# Patient Record
Sex: MALE | Race: WHITE | Employment: OTHER | URBAN - METROPOLITAN AREA
[De-identification: names, ages, dates, MRNs, and addresses within clinical notes are randomized per-mention and may not be internally consistent; named-entity substitution may affect disease eponyms.]

---

## 2017-02-28 DIAGNOSIS — E78.5 HYPERLIPIDEMIA LDL GOAL <100: ICD-10-CM

## 2017-02-28 DIAGNOSIS — M10.9 GOUT, UNSPECIFIED CAUSE, UNSPECIFIED CHRONICITY, UNSPECIFIED SITE: ICD-10-CM

## 2017-02-28 DIAGNOSIS — I10 BENIGN ESSENTIAL HYPERTENSION: ICD-10-CM

## 2017-02-28 NOTE — TELEPHONE ENCOUNTER
"Reason for Call:  Medication or medication refill:    Do you use a [facility]?  Name of the pharmacy and phone number for the current request:  [facility] [address] [place] [address] [address] AT [facility] OF  [address]      Name of the medication requested: furosemide (LASIX) 20 MG tablet  allopurinol (ZYLOPRIM) 300 MG tablet  amLODIPine (NORVASC) 10 MG tablet  simvastatin (ZOCOR) 20 MG tablet  metoprolol (TOPROL-XL) 50 MG 24 hr tablet  losartan (COZAAR) 100 MG tablet    Other request: pt changed pharmacy;  Ran out of \"some\" of them and wants to  Pick all of them up today    Can we leave a detailed message on this number? YES    Phone number patient can be reached at: Home number on file [phone] (home)    Best Time:     Call taken on 2/28/2017 at 12:45 PM by Belkis Heller      "

## 2017-02-28 NOTE — TELEPHONE ENCOUNTER
Lasix, amlodipine, metoprolol and losartan      Last Written Prescription Date: 09/08/16  Last Fill Quantity: 90, # refills: 3  Last Office Visit with [facility], [facility] or [facility] prescribing provider: 09/08/16       Potassium   Date Value Ref Range Status   09/08/2016 3.8 3.4 - 5.3 mmol/L Final     Creatinine   Date Value Ref Range Status   09/08/2016 1.20 0.66 - 1.25 mg/dL Final     BP Readings from Last 3 Encounters:   09/08/16 138/82   06/02/15 136/66   05/25/15 144/65     allopurinol       Last Written Prescription Date: 09/08/16  Last Fill Quantity: 90, # refills: 3  Last Office Visit with [facility], [facility] or [facility] prescribing provider:  09/08/16        No results found for: URIC]  Creatinine   Date Value Ref Range Status   09/08/2016 1.20 0.66 - 1.25 mg/dL Final   ]  Lab Results   Component Value Date    WBC 7.7 09/08/2016     Lab Results   Component Value Date    RBC 5.43 09/08/2016     Lab Results   Component Value Date    HGB 14.3 09/08/2016     Lab Results   Component Value Date    HCT 44.1 09/08/2016     No components found for: MCT  Lab Results   Component Value Date    MCV 81 09/08/2016     Lab Results   Component Value Date    MCH 26.3 09/08/2016     Lab Results   Component Value Date    MCHC 32.4 09/08/2016     Lab Results   Component Value Date    RDW 16.2 09/08/2016     Lab Results   Component Value Date     09/08/2016     Lab Results   Component Value Date    AST 10 09/08/2016     Lab Results   Component Value Date    ALT 22 09/08/2016     simvastatin     Last Written Prescription Date: 09/08/16  Last Fill Quantity: 90, # refills: 3  Last Office Visit with [facility], [facility] or [facility] prescribing provider: 09/08/16       Lab Results   Component Value Date    CHOL 217 09/08/2016     Lab Results   Component Value Date    HDL 53 09/08/2016     Lab Results   Component Value Date     09/08/2016     Lab Results   Component Value Date    TRIG 265 09/08/2016     Lab Results   Component Value Date    CHOLHDLRATIO  2.8 02/26/2015

## 2017-03-02 RX ORDER — SIMVASTATIN 20 MG
20 TABLET ORAL AT BEDTIME
Qty: 90 TABLET | Refills: 1 | Status: SHIPPED | OUTPATIENT
Start: 2017-03-02 | End: 2018-08-23

## 2017-03-02 RX ORDER — FUROSEMIDE 20 MG
TABLET ORAL
Qty: 90 TABLET | Refills: 1 | Status: SHIPPED | OUTPATIENT
Start: 2017-03-02 | End: 2018-08-23

## 2017-03-02 RX ORDER — LOSARTAN POTASSIUM 100 MG/1
100 TABLET ORAL DAILY
Qty: 90 TABLET | Refills: 1 | Status: SHIPPED | OUTPATIENT
Start: 2017-03-02 | End: 2018-08-23

## 2017-03-02 RX ORDER — AMLODIPINE BESYLATE 10 MG/1
10 TABLET ORAL DAILY
Qty: 90 TABLET | Refills: 1 | Status: SHIPPED | OUTPATIENT
Start: 2017-03-02 | End: 2018-08-23

## 2017-03-02 RX ORDER — METOPROLOL SUCCINATE 50 MG/1
50 TABLET, EXTENDED RELEASE ORAL DAILY
Qty: 90 TABLET | Refills: 1 | Status: SHIPPED | OUTPATIENT
Start: 2017-03-02 | End: 2018-08-23

## 2017-03-02 RX ORDER — ALLOPURINOL 300 MG/1
300 TABLET ORAL DAILY
Qty: 90 TABLET | Refills: 1 | Status: SHIPPED | OUTPATIENT
Start: 2017-03-02 | End: 2018-08-23

## 2018-01-18 ENCOUNTER — ALLIED HEALTH/NURSE VISIT (OUTPATIENT)
Dept: NURSING | Facility: CLINIC | Age: 73
End: 2018-01-18
Payer: COMMERCIAL

## 2018-01-18 DIAGNOSIS — Z23 NEED FOR PROPHYLACTIC VACCINATION AND INOCULATION AGAINST INFLUENZA: Primary | ICD-10-CM

## 2018-01-18 PROCEDURE — 90471 IMMUNIZATION ADMIN: CPT

## 2018-01-18 PROCEDURE — 90662 IIV NO PRSV INCREASED AG IM: CPT

## 2018-01-18 NOTE — MR AVS SNAPSHOT
"              After Visit Summary   1/18/2018    Betito Anderson    MRN: [id number]           Patient Information     Date Of Birth          1945        Visit Information        Provider Department      1/18/2018 3:00 PM CS NURSE [facility]        Today's Diagnoses     Need for prophylactic vaccination and inoculation against influenza    -  1       Follow-ups after your visit        Your next 10 appointments already scheduled     Jan 18, 2018  3:00 PM CST   Nurse Only with CS NURSE   [facility] [place] ([facility])    [address]  [place] [address] [phone]              Who to contact     If you have questions or need follow up information about today's clinic visit or your schedule please contact [facility] directly at [phone].  Normal or non-critical lab and imaging results will be communicated to you by SmashFlyhart, letter or phone within 4 business days after the clinic has received the results. If you do not hear from us within 7 days, please contact the clinic through SmashFlyhart or phone. If you have a critical or abnormal lab result, we will notify you by phone as soon as possible.  Submit refill requests through [employer] or call your pharmacy and they will forward the refill request to us. Please allow 3 business days for your refill to be completed.          Additional Information About Your Visit        SmashFlyhart Information     [employer] lets you send messages to your doctor, view your test results, renew your prescriptions, schedule appointments and more. To sign up, go to [email or web address] . Click on \"Log in\" on the left side of the screen, which will take you to the Welcome page. Then click on \"Sign up Now\" on the right side of the page.     You will be asked to enter the access code listed below, as well as some personal information. Please follow the directions to create your username and password.     Your access code is: " [email or web address]  Expires: 2018  2:47 PM     Your access code will  in 90 days. If you need help or a new code, please call your [facility] clinic or [phone].        Care EveryWhere ID     This is your Care EveryWhere ID. This could be used by other organizations to access your [facility] medical records  [id number]         Blood Pressure from Last 3 Encounters:   16 138/82   06/02/15 136/66   05/25/15 144/65    Weight from Last 3 Encounters:   16 210 lb (95.3 kg)   06/02/15 206 lb 12.8 oz (93.8 kg)   02/26/15 213 lb 9.6 oz (96.9 kg)              We Performed the Following     FLU VACCINE, INCREASED ANTIGEN, PRESV FREE, AGE 65+ [00403]     Vaccine Administration, Initial [64602]        Primary Care Provider Office Phone # Fax #    Rudy Mat Vernon [phone] [address] [address]        Equal Access to Services     [facility]: Hadii aad ku hadasho Soomaali, waaxda luqadaha, qaybta kaalmada adeblessing, dulce sofia . So [facility] [phone].    ATENCIÓN: Si habla español, tiene a mason disposición servicios gratuitos de asistencia lingüística. [facility] [phone].    We comply with applicable federal civil rights laws and [place] laws. We do not discriminate on the basis of race, color, national origin, age, disability, sex, sexual orientation, or gender identity.            Thank you!     Thank you for choosing [facility]  for your care. Our goal is always to provide you with excellent care. Hearing back from our patients is one way we can continue to improve our services. Please take a few minutes to complete the written survey that you may receive in the mail after your visit with us. Thank you!             Your Updated Medication List - Protect others around you: Learn how to safely use, store and throw away your medicines at www.disposemymeds.org.          This list is accurate as of: 18  2:47 PM.   Always use your most recent med list.                   Brand Name Dispense Instructions for use Diagnosis    allopurinol 300 MG tablet    ZYLOPRIM    90 tablet    Take 1 tablet (300 mg) by mouth daily    Gout, unspecified cause, unspecified chronicity, unspecified site       amLODIPine 10 MG tablet    NORVASC    90 tablet    Take 1 tablet (10 mg) by mouth daily    Benign essential hypertension       aspirin 325 MG EC tablet     40 tablet    Take 1 tablet (325 mg) by mouth daily        furosemide 20 MG tablet    LASIX    90 tablet    TAKE 1 TABLET EVERY DAY    Benign essential hypertension       ibuprofen 600 MG tablet    ADVIL/MOTRIN    30 tablet    Take 1 tablet (600 mg) by mouth 3 times daily    Radicular pain of left lower extremity       losartan 100 MG tablet    COZAAR    90 tablet    Take 1 tablet (100 mg) by mouth daily    Benign essential hypertension       metoprolol succinate 50 MG 24 hr tablet    TOPROL-XL    90 tablet    Take 1 tablet (50 mg) by mouth daily    Benign essential hypertension       simvastatin 20 MG tablet    ZOCOR    90 tablet    Take 1 tablet (20 mg) by mouth At Bedtime    Hyperlipidemia LDL goal <100

## 2018-01-18 NOTE — PROGRESS NOTES
Injectable Influenza Immunization Documentation    1.  Is the person to be vaccinated sick today?   No    2. Does the person to be vaccinated have an allergy to a component   of the vaccine?   No  Egg Allergy Algorithm Link    3. Has the person to be vaccinated ever had a serious reaction   to influenza vaccine in the past?   No    4. Has the person to be vaccinated ever had Guillain-Barré syndrome?   No    Form completed by Vivian Saravia CMA  Prior to injection verified patient identity using patient's name and date of birth.

## 2018-10-08 NOTE — PROGRESS NOTES
SUBJECTIVE:   Betito Anderson is a 72 year old male who presents for Preventive Visit.    The patient feels fine and weight is down, although still too much.  He is not working out regularly.  He does not check his blood pressure and it is quite high today.  He does miss his blood pressure medications on occasion mostly due to urinary frequency.  He is retired but works 3 times a week at the [place].  No chest pain or shortness of breath.  No palpitations or dizziness.  No gout.               Past Medical History:      Past Medical History:   Diagnosis Date     ASCVD (arteriosclerotic cardiovascular disease) 1997    angio with 40% circ lesion     Carotid artery plaque 2005    seen on us, about 50% stenosis, fu 2009 us no significant stenosis     Elevated blood sugar      Gout     on allopurinol     HTN (hypertension)      Hypercholesteremia      Hyponatremia 2015    felt due to chlorthalidone     Low mean corpuscular volume (MCV)      Near syncope 5/15    fu est echo low level but neg     Psoriasis     Dr. Marti     Screening 2012    abd us no aaa             Past Surgical History:      Past Surgical History:   Procedure Laterality Date     C ANESTH,DX ARTHROSCOPIC PROC KNEE JOINT  2009             Social History:     Social History     Social History     Marital status:      Spouse name: N/A     Number of children: 2     Years of education: N/A     Occupational History     retired      Social History Main Topics     Smoking status: Former Smoker     Quit date: 9/11/1998     Smokeless tobacco: Never Used     Alcohol use 8.4 oz/week     14 Standard drinks or equivalent per week      Comment: 1-2 a night     Drug use: No     Sexual activity: No     Other Topics Concern     Not on file     Social History Narrative             Family History:   reviewed         Allergies:     Allergies   Allergen Reactions     Ace Inhibitors Anaphylaxis     Edema of ace             Medications:     Current Outpatient  "Prescriptions   Medication Sig Dispense Refill     allopurinol (ZYLOPRIM) 300 MG tablet TAKE 1 TABLET(300 MG) BY MOUTH DAILY 90 tablet 3     amLODIPine (NORVASC) 10 MG tablet TAKE 1 TABLET(10 MG) BY MOUTH DAILY 90 tablet 3     aspirin 325 MG EC tablet Take 1 tablet (325 mg) by mouth daily 40 tablet      furosemide (LASIX) 20 MG tablet Take 1 tablet (20 mg) by mouth daily 90 tablet 3     ibuprofen (ADVIL,MOTRIN) 600 MG tablet Take 1 tablet (600 mg) by mouth 3 times daily 30 tablet 1     losartan (COZAAR) 100 MG tablet TAKE 1 TABLET(100 MG) BY MOUTH DAILY 90 tablet 3     metoprolol succinate (TOPROL-XL) 50 MG 24 hr tablet TAKE 1 TABLET(50 MG) BY MOUTH DAILY 90 tablet 3     simvastatin (ZOCOR) 20 MG tablet TAKE 1 TABLET(20 MG) BY MOUTH AT BEDTIME 90 tablet 3     [DISCONTINUED] amLODIPine (NORVASC) 10 MG tablet TAKE 1 TABLET(10 MG) BY MOUTH DAILY 30 tablet 0     [DISCONTINUED] furosemide (LASIX) 20 MG tablet TAKE 1 TABLET BY MOUTH EVERY DAY 30 tablet 0     [DISCONTINUED] losartan (COZAAR) 100 MG tablet TAKE 1 TABLET(100 MG) BY MOUTH DAILY 30 tablet 0     [DISCONTINUED] metoprolol succinate (TOPROL-XL) 50 MG 24 hr tablet TAKE 1 TABLET(50 MG) BY MOUTH DAILY 30 tablet 0     [DISCONTINUED] simvastatin (ZOCOR) 20 MG tablet TAKE 1 TABLET(20 MG) BY MOUTH AT BEDTIME 30 tablet 0               Review of Systems:   The 10 point Review of Systems is negative other than noted in the HPI           Physical Exam:   Blood pressure 184/81, pulse 85, temperature 97.9  F (36.6  C), temperature source Oral, height 5' 6\" (1.676 m), weight 195 lb (88.5 kg), SpO2 97 %.    Exam:  Constitutional: healthy appearing, alert and in no distress  Heent: Normocephalic. Head without obvious masses or lesions. PERRLDC, EOMI. Mouth exam within normal limits: tongue, mucous membranes, posterior pharynx all normal, no lesions or abnormalities seen.  Tm's and canals within normal limits bilaterally. Neck supple, no nuchal rigidity or masses. No " supraclavicular, or cervical adenopathy. Thyroid symmetric, no masses.  Cardiovascular: irregular rate and rhythm, no murmer, rub or gallops.  JVP not elevated, no edema.  Carotids within normal limits bilaterally, no bruits.  Respiratory: Normal respiratory effort.  Lungs clear, normal flow, no wheezing or crackles.  Breasts: Normal bilaterally.  No masses or lesions.  Nipples within normal limites.  No axillary lesions or nodes.  Gastrointestinal: Normal active bowel sounds.   Soft, not tender, no masses, guarding or rebound.  No hepatosplenomegaly.   Genitourinary: Rectal mod benign prostatic hypertrophy, smooth  Musculoskeletal: extremities normal, no gross deformities noted.  Skin: no suspicious lesions or rashes   Neurologic: Mental status within normal limits.  Speech fluent.  No gross motor abnormalities and gait intact.  Psychiatric: mentation appears normal and affect normal.         Data:   Labs sent; ekg - afib, otherwise within normal limits.        Assessment:   1. Normal complete physical exam  2. New onset afib, chadsvasc score of 2 to 3  3. Ascvd, stable  4. Hypertension, control poor, in part due to missing doses  5. Obesity, weight loss needed  6. Elevated sugar, cholesterol, follow up labs  7. Gout, no issues  8. Carotid dz, last follow up fine  9. Low mcv, follow up labs  10. hcm         Plan:   Change toprol to 50mg bid  Est echo  Cards consult  Labs today  Exercise, diet and weight loss  Fit testing  shingrix at pharm  Take meds regularly  Flu shot today    I discussed with patient at length dx of afib and causes, risk of stroke, risk of bleeding, need for blood pressure control, rec cutting down alcohol, see cards, eval as noted.      Rudy Vernon M.D.            Are you in the first 12 months of your Medicare Part B coverage?  No    Healthy Habits:    Do you get at least three servings of calcium containing foods daily (dairy, green leafy vegetables, etc.)? yes    Amount of exercise or  daily activities, outside of work: 5 day(s) per week    Problems taking medications regularly No    Medication side effects: No    Have you had an eye exam in the past two years? no    Do you see a dentist twice per year? no    Do you have sleep apnea, excessive snoring or daytime drowsiness?no      Ability to successfully perform activities of daily living: Yes, no assistance needed    Home safety:  none identified     Hearing impairment: Yes,     Fall risk:           COGNITIVE SCREEN  1) Repeat 3 items (Leader, Season, Table)    2) Clock draw:   3) 3 item recall: Recalls 3 objects  Results: 3 items recalled: COGNITIVE IMPAIRMENT LESS LIKELY    Mini-CogTM Copyright S Karolina. Licensed by the author for use in [facility]; reprinted with permission (soob@[facility]). All rights reserved.                Reviewed and updated as needed this visit by clinical staff         Reviewed and updated as needed this visit by Provider        Social History   Substance Use Topics     Smoking status: Former Smoker     Quit date: 9/11/1998     Smokeless tobacco: Never Used     Alcohol use 0.0 oz/week     0 Standard drinks or equivalent per week      Comment: 1-2 a night       If you drink alcohol do you typically have >3 drinks per day or >7 drinks per week? No                        Today's PHQ-2 Score:   PHQ-2 ( 1999 Pfizer) 9/8/2016 2/26/2015   Q1: Little interest or pleasure in doing things 0 0   Q2: Feeling down, depressed or hopeless 0 0   PHQ-2 Score 0 0       Do you feel safe in your environment - Yes    Do you have a Health Care Directive?: Yes: Patient states has Advance Directive and will bring in a copy to clinic.    Current providers sharing in care for this patient include:   Patient Care Team:  Rudy Vernon MD as PCP - General (Internal Medicine)    The following health maintenance items are reviewed in Epic and correct as of today:  Health Maintenance   Topic Date Due     FIT Q1 YR  11/21/2014      "FALL RISK ASSESSMENT  09/08/2017     PHQ-2 Q1 YR  09/08/2017     INFLUENZA VACCINE (1) 09/01/2018     ADVANCE DIRECTIVE PLANNING Q5 YRS  10/29/2018     LIPID SCREEN Q5 YR MALE (SYSTEM ASSIGNED)  09/08/2021     TETANUS IMMUNIZATION (SYSTEM ASSIGNED)  09/13/2022     PNEUMOCOCCAL  Completed     AORTIC ANEURYSM SCREENING (SYSTEM ASSIGNED)  Completed     HEPATITIS C SCREENING  Completed       End of Life Planning:  Patient currently has an advanced directive: yes    COUNSELING:  Reviewed preventive health counseling, as reflected in patient instructions       Regular exercise       Healthy diet/nutrition    BP Readings from Last 1 Encounters:   09/08/16 138/82     Estimated body mass index is 34.41 kg/(m^2) as calculated from the following:    Height as of 9/8/16: 5' 5.5\" (1.664 m).    Weight as of 9/8/16: 210 lb (95.3 kg).      Weight management plan: exercise, diet     reports that he quit smoking about 20 years ago. He has never used smokeless tobacco.      Appropriate preventive services were discussed with this patient, including applicable screening as appropriate for cardiovascular disease, diabetes, osteopenia/osteoporosis, and glaucoma.  As appropriate for age/gender, discussed screening for colorectal cancer, prostate cancer, breast cancer, and cervical cancer. Checklist reviewing preventive services available has been given to the patient.    Reviewed patients plan of care and provided an AVS. The Basic Care Plan (routine screening as documented in Health Maintenance) for Betito meets the Care Plan requirement. This Care Plan has been established and reviewed with the Patient.    Counseling Resources:  ATP IV Guidelines  Pooled Cohorts Equation Calculator  Breast Cancer Risk Calculator  FRAX Risk Assessment  ICSI Preventive Guidelines  Dietary Guidelines for Americans, 2010  [employer]'s MyPlate  ASA Prophylaxis  Lung CA Screening    Rudy Vernon MD  [facility]  "

## 2018-10-08 NOTE — PATIENT INSTRUCTIONS
I would recommend getting the new shingles shot called shingrix, but I would do it at your pharmacy as they can check with the insurance company to see if it is paid for.    I will let you know all the lab results once back    I will have the [place] [facility] call you to do both a stress test and to make a appointment for an office visit with cardiology    For now change the dose of your metoprolol to 50mg twice daily    Rudy Vernon M.D.              Preventive Health Recommendations:       Male Ages 65 and over    Yearly exam:             See your health care provider every year in order to  o   Review health changes.   o   Discuss preventive care.    o   Review your medicines if your doctor has prescribed any.    Talk with your health care provider about whether you should have a test to screen for prostate cancer (PSA).    Every 3 years, have a diabetes test (fasting glucose). If you are at risk for diabetes, you should have this test more often.    Every 5 years, have a cholesterol test. Have this test more often if you are at risk for high cholesterol or heart disease.     Every 10 years, have a colonoscopy. Or, have a yearly FIT test (stool test). These exams will check for colon cancer.    Talk to with your health care provider about screening for Abdominal Aortic Aneurysm if you have a family history of AAA or have a history of smoking.  Shots:     Get a flu shot each year.     Get a tetanus shot every 10 years.     Talk to your doctor about your pneumonia vaccines. There are now two you should receive - Pneumovax (PPSV 23) and Prevnar (PCV 13).    Talk to your pharmacist about a shingles vaccine.     Talk to your doctor about the hepatitis B vaccine.  Nutrition:     Eat at least 5 servings of fruits and vegetables each day.     Eat whole-grain bread, whole-wheat pasta and brown rice instead of white grains and rice.     Get adequate Calcium and Vitamin D.   Lifestyle    Exercise for at least 150 minutes a  week (30 minutes a day, 5 days a week). This will help you control your weight and prevent disease.     Limit alcohol to one drink per day.     No smoking.     Wear sunscreen to prevent skin cancer.     See your dentist every six months for an exam and cleaning.     See your eye doctor every 1 to 2 years to screen for conditions such as glaucoma, macular degeneration and cataracts.

## 2018-10-09 ENCOUNTER — OFFICE VISIT (OUTPATIENT)
Dept: FAMILY MEDICINE | Facility: CLINIC | Age: 73
End: 2018-10-09
Payer: COMMERCIAL

## 2018-10-09 VITALS
BODY MASS INDEX: 31.34 KG/M2 | HEIGHT: 66 IN | TEMPERATURE: 97.9 F | SYSTOLIC BLOOD PRESSURE: 184 MMHG | HEART RATE: 85 BPM | OXYGEN SATURATION: 97 % | WEIGHT: 195 LBS | DIASTOLIC BLOOD PRESSURE: 81 MMHG

## 2018-10-09 DIAGNOSIS — R71.8 LOW MEAN CORPUSCULAR VOLUME (MCV): ICD-10-CM

## 2018-10-09 DIAGNOSIS — E66.9 NON MORBID OBESITY, UNSPECIFIED OBESITY TYPE: ICD-10-CM

## 2018-10-09 DIAGNOSIS — I65.29 CAROTID ATHEROSCLEROSIS, UNSPECIFIED LATERALITY: ICD-10-CM

## 2018-10-09 DIAGNOSIS — M10.9 GOUT, UNSPECIFIED CAUSE, UNSPECIFIED CHRONICITY, UNSPECIFIED SITE: ICD-10-CM

## 2018-10-09 DIAGNOSIS — I25.10 ASCVD (ARTERIOSCLEROTIC CARDIOVASCULAR DISEASE): ICD-10-CM

## 2018-10-09 DIAGNOSIS — I10 BENIGN ESSENTIAL HYPERTENSION: ICD-10-CM

## 2018-10-09 DIAGNOSIS — Z23 NEED FOR PROPHYLACTIC VACCINATION AND INOCULATION AGAINST INFLUENZA: ICD-10-CM

## 2018-10-09 DIAGNOSIS — I48.91 ATRIAL FIBRILLATION, UNSPECIFIED TYPE (H): ICD-10-CM

## 2018-10-09 DIAGNOSIS — Z00.00 ROUTINE GENERAL MEDICAL EXAMINATION AT A HEALTH CARE FACILITY: Primary | ICD-10-CM

## 2018-10-09 DIAGNOSIS — E78.5 HYPERLIPIDEMIA LDL GOAL <100: ICD-10-CM

## 2018-10-09 DIAGNOSIS — R73.9 ELEVATED BLOOD SUGAR: ICD-10-CM

## 2018-10-09 LAB
ALBUMIN SERPL-MCNC: 3.5 G/DL (ref 3.4–5)
ALP SERPL-CCNC: 91 U/L (ref 40–150)
ALT SERPL W P-5'-P-CCNC: 27 U/L (ref 0–70)
ANION GAP SERPL CALCULATED.3IONS-SCNC: 11 MMOL/L (ref 3–14)
AST SERPL W P-5'-P-CCNC: 29 U/L (ref 0–45)
BASOPHILS # BLD AUTO: 0.1 10E9/L (ref 0–0.2)
BASOPHILS NFR BLD AUTO: 0.6 %
BILIRUB SERPL-MCNC: 1 MG/DL (ref 0.2–1.3)
BUN SERPL-MCNC: 13 MG/DL (ref 7–30)
CALCIUM SERPL-MCNC: 8.9 MG/DL (ref 8.5–10.1)
CHLORIDE SERPL-SCNC: 99 MMOL/L (ref 94–109)
CHOLEST SERPL-MCNC: 153 MG/DL
CO2 SERPL-SCNC: 26 MMOL/L (ref 20–32)
CREAT SERPL-MCNC: 1.06 MG/DL (ref 0.66–1.25)
DIFFERENTIAL METHOD BLD: ABNORMAL
EOSINOPHIL # BLD AUTO: 0.2 10E9/L (ref 0–0.7)
EOSINOPHIL NFR BLD AUTO: 2.2 %
ERYTHROCYTE [DISTWIDTH] IN BLOOD BY AUTOMATED COUNT: 19.6 % (ref 10–15)
FERRITIN SERPL-MCNC: 284 NG/ML (ref 26–388)
GFR SERPL CREATININE-BSD FRML MDRD: 69 ML/MIN/1.7M2
GLUCOSE SERPL-MCNC: 89 MG/DL (ref 70–99)
HBA1C MFR BLD: 5.3 % (ref 0–5.6)
HCT VFR BLD AUTO: 44.7 % (ref 40–53)
HDLC SERPL-MCNC: 90 MG/DL
HGB BLD-MCNC: 14.5 G/DL (ref 13.3–17.7)
IRON SATN MFR SERPL: 32 % (ref 15–46)
IRON SERPL-MCNC: 91 UG/DL (ref 35–180)
LDLC SERPL CALC-MCNC: 47 MG/DL
LYMPHOCYTES # BLD AUTO: 1.2 10E9/L (ref 0.8–5.3)
LYMPHOCYTES NFR BLD AUTO: 13.3 %
MCH RBC QN AUTO: 26.2 PG (ref 26.5–33)
MCHC RBC AUTO-ENTMCNC: 32.4 G/DL (ref 31.5–36.5)
MCV RBC AUTO: 81 FL (ref 78–100)
MONOCYTES # BLD AUTO: 0.7 10E9/L (ref 0–1.3)
MONOCYTES NFR BLD AUTO: 7 %
NEUTROPHILS # BLD AUTO: 7.1 10E9/L (ref 1.6–8.3)
NEUTROPHILS NFR BLD AUTO: 76.9 %
NONHDLC SERPL-MCNC: 63 MG/DL
PLATELET # BLD AUTO: 338 10E9/L (ref 150–450)
POTASSIUM SERPL-SCNC: 3.8 MMOL/L (ref 3.4–5.3)
PROT SERPL-MCNC: 7.6 G/DL (ref 6.8–8.8)
RBC # BLD AUTO: 5.53 10E12/L (ref 4.4–5.9)
SODIUM SERPL-SCNC: 136 MMOL/L (ref 133–144)
TIBC SERPL-MCNC: 282 UG/DL (ref 240–430)
TRIGL SERPL-MCNC: 80 MG/DL
TSH SERPL DL<=0.005 MIU/L-ACNC: 2.54 MU/L (ref 0.4–4)
WBC # BLD AUTO: 9.3 10E9/L (ref 4–11)

## 2018-10-09 PROCEDURE — 83540 ASSAY OF IRON: CPT | Performed by: INTERNAL MEDICINE

## 2018-10-09 PROCEDURE — 36415 COLL VENOUS BLD VENIPUNCTURE: CPT | Performed by: INTERNAL MEDICINE

## 2018-10-09 PROCEDURE — G0439 PPPS, SUBSEQ VISIT: HCPCS | Performed by: INTERNAL MEDICINE

## 2018-10-09 PROCEDURE — 83550 IRON BINDING TEST: CPT | Performed by: INTERNAL MEDICINE

## 2018-10-09 PROCEDURE — 93010 ELECTROCARDIOGRAM REPORT: CPT | Performed by: INTERNAL MEDICINE

## 2018-10-09 PROCEDURE — 90662 IIV NO PRSV INCREASED AG IM: CPT | Performed by: INTERNAL MEDICINE

## 2018-10-09 PROCEDURE — 80053 COMPREHEN METABOLIC PANEL: CPT | Performed by: INTERNAL MEDICINE

## 2018-10-09 PROCEDURE — 82728 ASSAY OF FERRITIN: CPT | Performed by: INTERNAL MEDICINE

## 2018-10-09 PROCEDURE — 83036 HEMOGLOBIN GLYCOSYLATED A1C: CPT | Performed by: INTERNAL MEDICINE

## 2018-10-09 PROCEDURE — 85025 COMPLETE CBC W/AUTO DIFF WBC: CPT | Performed by: INTERNAL MEDICINE

## 2018-10-09 PROCEDURE — 80061 LIPID PANEL: CPT | Performed by: INTERNAL MEDICINE

## 2018-10-09 PROCEDURE — 84443 ASSAY THYROID STIM HORMONE: CPT | Performed by: INTERNAL MEDICINE

## 2018-10-09 PROCEDURE — 99213 OFFICE O/P EST LOW 20 MIN: CPT | Mod: 25 | Performed by: INTERNAL MEDICINE

## 2018-10-09 PROCEDURE — G0008 ADMIN INFLUENZA VIRUS VAC: HCPCS | Performed by: INTERNAL MEDICINE

## 2018-10-09 RX ORDER — SIMVASTATIN 20 MG
TABLET ORAL
Qty: 90 TABLET | Refills: 3 | Status: SHIPPED | OUTPATIENT
Start: 2018-10-09 | End: 2019-03-21

## 2018-10-09 RX ORDER — FUROSEMIDE 20 MG
20 TABLET ORAL DAILY
Qty: 90 TABLET | Refills: 3 | Status: SHIPPED | OUTPATIENT
Start: 2018-10-09 | End: 2019-03-21

## 2018-10-09 RX ORDER — ALLOPURINOL 300 MG/1
TABLET ORAL
Qty: 90 TABLET | Refills: 3 | Status: SHIPPED | OUTPATIENT
Start: 2018-10-09 | End: 2019-03-21

## 2018-10-09 RX ORDER — AMLODIPINE BESYLATE 10 MG/1
TABLET ORAL
Qty: 90 TABLET | Refills: 3 | Status: SHIPPED | OUTPATIENT
Start: 2018-10-09 | End: 2019-03-21

## 2018-10-09 RX ORDER — LOSARTAN POTASSIUM 100 MG/1
TABLET ORAL
Qty: 90 TABLET | Refills: 3 | Status: SHIPPED | OUTPATIENT
Start: 2018-10-09 | End: 2019-03-21

## 2018-10-09 RX ORDER — METOPROLOL SUCCINATE 50 MG/1
TABLET, EXTENDED RELEASE ORAL
Qty: 90 TABLET | Refills: 3 | Status: SHIPPED | OUTPATIENT
Start: 2018-10-09 | End: 2019-03-21

## 2018-10-09 NOTE — MR AVS SNAPSHOT
After Visit Summary   10/9/2018    Betito Anderson    MRN: [id number]           Patient Information     Date Of Birth          1945        Visit Information        Provider Department      10/9/2018 10:00 AM Rudy Vernon MD [facility]        Today's Diagnoses     Routine general medical examination at a health care facility    -  1    Atrial fibrillation, unspecified type (H)        ASCVD (arteriosclerotic cardiovascular disease)        Benign essential hypertension        Hyperlipidemia LDL goal <100        Elevated blood sugar        Non morbid obesity, unspecified obesity type        Gout, unspecified cause, unspecified chronicity, unspecified site        Carotid atherosclerosis, unspecified laterality        Low mean corpuscular volume (MCV)        Need for prophylactic vaccination and inoculation against influenza          Care Instructions    I would recommend getting the new shingles shot called shingrix, but I would do it at your pharmacy as they can check with the insurance company to see if it is paid for.    I will let you know all the lab results once back    I will have the [place] [facility] clinic call you to do both a stress test and to make a appointment for an office visit with cardiology    For now change the dose of your metoprolol to 50mg twice daily    Rudy Vernon M.D.              Preventive Health Recommendations:       Male Ages 65 and over    Yearly exam:             See your health care provider every year in order to  o   Review health changes.   o   Discuss preventive care.    o   Review your medicines if your doctor has prescribed any.    Talk with your health care provider about whether you should have a test to screen for prostate cancer (PSA).    Every 3 years, have a diabetes test (fasting glucose). If you are at risk for diabetes, you should have this test more often.    Every 5 years, have a cholesterol test. Have this test more often if you are  at risk for high cholesterol or heart disease.     Every 10 years, have a colonoscopy. Or, have a yearly FIT test (stool test). These exams will check for colon cancer.    Talk to with your health care provider about screening for Abdominal Aortic Aneurysm if you have a family history of AAA or have a history of smoking.  Shots:     Get a flu shot each year.     Get a tetanus shot every 10 years.     Talk to your doctor about your pneumonia vaccines. There are now two you should receive - Pneumovax (PPSV 23) and Prevnar (PCV 13).    Talk to your pharmacist about a shingles vaccine.     Talk to your doctor about the hepatitis B vaccine.  Nutrition:     Eat at least 5 servings of fruits and vegetables each day.     Eat whole-grain bread, whole-wheat pasta and brown rice instead of white grains and rice.     Get adequate Calcium and Vitamin D.   Lifestyle    Exercise for at least 150 minutes a week (30 minutes a day, 5 days a week). This will help you control your weight and prevent disease.     Limit alcohol to one drink per day.     No smoking.     Wear sunscreen to prevent skin cancer.     See your dentist every six months for an exam and cleaning.     See your eye doctor every 1 to 2 years to screen for conditions such as glaucoma, macular degeneration and cataracts.          Follow-ups after your visit        Additional Services     CARDIOLOGY EVAL ADULT REFERRAL       Preferred location:  [facility] [phone]   [email or web address]    Please be aware that coverage of these services is subject to the terms and limitations of your health insurance plan.  Call member services at your health plan with any benefit or coverage questions.      Please bring the following to your appointment:  Any x-rays, CTs or MRIs which have been performed. Contact the facility where they were done to arrange for  prior to your scheduled appointment.    List  "of current medications  This referral request   Any documents/labs given to you for this referral                  Follow-up notes from your care team     Return in about 1 year (around 10/9/2019) for Physical Exam.      Future tests that were ordered for you today     Open Future Orders        Priority Expected Expires Ordered    Exercise Stress Echocardiogram Routine  10/9/2019 10/9/2018    Fecal colorectal cancer screen (FIT) Routine 10/30/2018 2019 10/9/2018            Who to contact     If you have questions or need follow up information about today's clinic visit or your schedule please contact [facility] directly at [phone].  Normal or non-critical lab and imaging results will be communicated to you by Comprehend Systemshart, letter or phone within 4 business days after the clinic has received the results. If you do not hear from us within 7 days, please contact the clinic through Comprehend Systemshart or phone. If you have a critical or abnormal lab result, we will notify you by phone as soon as possible.  Submit refill requests through Blockchain or call your pharmacy and they will forward the refill request to us. Please allow 3 business days for your refill to be completed.          Additional Information About Your Visit        Comprehend SystemsharVIDDIX Information     Blockchain lets you send messages to your doctor, view your test results, renew your prescriptions, schedule appointments and more. To sign up, go to [email or web address] . Click on \"Log in\" on the left side of the screen, which will take you to the Welcome page. Then click on \"Sign up Now\" on the right side of the page.     You will be asked to enter the access code listed below, as well as some personal information. Please follow the directions to create your username and password.     Your access code is: [email or web address]  Expires: 2019  9:42 AM     Your access code will  in 90 days. If you need help or a new code, please call your [facility] or " "[phone].        Care EveryWhere ID     This is your Care EveryWhere ID. This could be used by other organizations to access your [place] medical records  [id number]        Your Vitals Were     Pulse Temperature Height Pulse Oximetry BMI (Body Mass Index)       85 97.9  F (36.6  C) (Oral) 5' 6\" (1.676 m) 97% 31.47 kg/m2        Blood Pressure from Last 3 Encounters:   10/09/18 184/81   09/08/16 138/82   06/02/15 136/66    Weight from Last 3 Encounters:   10/09/18 195 lb (88.5 kg)   09/08/16 210 lb (95.3 kg)   06/02/15 206 lb 12.8 oz (93.8 kg)              We Performed the Following     CARDIOLOGY EVAL ADULT REFERRAL     CBC with platelets differential     Comprehensive metabolic panel     EKG 12-LEAD CLINIC READ     Ferritin     FLU VACCINE, INCREASED ANTIGEN, PRESV FREE, AGE 65+ [05777]     Hemoglobin A1c     Iron and iron binding capacity     Lipid panel reflex to direct LDL Non-fasting     OFFICE/OUTPT VISIT,EST,LEVL III     TSH with free T4 reflex     Vaccine Administration, Initial [[id number]]          Today's Medication Changes          These changes are accurate as of 10/9/18 10:33 AM.  If you have any questions, ask your nurse or doctor.               These medicines have changed or have updated prescriptions.        Dose/Directions    furosemide 20 MG tablet   Commonly known as:  LASIX   This may have changed:  See the new instructions.   Used for:  Benign essential hypertension   Changed by:  Rudy Vernon MD        Dose:  20 mg   Take 1 tablet (20 mg) by mouth daily   Quantity:  90 tablet   Refills:  3            Where to get your medicines      These medications were sent to [facility] [address] [place] [address] AT [facility] OF  & [facility] 7  [facility] [address] [address]     Phone:  [phone]     allopurinol 300 MG tablet    amLODIPine 10 MG tablet    furosemide 20 MG tablet    losartan 100 MG tablet    metoprolol succinate 50 MG 24 hr tablet    " simvastatin 20 MG tablet                Primary Care Provider Office Phone # Fax #    Rudy Vernon [phone] [address] [address]        Equal Access to Services     BRODIE COBOS : Hadshanelle katz ku farzaneho Somannyali, waaxda luqadaha, qaybta kaalmada adeegyada, waxjorgito idiin hayaan awa mayo laLuisalfredo bello. So [facility] [phone].    ATENCIÓN: Si habla español, tiene a mason disposición servicios gratuitos de asistencia lingüística. Llame al [phone].    We comply with applicable federal civil rights laws and [place] laws. We do not discriminate on the basis of race, color, national origin, age, disability, sex, sexual orientation, or gender identity.            Thank you!     Thank you for choosing [facility]  for your care. Our goal is always to provide you with excellent care. Hearing back from our patients is one way we can continue to improve our services. Please take a few minutes to complete the written survey that you may receive in the mail after your visit with us. Thank you!             Your Updated Medication List - Protect others around you: Learn how to safely use, store and throw away your medicines at www.disposemymeds.org.          This list is accurate as of 10/9/18 10:33 AM.  Always use your most recent med list.                   Brand Name Dispense Instructions for use Diagnosis    allopurinol 300 MG tablet    ZYLOPRIM    90 tablet    TAKE 1 TABLET(300 MG) BY MOUTH DAILY    Gout, unspecified cause, unspecified chronicity, unspecified site       amLODIPine 10 MG tablet    NORVASC    90 tablet    TAKE 1 TABLET(10 MG) BY MOUTH DAILY    Benign essential hypertension       aspirin 325 MG EC tablet     40 tablet    Take 1 tablet (325 mg) by mouth daily        furosemide 20 MG tablet    LASIX    90 tablet    Take 1 tablet (20 mg) by mouth daily    Benign essential hypertension       ibuprofen 600 MG tablet    ADVIL/MOTRIN    30 tablet    Take 1  tablet (600 mg) by mouth 3 times daily    Radicular pain of left lower extremity       losartan 100 MG tablet    COZAAR    90 tablet    TAKE 1 TABLET(100 MG) BY MOUTH DAILY    Benign essential hypertension       metoprolol succinate 50 MG 24 hr tablet    TOPROL-XL    90 tablet    TAKE 1 TABLET(50 MG) BY MOUTH DAILY    Benign essential hypertension       simvastatin 20 MG tablet    ZOCOR    90 tablet    TAKE 1 TABLET(20 MG) BY MOUTH AT BEDTIME    Hyperlipidemia LDL goal <100

## 2018-10-09 NOTE — LETTER
[place] [address] [facility]  [address]  [place]  [address]  Tel: [phone]    October 10, 2018    Betito Anderson  [address] [facility] [address]        Dear Mr. Anderson,    I am happy to report that your cbc or complete blood count is normal with no signs of anemia, leukemia or platelet abnormalities.  Your chemistry panel shows no signs of diabetes.  Your blood salts, kidney tests, liver tests, thyroid test, iron studies, and proteins are all fine.    Your total cholesterol is 153 with the normal range being below 200.  Your HDL or good cholesterol is 90 with the normal range being above 40.  Your LDL or bad cholesterol is 47 with the normal range being below 130.  These numbers are super.    I am happy to bring you this overall excellent report.  Please see cardiology for the afib and I want to see you back in 4 weeks.     If you have any further questions or problems, please contact our office.      Sincerely,    Rudy Vernon MD/ Vivian Saravia CMA  Results for orders placed or performed in visit on 10/09/18   CBC with platelets differential   Result Value Ref Range    WBC 9.3 4.0 - 11.0 10e9/L    RBC Count 5.53 4.4 - 5.9 10e12/L    Hemoglobin 14.5 13.3 - 17.7 g/dL    Hematocrit 44.7 40.0 - 53.0 %    MCV 81 78 - 100 fl    MCH 26.2 (L) 26.5 - 33.0 pg    MCHC 32.4 31.5 - 36.5 g/dL    RDW 19.6 (H) 10.0 - 15.0 %    Platelet Count 338 150 - 450 10e9/L    % Neutrophils 76.9 %    % Lymphocytes 13.3 %    % Monocytes 7.0 %    % Eosinophils 2.2 %    % Basophils 0.6 %    Absolute Neutrophil 7.1 1.6 - 8.3 10e9/L    Absolute Lymphocytes 1.2 0.8 - 5.3 10e9/L    Absolute Monocytes 0.7 0.0 - 1.3 10e9/L    Absolute Eosinophils 0.2 0.0 - 0.7 10e9/L    Absolute Basophils 0.1 0.0 - 0.2 10e9/L    Diff Method Automated Method    Comprehensive metabolic panel   Result Value Ref Range    Sodium 136 133 - 144 mmol/L    Potassium 3.8 3.4 - 5.3 mmol/L    Chloride 99 94 - 109 mmol/L    Carbon Dioxide  26 20 - 32 mmol/L    Anion Gap 11 3 - 14 mmol/L    Glucose 89 70 - 99 mg/dL    Urea Nitrogen 13 7 - 30 mg/dL    Creatinine 1.06 0.66 - 1.25 mg/dL    GFR Estimate 69 >60 mL/min/1.7m2    GFR Estimate If Black 83 >60 mL/min/1.7m2    Calcium 8.9 8.5 - 10.1 mg/dL    Bilirubin Total 1.0 0.2 - 1.3 mg/dL    Albumin 3.5 3.4 - 5.0 g/dL    Protein Total 7.6 6.8 - 8.8 g/dL    Alkaline Phosphatase 91 40 - 150 U/L    ALT 27 0 - 70 U/L    AST 29 0 - 45 U/L   Lipid panel reflex to direct LDL Non-fasting   Result Value Ref Range    Cholesterol 153 <200 mg/dL    Triglycerides 80 <150 mg/dL    HDL Cholesterol 90 >39 mg/dL    LDL Cholesterol Calculated 47 <100 mg/dL    Non HDL Cholesterol 63 <130 mg/dL   Iron and iron binding capacity   Result Value Ref Range    Iron 91 35 - 180 ug/dL    Iron Binding Cap 282 240 - 430 ug/dL    Iron Saturation Index 32 15 - 46 %   Ferritin   Result Value Ref Range    Ferritin 284 26 - 388 ng/mL   Hemoglobin A1c   Result Value Ref Range    Hemoglobin A1C 5.3 0 - 5.6 %   TSH with free T4 reflex   Result Value Ref Range    TSH 2.54 0.40 - 4.00 mU/L               Enclosure: Lab Results

## 2018-10-09 NOTE — PROGRESS NOTES
Injectable Influenza Immunization Documentation    1.  Is the person to be vaccinated sick today?   No    2. Does the person to be vaccinated have an allergy to a component   of the vaccine?   No  Egg Allergy Algorithm Link    3. Has the person to be vaccinated ever had a serious reaction   to influenza vaccine in the past?   No    4. Has the person to be vaccinated ever had Guillain-Barré syndrome?   No    Form completed by Vivian Saravia CMA  Prior to injection verified patient identity using patient's name and date of birth.  Due to injection administration, patient instructed to remain in clinic for 15 minutes  afterwards, and to report any adverse reaction to me immediately.

## 2018-10-10 NOTE — PROGRESS NOTES
It was a pleasure seeing you for your physical examination.  I wanted to get back to you with your test results.  I have enclosed a copy for your review.      I am happy to report that your cbc or complete blood count is normal with no signs of anemia, leukemia or platelet abnormalities.  Your chemistry panel shows no signs of diabetes.  Your blood salts, kidney tests, liver tests, thyroid test, iron studies, and proteins are all fine.    Your total cholesterol is 153 with the normal range being below 200.  Your HDL or good cholesterol is 90 with the normal range being above 40.  Your LDL or bad cholesterol is 47 with the normal range being below 130.  These numbers are super.    I am happy to bring you this overall excellent report.  Please see cardiology for the afib and I want to see you back in 4 weeks.  If you have any questions please call me.

## 2018-10-17 ENCOUNTER — TELEPHONE (OUTPATIENT)
Dept: FAMILY MEDICINE | Facility: CLINIC | Age: 73
End: 2018-10-17

## 2018-10-17 NOTE — TELEPHONE ENCOUNTER
Called heart clinic/ scheduling and cancelled appt. Called pt to notify, no answer- left VM to call back.    Noah DE LA O RN

## 2018-10-17 NOTE — TELEPHONE ENCOUNTER
Judith Hdez from the heart clinic called wanting to clarify the exercise stress echo order (Test is tomorrow). States pt had one done in 2015 and could not reach optimal heart rate and was non diagnostic. Per 2015 report they recommended a nuclear medicine Lexiscan stress test instead.   Do you want to continue with exercise stress echo or change to NM Lexiscan stress test?     Thank you,  Noah DE LA O RN    Triage: Judith can be reached at [phone] (this is the hospital not the clinic, but her co-workers are aware if Judith is unavailable)

## 2018-10-17 NOTE — TELEPHONE ENCOUNTER
Since he has cards appt coming soon, let's just cancel the stress test and see what cards wants to do    Thanks    Rudy Vernon M.D.

## 2018-10-23 ENCOUNTER — OFFICE VISIT (OUTPATIENT)
Dept: CARDIOLOGY | Facility: CLINIC | Age: 73
End: 2018-10-23
Attending: INTERNAL MEDICINE
Payer: COMMERCIAL

## 2018-10-23 VITALS
HEIGHT: 66 IN | WEIGHT: 195 LBS | OXYGEN SATURATION: 97 % | SYSTOLIC BLOOD PRESSURE: 168 MMHG | BODY MASS INDEX: 31.34 KG/M2 | DIASTOLIC BLOOD PRESSURE: 78 MMHG | HEART RATE: 61 BPM

## 2018-10-23 DIAGNOSIS — I48.20 CHRONIC ATRIAL FIBRILLATION (H): Primary | ICD-10-CM

## 2018-10-23 PROCEDURE — 93000 ELECTROCARDIOGRAM COMPLETE: CPT | Performed by: INTERNAL MEDICINE

## 2018-10-23 PROCEDURE — 99204 OFFICE O/P NEW MOD 45 MIN: CPT | Performed by: INTERNAL MEDICINE

## 2018-10-23 NOTE — MR AVS SNAPSHOT
After Visit Summary   10/23/2018    Betito Anderson    MRN: [id number]           Patient Information     Date Of Birth          1945        Visit Information        Provider Department      10/23/2018 8:45 AM Fuad Lezama MD [facility]        Today's Diagnoses     Chronic atrial fibrillation (H)    -  1       Follow-ups after your visit        Your next 10 appointments already scheduled     Nov 14, 2018  8:45 AM CST   Ech Complete with [id number]   [facility] CV Echocardiography (Cardiovascular Imaging at [facility])    [address]  [address]  [facility] [address]   [phone]           1.  Please bring or wear a comfortable two-piece outfit. 2.  You may eat, drink and take your normal medicines. 3.  For any questions that cannot be answered, please contact the ordering physician 4.  Please do not wear perfumes or scented lotions on the day of your exam.            Nov 14, 2018 10:00 AM CST   Holter Monitor with [facility] Radiology - [facility] ([facility])    [address]  [facility] [address]   [phone]              Future tests that were ordered for you today     Open Future Orders        Priority Expected Expires Ordered    Holter Monitor 24 hour - Adult Routine 10/30/2018 10/23/2019 10/23/2018    Echocardiogram Routine 10/30/2018 10/23/2019 10/23/2018            Who to contact     If you have questions or need follow up information about today's clinic visit or your schedule please contact [facility] directly at [phone].  Normal or non-critical lab and imaging results will be communicated to you by MyChart, letter or phone within 4 business days after the clinic has received the results. If you do not hear from us within 7 days, please contact the clinic through MyChart or phone. If you have a critical or abnormal  "lab result, we will notify you by phone as soon as possible.  Submit refill requests through Property Owl or call your pharmacy and they will forward the refill request to us. Please allow 3 business days for your refill to be completed.          Additional Information About Your Visit        MyChart Information     Property Owl lets you send messages to your doctor, view your test results, renew your prescriptions, schedule appointments and more. To sign up, go to [email or web address] . Click on \"Log in\" on the left side of the screen, which will take you to the Welcome page. Then click on \"Sign up Now\" on the right side of the page.     You will be asked to enter the access code listed below, as well as some personal information. Please follow the directions to create your username and password.     Your access code is: [email or web address]  Expires: 2019  9:42 AM     Your access code will  in 90 days. If you need help or a new code, please call your [place] clinic or [phone].        Care EveryWhere ID     This is your Care EveryWhere ID. This could be used by other organizations to access your [place] medical records  [id number]        Your Vitals Were     Pulse Height Pulse Oximetry BMI (Body Mass Index)          61 1.676 m (5' 6\") 97% 31.47 kg/m2         Blood Pressure from Last 3 Encounters:   10/23/18 168/78   10/09/18 184/81   16 138/82    Weight from Last 3 Encounters:   10/23/18 88.5 kg (195 lb)   10/09/18 88.5 kg (195 lb)   16 95.3 kg (210 lb)              We Performed the Following     EKG 12-lead complete w/read - Clinics (performed today)          Today's Medication Changes          These changes are accurate as of 10/23/18  9:19 AM.  If you have any questions, ask your nurse or doctor.               Start taking these medicines.        Dose/Directions    apixaban ANTICOAGULANT 5 MG tablet   Commonly known as:  ELIQUIS   Used for:  Chronic atrial fibrillation (H)   Started by:  Lise " Fuad Chang MD        Dose:  5 mg   Take 1 tablet (5 mg) by mouth 2 times daily   Quantity:  60 tablet   Refills:  11         These medicines have changed or have updated prescriptions.        Dose/Directions    ibuprofen 600 MG tablet   Commonly known as:  ADVIL/MOTRIN   This may have changed:    - when to take this  - reasons to take this   Used for:  Radicular pain of left lower extremity        Dose:  600 mg   Take 1 tablet (600 mg) by mouth 3 times daily   Quantity:  30 tablet   Refills:  1            Where to get your medicines      These medications were sent to [facility] Replaced by [facility] - [place], [place] - [address] AT [facility] OF  & [address]  [address] [facility] [address], [facility] [address]     Phone:  [phone]     apixaban ANTICOAGULANT 5 MG tablet                Primary Care Provider Office Phone # Fax #    Rudy Mat Vernon [phone] [address] [address]        Equal Access to Services     HAMM[facility] AH: Hadii aad ku hadasho [place], waaxda luqadaha, qaybta kaalmada adeegyada, waxay idiin hayaan awa sofia . So [facility] [phone].    ATENCIÓN: Si habla español, tiene a mason disposición servicios gratuitos de asistencia lingüística. [facility] [phone].    We comply with applicable federal civil rights laws and [place] laws. We do not discriminate on the basis of race, color, national origin, age, disability, sex, sexual orientation, or gender identity.            Thank you!     Thank you for choosing [facility] HEART [facility]  for your care. Our goal is always to provide you with excellent care. Hearing back from our patients is one way we can continue to improve our services. Please take a few minutes to complete the written survey that you may receive in the mail after your visit with us. Thank you!             Your Updated Medication List - Protect others around you: Learn how to safely use, store and throw  away your medicines at www.disposemymeds.org.          This list is accurate as of 10/23/18  9:19 AM.  Always use your most recent med list.                   Brand Name Dispense Instructions for use Diagnosis    allopurinol 300 MG tablet    ZYLOPRIM    90 tablet    TAKE 1 TABLET(300 MG) BY MOUTH DAILY    Gout, unspecified cause, unspecified chronicity, unspecified site       amLODIPine 10 MG tablet    NORVASC    90 tablet    TAKE 1 TABLET(10 MG) BY MOUTH DAILY    Benign essential hypertension       apixaban ANTICOAGULANT 5 MG tablet    ELIQUIS    60 tablet    Take 1 tablet (5 mg) by mouth 2 times daily    Chronic atrial fibrillation (H)       aspirin 325 MG EC tablet     40 tablet    Take 1 tablet (325 mg) by mouth daily        furosemide 20 MG tablet    LASIX    90 tablet    Take 1 tablet (20 mg) by mouth daily    Benign essential hypertension       ibuprofen 600 MG tablet    ADVIL/MOTRIN    30 tablet    Take 1 tablet (600 mg) by mouth 3 times daily    Radicular pain of left lower extremity       losartan 100 MG tablet    COZAAR    90 tablet    TAKE 1 TABLET(100 MG) BY MOUTH DAILY    Benign essential hypertension       metoprolol succinate 50 MG 24 hr tablet    TOPROL-XL    90 tablet    TAKE 1 TABLET(50 MG) BY MOUTH DAILY    Benign essential hypertension       simvastatin 20 MG tablet    ZOCOR    90 tablet    TAKE 1 TABLET(20 MG) BY MOUTH AT BEDTIME    Hyperlipidemia LDL goal <100

## 2018-10-23 NOTE — LETTER
10/23/2018    Rudy Vernon MD  [address]  [place] [address]    RE: Betito Anderson       Dear Colleague,    I had the pleasure of seeing Betito Anderson in the [facility].    HPI and Plan:   See dictation  [id number]  Orders Placed This Encounter   Procedures     EKG 12-lead complete w/read - Clinics (performed today)     Holter Monitor 24 hour - Adult     Echocardiogram       Orders Placed This Encounter   Medications     apixaban ANTICOAGULANT (ELIQUIS) 5 MG tablet     Sig: Take 1 tablet (5 mg) by mouth 2 times daily     Dispense:  60 tablet     Refill:  11       There are no discontinued medications.      Encounter Diagnosis   Name Primary?     Chronic atrial fibrillation (H) Yes       CURRENT MEDICATIONS:  Current Outpatient Prescriptions   Medication Sig Dispense Refill     allopurinol (ZYLOPRIM) 300 MG tablet TAKE 1 TABLET(300 MG) BY MOUTH DAILY 90 tablet 3     amLODIPine (NORVASC) 10 MG tablet TAKE 1 TABLET(10 MG) BY MOUTH DAILY 90 tablet 3     apixaban ANTICOAGULANT (ELIQUIS) 5 MG tablet Take 1 tablet (5 mg) by mouth 2 times daily 60 tablet 11     aspirin 325 MG EC tablet Take 1 tablet (325 mg) by mouth daily 40 tablet      furosemide (LASIX) 20 MG tablet Take 1 tablet (20 mg) by mouth daily 90 tablet 3     ibuprofen (ADVIL,MOTRIN) 600 MG tablet Take 1 tablet (600 mg) by mouth 3 times daily (Patient taking differently: Take 600 mg by mouth every 6 hours as needed ) 30 tablet 1     losartan (COZAAR) 100 MG tablet TAKE 1 TABLET(100 MG) BY MOUTH DAILY 90 tablet 3     metoprolol succinate (TOPROL-XL) 50 MG 24 hr tablet TAKE 1 TABLET(50 MG) BY MOUTH DAILY 90 tablet 3     simvastatin (ZOCOR) 20 MG tablet TAKE 1 TABLET(20 MG) BY MOUTH AT BEDTIME 90 tablet 3       ALLERGIES     Allergies   Allergen Reactions     Ace Inhibitors Anaphylaxis     Edema of ace       PAST MEDICAL HISTORY:  Past Medical History:   Diagnosis Date     ASCVD (arteriosclerotic cardiovascular  "disease) 1997    angio with 40% circ lesion     Atrial fibrillation (H) 10/09/2018    found on routine physical     Carotid artery plaque 2005    seen on us, about 50% stenosis, fu 2009 us no significant stenosis     Elevated blood sugar      Gout     on allopurinol     HTN (hypertension)      Hypercholesteremia      Hyponatremia 2015    felt due to chlorthalidone     Low mean corpuscular volume (MCV)      Near syncope 5/15    fu est echo low level but neg     Psoriasis     Dr. Marti     Screening 2012    abd us no aaa       PAST SURGICAL HISTORY:  Past Surgical History:   Procedure Laterality Date     C ANESTH,DX ARTHROSCOPIC PROC KNEE JOINT  2009       FAMILY HISTORY:  Family History   Problem Relation Age of Onset     Coronary Artery Disease Father      Medical History Unknown Mother      Medical History Unknown Maternal Grandfather        SOCIAL HISTORY:  Social History     Social History     Marital status:      Spouse name: N/A     Number of children: 2     Years of education: N/A     Occupational History     retired      Social History Main Topics     Smoking status: Former Smoker     Quit date: 9/11/1998     Smokeless tobacco: Never Used     Alcohol use 8.4 oz/week     14 Standard drinks or equivalent per week      Comment: 1-2 a night     Drug use: No     Sexual activity: No     Other Topics Concern     None     Social History Narrative       Review of Systems:  Skin:  Negative       Eyes:  Negative      ENT:  Negative      Respiratory:  Negative       Cardiovascular:    Positive for;edema    Gastroenterology: Negative      Genitourinary:  Positive for   kidney stone  Musculoskeletal:  Negative      Neurologic:  Negative      Psychiatric:         Heme/Lymph/Imm:  Negative      Endocrine:  Negative        Physical Exam:  Vitals: /78 (BP Location: Right arm, Patient Position: Chair, Cuff Size: Adult Regular)  Pulse 61  Ht 1.676 m (5' 6\")  Wt 88.5 kg (195 lb)  SpO2 97%  BMI 31.47 " kg/m2    Constitutional:  cooperative, alert and oriented, well developed, well nourished, in no acute distress obese      Skin:  warm and dry to the touch, no apparent skin lesions or masses noted          Head:  normocephalic, no masses or lesions        Eyes:  pupils equal and round, conjunctivae and lids unremarkable, sclera white, no xanthalasma, EOMS intact, no nystagmus        Lymph:No Cervical lymphadenopathy present     ENT:  no pallor or cyanosis, dentition good        Neck:  carotid pulses are full and equal bilaterally, JVP normal, no carotid bruit        Respiratory:  normal breath sounds, clear to auscultation, normal A-P diameter, normal symmetry, normal respiratory excursion, no use of accessory muscles         Cardiac:   irregularly irregular rhythm   no presence of murmur          pulses full and equal, no bruits auscultated                                        GI:  abdomen soft, non-tender, BS normoactive, no mass, no HSM, no bruits obese      Extremities and Muscular Skeletal:  no deformities, clubbing, cyanosis, erythema observed              Neurological:  no gross motor deficits        Psych:  Alert and Oriented x 3        CC  Rudy Vernon MD  [address] [facility] [address]  [place] [address]                Service Date: 10/23/2018      HISTORY OF PRESENT ILLNESS:  Thank you for allowing me to participate in the care of this very delightful patient.  As you know, Mykel is a 72-year-old gentleman with history of hypertension who was in the clinic for a routine visit and noted to be in atrial fibrillation with a controlled ventricular response.  The patient did not have any symptoms such as palpitations or change in exercise tolerance.  In light of that, I asked him to increase his metoprolol and see me for further evaluation.      EKG today demonstrated atrial fibrillation with rate of 69 beats per minute.  Again, the patient denies having palpitations, near syncope, syncope, chest pain, chest  discomfort.  The patient was scheduled to have a stress test but it was canceled because of atrial fibrillation.  He does not have any symptoms suggestive of sleep apnea and has a normal thyroid level.      We discussed at length about the etiology of atrial fibrillation.  In this case, it is probably related to his aging, hypertension and obesity.      We discussed potential complications including CVA which in his case there is a 2% chance at least, given CHADS-VASc of 2, and tachycardia-induced cardiomyopathy which is quite rare, given the rate is under control.      Lastly, we discussed treatment options including one attempt at restore sinus rhythm with a cardioversion versus leave alone and rate control.  The patient would like to go with the latter.  In the meantime, I did encourage him to go on anticoagulation with Eliquis and stop the aspirin and have him wear a monitor to look at his rate.  I also would like to obtain an echocardiogram as a baseline to ensure he has normal cardiac structure and function.  We will contact the patient regarding the result of the echocardiogram and plan going forward.      cc:   Rudy Vernon MD    [facility]   [address]   [place] [id number]         ROSANNA LANDAVERDE MD             D: 10/23/2018   T: 10/23/2018   MT: DAI      Name:     MARCELA TINAJERO   MRN:      [id number]        Account:      [id number]   :      1945           Service Date: 10/23/2018      Document: [id number]       Thank you for allowing me to participate in the care of your patient.      Sincerely,     Rosanna Rausch MD     [facility]    cc:   Rudy Vernon MD  [address]  [place] [id number]

## 2018-10-23 NOTE — LETTER
10/23/2018      Rudy Vernon MD  [address]  [facility] [address]      RE: Betito Anderson       Dear Colleague,    I had the pleasure of seeing Betito Anderson in the [facility].    Service Date: 10/23/2018      HISTORY OF PRESENT ILLNESS:  Thank you for allowing me to participate in the care of this very delightful patient.  As you know, Mykel is a 72-year-old gentleman with history of hypertension who was in the clinic for a routine visit and noted to be in atrial fibrillation with a controlled ventricular response.  The patient did not have any symptoms such as palpitations or change in exercise tolerance.  In light of that, I asked him to increase his metoprolol and see me for further evaluation.      EKG today demonstrated atrial fibrillation with rate of 69 beats per minute.  Again, the patient denies having palpitations, near syncope, syncope, chest pain, chest discomfort.  The patient was scheduled to have a stress test but it was canceled because of atrial fibrillation.  He does not have any symptoms suggestive of sleep apnea and has a normal thyroid level.      We discussed at length about the etiology of atrial fibrillation.  In this case, it is probably related to his aging, hypertension and obesity.      We discussed potential complications including CVA which in his case there is a 2% chance at least, given CHADS-VASc of 2, and tachycardia-induced cardiomyopathy which is quite rare, given the rate is under control.      Lastly, we discussed treatment options including one attempt at restore sinus rhythm with a cardioversion versus leave alone and rate control.  The patient would like to go with the latter.  In the meantime, I did encourage him to go on anticoagulation with Eliquis and stop the aspirin and have him wear a monitor to look at his rate.  I also would like to obtain an echocardiogram as a baseline to ensure he has normal cardiac structure and  function.  We will contact the patient regarding the result of the echocardiogram and plan going forward.      cc:   Rudy Vernon MD    [facility]   [address]   [place] [address]         ROSANNA LANDAVERDE MD             D: 10/23/2018   T: 10/23/2018   MT: DAI      Name:     MARCELA TINAJERO   MRN:      [id number]        Account:      [id number]   :      1945           Service Date: 10/23/2018      Document: [id number]           Outpatient Encounter Prescriptions as of 10/23/2018   Medication Sig Dispense Refill     allopurinol (ZYLOPRIM) 300 MG tablet TAKE 1 TABLET(300 MG) BY MOUTH DAILY 90 tablet 3     amLODIPine (NORVASC) 10 MG tablet TAKE 1 TABLET(10 MG) BY MOUTH DAILY 90 tablet 3     apixaban ANTICOAGULANT (ELIQUIS) 5 MG tablet Take 1 tablet (5 mg) by mouth 2 times daily 60 tablet 11     aspirin 325 MG EC tablet Take 1 tablet (325 mg) by mouth daily 40 tablet      furosemide (LASIX) 20 MG tablet Take 1 tablet (20 mg) by mouth daily 90 tablet 3     ibuprofen (ADVIL,MOTRIN) 600 MG tablet Take 1 tablet (600 mg) by mouth 3 times daily (Patient taking differently: Take 600 mg by mouth every 6 hours as needed ) 30 tablet 1     losartan (COZAAR) 100 MG tablet TAKE 1 TABLET(100 MG) BY MOUTH DAILY 90 tablet 3     metoprolol succinate (TOPROL-XL) 50 MG 24 hr tablet TAKE 1 TABLET(50 MG) BY MOUTH DAILY 90 tablet 3     simvastatin (ZOCOR) 20 MG tablet TAKE 1 TABLET(20 MG) BY MOUTH AT BEDTIME 90 tablet 3     No facility-administered encounter medications on file as of 10/23/2018.        Again, thank you for allowing me to participate in the care of your patient.      Sincerely,    Rosanna Rausch MD     [facility]

## 2018-10-23 NOTE — PROGRESS NOTES
Service Date: 10/23/2018      HISTORY OF PRESENT ILLNESS:  Thank you for allowing me to participate in the care of this very delightful patient.  As you know, Mykel is a 72-year-old gentleman with history of hypertension who was in the clinic for a routine visit and noted to be in atrial fibrillation with a controlled ventricular response.  The patient did not have any symptoms such as palpitations or change in exercise tolerance.  In light of that, I asked him to increase his metoprolol and see me for further evaluation.      EKG today demonstrated atrial fibrillation with rate of 69 beats per minute.  Again, the patient denies having palpitations, near syncope, syncope, chest pain, chest discomfort.  The patient was scheduled to have a stress test but it was canceled because of atrial fibrillation.  He does not have any symptoms suggestive of sleep apnea and has a normal thyroid level.      We discussed at length about the etiology of atrial fibrillation.  In this case, it is probably related to his aging, hypertension and obesity.      We discussed potential complications including CVA which in his case there is a 2% chance at least, given CHADS-VASc of 2, and tachycardia-induced cardiomyopathy which is quite rare, given the rate is under control.      Lastly, we discussed treatment options including one attempt at restore sinus rhythm with a cardioversion versus leave alone and rate control.  The patient would like to go with the latter.  In the meantime, I did encourage him to go on anticoagulation with Eliquis and stop the aspirin and have him wear a monitor to look at his rate.  I also would like to obtain an echocardiogram as a baseline to ensure he has normal cardiac structure and function.  We will contact the patient regarding the result of the echocardiogram and plan going forward.      cc:   Rudy Vernon MD    [facility]   [address]   [place] [address]         ROSANNA LANDAVERDE MD              D: 10/23/2018   T: 10/23/2018   MT: DAI      Name:     MARCELA TINAJERO   MRN:      [id number]        Account:      [id number]   :      1945           Service Date: 10/23/2018      Document: [id number]

## 2018-10-23 NOTE — PROGRESS NOTES
HPI and Plan:   See dictation  [id number]  Orders Placed This Encounter   Procedures     EKG 12-lead complete w/read - Clinics (performed today)     Holter Monitor 24 hour - Adult     Echocardiogram       Orders Placed This Encounter   Medications     apixaban ANTICOAGULANT (ELIQUIS) 5 MG tablet     Sig: Take 1 tablet (5 mg) by mouth 2 times daily     Dispense:  60 tablet     Refill:  11       There are no discontinued medications.      Encounter Diagnosis   Name Primary?     Chronic atrial fibrillation (H) Yes       CURRENT MEDICATIONS:  Current Outpatient Prescriptions   Medication Sig Dispense Refill     allopurinol (ZYLOPRIM) 300 MG tablet TAKE 1 TABLET(300 MG) BY MOUTH DAILY 90 tablet 3     amLODIPine (NORVASC) 10 MG tablet TAKE 1 TABLET(10 MG) BY MOUTH DAILY 90 tablet 3     apixaban ANTICOAGULANT (ELIQUIS) 5 MG tablet Take 1 tablet (5 mg) by mouth 2 times daily 60 tablet 11     aspirin 325 MG EC tablet Take 1 tablet (325 mg) by mouth daily 40 tablet      furosemide (LASIX) 20 MG tablet Take 1 tablet (20 mg) by mouth daily 90 tablet 3     ibuprofen (ADVIL,MOTRIN) 600 MG tablet Take 1 tablet (600 mg) by mouth 3 times daily (Patient taking differently: Take 600 mg by mouth every 6 hours as needed ) 30 tablet 1     losartan (COZAAR) 100 MG tablet TAKE 1 TABLET(100 MG) BY MOUTH DAILY 90 tablet 3     metoprolol succinate (TOPROL-XL) 50 MG 24 hr tablet TAKE 1 TABLET(50 MG) BY MOUTH DAILY 90 tablet 3     simvastatin (ZOCOR) 20 MG tablet TAKE 1 TABLET(20 MG) BY MOUTH AT BEDTIME 90 tablet 3       ALLERGIES     Allergies   Allergen Reactions     Ace Inhibitors Anaphylaxis     Edema of ace       PAST MEDICAL HISTORY:  Past Medical History:   Diagnosis Date     ASCVD (arteriosclerotic cardiovascular disease) 1997    angio with 40% circ lesion     Atrial fibrillation (H) 10/09/2018    found on routine physical     Carotid artery plaque 2005    seen on us, about 50% stenosis, fu 2009 us no significant stenosis     Elevated blood  "sugar      Gout     on allopurinol     HTN (hypertension)      Hypercholesteremia      Hyponatremia 2015    felt due to chlorthalidone     Low mean corpuscular volume (MCV)      Near syncope 5/15    fu est echo low level but neg     Psoriasis     Dr. Marti     Screening 2012    abd us no aaa       PAST SURGICAL HISTORY:  Past Surgical History:   Procedure Laterality Date     C ANESTH,DX ARTHROSCOPIC PROC KNEE JOINT  2009       FAMILY HISTORY:  Family History   Problem Relation Age of Onset     Coronary Artery Disease Father      Medical History Unknown Mother      Medical History Unknown Maternal Grandfather        SOCIAL HISTORY:  Social History     Social History     Marital status:      Spouse name: N/A     Number of children: 2     Years of education: N/A     Occupational History     retired      Social History Main Topics     Smoking status: Former Smoker     Quit date: 9/11/1998     Smokeless tobacco: Never Used     Alcohol use 8.4 oz/week     14 Standard drinks or equivalent per week      Comment: 1-2 a night     Drug use: No     Sexual activity: No     Other Topics Concern     None     Social History Narrative       Review of Systems:  Skin:  Negative       Eyes:  Negative      ENT:  Negative      Respiratory:  Negative       Cardiovascular:    Positive for;edema    Gastroenterology: Negative      Genitourinary:  Positive for   kidney stone  Musculoskeletal:  Negative      Neurologic:  Negative      Psychiatric:         Heme/Lymph/Imm:  Negative      Endocrine:  Negative        Physical Exam:  Vitals: /78 (BP Location: Right arm, Patient Position: Chair, Cuff Size: Adult Regular)  Pulse 61  Ht 1.676 m (5' 6\")  Wt 88.5 kg (195 lb)  SpO2 97%  BMI 31.47 kg/m2    Constitutional:  cooperative, alert and oriented, well developed, well nourished, in no acute distress obese      Skin:  warm and dry to the touch, no apparent skin lesions or masses noted          Head:  normocephalic, no masses or " lesions        Eyes:  pupils equal and round, conjunctivae and lids unremarkable, sclera white, no xanthalasma, EOMS intact, no nystagmus        Lymph:No Cervical lymphadenopathy present     ENT:  no pallor or cyanosis, dentition good        Neck:  carotid pulses are full and equal bilaterally, JVP normal, no carotid bruit        Respiratory:  normal breath sounds, clear to auscultation, normal A-P diameter, normal symmetry, normal respiratory excursion, no use of accessory muscles         Cardiac:   irregularly irregular rhythm   no presence of murmur          pulses full and equal, no bruits auscultated                                        GI:  abdomen soft, non-tender, BS normoactive, no mass, no HSM, no bruits obese      Extremities and Muscular Skeletal:  no deformities, clubbing, cyanosis, erythema observed              Neurological:  no gross motor deficits        Psych:  Alert and Oriented x 3        CC  Rudy Vernon MD  [address]  [place] [address]

## 2018-10-30 DIAGNOSIS — I48.20 CHRONIC ATRIAL FIBRILLATION (H): ICD-10-CM

## 2018-11-14 ENCOUNTER — HOSPITAL ENCOUNTER (OUTPATIENT)
Dept: CARDIOLOGY | Facility: CLINIC | Age: 73
End: 2018-11-14
Attending: INTERNAL MEDICINE
Payer: MEDICARE

## 2018-11-14 ENCOUNTER — HOSPITAL ENCOUNTER (OUTPATIENT)
Dept: CARDIOLOGY | Facility: CLINIC | Age: 73
Discharge: HOME | End: 2018-11-14
Attending: INTERNAL MEDICINE | Admitting: INTERNAL MEDICINE
Payer: MEDICARE

## 2018-11-14 DIAGNOSIS — I48.20 CHRONIC ATRIAL FIBRILLATION (H): ICD-10-CM

## 2018-11-14 PROCEDURE — 93227 XTRNL ECG REC<48 HR R&I: CPT | Performed by: INTERNAL MEDICINE

## 2018-11-14 PROCEDURE — 93306 TTE W/DOPPLER COMPLETE: CPT | Mod: 26 | Performed by: INTERNAL MEDICINE

## 2018-11-14 PROCEDURE — 93306 TTE W/DOPPLER COMPLETE: CPT

## 2018-11-14 PROCEDURE — 93225 XTRNL ECG REC<48 HRS REC: CPT

## 2018-11-19 LAB — INTERPRETATION MONITOR -MUSE: NORMAL

## 2018-11-20 ENCOUNTER — TELEPHONE (OUTPATIENT)
Dept: CARDIOLOGY | Facility: CLINIC | Age: 73
End: 2018-11-20

## 2018-11-20 DIAGNOSIS — I48.91 AFIB (H): Primary | ICD-10-CM

## 2018-11-20 NOTE — TELEPHONE ENCOUNTER
If no sxs in day time ok to reduce toprol to 25 mg daily and repeat holter in a few weeks. If sxs stop toprol and repeat holter in a few wks as well. Thanks, qp

## 2018-11-20 NOTE — TELEPHONE ENCOUNTER
Called and informed pt of holter results with 917 pauses. He has for dizziness, fatigue or lightheadedness. Per Dr Lezama he should decrease toprol to 25 mg daily and repeat holter in a few weeks. Pt agreed and will call back to schedule holter. ARNOLDO

## 2018-11-20 NOTE — TELEPHONE ENCOUNTER
Received note from Dr Cruz regarding holter results showing 917 pauses. The longest was 3.92 sec on 11/15 at 5: 58 am. Most were during sleep hours. He is on metoprolol and eliquis for afib. Will review any changes with Dr Lezama.

## 2018-12-06 ENCOUNTER — TELEPHONE (OUTPATIENT)
Dept: FAMILY MEDICINE | Facility: CLINIC | Age: 73
End: 2018-12-06

## 2019-03-20 DIAGNOSIS — E78.5 HYPERLIPIDEMIA LDL GOAL <100: ICD-10-CM

## 2019-03-20 DIAGNOSIS — I48.20 CHRONIC ATRIAL FIBRILLATION (H): ICD-10-CM

## 2019-03-20 DIAGNOSIS — M10.9 GOUT, UNSPECIFIED CAUSE, UNSPECIFIED CHRONICITY, UNSPECIFIED SITE: ICD-10-CM

## 2019-03-20 DIAGNOSIS — I10 BENIGN ESSENTIAL HYPERTENSION: ICD-10-CM

## 2019-03-20 NOTE — TELEPHONE ENCOUNTER
Fax from [facility]  Patient switching all medications over to mail order, new Rxs are needed.  Pended.  Eliquis previously Rx'd by Cardiology - should that be done by them or will Dr Vernon Rx?    LOV 10-9-18 Saulo Lowe, RT (R)

## 2019-03-21 RX ORDER — FUROSEMIDE 20 MG
20 TABLET ORAL DAILY
Qty: 90 TABLET | Refills: 1 | Status: ON HOLD | OUTPATIENT
Start: 2019-03-21 | End: 2019-07-06

## 2019-03-21 RX ORDER — LOSARTAN POTASSIUM 100 MG/1
TABLET ORAL
Qty: 90 TABLET | Refills: 1 | Status: SHIPPED | OUTPATIENT
Start: 2019-03-21 | End: 2019-09-17

## 2019-03-21 RX ORDER — ALLOPURINOL 300 MG/1
TABLET ORAL
Qty: 90 TABLET | Refills: 1 | Status: SHIPPED | OUTPATIENT
Start: 2019-03-21 | End: 2019-09-17

## 2019-03-21 RX ORDER — SIMVASTATIN 20 MG
TABLET ORAL
Qty: 90 TABLET | Refills: 1 | Status: ON HOLD | OUTPATIENT
Start: 2019-03-21 | End: 2019-07-06

## 2019-03-21 RX ORDER — AMLODIPINE BESYLATE 10 MG/1
TABLET ORAL
Qty: 90 TABLET | Refills: 1 | Status: SHIPPED | OUTPATIENT
Start: 2019-03-21 | End: 2019-06-29

## 2019-03-21 RX ORDER — METOPROLOL SUCCINATE 50 MG/1
TABLET, EXTENDED RELEASE ORAL
Qty: 90 TABLET | Refills: 1 | Status: ON HOLD | OUTPATIENT
Start: 2019-03-21 | End: 2019-07-06

## 2019-03-21 NOTE — TELEPHONE ENCOUNTER
allopurinol (ZYLOPRIM) 300 MG tablet  Last Written Prescription Date:  10/09/18  Last Fill Quantity: 90,  # refills: 3   Last office visit: 10/9/2018 with prescribing provider:  Saulo Spears Office Visit:        amLODIPine (NORVASC) 10 MG tablet  Last Written Prescription Date:  10/9/18  Last Fill Quantity: 90,  # refills: 3   Last office visit: 10/9/2018 with prescribing provider:  Saulo Spears Office Visit:        apixaban ANTICOAGULANT (ELIQUIS) 5 MG tablet  Last Written Prescription Date:  10/30/18  Last Fill Quantity: 180,  # refills: 3   Last office visit: 10/9/2018 with prescribing provider:  Saulo Spears Office Visit:          furosemide (LASIX) 20 MG tablet  Last Written Prescription Date:  10/9/18  Last Fill Quantity: 90,  # refills: 3   Last office visit: 10/9/2018 with prescribing provider:  Saulo Spears Office Visit:        losartan (COZAAR) 100 MG tablet  Last Written Prescription Date:  10/9/18  Last Fill Quantity: 90,  # refills: 3   Last office visit: 10/9/2018 with prescribing provider:  Saulo Spears Office Visit:        simvastatin (ZOCOR) 20 MG tablet  Last Written Prescription Date:  10/9/18  Last Fill Quantity: 90,  # refills: 3   Last office visit: 10/9/2018 with prescribing provider:  Saulo Spears Office Visit:        metoprolol succinate (TOPROL-XL) 50 MG 24 hr tablet  Last Written Prescription Date:  10/9/18  Last Fill Quantity: 90,  # refills: 3   Last office visit: 10/9/2018 with prescribing provider:  Saulo Spears Office Visit:          Requested Prescriptions   Pending Prescriptions Disp Refills     allopurinol (ZYLOPRIM) 300 MG tablet 90 tablet 3     Sig: TAKE 1 TABLET(300 MG) BY MOUTH DAILY    Gout Agents Protocol Failed - 3/20/2019  3:37 PM       Failed - Has Uric Acid on file in past 12 months and value is less than 6    No lab results found.  If level is 6mg/dL or greater, ok to refill one time and refer to provider.          Passed - CBC on file in  "past 12 months    Recent Labs   Lab Test 10/09/18  1039   WBC 9.3   RBC 5.53   HGB 14.5   HCT 44.7                   Passed - ALT on file in past 12 months    Recent Labs   Lab Test 10/09/18  1039   ALT 27            Passed - Recent (12 mo) or future (30 days) visit within the authorizing provider's specialty    Patient had office visit in the last 12 months or has a visit in the next 30 days with authorizing provider or within the authorizing provider's specialty.  See \"Patient Info\" tab in inbasket, or \"Choose Columns\" in Meds & Orders section of the refill encounter.             Passed - Medication is active on med list       Passed - Patient is age 18 or older       Passed - Normal serum creatinine on file in the past 12 months    Recent Labs   Lab Test 10/09/18  1039   CR 1.06             amLODIPine (NORVASC) 10 MG tablet 90 tablet 3     Sig: TAKE 1 TABLET(10 MG) BY MOUTH DAILY    Calcium Channel Blockers Protocol  Failed - 3/20/2019  3:37 PM       Failed - Blood pressure under 140/90 in past 12 months    BP Readings from Last 3 Encounters:   10/23/18 168/78   10/09/18 184/81   09/08/16 138/82                Passed - Recent (12 mo) or future (30 days) visit within the authorizing provider's specialty    Patient had office visit in the last 12 months or has a visit in the next 30 days with authorizing provider or within the authorizing provider's specialty.  See \"Patient Info\" tab in inbasket, or \"Choose Columns\" in Meds & Orders section of the refill encounter.             Passed - Medication is active on med list       Passed - Patient is age 18 or older       Passed - Normal serum creatinine on file in past 12 months    Recent Labs   Lab Test 10/09/18  1039   CR 1.06             apixaban ANTICOAGULANT (ELIQUIS) 5 MG tablet 180 tablet 3     Sig: Take 1 tablet (5 mg) by mouth 2 times daily    Direct Oral Anticoagulant Agents Passed - 3/20/2019  3:37 PM       Passed - Normal Platelets on file in past 12 " "months    Recent Labs   Lab Test 10/09/18  1039                 Passed - Medication is active on med list       Passed - Patient is 18-79 years of age       Passed - Serum creatinine less than or equal to 1.4 on file in past 12 mos    Recent Labs   Lab Test 10/09/18  1039   CR 1.06            Passed - Weight is greater than 60 kg for the past year    Wt Readings from Last 3 Encounters:   10/23/18 88.5 kg (195 lb)   10/09/18 88.5 kg (195 lb)   09/08/16 95.3 kg (210 lb)            Passed - Recent (6 mo) or future (30 days) visit within the authorizing provider's specialty        furosemide (LASIX) 20 MG tablet 90 tablet 3     Sig: Take 1 tablet (20 mg) by mouth daily    Diuretics (Including Combos) Protocol Failed - 3/20/2019  3:37 PM       Failed - Blood pressure under 140/90 in past 12 months    BP Readings from Last 3 Encounters:   10/23/18 168/78   10/09/18 184/81   09/08/16 138/82                Passed - Recent (12 mo) or future (30 days) visit within the authorizing provider's specialty    Patient had office visit in the last 12 months or has a visit in the next 30 days with authorizing provider or within the authorizing provider's specialty.  See \"Patient Info\" tab in inbasket, or \"Choose Columns\" in Meds & Orders section of the refill encounter.             Passed - Medication is active on med list       Passed - Patient is age 18 or older       Passed - Normal serum creatinine on file in past 12 months    Recent Labs   Lab Test 10/09/18  1039   CR 1.06             Passed - Normal serum potassium on file in past 12 months    Recent Labs   Lab Test 10/09/18  1039   POTASSIUM 3.8                   Passed - Normal serum sodium on file in past 12 months    Recent Labs   Lab Test 10/09/18  1039                 losartan (COZAAR) 100 MG tablet 90 tablet 3     Sig: TAKE 1 TABLET(100 MG) BY MOUTH DAILY    Angiotensin-II Receptors Failed - 3/20/2019  3:37 PM       Failed - Blood pressure under 140/90 in " "past 12 months    BP Readings from Last 3 Encounters:   10/23/18 168/78   10/09/18 184/81   09/08/16 138/82                Passed - Recent (12 mo) or future (30 days) visit within the authorizing provider's specialty    Patient had office visit in the last 12 months or has a visit in the next 30 days with authorizing provider or within the authorizing provider's specialty.  See \"Patient Info\" tab in inbasket, or \"Choose Columns\" in Meds & Orders section of the refill encounter.             Passed - Medication is active on med list       Passed - Patient is age 18 or older       Passed - Normal serum creatinine on file in past 12 months    Recent Labs   Lab Test 10/09/18  1039   CR 1.06            Passed - Normal serum potassium on file in past 12 months    Recent Labs   Lab Test 10/09/18  1039   POTASSIUM 3.8                    metoprolol succinate ER (TOPROL-XL) 50 MG 24 hr tablet 90 tablet 3     Sig: TAKE 1 TABLET(50 MG) BY MOUTH DAILY    Beta-Blockers Protocol Failed - 3/20/2019  3:37 PM       Failed - Blood pressure under 140/90 in past 12 months    BP Readings from Last 3 Encounters:   10/23/18 168/78   10/09/18 184/81   09/08/16 138/82                Passed - Patient is age 6 or older       Passed - Recent (12 mo) or future (30 days) visit within the authorizing provider's specialty    Patient had office visit in the last 12 months or has a visit in the next 30 days with authorizing provider or within the authorizing provider's specialty.  See \"Patient Info\" tab in inbasket, or \"Choose Columns\" in Meds & Orders section of the refill encounter.             Passed - Medication is active on med list        simvastatin (ZOCOR) 20 MG tablet 90 tablet 3     Sig: TAKE 1 TABLET(20 MG) BY MOUTH AT BEDTIME    Statins Protocol Passed - 3/20/2019  3:37 PM       Passed - LDL on file in past 12 months    Recent Labs   Lab Test 10/09/18  1039   LDL 47            Passed - No abnormal creatine kinase in past 12 months    No " "lab results found.            Passed - Recent (12 mo) or future (30 days) visit within the authorizing provider's specialty    Patient had office visit in the last 12 months or has a visit in the next 30 days with authorizing provider or within the authorizing provider's specialty.  See \"Patient Info\" tab in inbasket, or \"Choose Columns\" in Meds & Orders section of the refill encounter.             Passed - Medication is active on med list       Passed - Patient is age 18 or older          "

## 2019-03-21 NOTE — TELEPHONE ENCOUNTER
To PCP:     Routing refill request to provider for review/approval because:  Drug interaction warning (Losartan)  RX last authorized by outside Provider (Apixaban by [facility])     Pt is switching pharmacies.    Please review and authorize if appropriate,     Thank you,   Malorie WARREN RN     (all other requests approved per protocol to complete RX signed 10/2018 by PCP)

## 2019-06-26 ENCOUNTER — TELEPHONE (OUTPATIENT)
Dept: FAMILY MEDICINE | Facility: CLINIC | Age: 74
End: 2019-06-26

## 2019-06-26 NOTE — TELEPHONE ENCOUNTER
Reason for call:  Patient reporting a symptom    Symptom or request: pt gets exhausted every 10-15 steps. Wife if concerned about him.  Pleases call her with direction on what they should do    Duration (how long have symptoms been present): 2 weeks    Have you been treated for this before? No    Additional comments: Wife is really concerned and I offered NP appt and dr. Barrientos appt but they are SET on seeing Dr. Lujan    Phone Number patient can be reached at:  [phone]    Best Time:  anytime    Can we leave a detailed message on this number:  YES    Call taken on 6/26/2019 at 9:42 AM by Usha Ross

## 2019-06-26 NOTE — TELEPHONE ENCOUNTER
"To PCP: Please see below. This sounds complicated for a fit-in, but is there anyway you can fit pt in this week? Or would Team be OK (they declined Team, but maybe with your recommendation we could schedule something for tomorrow with Team/open Provider)     Please advise,   Malorie COX RN      Returned call:     S: Diane has noticed a progression over the last few months     Increased fatigue, lower extremity edema and generalized weakness     10-15 steps and needs to rest. \"huffs and puffs\"     B: Last OV 10/9/18    A:     Significant Lower extremity edema- bilateral     Extreme fatigue     Few drinks starting at 5pm-- then sits on the couch \"he is a couch potato\"     Denies CP   Denies lightheaded/dizzy   Denies unilateral weakness     Pt has not taken Eliquis \"for months, only on it for a week and a half after seeing the doctor\" (in October)     Reports he takes lasix-- just not in the morning (question if he truly does take this daily)     R: OV with PCP> If weakness becomes so that he cannot perform daily activities or SOB at rest, weakness, numbness/tingling- Advising ER.                                 "

## 2019-06-27 ENCOUNTER — ANCILLARY PROCEDURE (OUTPATIENT)
Dept: GENERAL RADIOLOGY | Facility: CLINIC | Age: 74
End: 2019-06-27
Attending: INTERNAL MEDICINE
Payer: COMMERCIAL

## 2019-06-27 ENCOUNTER — OFFICE VISIT (OUTPATIENT)
Dept: FAMILY MEDICINE | Facility: CLINIC | Age: 74
End: 2019-06-27
Payer: COMMERCIAL

## 2019-06-27 VITALS
OXYGEN SATURATION: 100 % | TEMPERATURE: 97.7 F | SYSTOLIC BLOOD PRESSURE: 185 MMHG | HEART RATE: 75 BPM | WEIGHT: 203 LBS | HEIGHT: 66 IN | DIASTOLIC BLOOD PRESSURE: 90 MMHG | BODY MASS INDEX: 32.62 KG/M2

## 2019-06-27 DIAGNOSIS — R06.09 DOE (DYSPNEA ON EXERTION): ICD-10-CM

## 2019-06-27 DIAGNOSIS — R06.09 DOE (DYSPNEA ON EXERTION): Primary | ICD-10-CM

## 2019-06-27 DIAGNOSIS — R60.0 BILATERAL LEG EDEMA: ICD-10-CM

## 2019-06-27 LAB
ERYTHROCYTE [DISTWIDTH] IN BLOOD BY AUTOMATED COUNT: 17.3 % (ref 10–15)
FEF 25/75: NORMAL
FEV-1: NORMAL
FEV1/FVC: NORMAL
FVC: NORMAL
HCT VFR BLD AUTO: 48.7 % (ref 40–53)
HGB BLD-MCNC: 15.6 G/DL (ref 13.3–17.7)
MCH RBC QN AUTO: 27.8 PG (ref 26.5–33)
MCHC RBC AUTO-ENTMCNC: 32 G/DL (ref 31.5–36.5)
MCV RBC AUTO: 87 FL (ref 78–100)
PLATELET # BLD AUTO: 286 10E9/L (ref 150–450)
RBC # BLD AUTO: 5.62 10E12/L (ref 4.4–5.9)
WBC # BLD AUTO: 8.9 10E9/L (ref 4–11)

## 2019-06-27 PROCEDURE — 36415 COLL VENOUS BLD VENIPUNCTURE: CPT | Performed by: INTERNAL MEDICINE

## 2019-06-27 PROCEDURE — 99214 OFFICE O/P EST MOD 30 MIN: CPT | Mod: 25 | Performed by: INTERNAL MEDICINE

## 2019-06-27 PROCEDURE — 84443 ASSAY THYROID STIM HORMONE: CPT | Performed by: INTERNAL MEDICINE

## 2019-06-27 PROCEDURE — 71046 X-RAY EXAM CHEST 2 VIEWS: CPT

## 2019-06-27 PROCEDURE — 80048 BASIC METABOLIC PNL TOTAL CA: CPT | Performed by: INTERNAL MEDICINE

## 2019-06-27 PROCEDURE — 99207 C PAF COMPLETED  NO CHARGE: CPT | Performed by: INTERNAL MEDICINE

## 2019-06-27 PROCEDURE — 93000 ELECTROCARDIOGRAM COMPLETE: CPT | Performed by: INTERNAL MEDICINE

## 2019-06-27 PROCEDURE — 85027 COMPLETE CBC AUTOMATED: CPT | Performed by: INTERNAL MEDICINE

## 2019-06-27 PROCEDURE — 94010 BREATHING CAPACITY TEST: CPT | Performed by: INTERNAL MEDICINE

## 2019-06-27 SDOH — HEALTH STABILITY: MENTAL HEALTH: HOW OFTEN DO YOU HAVE 6 OR MORE DRINKS ON ONE OCCASION?: NEVER

## 2019-06-27 SDOH — HEALTH STABILITY: MENTAL HEALTH: HOW MANY STANDARD DRINKS CONTAINING ALCOHOL DO YOU HAVE ON A TYPICAL DAY?: 1 OR 2

## 2019-06-27 SDOH — HEALTH STABILITY: MENTAL HEALTH: HOW OFTEN DO YOU HAVE A DRINK CONTAINING ALCOHOL?: 2-3 TIMES A WEEK

## 2019-06-27 ASSESSMENT — MIFFLIN-ST. JEOR: SCORE: 1608.55

## 2019-06-27 NOTE — PATIENT INSTRUCTIONS
1. Please call me today with your exact medicines and doses    2. I will have the heart clinic call you to do the heart echo test.  If they do not call in the next 3 days let me know    3. I will let you know the test results once back      Rudy Vernon M.D.

## 2019-06-27 NOTE — PROGRESS NOTES
The patient presents is not feeling well.    The patient notes he has not felt well for at least 4 weeks with increased fatigue, lower extremity edema and weakness.  He has had dyspnea on exertion mostly with coming up steps that he did not have before.  His edema is not new but persists.  He does not have chest pain and is not been dizzy but occasionally has palpitations.  He is not short of breath at rest and has no PND.  He was having had congestion and a cough with phlegm but that is much improved and is only occasional now.  No fevers or night sweats.    He does have a cardiac history as noted and reviewed.  He did not bring a med list and is very unclear on his medicines but it does not sound like he is taking his Eliquis as he did not feel well on it.  It is unclear exactly what medications he is taking.  He did have a cardiac echo as noted in November 2018 with some wall motion abnormality but a normal EF.  Moderate mitral regurg and moderate aortic valve sclerosis was seen.    Patient used to smoke but has not in years.  He has no history of lung disease or blood clots.    A complete review of systems is otherwise negative.     Past Medical History:   Diagnosis Date     ASCVD (arteriosclerotic cardiovascular disease) 1997    angio with 40% circ lesion     Atrial fibrillation (H) 10/09/2018    found on routine physical     Carotid artery plaque 2005    seen on us, about 50% stenosis, fu 2009 us no significant stenosis     Elevated blood sugar      Gout     on allopurinol     HTN (hypertension)      Hypercholesteremia      Hyponatremia 2015    felt due to chlorthalidone     Low mean corpuscular volume (MCV)      Near syncope 5/15    fu est echo low level but neg     Psoriasis     Dr. Marti     Screening 2012    abd us no aaa     Past Surgical History:   Procedure Laterality Date     C ANESTH,DX ARTHROSCOPIC PROC KNEE JOINT  2009     Social History     Socioeconomic History     Marital status:       Spouse name: Not on file     Number of children: 2     Years of education: Not on file     Highest education level: Not on file   Occupational History     Occupation: retired   Social Needs     Financial resource strain: Not on file     Food insecurity:     Worry: Not on file     Inability: Not on file     Transportation needs:     Medical: Not on file     Non-medical: Not on file   Tobacco Use     Smoking status: Former Smoker     Packs/day: 1.00     Years: 30.00     Pack years: 30.00     Types: Cigars     Last attempt to quit: 1998     Years since quittin.8     Smokeless tobacco: Never Used   Substance and Sexual Activity     Alcohol use: Yes     Alcohol/week: 8.4 oz     Types: 14 Standard drinks or equivalent per week     Frequency: 2-3 times a week     Drinks per session: 1 or 2     Binge frequency: Never     Comment: 1-2 a night     Drug use: No     Sexual activity: Never   Lifestyle     Physical activity:     Days per week: Not on file     Minutes per session: Not on file     Stress: Not on file   Relationships     Social connections:     Talks on phone: Not on file     Gets together: Not on file     Attends L[employer] service: Not on file     Active member of club or organization: Not on file     Attends meetings of clubs or organizations: Not on file     Relationship status: Not on file     Intimate partner violence:     Fear of current or ex partner: Not on file     Emotionally abused: Not on file     Physically abused: Not on file     Forced sexual activity: Not on file   Other Topics Concern     Parent/sibling w/ CABG, MI or angioplasty before 65F 55M? Not Asked   Social History Narrative     Not on file     Current Outpatient Medications   Medication Sig Dispense Refill     allopurinol (ZYLOPRIM) 300 MG tablet TAKE 1 TABLET(300 MG) BY MOUTH DAILY 90 tablet 1     amLODIPine (NORVASC) 10 MG tablet TAKE 1 TABLET(10 MG) BY MOUTH DAILY 90 tablet 1     apixaban ANTICOAGULANT (ELIQUIS) 5 MG tablet Take  "1 tablet (5 mg) by mouth 2 times daily 180 tablet 1     aspirin 325 MG EC tablet Take 1 tablet (325 mg) by mouth daily 40 tablet      furosemide (LASIX) 20 MG tablet Take 1 tablet (20 mg) by mouth daily 90 tablet 1     ibuprofen (ADVIL,MOTRIN) 600 MG tablet Take 1 tablet (600 mg) by mouth 3 times daily (Patient taking differently: Take 600 mg by mouth every 6 hours as needed ) 30 tablet 1     losartan (COZAAR) 100 MG tablet TAKE 1 TABLET(100 MG) BY MOUTH DAILY 90 tablet 1     metoprolol succinate ER (TOPROL-XL) 50 MG 24 hr tablet TAKE 1 TABLET(50 MG) BY MOUTH DAILY 90 tablet 1     simvastatin (ZOCOR) 20 MG tablet TAKE 1 TABLET(20 MG) BY MOUTH AT BEDTIME 90 tablet 1     Allergies   Allergen Reactions     Ace Inhibitors Anaphylaxis     Edema of ace     FAMILY HISTORY NOTED AND REVIEWED    REVIEW OF SYSTEMS: above    PHYSICAL EXAM    /90 (BP Location: Right arm, Patient Position: Sitting, Cuff Size: Adult Regular)   Pulse 75   Temp 97.7  F (36.5  C) (Oral)   Ht 1.676 m (5' 6\")   Wt 92.1 kg (203 lb)   SpO2 100%   BMI 32.77 kg/m      Patient appears non toxic  Mouth and eyes within normal limits  Neck within normal limits  Thyroid within normal limits  No supraclavicular, cervical or axillary lymphadenopathy  Lungs bilat scattered expir wheeze, dec at right base, no crackles  cv irreg, irreg, hr controlled, no murmer, rub or gallop, no jvp, 2+ bilat lower leg edema  Abdomen normal active bowel sounds, soft non-tender, no mgr, no hepatosplenomegaly    Labs sent; cxr to be done; ekg - afib, anter twi that is new; spirometry - dec lung volumes, normal ratio    ASSESSMENT:  Dyspnea on exertion in patient with afib, prior smoker, not taking anticoag, with significant edema.  The latter may be due to norvasc but need to consider left heart failure vs diast dysfxn.  Could be copd given wheezing, less likely pulmonary embolis, doubt ptx, anemia, cv ischemia, needs eval    PLAN:  Labs  Echo  Will consider ct chest  He " will call me asap with med list  Consider formal spirometry    Rudy Vernon M.D.

## 2019-06-28 LAB
ANION GAP SERPL CALCULATED.3IONS-SCNC: 9 MMOL/L (ref 3–14)
BUN SERPL-MCNC: 20 MG/DL (ref 7–30)
CALCIUM SERPL-MCNC: 8.3 MG/DL (ref 8.5–10.1)
CHLORIDE SERPL-SCNC: 98 MMOL/L (ref 94–109)
CO2 SERPL-SCNC: 30 MMOL/L (ref 20–32)
CREAT SERPL-MCNC: 1.31 MG/DL (ref 0.66–1.25)
GFR SERPL CREATININE-BSD FRML MDRD: 53 ML/MIN/{1.73_M2}
GLUCOSE SERPL-MCNC: 96 MG/DL (ref 70–99)
POTASSIUM SERPL-SCNC: 3.9 MMOL/L (ref 3.4–5.3)
SODIUM SERPL-SCNC: 137 MMOL/L (ref 133–144)
TSH SERPL DL<=0.005 MIU/L-ACNC: 3.22 MU/L (ref 0.4–4)

## 2019-06-29 ENCOUNTER — HOSPITAL ENCOUNTER (INPATIENT)
Facility: CLINIC | Age: 74
LOS: 7 days | Discharge: HOME | DRG: 246 | End: 2019-07-06
Attending: EMERGENCY MEDICINE | Admitting: INTERNAL MEDICINE
Payer: COMMERCIAL

## 2019-06-29 ENCOUNTER — APPOINTMENT (OUTPATIENT)
Dept: GENERAL RADIOLOGY | Facility: CLINIC | Age: 74
DRG: 246 | End: 2019-06-29
Attending: EMERGENCY MEDICINE
Payer: COMMERCIAL

## 2019-06-29 DIAGNOSIS — I50.9 ACUTE ON CHRONIC CONGESTIVE HEART FAILURE, UNSPECIFIED HEART FAILURE TYPE (H): ICD-10-CM

## 2019-06-29 DIAGNOSIS — I10 BENIGN ESSENTIAL HYPERTENSION: ICD-10-CM

## 2019-06-29 DIAGNOSIS — I48.20 CHRONIC ATRIAL FIBRILLATION (H): ICD-10-CM

## 2019-06-29 DIAGNOSIS — I25.10 ASCVD (ARTERIOSCLEROTIC CARDIOVASCULAR DISEASE): Primary | ICD-10-CM

## 2019-06-29 DIAGNOSIS — I50.23 ACUTE ON CHRONIC SYSTOLIC CONGESTIVE HEART FAILURE (H): ICD-10-CM

## 2019-06-29 LAB
ANION GAP SERPL CALCULATED.3IONS-SCNC: 8 MMOL/L (ref 3–14)
BASOPHILS # BLD AUTO: 0.1 10E9/L (ref 0–0.2)
BASOPHILS NFR BLD AUTO: 0.4 %
BUN SERPL-MCNC: 20 MG/DL (ref 7–30)
CALCIUM SERPL-MCNC: 8.4 MG/DL (ref 8.5–10.1)
CHLORIDE SERPL-SCNC: 103 MMOL/L (ref 94–109)
CO2 SERPL-SCNC: 27 MMOL/L (ref 20–32)
CREAT SERPL-MCNC: 1.17 MG/DL (ref 0.66–1.25)
DIFFERENTIAL METHOD BLD: ABNORMAL
EOSINOPHIL # BLD AUTO: 0.1 10E9/L (ref 0–0.7)
EOSINOPHIL NFR BLD AUTO: 0.5 %
ERYTHROCYTE [DISTWIDTH] IN BLOOD BY AUTOMATED COUNT: 16.2 % (ref 10–15)
GFR SERPL CREATININE-BSD FRML MDRD: 61 ML/MIN/{1.73_M2}
GLUCOSE SERPL-MCNC: 132 MG/DL (ref 70–99)
HCT VFR BLD AUTO: 45.5 % (ref 40–53)
HGB BLD-MCNC: 14.8 G/DL (ref 13.3–17.7)
IMM GRANULOCYTES # BLD: 0 10E9/L (ref 0–0.4)
IMM GRANULOCYTES NFR BLD: 0.2 %
INTERPRETATION ECG - MUSE: NORMAL
LYMPHOCYTES # BLD AUTO: 0.6 10E9/L (ref 0.8–5.3)
LYMPHOCYTES NFR BLD AUTO: 3.7 %
MCH RBC QN AUTO: 27.9 PG (ref 26.5–33)
MCHC RBC AUTO-ENTMCNC: 32.5 G/DL (ref 31.5–36.5)
MCV RBC AUTO: 86 FL (ref 78–100)
MONOCYTES # BLD AUTO: 1.2 10E9/L (ref 0–1.3)
MONOCYTES NFR BLD AUTO: 7.4 %
NEUTROPHILS # BLD AUTO: 14.6 10E9/L (ref 1.6–8.3)
NEUTROPHILS NFR BLD AUTO: 87.8 %
NRBC # BLD AUTO: 0 10*3/UL
NRBC BLD AUTO-RTO: 0 /100
NT-PROBNP SERPL-MCNC: ABNORMAL PG/ML (ref 0–900)
PLATELET # BLD AUTO: 304 10E9/L (ref 150–450)
POTASSIUM SERPL-SCNC: 3.4 MMOL/L (ref 3.4–5.3)
PROCALCITONIN SERPL-MCNC: 0.05 NG/ML
RBC # BLD AUTO: 5.3 10E12/L (ref 4.4–5.9)
SODIUM SERPL-SCNC: 138 MMOL/L (ref 133–144)
TROPONIN I SERPL-MCNC: 0.03 UG/L (ref 0–0.04)
WBC # BLD AUTO: 16.6 10E9/L (ref 4–11)

## 2019-06-29 PROCEDURE — 93005 ELECTROCARDIOGRAM TRACING: CPT

## 2019-06-29 PROCEDURE — 25000125 ZZHC RX 250: Performed by: EMERGENCY MEDICINE

## 2019-06-29 PROCEDURE — 94640 AIRWAY INHALATION TREATMENT: CPT

## 2019-06-29 PROCEDURE — 85025 COMPLETE CBC W/AUTO DIFF WBC: CPT | Performed by: EMERGENCY MEDICINE

## 2019-06-29 PROCEDURE — 25000128 H RX IP 250 OP 636: Performed by: EMERGENCY MEDICINE

## 2019-06-29 PROCEDURE — 84484 ASSAY OF TROPONIN QUANT: CPT | Performed by: EMERGENCY MEDICINE

## 2019-06-29 PROCEDURE — 99223 1ST HOSP IP/OBS HIGH 75: CPT | Mod: AI | Performed by: PHYSICIAN ASSISTANT

## 2019-06-29 PROCEDURE — 71046 X-RAY EXAM CHEST 2 VIEWS: CPT

## 2019-06-29 PROCEDURE — 21000001 ZZH R&B HEART CARE

## 2019-06-29 PROCEDURE — 96374 THER/PROPH/DIAG INJ IV PUSH: CPT

## 2019-06-29 PROCEDURE — 84145 PROCALCITONIN (PCT): CPT | Performed by: EMERGENCY MEDICINE

## 2019-06-29 PROCEDURE — 25000128 H RX IP 250 OP 636: Performed by: PHYSICIAN ASSISTANT

## 2019-06-29 PROCEDURE — 80048 BASIC METABOLIC PNL TOTAL CA: CPT | Performed by: EMERGENCY MEDICINE

## 2019-06-29 PROCEDURE — 99285 EMERGENCY DEPT VISIT HI MDM: CPT | Mod: 25

## 2019-06-29 PROCEDURE — 83880 ASSAY OF NATRIURETIC PEPTIDE: CPT | Performed by: EMERGENCY MEDICINE

## 2019-06-29 RX ORDER — SIMVASTATIN 20 MG
20 TABLET ORAL AT BEDTIME
Status: DISCONTINUED | OUTPATIENT
Start: 2019-06-30 | End: 2019-07-03

## 2019-06-29 RX ORDER — METOPROLOL TARTRATE 1 MG/ML
5 INJECTION, SOLUTION INTRAVENOUS EVERY 4 HOURS PRN
Status: DISCONTINUED | OUTPATIENT
Start: 2019-06-29 | End: 2019-07-06 | Stop reason: HOSPADM

## 2019-06-29 RX ORDER — BISACODYL 10 MG
10 SUPPOSITORY, RECTAL RECTAL DAILY PRN
Status: DISCONTINUED | OUTPATIENT
Start: 2019-06-29 | End: 2019-07-06 | Stop reason: HOSPADM

## 2019-06-29 RX ORDER — NALOXONE HYDROCHLORIDE 0.4 MG/ML
.1-.4 INJECTION, SOLUTION INTRAMUSCULAR; INTRAVENOUS; SUBCUTANEOUS
Status: DISCONTINUED | OUTPATIENT
Start: 2019-06-29 | End: 2019-07-05

## 2019-06-29 RX ORDER — PROCHLORPERAZINE MALEATE 5 MG
5 TABLET ORAL EVERY 6 HOURS PRN
Status: DISCONTINUED | OUTPATIENT
Start: 2019-06-29 | End: 2019-07-06 | Stop reason: HOSPADM

## 2019-06-29 RX ORDER — LOSARTAN POTASSIUM 100 MG/1
100 TABLET ORAL DAILY
Status: DISCONTINUED | OUTPATIENT
Start: 2019-06-30 | End: 2019-07-06 | Stop reason: HOSPADM

## 2019-06-29 RX ORDER — ACETAMINOPHEN 325 MG/1
650 TABLET ORAL EVERY 4 HOURS PRN
Status: DISCONTINUED | OUTPATIENT
Start: 2019-06-29 | End: 2019-07-05

## 2019-06-29 RX ORDER — ALLOPURINOL 300 MG/1
300 TABLET ORAL DAILY
Status: DISCONTINUED | OUTPATIENT
Start: 2019-06-30 | End: 2019-07-06 | Stop reason: HOSPADM

## 2019-06-29 RX ORDER — FUROSEMIDE 10 MG/ML
40 INJECTION INTRAMUSCULAR; INTRAVENOUS ONCE
Status: COMPLETED | OUTPATIENT
Start: 2019-06-29 | End: 2019-06-29

## 2019-06-29 RX ORDER — MAGNESIUM SULFATE HEPTAHYDRATE 40 MG/ML
4 INJECTION, SOLUTION INTRAVENOUS EVERY 4 HOURS PRN
Status: DISCONTINUED | OUTPATIENT
Start: 2019-06-29 | End: 2019-07-06 | Stop reason: HOSPADM

## 2019-06-29 RX ORDER — POTASSIUM CHLORIDE 29.8 MG/ML
20 INJECTION INTRAVENOUS
Status: DISCONTINUED | OUTPATIENT
Start: 2019-06-29 | End: 2019-07-06 | Stop reason: HOSPADM

## 2019-06-29 RX ORDER — ACETAMINOPHEN 650 MG/1
650 SUPPOSITORY RECTAL EVERY 4 HOURS PRN
Status: DISCONTINUED | OUTPATIENT
Start: 2019-06-29 | End: 2019-07-06 | Stop reason: HOSPADM

## 2019-06-29 RX ORDER — PROCHLORPERAZINE 25 MG
12.5 SUPPOSITORY, RECTAL RECTAL EVERY 12 HOURS PRN
Status: DISCONTINUED | OUTPATIENT
Start: 2019-06-29 | End: 2019-07-06 | Stop reason: HOSPADM

## 2019-06-29 RX ORDER — FUROSEMIDE 10 MG/ML
40 INJECTION INTRAMUSCULAR; INTRAVENOUS
Status: DISCONTINUED | OUTPATIENT
Start: 2019-06-30 | End: 2019-07-01

## 2019-06-29 RX ORDER — POTASSIUM CHLORIDE 7.45 MG/ML
10 INJECTION INTRAVENOUS
Status: DISCONTINUED | OUTPATIENT
Start: 2019-06-29 | End: 2019-07-06 | Stop reason: HOSPADM

## 2019-06-29 RX ORDER — POTASSIUM CL/LIDO/0.9 % NACL 10MEQ/0.1L
10 INTRAVENOUS SOLUTION, PIGGYBACK (ML) INTRAVENOUS
Status: DISCONTINUED | OUTPATIENT
Start: 2019-06-29 | End: 2019-07-06 | Stop reason: HOSPADM

## 2019-06-29 RX ORDER — POTASSIUM CHLORIDE 1500 MG/1
20-40 TABLET, EXTENDED RELEASE ORAL
Status: DISCONTINUED | OUTPATIENT
Start: 2019-06-29 | End: 2019-07-06 | Stop reason: HOSPADM

## 2019-06-29 RX ORDER — FUROSEMIDE 10 MG/ML
20 INJECTION INTRAMUSCULAR; INTRAVENOUS
Status: DISCONTINUED | OUTPATIENT
Start: 2019-06-30 | End: 2019-06-29

## 2019-06-29 RX ORDER — AMOXICILLIN 250 MG
2 CAPSULE ORAL 2 TIMES DAILY PRN
Status: DISCONTINUED | OUTPATIENT
Start: 2019-06-29 | End: 2019-07-06 | Stop reason: HOSPADM

## 2019-06-29 RX ORDER — LIDOCAINE 40 MG/G
CREAM TOPICAL
Status: DISCONTINUED | OUTPATIENT
Start: 2019-06-29 | End: 2019-07-06 | Stop reason: HOSPADM

## 2019-06-29 RX ORDER — IPRATROPIUM BROMIDE AND ALBUTEROL SULFATE 2.5; .5 MG/3ML; MG/3ML
3 SOLUTION RESPIRATORY (INHALATION) ONCE
Status: COMPLETED | OUTPATIENT
Start: 2019-06-29 | End: 2019-06-29

## 2019-06-29 RX ORDER — POTASSIUM CHLORIDE 1.5 G/1.58G
20-40 POWDER, FOR SOLUTION ORAL
Status: DISCONTINUED | OUTPATIENT
Start: 2019-06-29 | End: 2019-07-06 | Stop reason: HOSPADM

## 2019-06-29 RX ORDER — HYDRALAZINE HYDROCHLORIDE 20 MG/ML
10 INJECTION INTRAMUSCULAR; INTRAVENOUS EVERY 4 HOURS PRN
Status: DISCONTINUED | OUTPATIENT
Start: 2019-06-29 | End: 2019-07-06 | Stop reason: HOSPADM

## 2019-06-29 RX ORDER — ONDANSETRON 4 MG/1
4 TABLET, ORALLY DISINTEGRATING ORAL EVERY 6 HOURS PRN
Status: DISCONTINUED | OUTPATIENT
Start: 2019-06-29 | End: 2019-07-06 | Stop reason: HOSPADM

## 2019-06-29 RX ORDER — METOPROLOL SUCCINATE 50 MG/1
50 TABLET, EXTENDED RELEASE ORAL DAILY
Status: DISCONTINUED | OUTPATIENT
Start: 2019-06-30 | End: 2019-07-01

## 2019-06-29 RX ORDER — AMOXICILLIN 250 MG
1 CAPSULE ORAL 2 TIMES DAILY PRN
Status: DISCONTINUED | OUTPATIENT
Start: 2019-06-29 | End: 2019-07-06 | Stop reason: HOSPADM

## 2019-06-29 RX ORDER — ONDANSETRON 2 MG/ML
4 INJECTION INTRAMUSCULAR; INTRAVENOUS EVERY 6 HOURS PRN
Status: DISCONTINUED | OUTPATIENT
Start: 2019-06-29 | End: 2019-07-06 | Stop reason: HOSPADM

## 2019-06-29 RX ORDER — POLYETHYLENE GLYCOL 3350 17 G/17G
17 POWDER, FOR SOLUTION ORAL DAILY PRN
Status: DISCONTINUED | OUTPATIENT
Start: 2019-06-29 | End: 2019-07-06 | Stop reason: HOSPADM

## 2019-06-29 RX ADMIN — HYDRALAZINE HYDROCHLORIDE 10 MG: 20 INJECTION INTRAMUSCULAR; INTRAVENOUS at 22:18

## 2019-06-29 RX ADMIN — IPRATROPIUM BROMIDE AND ALBUTEROL SULFATE 3 ML: .5; 3 SOLUTION RESPIRATORY (INHALATION) at 19:10

## 2019-06-29 RX ADMIN — FUROSEMIDE 40 MG: 10 INJECTION, SOLUTION INTRAVENOUS at 19:43

## 2019-06-29 ASSESSMENT — ENCOUNTER SYMPTOMS: SHORTNESS OF BREATH: 1

## 2019-06-29 NOTE — ED TRIAGE NOTES
States he is due to have an ECHO. Saw Saulo on Thursday r/t heart issues.  Has a cold for multiple days and now harder to catch his breath.

## 2019-06-29 NOTE — Clinical Note
The first balloon was inserted into the left anterior descending and middle left anterior descending.Max pressure = 22 krista. Total duration = 27 seconds.      Balloon reinflated a second time:

## 2019-06-29 NOTE — Clinical Note
Stent deployed in the middle left anterior descending. Max pressure = 12 krista. Total duration = 22 seconds.

## 2019-06-29 NOTE — Clinical Note
The first balloon was inserted into the left anterior descending and middle left anterior descending.Max pressure = 18 krista. Total duration = 40 seconds.     Max pressure = 16 krista. Total duration = 25 seconds.    Balloon reinflated a second time: Max pressure = 16 krista. Total duration = 25 seconds.  Balloon reinflated a third time: Max pressure = 16 krista. Total duration = 20 seconds.

## 2019-06-29 NOTE — Clinical Note
Balloon inserted to left anterior descending, middle left anterior descending and left anterior descending diagonal.

## 2019-06-29 NOTE — Clinical Note
The first balloon was inserted into the left anterior descending and middle left anterior descending.Max pressure = 6 krista. Total duration = 8 seconds.     Max pressure = 10 krista. Total duration = 28 seconds.    Balloon reinflated a second time: Max pressure = 10 krista. Total duration = 28 seconds.  Balloon reinflated a third time: Max pressure = 12 krista. Total duration = 21 seconds.  Balloon reinflated a fourth time: Max pressure = 16 krista. Total duration = 23 seconds.

## 2019-06-30 ENCOUNTER — APPOINTMENT (OUTPATIENT)
Dept: CARDIOLOGY | Facility: CLINIC | Age: 74
DRG: 246 | End: 2019-06-30
Attending: PHYSICIAN ASSISTANT
Payer: COMMERCIAL

## 2019-06-30 ENCOUNTER — APPOINTMENT (OUTPATIENT)
Dept: OCCUPATIONAL THERAPY | Facility: CLINIC | Age: 74
DRG: 246 | End: 2019-06-30
Attending: PHYSICIAN ASSISTANT
Payer: COMMERCIAL

## 2019-06-30 LAB
ALBUMIN SERPL-MCNC: 3.3 G/DL (ref 3.4–5)
ALP SERPL-CCNC: 96 U/L (ref 40–150)
ALT SERPL W P-5'-P-CCNC: 22 U/L (ref 0–70)
ANION GAP SERPL CALCULATED.3IONS-SCNC: 7 MMOL/L (ref 3–14)
AST SERPL W P-5'-P-CCNC: 21 U/L (ref 0–45)
BILIRUB SERPL-MCNC: 1.3 MG/DL (ref 0.2–1.3)
BUN SERPL-MCNC: 19 MG/DL (ref 7–30)
CALCIUM SERPL-MCNC: 8.5 MG/DL (ref 8.5–10.1)
CHLORIDE SERPL-SCNC: 98 MMOL/L (ref 94–109)
CHOLEST SERPL-MCNC: 169 MG/DL
CO2 SERPL-SCNC: 30 MMOL/L (ref 20–32)
CREAT SERPL-MCNC: 1.07 MG/DL (ref 0.66–1.25)
ERYTHROCYTE [DISTWIDTH] IN BLOOD BY AUTOMATED COUNT: 16.5 % (ref 10–15)
GFR SERPL CREATININE-BSD FRML MDRD: 68 ML/MIN/{1.73_M2}
GLUCOSE SERPL-MCNC: 110 MG/DL (ref 70–99)
HBA1C MFR BLD: 5.7 % (ref 0–5.6)
HCT VFR BLD AUTO: 46.1 % (ref 40–53)
HDLC SERPL-MCNC: 60 MG/DL
HGB BLD-MCNC: 14.9 G/DL (ref 13.3–17.7)
LDLC SERPL CALC-MCNC: 88 MG/DL
MAGNESIUM SERPL-MCNC: 1.1 MG/DL (ref 1.6–2.3)
MAGNESIUM SERPL-MCNC: 2.2 MG/DL (ref 1.6–2.3)
MCH RBC QN AUTO: 27.5 PG (ref 26.5–33)
MCHC RBC AUTO-ENTMCNC: 32.3 G/DL (ref 31.5–36.5)
MCV RBC AUTO: 85 FL (ref 78–100)
NONHDLC SERPL-MCNC: 109 MG/DL
PLATELET # BLD AUTO: 262 10E9/L (ref 150–450)
POTASSIUM SERPL-SCNC: 3.2 MMOL/L (ref 3.4–5.3)
POTASSIUM SERPL-SCNC: 4.1 MMOL/L (ref 3.4–5.3)
PROT SERPL-MCNC: 7.5 G/DL (ref 6.8–8.8)
RBC # BLD AUTO: 5.41 10E12/L (ref 4.4–5.9)
SODIUM SERPL-SCNC: 135 MMOL/L (ref 133–144)
TRIGL SERPL-MCNC: 105 MG/DL
TROPONIN I SERPL-MCNC: 0.04 UG/L (ref 0–0.04)
TROPONIN I SERPL-MCNC: 0.04 UG/L (ref 0–0.04)
WBC # BLD AUTO: 13.6 10E9/L (ref 4–11)

## 2019-06-30 PROCEDURE — 25500064 ZZH RX 255 OP 636: Performed by: INTERNAL MEDICINE

## 2019-06-30 PROCEDURE — 25000132 ZZH RX MED GY IP 250 OP 250 PS 637: Performed by: HOSPITALIST

## 2019-06-30 PROCEDURE — 97535 SELF CARE MNGMENT TRAINING: CPT | Mod: GO

## 2019-06-30 PROCEDURE — 93306 TTE W/DOPPLER COMPLETE: CPT | Mod: 26 | Performed by: INTERNAL MEDICINE

## 2019-06-30 PROCEDURE — 99207 ZZC CDG-MDM COMPONENT: MEETS MODERATE - UP CODED: CPT | Performed by: HOSPITALIST

## 2019-06-30 PROCEDURE — 21000001 ZZH R&B HEART CARE

## 2019-06-30 PROCEDURE — 84132 ASSAY OF SERUM POTASSIUM: CPT | Performed by: HOSPITALIST

## 2019-06-30 PROCEDURE — 97165 OT EVAL LOW COMPLEX 30 MIN: CPT | Mod: GO

## 2019-06-30 PROCEDURE — 99222 1ST HOSP IP/OBS MODERATE 55: CPT | Mod: 25 | Performed by: INTERNAL MEDICINE

## 2019-06-30 PROCEDURE — 84484 ASSAY OF TROPONIN QUANT: CPT | Performed by: PHYSICIAN ASSISTANT

## 2019-06-30 PROCEDURE — 25000128 H RX IP 250 OP 636: Performed by: PHYSICIAN ASSISTANT

## 2019-06-30 PROCEDURE — 36415 COLL VENOUS BLD VENIPUNCTURE: CPT | Performed by: HOSPITALIST

## 2019-06-30 PROCEDURE — 83735 ASSAY OF MAGNESIUM: CPT | Performed by: HOSPITALIST

## 2019-06-30 PROCEDURE — 97110 THERAPEUTIC EXERCISES: CPT | Mod: GO

## 2019-06-30 PROCEDURE — 25000128 H RX IP 250 OP 636: Performed by: HOSPITALIST

## 2019-06-30 PROCEDURE — 40000264 ECHOCARDIOGRAM COMPLETE

## 2019-06-30 PROCEDURE — 99233 SBSQ HOSP IP/OBS HIGH 50: CPT | Performed by: HOSPITALIST

## 2019-06-30 PROCEDURE — 83036 HEMOGLOBIN GLYCOSYLATED A1C: CPT | Performed by: PHYSICIAN ASSISTANT

## 2019-06-30 PROCEDURE — 80053 COMPREHEN METABOLIC PANEL: CPT | Performed by: PHYSICIAN ASSISTANT

## 2019-06-30 PROCEDURE — 83735 ASSAY OF MAGNESIUM: CPT | Performed by: PHYSICIAN ASSISTANT

## 2019-06-30 PROCEDURE — 85027 COMPLETE CBC AUTOMATED: CPT | Performed by: PHYSICIAN ASSISTANT

## 2019-06-30 PROCEDURE — 25000132 ZZH RX MED GY IP 250 OP 250 PS 637: Performed by: PHYSICIAN ASSISTANT

## 2019-06-30 PROCEDURE — 25000132 ZZH RX MED GY IP 250 OP 250 PS 637: Performed by: INTERNAL MEDICINE

## 2019-06-30 PROCEDURE — 80061 LIPID PANEL: CPT | Performed by: PHYSICIAN ASSISTANT

## 2019-06-30 PROCEDURE — 36415 COLL VENOUS BLD VENIPUNCTURE: CPT | Performed by: PHYSICIAN ASSISTANT

## 2019-06-30 RX ORDER — FUROSEMIDE 10 MG/ML
40 INJECTION INTRAMUSCULAR; INTRAVENOUS ONCE
Status: COMPLETED | OUTPATIENT
Start: 2019-07-01 | End: 2019-07-01

## 2019-06-30 RX ORDER — POTASSIUM CHLORIDE 1500 MG/1
20 TABLET, EXTENDED RELEASE ORAL
Status: COMPLETED | OUTPATIENT
Start: 2019-06-30 | End: 2019-07-02

## 2019-06-30 RX ORDER — MICONAZOLE NITRATE 20 MG/G
CREAM TOPICAL
Status: DISCONTINUED | OUTPATIENT
Start: 2019-06-30 | End: 2019-07-06 | Stop reason: HOSPADM

## 2019-06-30 RX ORDER — LABETALOL 20 MG/4 ML (5 MG/ML) INTRAVENOUS SYRINGE
10
Status: DISCONTINUED | OUTPATIENT
Start: 2019-06-30 | End: 2019-07-06 | Stop reason: HOSPADM

## 2019-06-30 RX ORDER — FUROSEMIDE 10 MG/ML
40 INJECTION INTRAMUSCULAR; INTRAVENOUS ONCE
Status: COMPLETED | OUTPATIENT
Start: 2019-06-30 | End: 2019-06-30

## 2019-06-30 RX ADMIN — POTASSIUM CHLORIDE 40 MEQ: 1500 TABLET, EXTENDED RELEASE ORAL at 08:19

## 2019-06-30 RX ADMIN — FUROSEMIDE 40 MG: 10 INJECTION, SOLUTION INTRAVENOUS at 08:19

## 2019-06-30 RX ADMIN — HYDRALAZINE HYDROCHLORIDE 10 MG: 20 INJECTION INTRAMUSCULAR; INTRAVENOUS at 20:52

## 2019-06-30 RX ADMIN — FUROSEMIDE 40 MG: 10 INJECTION, SOLUTION INTRAVENOUS at 16:19

## 2019-06-30 RX ADMIN — METOPROLOL SUCCINATE 50 MG: 50 TABLET, EXTENDED RELEASE ORAL at 08:19

## 2019-06-30 RX ADMIN — HUMAN ALBUMIN MICROSPHERES AND PERFLUTREN 9 ML: 10; .22 INJECTION, SOLUTION INTRAVENOUS at 10:23

## 2019-06-30 RX ADMIN — MAGNESIUM SULFATE IN WATER 4 G: 40 INJECTION, SOLUTION INTRAVENOUS at 08:20

## 2019-06-30 RX ADMIN — HYDRALAZINE HYDROCHLORIDE 10 MG: 20 INJECTION INTRAMUSCULAR; INTRAVENOUS at 04:29

## 2019-06-30 RX ADMIN — ALLOPURINOL 300 MG: 300 TABLET ORAL at 08:19

## 2019-06-30 RX ADMIN — LOSARTAN POTASSIUM 100 MG: 100 TABLET ORAL at 08:19

## 2019-06-30 RX ADMIN — POTASSIUM CHLORIDE 20 MEQ: 1500 TABLET, EXTENDED RELEASE ORAL at 11:05

## 2019-06-30 RX ADMIN — ASPIRIN 325 MG: 325 TABLET, COATED ORAL at 20:52

## 2019-06-30 RX ADMIN — GUAIFENESIN 10 ML: 100 SOLUTION ORAL at 16:24

## 2019-06-30 RX ADMIN — SIMVASTATIN 20 MG: 20 TABLET, FILM COATED ORAL at 20:52

## 2019-06-30 RX ADMIN — HYDRALAZINE HYDROCHLORIDE 10 MG: 20 INJECTION INTRAMUSCULAR; INTRAVENOUS at 11:38

## 2019-06-30 RX ADMIN — GUAIFENESIN 10 ML: 100 SOLUTION ORAL at 20:52

## 2019-06-30 ASSESSMENT — ACTIVITIES OF DAILY LIVING (ADL)
ADLS_ACUITY_SCORE: 12

## 2019-06-30 NOTE — PROVIDER NOTIFICATION
MD Notification    Notified Person: MD    Notified Person Name:  Dr. Colón     Notification Date/Time:  6/30, 1154    Notification Interaction: text page    Purpose of Notification: FYI pt very hypertensive, PRN hydralazine given,do you want to add anything further, also wife is here at bedside if you are able to see pt soon.      Orders Received: orders for labetolol and cards consult placed    Comments:

## 2019-06-30 NOTE — H&P
"Admitted:     06/29/2019      HOSPITALIST ADMISSION      PRIMARY CARE PROVIDER:  Dr. Rudy Vernon.      CHIEF COMPLAINT:  Shortness of breath and skipping beats.      History is provided by the patient and chart review      HISTORY OF PRESENT ILLNESS:  Betito Anderson is a 73-year-old male with hypertension, chronic atrial fibrillation, ASCVD, who presented with progressive dyspnea and lower extremity edema over the last 2-3 months.  He states initially his dyspnea first occurred when he would walk across the street and it has since progressed to the point where he can only walk a couple of stairs before having to stop and rest.  He attributed his lower extremity edema to Amlodipine.  Today, he became more concerned when he noted that he was short of breath at rest and had heart palpitations, noting \"My heart was skipping beats.\"  He also describes orthopnea and has increased from his usual 2 pillows to 3-4 pillows more recently.  In the last 24-36 hours, he developed a productive cough that feels like it is stuck in his throat.  He is mostly compliant with his medications, so he stopped taking the amlodipine as he thought it was worsening his lower extremity edema and he does admit to missing usually at least 1 day per week of medications.  He denies fevers, URI symptoms, chest pain, PND, abdominal pain, nausea, vomiting, change in bowel or bladder habits.      Notably, the patient has a history of chronic atrial fibrillation that appears was diagnosed in the fall of 2018.  Echocardiogram done showed EF 55%-60% with basal inferior and inferolateral wall hypokinesia and patient was referred for stress testing; however, failed, it appears due to atrial fibrillation, then it appears, lost to followup.  Additionally, the patient stopped taking apixaban after 2 weeks due to developing facial numbness, \"like clockwork\" at noon.  It does not appear that he has seen a medical provider since being evaluated for atrial " fibrillation.  He was seen in clinic 2 days prior to this admission with his primary provider with similar symptoms and at that time was noted to have lack of clarity in what he was actually taking for medications.  Notably, he has a family history of his father having coronary artery disease with bypass surgery at the age of 56 and later suffering a heart attack.     In the Emergency Department, the patient was found to be hypertensive with heart with SBP ranging  180-220s.  His heart rate was overall controlled in the 80s-90s.  He has an audible gurgling quality to his breathing and basic labs revealed a leukocytosis of 16.6, BNP of 24,400 and a troponin that was within reference range.  His EKG showed atrial fibrillation with nonspecific ST changes and notably 2 days prior in clinic with his primary showed T-wave inversions in the anterior leads.  His chest x-ray shows a right-sided pleural effusion with associated atelectasis versus infiltrate.  The patient was treated with Lasix 40 mg and one Duo nebulizer and requested admission.  He has not yet seen any improvement in his symptoms.      Lastly, the patient is a former smoker with estimated 40-qnii-hmlc history.  He does consume a moderate amount of alcohol, consuming 3-4 alcoholic beverages per night, usually daily.  He has never had seizures, hallucinations or tremors if he goes without drinking.  His last drink was on the evening prior to admission.     PAST MEDICAL HISTORY:   1.  Chronic atrial fibrillation.  Stopped taking Eliquis after 2 weeks due to facial numbness that would start around noon.  Notably, he did not start the Eliquis medication until approximately 3 months later when his insurance changed following his initial visit with Dr. Lezama.   2.  ASCVD, mild, with a 40% lesion in the circumflex, based off of angiogram in 1997.   3.  Hypertension.   4.  Moderate aortic valve stenosis based off TTE, 11/2018.      PAST SURGICAL HISTORY:  Right knee  meniscal repair.      FAMILY HISTORY:  Father:  CAD with bypass surgery at the age of 56 and later suffered a heart attack.  Paternal aunt with CVA.      SOCIAL HISTORY:     The patient is a former smoker, quitting at around age 40 with an estimated 30-pack-year history.   Consumes 3-4 alcoholic beverages per week.     No illicit drug use.   Does not walk with a cane or a walker.   [facility]   to his wife, Diane, and they have 2 adult children.      PRIOR TO ADMISSION MEDICATIONS:  Medications Prior to Admission   Medication Sig Dispense Refill Last Dose     allopurinol (ZYLOPRIM) 300 MG tablet TAKE 1 TABLET(300 MG) BY MOUTH DAILY 90 tablet 1 6/29/2019 at morning     furosemide (LASIX) 20 MG tablet Take 1 tablet (20 mg) by mouth daily 90 tablet 1 6/29/2019 at morning     losartan (COZAAR) 100 MG tablet TAKE 1 TABLET(100 MG) BY MOUTH DAILY 90 tablet 1 6/29/2019 at morning     metoprolol succinate ER (TOPROL-XL) 50 MG 24 hr tablet TAKE 1 TABLET(50 MG) BY MOUTH DAILY 90 tablet 1 6/29/2019 at morning     simvastatin (ZOCOR) 20 MG tablet TAKE 1 TABLET(20 MG) BY MOUTH AT BEDTIME 90 tablet 1 6/29/2019 at morning      ALLERGIES:  ACE INHIBITORS CAUSE ANAPHYLAXIS WITH EDEMA.      REVIEW OF SYSTEMS:  A complete review of systems was performed and is negative except for that noted in the HPI.      PHYSICAL EXAMINATION:   VITAL SIGNS:  Blood pressure 200/119, heart rate is 96, temperature 98.8, respirations 18, oxygen saturation is 93%.   GENERAL:  Pleasant male who appears stated age.  He looks overall comfortable lying flat but does have a gurgling quality to his breathing and voice, which improved slightly with sitting up.   SKIN:  Warm, dry.  No rash or lesions on exposed skin.   HEENT:  Normocephalic, atraumatic.  EOMs grossly intact and PERRLA.  Moist mucous membranes.  Crowded oropharynx.   NECK:  Supple.  No cervical lymphadenopathy or JVD.   CHEST:  Breath sounds coarse with diffuse inspiratory,  expiratory wheezing and crackles at the bases.  No increased work of breathing on room air.   CARDIOVASCULAR:  Irregularly irregular.  No rub or murmur appreciated, although difficult to hear over adventitious breath sounds.  There is 2+ edema to the knees.   ABDOMEN:  Soft, overweight, nontender, nondistended.   MUSCULOSKELETAL:  Moves all 4 extremities.   NEUROLOGIC:  Cranial nerves II-XII grossly intact.      LABORATORY DATA:  Sodium 138, potassium 3.4, creatinine 1.17, GFR 61, calcium 8.4, glucose 132.   WBC 16.6, hemoglobin 14.8, platelets 304.  BNP 24,400.  Troponin I 0.029.      IMAGING:  EKG shows atrial fibrillation with controlled ventricular rate and nonspecific ST-T changes.      Chest x-rays shows a right-sided pleural effusion with associated atelectasis versus infiltrate.      I personally reviewed imaging as described above and agree with interpretations.      ASSESSMENT AND PLAN:  Betito Anderson is a 73-year-old male with alcohol use disorder, hypertension, ASCVD, and chronic atrial fibrillation who presented with progressive dyspnea and lower extremity as well as new shortness of breath at rest and heart palpitations, found to be in atrial fibrillation with controlled rate and acute heart failure.     1.  Acute heart failure.   Last TTE showed EF 55%-60% (11/2018) and noted to have basal and basal inferior and inferolateral wall hypokinesia and unfortunately, the patient appears to have been lost to followup after failing a stress test due to atrial fibrillation.  Also noted on that echo was moderate aortic valve stenosis.  Additionally, the patient is a moderate drinker (see below).  He appears to have uncontrolled hypertension.  Additionally, he has history of a 40% lesion, circumflex based off of angiogram in 1997.  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  Treated with Lasix 40 mg IV in the ED.  BNP 24,400.  EKG without overt ischemic changes, does show atrial fibrillation with  "controlled rate.  Chest x-ray shows right-sided pleural effusion with no overt pulmonary vascular congestion.   -- Monitor strict I's and O's and daily weights.  Telemetry.   -- Continue IV Lasix 40 mg b.i.d.   -- Continue PTA beta blocker, ARB, statin.   -- Obtain A1c and fasting lipid panel for ischemic risk factors screening.   -- Appreciate Cardiology and [facility] consultations.   -- Trend serial troponin.    2.  Possible pneumonia.   Afebrile and no other URI symptoms aside from a productive cough times the last 2 days that he feels gets stuck in his throat and may very well be related to his heart failure.  Has leukocytosis of 16.6, which could be reactive in the setting above.  Chest x-ray showed a right pleural effusion with atelectasis versus infiltrate.   -- Add on procalcitonin.   -- Hold off on empiric antibiotics at this point and recheck labs and monitor vitals, with treatment as depicted above and consider antibiotics if appears clinically warranted.     3.  Chronic atrial fibrillation with controlled rate.   Appears diagnosed in fall 2018 and was recommended increased dose of metoprolol as well as apixaban.  He did not start the apixaban until approximately 3 months later when his insurance changed and then only used it for 2 weeks after developing facial numbness, \"like clockwork\" around noon.  CHADS-VASc score is at least 3 for age, CHF and hypertension.  He is agreeable to trying a different anticoagulant agent.   -- Continue metoprolol succinate 50 mg daily.   -- P.r.n. metoprolol available for heart rate greater than 120.   -- Plan was to initiate Xarelto with the patient's permission.  However, upon further thought, as there may be consideration for an angiogram during patient's stay, will hold off on initiating any anticoagulation at this point.     4.  Moderate aortic valve stenosis.   Noted on echo 11/2018.  No murmur appreciated on exam, though difficult to hear over adventitious breath " sounds.   -- Obtain echocardiogram as above.     5.  Alcohol use disorder.   Consumes 3-4 alcoholic beverages daily.   -- Discussed moderation of alcohol with recommendation for no more than 3 per sitting or 14 per week and explained it can contribute to atrial fibrillation as well as heart failure.  Has no history of withdrawal and his last drink was on the evening prior to admission.   -- Monitor CIWAs without benzos for time being.     6.  ASCVD.   Noted to have 40% lesion in the circumflex on angiogram in .  Again, TTE in 2018, showed hypokinesia of the basal inferior and inferolateral wall.  The patient went for stress testing, but failed it due to atrial fibrillation and then appears lost to followup.   -- Screening labs as above and trending serial troponins as above.     7.  Hypertension.   Uncontrolled.  The patient states mostly compliant, missing usually 1 day per week and he does state that he essentially stopped taking his amlodipine as he thinks that it exacerbates his lower extremity swelling.  SBPs are uncontrolled, 180s-220s systolic.   -- Continue PTA losartan 100 mg, metoprolol succinate 50 mg daily.   -- Holding PTA Lasix in lieu of IV Lasix at increased dose and frequency.   -- P.r.n. hydralazine is available for SBP greater than 160.   -- Continue PTA simvastatin.     Deep venous thrombosis prophylaxis:  PCDs.  Consider anticoagulation if no plans for angiogram versus starting heparin drip.   Code status:  Full code.      DISPOSITION:  Anticipate discharge in greater than 2 days.      ATTESTATION:  This patient was discussed with Dr. Deisi Cruz of the Hospitalist Service, who is in agreement with my assessment and plan of care as outlined above.         HALLEY CRUZ MD       As dictated by JOANNA ULMEN BARTHELL, PA-C            D: 2019   T: 2019   MT: ERENDIRA      Name:     MARCELA TINAJERO   MRN:      [id number]        Account:      [id number]   :      1945         Admitted:     06/29/2019                   Document: [id number]       cc: Rudy Vernon MD

## 2019-06-30 NOTE — PLAN OF CARE
Discharge Planner OT   Patient plan for discharge: home  Current status: OT/cardiac rehab eval completed and treatment initiated on 72yo male admitted with new diagnosis of CHF. Pt lives w/spouse in multi-level home, he is indep all mobilities without an adaptive device, works part time mowing golf course. He enjoys golfing. He and spouse planning a trip abroad in September which will involve significant amount of walking in multiple ports of call. Today cited fatigue wanting to sleep but agreeable to participation. He is indep with mobilities and basic self-cares in room. Ambulated x 4min before needing to use bathroom (on lasix). Patient with frequent raspy coughing througout and at completion of amb and toileting /101, HR 80, 02 96% on RA.  Barriers to return to prior living situation: multiple stairs, poor endurance  Recommendations for discharge: home w/follow through of home walking program and may benefit from WEL program ??  Rationale for recommendations: pt deconditioned, will benefit from HEP. OT/CR to continue daily during inpatient stay to advance activity tolerance/general strength       Entered by: Hans Artis 06/30/2019 2:36 PM

## 2019-06-30 NOTE — PLAN OF CARE
VSS, although hypertensive at times--prn hydralazine available, diuresing on lasix, plan for angiogram tomorrow am, consent signed, S/H orders in place.  Continue to monitor closely.

## 2019-06-30 NOTE — PHARMACY-ADMISSION MEDICATION HISTORY
"Admission medication history interview status for the 6/29/2019  admission is complete. See EPIC admission navigator for prior to admission medications     Medication history source reliability:Good , patient presented with his medication bottles.  He was able to discuss changes to his medications and reasons for recent medication changes    Actions taken by pharmacist (provider contacted, etc):None     Additional medication history information not noted on PTA med list :  - Patient has stopped his apixaban this week d/t facial numbness.  Primary provider is aware and he has not restarted his aspirin at this time. Per the patient his primary is aware and \"they have not gotten that far as the change was made this week.\"  (See primary note dated 6/27/2019)  - Has stopped his Amlodipine d/t leg swelling.    - Takes Simvastatin in the morning    Medication reconciliation/reorder completed by provider prior to medication history? No    Time spent in this activity: 15 minutes    Prior to Admission medications    Medication Sig Last Dose Taking? Auth Provider   allopurinol (ZYLOPRIM) 300 MG tablet TAKE 1 TABLET(300 MG) BY MOUTH DAILY 6/29/2019 at morning Yes Rudy Vernon MD   furosemide (LASIX) 20 MG tablet Take 1 tablet (20 mg) by mouth daily 6/29/2019 at morning Yes Rudy Vernon MD   losartan (COZAAR) 100 MG tablet TAKE 1 TABLET(100 MG) BY MOUTH DAILY 6/29/2019 at morning Yes Rudy Vernon MD   metoprolol succinate ER (TOPROL-XL) 50 MG 24 hr tablet TAKE 1 TABLET(50 MG) BY MOUTH DAILY 6/29/2019 at morning Yes Rudy Vernon MD   simvastatin (ZOCOR) 20 MG tablet TAKE 1 TABLET(20 MG) BY MOUTH AT BEDTIME 6/29/2019 at morning Yes Rudy Vernon MD         .  "

## 2019-06-30 NOTE — PROGRESS NOTES
Hospitalist admission note dictated. Confirmation #: [id number].    JoAnna Barthell, PA-C  6/29/2019   9:32 PM

## 2019-06-30 NOTE — PROGRESS NOTES
06/30/19 1420   Quick Adds   Type of Visit Initial Occupational Therapy Evaluation   Living Environment   Lives With spouse   Living Arrangements   ([facility])   Home Accessibility stairs to enter home;stairs within home   Number of Stairs, Main Entrance 4   Stair Railings, Main Entrance railings safe and in good condition   Number of Stairs, Within Home, Primary other (see comments)  (16)   Stair Railings, Within Home, Primary railings safe and in good condition  (bilateral railings)   Transportation Anticipated car, drives self;family or friend will provide   Living Environment Comment 13 steps to lower level, 3 steps up to main from garage level   Self-Care   Usual Activity Tolerance moderate   Current Activity Tolerance fair   Regular Exercise No   Equipment Currently Used at Home none   Functional Level   Ambulation 0-->independent   Transferring 0-->independent   Toileting 0-->independent   Bathing 0-->independent   Dressing 0-->independent   Eating 0-->independent   Communication 0-->understands/communicates without difficulty   Swallowing 0-->swallows foods/liquids without difficulty   Cognition 0 - no cognition issues reported   Fall history within last six months no   Which of the above functional risks had a recent onset or change? 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 Pt [employer].    General Information   Onset of Illness/Injury or Date of Surgery - Date 06/29/19   Referring Physician Joanna Barthell PA   Patient/Family Goals Statement return home   Additional Occupational Profile Info/Pertinent History of Current Problem 74yo male admitted with CHF (new dx)). PMH includes HTN, ASCVD, chronic afib.   Precautions/Limitations no known precautions/limitations   Cognitive Status Examination   Orientation orientation to person, place and time   Level of Consciousness alert   Follows Commands  "(Cognition) WNL   Memory intact   Visual Perception   Visual Perception No deficits were identified  (reading glasses only)   Pain Assessment   Patient Currently in Pain No   Integumentary/Edema   Integumentary/Edema Comments Swelling in legs   Range of Motion (ROM)   ROM Quick Adds No deficits were identified   Strength   Manual Muscle Testing Quick Adds No deficits were identified   Mobility   Bed Mobility Comments Independent   Transfer Skills   Transfer Comments Independent   Balance   Balance Comments Independent no AD   Lower Body Dressing   Level of [place]: Dress Lower Body independent   Toileting   Level of [place]: Toilet independent   Instrumental Activities of Daily Living (IADL)   Previous Responsibilities shopping;medication management;finances;work;driving   IADL Comments Spouse does cooking, laundry, cleaning   Activities of Daily Living Analysis   Impairments Contributing to Impaired Activities of Daily Living strength decreased  (endurance)   General Therapy Interventions   Planned Therapy Interventions ADL retraining;home program guidelines;progressive activity/exercise;risk factor education   Clinical Impression   Criteria for Skilled Therapeutic Interventions Met yes, treatment indicated   OT Diagnosis decreased IADL performance   Influenced by the following impairments activity tolerance/endurance   Assessment of Occupational Performance 1-3 Performance Deficits   Identified Performance Deficits household mgmt, social/work skills   Clinical Decision Making (Complexity) Low complexity   Therapy Frequency Daily   Predicted Duration of Therapy Intervention (days/wks) 3 days   Anticipated Discharge Disposition Home  (WEL program)   Risks and Benefits of Treatment have been explained. Yes   Patient, Family & other staff in agreement with plan of care Yes   [facility] AM-PAC TM \"6 Clicks\"   2016, Trustees of [facility], under license to [employer].  All rights reserved.   6 " "Clicks Short Forms Daily Activity Inpatient Short Form   [facility] AM-PAC  \"6 Clicks\" Daily Activity Inpatient Short Form   1. Putting on and taking off regular lower body clothing? 4 - None   2. Bathing (including washing, rinsing, drying)? 4 - None   3. Toileting, which includes using toilet, bedpan or urinal? 4 - None   4. Putting on and taking off regular upper body clothing? 4 - None   5. Taking care of personal grooming such as brushing teeth? 4 - None   6. Eating meals? 4 - None   Daily Activity Raw Score (Score out of 24.Lower scores equate to lower levels of function) 24     "

## 2019-06-30 NOTE — PROGRESS NOTES
Care Coordination:    -Orders received for Congestive Heart Failure to assist with disposition and discharge planning.  CHF education to be completed by bedside nurse.  Will continue to follow and assist with discharge planning.      Yancy Eaton RN, BAN   Care Coordinator  Ph. [phone]

## 2019-06-30 NOTE — PLAN OF CARE
Pt A&Ox4, VSS on RA ex BP elevated, PRN hydralazine given x2. Pt up frequently to the BR, up IND, steady gait. Tele afib/aflutter w/ CVR. Denies pain, confirms SOB. LS coarse, inspiratory and expiratory wheezes. Trops 0.029 and 0.038, awaiting third result. IVSL. Echo & CXR done. Discharge TBD.

## 2019-06-30 NOTE — PROGRESS NOTES
RECEIVING UNIT ED HANDOFF REVIEW    ED Nurse Handoff Report was reviewed by: Yolanda Rojo on June 29, 2019 at 7:59 PM

## 2019-06-30 NOTE — CONSULTS
[facility]    Cardiology Consultation     Date of Admission:  6/29/2019    Assessment & Plan   Betito Anderson is a 73 year old male who was admitted on 6/29/2019. I was asked to see the patient for shortness of breath.    1. Acute systolic congestive heart failure, LVEF 35-40%  2. Mildly elevated troponin, query demand NSTEMI  3. Moderate CAD seen on prior coronary angiogram in 1997  4. Poorly controlled hypertension  5. Chronic atrial fibrillation previously not on anticoagulation due to self discontinuation  6. Aortic valve sclerosis without stenosis    It was a pleasure seeing Mr. Anderson today with the cardiology consult service.  I reviewed these recommendations with him in the hospital in detail.    He presents with acute, worsening congestive heart failure over period of 2 to 3 months.  This in the setting of an echocardiogram demonstrating moderately reduced ejection fraction with an EF of 35 to 40% which is new compared to his last prior study in November 2018.  He has some aortic valve sclerosis but no significant aortic valve stenosis.  No other significant valvular heart disease was seen.    I discussed with him that I suspect his systolic congestive heart failure has been worsening for period of few months now.  His troponin is mildly elevated at 0.03, 0.04, and 0.04.  His EKG demonstrates no acute or chronic appearing changes to suggest prior infarct or active ischemia.  My overall impression is that this likely represents poorly controlled hypertension and hypertensive cardiomyopathy.  However, he does have known coronary artery disease with a moderate stenosis seen in 1997 and it certainly possible this is now progressed.    I did discuss with him that given his elevated troponin and new systolic congestive heart failure, I do think coronary angiography is warranted in this case.  We discussed the risk and benefits of proceeding with coronary angiogram and informed consent was  obtained.  We will plan to move forward with a coronary angiogram tomorrow.     In the meantime, we will continue active diuresis targeting -2 L for the day.  His blood pressure is under a bit better control with the IV hydralazine.  We can begin up titrating his beta-blocker therapy as well as losartan given his reduced ejection fraction after his coronary angiogram takes place.    Cardiology consult service will continue to follow along.  Please page with any questions or concerns.    Fernando Velez MD    Code Status    Full Code    Reason for Consult   Reason for consult: Shortness of breath    Primary Care Physician   Rudy Vernon    Chief Complaint   Shortness of breath    History is obtained from the patient    History of Present Illness   Betito Anderson is a 73 year old male who presents with worsening shortness of breath and dyspnea on exertion.  He has a past medical history of coronary artery disease with prior coronary angiogram in 1997 demonstrating a 40% circumflex lesion, chronic atrial fibrillation not on anticoagulation, hypertension, hyperlipidemia, and impaired fasting glucose.    He tells me that beginning about 2 months ago he developed worsening dyspnea on exertion.  Since that time he has had progressive worsening dyspnea on exertion to the point that now he is very short of breath taking a flight of stairs.  His wife is commented that he now takes 3 steps at a time and rest in between in order to get to the top of the flight of stairs.  He also reported symptoms of orthopnea though denied symptoms of paroxysmal nocturnal dyspnea.  He had worsening lower extremity edema over the past month.    He was being worked up for this in the outpatient setting.  However, he came into the hospital for worsening shortness of breath that was occurring now at rest while sitting on the couch.  He presented to the emergency department for further evaluation.  In the emergency department, his NT proBNP was  significantly elevated to 24,400.  His troponins were mildly elevated at 0.03, 0.04, and 0.04.  His renal function is normal.  A chest x-ray demonstrated a right pleural effusion.  EKG demonstrated atrial fibrillation but no acute ischemic changes.  He was admitted to the hospital and cardiology was consulted for recommendations.    His echocardiogram today demonstrated a newly reduced ejection fraction with an EF of 35 to 40% with generalized hypokinesis.  No significant valvular heart disease.  He was quite hypertensive earlier this admission though his blood pressures under better control with the addition of PRN IV hydralazine.  He denies any chest pain or chest discomfort of any kind.  He reports his shortness of breath is significantly improved and is now able to lie flat.  His lower extremity edema is overall unchanged.  He denies any paroxysmal nocturnal dyspnea.  He denies any sensations of palpitations or presyncope.    Past Medical History   I have reviewed this patient's medical history and updated it with pertinent information if needed.   Past Medical History:   Diagnosis Date     ASCVD (arteriosclerotic cardiovascular disease) 1997    angio with 40% circ lesion     Atrial fibrillation (H) 10/09/2018    found on routine physical     Carotid artery plaque 2005    seen on us, about 50% stenosis, fu 2009 us no significant stenosis     Elevated blood sugar      Gout     on allopurinol     HTN (hypertension)      Hypercholesteremia      Hyponatremia 2015    felt due to chlorthalidone     Low mean corpuscular volume (MCV)      Near syncope 5/15    fu est echo low level but neg     Psoriasis     Dr. Marti     Screening 2012    abd us no aaa       Past Surgical History   I have reviewed this patient's surgical history and updated it with pertinent information if needed.  Past Surgical History:   Procedure Laterality Date     C ANESTH,DX ARTHROSCOPIC PROC KNEE JOINT  2009       Prior to Admission Medications    Prior to Admission Medications   Prescriptions Last Dose Informant Patient Reported? Taking?   allopurinol (ZYLOPRIM) 300 MG tablet 6/29/2019 at morning Self No Yes   Sig: TAKE 1 TABLET(300 MG) BY MOUTH DAILY   furosemide (LASIX) 20 MG tablet 6/29/2019 at morning Self No Yes   Sig: Take 1 tablet (20 mg) by mouth daily   losartan (COZAAR) 100 MG tablet 6/29/2019 at morning Self No Yes   Sig: TAKE 1 TABLET(100 MG) BY MOUTH DAILY   metoprolol succinate ER (TOPROL-XL) 50 MG 24 hr tablet 6/29/2019 at morning Self No Yes   Sig: TAKE 1 TABLET(50 MG) BY MOUTH DAILY   simvastatin (ZOCOR) 20 MG tablet 6/29/2019 at morning Self No Yes   Sig: TAKE 1 TABLET(20 MG) BY MOUTH AT BEDTIME      Facility-Administered Medications: None     Allergies   Allergies   Allergen Reactions     Ace Inhibitors Anaphylaxis     Edema of ace       Social History   I have reviewed this patient's social history and updated it with pertinent information if needed. Betito Anderson  reports that he quit smoking about 20 years ago. His smoking use included cigars. He has a 30.00 pack-year smoking history. He has never used smokeless tobacco. He reports that he drinks about 8.4 oz of alcohol per week. He reports that he does not use drugs.    Family History   I have reviewed this patient's family history and updated it with pertinent information if needed.   Family History   Problem Relation Age of Onset     Coronary Artery Disease Father      Medical History Unknown Mother      Medical History Unknown Maternal Grandfather        Review of Systems   The 10 point Review of Systems is negative other than noted in the HPI or here.     Physical Exam   Temp: 98.3  F (36.8  C) Temp src: Oral BP: (!) 142/93 Pulse: 67 Heart Rate: 80 Resp: 16 SpO2: 96 % O2 Device: None (Room air)    Vital Signs with Ranges  Temp:  [97.4  F (36.3  C)-98.8  F (37.1  C)] 98.3  F (36.8  C)  Pulse:  [67-98] 67  Heart Rate:  [78-91] 80  Resp:  [16-20] 16  BP: (142-226)/()  142/93  SpO2:  [93 %-97 %] 96 %  198 lbs 9.6 oz    Constitutional: No apparent distress.   Eyes: No xanthelasma or conjunctivitis  HEENT: Moist oral mucosa  Respiratory: Mild crackles at the bases bilaterally.  Cardiovascular: Irregularly-regular rhythm with a normal rate. Normal S1 and S2.  Soft, systolic murmur heard best at the right upper sternal border.  Lymph/Hematologic: No purpura or petechiae.  Skin: No stasis dermatitis. No major rashes.  Extremities: 3+, bilateral peripheral edema up to the shin.  Neurologic: Moving all extremities. No facial assymmetry.  Psychiatric: Alert and oriented. Answers questions appropriately.    Data   Results for orders placed or performed during the hospital encounter of 06/29/19 (from the past 24 hour(s))   EKG 12-lead, tracing only   Result Value Ref Range    Interpretation ECG Click View Image link to view waveform and result    CBC with platelets differential   Result Value Ref Range    WBC 16.6 (H) 4.0 - 11.0 10e9/L    RBC Count 5.30 4.4 - 5.9 10e12/L    Hemoglobin 14.8 13.3 - 17.7 g/dL    Hematocrit 45.5 40.0 - 53.0 %    MCV 86 78 - 100 fl    MCH 27.9 26.5 - 33.0 pg    MCHC 32.5 31.5 - 36.5 g/dL    RDW 16.2 (H) 10.0 - 15.0 %    Platelet Count 304 150 - 450 10e9/L    Diff Method Automated Method     % Neutrophils 87.8 %    % Lymphocytes 3.7 %    % Monocytes 7.4 %    % Eosinophils 0.5 %    % Basophils 0.4 %    % Immature Granulocytes 0.2 %    Nucleated RBCs 0 0 /100    Absolute Neutrophil 14.6 (H) 1.6 - 8.3 10e9/L    Absolute Lymphocytes 0.6 (L) 0.8 - 5.3 10e9/L    Absolute Monocytes 1.2 0.0 - 1.3 10e9/L    Absolute Eosinophils 0.1 0.0 - 0.7 10e9/L    Absolute Basophils 0.1 0.0 - 0.2 10e9/L    Abs Immature Granulocytes 0.0 0 - 0.4 10e9/L    Absolute Nucleated RBC 0.0    Basic metabolic panel   Result Value Ref Range    Sodium 138 133 - 144 mmol/L    Potassium 3.4 3.4 - 5.3 mmol/L    Chloride 103 94 - 109 mmol/L    Carbon Dioxide 27 20 - 32 mmol/L    Anion Gap 8 3 - 14  mmol/L    Glucose 132 (H) 70 - 99 mg/dL    Urea Nitrogen 20 7 - 30 mg/dL    Creatinine 1.17 0.66 - 1.25 mg/dL    GFR Estimate 61 >60 mL/min/[1.73_m2]    GFR Estimate If Black 71 >60 mL/min/[1.73_m2]    Calcium 8.4 (L) 8.5 - 10.1 mg/dL   Troponin I   Result Value Ref Range    Troponin I ES 0.029 0.000 - 0.045 ug/L   Nt probnp inpatient (BNP)   Result Value Ref Range    N-Terminal Pro BNP Inpatient 24,400 (H) 0 - 900 pg/mL   Procalcitonin   Result Value Ref Range    Procalcitonin 0.05 ng/ml   XR Chest 2 Views    Narrative    CHEST TWO VIEWS 6/29/2019 7:23 PM     HISTORY: Shortness of breath.    COMPARISON: June 27, 2019     FINDINGS: Slightly improved right effusion and associated atelectasis  and/or infiltrate. Left lung clear. The cardiac silhouette is not  enlarged. Pulmonary vasculature is unremarkable.      Impression    IMPRESSION: Slightly improved right pleural effusion and associated  atelectasis and/or infiltrate.    CONNOR RUANO MD   Nutrition Services Adult IP Consult    Narrative    Madeline Haynes RD, LD     6/30/2019 10:30 AM  NUTRITION EDUCATION      REASON FOR NUTRITION CONSULT:  Provider Order  -  Nutrition Education Congestive Heart Failure   (CHF) - Dietitian to instruct patient on 2 gram sodium diet      ASSESSMENT:  Unable to complete nutrition assessment and instructions at this   time  Noted ECHO pending        FOLLOW UP:   Will instruct patient prior to discharge as appropriate    Madeline Haynes RD, LD, [facility]   Clinical Dietitian - [facility]            Troponin I   Result Value Ref Range    Troponin I ES 0.038 0.000 - 0.045 ug/L   Comprehensive metabolic panel   Result Value Ref Range    Sodium 135 133 - 144 mmol/L    Potassium 3.2 (L) 3.4 - 5.3 mmol/L    Chloride 98 94 - 109 mmol/L    Carbon Dioxide 30 20 - 32 mmol/L    Anion Gap 7 3 - 14 mmol/L    Glucose 110 (H) 70 - 99 mg/dL    Urea Nitrogen 19 7 - 30 mg/dL    Creatinine 1.07 0.66 - 1.25 mg/dL    GFR Estimate 68 >60  mL/min/[1.73_m2]    GFR Estimate If Black 79 >60 mL/min/[1.73_m2]    Calcium 8.5 8.5 - 10.1 mg/dL    Bilirubin Total 1.3 0.2 - 1.3 mg/dL    Albumin 3.3 (L) 3.4 - 5.0 g/dL    Protein Total 7.5 6.8 - 8.8 g/dL    Alkaline Phosphatase 96 40 - 150 U/L    ALT 22 0 - 70 U/L    AST 21 0 - 45 U/L   CBC with platelets   Result Value Ref Range    WBC 13.6 (H) 4.0 - 11.0 10e9/L    RBC Count 5.41 4.4 - 5.9 10e12/L    Hemoglobin 14.9 13.3 - 17.7 g/dL    Hematocrit 46.1 40.0 - 53.0 %    MCV 85 78 - 100 fl    MCH 27.5 26.5 - 33.0 pg    MCHC 32.3 31.5 - 36.5 g/dL    RDW 16.5 (H) 10.0 - 15.0 %    Platelet Count 262 150 - 450 10e9/L   Lipid panel reflex to direct LDL   Result Value Ref Range    Cholesterol 169 <200 mg/dL    Triglycerides 105 <150 mg/dL    HDL Cholesterol 60 >39 mg/dL    LDL Cholesterol Calculated 88 <100 mg/dL    Non HDL Cholesterol 109 <130 mg/dL   Hemoglobin A1c   Result Value Ref Range    Hemoglobin A1C 5.7 (H) 0 - 5.6 %   Troponin I   Result Value Ref Range    Troponin I ES 0.040 0.000 - 0.045 ug/L   Magnesium   Result Value Ref Range    Magnesium 1.1 (L) 1.6 - 2.3 mg/dL   Echocardiogram Complete    Narrative    [id number]  [id number]  [id number]^BARTHELL^MARIO^CONRAD [facility]  Echocardiography Laboratory  [address] [place] [address]        Name: MARCELA TINAJERO  MRN: [id number]  : 1945  Study Date: 2019 09:55 AM  Age: 73 yrs  Gender: Male  Patient Location: [facility]  Reason For Study: CHF  Ordering Physician: BARTHELL, JOANNA  Referring Physician: Rudy Vernon  Performed By: Yesika Becerra     BSA: 2.0 m2  Height: 66 in  Weight: 198 lb  HR: 80  BP: 185/105 mmHg  _____________________________________________________________________________  __        Procedure  Complete Portable Echo Adult. Optison (NDC #[id number]) given intravenously.  _____________________________________________________________________________  __        Interpretation  Summary     1. Left ventricular systolic function is mild to moderately reduced. The  visual ejection fraction is estimated at 35-40%.  2. There is mild-moderate global hypokinesia of the left ventricle.  3. The right ventricle is normal size. Mildly decreased right ventricular  systolic function  4. There is moderate biatrial dilatation.  5. There is mild (1+) mitral regurgitation.  6. There is mild (1+) tricuspid regurgitation.  7. The right ventricular systolic pressure is approximated at 46mmHg plus the  right atrial pressure. IVC diameter >2.1 cm collapsing <50% with sniff  suggests a high RA pressure estimated at 15 mmHg or greater.  8. Compared with the prior report, there has been an interval worsening in LV  function.  _____________________________________________________________________________  __        Left Ventricle  The left ventricle is normal in size. Left ventricular systolic function is  mild to moderately reduced. The visual ejection fraction is estimated at 35-  40%. Diastolic function not assessed due to atrial fibrillation. There is  mild-moderate global hypokinesia of the left ventricle.     Right Ventricle  The right ventricle is normal size. Mildly decreased right ventricular  systolic function.     Atria  The left atrium is moderately dilated. The right atrium is moderately dilated.     Mitral Valve  The mitral valve is normal in structure and function. There is mild (1+)  mitral regurgitation.        Tricuspid Valve  The tricuspid valve is normal in structure and function. There is mild (1+)  tricuspid regurgitation. The right ventricular systolic pressure is  approximated at 46mmHg plus the right atrial pressure.     Aortic Valve  The aortic valve is trileaflet with aortic valve sclerosis.     Pulmonic Valve  The pulmonic valve is normal in structure and function.     Vessels  Normal size aorta. IVC diameter >2.1 cm collapsing <50% with sniff suggests a  high RA pressure estimated at 15  mmHg or greater.     Pericardium  The pericardium appears normal.        Rhythm  The rhythm was atrial fibrillation.  _____________________________________________________________________________  __  MMode/2D Measurements & Calculations  IVSd: 1.3 cm     LVIDd: 5.3 cm  LVIDs: 4.3 cm  LVPWd: 1.1 cm  FS: 18.9 %  LV mass(C)d: 251.9 grams  LV mass(C)dI: 126.5 grams/m2  Ao root diam: 2.7 cm  LA dimension: 5.3 cm  asc Aorta Diam: 3.3 cm  LA/Ao: 2.0  LVOT diam: 1.9 cm  LVOT area: 2.8 cm2  LA Volume (BP): 134.0 ml  LA Volume Index (BP): 67.3 ml/m2  RWT: 0.40           Doppler Measurements & Calculations  MV E max karen: 117.6 cm/sec  MV dec time: 0.20 sec  TR max karen: 368.0 cm/sec  TR max P.7 mmHg  E/E' av.6  Lateral E/e': 18.1  Medial E/e': 19.0           _____________________________________________________________________________  __           Report approved by: Jefry Denis 2019 12:36 PM      Magnesium (1200)   Result Value Ref Range    Magnesium 2.2 1.6 - 2.3 mg/dL   Potassium   Result Value Ref Range    Potassium 4.1 3.4 - 5.3 mmol/L

## 2019-06-30 NOTE — ED NOTES
"[facility]  ED Nurse Handoff Report    ED Chief complaint: Shortness of Breath      ED Diagnosis:   Final diagnoses:   Acute on chronic congestive heart failure, unspecified heart failure type (H)       Code Status: Full Code    Allergies:   Allergies   Allergen Reactions     Ace Inhibitors Anaphylaxis     Edema of ace       Activity level - Baseline/Home:  Independent  Activity Level - Current:   Independent    Patient's Preferred language: english   Needed?: No    Isolation: No  Infection: Not Applicable  Bariatric?: No    Vital Signs:   Vitals:    06/29/19 1841 06/29/19 1930   BP: (!) 226/116 (!) 183/103   Pulse:  98   Resp: 18 20   Temp: 98.8  F (37.1  C)    TempSrc: Oral    SpO2: 95% 96%   Weight: 92.5 kg (204 lb)        Cardiac Rhythm: ,        Pain level:      Is this patient confused?: No   Does this patient have a guardian?  No         If yes, is there guardianship documents in the Epic \"Code/ACP\" activity?  N/A         Guardian Notified?  No  [place] - Suicide Severity Rating Scale Completed?  Yes  If yes, what color did the patient score?  White    Patient Report: Initial Complaint: States he is due to have an ECHO. Saw Saulo on Thursday r/t heart issues.  Has a cold for multiple days and now harder to catch his breath. Pt has lower leg edema. Frequent cough. Neb treatment and lasix  Focused Assessment: shortness of breath  Tests Performed: lab, cxr  Abnormal Results:   Results for orders placed or performed during the hospital encounter of 06/29/19   XR Chest 2 Views    Narrative    CHEST TWO VIEWS 6/29/2019 7:23 PM     HISTORY: Shortness of breath.    COMPARISON: June 27, 2019     FINDINGS: Slightly improved right effusion and associated atelectasis  and/or infiltrate. Left lung clear. The cardiac silhouette is not  enlarged. Pulmonary vasculature is unremarkable.      Impression    IMPRESSION: Slightly improved right pleural effusion and associated  atelectasis and/or " infiltrate.   CBC with platelets differential   Result Value Ref Range    WBC 16.6 (H) 4.0 - 11.0 10e9/L    RBC Count 5.30 4.4 - 5.9 10e12/L    Hemoglobin 14.8 13.3 - 17.7 g/dL    Hematocrit 45.5 40.0 - 53.0 %    MCV 86 78 - 100 fl    MCH 27.9 26.5 - 33.0 pg    MCHC 32.5 31.5 - 36.5 g/dL    RDW 16.2 (H) 10.0 - 15.0 %    Platelet Count 304 150 - 450 10e9/L    Diff Method Automated Method     % Neutrophils 87.8 %    % Lymphocytes 3.7 %    % Monocytes 7.4 %    % Eosinophils 0.5 %    % Basophils 0.4 %    % Immature Granulocytes 0.2 %    Nucleated RBCs 0 0 /100    Absolute Neutrophil 14.6 (H) 1.6 - 8.3 10e9/L    Absolute Lymphocytes 0.6 (L) 0.8 - 5.3 10e9/L    Absolute Monocytes 1.2 0.0 - 1.3 10e9/L    Absolute Eosinophils 0.1 0.0 - 0.7 10e9/L    Absolute Basophils 0.1 0.0 - 0.2 10e9/L    Abs Immature Granulocytes 0.0 0 - 0.4 10e9/L    Absolute Nucleated RBC 0.0    Basic metabolic panel   Result Value Ref Range    Sodium 138 133 - 144 mmol/L    Potassium 3.4 3.4 - 5.3 mmol/L    Chloride 103 94 - 109 mmol/L    Carbon Dioxide 27 20 - 32 mmol/L    Anion Gap 8 3 - 14 mmol/L    Glucose 132 (H) 70 - 99 mg/dL    Urea Nitrogen 20 7 - 30 mg/dL    Creatinine 1.17 0.66 - 1.25 mg/dL    GFR Estimate 61 >60 mL/min/[1.73_m2]    GFR Estimate If Black 71 >60 mL/min/[1.73_m2]    Calcium 8.4 (L) 8.5 - 10.1 mg/dL   Troponin I   Result Value Ref Range    Troponin I ES 0.029 0.000 - 0.045 ug/L   Nt probnp inpatient (BNP)   Result Value Ref Range    N-Terminal Pro BNP Inpatient 24,400 (H) 0 - 900 pg/mL   EKG 12-lead, tracing only   Result Value Ref Range    Interpretation ECG Click View Image link to view waveform and result      Treatments provided: neb, lasix    Family Comments:     OBS brochure/video discussed/provided to patient/family: Yes              Name of person given brochure if not patient:               Relationship to patient:     ED Medications:   Medications   ipratropium - albuterol 0.5 mg/2.5 mg/3 mL (DUONEB) neb solution 3  mL (3 mLs Nebulization Given 6/29/19 1910)   furosemide (LASIX) injection 40 mg (40 mg Intravenous Given 6/29/19 1943)       Drips infusing?:  No    For the majority of the shift this patient was Green.   Interventions performed were .    Severe Sepsis OR Septic Shock Diagnosis Present: No    To be done/followed up on inpatient unit:      ED NURSE PHONE NUMBER: [phone]

## 2019-06-30 NOTE — PROGRESS NOTES
"[facility]    Medicine Progress Note - Hospitalist Service       Date of Admission:  6/29/2019  Assessment & Plan   Betito Anderson is a 73-year-old male with alcohol use disorder, hypertension, ASCVD, and chronic atrial fibrillation who presented with progressive dyspnea and lower extremity as well as new shortness of breath at rest and heart palpitations, found to be in atrial fibrillation with controlled rate and acute heart failure.     Acute systolic heart failure.   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  Also noted on that echo was moderate aortic valve stenosis.  Additionally, the patient is a moderate drinker (see below).  He appears to have uncontrolled hypertension.  Additionally, he has history of a 40% lesion, circumflex based off of angiogram in 1997.  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  Treated with Lasix 40 mg IV in the ED.  BNP 24,400.  EKG without overt ischemic changes, does show atrial fibrillation with controlled rate.  Chest x-ray shows right-sided pleural effusion with no overt pulmonary vascular congestion.   - TTE 6/30 EF 35-40%, mild to mod global hypokinesia  - telemetry, I/O, weights   - Continue IV Lasix 40 mg b.i.d.   - Continue PTA beta blocker, ARB, statin.   - A1c 5.7%, LDL 88  - Appreciate Cardiology consultation     Chronic atrial fibrillation with controlled rate.   Appears diagnosed in fall 2018 and was recommended increased dose of metoprolol as well as apixaban.  He did not start the apixaban until approximately 3 months later when his insurance changed and then only used it for 2 weeks after developing facial numbness, \"like clockwork\" around noon.  CHADS-VASc score is at least 3 for " age, CHF and hypertension.  He is agreeable to trying a different anticoagulant agent.   - Continue metoprolol succinate 50 mg daily.   - P.r.n. metoprolol available for heart rate greater than 120.   - holding OAC in case of angio  - Trended serial troponin. 0.029--> 0.038 --> 0.040     ASCVD.   Noted to have 40% lesion in the circumflex on angiogram in 1997.  Again, TTE in 11/2018, showed hypokinesia of the basal inferior and inferolateral wall.  The patient went for stress testing, but failed it due to atrial fibrillation and then appears lost to followup.   - Screening labs as above and trending serial troponins as above.     Hypertension.   Uncontrolled.  The patient states mostly compliant, missing usually 1 day per week and he does state that he essentially stopped taking his amlodipine as he thinks that it exacerbates his lower extremity swelling.  SBPs are uncontrolled, 180s-220s systolic.   - Continue PTA losartan 100 mg, metoprolol succinate 50 mg daily.   - Holding PTA Lasix in lieu of IV Lasix at increased dose and frequency.   - P.r.n. hydralazine is available for SBP greater than 160.   - Continue PTA simvastatin.     Concern of pneumonia.   Afebrile and no other URI symptoms aside from a productive cough times the last 2 days that he feels gets stuck in his throat and may very well be related to his heart failure.  Has leukocytosis of 16.6, which could be reactive in the setting above.  Chest x-ray showed a right pleural effusion with atelectasis versus infiltrate.   - Procalc 0.05 on admission  - continues to be afebrile, WBC decreasing, continue to monitor (not on abx)    Alcohol use disorder.   Consumes 3-4 alcoholic beverages daily.   - Discussed moderation of alcohol with recommendation for no more than 3 per sitting or 14 per week and explained it can contribute to atrial fibrillation as well as heart failure.  Has no history of withdrawal and his last drink was on the evening prior to  admission.   - Monitor CIWAs without benzos for time being.       Diet: 2 Gram Sodium Diet No Caffeine for 24 hours (once tests completed, may have caffeine)    DVT Prophylaxis: Pneumatic Compression Devices  Morales Catheter: not present  Code Status: Full Code      Disposition Plan   Expected discharge: Pending cardiology evaluation 1-2 days more, possibly more  Entered: Johan Colón MD 06/30/2019, 7:59 AM       The patient's care was discussed with the Bedside Nurse and Patient.    Johan Colón MD  Hospitalist Service  [facility]    ______________________________________________________________________    Interval History   No new events. Cough and breathing a bit better. No chest pain or pressure. Denies fevers, chills, abdo pain. Awaiting cardiology recommendations.     Data reviewed today: I reviewed all medications, new labs and imaging results over the last 24 hours. I personally reviewed no images or EKG's today.    Physical Exam   Vital Signs: Temp: 98.3  F (36.8  C) Temp src: Oral BP: (!) 142/93 Pulse: 92 Heart Rate: 78 Resp: 18 SpO2: 97 % O2 Device: None (Room air)    Weight: 198 lbs 9.6 oz    Gen: NAD, pleasant  HEENT: Normocephalic, EOMI, MMM  Resp: coarse bilaterally, no wheezes, basilar crackles, no increased work of resp  CV: S1S2 heard, reg rhythm, reg rate, 2+ pitting bilateral pedal edema  Abdo: soft, nontender, nondistended, bowel sounds present  Ext: calves nontender, well perfused  Neuro: AAOx3, CN grossly intact, no facial asymmetry      Data   Recent Labs   Lab 06/30/19  0640 06/30/19  0042 06/29/19  1859 06/27/19  1151   WBC 13.6*  --  16.6* 8.9   HGB 14.9  --  14.8 15.6   MCV 85  --  86 87     --  304 286     --  138 137   POTASSIUM 3.2*  --  3.4 3.9   CHLORIDE 98  --  103 98   CO2 30  --  27 30   BUN 19  --  20 20   CR 1.07  --  1.17 1.31*   ANIONGAP 7  --  8 9   GUNNER 8.5  --  8.4* 8.3*   *  --  132* 96   ALBUMIN 3.3*  --   --   --    PROTTOTAL  7.5  --   --   --    BILITOTAL 1.3  --   --   --    ALKPHOS 96  --   --   --    ALT 22  --   --   --    AST 21  --   --   --    TROPI 0.040 0.038 0.029  --      Medications     Continuing ACE inhibitor/ARB/ARNI from home medication list OR ACE inhibitor/ARB order already placed during this visit       - MEDICATION INSTRUCTIONS -       - MEDICATION INSTRUCTIONS -         allopurinol  300 mg Oral Daily     furosemide  40 mg Intravenous BID     losartan  100 mg Oral Daily     metoprolol succinate ER  50 mg Oral Daily     simvastatin  20 mg Oral At Bedtime     sodium chloride (PF)  3 mL Intracatheter Q8H

## 2019-06-30 NOTE — CONSULTS
NUTRITION EDUCATION      REASON FOR NUTRITION CONSULT:  Provider Order  -  Nutrition Education Congestive Heart Failure (CHF) - Dietitian to instruct patient on 2 gram sodium diet      ASSESSMENT:  Unable to complete nutrition assessment and instructions at this time  Noted ECHO pending        FOLLOW UP:   Will instruct patient prior to discharge as appropriate    Madeline Haynes RD, LD, CNSC   Clinical Dietitian - [facility]

## 2019-07-01 LAB
BASOPHILS # BLD AUTO: 0 10E9/L (ref 0–0.2)
BASOPHILS NFR BLD AUTO: 0.3 %
DIFFERENTIAL METHOD BLD: ABNORMAL
EOSINOPHIL # BLD AUTO: 0.1 10E9/L (ref 0–0.7)
EOSINOPHIL NFR BLD AUTO: 1.1 %
ERYTHROCYTE [DISTWIDTH] IN BLOOD BY AUTOMATED COUNT: 16.6 % (ref 10–15)
ERYTHROCYTE [DISTWIDTH] IN BLOOD BY AUTOMATED COUNT: 16.7 % (ref 10–15)
HCT VFR BLD AUTO: 43.9 % (ref 40–53)
HCT VFR BLD AUTO: 47.5 % (ref 40–53)
HGB BLD-MCNC: 14.3 G/DL (ref 13.3–17.7)
HGB BLD-MCNC: 15.3 G/DL (ref 13.3–17.7)
IMM GRANULOCYTES # BLD: 0 10E9/L (ref 0–0.4)
IMM GRANULOCYTES NFR BLD: 0.3 %
LMWH PPP CHRO-ACNC: 0.42 IU/ML
LYMPHOCYTES # BLD AUTO: 0.5 10E9/L (ref 0.8–5.3)
LYMPHOCYTES NFR BLD AUTO: 6.1 %
MCH RBC QN AUTO: 27.6 PG (ref 26.5–33)
MCH RBC QN AUTO: 27.7 PG (ref 26.5–33)
MCHC RBC AUTO-ENTMCNC: 32.2 G/DL (ref 31.5–36.5)
MCHC RBC AUTO-ENTMCNC: 32.6 G/DL (ref 31.5–36.5)
MCV RBC AUTO: 85 FL (ref 78–100)
MCV RBC AUTO: 86 FL (ref 78–100)
MONOCYTES # BLD AUTO: 0.9 10E9/L (ref 0–1.3)
MONOCYTES NFR BLD AUTO: 12.4 %
NEUTROPHILS # BLD AUTO: 5.8 10E9/L (ref 1.6–8.3)
NEUTROPHILS NFR BLD AUTO: 79.8 %
NRBC # BLD AUTO: 0 10*3/UL
NRBC BLD AUTO-RTO: 0 /100
PLATELET # BLD AUTO: 213 10E9/L (ref 150–450)
PLATELET # BLD AUTO: 222 10E9/L (ref 150–450)
POTASSIUM SERPL-SCNC: 3.5 MMOL/L (ref 3.4–5.3)
PROCALCITONIN SERPL-MCNC: 0.3 NG/ML
RBC # BLD AUTO: 5.18 10E12/L (ref 4.4–5.9)
RBC # BLD AUTO: 5.53 10E12/L (ref 4.4–5.9)
WBC # BLD AUTO: 6.3 10E9/L (ref 4–11)
WBC # BLD AUTO: 7.3 10E9/L (ref 4–11)

## 2019-07-01 PROCEDURE — 99207 ZZC CDG-MDM COMPONENT: MEETS MODERATE - UP CODED: CPT | Performed by: HOSPITALIST

## 2019-07-01 PROCEDURE — 25000128 H RX IP 250 OP 636: Performed by: PHYSICIAN ASSISTANT

## 2019-07-01 PROCEDURE — 25000128 H RX IP 250 OP 636: Performed by: INTERNAL MEDICINE

## 2019-07-01 PROCEDURE — 25800030 ZZH RX IP 258 OP 636: Performed by: INTERNAL MEDICINE

## 2019-07-01 PROCEDURE — 84132 ASSAY OF SERUM POTASSIUM: CPT | Performed by: HOSPITALIST

## 2019-07-01 PROCEDURE — 25000132 ZZH RX MED GY IP 250 OP 250 PS 637: Performed by: INTERNAL MEDICINE

## 2019-07-01 PROCEDURE — 99233 SBSQ HOSP IP/OBS HIGH 50: CPT | Performed by: INTERNAL MEDICINE

## 2019-07-01 PROCEDURE — 36415 COLL VENOUS BLD VENIPUNCTURE: CPT | Performed by: PHYSICIAN ASSISTANT

## 2019-07-01 PROCEDURE — 93010 ELECTROCARDIOGRAM REPORT: CPT | Performed by: INTERNAL MEDICINE

## 2019-07-01 PROCEDURE — 93005 ELECTROCARDIOGRAM TRACING: CPT

## 2019-07-01 PROCEDURE — 85027 COMPLETE CBC AUTOMATED: CPT | Performed by: PHYSICIAN ASSISTANT

## 2019-07-01 PROCEDURE — 84145 PROCALCITONIN (PCT): CPT | Performed by: HOSPITALIST

## 2019-07-01 PROCEDURE — 25000128 H RX IP 250 OP 636: Performed by: HOSPITALIST

## 2019-07-01 PROCEDURE — 85520 HEPARIN ASSAY: CPT | Performed by: HOSPITALIST

## 2019-07-01 PROCEDURE — 36415 COLL VENOUS BLD VENIPUNCTURE: CPT | Performed by: HOSPITALIST

## 2019-07-01 PROCEDURE — 21000001 ZZH R&B HEART CARE

## 2019-07-01 PROCEDURE — 99233 SBSQ HOSP IP/OBS HIGH 50: CPT | Performed by: HOSPITALIST

## 2019-07-01 PROCEDURE — 25000132 ZZH RX MED GY IP 250 OP 250 PS 637: Performed by: PHYSICIAN ASSISTANT

## 2019-07-01 PROCEDURE — 85025 COMPLETE CBC W/AUTO DIFF WBC: CPT | Performed by: HOSPITALIST

## 2019-07-01 PROCEDURE — 25000132 ZZH RX MED GY IP 250 OP 250 PS 637: Performed by: HOSPITALIST

## 2019-07-01 RX ORDER — HYDRALAZINE HYDROCHLORIDE 25 MG/1
25 TABLET, FILM COATED ORAL EVERY 8 HOURS SCHEDULED
Status: DISCONTINUED | OUTPATIENT
Start: 2019-07-01 | End: 2019-07-02

## 2019-07-01 RX ORDER — LORAZEPAM 0.5 MG/1
0.5 TABLET ORAL
Status: DISCONTINUED | OUTPATIENT
Start: 2019-07-01 | End: 2019-07-02 | Stop reason: HOSPADM

## 2019-07-01 RX ORDER — SODIUM CHLORIDE 9 MG/ML
INJECTION, SOLUTION INTRAVENOUS CONTINUOUS
Status: DISCONTINUED | OUTPATIENT
Start: 2019-07-01 | End: 2019-07-02 | Stop reason: HOSPADM

## 2019-07-01 RX ORDER — LORAZEPAM 2 MG/ML
0.5 INJECTION INTRAMUSCULAR
Status: DISCONTINUED | OUTPATIENT
Start: 2019-07-01 | End: 2019-07-02 | Stop reason: HOSPADM

## 2019-07-01 RX ORDER — ALBUTEROL SULFATE 90 UG/1
2 AEROSOL, METERED RESPIRATORY (INHALATION) EVERY 6 HOURS PRN
Status: DISCONTINUED | OUTPATIENT
Start: 2019-07-01 | End: 2019-07-06 | Stop reason: HOSPADM

## 2019-07-01 RX ORDER — ISOSORBIDE MONONITRATE 60 MG/1
60 TABLET, EXTENDED RELEASE ORAL DAILY
Status: DISCONTINUED | OUTPATIENT
Start: 2019-07-01 | End: 2019-07-06 | Stop reason: HOSPADM

## 2019-07-01 RX ORDER — FUROSEMIDE 10 MG/ML
40 INJECTION INTRAMUSCULAR; INTRAVENOUS 3 TIMES DAILY
Status: DISCONTINUED | OUTPATIENT
Start: 2019-07-01 | End: 2019-07-01

## 2019-07-01 RX ORDER — FUROSEMIDE 10 MG/ML
40 INJECTION INTRAMUSCULAR; INTRAVENOUS EVERY 24 HOURS
Status: DISCONTINUED | OUTPATIENT
Start: 2019-07-01 | End: 2019-07-02

## 2019-07-01 RX ORDER — CARVEDILOL 12.5 MG/1
12.5 TABLET ORAL 2 TIMES DAILY WITH MEALS
Status: DISCONTINUED | OUTPATIENT
Start: 2019-07-01 | End: 2019-07-02

## 2019-07-01 RX ORDER — LIDOCAINE 40 MG/G
CREAM TOPICAL
Status: DISCONTINUED | OUTPATIENT
Start: 2019-07-01 | End: 2019-07-01

## 2019-07-01 RX ADMIN — HYDRALAZINE HYDROCHLORIDE 25 MG: 25 TABLET ORAL at 20:58

## 2019-07-01 RX ADMIN — GUAIFENESIN 10 ML: 100 SOLUTION ORAL at 03:06

## 2019-07-01 RX ADMIN — SODIUM CHLORIDE 10 ML/HR: 9 INJECTION, SOLUTION INTRAVENOUS at 06:58

## 2019-07-01 RX ADMIN — CARVEDILOL 12.5 MG: 12.5 TABLET, FILM COATED ORAL at 08:55

## 2019-07-01 RX ADMIN — SIMVASTATIN 20 MG: 20 TABLET, FILM COATED ORAL at 21:01

## 2019-07-01 RX ADMIN — ASPIRIN 325 MG: 325 TABLET, COATED ORAL at 08:55

## 2019-07-01 RX ADMIN — CARVEDILOL 12.5 MG: 12.5 TABLET, FILM COATED ORAL at 18:03

## 2019-07-01 RX ADMIN — HEPARIN SODIUM 900 UNITS/HR: 10000 INJECTION, SOLUTION INTRAVENOUS at 13:37

## 2019-07-01 RX ADMIN — HYDRALAZINE HYDROCHLORIDE 25 MG: 25 TABLET ORAL at 10:44

## 2019-07-01 RX ADMIN — Medication 4500 UNITS: at 13:38

## 2019-07-01 RX ADMIN — GUAIFENESIN 10 ML: 100 SOLUTION ORAL at 21:07

## 2019-07-01 RX ADMIN — HYDRALAZINE HYDROCHLORIDE 10 MG: 20 INJECTION INTRAMUSCULAR; INTRAVENOUS at 03:06

## 2019-07-01 RX ADMIN — ACETAMINOPHEN 650 MG: 325 TABLET, FILM COATED ORAL at 03:06

## 2019-07-01 RX ADMIN — MICONAZOLE NITRATE: 20 POWDER TOPICAL at 10:53

## 2019-07-01 RX ADMIN — ALLOPURINOL 300 MG: 300 TABLET ORAL at 08:55

## 2019-07-01 RX ADMIN — LOSARTAN POTASSIUM 100 MG: 100 TABLET ORAL at 08:55

## 2019-07-01 RX ADMIN — ISOSORBIDE MONONITRATE 60 MG: 60 TABLET, EXTENDED RELEASE ORAL at 10:44

## 2019-07-01 RX ADMIN — FUROSEMIDE 40 MG: 10 INJECTION, SOLUTION INTRAVENOUS at 06:52

## 2019-07-01 RX ADMIN — FUROSEMIDE 40 MG: 10 INJECTION, SOLUTION INTRAVENOUS at 10:44

## 2019-07-01 ASSESSMENT — ACTIVITIES OF DAILY LIVING (ADL)
ADLS_ACUITY_SCORE: 12

## 2019-07-01 NOTE — PROGRESS NOTES
[facility]  Cardiology Progress Note  Date of Service: 07/01/2019  Primary Cardiologist: Dr. Lezama    Assessment & Plan    Betito Anderson is a 73 year old male with past medical history significant for chronic afib not on anticoagulation, non occlusive CAD on cath in 1997, HTN admitted on 6/29/2019 with progressive [place] and lower extremity edema.      Assessment:  1.  Acute systolic heart failure with new dx cardiomyopathy EF 35-40%   - rt pleural effusion, ntprobnp 24k, trop neg   - possible etiologies include ischemia, tachycardia, htn, toxin, infectious etc, suspect ischemic vs htn   - admit wt 204, current wt 197, dry wt low 190s?   - I/o neg 2.6 L    2.  HTN, markedly so here 1602/100s   - on losartan 100 mg daily and toprol 50    3.  Chronic afib, had been on eliquis with adverse effect of face feeling numb, willing to try alternate   -  CHADS VASC min 3 (age, htn, chf,) possibly 4 if CAD   - once angio complete will trial xarelto v pradaxa    4.  Non occlusive CAD per pt report angio done here in 1997- unable to find records    5.  Mild mitral regur, tricuspid regurg and aortic sclerosis (not stenosis).  Not likely contributing, will follow with serial echo     Plan:   1. Stop toprol  2. Start coreg 12.5 mg bid  3.  hold angio today- pt can't lie flat, likely tomorrow  4. Increase lasix to 40 mg tid with 20 meq scheduled potassium bid  5. Bmp daily  6. Likely angiogram tomorrow  7. Will consider Entresto and spironolactone this stay.   8. Will follow with you, likely discharge Wed?    Melvi Montaño PA-C  [facility]  Pager: [phone]     Interval History   Patient states he is feeling better, his breathing is improved and he thinks his legs are less swollen.  He cannot lie flat to breathe.  He needs to be sitting up some.  He is urinating frequently.  He notes that he had an angiogram here through his groin and 97, currently sets where he thinks it was.    He was on Eliquis last fall and said  that when he took it at noon every day his face went numb and because of that he stopped.  He is willing to try different anticoagulant did not have any bleeding issues.    Physical Exam   Temp: 98.1  F (36.7  C) Temp src: Oral BP: 164/79 Pulse: 71 Heart Rate: 73 Resp: 20 SpO2: 95 % O2 Device: None (Room air)    Vitals:    06/29/19 1841 06/30/19 0547 07/01/19 0611   Weight: 92.5 kg (204 lb) 90.1 kg (198 lb 9.6 oz) 89.8 kg (197 lb 15.6 oz)       Well-developed well-nourished man in no acute distress.  Normocephalic atraumatic.  His heart is irregularly irregular but not tachycardic is a 2 out of 6 systolic murmur heard best at the right sternal border.  He has a mix of rales and rhonchi through both lower and middle lung fields.  He has 2+ edema to mid shin.  Abdomen is soft nontender.    Medications     Continuing ACE inhibitor/ARB/ARNI from home medication list OR ACE inhibitor/ARB order already placed during this visit       - MEDICATION INSTRUCTIONS -       - MEDICATION INSTRUCTIONS -       - MEDICATION INSTRUCTIONS -       sodium chloride 10 mL/hr (07/01/19 0658)       allopurinol  300 mg Oral Daily     aspirin  325 mg Oral Daily     furosemide  40 mg Intravenous BID     losartan  100 mg Oral Daily     metoprolol succinate ER  50 mg Oral Daily     simvastatin  20 mg Oral At Bedtime     sodium chloride (PF)  3 mL Intracatheter Q8H       Data   Most Recent 3 CBC's:  Recent Labs   Lab Test 07/01/19  0534 06/30/19  0640 06/29/19  1859   WBC 7.3 13.6* 16.6*   HGB 14.3 14.9 14.8   MCV 85 85 86    262 304     Most Recent 3 BMP's:  Recent Labs   Lab Test 07/01/19  0817 06/30/19  1540 06/30/19  0640 06/29/19  1859 06/27/19  1151   NA  --   --  135 138 137   POTASSIUM 3.5 4.1 3.2* 3.4 3.9   CHLORIDE  --   --  98 103 98   CO2  --   --  30 27 30   BUN  --   --  19 20 20   CR  --   --  1.07 1.17 1.31*   ANIONGAP  --   --  7 8 9   GUNNER  --   --  8.5 8.4* 8.3*   GLC  --   --  110* 132* 96     Most Recent 3  Troponin's:  Recent Labs   Lab Test 06/30/19  0640 06/30/19  0042 06/29/19  1859   TROPI 0.040 0.038 0.029     Most Recent 3 BNP's:  Recent Labs   Lab Test 06/29/19 1859   NTBNPI 24,400*     Last 24 hours labs reviewed    EKG: (reviewed personally) afib with twi v2-v5, II, III, avF    Imaging: cxr  IMPRESSION: Slightly improved right pleural effusion and associated  atelectasis and/or infiltrate.    Tele: afib hr controlled    Echo:   1. Left ventricular systolic function is mild to moderately reduced. The  visual ejection fraction is estimated at 35-40%.  2. There is mild-moderate global hypokinesia of the left ventricle.  3. The right ventricle is normal size. Mildly decreased right ventricular  systolic function  4. There is moderate biatrial dilatation.  5. There is mild (1+) mitral regurgitation.  6. There is mild (1+) tricuspid regurgitation.  7. The right ventricular systolic pressure is approximated at 46mmHg plus the  right atrial pressure. IVC diameter >2.1 cm collapsing <50% with sniff  suggests a high RA pressure estimated at 15 mmHg or greater.  8. Compared with the prior report, there has been an interval worsening in LV  function.  Last ischemic eval: 1997 angio per pt.      Device: none

## 2019-07-01 NOTE — PLAN OF CARE
"BP elevated up to 169/90 PRN Hydralazine given twice this shift with some improvement. Denies pain or problems. O2 sats 96% RA. CIWA score 0. Patient did not sleep well overnight up sitting on bedside verbalizing \"frustrated with having to be in the hospital\". RN spend extra time listening and problem solving with patient and patient appears more relaxed and able to lay back in recliner and finally get some sleep since 0300. Previous note in regarding excoriated rash and moist sheer area R scrotal/thigh. Also bilat buttocks excoration. Alll cleansed and barrier applied. Patient states \"have had this problem with my skin area's for weeks\". Non productive cough infrequent with inspiratory wheezing. Refuses alarms. Calls appropriately.  "

## 2019-07-01 NOTE — PROGRESS NOTES
"WOC note. This department was consulted for groin/buttocks excoriation/moisture issues.Introduced self to patient and asked about skin issues. Pt stated he was frustrated being in the hospital and he also did not want anyone else looking at \"the private area of my buttocks.\" He declined to allow an assessment, and that the nurse had \"put on a cream today and this is helping.\" Stated he would have us contacted if he wanted further assistance. See 6/30 note by Guerita Gtz describing area in detail. He is using [employer] paste and this is reasonable. WOC will sign off, please contact us again if needed.  "

## 2019-07-01 NOTE — CONSULTS
Medication coverage check for Eliquis/Xarelto. $47 monthly copay.  Cynthia Castillo CphT  [facility] Discharge Pharmacy Liaison  Liaison Cell: [phone]

## 2019-07-01 NOTE — PROGRESS NOTES
"RN did full skin check patient was reluctant to remove boxer pants but requested \"something for a rash and tender\". RN was able to assure patient better to have it looked at and cleansed and treated. So, RN able to skin check and found very red,moist yeasty smell between R thigh fold and underside of scrotal sac. Also small macular type rash in area. Rn cleansed with soap and water and dried. Then applied barrier paste for comfort and loose pullup underwear. RN will order miconazole but will add sticky note for MD for possible WOC as area is sheer and patient admits he \"probably dribbles so his boxers are often damp\". ALso, bilat buttocks excoriated so was cleansed and barrier paste applied. Will continue to educate patient on importance of keeping are clean,dry and seen by health provider.  "

## 2019-07-01 NOTE — PROGRESS NOTES
[facility]    Medicine Progress Note - Hospitalist Service       Date of Admission:  6/29/2019  Assessment & Plan    Betito Anderson is a 73-year-old male with alcohol use disorder, hypertension, ASCVD, and chronic atrial fibrillation who presented with progressive dyspnea and lower extremity as well as new shortness of breath at rest and heart palpitations, found to be in atrial fibrillation with controlled rate and acute heart failure.     Acute systolic heart failure.   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  Also noted on that echo was moderate aortic valve stenosis.  Additionally, the patient is a moderate drinker (see below).  He appears to have uncontrolled hypertension.  Additionally, he has history of a 40% lesion, circumflex based off of angiogram in 1997.  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  Treated with Lasix 40 mg IV in the ED.  BNP 24,400.  EKG without overt ischemic changes, does show atrial fibrillation with controlled rate.  Chest x-ray shows right-sided pleural effusion with no overt pulmonary vascular congestion.   - TTE 6/30 EF 35-40%, mild to mod global hypokinesia  - telemetry, I/O, weights   - Trended serial troponin. 0.029--> 0.038 --> 0.040    - net -2.6L, current weight 197, 204 on admission  - Continue IV Lasix 40 mg now on T.I.D. (Kcl added)  - Continue PTA ARB, statin. PTA beta blocker held  - A1c 5.7%, LDL 88  - Appreciate Cardiology consultation (entresto and spirono considered)  - Angiography likely tomorrow - unable to tolerate lying flat today    Chronic atrial fibrillation with controlled rate.   Appears diagnosed in fall 2018 and was  "recommended increased dose of metoprolol as well as apixaban.  He did not start the apixaban until approximately 3 months later when his insurance changed and then only used it for 2 weeks after developing facial numbness, \"like clockwork\" around noon.   - CHADS-VASc score is at least 3 for age, CHF and hypertension.  He is agreeable to trying a different anticoagulant agent.   - PTA metop stopped     ASCVD.   Noted to have 40% lesion in the circumflex on angiogram in 1997.  Again, TTE in 11/2018, showed hypokinesia of the basal inferior and inferolateral wall.  The patient went for stress testing, but failed it due to atrial fibrillation and then appears lost to followup.   - Screening labs as above and trending serial troponins as above.     Hypertension.   Uncontrolled.  The patient states mostly compliant, missing usually 1 day per week and he does state that he essentially stopped taking his amlodipine as he thinks that it exacerbates his lower extremity swelling.  SBPs are uncontrolled, 180s-220s systolic.   - Continue PTA losartan 100 mg, metoprolol succinate 50 mg daily.    - P.r.n. hydralazine is available for SBP greater than 160.   - Continue PTA simvastatin.     Concern of pneumonia.   Afebrile and no other URI symptoms aside from a productive cough times the last 2 days that he feels gets stuck in his throat and may very well be related to his heart failure.  Has leukocytosis of 16.6, which could be reactive in the setting above.  Chest x-ray showed a right pleural effusion with atelectasis versus infiltrate.   - Procalc 0.05 on admission, 0.30  - continues to be afebrile, WBC normal, continue to monitor (not on abx)  - recheck procalc this evening/ tomorrow AM  - low threshold to start Abx    Alcohol use disorder.   Consumes 3-4 alcoholic beverages daily.   - Discussed moderation of alcohol with recommendation for no more than 3 per sitting or 14 per week and explained it can contribute to atrial " fibrillation as well as heart failure.  Has no history of withdrawal and his last drink was on the evening prior to admission.   - Monitor CIWAs without benzos for time being.       Diet: 2 Gram Sodium Diet No Caffeine for 24 hours (once tests completed, may have caffeine)  NPO for Medical/Clinical Reasons Except for: Meds    DVT Prophylaxis: heparin gtt  Morales Catheter: not present  Code Status: Full Code      Disposition Plan   Expected discharge:     Entered: Johan Colón MD 07/01/2019, 8:43 AM       The patient's care was discussed with the Bedside Nurse and Patient.    Johan Colón MD  Hospitalist Service  [facility]    ______________________________________________________________________    Interval History   Seen and examined. Up in chair. No new complaints or issues overnight. No chest pain, pressure, or palpitations. No sob. Coughing with clear/white mucus production on going, not worse. No fevers or chills. No abdo pain, n or v. Angio pushed back until tomorrow since he cannot quite ly flat.     Data reviewed today: I reviewed all medications, new labs and imaging results over the last 24 hours. I personally reviewed no images or EKG's today.    Physical Exam   Vital Signs: Temp: 98.1  F (36.7  C) Temp src: Oral BP: 164/79 Pulse: 71 Heart Rate: 73 Resp: 20 SpO2: 95 % O2 Device: None (Room air)    Weight: 197 lbs 15.57 oz    Gen: NAD, pleasant  HEENT: Normocephalic, EOMI, MMM  Resp: coarse bilaterally, scattered wheeze, no increased work of resp  CV: S1S2 heard, irreg irreg rhythm, reg rate, 1-2+  pedal edema to prox/mid tibia  Abdo: soft, nontender, nondistended, bowel sounds present  Ext: calves nontender, well perfused  Neuro: AAOx3, CN grossly intact, no facial asymmetry      Data   Recent Labs   Lab 07/01/19  1051 07/01/19  0817 07/01/19  0534 06/30/19  1540 06/30/19  0640 06/30/19  0042 06/29/19  1859 06/27/19  1151   WBC 6.3  --  7.3  --  13.6*  --  16.6* 8.9   HGB 15.3   --  14.3  --  14.9  --  14.8 15.6   MCV 86  --  85  --  85  --  86 87     --  213  --  262  --  304 286   NA  --   --   --   --  135  --  138 137   POTASSIUM  --  3.5  --  4.1 3.2*  --  3.4 3.9   CHLORIDE  --   --   --   --  98  --  103 98   CO2  --   --   --   --  30  --  27 30   BUN  --   --   --   --  19  --  20 20   CR  --   --   --   --  1.07  --  1.17 1.31*   ANIONGAP  --   --   --   --  7  --  8 9   GUNNER  --   --   --   --  8.5  --  8.4* 8.3*   GLC  --   --   --   --  110*  --  132* 96   ALBUMIN  --   --   --   --  3.3*  --   --   --    PROTTOTAL  --   --   --   --  7.5  --   --   --    BILITOTAL  --   --   --   --  1.3  --   --   --    ALKPHOS  --   --   --   --  96  --   --   --    ALT  --   --   --   --  22  --   --   --    AST  --   --   --   --  21  --   --   --    TROPI  --   --   --   --  0.040 0.038 0.029  --      No results found for this or any previous visit (from the past 24 hour(s)).

## 2019-07-01 NOTE — PLAN OF CARE
Heart Failure Care Pathway  GOALS TO BE MET BEFORE DISCHARGE:    1. Decrease congestion and/or edema with diuretic therapy to achieve near      optimal volume status.            Dyspnea improved:  Yes            Edema improved:     Yes        Net I/O and Weights since admission:          06/01 2300 - 07/01 2259  In: 1140 [P.O.:1040; I.V.:100]  Out: 4315 [Urine:4315]  Net: -3175            Vitals:    06/29/19 1841 06/30/19 0547 07/01/19 0611   Weight: 92.5 kg (204 lb) 90.1 kg (198 lb 9.6 oz) 89.8 kg (197 lb 15.6 oz)         2.  O2 sats > 92% on RA or at prior home O2 therapy level.          Current oxygenation status:       SpO2: 94 %                  Able to wean O2 this shift to keep sats > 92%: Room air       Does patient use Home O2? No    3.  Tolerates ambulation and mobility near baseline: Yes        How many times did the patient ambulate with nursing staff this shift? Ambulates to bathroom    Please review the Heart Failure Care Pathway for additional HF goal outcomes.     Monitor remains Atrial fib with CVR. Pt. Denies pain. Plan for Angiogram tomorrow.    Leda Zelaya RN  7/1/2019

## 2019-07-01 NOTE — PROVIDER NOTIFICATION
"MD Notification    Notified Person: MD Dr Hidalgo Cardiology    Notified Person Name:    Notification Date/Time:    Notification Interaction:    Purpose of Notification: Patient lungs sounding very crackles throughout,wet some wheezing, BP continues elevated 160/100 has had PRN hydralazine 2 times this shift for elevated BP. IV lasix dose was scheduled as   40 mg to give at 0700 \"once\" and 40 mg Bid at 0800 and 2000. RN questioned the doses so close for am prior to cath lab.     Orders Received: MD \"wants one dose of 40mg IV Lasix given now, put other order for 0800 on HOLD until Cardiology has seen patient this morning and Do NOT start IVF at pre cath lab infusion rate, can do TKO.     Comments:Ishmael denies problems, is in recliner chair sleepy infrequent wet cough.   "

## 2019-07-02 ENCOUNTER — SURGERY (OUTPATIENT)
Age: 74
End: 2019-07-02
Payer: COMMERCIAL

## 2019-07-02 ENCOUNTER — APPOINTMENT (OUTPATIENT)
Dept: CT IMAGING | Facility: CLINIC | Age: 74
DRG: 246 | End: 2019-07-02
Attending: SURGERY
Payer: COMMERCIAL

## 2019-07-02 LAB
ANION GAP SERPL CALCULATED.3IONS-SCNC: 2 MMOL/L (ref 3–14)
BUN SERPL-MCNC: 24 MG/DL (ref 7–30)
CALCIUM SERPL-MCNC: 8.2 MG/DL (ref 8.5–10.1)
CATH EF ESTIMATED: 60 %
CHLORIDE SERPL-SCNC: 97 MMOL/L (ref 94–109)
CO2 BLDCOV-SCNC: 31 MMOL/L (ref 21–28)
CO2 SERPL-SCNC: 36 MMOL/L (ref 20–32)
CREAT SERPL-MCNC: 1.19 MG/DL (ref 0.66–1.25)
GFR SERPL CREATININE-BSD FRML MDRD: 60 ML/MIN/{1.73_M2}
GLUCOSE SERPL-MCNC: 98 MG/DL (ref 70–99)
LMWH PPP CHRO-ACNC: 0.1 IU/ML
LMWH PPP CHRO-ACNC: 0.24 IU/ML
LMWH PPP CHRO-ACNC: 0.32 IU/ML
PCO2 BLDV: 50 MM HG (ref 40–50)
PH BLDV: 7.4 PH (ref 7.32–7.43)
PO2 BLDV: 38 MM HG (ref 25–47)
POTASSIUM SERPL-SCNC: 3.4 MMOL/L (ref 3.4–5.3)
POTASSIUM SERPL-SCNC: 3.6 MMOL/L (ref 3.4–5.3)
SAO2 % BLDV FROM PO2: 71 %
SODIUM SERPL-SCNC: 135 MMOL/L (ref 133–144)

## 2019-07-02 PROCEDURE — 36415 COLL VENOUS BLD VENIPUNCTURE: CPT | Performed by: INTERNAL MEDICINE

## 2019-07-02 PROCEDURE — 25000125 ZZHC RX 250: Performed by: INTERNAL MEDICINE

## 2019-07-02 PROCEDURE — 85520 HEPARIN ASSAY: CPT | Performed by: PHYSICIAN ASSISTANT

## 2019-07-02 PROCEDURE — 25000132 ZZH RX MED GY IP 250 OP 250 PS 637: Performed by: INTERNAL MEDICINE

## 2019-07-02 PROCEDURE — B2151ZZ FLUOROSCOPY OF LEFT HEART USING LOW OSMOLAR CONTRAST: ICD-10-PCS | Performed by: INTERNAL MEDICINE

## 2019-07-02 PROCEDURE — C1769 GUIDE WIRE: HCPCS | Performed by: INTERNAL MEDICINE

## 2019-07-02 PROCEDURE — 93460 R&L HRT ART/VENTRICLE ANGIO: CPT | Mod: 26 | Performed by: INTERNAL MEDICINE

## 2019-07-02 PROCEDURE — 21000001 ZZH R&B HEART CARE

## 2019-07-02 PROCEDURE — 25000132 ZZH RX MED GY IP 250 OP 250 PS 637: Performed by: PHYSICIAN ASSISTANT

## 2019-07-02 PROCEDURE — 80048 BASIC METABOLIC PNL TOTAL CA: CPT | Performed by: PHYSICIAN ASSISTANT

## 2019-07-02 PROCEDURE — 36415 COLL VENOUS BLD VENIPUNCTURE: CPT | Performed by: PHYSICIAN ASSISTANT

## 2019-07-02 PROCEDURE — 25000128 H RX IP 250 OP 636: Performed by: INTERNAL MEDICINE

## 2019-07-02 PROCEDURE — 99152 MOD SED SAME PHYS/QHP 5/>YRS: CPT | Performed by: INTERNAL MEDICINE

## 2019-07-02 PROCEDURE — 86901 BLOOD TYPING SEROLOGIC RH(D): CPT | Performed by: SURGERY

## 2019-07-02 PROCEDURE — 36415 COLL VENOUS BLD VENIPUNCTURE: CPT | Performed by: SURGERY

## 2019-07-02 PROCEDURE — 25800030 ZZH RX IP 258 OP 636: Performed by: INTERNAL MEDICINE

## 2019-07-02 PROCEDURE — 86850 RBC ANTIBODY SCREEN: CPT | Performed by: SURGERY

## 2019-07-02 PROCEDURE — 99153 MOD SED SAME PHYS/QHP EA: CPT | Performed by: INTERNAL MEDICINE

## 2019-07-02 PROCEDURE — 99233 SBSQ HOSP IP/OBS HIGH 50: CPT | Performed by: HOSPITALIST

## 2019-07-02 PROCEDURE — 99233 SBSQ HOSP IP/OBS HIGH 50: CPT | Mod: 25 | Performed by: INTERNAL MEDICINE

## 2019-07-02 PROCEDURE — 82803 BLOOD GASES ANY COMBINATION: CPT

## 2019-07-02 PROCEDURE — C1887 CATHETER, GUIDING: HCPCS | Performed by: INTERNAL MEDICINE

## 2019-07-02 PROCEDURE — B2111ZZ FLUOROSCOPY OF MULTIPLE CORONARY ARTERIES USING LOW OSMOLAR CONTRAST: ICD-10-PCS | Performed by: INTERNAL MEDICINE

## 2019-07-02 PROCEDURE — 40000853 ZZH STATISTIC ANGIOGRAM, STENT, VERTEBRO PLASTY

## 2019-07-02 PROCEDURE — 99207 ZZC CDG-MDM COMPONENT: MEETS MODERATE - UP CODED: CPT | Performed by: HOSPITALIST

## 2019-07-02 PROCEDURE — 85520 HEPARIN ASSAY: CPT | Performed by: INTERNAL MEDICINE

## 2019-07-02 PROCEDURE — 25000132 ZZH RX MED GY IP 250 OP 250 PS 637: Performed by: HOSPITALIST

## 2019-07-02 PROCEDURE — 86900 BLOOD TYPING SEROLOGIC ABO: CPT | Performed by: SURGERY

## 2019-07-02 PROCEDURE — 4A023N8 MEASUREMENT OF CARDIAC SAMPLING AND PRESSURE, BILATERAL, PERCUTANEOUS APPROACH: ICD-10-PCS | Performed by: INTERNAL MEDICINE

## 2019-07-02 PROCEDURE — 84132 ASSAY OF SERUM POTASSIUM: CPT | Performed by: HOSPITALIST

## 2019-07-02 PROCEDURE — 93460 R&L HRT ART/VENTRICLE ANGIO: CPT | Performed by: INTERNAL MEDICINE

## 2019-07-02 PROCEDURE — C1894 INTRO/SHEATH, NON-LASER: HCPCS | Performed by: INTERNAL MEDICINE

## 2019-07-02 PROCEDURE — 71250 CT THORAX DX C-: CPT

## 2019-07-02 PROCEDURE — 27210794 ZZH OR GENERAL SUPPLY STERILE: Performed by: INTERNAL MEDICINE

## 2019-07-02 RX ORDER — LORAZEPAM 2 MG/ML
0.5 INJECTION INTRAMUSCULAR
Status: DISCONTINUED | OUTPATIENT
Start: 2019-07-02 | End: 2019-07-02

## 2019-07-02 RX ORDER — NITROGLYCERIN 5 MG/ML
VIAL (ML) INTRAVENOUS
Status: DISCONTINUED | OUTPATIENT
Start: 2019-07-02 | End: 2019-07-02 | Stop reason: HOSPADM

## 2019-07-02 RX ORDER — ARGATROBAN 1 MG/ML
350 INJECTION, SOLUTION INTRAVENOUS
Status: DISCONTINUED | OUTPATIENT
Start: 2019-07-02 | End: 2019-07-02 | Stop reason: HOSPADM

## 2019-07-02 RX ORDER — SODIUM CHLORIDE 9 MG/ML
INJECTION, SOLUTION INTRAVENOUS CONTINUOUS
Status: DISCONTINUED | OUTPATIENT
Start: 2019-07-02 | End: 2019-07-05

## 2019-07-02 RX ORDER — FENTANYL CITRATE 50 UG/ML
INJECTION, SOLUTION INTRAMUSCULAR; INTRAVENOUS
Status: DISCONTINUED | OUTPATIENT
Start: 2019-07-02 | End: 2019-07-02 | Stop reason: HOSPADM

## 2019-07-02 RX ORDER — EPTIFIBATIDE 2 MG/ML
2 INJECTION, SOLUTION INTRAVENOUS CONTINUOUS PRN
Status: DISCONTINUED | OUTPATIENT
Start: 2019-07-02 | End: 2019-07-02 | Stop reason: HOSPADM

## 2019-07-02 RX ORDER — POTASSIUM CHLORIDE 1500 MG/1
20 TABLET, EXTENDED RELEASE ORAL
Status: DISCONTINUED | OUTPATIENT
Start: 2019-07-02 | End: 2019-07-02 | Stop reason: HOSPADM

## 2019-07-02 RX ORDER — ARGATROBAN 1 MG/ML
150 INJECTION, SOLUTION INTRAVENOUS
Status: DISCONTINUED | OUTPATIENT
Start: 2019-07-02 | End: 2019-07-02 | Stop reason: HOSPADM

## 2019-07-02 RX ORDER — HYDRALAZINE HYDROCHLORIDE 50 MG/1
50 TABLET, FILM COATED ORAL EVERY 8 HOURS SCHEDULED
Status: DISCONTINUED | OUTPATIENT
Start: 2019-07-02 | End: 2019-07-03

## 2019-07-02 RX ORDER — HEPARIN SODIUM 1000 [USP'U]/ML
INJECTION, SOLUTION INTRAVENOUS; SUBCUTANEOUS
Status: DISCONTINUED | OUTPATIENT
Start: 2019-07-02 | End: 2019-07-02 | Stop reason: HOSPADM

## 2019-07-02 RX ORDER — HYDROCODONE BITARTRATE AND ACETAMINOPHEN 5; 325 MG/1; MG/1
1-2 TABLET ORAL EVERY 4 HOURS PRN
Status: DISCONTINUED | OUTPATIENT
Start: 2019-07-02 | End: 2019-07-05

## 2019-07-02 RX ORDER — FLUMAZENIL 0.1 MG/ML
0.2 INJECTION, SOLUTION INTRAVENOUS
Status: ACTIVE | OUTPATIENT
Start: 2019-07-02 | End: 2019-07-03

## 2019-07-02 RX ORDER — NALOXONE HYDROCHLORIDE 0.4 MG/ML
.2-.4 INJECTION, SOLUTION INTRAMUSCULAR; INTRAVENOUS; SUBCUTANEOUS
Status: ACTIVE | OUTPATIENT
Start: 2019-07-02 | End: 2019-07-03

## 2019-07-02 RX ORDER — DOBUTAMINE HYDROCHLORIDE 200 MG/100ML
2-20 INJECTION INTRAVENOUS CONTINUOUS PRN
Status: DISCONTINUED | OUTPATIENT
Start: 2019-07-02 | End: 2019-07-02 | Stop reason: HOSPADM

## 2019-07-02 RX ORDER — HYDRALAZINE HYDROCHLORIDE 20 MG/ML
20 INJECTION INTRAMUSCULAR; INTRAVENOUS ONCE
Status: COMPLETED | OUTPATIENT
Start: 2019-07-02 | End: 2019-07-02

## 2019-07-02 RX ORDER — ATROPINE SULFATE 0.1 MG/ML
0.5 INJECTION INTRAVENOUS EVERY 5 MIN PRN
Status: ACTIVE | OUTPATIENT
Start: 2019-07-02 | End: 2019-07-03

## 2019-07-02 RX ORDER — LIDOCAINE 40 MG/G
CREAM TOPICAL
Status: DISCONTINUED | OUTPATIENT
Start: 2019-07-02 | End: 2019-07-02

## 2019-07-02 RX ORDER — NALOXONE HYDROCHLORIDE 0.4 MG/ML
.1-.4 INJECTION, SOLUTION INTRAMUSCULAR; INTRAVENOUS; SUBCUTANEOUS
Status: DISCONTINUED | OUTPATIENT
Start: 2019-07-02 | End: 2019-07-02

## 2019-07-02 RX ORDER — CARVEDILOL 12.5 MG/1
25 TABLET ORAL 2 TIMES DAILY WITH MEALS
Status: DISCONTINUED | OUTPATIENT
Start: 2019-07-02 | End: 2019-07-02

## 2019-07-02 RX ORDER — TIROFIBAN HYDROCHLORIDE 50 UG/ML
0.07 INJECTION INTRAVENOUS CONTINUOUS PRN
Status: DISCONTINUED | OUTPATIENT
Start: 2019-07-02 | End: 2019-07-02 | Stop reason: HOSPADM

## 2019-07-02 RX ORDER — NITROGLYCERIN 20 MG/100ML
.07-2 INJECTION INTRAVENOUS CONTINUOUS PRN
Status: DISCONTINUED | OUTPATIENT
Start: 2019-07-02 | End: 2019-07-02 | Stop reason: HOSPADM

## 2019-07-02 RX ORDER — DOPAMINE HYDROCHLORIDE 160 MG/100ML
2-20 INJECTION, SOLUTION INTRAVENOUS CONTINUOUS PRN
Status: DISCONTINUED | OUTPATIENT
Start: 2019-07-02 | End: 2019-07-02 | Stop reason: HOSPADM

## 2019-07-02 RX ORDER — SODIUM CHLORIDE 9 MG/ML
INJECTION, SOLUTION INTRAVENOUS CONTINUOUS
Status: DISCONTINUED | OUTPATIENT
Start: 2019-07-02 | End: 2019-07-02 | Stop reason: HOSPADM

## 2019-07-02 RX ORDER — LORAZEPAM 0.5 MG/1
0.5 TABLET ORAL
Status: DISCONTINUED | OUTPATIENT
Start: 2019-07-02 | End: 2019-07-02 | Stop reason: HOSPADM

## 2019-07-02 RX ORDER — FUROSEMIDE 40 MG
40 TABLET ORAL DAILY
Status: DISCONTINUED | OUTPATIENT
Start: 2019-07-03 | End: 2019-07-06 | Stop reason: HOSPADM

## 2019-07-02 RX ORDER — NOREPINEPHRINE BITARTRATE 0.06 MG/ML
.03-.4 INJECTION, SOLUTION INTRAVENOUS CONTINUOUS PRN
Status: DISCONTINUED | OUTPATIENT
Start: 2019-07-02 | End: 2019-07-02 | Stop reason: HOSPADM

## 2019-07-02 RX ORDER — EPTIFIBATIDE 2 MG/ML
180 INJECTION, SOLUTION INTRAVENOUS EVERY 10 MIN PRN
Status: DISCONTINUED | OUTPATIENT
Start: 2019-07-02 | End: 2019-07-02 | Stop reason: HOSPADM

## 2019-07-02 RX ORDER — FENTANYL CITRATE 50 UG/ML
25-50 INJECTION, SOLUTION INTRAMUSCULAR; INTRAVENOUS
Status: ACTIVE | OUTPATIENT
Start: 2019-07-02 | End: 2019-07-03

## 2019-07-02 RX ORDER — VERAPAMIL HYDROCHLORIDE 2.5 MG/ML
INJECTION, SOLUTION INTRAVENOUS
Status: DISCONTINUED | OUTPATIENT
Start: 2019-07-02 | End: 2019-07-02 | Stop reason: HOSPADM

## 2019-07-02 RX ORDER — ACETAMINOPHEN 325 MG/1
650 TABLET ORAL EVERY 4 HOURS PRN
Status: DISCONTINUED | OUTPATIENT
Start: 2019-07-02 | End: 2019-07-02

## 2019-07-02 RX ORDER — CARVEDILOL 12.5 MG/1
12.5 TABLET ORAL 2 TIMES DAILY WITH MEALS
Status: DISCONTINUED | OUTPATIENT
Start: 2019-07-02 | End: 2019-07-02

## 2019-07-02 RX ADMIN — FUROSEMIDE 40 MG: 10 INJECTION, SOLUTION INTRAVENOUS at 11:12

## 2019-07-02 RX ADMIN — LIDOCAINE HYDROCHLORIDE 4 ML: 10 INJECTION, SOLUTION EPIDURAL; INFILTRATION; INTRACAUDAL; PERINEURAL at 15:15

## 2019-07-02 RX ADMIN — MIDAZOLAM 1 MG: 1 INJECTION INTRAMUSCULAR; INTRAVENOUS at 15:10

## 2019-07-02 RX ADMIN — ISOSORBIDE MONONITRATE 60 MG: 60 TABLET, EXTENDED RELEASE ORAL at 08:10

## 2019-07-02 RX ADMIN — ALBUTEROL SULFATE 2 PUFF: 90 AEROSOL, METERED RESPIRATORY (INHALATION) at 16:56

## 2019-07-02 RX ADMIN — FENTANYL CITRATE 50 MCG: 50 INJECTION, SOLUTION INTRAMUSCULAR; INTRAVENOUS at 15:10

## 2019-07-02 RX ADMIN — CARVEDILOL 12.5 MG: 12.5 TABLET, FILM COATED ORAL at 08:10

## 2019-07-02 RX ADMIN — SODIUM CHLORIDE: 9 INJECTION, SOLUTION INTRAVENOUS at 17:41

## 2019-07-02 RX ADMIN — ACETAMINOPHEN 650 MG: 325 TABLET, FILM COATED ORAL at 18:17

## 2019-07-02 RX ADMIN — NITROGLYCERIN 200 MCG: 5 INJECTION, SOLUTION INTRAVENOUS at 15:27

## 2019-07-02 RX ADMIN — SODIUM CHLORIDE: 9 INJECTION, SOLUTION INTRAVENOUS at 08:11

## 2019-07-02 RX ADMIN — POTASSIUM CHLORIDE 20 MEQ: 1500 TABLET, EXTENDED RELEASE ORAL at 08:10

## 2019-07-02 RX ADMIN — FENTANYL CITRATE 50 MCG: 50 INJECTION, SOLUTION INTRAMUSCULAR; INTRAVENOUS at 15:40

## 2019-07-02 RX ADMIN — CARVEDILOL 18.75 MG: 6.25 TABLET, FILM COATED ORAL at 17:46

## 2019-07-02 RX ADMIN — HYDRALAZINE HYDROCHLORIDE 20 MG: 20 INJECTION INTRAMUSCULAR; INTRAVENOUS at 15:20

## 2019-07-02 RX ADMIN — ALLOPURINOL 300 MG: 300 TABLET ORAL at 08:10

## 2019-07-02 RX ADMIN — LIDOCAINE HYDROCHLORIDE 1 ML: 10 INJECTION, SOLUTION EPIDURAL; INFILTRATION; INTRACAUDAL; PERINEURAL at 15:26

## 2019-07-02 RX ADMIN — ASPIRIN 325 MG: 325 TABLET, COATED ORAL at 08:10

## 2019-07-02 RX ADMIN — HYDRALAZINE HYDROCHLORIDE 25 MG: 25 TABLET ORAL at 04:14

## 2019-07-02 RX ADMIN — HEPARIN SODIUM 900 UNITS/HR: 10000 INJECTION, SOLUTION INTRAVENOUS at 13:04

## 2019-07-02 RX ADMIN — ALBUTEROL SULFATE 2 PUFF: 90 AEROSOL, METERED RESPIRATORY (INHALATION) at 01:05

## 2019-07-02 RX ADMIN — POTASSIUM CHLORIDE 20 MEQ: 1500 TABLET, EXTENDED RELEASE ORAL at 18:17

## 2019-07-02 RX ADMIN — LOSARTAN POTASSIUM 100 MG: 100 TABLET ORAL at 08:10

## 2019-07-02 RX ADMIN — HYDRALAZINE HYDROCHLORIDE 50 MG: 50 TABLET ORAL at 11:14

## 2019-07-02 RX ADMIN — HEPARIN SODIUM 5000 UNITS: 1000 INJECTION, SOLUTION INTRAVENOUS; SUBCUTANEOUS at 15:28

## 2019-07-02 RX ADMIN — VERAPAMIL HYDROCHLORIDE 2.5 MG: 2.5 INJECTION, SOLUTION INTRAVENOUS at 15:27

## 2019-07-02 RX ADMIN — SIMVASTATIN 20 MG: 20 TABLET, FILM COATED ORAL at 22:08

## 2019-07-02 RX ADMIN — MIDAZOLAM 1 MG: 1 INJECTION INTRAMUSCULAR; INTRAVENOUS at 15:40

## 2019-07-02 RX ADMIN — HEPARIN SODIUM 900 UNITS/HR: 10000 INJECTION, SOLUTION INTRAVENOUS at 22:06

## 2019-07-02 RX ADMIN — HYDRALAZINE HYDROCHLORIDE 50 MG: 50 TABLET ORAL at 18:12

## 2019-07-02 ASSESSMENT — ACTIVITIES OF DAILY LIVING (ADL)
ADLS_ACUITY_SCORE: 15
ADLS_ACUITY_SCORE: 12

## 2019-07-02 NOTE — PROGRESS NOTES
1405 Received in care suites as a hold pre angiogram. Denies pain or SOB. Monitor shows atrial fibrillation with controlled ventricular response. Wife at bedside. Voided. Heparin and normal saline infusing.

## 2019-07-02 NOTE — PRE-PROCEDURE
I have examined the patient, reviewed the history, medications and pre procedural tests. He has heart failure in the setting of atrial fibrillation, decreased LVEF on TTE and a known history of CAD.  I have explained to the patient the risks of death, MI, stroke, hematoma, possible urgent bypass surgery for failed PCI, use of stents, thienopyridine agents, possible peripheral vascular complications, arrhythmia, the use of FFR in clinical decision-making and alternative of medical therapy alone in regards to Bilateral  heart catheterization, left ventriculography, coronary angiography, and possible percutaneous coronary intervention. The patient voiced understanding and wishes to proceed. The patient has a good right radial pulse, normal ulnar pulse and a normal Joshua's sign.

## 2019-07-02 NOTE — PROGRESS NOTES
[facility]  Cardiology Progress Note  Date of Service: 07/02/2019  Primary Cardiologist: Dr. Lezama    Assessment & Plan    Betito Anderson is a 73 year old male with past medical history significant for chronic afib not on anticoagulation, non occlusive CAD on cath in 1997, HTN admitted on 6/29/2019 with progressive [place] and lower extremity edema.      Assessment:  1.  Acute systolic heart failure with new dx cardiomyopathy EF 35-40%   - rt pleural effusion, ntprobnp 24k, trop neg   - possible etiologies include ischemia, tachycardia, htn, toxin, infectious etc, suspect ischemic vs htn   - admit wt 204, current wt 197, dry wt low 190s?   - I/o neg 2.6 L    2.  HTN, markedly so here 1602/100s   - on losartan 100 mg daily and toprol 50    3.  Chronic afib, had been on eliquis with adverse effect of face feeling numb, willing to try alternate   -  CHADS VASC min 3 (age, htn, chf,) possibly 4 if CAD   - once angio complete will trial xarelto v pradaxa    4.  Non occlusive CAD per pt report angio done here in 1997- unable to find records    5.  Mild mitral regur, tricuspid regurg and aortic sclerosis (not stenosis).  Not likely contributing, will follow with serial echo     Plan:   1. Increase hydralazine to 50mg tid  2. R and L heart cath today.  Risks and benefits of coronary angiogram discussed today including, bleeding, bruising, infection, allergic reaction, kidney damage (including need for dialysis), stroke, heart attack, vascular damage, emergency open heart surgery, up to and including death.  Patient indicates understanding and is agreeable to proceed.    3. Continue other meds  4. Pharmacy consult for pricing of noacs  5. Additional recs following angio  6. Possible discharge tomorrow  7. F/u is arranged     Melvi Montaño PA-C  [facility]  Pager: [phone]     Interval History   Feeling better today, still coughing up clear sputum but less.  Was able to sleep nearly flat from 0400 to 0700.   Denies cp.      He was on Eliquis last fall and said that when he took it at noon every day his face went numb and because of that he stopped.  He is willing to try different anticoagulant did not have any bleeding issues.    Physical Exam   Temp: 98.1  F (36.7  C) Temp src: Oral BP: (!) 176/91 Pulse: 77 Heart Rate: 71 Resp: 20 SpO2: 96 % O2 Device: None (Room air)    Vitals:    06/30/19 0547 07/01/19 0611 07/02/19 0417   Weight: 90.1 kg (198 lb 9.6 oz) 89.8 kg (197 lb 15.6 oz) 89.7 kg (197 lb 12.8 oz)       Well-developed well-nourished man in no acute distress.  Normocephalic atraumatic.  His heart is irregularly irregular but not tachycardic is a 2 out of 6 systolic murmur heard best at the right sternal border.  Lungs clear with small area of rhonchi in bilateral lower lobes.  He has 2+ edema to mid shin.  Abdomen is soft nontender.    Medications     Continuing ACE inhibitor/ARB/ARNI from home medication list OR ACE inhibitor/ARB order already placed during this visit       - MEDICATION INSTRUCTIONS -       HEParin 900 Units/hr (07/02/19 0415)     - MEDICATION INSTRUCTIONS -       - MEDICATION INSTRUCTIONS -       sodium chloride 20 mL/hr at 07/02/19 0811       allopurinol  300 mg Oral Daily     aspirin  325 mg Oral Daily     carvedilol  25 mg Oral BID w/meals     furosemide  40 mg Intravenous Q24H     hydrALAZINE  25 mg Oral Q8H ROSELIA     isosorbide mononitrate  60 mg Oral Daily     losartan  100 mg Oral Daily     simvastatin  20 mg Oral At Bedtime     sodium chloride (PF)  3 mL Intracatheter Q8H       Data   Most Recent 3 CBC's:  Recent Labs   Lab Test 07/01/19  1051 07/01/19  0534 06/30/19  0640   WBC 6.3 7.3 13.6*   HGB 15.3 14.3 14.9   MCV 86 85 85    213 262     Most Recent 3 BMP's:  Recent Labs   Lab Test 07/02/19  0536 07/01/19  0817 06/30/19  1540 06/30/19  0640 06/29/19  1859     --   --  135 138   POTASSIUM 3.4 3.5 4.1 3.2* 3.4   CHLORIDE 97  --   --  98 103   CO2 36*  --   --  30 27   BUN  24  --   --  19 20   CR 1.19  --   --  1.07 1.17   ANIONGAP 2*  --   --  7 8   GUNNER 8.2*  --   --  8.5 8.4*   GLC 98  --   --  110* 132*     Most Recent 3 Troponin's:  Recent Labs   Lab Test 06/30/19  0640 06/30/19  0042 06/29/19  1859   TROPI 0.040 0.038 0.029     Most Recent 3 BNP's:  Recent Labs   Lab Test 06/29/19  1859   NTBNPI 24,400*     Last 24 hours labs reviewed    EKG: (reviewed personally) afib with twi v2-v5, II, III, avF    Imaging: cxr  IMPRESSION: Slightly improved right pleural effusion and associated  atelectasis and/or infiltrate.    Tele: afib hr 60-90, with 2.2 sec paus early 7/2    Echo:   1. Left ventricular systolic function is mild to moderately reduced. The  visual ejection fraction is estimated at 35-40%.  2. There is mild-moderate global hypokinesia of the left ventricle.  3. The right ventricle is normal size. Mildly decreased right ventricular  systolic function  4. There is moderate biatrial dilatation.  5. There is mild (1+) mitral regurgitation.  6. There is mild (1+) tricuspid regurgitation.  7. The right ventricular systolic pressure is approximated at 46mmHg plus the  right atrial pressure. IVC diameter >2.1 cm collapsing <50% with sniff  suggests a high RA pressure estimated at 15 mmHg or greater.  8. Compared with the prior report, there has been an interval worsening in LV  function.  Last ischemic eval: 1997 angio per pt.      Device: none

## 2019-07-02 NOTE — PROGRESS NOTES
Focus: IAD with candida component  Attempted to see pt x 2 today. Pt refused eval on first visit but states area improved with use of antifungal powder and barrier ointment. On return pt was down for procedure. Will attempt visit again on 7-3 to assess area.

## 2019-07-02 NOTE — PLAN OF CARE
Monitor remains Atrial fib with CVR. Pt. Denies pain. Heparin at 900 unit(s)/hr . Pt. To Care [facility].   Awaiting right and left heart cath. Spouse accompanies pt. Report given to receiving nurse Magdalena.

## 2019-07-02 NOTE — PROGRESS NOTES
I reviewed the coronary angiography films and the pictures with the interventionalist Dr. Loera.  Per the interventional team the patient has three-vessel coronary artery disease and CV surgery consultation has been recommended.  At this time the patient is under some degree of sedation and does not seem to be comprehending the data of his coronary angiography well.  We will place a consult for CV surgery and have them discuss risks and benefits and pros and cons of surgery with the surgical team tomorrow.  If the patient is thought to be relatively high risk for surgery or should he decide not to undergo surgery, I would recommend touching base again with interventional team for elective PCI of the mid LAD.  The LAD seems to be giving very good retrograde flow to the large PDA but has a tight lesion in the mid segment. The antegrade right coronary artery is obstructed.  There is moderate to severe disease in the distal circumflex and moderate proximal circumflex disease.  Cardia myopathy is likely from hypertensive heart disease. Patient is not diabetic.  In the Cath Lab EF appears to be better than what was noted on the echo.  We will repeat a limited echo tomorrow.  I do not think he would need a viability study for revascularization.

## 2019-07-02 NOTE — PROVIDER NOTIFICATION
MD Notification    Notified Person: MD    Notified Person Name: On call    Notification Date/Time: 07/1/19 0105    Notification Interaction: paged    Purpose of Notification: 2.2 pause, no chest pain, pt resting     Orders Received:    Comments:

## 2019-07-02 NOTE — PROCEDURES
Procedure/Surgery Information   [facility]     Procedure Note  Date of Service (when I performed the procedure): 07/02/2019    Procedure:  1) BHC  2) CAG  3) LV    Indication:  Heart failure  Atrial fibrillation CAD    Physical Exam    I have reviewed the lab findings, diagnostic data, medications, and the plan for procedure. I have determined this patient to be an appropriate candidate for the planned procedure and have reassessed the patient IMMEDIATELY PRIOR to ocedure.  Prior to the start of the procedure and with procedural staff participation, I verbally confirmed the patient s identity using two indicators, relevant allergies, that the procedure was appropriate and matched the consent or emergent situation, and that the correct equipment/implants were available. Immediately prior to starting the procedure I conducted the Time Out with the procedural staff and re-confirmed the patient s name, procedure, and site/side. (The Joint Commi    Procedures          Findings  RA 9  RV 55/3,9  PA 55/20  Mean 35  PWP 13    Coronaries   LMCA no significant stenosis  LAD 80% mid  CX 70% mid  RCA subtotal heavily calcified ostial    LV EF 60%    Assess anatomy complex and surgery likely best for CAD, if turned down for surgery complex PCI with atherectomy RCA  PCI LAD would be option.    Evaristo    Performed by: Donaldo Loera  Authorized by: Donaldo Loera

## 2019-07-02 NOTE — CONSULTS
[facility] evaluation referral received from Joanna Barthell PAC      Betito is not currently established in the [facility]. He has been seen in our EP department x1, by Dr. Lezama 10/2018.    Betito was admitted for [place], edema and has a new diagnosis of CM, investigation re: etiology ongoing w/ plan for LH today. Possible discharge mid/late week.     [facility] appointment made for:  -7/10/19 BMP, pro BNP and visit with Leidy Lawson CNP (nothing available w/ Melvi More PAC in next 2 weeks)  -8/28/19 BMP, pro BNP and visit with Dr. Hidalgo (first available)  -Orders for above placed    Per chart review, patient's criteria for CardioMEMS is as follows:  -Class III heart failure symptoms: yes  -Hospitalization for heart failure in the past 12 months: yes  -Followed by [facility] providers: yes  -Compliant with TELEMANAGEMENT: not enrolled  -GFR>25: yes  -Chest circumference <65 inches: unknown, BMI 32  -Able to take dual antiplatelets [Plavix for 30 days in addition to aspirin 81mg for life (or already on Coumadin)]: yes  -Active infection: ?URI  -History of >1 pulmonary embolism or DVT: no  -Congenital heart disease: no  -Mechanical right heart valves: no  -CRT in last 3 months: no  -RVSP noted on Echo: 46mmHg +RAP    We look forward to seeing Betito in the clinic.   Please call with questions.     Jade Byers RN, BSN  [facility] RN Care Coordinator  [facility]  [phone]    [facility]: Cardiomyopathy, Optimization, Rehabilitation, Education   The [facility] is a heart failure specialty clinic within the [facility] where you will work with your cardiologist, nurse practitioners, physician assistants and registered nurses who specialize in heart failure care. They are dedicated to helping patients with heart failure to carefully adjust medications, receive education, and learn who and when to call if symptoms develop. They specialize in helping you  better understand your condition, slow the progression of your disease, improve the length and quality of your life, help you detect future heart problems before they become life threatening, and avoid hospitalizations.

## 2019-07-02 NOTE — CONSULTS
Medication coverage check for Xarelto. $47 monthly copay.     Pradaxa, $363 for first fill. About $230 monthly after first fill.    Cynthia Castillo CphT  [facility] Liaison  Liaison Cell: [phone]

## 2019-07-02 NOTE — PROGRESS NOTES
Heart Failure Care Pathway  GOALS TO BE MET BEFORE DISCHARGE:    1. Decrease congestion and/or edema with diuretic therapy to achieve near      optimal volume status.            Dyspnea improved:  Yes            Edema improved:     Yes        Net I/O and Weights since admission:          06/02 2300 - 07/02 2259  In: 1380 [P.O.:1280; I.V.:100]  Out: 5565 [Urine:5565]  Net: -4185            Vitals:    06/29/19 1841 06/30/19 0547 07/01/19 0611 07/02/19 0417   Weight: 92.5 kg (204 lb) 90.1 kg (198 lb 9.6 oz) 89.8 kg (197 lb 15.6 oz) 89.7 kg (197 lb 12.8 oz)       2.  O2 sats > 92% on RA or at prior home O2 therapy level.          Current oxygenation status:       SpO2: 94 %         O2 Device: None (Room air),  Oxygen Delivery: 3 LPM         Able to wean O2 this shift to keep sats > 92%:  Yes       Does patient use Home O2? No    3.  Tolerates ambulation and mobility near baseline: No, please explain: bedrest after heart cath        How many times did the patient ambulate with nursing staff this shift? 0    Please review the Heart Failure Care Pathway for additional HF goal outcomes.    Pt back from heart cath. Pt alert, forgetful. VSS on RA. LS wheezy - inhaler given x1. Heparin gtt on hold for 4 hours after TR band removed - removed at 1800. R radial site CDI, no bleeding/bruising/hematoma. Arm board in place. IVF infusing. Started clear liquids. Cards saw pt - ordered CV surgery consult. Orders in place for echo, US venous mapping, US carotd, CT chest. Still need UA. Blancheable redness in perineal area. Tele A fib CVR. +2 BLE edema. K+ recheck 3.6, replaced. Recheck tomorrow am. Pt c/o headache around 1800.  Given PRN Tylenol. Continue to monitor.    Taya Mcfarlane RN  7/2/2019

## 2019-07-02 NOTE — PLAN OF CARE
A&OX4,VSS, denies [place], on room air. Tele- AFib SVR, denies pain NPO angio. Up with SBA, voiding on the urinal. Slept most of the night on the recliner. Heparin infusing at 7.5 ml/hr, CIWA score 0.  2.2 pause this night, pt asymptomatic, resting comfortably. Plan for angio today.

## 2019-07-02 NOTE — CONSULTS
Patient seen and examined. Investigations reviewed. Will get CT chest, carotid duplex and vein mapping for preop evaluation.  Will discuss with patient and family tomorrow regarding risks and benefits of surgery as patient is still slightly confused.  Full consult dictated.

## 2019-07-02 NOTE — PROCEDURES
Procedure  Preliminary report  1) CAG  2) LHC  3) RHC  4) LV    Approach RTR RBV    RA 9  RV 55/4,9  PA 55/19  Mean 25  PWP 13    Coronaries   80% LAD  70% distal CX  RCA calcified ostial subtotal with TIM1 2 flow  Collaterals from LCA to RCA    LV EF 60%      Plan  Diuresis is adequate  Moderate pulmonary hypertension  3 vessel CAD would be complex PCI so would advise surgical consultation as first step. If he is turned down, could discuss complex PCI RCA and LAD with atherectomy    [place]

## 2019-07-02 NOTE — PROGRESS NOTES
X cover 2250    Called for 2.2 sec pause, asymptomatic, vitals stable    - continue telemetry monitoring

## 2019-07-03 ENCOUNTER — APPOINTMENT (OUTPATIENT)
Dept: ULTRASOUND IMAGING | Facility: CLINIC | Age: 74
DRG: 246 | End: 2019-07-03
Attending: SURGERY
Payer: COMMERCIAL

## 2019-07-03 ENCOUNTER — APPOINTMENT (OUTPATIENT)
Dept: CARDIOLOGY | Facility: CLINIC | Age: 74
DRG: 246 | End: 2019-07-03
Attending: INTERNAL MEDICINE
Payer: COMMERCIAL

## 2019-07-03 LAB
ALBUMIN UR-MCNC: 100 MG/DL
ANION GAP SERPL CALCULATED.3IONS-SCNC: 6 MMOL/L (ref 3–14)
APPEARANCE UR: CLEAR
BILIRUB UR QL STRIP: NEGATIVE
BUN SERPL-MCNC: 23 MG/DL (ref 7–30)
CALCIUM SERPL-MCNC: 8.1 MG/DL (ref 8.5–10.1)
CHLORIDE SERPL-SCNC: 96 MMOL/L (ref 94–109)
CO2 SERPL-SCNC: 31 MMOL/L (ref 20–32)
COLOR UR AUTO: YELLOW
CREAT SERPL-MCNC: 1.09 MG/DL (ref 0.66–1.25)
GFR SERPL CREATININE-BSD FRML MDRD: 67 ML/MIN/{1.73_M2}
GLUCOSE SERPL-MCNC: 89 MG/DL (ref 70–99)
GLUCOSE UR STRIP-MCNC: NEGATIVE MG/DL
HGB UR QL STRIP: NEGATIVE
INTERPRETATION ECG - MUSE: NORMAL
KETONES UR STRIP-MCNC: NEGATIVE MG/DL
LEUKOCYTE ESTERASE UR QL STRIP: NEGATIVE
LMWH PPP CHRO-ACNC: 0.15 IU/ML
LMWH PPP CHRO-ACNC: NORMAL IU/ML
NITRATE UR QL: NEGATIVE
PH UR STRIP: 7 PH (ref 5–7)
POTASSIUM SERPL-SCNC: 3.8 MMOL/L (ref 3.4–5.3)
RBC #/AREA URNS AUTO: 0 /HPF (ref 0–2)
SODIUM SERPL-SCNC: 133 MMOL/L (ref 133–144)
SOURCE: ABNORMAL
SP GR UR STRIP: 1 (ref 1–1.03)
SQUAMOUS #/AREA URNS AUTO: 1 /HPF (ref 0–1)
UROBILINOGEN UR STRIP-MCNC: NORMAL MG/DL (ref 0–2)
WBC #/AREA URNS AUTO: <1 /HPF (ref 0–5)

## 2019-07-03 PROCEDURE — 93308 TTE F-UP OR LMTD: CPT | Mod: 26 | Performed by: INTERNAL MEDICINE

## 2019-07-03 PROCEDURE — 81001 URINALYSIS AUTO W/SCOPE: CPT | Performed by: SURGERY

## 2019-07-03 PROCEDURE — 36415 COLL VENOUS BLD VENIPUNCTURE: CPT | Performed by: PHYSICIAN ASSISTANT

## 2019-07-03 PROCEDURE — 25000132 ZZH RX MED GY IP 250 OP 250 PS 637: Performed by: INTERNAL MEDICINE

## 2019-07-03 PROCEDURE — 21000001 ZZH R&B HEART CARE

## 2019-07-03 PROCEDURE — 25000132 ZZH RX MED GY IP 250 OP 250 PS 637: Performed by: PHYSICIAN ASSISTANT

## 2019-07-03 PROCEDURE — 93325 DOPPLER ECHO COLOR FLOW MAPG: CPT | Mod: 26 | Performed by: INTERNAL MEDICINE

## 2019-07-03 PROCEDURE — 93308 TTE F-UP OR LMTD: CPT

## 2019-07-03 PROCEDURE — 85520 HEPARIN ASSAY: CPT | Performed by: HOSPITALIST

## 2019-07-03 PROCEDURE — 99233 SBSQ HOSP IP/OBS HIGH 50: CPT | Mod: 25 | Performed by: INTERNAL MEDICINE

## 2019-07-03 PROCEDURE — 99232 SBSQ HOSP IP/OBS MODERATE 35: CPT | Performed by: HOSPITALIST

## 2019-07-03 PROCEDURE — 80048 BASIC METABOLIC PNL TOTAL CA: CPT | Performed by: PHYSICIAN ASSISTANT

## 2019-07-03 PROCEDURE — 36415 COLL VENOUS BLD VENIPUNCTURE: CPT | Performed by: HOSPITALIST

## 2019-07-03 PROCEDURE — 93321 DOPPLER ECHO F-UP/LMTD STD: CPT | Mod: 26 | Performed by: INTERNAL MEDICINE

## 2019-07-03 PROCEDURE — 93971 EXTREMITY STUDY: CPT | Mod: LT

## 2019-07-03 PROCEDURE — 93880 EXTRACRANIAL BILAT STUDY: CPT

## 2019-07-03 RX ORDER — CLOPIDOGREL BISULFATE 75 MG/1
300 TABLET ORAL ONCE
Status: DISCONTINUED | OUTPATIENT
Start: 2019-07-03 | End: 2019-07-03

## 2019-07-03 RX ORDER — ATORVASTATIN CALCIUM 40 MG/1
40 TABLET, FILM COATED ORAL DAILY
Status: DISCONTINUED | OUTPATIENT
Start: 2019-07-03 | End: 2019-07-06 | Stop reason: HOSPADM

## 2019-07-03 RX ORDER — CARVEDILOL 12.5 MG/1
12.5 TABLET ORAL 2 TIMES DAILY WITH MEALS
Status: DISCONTINUED | OUTPATIENT
Start: 2019-07-03 | End: 2019-07-06 | Stop reason: HOSPADM

## 2019-07-03 RX ORDER — CLOPIDOGREL BISULFATE 75 MG/1
75 TABLET ORAL DAILY
Status: DISCONTINUED | OUTPATIENT
Start: 2019-07-04 | End: 2019-07-03

## 2019-07-03 RX ORDER — LIDOCAINE 40 MG/G
CREAM TOPICAL
Status: CANCELLED | OUTPATIENT
Start: 2019-07-05

## 2019-07-03 RX ORDER — ASPIRIN 81 MG/1
81 TABLET ORAL DAILY
Status: DISCONTINUED | OUTPATIENT
Start: 2019-07-03 | End: 2019-07-05

## 2019-07-03 RX ORDER — ASPIRIN 81 MG/1
81 TABLET, CHEWABLE ORAL DAILY
Status: DISCONTINUED | OUTPATIENT
Start: 2019-07-03 | End: 2019-07-03

## 2019-07-03 RX ORDER — CARVEDILOL 12.5 MG/1
25 TABLET ORAL 2 TIMES DAILY WITH MEALS
Status: DISCONTINUED | OUTPATIENT
Start: 2019-07-03 | End: 2019-07-03

## 2019-07-03 RX ADMIN — ASPIRIN 81 MG: 81 TABLET, COATED ORAL at 09:11

## 2019-07-03 RX ADMIN — HYDRALAZINE HYDROCHLORIDE 75 MG: 50 TABLET ORAL at 19:01

## 2019-07-03 RX ADMIN — ALBUTEROL SULFATE 2 PUFF: 90 AEROSOL, METERED RESPIRATORY (INHALATION) at 08:13

## 2019-07-03 RX ADMIN — POTASSIUM CHLORIDE 20 MEQ: 1.5 POWDER, FOR SOLUTION ORAL at 08:11

## 2019-07-03 RX ADMIN — CARVEDILOL 18.75 MG: 6.25 TABLET, FILM COATED ORAL at 08:12

## 2019-07-03 RX ADMIN — ISOSORBIDE MONONITRATE 60 MG: 60 TABLET, EXTENDED RELEASE ORAL at 08:10

## 2019-07-03 RX ADMIN — FUROSEMIDE 40 MG: 40 TABLET ORAL at 09:09

## 2019-07-03 RX ADMIN — HYDRALAZINE HYDROCHLORIDE 75 MG: 50 TABLET ORAL at 11:52

## 2019-07-03 RX ADMIN — ALLOPURINOL 300 MG: 300 TABLET ORAL at 08:12

## 2019-07-03 RX ADMIN — ATORVASTATIN CALCIUM 40 MG: 40 TABLET, FILM COATED ORAL at 09:11

## 2019-07-03 RX ADMIN — HYDRALAZINE HYDROCHLORIDE 50 MG: 50 TABLET ORAL at 03:01

## 2019-07-03 RX ADMIN — ACETAMINOPHEN 650 MG: 325 TABLET, FILM COATED ORAL at 14:11

## 2019-07-03 RX ADMIN — LOSARTAN POTASSIUM 100 MG: 100 TABLET ORAL at 08:10

## 2019-07-03 RX ADMIN — CARVEDILOL 12.5 MG: 12.5 TABLET, FILM COATED ORAL at 19:01

## 2019-07-03 ASSESSMENT — ACTIVITIES OF DAILY LIVING (ADL)
ADLS_ACUITY_SCORE: 14
ADLS_ACUITY_SCORE: 15
ADLS_ACUITY_SCORE: 14
ADLS_ACUITY_SCORE: 15
ADLS_ACUITY_SCORE: 14
ADLS_ACUITY_SCORE: 14

## 2019-07-03 NOTE — PROGRESS NOTES
CVTS Staff Note      73 M seen by my colleague, Dr Ted Self, yesterday for coronary disease.    Met with patient and wife today, as patient could not recall much of prior consultation as he was still sedated from cor angio.    Reviewed studies with them, specifically his CT Chest, which reveals heavy calcification of the ascending aorta.    His ECHO was updated today as well, showing improved function from time of admission.    Discussed my concerns regarding risks of surgery and aorto embolic phenomena related to his severely diseased ascending aorta, and that I would discuss further with the Cardiology team to explore non surgical options for revascularization. He was relieved by this information, and seemed amenable to this approach.    Discussed this with Dr Hidalgo as well, and will have the Interventional service again review his case for PCI.        Derek Mendez MD
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
## 2019-07-03 NOTE — PROGRESS NOTES
[facility] Progress Note  [facility] Cardiac    Pt respectfully declined visit. SH will follow-up per request and LOS.      Evan Meester   Intern  Pager:  [phone]

## 2019-07-03 NOTE — CONSULTS
Consult Date:  07/02/2019      REASON FOR CONSULTATION:  I was requested to see Mr. Anderson for evaluation of options for coronary artery disease.      HISTORY OF PRESENT ILLNESS:  Mr. Anderson is a pleasant 73-year-old gentleman with a past medical history of chronic atrial fibrillation, hypertension, hyperlipidemia, who has had some worsening shortness of breath, fatigue and tiredness over the last several months.  His effort tolerance is very limited to approximately a block.  He also complains of orthopnea and denies any PND.  He has had worsening lower extremity swelling over the last few months.  He underwent a coronary angiogram today that showed severe triple vessel coronary artery disease.      PAST MEDICAL HISTORY:  Coronary artery disease, atrial fibrillation, carotid artery stenosis, hypertension, hypercholesterolemia.      PAST SURGICAL HISTORY:  Arthroscopic surgery.      OUTPATIENT MEDICATIONS:  Allopurinol, Lasix, Cozaar, metoprolol, Zocor.      ALLERGIES:  ACE INHIBITORS.      SOCIAL HISTORY:  Former smoker, denies current alcohol, drug or tobacco abuse.      FAMILY HISTORY:  Coronary artery disease.      REVIEW OF SYSTEMS:   GENERAL:  Constant fatigue, tiredness.   RESPIRATORY:  Short of breath.   CARDIOVASCULAR:  Coronary artery disease, atrial fibrillation.   GENITOURINARY:  None.   GASTROINTESTINAL:  None.   ENDOCRINE:  None.   NEUROLOGIC:  None.   PSYCHIATRIC:  None.   INTEGUMENT:  None.   ENT:  None.      PHYSICAL EXAMINATION:   VITAL SIGNS:  Afebrile.  Vitals stable.  Blood pressure 140/74, heart rate 72.     GENERAL:  He is awake, alert, oriented x3, appears comfortable at rest.   CARDIOVASCULAR:  S1, S2 normal, no S3, a 2/6 systolic murmur at left sternal border.     RESPIRATORY:  Bilateral rhonchi, crepitations.   ABDOMEN:  Bowel sounds are present.  Nondistended.   EXTREMITIES:  Lower extremities warm, perfused.  Mild pedal edema.   NEUROLOGIC:  No focal deficits.   EYES:  Pupils equal,  round and reactive to light.   DERMATOLOGIC:  Within normal limits.   ENT:  Normal.      LABORATORY DATA:  Electrolytes within normal limits, creatinine 1.19.  White cell count within normal limits.  Platelet 222,000.  I reviewed his echocardiogram performed 2019 that showed left ventricular function is mild to moderately reduced, ejection fraction 35%-40%, mild to moderate global hypokinesis of left ventricle.  Right ventricle is normal size.  Mildly decreased right ventricular function, mild mitral regurgitation, mild tricuspid regurgitation.  I reviewed his coronary angiogram that showed no significant left main stenosis, 80% mid left anterior descending artery, 70% mid circumflex lesion, subtotal heavily calcified ostial, right coronary artery stenosis with a PDA that fills with collaterals.      ASSESSMENT AND PLAN:  A 73-year-old gentleman with severe triple vessel coronary artery disease.  I briefly discussed the possibility of coronary artery bypass grafting.  I attempted to call the patient's family and was not able to do so.  We should again revisit coronary artery bypass grafting with the patient and his family so they understand the risks and benefits of the surgery.  As part of our preoperative evaluation, we will get a carotid duplex, CT scan of the chest without any contrast, and lower extremity vein mapping.        If you have any questions regarding his care, feel free to contact me.         LULÚ RYAN MD             D: 2019   T: 2019   MT: SUHAIL      Name:     MARCELA TINAJERO   MRN:      [id number]        Account:       [id number]   :      1945           Consult Date:  2019      Document: [id number]       cc: Mendez Loera MD       [facility] Surgery Billing

## 2019-07-03 NOTE — PLAN OF CARE
Pt just C/O of H/A and Tylenol was given around 1405. B/P under better control with last B/P 111/76. Heparin gtt remains at 900 units/hr. Pt and his family are waiting to see Vascular team this afternoon to discussion plans for poss CAGB. Call out to V Team so I can update Pt and family for poss arrival. Remains in Sinus Everardo / Sinus Rhythm without any pauses noted today.

## 2019-07-03 NOTE — PLAN OF CARE
OT/CR: Discussed with RN. RN requesting to hold therapy session at this time. Per chart: Pt being worked up by CV surgery.

## 2019-07-03 NOTE — PROGRESS NOTES
[facility]    Medicine Progress Note - Hospitalist Service       Date of Admission:  6/29/2019  Assessment & Plan    Betito Anderson is a 73-year-old male with alcohol use disorder, hypertension, ASCVD, and chronic atrial fibrillation who presented with progressive dyspnea and lower extremity as well as new shortness of breath at rest and heart palpitations, found to be in atrial fibrillation with controlled rate and acute heart failure.     Acute systolic heart failure.   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  Also noted on that echo was moderate aortic valve stenosis.  Additionally, the patient is a moderate drinker (see below).  He appears to have uncontrolled hypertension.  Additionally, he has history of a 40% lesion, circumflex based off of angiogram in 1997.  Suspect he has underlying untreated LROA as well.  Treated with Lasix 40 mg IV in the ED.  BNP 24,400.  EKG without overt ischemic changes, does show atrial fibrillation with controlled rate.  Chest x-ray shows right-sided pleural effusion with no overt pulmonary vascular congestion.   - TTE 6/30 EF 35-40%, mild to mod global hypokinesia  - telemetry, I/O, weights   - Trended serial troponin. 0.029--> 0.038 --> 0.040    - 7/3 net -4.9L, current weight 193, 204 on admission  - Lasix 40 po daily, hydralazine now 75 po tid, losartan 100 daily, carvedilol 12.5bid  - A1c 5.7%, LDL 88  - Appreciate Cardiology consultation (entresto and spirono considered)  - Angiography 7/2 - triple vessel disease; CAB recommended  - CV Surgery consulted for possible CAB - seems patient did not recall discussion after cath 7/2 due to  "sedation     Chronic atrial fibrillation with controlled rate.   Appears diagnosed in fall 2018 and was recommended increased dose of metoprolol as well as apixaban.  He did not start the apixaban until approximately 3 months later when his insurance changed and then only used it for 2 weeks after developing facial numbness, \"like clockwork\" around noon.   - CHADS-VASc score is at least 3 for age, CHF and hypertension.  He is agreeable to trying a different anticoagulant agent.   - PTA metop stopped, coreg decreased per cardiology  - continue on heparin gtt    ASCVD.   Noted to have 40% lesion in the circumflex on angiogram in 1997.  Again, TTE in 11/2018, showed hypokinesia of the basal inferior and inferolateral wall.  The patient went for stress testing, but failed it due to atrial fibrillation and then appears lost to followup.   - Screening labs as above and trending serial troponins as above.     Hypertension.   Uncontrolled.  The patient states mostly compliant, missing usually 1 day per week and he does state that he essentially stopped taking his amlodipine as he thinks that it exacerbates his lower extremity swelling.  SBPs are uncontrolled, 180s-220s systolic.   - Continue PTA losartan 100 mg, metoprolol succinate 50 mg daily.    - P.r.n. hydralazine is available for SBP greater than 160.   - Continue PTA simvastatin.     Concern of pneumonia.   Afebrile and no other URI symptoms aside from a productive cough times the last 2 days that he feels gets stuck in his throat and may very well be related to his heart failure.  Has leukocytosis of 16.6, which could be reactive in the setting above.  Chest x-ray showed a right pleural effusion with atelectasis versus infiltrate.   - Procalc 0.05 on admission, 0.30  - continues to be afebrile, WBC normal, continue to monitor (not on abx)    Alcohol use disorder.   Consumes 3-4 alcoholic beverages daily.   - Discussed moderation of alcohol with recommendation for no " more than 3 per sitting or 14 per week and explained it can contribute to atrial fibrillation as well as heart failure.  Has no history of withdrawal and his last drink was on the evening prior to admission.   - Monitor CIWAs without benzos for time being.       Diet: Advance Diet as Tolerated: Regular Diet Adult  <2400mg sodium  DVT Prophylaxis: heparin gtt  Morales Catheter: not present  Code Status: Full Code      Disposition Plan   Expected discharge: Pending possible CAB plans.   Entered: Johan Colón MD 07/03/2019, 12:52 PM       The patient's care was discussed with the Bedside Nurse and Patient.    Johan Colón MD  Hospitalist Service  [facility]    ______________________________________________________________________    Interval History   Seen and examined.  Wife and son at bedside.  Patient seems to have been still groggy from sedation yesterday and does not recall any visit with cardiothoracic surgery.  We discussed potential options but ultimately I defer to CT surgery.  No new major complaints/issues, ongoing mainly nonproductive cough about the same.  Breathing continues to be improved since admission.  No chest pain or pressure.  Slight headache awaiting Tylenol currently.    Data reviewed today: I reviewed all medications, new labs and imaging results over the last 24 hours. I personally reviewed no images or EKG's today.    Physical Exam   Vital Signs: Temp: 98.1  F (36.7  C) Temp src: Oral BP: 101/62 Pulse: 75 Heart Rate: 65 Resp: 18 SpO2: 93 % O2 Device: None (Room air) Oxygen Delivery: 3 LPM  Weight: 193 lbs 3.2 oz    Gen: NAD, pleasant  HEENT: Normocephalic, EOMI, MMM  Resp:  Coarse bilaterally, a few scattered wheezes, no increased work of resp  CV: S1S2 heard, reg rhythm, reg rate, no pedal edema  Abdo: soft, nontender, nondistended, bowel sounds present  Ext: calves nontender, well perfused  Neuro: AAOx3, CN grossly intact, no facial asymmetry      Data   Recent Labs    Lab 07/03/19  0532 07/02/19  1713 07/02/19  0536 07/01/19  1051  07/01/19  0534  06/30/19  0640 06/30/19  0042 06/29/19  1859   WBC  --   --   --  6.3  --  7.3  --  13.6*  --  16.6*   HGB  --   --   --  15.3  --  14.3  --  14.9  --  14.8   MCV  --   --   --  86  --  85  --  85  --  86   PLT  --   --   --  222  --  213  --  262  --  304     --  135  --   --   --   --  135  --  138   POTASSIUM 3.8 3.6 3.4  --    < >  --    < > 3.2*  --  3.4   CHLORIDE 96  --  97  --   --   --   --  98  --  103   CO2 31  --  36*  --   --   --   --  30  --  27   BUN 23  --  24  --   --   --   --  19  --  20   CR 1.09  --  1.19  --   --   --   --  1.07  --  1.17   ANIONGAP 6  --  2*  --   --   --   --  7  --  8   GUNNER 8.1*  --  8.2*  --   --   --   --  8.5  --  8.4*   GLC 89  --  98  --   --   --   --  110*  --  132*   ALBUMIN  --   --   --   --   --   --   --  3.3*  --   --    PROTTOTAL  --   --   --   --   --   --   --  7.5  --   --    BILITOTAL  --   --   --   --   --   --   --  1.3  --   --    ALKPHOS  --   --   --   --   --   --   --  96  --   --    ALT  --   --   --   --   --   --   --  22  --   --    AST  --   --   --   --   --   --   --  21  --   --    TROPI  --   --   --   --   --   --   --  0.040 0.038 0.029    < > = values in this interval not displayed.     Recent Results (from the past 24 hour(s))   CT Chest w/o Contrast    Narrative    CT CHEST WITHOUT CONTRAST July 2, 2019 7:57 PM     HISTORY: Preoperative coronary artery bypass graft evaluation.     COMPARISON: None.    TECHNIQUE: Volumetric helical acquisition of CT images of the chest  from the clavicles to the kidneys were acquired without IV contrast.  Radiation dose for this scan was reduced using automated exposure  control, adjustment of the mA and/or kV according to patient size, or  iterative reconstruction technique.    FINDINGS: Small right pleural effusion and associated compressive  atelectasis versus infiltrate. There are extensive coronary  vascular  calcifications and/or stents consistent with coronary artery disease.  Benign granuloma in the left apex. No left pleural or pericardial  effusion. At least moderate coronary artery calcifications are seen in  the lateral and posterior walls of the aorta. Anterior wall of the  ascending aorta is relatively spared. Normal caliber aorta.  Cardiomegaly. No acute findings in the visualized upper abdomen.  Partially seen indeterminate 1.1 cm left renal lesion.      Impression    IMPRESSION:  1. Moderate calcified atherosclerosis of the ascending aorta.  2. Small right pleural effusion and associated compressive atelectasis  and/or infiltrate.  3. 1.1 cm indeterminate lesion in the left kidney. Consider diagnostic  CT of the abdomen for further evaluation.    CONNOR RUANO MD   US Carotid Bilateral    Narrative    BILATERAL DUPLEX CAROTID ULTRASOUND July 3, 2019 9:02 AM     HISTORY: Preoperative coronary artery bypass graft.     COMPARISON: Carotid Doppler ultrasound 10/16/2012.    FINDINGS: There is mild atherosclerotic plaque in the carotid  bifurcations. Flow velocities and waveforms show less than 50%  diameter stenosis in both the right and left internal carotid arteries  as assessed by each ICA PSV and EDV and ICA/DCCA ratio. Antegrade flow  is present in both vertebral and external carotid arteries.        Impression    IMPRESSION: Less than 50% diameter stenosis of both internal carotid  arteries relative to the distal ICA diameters.     US Lower Extremity Venous Mapping Left    Narrative    ULTRASOUND LOWER EXTREMITY VENOUS MAPPING LEFT  7/3/2019 9:03 AM     HISTORY: Preoperative planning for coronary artery bypass graft  surgery.    COMPARISON: None.    FINDINGS: The left greater saphenous vein was mapped. Its diameters  from the saphenofemoral junction to the knee range between 7.0 to 2.7  mm. Its diameters from below the knee to the ankle range between 3.0  to 2.3 mm.      Impression    IMPRESSION:  Left lower extremity vein mapping performed as above.

## 2019-07-03 NOTE — PROGRESS NOTES
Spoke with Dr Mendez with surgery. He does not think Mr Anderson is a suitable candidate for surgery. I spoke with Dr. Hu who reviewed the images and will plan on PCI to the mid LAD on Friday. NPO tomorrow night. Patient is going to confirm his decision about wanting to proceed with this PCI with us tomorrow. If that happens, please have patient get 300 mg of Plavix tomorrow and then 75 mg daily. Dr. Lezama or Dr. Velez will follow tomorrow, I will see him Friday am.

## 2019-07-04 LAB
ANION GAP SERPL CALCULATED.3IONS-SCNC: 4 MMOL/L (ref 3–14)
BUN SERPL-MCNC: 22 MG/DL (ref 7–30)
CALCIUM SERPL-MCNC: 8.3 MG/DL (ref 8.5–10.1)
CHLORIDE SERPL-SCNC: 96 MMOL/L (ref 94–109)
CO2 SERPL-SCNC: 33 MMOL/L (ref 20–32)
CREAT SERPL-MCNC: 1.17 MG/DL (ref 0.66–1.25)
ERYTHROCYTE [DISTWIDTH] IN BLOOD BY AUTOMATED COUNT: 16.2 % (ref 10–15)
GFR SERPL CREATININE-BSD FRML MDRD: 61 ML/MIN/{1.73_M2}
GLUCOSE SERPL-MCNC: 97 MG/DL (ref 70–99)
HCT VFR BLD AUTO: 40.7 % (ref 40–53)
HGB BLD-MCNC: 13.1 G/DL (ref 13.3–17.7)
LMWH PPP CHRO-ACNC: 0.24 IU/ML
MCH RBC QN AUTO: 27.3 PG (ref 26.5–33)
MCHC RBC AUTO-ENTMCNC: 32.2 G/DL (ref 31.5–36.5)
MCV RBC AUTO: 85 FL (ref 78–100)
PLATELET # BLD AUTO: 246 10E9/L (ref 150–450)
POTASSIUM SERPL-SCNC: 3.8 MMOL/L (ref 3.4–5.3)
RBC # BLD AUTO: 4.79 10E12/L (ref 4.4–5.9)
SODIUM SERPL-SCNC: 133 MMOL/L (ref 133–144)
WBC # BLD AUTO: 5 10E9/L (ref 4–11)

## 2019-07-04 PROCEDURE — 25000132 ZZH RX MED GY IP 250 OP 250 PS 637: Performed by: INTERNAL MEDICINE

## 2019-07-04 PROCEDURE — 36415 COLL VENOUS BLD VENIPUNCTURE: CPT | Performed by: PHYSICIAN ASSISTANT

## 2019-07-04 PROCEDURE — 25000128 H RX IP 250 OP 636: Performed by: INTERNAL MEDICINE

## 2019-07-04 PROCEDURE — 99232 SBSQ HOSP IP/OBS MODERATE 35: CPT | Performed by: HOSPITALIST

## 2019-07-04 PROCEDURE — 21000001 ZZH R&B HEART CARE

## 2019-07-04 PROCEDURE — 80048 BASIC METABOLIC PNL TOTAL CA: CPT | Performed by: PHYSICIAN ASSISTANT

## 2019-07-04 PROCEDURE — 99233 SBSQ HOSP IP/OBS HIGH 50: CPT | Performed by: INTERNAL MEDICINE

## 2019-07-04 PROCEDURE — 85520 HEPARIN ASSAY: CPT | Performed by: PHYSICIAN ASSISTANT

## 2019-07-04 PROCEDURE — 25000132 ZZH RX MED GY IP 250 OP 250 PS 637: Performed by: PHYSICIAN ASSISTANT

## 2019-07-04 PROCEDURE — 85027 COMPLETE CBC AUTOMATED: CPT | Performed by: PHYSICIAN ASSISTANT

## 2019-07-04 PROCEDURE — 25000132 ZZH RX MED GY IP 250 OP 250 PS 637: Performed by: HOSPITALIST

## 2019-07-04 RX ORDER — CLOPIDOGREL BISULFATE 75 MG/1
300 TABLET ORAL ONCE
Status: COMPLETED | OUTPATIENT
Start: 2019-07-04 | End: 2019-07-04

## 2019-07-04 RX ORDER — CLOPIDOGREL BISULFATE 75 MG/1
75 TABLET ORAL DAILY
Status: DISCONTINUED | OUTPATIENT
Start: 2019-07-05 | End: 2019-07-06 | Stop reason: HOSPADM

## 2019-07-04 RX ADMIN — ALLOPURINOL 300 MG: 300 TABLET ORAL at 08:29

## 2019-07-04 RX ADMIN — ALBUTEROL SULFATE 2 PUFF: 90 AEROSOL, METERED RESPIRATORY (INHALATION) at 12:41

## 2019-07-04 RX ADMIN — HYDRALAZINE HYDROCHLORIDE 75 MG: 50 TABLET ORAL at 11:54

## 2019-07-04 RX ADMIN — ATORVASTATIN CALCIUM 40 MG: 40 TABLET, FILM COATED ORAL at 08:29

## 2019-07-04 RX ADMIN — CARVEDILOL 12.5 MG: 12.5 TABLET, FILM COATED ORAL at 08:29

## 2019-07-04 RX ADMIN — CARVEDILOL 12.5 MG: 12.5 TABLET, FILM COATED ORAL at 17:20

## 2019-07-04 RX ADMIN — FUROSEMIDE 40 MG: 40 TABLET ORAL at 08:29

## 2019-07-04 RX ADMIN — ALBUTEROL SULFATE 2 PUFF: 90 AEROSOL, METERED RESPIRATORY (INHALATION) at 23:34

## 2019-07-04 RX ADMIN — GUAIFENESIN 10 ML: 100 SOLUTION ORAL at 11:53

## 2019-07-04 RX ADMIN — ASPIRIN 81 MG: 81 TABLET, COATED ORAL at 08:29

## 2019-07-04 RX ADMIN — HYDRALAZINE HYDROCHLORIDE 75 MG: 50 TABLET ORAL at 04:30

## 2019-07-04 RX ADMIN — CLOPIDOGREL BISULFATE 300 MG: 75 TABLET ORAL at 17:20

## 2019-07-04 RX ADMIN — HEPARIN SODIUM 900 UNITS/HR: 10000 INJECTION, SOLUTION INTRAVENOUS at 00:25

## 2019-07-04 RX ADMIN — LOSARTAN POTASSIUM 100 MG: 100 TABLET ORAL at 08:30

## 2019-07-04 RX ADMIN — HYDRALAZINE HYDROCHLORIDE 75 MG: 50 TABLET ORAL at 19:03

## 2019-07-04 RX ADMIN — ISOSORBIDE MONONITRATE 60 MG: 60 TABLET, EXTENDED RELEASE ORAL at 08:30

## 2019-07-04 ASSESSMENT — ACTIVITIES OF DAILY LIVING (ADL)
ADLS_ACUITY_SCORE: 14

## 2019-07-04 NOTE — PLAN OF CARE
OT: Reviewed chart, talked with nursing.  Pt planning for an PCI on 7/5, will hold session until after this is completed.

## 2019-07-04 NOTE — PROVIDER NOTIFICATION
Pt had 2.6 sec pause, Dr. Velez was paged - continue to monitor for now and Dr. Velez will review cardiac  medications in AM.

## 2019-07-04 NOTE — PROGRESS NOTES
Remains on heparin gtt. Plan for PCI tomorrow, NPO at midnight.   VSS. RA. Ambulating in carmen. Alert and oriented x4, denies pain.   Incentive spirometry started. Can only get to 500, dyspnea with repeated efforts. Some wheezing noted. PRN inhaler given. Also has productive cough.  Fair appetite, no BM. Voiding in urinal.     Plan for cath lab in AM.

## 2019-07-04 NOTE — PROGRESS NOTES
[facility]    Cardiology Progress Note     Assessment & Plan   Betito Anderson is a 73 year old male who was admitted on 6/29/2019.    1.  Acute systolic heart failure with new dx cardiomyopathy EF 35-40%  2.  Hypertension  3.  Chronic afib, had been on eliquis with adverse effect of face feeling numb, willing to try alternate  4. Severe, 3 vessel CAD seen on coronary angiogram 7/2/2019  5.  Mild mitral regurgitation, tricuspid regurgitation and aortic sclerosis    Mr. Anderson continues to do well.  He feels back to his baseline.  Fortunately, his echocardiogram yesterday demonstrated that his ejection fraction is substantially improved and his EF is now 55 to 60% with normal RV size and function.  He has some trivial valve disease which is not contributing to his current picture.    I had a discussion with him and his family today.  We again reviewed the CT scan results which show significant calcification of the ascending aorta at the location of a cross-clamp site.  I reiterated to him why cardiovascular surgery would be risky in this setting due to risk of calcium embolism during cross-clamp time.  As such, I did agree with moving forward with complex PCI as opposed to coronary artery bypass surgery.    We discussed the risk and benefits of proceeding in this fashion.  This will be a focused intervention on the LAD which has a high-grade mid stenosis.  It is near the ostium of a diagonal branch which is large.  There may be the potential for diagonal ostial stenosis.  However, given the extensive collaterals provided by the LAD to the RCA, I do agree that this would be the vessel that would most benefit from PCI.  He does appear to have some collaterals from his circumflex to the posterior lateral branch of the RCA.  Would be reasonable to approach this from a PCI standpoint if any ischemia was seen on stress testing at a later date.    Informed consent was obtained for planned PCI tomorrow.   Will load with 300 mg of Plavix today and 75 mg starting tomorrow.  His blood pressures under better control and will continue current medications.  Overnight, he had a 2.6-second pause in the setting of A. fib and slow response and sleeping.  Given extent of coronary disease, would favor continued beta-blocker at current dosing unless he becomes symptomatic.    N.p.o. at midnight for planned PCI tomorrow.  Cardiology will continue to follow along.  Please page with any questions or concerns.    Fernando Velez MD    Interval History   He is doing well today.  No acute complaints.  No chest pain or chest discomfort.  Shortness of breath is resolved.    Physical Exam   Temp: 98.3  F (36.8  C) Temp src: Oral BP: 124/58 Pulse: 80 Heart Rate: 59 Resp: 16 SpO2: 94 % O2 Device: None (Room air)    Vitals:    07/02/19 0417 07/03/19 0639 07/04/19 0628   Weight: 89.7 kg (197 lb 12.8 oz) 87.6 kg (193 lb 3.2 oz) 88.9 kg (195 lb 14.4 oz)     Vital Signs with Ranges  Temp:  [97.7  F (36.5  C)-98.7  F (37.1  C)] 98.3  F (36.8  C)  Pulse:  [66-80] 80  Heart Rate:  [59-68] 59  Resp:  [16-18] 16  BP: (101-151)/(58-94) 124/58  SpO2:  [93 %-95 %] 94 %  I/O last 3 completed shifts:  In: 631 [P.O.:559; I.V.:72]  Out: 180 [Urine:180]    Constitutional: No apparent distress.   Eyes: No xanthelasma or conjunctivitis  Respiratory: Clear to auscultation bilaterally. No crackles or wheezes.  Cardiovascular: Irregularly-regular rhythm with a normal rate. Normal S1 and S2.  Soft, systolic murmur heard best at the right upper sternal border.  Extremities: No peripheral edema.  Neurologic: Moving all extremities. No facial assymmetry.  Psychiatric: Alert and oriented. Answers questions appropriately.     Medications     Continuing ACE inhibitor/ARB/ARNI from home medication list OR ACE inhibitor/ARB order already placed during this visit       - MEDICATION INSTRUCTIONS -       HEParin 900 Units/hr (07/04/19 0831)     - MEDICATION INSTRUCTIONS -        sodium chloride Stopped (19 2205)       allopurinol  300 mg Oral Daily     aspirin  81 mg Oral Daily     atorvastatin  40 mg Oral Daily     carvedilol  12.5 mg Oral BID w/meals     furosemide  40 mg Oral Daily     hydrALAZINE  75 mg Oral Q8H ROSELIA     isosorbide mononitrate  60 mg Oral Daily     losartan  100 mg Oral Daily     sodium chloride (PF)  3 mL Intracatheter Q8H       Data   Results for orders placed or performed during the hospital encounter of 19 (from the past 24 hour(s))   Echocardiogram Limited    Narrative    [id number]  [id number]  [id number]  [id number]^OBINNA^MANISH           [facility]  Echocardiography Laboratory  [facility] [place] [address]        Name: MARCELA TINAJERO  MRN: [id number]  : 1945  Study Date: 2019 10:09 AM  Age: 73 yrs  Gender: Male  Patient Location: [facility]  Reason For Study: Abn EKG  Ordering Physician: MANISH YEBOAH  Referring Physician: NICOLE YODER  Performed By: Kim Lee     BSA: 2.0 m2  Height: 66 in  Weight: 193 lb  HR: 75  BP: 148/88 mmHg  _____________________________________________________________________________  __        Procedure  Limited Portable Echo Adult.  _____________________________________________________________________________  __        Interpretation Summary     The study was technically difficult. The left ventricle is normal in size.  There is borderline concentric left ventricular hypertrophy. Left ventricular  systolic function is normal. The visual ejection fraction is estimated at 55-  60%. There is mild hypokinesis of the basal inferior wall.  The right ventricle is normal size. The right ventricular systolic function is  normal.  There is moderate biatrial enlargement.  Trace mitral and tricuspid regurgitation.  No pericardial effusion.  In direct comparison to the previous study dated 2019, there has been an  interval improvement in left ventricular systolic  function.  _____________________________________________________________________________  __        Left Ventricle  The left ventricle is normal in size. There is borderline concentric left  ventricular hypertrophy. Left ventricular systolic function is normal. The  visual ejection fraction is estimated at 55-60%. There is mild hypokinesis of  the basal inferior wall.     Right Ventricle  The right ventricle is normal size. The right ventricular systolic function is  normal.     Atria  There is moderate biatrial enlargement. There is no color Doppler evidence of  an atrial shunt.     Mitral Valve  There is trace mitral regurgitation.        Tricuspid Valve  There is trace tricuspid regurgitation.     Aortic Valve  No aortic regurgitation is present. No aortic stenosis is present.     Pulmonic Valve  There is trace pulmonic valvular regurgitation. There is no pulmonic valvular  stenosis.     Vessels  Dilation of the inferior vena cava is present with normal respiratory  variation in diameter.     Pericardium  There is no pericardial effusion.        Rhythm  The rhythm was atrial fibrillation.  _____________________________________________________________________________  __  MMode/2D Measurements & Calculations  IVSd: 1.2 cm     LVIDd: 5.1 cm  LVIDs: 3.5 cm  LVPWd: 1.2 cm  FS: 30.5 %  LV mass(C)d: 241.7 grams  LV mass(C)dI: 122.7 grams/m2  RWT: 0.47                 _____________________________________________________________________________  __           Report approved by: Bettye Pizano 07/03/2019 11:24 AM      Basic metabolic panel   Result Value Ref Range    Sodium 133 133 - 144 mmol/L    Potassium 3.8 3.4 - 5.3 mmol/L    Chloride 96 94 - 109 mmol/L    Carbon Dioxide 33 (H) 20 - 32 mmol/L    Anion Gap 4 3 - 14 mmol/L    Glucose 97 70 - 99 mg/dL    Urea Nitrogen 22 7 - 30 mg/dL    Creatinine 1.17 0.66 - 1.25 mg/dL    GFR Estimate 61 >60 mL/min/[1.73_m2]    GFR Estimate If Black 71 >60 mL/min/[1.73_m2]    Calcium  8.3 (L) 8.5 - 10.1 mg/dL   CBC with platelets   Result Value Ref Range    WBC 5.0 4.0 - 11.0 10e9/L    RBC Count 4.79 4.4 - 5.9 10e12/L    Hemoglobin 13.1 (L) 13.3 - 17.7 g/dL    Hematocrit 40.7 40.0 - 53.0 %    MCV 85 78 - 100 fl    MCH 27.3 26.5 - 33.0 pg    MCHC 32.2 31.5 - 36.5 g/dL    RDW 16.2 (H) 10.0 - 15.0 %    Platelet Count 246 150 - 450 10e9/L   Heparin Xa (10a) Level   Result Value Ref Range    Heparin 10A Level 0.24 IU/mL

## 2019-07-04 NOTE — PLAN OF CARE
Pt A/O. VSS. Up with sba/ind. Pt denies any CP or SOB. Hep gtt at 900 unit(s)/hr. Surgery saw, pt is non surgical pt. Cath lab request ordered. Pt has no new complaints.    /72 (BP Location: Left arm)   Pulse 66   Temp 97.7  F (36.5  C) (Oral)   Resp 18   Wt 87.6 kg (193 lb 3.2 oz)   SpO2 93%   BMI 31.18 kg/m

## 2019-07-04 NOTE — PLAN OF CARE
Pt A&O, VSS on RA. Tele A-fib SVR/CVR. Pt denies CP, dizziness, and SOB. Pt has inspiratory and expiratory wheezes throughout. Hep gtt 900. Plan for PCI to mid LAD on Friday, NPO at midnight. Will continue to monitor.

## 2019-07-04 NOTE — PLAN OF CARE
Patient A&O x4, Denies chest pain/tightness/pressure and on tele A-fib CVR and VSS,  Lungs are coarse and expiratory wheezes and on RA, up with SBA , on cardiac diet, skin is R radial and brachial sites with good CMS , voiding well in urinal and last BM 7/3. IV is in  R arm and Heparin at 900 units/hr and Xa in AM. Plan for Angio 7/5.

## 2019-07-04 NOTE — PROGRESS NOTES
[facility]    Medicine Progress Note - Hospitalist Service       Date of Admission:  6/29/2019  Assessment & Plan    Betito Anderson is a 73-year-old male with alcohol use disorder, hypertension, ASCVD, and chronic atrial fibrillation who presented with progressive dyspnea and lower extremity as well as new shortness of breath at rest and heart palpitations, found to be in atrial fibrillation with controlled rate and acute heart failure.     Acute systolic heart failure.   New cardiomyopathy EF now 35-40% was 55-60% -- Angiography EF >55% 7/2  Mild MR, mild TR, aortic sclerosis  Likely hypertensive cardiomyopathy  Last TTE showed EF 55%-60% (11/2018) and noted to have basal and basal inferior and inferolateral wall hypokinesia and unfortunately, the patient appears to have been lost to followup after failing a stress test due to atrial fibrillation.  Also noted on that echo was moderate aortic valve stenosis.  Additionally, the patient is a moderate drinker (see below).  He appears to have uncontrolled hypertension.  Additionally, he has history of a 40% lesion, circumflex based off of angiogram in 1997.  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  Treated with Lasix 40 mg IV in the ED.  BNP 24,400.  EKG without overt ischemic changes, does show atrial fibrillation with controlled rate.  Chest x-ray shows right-sided pleural effusion with no overt pulmonary vascular congestion.   - TTE 6/30 EF 35-40%, mild to mod global hypokinesia  - telemetry, I/O, weights   - Trended serial troponin. 0.029--> 0.038 --> 0.040    - 7/4 net -4.3L, current weight 195, 204 on admission  - Lasix 40 po daily, hydralazine now 75 po tid, losartan 100 daily, carvedilol 12.5bid  - A1c 5.7%, LDL 88  - Appreciate Cardiology consultation (entresto and spirono considered)  - Angiography 7/2 - triple vessel disease; CAB recommended  - CV Surgery consulted for possible CAB - seems patient did not recall discussion after  "cath 7/2 due to sedation  - 7/4 - Plan is for PCI to mid LAD on 7/5 - Cardiology ordering plavix load (300) for today, 75 subsequently     Chronic atrial fibrillation with controlled rate.   Appears diagnosed in fall 2018 and was recommended increased dose of metoprolol as well as apixaban.  He did not start the apixaban until approximately 3 months later when his insurance changed and then only used it for 2 weeks after developing facial numbness, \"like clockwork\" around noon.   - CHADS-VASc score is at least 3 for age, CHF and hypertension.  He is agreeable to trying a different anticoagulant agent.   - PTA metop stopped, coreg decreased per cardiology  - continue on heparin gtt    ASCVD.   Noted to have 40% lesion in the circumflex on angiogram in 1997.  Again, TTE in 11/2018, showed hypokinesia of the basal inferior and inferolateral wall.  The patient went for stress testing, but failed it due to atrial fibrillation and then appears lost to followup.   - Screening labs as above and trending serial troponins as above.     Hypertension.   Uncontrolled.  The patient states mostly compliant, missing usually 1 day per week and he does state that he essentially stopped taking his amlodipine as he thinks that it exacerbates his lower extremity swelling.  SBPs are uncontrolled, 180s-220s systolic.   - Continue PTA losartan 100 mg, metoprolol succinate 50 mg daily.    - P.r.n. hydralazine is available for SBP greater than 160.   - Continue PTA simvastatin.     Concern of pneumonia.   Afebrile and no other URI symptoms aside from a productive cough times the last 2 days that he feels gets stuck in his throat and may very well be related to his heart failure.  Has leukocytosis of 16.6, which could be reactive in the setting above.  Chest x-ray showed a right pleural effusion with atelectasis versus infiltrate.   - Procalc 0.05 on admission, 0.30 subsequently  - continues to be afebrile, WBC normal, continue to monitor " (not on abx)    Alcohol use disorder.   Consumes 3-4 alcoholic beverages daily.   - Discussed moderation of alcohol with recommendation for no more than 3 per sitting or 14 per week and explained it can contribute to atrial fibrillation as well as heart failure.  Has no history of withdrawal and his last drink was on the evening prior to admission.   - Monitor CIWAs without benzos for time being  - as of 7/4 no issues      Diet: Low Saturated Fat Na <2400 mg    DVT Prophylaxis: heparin gtt  Morales Catheter: not present  Code Status: Full Code      Disposition Plan   Expected discharge: planned PCI 7/5 - discharge pending course afterwards - anticipate home eventually.  Entered: Johan Colón MD 07/04/2019, 12:33 PM       The patient's care was discussed with the Bedside Nurse and Patient.    Johan Colón MD  Hospitalist Service  [facility]    ______________________________________________________________________    Interval History   Seen and examined.  Stable and no new changes really. Denies fevers, chills, change in breathing, worsening cough, or chest pain/pressure. Some mainly non-prod cough continues. Robitussin helping. Pt and family discussed with Cardiology and CVS, ultimately will not pursue CAB and instead PCI to mid LAD planned 7/5.      Data reviewed today: I reviewed all medications, new labs and imaging results over the last 24 hours. I personally reviewed no images or EKG's today.    Physical Exam   Vital Signs: Temp: 98.3  F (36.8  C) Temp src: Oral BP: 124/58 Pulse: 80 Heart Rate: 59 Resp: 16 SpO2: 94 % O2 Device: None (Room air)    Weight: 195 lbs 14.4 oz    Gen: NAD, pleasant  HEENT: Normocephalic, EOMI, MMM  Resp: coarse bilaterally, a few scattered wheezes, no increased work of resp  CV: S1S2 heard, irreg irreg rhythm, reg rate, no pitting pedal edema  Abdo: soft, nontender, nondistended, bowel sounds present  Ext: calves nontender, well perfused  Neuro: AAOx3, CN  grossly intact, no facial asymmetry      Data   Recent Labs   Lab 07/04/19  0530 07/03/19  0532 07/02/19  1713 07/02/19  0536 07/01/19  1051  07/01/19  0534  06/30/19  0640 06/30/19  0042 06/29/19  1859   WBC 5.0  --   --   --  6.3  --  7.3  --  13.6*  --  16.6*   HGB 13.1*  --   --   --  15.3  --  14.3  --  14.9  --  14.8   MCV 85  --   --   --  86  --  85  --  85  --  86     --   --   --  222  --  213  --  262  --  304    133  --  135  --   --   --   --  135  --  138   POTASSIUM 3.8 3.8 3.6 3.4  --    < >  --    < > 3.2*  --  3.4   CHLORIDE 96 96  --  97  --   --   --   --  98  --  103   CO2 33* 31  --  36*  --   --   --   --  30  --  27   BUN 22 23  --  24  --   --   --   --  19  --  20   CR 1.17 1.09  --  1.19  --   --   --   --  1.07  --  1.17   ANIONGAP 4 6  --  2*  --   --   --   --  7  --  8   GUNNER 8.3* 8.1*  --  8.2*  --   --   --   --  8.5  --  8.4*   GLC 97 89  --  98  --   --   --   --  110*  --  132*   ALBUMIN  --   --   --   --   --   --   --   --  3.3*  --   --    PROTTOTAL  --   --   --   --   --   --   --   --  7.5  --   --    BILITOTAL  --   --   --   --   --   --   --   --  1.3  --   --    ALKPHOS  --   --   --   --   --   --   --   --  96  --   --    ALT  --   --   --   --   --   --   --   --  22  --   --    AST  --   --   --   --   --   --   --   --  21  --   --    TROPI  --   --   --   --   --   --   --   --  0.040 0.038 0.029    < > = values in this interval not displayed.     No results found for this or any previous visit (from the past 24 hour(s)).

## 2019-07-04 NOTE — PLAN OF CARE
Patient A&O x4, Denies chest pain/tightness/pressure and on tele A-fib CVR and VSS,  Lungs are coarse and expiratory wheezes and on RA, up with SBA , on cardiac diet, skin is R radial and brachial sites with good CMS , voiding well in urinal and last BM 7/3. IV is in  R arm and Heparin at 900 units/hr. Plan for Angio 7/5.

## 2019-07-05 ENCOUNTER — SURGERY (OUTPATIENT)
Age: 74
End: 2019-07-05
Payer: COMMERCIAL

## 2019-07-05 LAB
ANION GAP SERPL CALCULATED.3IONS-SCNC: 8 MMOL/L (ref 3–14)
BUN SERPL-MCNC: 20 MG/DL (ref 7–30)
CALCIUM SERPL-MCNC: 8.8 MG/DL (ref 8.5–10.1)
CHLORIDE SERPL-SCNC: 93 MMOL/L (ref 94–109)
CO2 SERPL-SCNC: 31 MMOL/L (ref 20–32)
CREAT SERPL-MCNC: 1.2 MG/DL (ref 0.66–1.25)
ERYTHROCYTE [DISTWIDTH] IN BLOOD BY AUTOMATED COUNT: 16 % (ref 10–15)
GFR SERPL CREATININE-BSD FRML MDRD: 59 ML/MIN/{1.73_M2}
GLUCOSE SERPL-MCNC: 97 MG/DL (ref 70–99)
HCT VFR BLD AUTO: 41.8 % (ref 40–53)
HGB BLD-MCNC: 13.4 G/DL (ref 13.3–17.7)
LMWH PPP CHRO-ACNC: 0.32 IU/ML
MCH RBC QN AUTO: 27.1 PG (ref 26.5–33)
MCHC RBC AUTO-ENTMCNC: 32.1 G/DL (ref 31.5–36.5)
MCV RBC AUTO: 84 FL (ref 78–100)
PLATELET # BLD AUTO: 251 10E9/L (ref 150–450)
POTASSIUM SERPL-SCNC: 4.7 MMOL/L (ref 3.4–5.3)
RBC # BLD AUTO: 4.95 10E12/L (ref 4.4–5.9)
SODIUM SERPL-SCNC: 132 MMOL/L (ref 133–144)
WBC # BLD AUTO: 7.2 10E9/L (ref 4–11)

## 2019-07-05 PROCEDURE — C1725 CATH, TRANSLUMIN NON-LASER: HCPCS | Performed by: INTERNAL MEDICINE

## 2019-07-05 PROCEDURE — 25000128 H RX IP 250 OP 636: Performed by: INTERNAL MEDICINE

## 2019-07-05 PROCEDURE — 25000132 ZZH RX MED GY IP 250 OP 250 PS 637: Performed by: PHYSICIAN ASSISTANT

## 2019-07-05 PROCEDURE — 25000128 H RX IP 250 OP 636: Performed by: PHYSICIAN ASSISTANT

## 2019-07-05 PROCEDURE — 99152 MOD SED SAME PHYS/QHP 5/>YRS: CPT | Performed by: INTERNAL MEDICINE

## 2019-07-05 PROCEDURE — 93010 ELECTROCARDIOGRAM REPORT: CPT | Mod: 76 | Performed by: INTERNAL MEDICINE

## 2019-07-05 PROCEDURE — 25000132 ZZH RX MED GY IP 250 OP 250 PS 637: Performed by: INTERNAL MEDICINE

## 2019-07-05 PROCEDURE — C9600 PERC DRUG-EL COR STENT SING: HCPCS | Performed by: INTERNAL MEDICINE

## 2019-07-05 PROCEDURE — 25000125 ZZHC RX 250: Performed by: INTERNAL MEDICINE

## 2019-07-05 PROCEDURE — 40000141 ZZH STATISTIC PERIPHERAL IV START W/O US GUIDANCE

## 2019-07-05 PROCEDURE — C1894 INTRO/SHEATH, NON-LASER: HCPCS | Performed by: INTERNAL MEDICINE

## 2019-07-05 PROCEDURE — 25000132 ZZH RX MED GY IP 250 OP 250 PS 637: Performed by: HOSPITALIST

## 2019-07-05 PROCEDURE — 21000001 ZZH R&B HEART CARE

## 2019-07-05 PROCEDURE — 85520 HEPARIN ASSAY: CPT | Performed by: PHYSICIAN ASSISTANT

## 2019-07-05 PROCEDURE — 99232 SBSQ HOSP IP/OBS MODERATE 35: CPT | Mod: 25 | Performed by: INTERNAL MEDICINE

## 2019-07-05 PROCEDURE — 92928 PRQ TCAT PLMT NTRAC ST 1 LES: CPT | Mod: LD | Performed by: INTERNAL MEDICINE

## 2019-07-05 PROCEDURE — 85027 COMPLETE CBC AUTOMATED: CPT | Performed by: PHYSICIAN ASSISTANT

## 2019-07-05 PROCEDURE — 80048 BASIC METABOLIC PNL TOTAL CA: CPT | Performed by: PHYSICIAN ASSISTANT

## 2019-07-05 PROCEDURE — 99232 SBSQ HOSP IP/OBS MODERATE 35: CPT | Performed by: INTERNAL MEDICINE

## 2019-07-05 PROCEDURE — C1887 CATHETER, GUIDING: HCPCS | Performed by: INTERNAL MEDICINE

## 2019-07-05 PROCEDURE — 85347 COAGULATION TIME ACTIVATED: CPT

## 2019-07-05 PROCEDURE — 93005 ELECTROCARDIOGRAM TRACING: CPT

## 2019-07-05 PROCEDURE — 027034Z DILATION OF CORONARY ARTERY, ONE ARTERY WITH DRUG-ELUTING INTRALUMINAL DEVICE, PERCUTANEOUS APPROACH: ICD-10-PCS | Performed by: INTERNAL MEDICINE

## 2019-07-05 PROCEDURE — 40000275 ZZH STATISTIC RCP TIME EA 10 MIN

## 2019-07-05 PROCEDURE — 94640 AIRWAY INHALATION TREATMENT: CPT

## 2019-07-05 PROCEDURE — C1769 GUIDE WIRE: HCPCS | Performed by: INTERNAL MEDICINE

## 2019-07-05 PROCEDURE — 36415 COLL VENOUS BLD VENIPUNCTURE: CPT | Performed by: PHYSICIAN ASSISTANT

## 2019-07-05 PROCEDURE — C1874 STENT, COATED/COV W/DEL SYS: HCPCS | Performed by: INTERNAL MEDICINE

## 2019-07-05 PROCEDURE — 99153 MOD SED SAME PHYS/QHP EA: CPT | Performed by: INTERNAL MEDICINE

## 2019-07-05 PROCEDURE — 27210794 ZZH OR GENERAL SUPPLY STERILE: Performed by: INTERNAL MEDICINE

## 2019-07-05 DEVICE — STENT SYNERGY DRUG ELUTING 2.50X12MM  H7493926012250: Type: IMPLANTABLE DEVICE | Status: FUNCTIONAL

## 2019-07-05 RX ORDER — NITROGLYCERIN 0.4 MG/1
0.4 TABLET SUBLINGUAL EVERY 5 MIN PRN
Status: DISCONTINUED | OUTPATIENT
Start: 2019-07-05 | End: 2019-07-06 | Stop reason: HOSPADM

## 2019-07-05 RX ORDER — FLUMAZENIL 0.1 MG/ML
0.2 INJECTION, SOLUTION INTRAVENOUS
Status: ACTIVE | OUTPATIENT
Start: 2019-07-05 | End: 2019-07-06

## 2019-07-05 RX ORDER — NOREPINEPHRINE BITARTRATE 0.06 MG/ML
.03-.4 INJECTION, SOLUTION INTRAVENOUS CONTINUOUS PRN
Status: DISCONTINUED | OUTPATIENT
Start: 2019-07-05 | End: 2019-07-05 | Stop reason: HOSPADM

## 2019-07-05 RX ORDER — EPTIFIBATIDE 2 MG/ML
180 INJECTION, SOLUTION INTRAVENOUS EVERY 10 MIN PRN
Status: DISCONTINUED | OUTPATIENT
Start: 2019-07-05 | End: 2019-07-05 | Stop reason: HOSPADM

## 2019-07-05 RX ORDER — NITROGLYCERIN 5 MG/ML
VIAL (ML) INTRAVENOUS
Status: DISCONTINUED | OUTPATIENT
Start: 2019-07-05 | End: 2019-07-05 | Stop reason: HOSPADM

## 2019-07-05 RX ORDER — AMLODIPINE BESYLATE 2.5 MG/1
2.5 TABLET ORAL DAILY
Status: DISCONTINUED | OUTPATIENT
Start: 2019-07-05 | End: 2019-07-06 | Stop reason: HOSPADM

## 2019-07-05 RX ORDER — NITROGLYCERIN 20 MG/100ML
.07-2 INJECTION INTRAVENOUS CONTINUOUS PRN
Status: DISCONTINUED | OUTPATIENT
Start: 2019-07-05 | End: 2019-07-05 | Stop reason: HOSPADM

## 2019-07-05 RX ORDER — ASPIRIN 81 MG/1
81 TABLET ORAL DAILY
Status: DISCONTINUED | OUTPATIENT
Start: 2019-07-05 | End: 2019-07-05

## 2019-07-05 RX ORDER — FENTANYL CITRATE 50 UG/ML
INJECTION, SOLUTION INTRAMUSCULAR; INTRAVENOUS
Status: DISCONTINUED | OUTPATIENT
Start: 2019-07-05 | End: 2019-07-05 | Stop reason: HOSPADM

## 2019-07-05 RX ORDER — IPRATROPIUM BROMIDE AND ALBUTEROL SULFATE 2.5; .5 MG/3ML; MG/3ML
3 SOLUTION RESPIRATORY (INHALATION) EVERY 4 HOURS PRN
Status: DISCONTINUED | OUTPATIENT
Start: 2019-07-05 | End: 2019-07-06 | Stop reason: HOSPADM

## 2019-07-05 RX ORDER — FUROSEMIDE 40 MG
40 TABLET ORAL ONCE
Status: COMPLETED | OUTPATIENT
Start: 2019-07-05 | End: 2019-07-05

## 2019-07-05 RX ORDER — ACETAMINOPHEN 325 MG/1
650 TABLET ORAL EVERY 4 HOURS PRN
Status: DISCONTINUED | OUTPATIENT
Start: 2019-07-05 | End: 2019-07-06 | Stop reason: HOSPADM

## 2019-07-05 RX ORDER — HYDROCODONE BITARTRATE AND ACETAMINOPHEN 5; 325 MG/1; MG/1
1-2 TABLET ORAL EVERY 4 HOURS PRN
Status: DISCONTINUED | OUTPATIENT
Start: 2019-07-05 | End: 2019-07-06 | Stop reason: HOSPADM

## 2019-07-05 RX ORDER — ARGATROBAN 1 MG/ML
350 INJECTION, SOLUTION INTRAVENOUS
Status: DISCONTINUED | OUTPATIENT
Start: 2019-07-05 | End: 2019-07-05 | Stop reason: HOSPADM

## 2019-07-05 RX ORDER — IOPAMIDOL 755 MG/ML
INJECTION, SOLUTION INTRAVASCULAR
Status: DISCONTINUED | OUTPATIENT
Start: 2019-07-05 | End: 2019-07-05 | Stop reason: HOSPADM

## 2019-07-05 RX ORDER — ARGATROBAN 1 MG/ML
150 INJECTION, SOLUTION INTRAVENOUS
Status: DISCONTINUED | OUTPATIENT
Start: 2019-07-05 | End: 2019-07-05 | Stop reason: HOSPADM

## 2019-07-05 RX ORDER — LORAZEPAM 2 MG/ML
0.5 INJECTION INTRAMUSCULAR
Status: DISCONTINUED | OUTPATIENT
Start: 2019-07-05 | End: 2019-07-05 | Stop reason: HOSPADM

## 2019-07-05 RX ORDER — HEPARIN SODIUM 1000 [USP'U]/ML
INJECTION, SOLUTION INTRAVENOUS; SUBCUTANEOUS
Status: DISCONTINUED | OUTPATIENT
Start: 2019-07-05 | End: 2019-07-05 | Stop reason: HOSPADM

## 2019-07-05 RX ORDER — SODIUM CHLORIDE 9 MG/ML
INJECTION, SOLUTION INTRAVENOUS CONTINUOUS
Status: DISCONTINUED | OUTPATIENT
Start: 2019-07-05 | End: 2019-07-05

## 2019-07-05 RX ORDER — NALOXONE HYDROCHLORIDE 0.4 MG/ML
.1-.4 INJECTION, SOLUTION INTRAMUSCULAR; INTRAVENOUS; SUBCUTANEOUS
Status: DISCONTINUED | OUTPATIENT
Start: 2019-07-05 | End: 2019-07-06 | Stop reason: HOSPADM

## 2019-07-05 RX ORDER — FENTANYL CITRATE 50 UG/ML
25-50 INJECTION, SOLUTION INTRAMUSCULAR; INTRAVENOUS
Status: ACTIVE | OUTPATIENT
Start: 2019-07-05 | End: 2019-07-06

## 2019-07-05 RX ORDER — DOBUTAMINE HYDROCHLORIDE 200 MG/100ML
2-20 INJECTION INTRAVENOUS CONTINUOUS PRN
Status: DISCONTINUED | OUTPATIENT
Start: 2019-07-05 | End: 2019-07-05 | Stop reason: HOSPADM

## 2019-07-05 RX ORDER — HYDRALAZINE HYDROCHLORIDE 50 MG/1
100 TABLET, FILM COATED ORAL EVERY 8 HOURS SCHEDULED
Status: DISCONTINUED | OUTPATIENT
Start: 2019-07-05 | End: 2019-07-06 | Stop reason: HOSPADM

## 2019-07-05 RX ORDER — DOPAMINE HYDROCHLORIDE 160 MG/100ML
2-20 INJECTION, SOLUTION INTRAVENOUS CONTINUOUS PRN
Status: DISCONTINUED | OUTPATIENT
Start: 2019-07-05 | End: 2019-07-05 | Stop reason: HOSPADM

## 2019-07-05 RX ORDER — NALOXONE HYDROCHLORIDE 0.4 MG/ML
.2-.4 INJECTION, SOLUTION INTRAMUSCULAR; INTRAVENOUS; SUBCUTANEOUS
Status: ACTIVE | OUTPATIENT
Start: 2019-07-05 | End: 2019-07-06

## 2019-07-05 RX ORDER — SODIUM CHLORIDE 9 MG/ML
INJECTION, SOLUTION INTRAVENOUS CONTINUOUS
Status: DISCONTINUED | OUTPATIENT
Start: 2019-07-05 | End: 2019-07-05 | Stop reason: HOSPADM

## 2019-07-05 RX ORDER — POTASSIUM CHLORIDE 1500 MG/1
20 TABLET, EXTENDED RELEASE ORAL
Status: DISCONTINUED | OUTPATIENT
Start: 2019-07-05 | End: 2019-07-05 | Stop reason: HOSPADM

## 2019-07-05 RX ORDER — LORAZEPAM 0.5 MG/1
0.5 TABLET ORAL
Status: DISCONTINUED | OUTPATIENT
Start: 2019-07-05 | End: 2019-07-05 | Stop reason: HOSPADM

## 2019-07-05 RX ORDER — VERAPAMIL HYDROCHLORIDE 2.5 MG/ML
INJECTION, SOLUTION INTRAVENOUS
Status: DISCONTINUED | OUTPATIENT
Start: 2019-07-05 | End: 2019-07-05 | Stop reason: HOSPADM

## 2019-07-05 RX ORDER — ATROPINE SULFATE 0.1 MG/ML
0.5 INJECTION INTRAVENOUS EVERY 5 MIN PRN
Status: ACTIVE | OUTPATIENT
Start: 2019-07-05 | End: 2019-07-06

## 2019-07-05 RX ORDER — EPTIFIBATIDE 2 MG/ML
2 INJECTION, SOLUTION INTRAVENOUS CONTINUOUS PRN
Status: DISCONTINUED | OUTPATIENT
Start: 2019-07-05 | End: 2019-07-05 | Stop reason: HOSPADM

## 2019-07-05 RX ORDER — ASPIRIN 81 MG/1
81 TABLET ORAL DAILY
Status: DISCONTINUED | OUTPATIENT
Start: 2019-07-06 | End: 2019-07-06 | Stop reason: HOSPADM

## 2019-07-05 RX ADMIN — VERAPAMIL HYDROCHLORIDE 2.5 MG: 2.5 INJECTION, SOLUTION INTRAVENOUS at 13:53

## 2019-07-05 RX ADMIN — GUAIFENESIN 10 ML: 100 SOLUTION ORAL at 23:53

## 2019-07-05 RX ADMIN — HEPARIN SODIUM 900 UNITS/HR: 10000 INJECTION, SOLUTION INTRAVENOUS at 05:34

## 2019-07-05 RX ADMIN — ISOSORBIDE MONONITRATE 60 MG: 60 TABLET, EXTENDED RELEASE ORAL at 08:53

## 2019-07-05 RX ADMIN — IOPAMIDOL 150 ML: 755 INJECTION, SOLUTION INTRAVENOUS at 14:40

## 2019-07-05 RX ADMIN — ALLOPURINOL 300 MG: 300 TABLET ORAL at 08:54

## 2019-07-05 RX ADMIN — HEPARIN SODIUM 9000 UNITS: 1000 INJECTION, SOLUTION INTRAVENOUS; SUBCUTANEOUS at 13:56

## 2019-07-05 RX ADMIN — IPRATROPIUM BROMIDE AND ALBUTEROL SULFATE 3 ML: .5; 3 SOLUTION RESPIRATORY (INHALATION) at 16:46

## 2019-07-05 RX ADMIN — FUROSEMIDE 40 MG: 40 TABLET ORAL at 08:54

## 2019-07-05 RX ADMIN — ATORVASTATIN CALCIUM 40 MG: 40 TABLET, FILM COATED ORAL at 08:54

## 2019-07-05 RX ADMIN — HYDRALAZINE HYDROCHLORIDE 75 MG: 50 TABLET ORAL at 02:53

## 2019-07-05 RX ADMIN — HYDRALAZINE HYDROCHLORIDE 10 MG: 20 INJECTION INTRAMUSCULAR; INTRAVENOUS at 23:53

## 2019-07-05 RX ADMIN — CLOPIDOGREL BISULFATE 75 MG: 75 TABLET ORAL at 17:03

## 2019-07-05 RX ADMIN — FUROSEMIDE 40 MG: 40 TABLET ORAL at 09:02

## 2019-07-05 RX ADMIN — AMLODIPINE BESYLATE 2.5 MG: 2.5 TABLET ORAL at 08:54

## 2019-07-05 RX ADMIN — LIDOCAINE HYDROCHLORIDE 1 ML: 10 INJECTION, SOLUTION EPIDURAL; INFILTRATION; INTRACAUDAL; PERINEURAL at 13:50

## 2019-07-05 RX ADMIN — FENTANYL CITRATE 50 MCG: 50 INJECTION, SOLUTION INTRAMUSCULAR; INTRAVENOUS at 13:45

## 2019-07-05 RX ADMIN — MIDAZOLAM 1 MG: 1 INJECTION INTRAMUSCULAR; INTRAVENOUS at 13:45

## 2019-07-05 RX ADMIN — CLOPIDOGREL BISULFATE 75 MG: 75 TABLET ORAL at 08:53

## 2019-07-05 RX ADMIN — ASPIRIN 81 MG: 81 TABLET, COATED ORAL at 08:54

## 2019-07-05 RX ADMIN — MIDAZOLAM 1 MG: 1 INJECTION INTRAMUSCULAR; INTRAVENOUS at 14:40

## 2019-07-05 RX ADMIN — LOSARTAN POTASSIUM 100 MG: 100 TABLET ORAL at 08:54

## 2019-07-05 RX ADMIN — GUAIFENESIN 10 ML: 100 SOLUTION ORAL at 00:15

## 2019-07-05 RX ADMIN — HYDRALAZINE HYDROCHLORIDE 10 MG: 20 INJECTION INTRAMUSCULAR; INTRAVENOUS at 05:34

## 2019-07-05 RX ADMIN — GUAIFENESIN 10 ML: 100 SOLUTION ORAL at 20:07

## 2019-07-05 RX ADMIN — HYDRALAZINE HYDROCHLORIDE 100 MG: 50 TABLET ORAL at 18:51

## 2019-07-05 RX ADMIN — NITROGLYCERIN 200 MCG: 5 INJECTION, SOLUTION INTRAVENOUS at 14:33

## 2019-07-05 RX ADMIN — NITROGLYCERIN 200 MCG: 5 INJECTION, SOLUTION INTRAVENOUS at 13:53

## 2019-07-05 RX ADMIN — HYDRALAZINE HYDROCHLORIDE 100 MG: 50 TABLET ORAL at 12:14

## 2019-07-05 RX ADMIN — CARVEDILOL 12.5 MG: 12.5 TABLET, FILM COATED ORAL at 09:25

## 2019-07-05 RX ADMIN — FENTANYL CITRATE 50 MCG: 50 INJECTION, SOLUTION INTRAMUSCULAR; INTRAVENOUS at 14:20

## 2019-07-05 RX ADMIN — MIDAZOLAM 1 MG: 1 INJECTION INTRAMUSCULAR; INTRAVENOUS at 14:00

## 2019-07-05 ASSESSMENT — ACTIVITIES OF DAILY LIVING (ADL)
ADLS_ACUITY_SCORE: 14

## 2019-07-05 NOTE — PLAN OF CARE
BP stable. Cardiology is aware of slow HR and pauses overnight last night and wanted coreg continued. HRs now while awake have been around 50, into the 30's with sleep. Coreg held at this time. 02 stable on room air, nebs ordered for significant wheezing. To cath lab and got stent to LAD. Confused upon awakening after cath lab, easily redirectable but forgetful regarding radial site. Close monitoring and slow TR removal. Wife updated. SBA. Voiding.

## 2019-07-05 NOTE — CONSULTS
NUTRITION EDUCATION      REASON FOR NUTRITION CONSULT:  Provider Order  -  Nutrition Education Dietitian to see for Heart Healthy Diet education    ASSESSMENT:  Unable to complete nutrition assessment and instructions at this time.   - note seen for CHF ed this AM    FOLLOW UP:   Will instruct patient prior to discharge    Katy Ellison RD, LD

## 2019-07-05 NOTE — PROGRESS NOTES
[facility]    Hospitalist Progress Note    Date of Service (when I saw the patient): 07/05/2019    Assessment & Plan   Assessment & Plan  Betito Anderson is a 73-year-old male with alcohol use disorder, hypertension, ASCVD, and chronic atrial fibrillation who presented with progressive dyspnea and lower extremity as well as new shortness of breath at rest and heart palpitations, found to be in atrial fibrillation with controlled rate and acute heart failure.      Acute systolic heart failure.   New cardiomyopathy EF now 35-40% was 55-60% -- Angiography EF >55% 7/2  Mild MR, mild TR, aortic sclerosis  ASCVD  Likely hypertensive cardiomyopathy  Last TTE showed EF 55%-60% (11/2018) and noted to have basal and basal inferior and inferolateral wall hypokinesia and unfortunately, the patient appears to have been lost to followup after failing a stress test due to atrial fibrillation.  Also noted on that echo was moderate aortic valve stenosis.  Additionally, the patient is a moderate drinker (see below).  He appears to have uncontrolled hypertension.  Additionally, he has history of a 40% lesion, circumflex based off of angiogram in 1997.  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 Treated with Lasix 40 mg IV in the ED.  BNP 24,400.  EKG without overt ischemic changes, does show atrial fibrillation with controlled rate.  Chest x-ray shows right-sided pleural effusion with no overt pulmonary vascular congestion.   - TTE 6/30 EF 35-40%, mild to mod global hypokinesia  - telemetry, I/O, weights   - Trended serial troponin. 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   Appears diagnosed in fall 2018 and was recommended increased dose of metoprolol as well as apixaban.  He did not start the apixaban until approximately 3 months later when his insurance changed and then only used it for 2 weeks after developing facial numbness, \"like clockwork\" around noon.   - CHADS-VASc score is at least 3 for age, CHF and hypertension.  He is agreeable to trying a different anticoagulant agent.   - PTA metop stopped, coreg decreased per cardiology  - starting xarelto 7/6     Hypertension.   Uncontrolled.  The patient states mostly compliant, missing usually 1 day per week and he does state that he essentially stopped taking his amlodipine as he thinks that it exacerbates his lower extremity swelling.  SBPs are uncontrolled, 180s-220s systolic.   - Continue PTA losartan 100 mg, metoprolol succinate 50 mg daily.    - carvedilol 12.5 mg bid, hold for bradycrdia  - furosemide daily  - hydralazine and amlodipine started as well    Concern of pneumonia.   Afebrile and no other URI symptoms aside from a productive cough times the last 2 days that he feels gets stuck in his throat and may very well be related to his heart failure.  Has leukocytosis of 16.6, which could be reactive in the setting above.  Chest x-ray showed a right pleural effusion with atelectasis versus infiltrate.   - Procalc 0.05 on admission, 0.30 subsequently  - leukocytosis resolved, no abx     Alcohol use disorder.   Consumes 3-4 alcoholic beverages daily.   -encourage moderation/ abstinence    Groin wound  WOC consult    ? RAD  Wheezing 7/5. Has extensive smoking h/o but quit ~20 years ago.  - duonebs q4 hours prn    FEN (fluids, electrolytes and nutrition): cardiac diet  Discussed with nursing.  DVT Prophylaxis: heparin/ xarelto  Code Status: Full " Code    Disposition: Expected discharge in 1-2 days pending clinical progress    Jimmy Huang MD  [phone] (P)  Text Page    Interval History   Doing ok. Sleepy post angiogram. Denies cp/sob.     -Data reviewed today: I reviewed all new labs and imaging results over the last 24 hours. I personally reviewed no images or EKG's today.    Physical Exam   Temp: 97.6  F (36.4  C) Temp src: Axillary BP: 114/76 Pulse: 72 Heart Rate: (!) 38 Resp: 10 SpO2: 94 % O2 Device: None (Room air) Oxygen Delivery: 3 LPM  Vitals:    07/03/19 0639 07/04/19 0628 07/05/19 0559   Weight: 87.6 kg (193 lb 3.2 oz) 88.9 kg (195 lb 14.4 oz) 89.1 kg (196 lb 8 oz)     Vital Signs with Ranges  Temp:  [97.6  F (36.4  C)-98  F (36.7  C)] 97.6  F (36.4  C)  Pulse:  [64-81] 72  Heart Rate:  [38-73] 38  Resp:  [10-18] 10  BP: (112-195)/() 114/76  SpO2:  [81 %-97 %] 94 %  I/O last 3 completed shifts:  In: 727 [P.O.:240; I.V.:487]  Out: 725 [Urine:725]    Constitutional: Alert, somnolent  Respiratory: wheezing  Cardiovascular: beth, regular  GI: Soft, non-tender, non-disteneded, good bowel sounds  Skin/Integumen: No erythema, cyanosis or edema  Other:      Medications     Continuing ACE inhibitor/ARB/ARNI from home medication list OR ACE inhibitor/ARB order already placed during this visit       - MEDICATION INSTRUCTIONS -       - MEDICATION INSTRUCTIONS -       - MEDICATION INSTRUCTIONS -       - MEDICATION INSTRUCTIONS -       Percutaneous Coronary Intervention orders placed (this is information for BPA alerting)       sodium chloride         allopurinol  300 mg Oral Daily     amLODIPine  2.5 mg Oral Daily     [START ON 7/6/2019] aspirin  81 mg Oral Daily     atorvastatin  40 mg Oral Daily     carvedilol  12.5 mg Oral BID w/meals     clopidogrel  75 mg Oral Daily     furosemide  40 mg Oral Daily     hydrALAZINE  100 mg Oral Q8H ROSELIA     isosorbide mononitrate  60 mg Oral Daily     losartan  100 mg Oral Daily     [START ON 7/6/2019]  rivaroxaban ANTICOAGULANT  15 mg Oral Daily with supper     sodium chloride (PF)  3 mL Intracatheter Q8H       Data   Recent Labs   Lab 07/05/19  0540 07/04/19  0530 07/03/19  0532  07/01/19  1051  06/30/19  0640 06/30/19  0042 06/29/19  1859   WBC 7.2 5.0  --   --  6.3   < > 13.6*  --  16.6*   HGB 13.4 13.1*  --   --  15.3   < > 14.9  --  14.8   MCV 84 85  --   --  86   < > 85  --  86    246  --   --  222   < > 262  --  304   * 133 133   < >  --   --  135  --  138   POTASSIUM 4.7 3.8 3.8   < >  --    < > 3.2*  --  3.4   CHLORIDE 93* 96 96   < >  --   --  98  --  103   CO2 31 33* 31   < >  --   --  30  --  27   BUN 20 22 23   < >  --   --  19  --  20   CR 1.20 1.17 1.09   < >  --   --  1.07  --  1.17   ANIONGAP 8 4 6   < >  --   --  7  --  8   GUNNER 8.8 8.3* 8.1*   < >  --   --  8.5  --  8.4*   GLC 97 97 89   < >  --   --  110*  --  132*   ALBUMIN  --   --   --   --   --   --  3.3*  --   --    PROTTOTAL  --   --   --   --   --   --  7.5  --   --    BILITOTAL  --   --   --   --   --   --  1.3  --   --    ALKPHOS  --   --   --   --   --   --  96  --   --    ALT  --   --   --   --   --   --  22  --   --    AST  --   --   --   --   --   --  21  --   --    TROPI  --   --   --   --   --   --  0.040 0.038 0.029    < > = values in this interval not displayed.       No results found for this or any previous visit (from the past 24 hour(s)).

## 2019-07-05 NOTE — PROGRESS NOTES
X cover [id number]    Called by nursing for 3.04 sec pause followed by 3.46  Patient asymptomatic    - continue telemetry, h/o a fib and CHF  - cardiology following  - on coreg bid    - will hold off on am coreg until cardiology eval; will defer to cardiology regarding adjustment of beta blocker; might need EP eval

## 2019-07-05 NOTE — PROVIDER NOTIFICATION
Pt HTN. 's/100's and PRN Hydralazine IVP given but BP only came down to 172/95. Parameters to keep less than 160. PRN Labetalol available but pt has been beth in the 30's at times with frequent pauses. MD notified. Do not give Labetalol. Cardiology to see this AM and address cardiac meds. Continue to monitor. No new orders.

## 2019-07-05 NOTE — PROVIDER NOTIFICATION
Paged on call hospitalist regarding pause.    Call backed cardiology to address cardiac meds in AM. Continue to monitor.

## 2019-07-05 NOTE — PLAN OF CARE
A/O x4. VS. Tele a-fib w/SVR, frequent pauses, longest being 3.46, bradycardic in 30s at times - hospitalist notified, see note. HTN, hydralazine given x1. Denies pain/SOB. LS wheezing expiratory, coarse bases of lung, albuterol given x1. Frequent nonproductive cough, robitussin given x1. Angio site, WDL,Up independently. Voiding using bedside urinal. NPO at midnight. Heparin infusing 9ml/hr. Plan for angio today.     Heart Failure Care Pathway  GOALS TO BE MET BEFORE DISCHARGE:    1. Decrease congestion and/or edema with diuretic therapy to achieve near      optimal volume status.            Dyspnea improved:  Yes            Edema improved:     No, please explain: trace edema BLE        Net I/O and Weights since admission:          06/05 1500 - 07/05 1459  In: 2936 [P.O.:2079; I.V.:857]  Out: 7295 [Urine:7295]  Net: -4359            Vitals:    06/29/19 1841 06/30/19 0547 07/01/19 0611 07/02/19 0417   Weight: 92.5 kg (204 lb) 90.1 kg (198 lb 9.6 oz) 89.8 kg (197 lb 15.6 oz) 89.7 kg (197 lb 12.8 oz)    07/03/19 0639 07/04/19 0628 07/05/19 0559   Weight: 87.6 kg (193 lb 3.2 oz) 88.9 kg (195 lb 14.4 oz) 89.1 kg (196 lb 8 oz)       2.  O2 sats > 92% on RA or at prior home O2 therapy level.          Current oxygenation status:       SpO2: 96 %         O2 Device: None (Room air),            Able to wean O2 this shift to keep sats > 92%:  Yes       Does patient use Home O2? No    3.  Tolerates ambulation and mobility near baseline: Yes        How many times did the patient ambulate with nursing staff this shift? 1    Please review the Heart Failure Care Pathway for additional HF goal outcomes.    Dandre Santiago RN  7/5/2019

## 2019-07-05 NOTE — PROGRESS NOTES
[facility] Cardiology Progress Note  Date of Service: 07/05/2019  Primary Cardiologist: Dr. Lezama / will also be Dr. Hidalgo    Betito Anderson is a 73 year old male admitted on 6/29/2019 with progressive exertional dyspnea and peripheral edema.    Subjective: Denies CP. Leg swelling continues to improve. Did develop a cough as well as some confusion last night. Anxious to go home.     Assessment / Plan:  1. Severe 3v disease noted on angiogram 7/2/19 - poor candidate for CABG due to significant aortic calcification at site of cross-clamp placement.    - To go for focused PCI of mid LAD today.    - Loaded with 300 mg Plavix 7/4. On DAPT, BB, ARB, hydralazine, nitrates, high intensity statin.    - Anticipate stopping asa after one week, continuing on Xarelto + Plavix alone post-PCI.    - Likely home tomorrow morning, after cardiac rehab.    - Potential for return intervention LCX in future, consider f/u stress testing as outpt.    - F/U arranged in clinic (will not need CORE).    2. Severe ischemic CMP, EF improved to 55-60% on repeat TTE    - Volume status improved per RHC, on oral Lasix 40 daily [on 20 PTA].   - Wt slowly trending back up, hydrating for cath, will give an extra 40 mg Lasix today.    - proBNP markedly elevated on admit; will repeat tomorrow AM.     3. CAF, previously on Eliquis with possible TIA vs side effect, now off   - To start Xarelto post-PCI for [id number] of 4.   - Up to 3.5 sec pauses noted while sleeping noted, on Coreg. HR overall 60's - 80's.   No changes necessary.     4. Mild VHD (MR/TR/aortic sclerosis)    5. Probable URI, R pleural effusion - markedly abnormal lung exam today.   - IM service following, ?Repeat CXR    6. HTN - remains uncontrolled   - Increase hydralazine to 100 mg TID.   - Add Norvasc 2.5 mg daily.      Cynthia Manuel PA-C  Pager: [phone]  Text Page  (7:30am - 4pm M-F)    __________________________________________________________________________    Physical Exam   Temp: 97.7  F (36.5  C) Temp src: Oral BP: (!) 155/97 Pulse: 72 Heart Rate: 51 Resp: 16 SpO2: 95 % O2 Device: None (Room air)    Vitals:    07/03/19 0639 07/04/19 0628 07/05/19 0559   Weight: 87.6 kg (193 lb 3.2 oz) 88.9 kg (195 lb 14.4 oz) 89.1 kg (196 lb 8 oz)       GENERAL:  The patient is in no apparent distress.   NECK: CVP appears normal, no masses or thyromegaly.  PULMONARY:  There is a normal respiratory effort. Diffuse insp and exp rhonchi appreciated. No rales.   CARDIOVASCULAR:  Irregularly irregular rhythm, normal S1 S2, no murmurs noted.   GI:  Non tender abdomen with normoactive bowel sounds and no hepatosplenomegaly. There are no masses palpable.   EXTREMITIES:  Trace-1+ pitting ankle to pretibial edema bilaterally.  VASCULAR: 2+ Pulses bilaterally in upper and lower extremities.    Medications     Continuing ACE inhibitor/ARB/ARNI from home medication list OR ACE inhibitor/ARB order already placed during this visit       - MEDICATION INSTRUCTIONS -       HEParin 900 Units/hr (07/05/19 0534)     - MEDICATION INSTRUCTIONS -       - MEDICATION INSTRUCTIONS -       sodium chloride       sodium chloride Stopped (07/02/19 220)       allopurinol  300 mg Oral Daily     aspirin  81 mg Oral Daily     atorvastatin  40 mg Oral Daily     carvedilol  12.5 mg Oral BID w/meals     clopidogrel  75 mg Oral Daily     furosemide  40 mg Oral Daily     hydrALAZINE  75 mg Oral Q8H ROSELIA     isosorbide mononitrate  60 mg Oral Daily     losartan  100 mg Oral Daily     sodium chloride (PF)  3 mL Intracatheter Q8H       Data   Most Recent 3 CBC's:  Recent Labs   Lab Test 07/05/19  0540 07/04/19  0530 07/01/19  1051   WBC 7.2 5.0 6.3   HGB 13.4 13.1* 15.3   MCV 84 85 86    246 222     Most Recent 3 BMP's:  Recent Labs   Lab Test 07/05/19  0540 07/04/19  0530 07/03/19  0532   * 133 133   POTASSIUM 4.7 3.8 3.8   CHLORIDE  93* 96 96   CO2 31 33* 31   BUN 20 22 23   CR 1.20 1.17 1.09   ANIONGAP 8 4 6   GUNNER 8.8 8.3* 8.1*   GLC 97 97 89     Most Recent 2 LFT's:  Recent Labs   Lab Test 06/30/19  0640 10/09/18  1039   AST 21 29   ALT 22 27   ALKPHOS 96 91   BILITOTAL 1.3 1.0     Most Recent 3 Troponin's:  Recent Labs   Lab Test 06/30/19  0640 06/30/19  0042 06/29/19  1859   TROPI 0.040 0.038 0.029     Most Recent 3 BNP's:  Recent Labs   Lab Test 06/29/19  1859   NTBNPI 24,400*     Most Recent Cholesterol Panel:  Recent Labs   Lab Test 06/30/19  0640   CHOL 169   LDL 88   HDL 60   TRIG 105     Most Recent TSH and T4:  Recent Labs   Lab Test 06/27/19  1151   TSH 3.22

## 2019-07-05 NOTE — CONSULTS
"NUTRITION ASSESSMENT & EDUCATION NOTE    REASON FOR ASSESSMENT  Betito Anderson is a 73 year old male seen by Registered Dietitian for Provider Order - Nutrition Education - Congestive Heart Failure (CHF) - Dietitian to instruct patient on 2 gram sodium diet and LOS     Nutrition History  Nutrition History:  - Information obtained from patient, EMR.   - Admitted with shortness of breath and skipping beats.   - Mykel denies any prior diet education \"I've never paid any attention to diet, and now here I am\"  - Eats 2 meals most days, will skip dinner on occasion \"it gets to be too much work\".   - He and his spouse have become tired of cooking.   - Noted to consume up to 3-4 alcoholic beverages nightly.   - NKFA    CURRENT DIET ORDER  Diet:  Low Saturated Fat/2400 mg Sodium  Intake/Tolerance:  % intakes recorded. Pt does verbalize a decrease in appetite since admission with not feeling well.     Anthropometrics  Height: 5' 6\"  Weight: 196 lbs 8 oz (89.1 kg)   Body mass index is 31.72 kg/m .  Weight Status:  Obesity Grade I BMI 30-34.9  IBW: 64.5 kg  % IBW: 138%  Weight History: weight fluctuations with fluid.  Wt Readings from Last 10 Encounters:   07/05/19 89.1 kg (196 lb 8 oz)   06/27/19 92.1 kg (203 lb)   10/23/18 88.5 kg (195 lb)   10/09/18 88.5 kg (195 lb)   09/08/16 95.3 kg (210 lb)   06/02/15 93.8 kg (206 lb 12.8 oz)   02/26/15 96.9 kg (213 lb 9.6 oz)   12/31/13 91.6 kg (202 lb)   10/29/13 91.2 kg (201 lb)   04/05/13 96.2 kg (212 lb)       LABS  Reviewed.   Recent Labs   Lab Test 07/05/19  0540 07/04/19  0530 07/03/19  0532 07/02/19  1713 07/02/19  0536   POTASSIUM 4.7 3.8 3.8 3.6 3.4     No results for input(s): PHOS in the last [id number] hours.  Recent Labs   Lab Test 06/30/19  1154 06/30/19  0640 05/25/15  1312   MAG 2.2 1.1* 1.3*     Recent Labs   Lab Test 07/05/19  0540 07/04/19  0530 07/03/19  0532 07/02/19  0536 06/30/19  0640   * 133 133 135 135     Recent Labs   Lab Test 07/05/19  0540 " 07/04/19  0530 07/03/19  0532 07/02/19  0536 06/30/19  0640   CR 1.20 1.17 1.09 1.19 1.07     Recent Labs   Lab 07/05/19  0540 07/04/19  0530 07/03/19  0532 07/02/19  0536 06/30/19  0640 06/29/19  1859   GLC 97 97 89 98 110* 132*     Lab Results   Component Value Date    A1C 5.7 06/30/2019    A1C 5.3 10/09/2018    A1C 5.5 09/08/2016    A1C 5.4 02/26/2015    A1C 5.5 10/29/2013     Triglycerides   Date Value Ref Range Status   06/30/2019 105 <150 mg/dL Final   10/09/2018 80 <150 mg/dL Final   09/08/2016 265 (H) <150 mg/dL Final     Comment:     Borderline high:  150-199 mg/dl   High:             200-499 mg/dl   Very high:       >499 mg/dl        MEDICATIONS  Reviewed     MALNUTRITION:  Patient does not meet two of the following criteria necessary for diagnosing malnutrition: significant weight loss, reduced intake, subcutaneous fat loss, muscle loss or fluid retention    NUTRITION DIAGNOSIS    Food- and nutrition- related knowledge deficit R/t low sodium diet AEB pt verbalizes no prior education session.       INTERVENTIONS    Nutrition Prescription:  2g Na, LSF     Implementation    Assessed learning needs, learning preferences, and willingness to learn.    Nutrition Education (Content):  a) Provided handout   a. Low sodium foods and drinks  b. Tips for low sodium diet  c. Tips for reading labels  d. Low sodium recipes  b) Discussed   a. Rationale for sodium restritions  b. Tips for eating out  c. Tips for low Na diet    Nutrition Education (Application):  a) Discussed eating habits and recommended alternative food choices    Anticipate good compliance    Diet Education - refer to Education Flowsheet    Goals    Patient verbalizes understanding of diet    All the above goals met during education session    Evaluation/Monitoring     Provided RD contact information for future questions    Recommend Out-Patient Nutrition Referral if further diet instructions are needed    Will re-evaluate in 7 days, on sooner, if  nutrition status changes      Katy Ellison RD, LD

## 2019-07-06 ENCOUNTER — APPOINTMENT (OUTPATIENT)
Dept: OCCUPATIONAL THERAPY | Facility: CLINIC | Age: 74
DRG: 246 | End: 2019-07-06
Payer: COMMERCIAL

## 2019-07-06 VITALS
DIASTOLIC BLOOD PRESSURE: 71 MMHG | BODY MASS INDEX: 31.15 KG/M2 | RESPIRATION RATE: 16 BRPM | HEART RATE: 83 BPM | OXYGEN SATURATION: 94 % | WEIGHT: 193 LBS | TEMPERATURE: 97.8 F | SYSTOLIC BLOOD PRESSURE: 110 MMHG

## 2019-07-06 LAB
ANION GAP SERPL CALCULATED.3IONS-SCNC: 10 MMOL/L (ref 3–14)
BUN SERPL-MCNC: 20 MG/DL (ref 7–30)
CALCIUM SERPL-MCNC: 8.7 MG/DL (ref 8.5–10.1)
CHLORIDE SERPL-SCNC: 95 MMOL/L (ref 94–109)
CHOLEST SERPL-MCNC: 137 MG/DL
CO2 SERPL-SCNC: 27 MMOL/L (ref 20–32)
CREAT SERPL-MCNC: 1.1 MG/DL (ref 0.66–1.25)
ERYTHROCYTE [DISTWIDTH] IN BLOOD BY AUTOMATED COUNT: 16.1 % (ref 10–15)
GFR SERPL CREATININE-BSD FRML MDRD: 66 ML/MIN/{1.73_M2}
GLUCOSE SERPL-MCNC: 77 MG/DL (ref 70–99)
HCT VFR BLD AUTO: 41.3 % (ref 40–53)
HDLC SERPL-MCNC: 39 MG/DL
HGB BLD-MCNC: 13.4 G/DL (ref 13.3–17.7)
LDLC SERPL CALC-MCNC: 63 MG/DL
MCH RBC QN AUTO: 27.1 PG (ref 26.5–33)
MCHC RBC AUTO-ENTMCNC: 32.4 G/DL (ref 31.5–36.5)
MCV RBC AUTO: 84 FL (ref 78–100)
NONHDLC SERPL-MCNC: 98 MG/DL
NT-PROBNP SERPL-MCNC: 3355 PG/ML (ref 0–900)
PLATELET # BLD AUTO: 242 10E9/L (ref 150–450)
POTASSIUM SERPL-SCNC: 3.9 MMOL/L (ref 3.4–5.3)
RBC # BLD AUTO: 4.94 10E12/L (ref 4.4–5.9)
SODIUM SERPL-SCNC: 132 MMOL/L (ref 133–144)
TRIGL SERPL-MCNC: 175 MG/DL
WBC # BLD AUTO: 8.2 10E9/L (ref 4–11)

## 2019-07-06 PROCEDURE — 36415 COLL VENOUS BLD VENIPUNCTURE: CPT | Performed by: INTERNAL MEDICINE

## 2019-07-06 PROCEDURE — 99239 HOSP IP/OBS DSCHRG MGMT >30: CPT | Performed by: INTERNAL MEDICINE

## 2019-07-06 PROCEDURE — 25000132 ZZH RX MED GY IP 250 OP 250 PS 637: Performed by: INTERNAL MEDICINE

## 2019-07-06 PROCEDURE — 99232 SBSQ HOSP IP/OBS MODERATE 35: CPT | Performed by: INTERNAL MEDICINE

## 2019-07-06 PROCEDURE — 94640 AIRWAY INHALATION TREATMENT: CPT

## 2019-07-06 PROCEDURE — 25000125 ZZHC RX 250: Performed by: INTERNAL MEDICINE

## 2019-07-06 PROCEDURE — 40000275 ZZH STATISTIC RCP TIME EA 10 MIN

## 2019-07-06 PROCEDURE — 85027 COMPLETE CBC AUTOMATED: CPT | Performed by: INTERNAL MEDICINE

## 2019-07-06 PROCEDURE — 80061 LIPID PANEL: CPT | Performed by: INTERNAL MEDICINE

## 2019-07-06 PROCEDURE — 80048 BASIC METABOLIC PNL TOTAL CA: CPT | Performed by: INTERNAL MEDICINE

## 2019-07-06 PROCEDURE — 94640 AIRWAY INHALATION TREATMENT: CPT | Mod: 76

## 2019-07-06 PROCEDURE — 97110 THERAPEUTIC EXERCISES: CPT | Mod: GO

## 2019-07-06 PROCEDURE — 97535 SELF CARE MNGMENT TRAINING: CPT | Mod: GO

## 2019-07-06 PROCEDURE — 83880 ASSAY OF NATRIURETIC PEPTIDE: CPT | Performed by: INTERNAL MEDICINE

## 2019-07-06 RX ORDER — ATORVASTATIN CALCIUM 40 MG/1
40 TABLET, FILM COATED ORAL DAILY
Qty: 30 TABLET | Refills: 0 | Status: SHIPPED | OUTPATIENT
Start: 2019-07-07 | End: 2019-07-16

## 2019-07-06 RX ORDER — CARVEDILOL 12.5 MG/1
12.5 TABLET ORAL 2 TIMES DAILY WITH MEALS
Qty: 60 TABLET | Refills: 0 | Status: SHIPPED | OUTPATIENT
Start: 2019-07-06 | End: 2019-07-16

## 2019-07-06 RX ORDER — CLOPIDOGREL BISULFATE 75 MG/1
75 TABLET ORAL DAILY
Qty: 30 TABLET | Refills: 0 | Status: SHIPPED | OUTPATIENT
Start: 2019-07-07 | End: 2019-07-16

## 2019-07-06 RX ORDER — FUROSEMIDE 40 MG
40 TABLET ORAL DAILY
Qty: 30 TABLET | Refills: 0 | Status: SHIPPED | OUTPATIENT
Start: 2019-07-06 | End: 2019-07-16

## 2019-07-06 RX ORDER — ISOSORBIDE MONONITRATE 60 MG/1
60 TABLET, EXTENDED RELEASE ORAL DAILY
Qty: 30 TABLET | Refills: 0 | Status: SHIPPED | OUTPATIENT
Start: 2019-07-07 | End: 2019-07-16

## 2019-07-06 RX ORDER — AMLODIPINE BESYLATE 2.5 MG/1
2.5 TABLET ORAL DAILY
Qty: 30 TABLET | Refills: 0 | Status: SHIPPED | OUTPATIENT
Start: 2019-07-07 | End: 2019-07-16

## 2019-07-06 RX ADMIN — CLOPIDOGREL BISULFATE 75 MG: 75 TABLET ORAL at 08:19

## 2019-07-06 RX ADMIN — IPRATROPIUM BROMIDE AND ALBUTEROL SULFATE 3 ML: .5; 3 SOLUTION RESPIRATORY (INHALATION) at 00:02

## 2019-07-06 RX ADMIN — FUROSEMIDE 40 MG: 40 TABLET ORAL at 08:19

## 2019-07-06 RX ADMIN — GUAIFENESIN 10 ML: 100 SOLUTION ORAL at 12:53

## 2019-07-06 RX ADMIN — AMLODIPINE BESYLATE 2.5 MG: 2.5 TABLET ORAL at 08:19

## 2019-07-06 RX ADMIN — ATORVASTATIN CALCIUM 40 MG: 40 TABLET, FILM COATED ORAL at 08:19

## 2019-07-06 RX ADMIN — GUAIFENESIN 10 ML: 100 SOLUTION ORAL at 03:47

## 2019-07-06 RX ADMIN — HYDRALAZINE HYDROCHLORIDE 100 MG: 50 TABLET ORAL at 06:57

## 2019-07-06 RX ADMIN — CARVEDILOL 12.5 MG: 12.5 TABLET, FILM COATED ORAL at 08:19

## 2019-07-06 RX ADMIN — ISOSORBIDE MONONITRATE 60 MG: 60 TABLET, EXTENDED RELEASE ORAL at 08:19

## 2019-07-06 RX ADMIN — ASPIRIN 81 MG: 81 TABLET, COATED ORAL at 08:19

## 2019-07-06 RX ADMIN — POTASSIUM CHLORIDE 20 MEQ: 1500 TABLET, EXTENDED RELEASE ORAL at 07:06

## 2019-07-06 RX ADMIN — ALLOPURINOL 300 MG: 300 TABLET ORAL at 08:19

## 2019-07-06 RX ADMIN — GUAIFENESIN 10 ML: 100 SOLUTION ORAL at 08:18

## 2019-07-06 RX ADMIN — LOSARTAN POTASSIUM 100 MG: 100 TABLET ORAL at 09:38

## 2019-07-06 RX ADMIN — IPRATROPIUM BROMIDE AND ALBUTEROL SULFATE 3 ML: .5; 3 SOLUTION RESPIRATORY (INHALATION) at 09:40

## 2019-07-06 NOTE — PLAN OF CARE
Discharge Planner OT   Patient plan for discharge: Home  Current status: Pt is now s/p stent to LAD, see chart for details. IP OT/CR goal areas remain appropriate. Pt ambulated in the hallway for 4 minutes with independence. Pt completed 22 stairs with independence. Pt demonstrated increase in fatigue and SOB with activity. Pt required 2 standing rest breaks during ambulation in hallway.   Barriers to return to prior living situation: Decreased activity tolerance for I/ADLs   Recommendations for discharge: Home with assist for I/ADLs as needed (yardwork, home management tasks), and OP CR   Rationale for recommendations: OP CR for monitored and progressive physical activity and education.        Entered by: Rohith Ratliff 07/06/2019 9:23 AM

## 2019-07-06 NOTE — PLAN OF CARE
VSS, not in pain at time of discharge. Pt states all belongings and paperwork are accounted for. Medications have been filled at in house pharmacy and are with pt at time of discharge. Discharge education complete including meds, follow up and all instructions. Post angio and CHF care information gone over. No further questions on discharge information. Family here to drive pt home.

## 2019-07-06 NOTE — DISCHARGE SUMMARY
[facility]    Discharge Summary  Hospitalist    Date of Admission:  6/29/2019  Date of Discharge:  7/6/2019  Discharging Provider: Hipolito Singh MD  Date of Service (when I saw the patient): 07/06/19    Discharge Diagnoses   Acute systolic congestive heart failure secondary to new onset cardiomyopathy with EF of 35 to 40%  Three-vessel coronary artery disease status post PCI to LAD, not a candidate for CABG       History of Present Illness   Betito Anderson is an 73 year old male who presented with shortness of breath    Hospital Course       Betito Anderson is a 73-year-old male with alcohol use disorder, hypertension, ASCVD, and chronic atrial fibrillation who presented with progressive dyspnea and lower extremity edema as well as new shortness of breath at rest and heart palpitations, found to be in atrial fibrillation with controlled rate and acute heart failure.     Final discharge diagnosis and hospital course     Acute systolic heart failure.   New cardiomyopathy EF now 35-40% was 55-60% -- Angiography EF >55% 7/2  Mild MR, mild TR, aortic sclerosis  ASCVD  Likely hypertensive cardiomyopathy  Last TTE showed EF 55%-60% (11/2018) and noted to have basal and basal inferior and inferolateral wall hypokinesia and unfortunately, the patient appears to have been lost to followup after failing a stress test due to atrial fibrillation.  Also noted on that echo was moderate aortic valve stenosis.  Additionally, the patient is a moderate drinker (see below).  He appears to have uncontrolled hypertension.  Additionally, he has history of a 40% lesion, circumflex based off of angiogram in 1997.  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 Treated with Lasix 40 mg IV in the ED.  BNP 24,400.  EKG without overt ischemic changes, does show atrial fibrillation with controlled rate.  Chest x-ray shows right-sided pleural effusion with no overt pulmonary vascular congestion.   - TTE 6/30 EF 35-40%, mild  to mod global hypokinesia  - telemetry, I/O, weights   - Trended serial troponin. 0.029--> 0.038 --> 0.040 but remain within normal range.  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  At this time the patient is feeling well denies any chest pain shortness of breath nausea vomiting headache dizziness lightheadedness no abdominal pain back pain dysuria hematuria constipation diarrhea  .  He is up and about has been walking and doing well.  He will be discharged on aspirin and Plavix and Xarelto, after 2 weeks he can stop his aspirin and continue with Plavix and Xarelto thereafter.  Continue with the hydralazine, losartan, Coreg 12.5 mg daily and Lasix 40 mg daily.  Follow with the PCP in 1 week and follow with the cardiology as scheduled.  Outpatient cardiac rehab as well.     Chronic atrial fibrillation with controlled rate.   Appears diagnosed in fall 2018 and was recommended increased dose of metoprolol as well as apixaban.  He did not start the apixaban until approximately 3 months later when his insurance changed and then only used it for 2 weeks .  - CHADS-VASc score is at least 3 for age, CHF and hypertension.  He is agreeable to trying a different anticoagulant agent.   - PTA metop stopped, coreg decreased per cardiology  - starting xarelto 7/6  She is discharged on Coreg 12.5 mg twice daily and Xarelto.     Hypertension.   Uncontrolled.  The patient states mostly compliant, missing usually 1 day per week and he does state that he essentially stopped taking his amlodipine  as he thinks that it exacerbates his lower extremity swelling.  SBPs are uncontrolled, 180s-220s systolic.  Patient  - Continue PTA losartan 100 mg, metoprolol succinate 50 mg daily changed to Coreg 12.5 mg twice daily, started on hydralazine and amlodipine as well.  Since then his blood pressure is been good control.-      Concern of pneumonia.   Afebrile and no other URI symptoms aside from a productive cough times the last 2 days that he feels gets stuck in his throat and may very well be related to his heart failure.  Has leukocytosis of 16.6, which could be reactive in the setting above.  Chest x-ray showed a right pleural effusion with atelectasis versus infiltrate.   - Procalc 0.05 on admission, 0.30 subsequently  - leukocytosis resolved, no abx     Alcohol use disorder.   Consumes 3-4 alcoholic beverages daily.   -encourage moderation/ abstinence     At this time the patient is discharged home in stable condition.  Up with the PCP in 1 week  Follow with the cardiology as scheduled  Outpatient cardiac rehab  Limit alcohol use.      Hipolito Singh MD    Significant Results and Procedures   Coronary angiogram in the PCI to LAD.  He is referred to the Cath report for more detail.    Pending Results   These results will be followed up by PCP  Unresulted Labs Ordered in the Past 30 Days of this Admission     No orders found from 4/30/2019 to 6/30/2019.          Code Status   Full Code       Primary Care Physician   Rudy Vernon    Physical Exam   Temp: 97.8  F (36.6  C) Temp src: Oral BP: 110/71 Pulse: 83 Heart Rate: 104 Resp: 16 SpO2: 94 % O2 Device: None (Room air) Oxygen Delivery: 3 LPM  Vitals:    07/04/19 0628 07/05/19 0559 07/06/19 0359   Weight: 88.9 kg (195 lb 14.4 oz) 89.1 kg (196 lb 8 oz) 87.5 kg (193 lb)     Vital Signs with Ranges  Temp:  [97.6  F (36.4  C)-97.8  F (36.6  C)] 97.8  F (36.6  C)  Pulse:  [72-83] 83  Heart Rate:  [] 104  Resp:  [9-24] 16  BP: (102-177)/() 110/71  SpO2:  [94  %-100 %] 94 %  I/O last 3 completed shifts:  In: -   Out: 1600 [Urine:1600]    Constitutional: awake, alert, cooperative, no apparent distress, and appears stated age  Eyes: Lids and lashes normal, pupils equal, round and reactive to light, extra ocular muscles intact, sclera clear, conjunctiva normal  Respiratory: No increased work of breathing, good air exchange, clear to auscultation bilaterally, no crackles or wheezing  Cardiovascular: Normal apical impulse, regular rate and rhythm, normal S1 and S2, no S3 or S4, and no murmur noted  GI: No scars, normal bowel sounds, soft, non-distended, non-tender, no masses palpated, no hepatosplenomegally  Skin: no bruising or bleeding  Musculoskeletal: no lower extremity pitting edema present  Neurologic: Awake, alert, oriented to name, place and time.  Cranial nerves II-XII are grossly intact.  Motor is 5 out of 5 bilaterally.  Cerebellar finger to nose, heel to shin intact.  Sensory is intact.  Babinski down going, Romberg negative, and gait is normal.    Discharge Disposition   Discharged to home  Condition at discharge: Stable    Consultations This Hospital Stay   [facility] EVALUATION IP CONSULT  CARDIAC REHAB IP CONSULT  CARE COORDINATOR IP CONSULT  NUTRITION SERVICES ADULT IP CONSULT  CARDIOLOGY IP CONSULT  WOUND OSTOMY CONTINENCE NURSE  IP CONSULT  PHARMACY TO DOSE HEPARIN  PHARMACY LIAISON FOR MEDICATION COVERAGE CONSULT  PHARMACY LIAISON FOR MEDICATION COVERAGE CONSULT  PHARMACY IP CONSULT  PHARMACY IP CONSULT  CARDIOVASCULAR SURGERY IP CONSULT  PHARMACY IP CONSULT  PHARMACY TO DOSE HEPARIN  NUTRITION SERVICES ADULT IP CONSULT  CARDIAC REHAB IP CONSULT  PHARMACY IP CONSULT  PHARMACY IP CONSULT  SMOKING CESSATION PROGRAM IP CONSULT  SMOKING CESSATION PROGRAM IP CONSULT    Time Spent on this Encounter   IHipolito, personally saw the patient today and spent greater than 30 minutes discharging this patient.    Discharge Orders      Basic metabolic panel      Basic metabolic panel     N terminal pro BNP outpatient     N terminal pro BNP outpatient     Basic metabolic panel     CBC with platelets    Last Lab Result: Hemoglobin (g/dL)       Date                     Value                 07/05/2019               13.4             ----------     Basic metabolic panel     CBC with platelets    Last Lab Result: Hemoglobin (g/dL)       Date                     Value                 07/05/2019               13.4             ----------     Follow[facility]Up with [facility]      Follow-Up with [facility]      Discharge Order: F/U with Cardiac  RASHI      Follow-Up with Cardiologist      CARDIAC REHAB REFERRAL      Discharge Order: F/U with Cardiac  RASHI      Cardiac Rehab Referral      Reason for your hospital stay    CHF     Follow-up and recommended labs and tests     Follow up with primary care provider, Rudy Vernon, within 7 days for hospital follow- up.  The following labs/tests are recommended: CBC, BMP.    Follow up with Cardiology as schedule     Activity    Your activity upon discharge: activity as tolerated     Full Code     Diet    Follow this diet upon discharge: Orders Placed This Encounter      Low Saturated Fat Na <2400 mg     Discharge Medications   Current Discharge Medication List      START taking these medications    Details   amLODIPine (NORVASC) 2.5 MG tablet Take 1 tablet (2.5 mg) by mouth daily  Qty: 30 tablet, Refills: 0    Comments: Further refills from his PCP  Associated Diagnoses: Acute on chronic systolic congestive heart failure (H)      aspirin (ASA) 81 MG EC tablet Take 1 tablet (81 mg) by mouth daily for 14 days  Qty: 14 tablet, Refills: 0    Associated Diagnoses: Acute on chronic systolic congestive heart failure (H)      atorvastatin (LIPITOR) 40 MG tablet Take 1 tablet (40 mg) by mouth daily  Qty: 30 tablet, Refills: 0    Comments: Further refill from his PCP  Associated Diagnoses: Acute on chronic systolic congestive heart failure (H)       carvedilol (COREG) 12.5 MG tablet Take 1 tablet (12.5 mg) by mouth 2 times daily (with meals)  Qty: 60 tablet, Refills: 0    Comments: Further refill from his PCP  Associated Diagnoses: Acute on chronic systolic congestive heart failure (H)      clopidogrel (PLAVIX) 75 MG tablet Take 1 tablet (75 mg) by mouth daily  Qty: 30 tablet, Refills: 0    Comments: Further refill from his PCP  Associated Diagnoses: Acute on chronic systolic congestive heart failure (H)      isosorbide mononitrate (IMDUR) 60 MG 24 hr tablet Take 1 tablet (60 mg) by mouth daily  Qty: 30 tablet, Refills: 0    Comments: Further refill from his PCP  Associated Diagnoses: Acute on chronic systolic congestive heart failure (H)      rivaroxaban ANTICOAGULANT (XARELTO) 15 MG TABS tablet Take 1 tablet (15 mg) by mouth daily (with dinner)  Qty: 30 tablet, Refills: 0    Comments: Further refill from his PCP  Associated Diagnoses: Acute on chronic systolic congestive heart failure (H)         CONTINUE these medications which have CHANGED    Details   furosemide (LASIX) 40 MG tablet Take 1 tablet (40 mg) by mouth daily  Qty: 30 tablet, Refills: 0    Comments: Further refill from his PCP  Associated Diagnoses: Benign essential hypertension         CONTINUE these medications which have NOT CHANGED    Details   allopurinol (ZYLOPRIM) 300 MG tablet TAKE 1 TABLET(300 MG) BY MOUTH DAILY  Qty: 90 tablet, Refills: 1    Associated Diagnoses: Gout, unspecified cause, unspecified chronicity, unspecified site      losartan (COZAAR) 100 MG tablet TAKE 1 TABLET(100 MG) BY MOUTH DAILY  Qty: 90 tablet, Refills: 1    Associated Diagnoses: Benign essential hypertension         STOP taking these medications       metoprolol succinate ER (TOPROL-XL) 50 MG 24 hr tablet Comments:   Reason for Stopping:         simvastatin (ZOCOR) 20 MG tablet Comments:   Reason for Stopping:             Allergies   Allergies   Allergen Reactions     Ace Inhibitors Anaphylaxis     Edema of ace      Data   Most Recent 3 CBC's:  Recent Labs   Lab Test 07/06/19  0547 07/05/19  0540 07/04/19  0530   WBC 8.2 7.2 5.0   HGB 13.4 13.4 13.1*   MCV 84 84 85    251 246      Most Recent 3 BMP's:  Recent Labs   Lab Test 07/06/19  0547 07/05/19  0540 07/04/19  0530   * 132* 133   POTASSIUM 3.9 4.7 3.8   CHLORIDE 95 93* 96   CO2 27 31 33*   BUN 20 20 22   CR 1.10 1.20 1.17   ANIONGAP 10 8 4   GUNNER 8.7 8.8 8.3*   GLC 77 97 97     Most Recent 2 LFT's:  Recent Labs   Lab Test 06/30/19  0640 10/09/18  1039   AST 21 29   ALT 22 27   ALKPHOS 96 91   BILITOTAL 1.3 1.0     Most Recent INR's and Anticoagulation Dosing History:  Anticoagulation Dose History     There is no flowsheet data to display.        Most Recent 3 Troponin's:  Recent Labs   Lab Test 06/30/19  0640 06/30/19  0042 06/29/19  1859   TROPI 0.040 0.038 0.029     Most Recent Cholesterol Panel:  Recent Labs   Lab Test 07/06/19  0547   CHOL 137   LDL 63   HDL 39*   TRIG 175*     Most Recent 6 Bacteria Isolates From Any Culture (See EPIC Reports for Culture Details):No lab results found.  Most Recent TSH, T4 and A1c Labs:  Recent Labs   Lab Test 06/30/19  0640 06/27/19  1151   TSH  --  3.22   A1C 5.7*  --      Results for orders placed or performed during the hospital encounter of 06/29/19   XR Chest 2 Views    Narrative    CHEST TWO VIEWS 6/29/2019 7:23 PM     HISTORY: Shortness of breath.    COMPARISON: June 27, 2019     FINDINGS: Slightly improved right effusion and associated atelectasis  and/or infiltrate. Left lung clear. The cardiac silhouette is not  enlarged. Pulmonary vasculature is unremarkable.      Impression    IMPRESSION: Slightly improved right pleural effusion and associated  atelectasis and/or infiltrate.    CONNOR RUANO MD   US Carotid Bilateral    Narrative    BILATERAL DUPLEX CAROTID ULTRASOUND July 3, 2019 9:02 AM     HISTORY: Preoperative coronary artery bypass graft.     COMPARISON: Carotid Doppler ultrasound  10/16/2012.    FINDINGS: There is mild atherosclerotic plaque in the carotid  bifurcations. Flow velocities and waveforms show less than 50%  diameter stenosis in both the right and left internal carotid arteries  as assessed by each ICA PSV and EDV and ICA/DCCA ratio. Antegrade flow  is present in both vertebral and external carotid arteries.        Impression    IMPRESSION: Less than 50% diameter stenosis of both internal carotid  arteries relative to the distal ICA diameters.      PAZ TRIPATHI MD   CT Chest w/o Contrast    Narrative    CT CHEST WITHOUT CONTRAST July 2, 2019 7:57 PM     HISTORY: Preoperative coronary artery bypass graft evaluation.     COMPARISON: None.    TECHNIQUE: Volumetric helical acquisition of CT images of the chest  from the clavicles to the kidneys were acquired without IV contrast.  Radiation dose for this scan was reduced using automated exposure  control, adjustment of the mA and/or kV according to patient size, or  iterative reconstruction technique.    FINDINGS: Small right pleural effusion and associated compressive  atelectasis versus infiltrate. There are extensive coronary vascular  calcifications and/or stents consistent with coronary artery disease.  Benign granuloma in the left apex. No left pleural or pericardial  effusion. At least moderate coronary artery calcifications are seen in  the lateral and posterior walls of the aorta. Anterior wall of the  ascending aorta is relatively spared. Normal caliber aorta.  Cardiomegaly. No acute findings in the visualized upper abdomen.  Partially seen indeterminate 1.1 cm left renal lesion.      Impression    IMPRESSION:  1. Moderate calcified atherosclerosis of the ascending aorta.  2. Small right pleural effusion and associated compressive atelectasis  and/or infiltrate.  3. 1.1 cm indeterminate lesion in the left kidney. Consider diagnostic  CT of the abdomen for further evaluation.    CONNOR RUANO MD   US Lower Extremity Venous  Mapping Left    Narrative    ULTRASOUND LOWER EXTREMITY VENOUS MAPPING LEFT  7/3/2019 9:03 AM     HISTORY: Preoperative planning for coronary artery bypass graft  surgery.    COMPARISON: None.    FINDINGS: The left greater saphenous vein was mapped. Its diameters  from the saphenofemoral junction to the knee range between 7.0 to 2.7  mm. Its diameters from below the knee to the ankle range between 3.0  to 2.3 mm.      Impression    IMPRESSION: Left lower extremity vein mapping performed as above.    ANALI BARKSDALE MD     Most Recent 3 CBC's:  Recent Labs   Lab Test 07/06/19  0547 07/05/19  0540 07/04/19  0530   WBC 8.2 7.2 5.0   HGB 13.4 13.4 13.1*   MCV 84 84 85    251 246     Most Recent 3 BMP's:  Recent Labs   Lab Test 07/06/19  0547 07/05/19  0540 07/04/19  0530   * 132* 133   POTASSIUM 3.9 4.7 3.8   CHLORIDE 95 93* 96   CO2 27 31 33*   BUN 20 20 22   CR 1.10 1.20 1.17   ANIONGAP 10 8 4   GUNNER 8.7 8.8 8.3*   GLC 77 97 97

## 2019-07-06 NOTE — DISCHARGE INSTRUCTIONS
Cardiac Angioplasty/Stent Discharge Instructions - Radial    After you go home:      Have an adult stay with you until tomorrow.    Drink extra fluids for 2 days.    You may resume your normal diet.    No smoking       For 24 hours - due to the sedation you received:    Relax and take it easy.    Do NOT make any important or legal decisions.    Do NOT drive or operate machines at home or at work.    Do NOT drink alcohol.    Care of Wrist Puncture Site:      For the first 24 hrs - check the puncture site every 1-2 hours while awake.    It is normal to have soreness at the puncture site and mild tingling in your hand for up to 3 days.    Remove the bandaid after 24 hours. If there is minor oozing, apply another bandaid and remove it after 12 hours.    You may shower tomorrow.  Do NOT take a bath, or use a hot tub or pool for at least 3 days. Do NOT scrub the site. Do not use lotion or powder near the puncture site.           Activity:          For 2 days:     do not use your hand or arm to support your weight (such as rising from a chair)     do not bend your wrist (such as lifting a garage door).    do not lift more than 5 pounds or exercise your arm (such as tennis, golf or bowling).    Do NOT do any heavy activity such as exercise, lifting, or straining.     Bleeding:      If you start bleeding from the site in your wrist, sit down and press firmly on/above the site for 10 minutes.     Once bleeding stops, keep arm still for 2 hours.     Call [facility] as soon as you can.       Call 911 right away if you have heavy bleeding or bleeding that does not stop.      Medicines:      If you are taking an antiplatelet medication such as Plavix, Brilinta or Effient, do not stop taking it until you talk to your cardiologist.        If you are on Metformin (Glucophage), do not restart it until you have blood tests (within 2 to 3 days after discharge).  After you have your blood drawn, you may restart the Metformin.     Take  your medications, including blood thinners, unless your provider tells you not to.  If you take Coumadin (Warfarin), have your INR checked by your provider in  3-5 days. Call your clinic to schedule this.    If you have stopped any medicines, check with your provider about when to restart them.    Follow Up Appointments:      Follow up with [facility] Nurse Practitioner at [facility] of patient preference in 7-10 days.    Cardiac Rehab will contact you for follow up care.    Call the clinic if:      You have a large or growing hard lump around the site.    The site is red, swollen, hot or tender.    Blood or fluid is draining from the site.    You have chills or a fever greater than 101 F (38 C).    Your arm feels numb, cool or changes color.    You have hives, a rash or unusual itching.    Any questions or concerns.    Other Instructions:      If you received a stent - carry your stent card with you at all times.      [facility] at [place]:    [phone] [facility] (7 days a week)

## 2019-07-06 NOTE — PROGRESS NOTES
Tr band removed and stabilization board left on R wrist.  Pt reminded to keep R wrist straight and not to push up from bed with it or lift with it.  Vss.  Appears to understand instructions .  Site cdi.  Ecchymosis noted. Pulse good and cms good.

## 2019-07-06 NOTE — PROGRESS NOTES
[facility]    Cardiology Progress Note     Assessment & Plan   Betito Anderson is a 73 year old male who was admitted on 6/29/2019.     1. Severe 3v disease noted on angiogram 7/2/19 - poor candidate for CABG due to significant aortic calcification at site of cross-clamp placement.   2. Severe ischemic CMP, EF improved to 55-60% on repeat TTE   3. CAF, previously on Eliquis with possible TIA vs side effect, now off  4. Mild VHD (MR/TR/aortic sclerosis)  5.   Probable URI, R pleural effusion   6.   Hypertension     Underwent successful PCI of the LAD yesterday.  Feels well this morning.  No significant telemetry events of the than his typical pauses overnight.  He remains asymptomatic in regards to these.    From a cardiology standpoint, reasonable to dismiss from the hospital.  He will be dismissed on aspirin, Plavix, and rivaroxaban.  The aspirin can be discontinued in 2 weeks and then he will continue on Plavix and rivaroxaban.  He will continue on current dose of carvedilol given significant coronary artery disease.  Also dismissed on Lasix 40 mg daily which has helped maintain him in a bit more euvolemic state.    We will set up follow-up for him in clinic in 1 to 2 weeks with Dr. Hidalgo.  Cardiology will sign off this time.  Please page with any questions    Fernando Velez MD    Interval History   Doing well this morning. No acute complaints. No chest pain or chest discomfort.     Physical Exam   Temp: 97.8  F (36.6  C) Temp src: Axillary BP: 110/71 Pulse: 83 Heart Rate: 104 Resp: 16 SpO2: 94 % O2 Device: None (Room air) Oxygen Delivery: 3 LPM  Vitals:    07/04/19 0628 07/05/19 0559 07/06/19 0359   Weight: 88.9 kg (195 lb 14.4 oz) 89.1 kg (196 lb 8 oz) 87.5 kg (193 lb)     Vital Signs with Ranges  Temp:  [97.6  F (36.4  C)-97.8  F (36.6  C)] 97.8  F (36.6  C)  Pulse:  [72-83] 83  Heart Rate:  [] 104  Resp:  [9-24] 16  BP: (102-177)/() 110/71  SpO2:  [94 %-100 %] 94 %  I/O last 3  completed shifts:  In: -   Out: 1600 [Urine:1600]    Constitutional: No apparent distress.   Eyes: No xanthelasma or conjunctivitis  Respiratory: Clear to auscultation bilaterally. No crackles or wheezes.  Cardiovascular: Irregularly-regular rhythm with a normal rate. Normal S1 and S2.  Soft, systolic murmur heard best at the right upper sternal border.  Extremities: No peripheral edema. Right wrist site is clean and dry without hematoma or bruising.   Neurologic: Moving all extremities. No facial assymmetry.  Psychiatric: Alert and oriented. Answers questions appropriately.     Medications     Continuing ACE inhibitor/ARB/ARNI from home medication list OR ACE inhibitor/ARB order already placed during this visit       - MEDICATION INSTRUCTIONS -       - MEDICATION INSTRUCTIONS -       - MEDICATION INSTRUCTIONS -       - MEDICATION INSTRUCTIONS -       Percutaneous Coronary Intervention orders placed (this is information for BPA alerting)         allopurinol  300 mg Oral Daily     amLODIPine  2.5 mg Oral Daily     aspirin  81 mg Oral Daily     atorvastatin  40 mg Oral Daily     carvedilol  12.5 mg Oral BID w/meals     clopidogrel  75 mg Oral Daily     furosemide  40 mg Oral Daily     hydrALAZINE  100 mg Oral Q8H ROSELIA     isosorbide mononitrate  60 mg Oral Daily     losartan  100 mg Oral Daily     rivaroxaban ANTICOAGULANT  15 mg Oral Daily with supper     sodium chloride (PF)  3 mL Intracatheter Q8H       Data   Results for orders placed or performed during the hospital encounter of 06/29/19 (from the past 24 hour(s))   Cardiac Catheterization    Narrative    1-successful PCI of the mid LAD, straddling the second diagonal, with OLESYA.   Nutrition Services Adult IP Consult    Narrative    Katy Ellison RD, LD     7/5/2019  3:30 PM  NUTRITION EDUCATION      REASON FOR NUTRITION CONSULT:  Provider Order  -  Nutrition Education Dietitian to see for Heart   Healthy Diet education    ASSESSMENT:  Unable to complete nutrition  assessment and instructions at this   time.   - note seen for CHF ed this AM    FOLLOW UP:   Will instruct patient prior to discharge    Katy Ellison RD, LD             EKG 12-lead, tracing only    Result Value Ref Range    Interpretation ECG Click View Image link to view waveform and result    Basic metabolic panel   Result Value Ref Range    Sodium 132 (L) 133 - 144 mmol/L    Potassium 3.9 3.4 - 5.3 mmol/L    Chloride 95 94 - 109 mmol/L    Carbon Dioxide 27 20 - 32 mmol/L    Anion Gap 10 3 - 14 mmol/L    Glucose 77 70 - 99 mg/dL    Urea Nitrogen 20 7 - 30 mg/dL    Creatinine 1.10 0.66 - 1.25 mg/dL    GFR Estimate 66 >60 mL/min/[1.73_m2]    GFR Estimate If Black 76 >60 mL/min/[1.73_m2]    Calcium 8.7 8.5 - 10.1 mg/dL   NT proBNP inpatient and ED   Result Value Ref Range    N-Terminal Pro BNP Inpatient 3,355 (H) 0 - 900 pg/mL   Lipid Profile   Result Value Ref Range    Cholesterol 137 <200 mg/dL    Triglycerides 175 (H) <150 mg/dL    HDL Cholesterol 39 (L) >39 mg/dL    LDL Cholesterol Calculated 63 <100 mg/dL    Non HDL Cholesterol 98 <130 mg/dL   CBC with platelets   Result Value Ref Range    WBC 8.2 4.0 - 11.0 10e9/L    RBC Count 4.94 4.4 - 5.9 10e12/L    Hemoglobin 13.4 13.3 - 17.7 g/dL    Hematocrit 41.3 40.0 - 53.0 %    MCV 84 78 - 100 fl    MCH 27.1 26.5 - 33.0 pg    MCHC 32.4 31.5 - 36.5 g/dL    RDW 16.1 (H) 10.0 - 15.0 %    Platelet Count 242 150 - 450 10e9/L

## 2019-07-06 NOTE — PLAN OF CARE
Occupational Therapy Discharge Summary    Reason for therapy discharge:    Discharged to home.    Progress towards therapy goal(s). See goals on Care Plan in [facility] electronic health record for goal details.  Goals partially met.  Barriers to achieving goals:   discharge from facility.    Therapy recommendation(s):    Home with assist for I/ADLs as needed (yardwork, home management tasks), and OP CR

## 2019-07-06 NOTE — PLAN OF CARE
Is more and more alert and oriented.  IRENE STARK. Vss ex bp above parameters for hydralazine. Given prn dose and bp lower .  R wrist cdi without hematoma.  Cms good to R hand.  Stood at side of bed to void.  Denies pain.  Sleeping at long intervals. Has intermittent cough which is harsh and lungs are wheezy.  Given robitussin and Neb per RT.  monitoring

## 2019-07-07 LAB — KCT BLD-ACNC: 328 SEC (ref 75–150)

## 2019-07-08 ENCOUNTER — TELEPHONE (OUTPATIENT)
Dept: FAMILY MEDICINE | Facility: CLINIC | Age: 74
End: 2019-07-08

## 2019-07-08 NOTE — TELEPHONE ENCOUNTER
Chief Complaint: Acute On Chronic Congestive Heart Failure, Unspecified Heart Failure Type (H), Ascvd (Arteriosclerotic Cardiovascular D, SAT 06-JUL-2019 0/1    [phone] ([place])

## 2019-07-08 NOTE — TELEPHONE ENCOUNTER
Reason for Call:  Other appointment    Detailed comments: Patient was Discharged on 7/6/19 from hospital and needs to Follow-up with Dr. Mckee this week. There are no spots for me to fit pt in    Please call Wife back with day and time    Phone Number Patient can be reached at: Home number on file [phone] (home) or [phone]    Best Time: anytime    Can we leave a detailed message on this number? YES    Call taken on 7/8/2019 at 8:32 AM by Usha Ross

## 2019-07-08 NOTE — TELEPHONE ENCOUNTER
Routing to Dr Vernon for advise.     Patient is scheduled with you for Hospital follow up next Monday, 7-15-19.     Is that OK/appropriate (Hospital discharge 7-6-19)---or are you able to work patient into your schedule this week for a Hospital follow up?      Thank you,  Kinga CASTILLO RN,BSN

## 2019-07-09 ENCOUNTER — TELEPHONE (OUTPATIENT)
Dept: FAMILY MEDICINE | Facility: CLINIC | Age: 74
End: 2019-07-09

## 2019-07-09 NOTE — TELEPHONE ENCOUNTER
"Next 5 appointments (look out 90 days)    Jul 15, 2019  2:30 PM CDT  [facility] Rudy [facility] MD  [facility] [address] [address]  [phone]   Jul 25, 2019  3:00 PM CDT  Office Visit with Rudy Vernon MD  [facility] [address] [address]  [phone]   Sep 19, 2019 10:45 AM CDT  Return Visit with Mendez Hidalgo MD  [facility] ([facility]) [address]  [facility] [facility] [address]  [phone] OPT 2        ED / Discharge Outreach Protocol    Patient Contact    Attempt # 1    Was call answered?  Yes.  \"May I please speak with <patient name>\"  Is patient available?   Yes    ED/Discharge Protocol    \"Hi, my name is Miladis Conn, a registered nurse, and I am calling on behalf of Dr. Yepez's office at [place].  I am calling to follow up and see how things are going for you after your recent visit.\"    \"I see that you were in the (ER/UC/IP) on 6/29/19-7/6/19.    How are you doing now that you are home?\" doing well - fighting cold - up and moving - no SOB or chest pain, still fatigued     Is patient experiencing symptoms that may require a hospital visit?  No     Discharge Instructions    \"Let's review your discharge instructions.  What is/are the follow-up recommendations?  Pt. Response: follow up with PCP     \"Were you instructed to make a follow-up appointment?\"  Pt. Response: Yes.  Has appointment been made?   Yes      \"When you see the provider, I would recommend that you bring your discharge instructions with you.    Medications    \"How many new medications are you on since your hospitalization/ED visit?\"    8  \"How many of your current medicines changed (dose, timing, name, etc.) while you were in the hospital/ED visit?\"   2 or more   \"Do you have questions about your medications?\"   No  \"Were you newly diagnosed " "with heart failure, COPD, diabetes or did you have a heart attack?\"   No  For patients on insulin: \"Did you start on insulin in the hospital or did you have your insulin dose changed?\"   No    Medication reconciliation completed? Yes    Was MTM referral placed (*Make sure to put transitions as reason for referral)?   No    Call Summary    \"Do you have any questions or concerns about your condition or care plan at the moment?\"    No  Triage nurse advice given: call if any questions     Patient was in ER 1x in the past year (assess appropriateness of ER visits.)      \"If you have questions or things don't continue to improve, we encourage you contact us through the main clinic number,  ([phone]).  Even if the clinic is not open, triage nurses are available 24/7 to help you.     We would like you to know that our clinic has extended hours (provide information).  We also have urgent care (provide details on closest location and hours/contact info)\"      \"Thank you for your time and take care!\"    Miladis CHEEMA RN      "

## 2019-07-09 NOTE — TELEPHONE ENCOUNTER
How about office visit 11:45 this Thursday for hosp follow up instead of one he has?    Rudy Vernon M.D.

## 2019-07-10 LAB
INTERPRETATION ECG - MUSE: NORMAL
INTERPRETATION ECG - MUSE: NORMAL

## 2019-07-11 ENCOUNTER — OFFICE VISIT (OUTPATIENT)
Dept: FAMILY MEDICINE | Facility: CLINIC | Age: 74
End: 2019-07-11
Payer: COMMERCIAL

## 2019-07-11 VITALS
BODY MASS INDEX: 30.7 KG/M2 | HEIGHT: 66 IN | DIASTOLIC BLOOD PRESSURE: 60 MMHG | HEART RATE: 74 BPM | WEIGHT: 191 LBS | SYSTOLIC BLOOD PRESSURE: 122 MMHG | OXYGEN SATURATION: 96 %

## 2019-07-11 DIAGNOSIS — I48.20 CHRONIC ATRIAL FIBRILLATION (H): ICD-10-CM

## 2019-07-11 DIAGNOSIS — N28.89 RENAL MASS: ICD-10-CM

## 2019-07-11 DIAGNOSIS — R05.9 COUGH: ICD-10-CM

## 2019-07-11 DIAGNOSIS — I50.23 ACUTE ON CHRONIC SYSTOLIC CONGESTIVE HEART FAILURE (H): Primary | ICD-10-CM

## 2019-07-11 DIAGNOSIS — I25.10 ASCVD (ARTERIOSCLEROTIC CARDIOVASCULAR DISEASE): ICD-10-CM

## 2019-07-11 DIAGNOSIS — I10 BENIGN ESSENTIAL HYPERTENSION: ICD-10-CM

## 2019-07-11 PROCEDURE — 99495 TRANSJ CARE MGMT MOD F2F 14D: CPT | Performed by: INTERNAL MEDICINE

## 2019-07-11 ASSESSMENT — MIFFLIN-ST. JEOR: SCORE: 1554.12

## 2019-07-11 NOTE — PROGRESS NOTES
Subjective     Betito Anderson is a 73 year old male who presents to clinic today for the following health issues:    HPI       Hospital Follow-up Visit:    Hospital/Nursing Home/IP Rehab Facility: [facility]  Date of Admission: 6/29/19  Date of Discharge: 7/6/19  Reason(s) for Admission: CHF            Problems taking medications regularly:  None       Medication changes since discharge: None       Problems adhering to non-medication therapy:  None    Summary of hospitalization:  [facility] discharge summary reviewed  Diagnostic Tests/Treatments reviewed.  Follow up needed: none  Other Healthcare Providers Involved in Patient s Care:         Specialist appointment - cards as noted  Update since discharge: improved.     Post Discharge Medication Reconciliation: discharge medications reconciled, continue medications without change.  Plan of care communicated with patient     Coding guidelines for this visit:  Type of Medical   Decision Making Face-to-Face Visit       within 7 Days of discharge Face-to-Face Visit        within 14 days of discharge   Moderate Complexity [id number]   High Complexity [id number]          This is a complicated patient who presents for hospital follow-up. I reviewed the discharge summary as well as the CT scan.  As noted, patient was hospitalized on June 29 with acute congestive heart failure and new onset cardiomyopathy with an EF of 35 to 40%.  During that evaluation he had a coronary angiogram with three-vessel disease.  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  He also has chronic atrial fibrillation as well as hypertension.  He has had a cough and congestion.  A CT of his chest showed atelectasis and a possible infiltrate as well as a renal mass that requires follow-up.  He has no history of lung disease but used to smoke.  He has had poorly controlled blood pressure in the past.  He  has a history of consuming too much alcohol as well.    At this point the patient is doing much better.  His dyspnea on exertion is much improved.  He is not having chest pain or dizziness, his weight is down and his edema is significantly improved.    His cough is improved but he still does cough and sometimes wheezes.  No fevers or night sweats.  Occasionally he has phlegm but that is clear.    Review of systems is otherwise negative.    The discharge summary they talked about him being on hydralazine 3 times daily but he has not been on it as it is not on his discharge medication list.    Past Medical History:   Diagnosis Date     ASCVD (arteriosclerotic cardiovascular disease) 1997    angio with 40% circ lesion     Atrial fibrillation (H) 10/09/2018    found on routine physical     Carotid artery plaque 2005    seen on us, about 50% stenosis, fu 2009 us no significant stenosis     Elevated blood sugar      Gout     on allopurinol     HTN (hypertension)      Hypercholesteremia      Hyponatremia 2015    felt due to chlorthalidone     Low mean corpuscular volume (MCV)      Near syncope 5/15    fu est echo low level but neg     Psoriasis     Dr. Marti     Renal mass 06/29/2019    seen on ct chest for sob, needs fu ct for that     Screening 2012    abd us no aaa     Past Surgical History:   Procedure Laterality Date     C ANESTH,DX ARTHROSCOPIC PROC KNEE JOINT  2009     CV CORONARY ANGIOGRAM N/A 7/2/2019    Procedure: Coronary Angiogram;  Surgeon: Donaldo Loera MD;  Location:  HEART CARDIAC CATH LAB     CV HEART CATHETERIZATION WITH POSSIBLE INTERVENTION N/A 7/5/2019    Procedure: Heart Catheterization with Possible Intervention;  Surgeon: Manolo Hu MD;  Location:  HEART CARDIAC CATH LAB     CV LEFT HEART CATH N/A 7/2/2019    Procedure: Left Heart Cath;  Surgeon: Donaldo Loera MD;  Location:  HEART CARDIAC CATH LAB     CV LEFT VENTRICULOGRAM N/A 7/2/2019    Procedure: Left  Ventriculogram;  Surgeon: Donaldo Loera MD;  Location:  HEART CARDIAC CATH LAB     CV PCI STENT DRUG ELUTING N/A 2019    Procedure: PCI Stent Drug Eluting;  Surgeon: Manolo Hu MD;  Location:  HEART CARDIAC CATH LAB     CV RIGHT HEART CATH N/A 2019    Procedure: Right Heart Cath;  Surgeon: Donaldo Loera MD;  Location:  HEART CARDIAC CATH LAB     Social History     Socioeconomic History     Marital status:      Spouse name: Not on file     Number of children: 2     Years of education: Not on file     Highest education level: Not on file   Occupational History     Occupation: retired   Social Needs     Financial resource strain: Not on file     Food insecurity:     Worry: Not on file     Inability: Not on file     Transportation needs:     Medical: Not on file     Non-medical: Not on file   Tobacco Use     Smoking status: Former Smoker     Packs/day: 1.00     Years: 30.00     Pack years: 30.00     Types: Cigars     Last attempt to quit: 1998     Years since quittin.8     Smokeless tobacco: Never Used   Substance and Sexual Activity     Alcohol use: Yes     Alcohol/week: 8.4 oz     Types: 14 Standard drinks or equivalent per week     Frequency: 2-3 times a week     Drinks per session: 1 or 2     Binge frequency: Never     Comment: 1-2 a night     Drug use: No     Sexual activity: Never   Lifestyle     Physical activity:     Days per week: Not on file     Minutes per session: Not on file     Stress: Not on file   Relationships     Social connections:     Talks on phone: Not on file     Gets together: Not on file     Attends Jainism service: Not on file     Active member of club or organization: Not on file     Attends meetings of clubs or organizations: Not on file     Relationship status: Not on file     Intimate partner violence:     Fear of current or ex partner: Not on file     Emotionally abused: Not on file     Physically abused: Not on file     Forced  "sexual activity: Not on file   Other Topics Concern     Parent/sibling w/ CABG, MI or angioplasty before 65F 55M? Not Asked   Social History Narrative     Not on file     Current Outpatient Medications   Medication Sig Dispense Refill     allopurinol (ZYLOPRIM) 300 MG tablet TAKE 1 TABLET(300 MG) BY MOUTH DAILY 90 tablet 1     amLODIPine (NORVASC) 2.5 MG tablet Take 1 tablet (2.5 mg) by mouth daily 30 tablet 0     aspirin (ASA) 81 MG EC tablet Take 1 tablet (81 mg) by mouth daily for 14 days (Patient taking differently: Take 81 mg by mouth daily Takes 2 daily) 14 tablet 0     atorvastatin (LIPITOR) 40 MG tablet Take 1 tablet (40 mg) by mouth daily 30 tablet 0     carvedilol (COREG) 12.5 MG tablet Take 1 tablet (12.5 mg) by mouth 2 times daily (with meals) 60 tablet 0     clopidogrel (PLAVIX) 75 MG tablet Take 1 tablet (75 mg) by mouth daily 30 tablet 0     furosemide (LASIX) 40 MG tablet Take 1 tablet (40 mg) by mouth daily 30 tablet 0     isosorbide mononitrate (IMDUR) 60 MG 24 hr tablet Take 1 tablet (60 mg) by mouth daily 30 tablet 0     losartan (COZAAR) 100 MG tablet TAKE 1 TABLET(100 MG) BY MOUTH DAILY 90 tablet 1     rivaroxaban ANTICOAGULANT (XARELTO) 15 MG TABS tablet Take 1 tablet (15 mg) by mouth daily (with dinner) 30 tablet 0     tiotropium (SPIRIVA RESPIMAT) 2.5 MCG/ACT inhaler Inhale 2 puffs into the lungs daily 1 Inhaler 1     Allergies   Allergen Reactions     Ace Inhibitors Anaphylaxis     Edema of ace     FAMILY HISTORY NOTED AND REVIEWED    REVIEW OF SYSTEMS: above    PHYSICAL EXAM    /60   Pulse 74   Ht 1.676 m (5' 6\")   Wt 86.6 kg (191 lb)   SpO2 96%   BMI 30.83 kg/m       Patient appears non toxic  Mouth and eyes within normal limits  Lungs bilat inspir and expir wheezing, normal flow, no crackles  cv reglar rate and rhythm, no murmer, rub or gallop, no jvp, min edema  Abdomen normal active bowel sounds, soft non-tender    ASSESSMENT:  1. Chf, new onset, suspect due to afib, " hypertension, alcohol  2. Cad, post pci, stable  3. Afib, on noac  4. Hypertension, control much better  5. Renal mass, will follow up next office visit  6. Cough, wheezing, suspect copd, doubt chf    PLAN:  On triple anticoag for 2 weeks then discontinue asa  Has cards follow up next week  Follow up with me the 7/25  Add spiriva  Call if weight gain or shortness of breath  Consider renal ct then as well as spirometry    Rudy Vernon M.D.

## 2019-07-11 NOTE — PATIENT INSTRUCTIONS
Start the new inhaler and use this once daily    Call if you get worsening shortness of breath, or gain more then 3 pounds    See me at the end of July    Rudy Vernon M.D.

## 2019-07-15 LAB
ABO + RH BLD: NORMAL
ABO + RH BLD: NORMAL
BLD GP AB SCN SERPL QL: NORMAL
BLOOD BANK CMNT PATIENT-IMP: NORMAL
BLOOD BANK CMNT PATIENT-IMP: NORMAL
SPECIMEN EXP DATE BLD: NORMAL

## 2019-07-16 ENCOUNTER — OFFICE VISIT (OUTPATIENT)
Dept: CARDIOLOGY | Facility: CLINIC | Age: 74
End: 2019-07-16
Payer: COMMERCIAL

## 2019-07-16 VITALS
HEART RATE: 62 BPM | WEIGHT: 190 LBS | SYSTOLIC BLOOD PRESSURE: 114 MMHG | HEIGHT: 66 IN | DIASTOLIC BLOOD PRESSURE: 60 MMHG | BODY MASS INDEX: 30.53 KG/M2

## 2019-07-16 DIAGNOSIS — I50.9 ACUTE ON CHRONIC CONGESTIVE HEART FAILURE, UNSPECIFIED HEART FAILURE TYPE (H): ICD-10-CM

## 2019-07-16 DIAGNOSIS — I25.10 ASCVD (ARTERIOSCLEROTIC CARDIOVASCULAR DISEASE): ICD-10-CM

## 2019-07-16 DIAGNOSIS — I10 BENIGN ESSENTIAL HYPERTENSION: ICD-10-CM

## 2019-07-16 DIAGNOSIS — I50.23 ACUTE ON CHRONIC SYSTOLIC CONGESTIVE HEART FAILURE (H): ICD-10-CM

## 2019-07-16 LAB
ANION GAP SERPL CALCULATED.3IONS-SCNC: 10.3 MMOL/L (ref 6–17)
BUN SERPL-MCNC: 19 MG/DL (ref 7–30)
CALCIUM SERPL-MCNC: 9.9 MG/DL (ref 8.5–10.5)
CHLORIDE SERPL-SCNC: 99 MMOL/L (ref 98–107)
CO2 SERPL-SCNC: 29 MMOL/L (ref 23–29)
CREAT SERPL-MCNC: 1.32 MG/DL (ref 0.7–1.3)
ERYTHROCYTE [DISTWIDTH] IN BLOOD BY AUTOMATED COUNT: 15.8 % (ref 10–15)
GFR SERPL CREATININE-BSD FRML MDRD: 53 ML/MIN/{1.73_M2}
GLUCOSE SERPL-MCNC: 111 MG/DL (ref 70–105)
HCT VFR BLD AUTO: 40.9 % (ref 40–53)
HGB BLD-MCNC: 12.9 G/DL (ref 13.3–17.7)
MCH RBC QN AUTO: 27 PG (ref 26.5–33)
MCHC RBC AUTO-ENTMCNC: 31.5 G/DL (ref 31.5–36.5)
MCV RBC AUTO: 86 FL (ref 78–100)
NT-PROBNP SERPL-MCNC: 2994 PG/ML (ref 0–125)
PLATELET # BLD AUTO: 437 10E9/L (ref 150–450)
POTASSIUM SERPL-SCNC: 4.3 MMOL/L (ref 3.5–5.1)
RBC # BLD AUTO: 4.77 10E12/L (ref 4.4–5.9)
SODIUM SERPL-SCNC: 134 MMOL/L (ref 136–145)
WBC # BLD AUTO: 7.9 10E9/L (ref 4–11)

## 2019-07-16 PROCEDURE — 99214 OFFICE O/P EST MOD 30 MIN: CPT | Performed by: PHYSICIAN ASSISTANT

## 2019-07-16 PROCEDURE — 80048 BASIC METABOLIC PNL TOTAL CA: CPT | Performed by: PHYSICIAN ASSISTANT

## 2019-07-16 PROCEDURE — 36415 COLL VENOUS BLD VENIPUNCTURE: CPT | Performed by: PHYSICIAN ASSISTANT

## 2019-07-16 PROCEDURE — 85027 COMPLETE CBC AUTOMATED: CPT | Performed by: PHYSICIAN ASSISTANT

## 2019-07-16 PROCEDURE — 83880 ASSAY OF NATRIURETIC PEPTIDE: CPT | Performed by: PHYSICIAN ASSISTANT

## 2019-07-16 RX ORDER — FUROSEMIDE 40 MG
40 TABLET ORAL DAILY
Qty: 90 TABLET | Refills: 3 | Status: SHIPPED | OUTPATIENT
Start: 2019-07-16 | End: 2019-09-09

## 2019-07-16 RX ORDER — ISOSORBIDE MONONITRATE 120 MG/1
120 TABLET, EXTENDED RELEASE ORAL DAILY
Qty: 90 TABLET | Refills: 3 | Status: SHIPPED | OUTPATIENT
Start: 2019-07-16 | End: 2019-11-06

## 2019-07-16 RX ORDER — CLOPIDOGREL BISULFATE 75 MG/1
75 TABLET ORAL DAILY
Qty: 90 TABLET | Refills: 3 | Status: SHIPPED | OUTPATIENT
Start: 2019-07-16 | End: 2019-11-06

## 2019-07-16 RX ORDER — CARVEDILOL 12.5 MG/1
12.5 TABLET ORAL 2 TIMES DAILY WITH MEALS
Qty: 180 TABLET | Refills: 3 | Status: ON HOLD | OUTPATIENT
Start: 2019-07-16 | End: 2019-09-15

## 2019-07-16 RX ORDER — ATORVASTATIN CALCIUM 40 MG/1
40 TABLET, FILM COATED ORAL DAILY
Qty: 90 TABLET | Refills: 3 | Status: SHIPPED | OUTPATIENT
Start: 2019-07-16 | End: 2019-11-06

## 2019-07-16 RX ORDER — AMLODIPINE BESYLATE 5 MG/1
5 TABLET ORAL DAILY
Qty: 90 TABLET | Refills: 3 | Status: SHIPPED | OUTPATIENT
Start: 2019-07-16 | End: 2019-11-06

## 2019-07-16 ASSESSMENT — MIFFLIN-ST. JEOR: SCORE: 1549.58

## 2019-07-16 NOTE — PROGRESS NOTES
Service Date: 07/16/2019      PRIMARY CARDIOLOGIST:  Mendez Hidalgo MD      REASON FOR VISIT:  Hospital followup, heart failure, severe coronary artery disease, hypertension.      HISTORY OF PRESENT ILLNESS:  Mr. Anderson is a delightful 73-year-old gentleman who had past medical history significant for chronic atrial fibrillation, not on anticoagulation, nonocclusive coronary artery disease with cath in 1997 and uncontrolled hypertension who was admitted 06/29 with progressive dyspnea on exertion and lower extremity edema.  He was found to have a new diagnosis of acute systolic heart failure with EF of 35%-40% and had a right pleural effusion.  His blood pressures were markedly elevated in the 160s/100s during that hospitalization and required large doses of medication to get them back in line.  He had been off his anticoagulation for atrial fibrillation as he felt like his face went numb with Eliquis.  Given his cardiomyopathy, he went on to have coronary angiogram.        This revealed severe 3-vessel disease with a normal left main, and LAD with 80% mid lesion, 70% mid circumflex lesion and a subtotally occluded RCA with left-to-right collaterals.  He initially was referred for CABG, but has a porcelain aorta and would not tolerate cross clamping.  He went on to have a drug-eluting stent to the mid LAD at the bifurcation of the D2.  It appears that there is a 75% ongoing D2 lesion.  The plan has been to consider stress testing and if it is ischemic in the circumflex territory, then send for coronary angiogram and staged PCI.  He also was found to have just mild mitral and tricuspid regurgitation and aortic sclerosis but not stenosis.  He was admitted with a weight of 204 pounds and discharged at 193 pounds.      He comes in today for followup.  He is overall doing better.  He is not back to himself as he still has some poor energy and cannot quite do the things he could do 6 months ago.  If he goes to the store,  he does not want to go in and sometimes sits in the car and waits for his wife.  That being said, he is much, much better than when he came into the hospital and he continues to get better every day.  He continues to deny chest pain and he did not have any chest pain prior to his medication at admission or anginal type symptoms.  He denies orthopnea or PND.  He has not had syncope or presyncope.  He brings in home blood pressures, and they are consistently in the 140s to 170s.  His cuff is checked against ours today and it is within 10 points.  His home weight when he discharged was 193 pounds and he is down to 185 pounds today.  He denies dry mouth or feelings of thirst.  He has also been seen by his primary and started on Spiriva, although this has not been received yet as it was sent to mail order.  He is getting frequent bruising and bleeding even if he just scratches himself.      SOCIAL HISTORY:  He is .  He is a former smoker, quit in 1998 with about a 30-pack-year history.  About 2 drinks of alcohol a night, none since hospitalization.      PHYSICAL EXAMINATION:   GENERAL:  Well-developed, well-nourished gentleman in no acute distress.   HEENT:  Normocephalic, atraumatic.   HEART:  Irregularly irregular with a 2/6 systolic murmur heard best at the right sternal border.   LUNGS:  Slightly diminished in the bases with some diffuse wheezing in the upper lobes but no rales or rhonchi.   EXTREMITIES:  With 1+ edema just in the lower ankle more likely lymphedema; it is in somewhat woody.   SKIN:  With multiple areas of ecchymosis throughout and Band-Aids over small skin tears.   NECK:  I do not appreciate JVP at 30 degrees.      ASSESSMENT AND PLAN:   1.  Severe 3-vessel coronary artery disease, now status post drug-eluting stent to the mid LAD at the bifurcation of the D2.  He has a remaining 70% circumflex lesion and a chronically occluded RCA with good left-to-right collaterals.  He is appropriately on  aspirin and Plavix and will discontinue his aspirin at the end of this week as this will be 2 weeks post PCI and will leave him on Xarelto and Plavix alone.  He has no anginal symptoms nor did he have any prior.  We will need to decide if we do an intervention on his circ and will plan on doing a nuclear stress test in evaluating that after the next visit.   2.  Hypertension.  Came down nicely in clinic today, but home blood pressures are consistently in the 140s to 170s.  He absolutely needs improved control as his cardiomyopathy was likely hypertensive related.  We will increase his Imdur from 60 mg to 120 mg and his amlodipine from 2.5 to 5 mg daily, while remains on carvedilol 12.5 mg b.i.d. and losartan 100 mg daily.   3.  Cardiomyopathy with acute systolic heart failure with EF initially 35%, improved to 55% even prior to discharge and intervention, suggesting that this is hypertensive related.  He remains on a low-sodium diet and doing well with less than 2000 mg a day.  We will continue him on that and Lasix 40 mg a day.  His creatinine is up slightly to 1.3, sodium of 134, but it sounds like he is not getting enough p.o. intake.  I asked him to increase that slightly.  We will continue the Lasix dose for now.  May decrease at next visit.   5.  Chronic atrial fibrillation, now on Xarelto for CHADS-VASc of 4 for age, hypertension, coronary artery disease and CHF.  Appropriately on Xarelto without adverse effects.  Bruising as expected.      We will see the patient back in 1 month, sooner with concerns.  Thank you for allowing me to participate in his care.         NATI MIRANDA PA-C             D: 2019   T: 2019   MT: JODIE      Name:     MARCELA TINAJERO   MRN:      [id number]        Account:      [id number]   :      1945           Service Date: 2019      Document: [id number]

## 2019-07-16 NOTE — LETTER
7/16/2019      Rudy Vernon MD  [address]  [facility] [address]      RE: Betito Anderson       Dear Colleague,    I had the pleasure of seeing Betito Anderson in the [facility].    Service Date: 07/16/2019      PRIMARY CARDIOLOGIST:  Mendez Hidalgo MD      REASON FOR VISIT:  Hospital followup, heart failure, severe coronary artery disease, hypertension.      HISTORY OF PRESENT ILLNESS:  Mr. Anderson is a delightful 73-year-old gentleman who had past medical history significant for chronic atrial fibrillation, not on anticoagulation, nonocclusive coronary artery disease with cath in 1997 and uncontrolled hypertension who was admitted 06/29 with progressive dyspnea on exertion and lower extremity edema.  He was found to have a new diagnosis of acute systolic heart failure with EF of 35%-40% and had a right pleural effusion.  His blood pressures were markedly elevated in the 160s/100s during that hospitalization and required large doses of medication to get them back in line.  He had been off his anticoagulation for atrial fibrillation as he felt like his face went numb with Eliquis.  Given his cardiomyopathy, he went on to have coronary angiogram.        This revealed severe 3-vessel disease with a normal left main, and LAD with 80% mid lesion, 70% mid circumflex lesion and a subtotally occluded RCA with left-to-right collaterals.  He initially was referred for CABG, but has a porcelain aorta and would not tolerate cross clamping.  He went on to have a drug-eluting stent to the mid LAD at the bifurcation of the D2.  It appears that there is a 75% ongoing D2 lesion.  The plan has been to consider stress testing and if it is ischemic in the circumflex territory, then send for coronary angiogram and staged PCI.  He also was found to have just mild mitral and tricuspid regurgitation and aortic sclerosis but not stenosis.  He was admitted with a weight of 204 pounds and  discharged at 193 pounds.      He comes in today for followup.  He is overall doing better.  He is not back to himself as he still has some poor energy and cannot quite do the things he could do 6 months ago.  If he goes to the store, he does not want to go in and sometimes sits in the car and waits for his wife.  That being said, he is much, much better than when he came into the hospital and he continues to get better every day.  He continues to deny chest pain and he did not have any chest pain prior to his medication at admission or anginal type symptoms.  He denies orthopnea or PND.  He has not had syncope or presyncope.  He brings in home blood pressures, and they are consistently in the 140s to 170s.  His cuff is checked against ours today and it is within 10 points.  His home weight when he discharged was 193 pounds and he is down to 185 pounds today.  He denies dry mouth or feelings of thirst.  He has also been seen by his primary and started on Spiriva, although this has not been received yet as it was sent to mail order.  He is getting frequent bruising and bleeding even if he just scratches himself.      SOCIAL HISTORY:  He is .  He is a former smoker, quit in 1998 with about a 30-pack-year history.  About 2 drinks of alcohol a night, none since hospitalization.      PHYSICAL EXAMINATION:   GENERAL:  Well-developed, well-nourished gentleman in no acute distress.   HEENT:  Normocephalic, atraumatic.   HEART:  Irregularly irregular with a 2/6 systolic murmur heard best at the right sternal border.   LUNGS:  Slightly diminished in the bases with some diffuse wheezing in the upper lobes but no rales or rhonchi.   EXTREMITIES:  With 1+ edema just in the lower ankle more likely lymphedema; it is in somewhat woody.   SKIN:  With multiple areas of ecchymosis throughout and Band-Aids over small skin tears.   NECK:  I do not appreciate JVP at 30 degrees.      ASSESSMENT AND PLAN:   1.  Severe 3-vessel  coronary artery disease, now status post drug-eluting stent to the mid LAD at the bifurcation of the D2.  He has a remaining 70% circumflex lesion and a chronically occluded RCA with good left-to-right collaterals.  He is appropriately on aspirin and Plavix and will discontinue his aspirin at the end of this week as this will be 2 weeks post PCI and will leave him on Xarelto and Plavix alone.  He has no anginal symptoms nor did he have any prior.  We will need to decide if we do an intervention on his circ and will plan on doing a nuclear stress test in evaluating that after the next visit.   2.  Hypertension.  Came down nicely in clinic today, but home blood pressures are consistently in the 140s to 170s.  He absolutely needs improved control as his cardiomyopathy was likely hypertensive related.  We will increase his Imdur from 60 mg to 120 mg and his amlodipine from 2.5 to 5 mg daily, while remains on carvedilol 12.5 mg b.i.d. and losartan 100 mg daily.   3.  Cardiomyopathy with acute systolic heart failure with EF initially 35%, improved to 55% even prior to discharge and intervention, suggesting that this is hypertensive related.  He remains on a low-sodium diet and doing well with less than 2000 mg a day.  We will continue him on that and Lasix 40 mg a day.  His creatinine is up slightly to 1.3, sodium of 134, but it sounds like he is not getting enough p.o. intake.  I asked him to increase that slightly.  We will continue the Lasix dose for now.  May decrease at next visit.   5.  Chronic atrial fibrillation, now on Xarelto for CHADS-VASc of 4 for age, hypertension, coronary artery disease and CHF.  Appropriately on Xarelto without adverse effects.  Bruising as expected.      We will see the patient back in 1 month, sooner with concerns.  Thank you for allowing me to participate in his care.         NATI MIRANDA PA-C             D: 07/16/2019   T: 07/16/2019   MT: JODIE      Name:     MARCELA TINAJERO    MRN:      [id number]        Account:      [id number]   :      1945           Service Date: 2019      Document: [id number]         Outpatient Encounter Medications as of 2019   Medication Sig Dispense Refill     amLODIPine (NORVASC) 5 MG tablet Take 1 tablet (5 mg) by mouth daily 90 tablet 3     atorvastatin (LIPITOR) 40 MG tablet Take 1 tablet (40 mg) by mouth daily 90 tablet 3     carvedilol (COREG) 12.5 MG tablet Take 1 tablet (12.5 mg) by mouth 2 times daily (with meals) 180 tablet 3     clopidogrel (PLAVIX) 75 MG tablet Take 1 tablet (75 mg) by mouth daily 90 tablet 3     furosemide (LASIX) 40 MG tablet Take 1 tablet (40 mg) by mouth daily 90 tablet 3     isosorbide mononitrate (IMDUR) 120 MG 24 HR ER tablet Take 1 tablet (120 mg) by mouth daily 90 tablet 3     rivaroxaban ANTICOAGULANT (XARELTO) 15 MG TABS tablet Take 1 tablet (15 mg) by mouth daily (with dinner) 90 tablet 3     allopurinol (ZYLOPRIM) 300 MG tablet TAKE 1 TABLET(300 MG) BY MOUTH DAILY 90 tablet 1     losartan (COZAAR) 100 MG tablet TAKE 1 TABLET(100 MG) BY MOUTH DAILY 90 tablet 1     tiotropium (SPIRIVA RESPIMAT) 2.5 MCG/ACT inhaler Inhale 2 puffs into the lungs daily 1 Inhaler 1     [DISCONTINUED] amLODIPine (NORVASC) 2.5 MG tablet Take 1 tablet (2.5 mg) by mouth daily 30 tablet 0     [DISCONTINUED] aspirin (ASA) 81 MG EC tablet Take 1 tablet (81 mg) by mouth daily for 14 days (Patient taking differently: Take 81 mg by mouth daily Takes 2 daily) 14 tablet 0     [DISCONTINUED] atorvastatin (LIPITOR) 40 MG tablet Take 1 tablet (40 mg) by mouth daily 30 tablet 0     [DISCONTINUED] carvedilol (COREG) 12.5 MG tablet Take 1 tablet (12.5 mg) by mouth 2 times daily (with meals) 60 tablet 0     [DISCONTINUED] clopidogrel (PLAVIX) 75 MG tablet Take 1 tablet (75 mg) by mouth daily 30 tablet 0     [DISCONTINUED] furosemide (LASIX) 40 MG tablet Take 1 tablet (40 mg) by mouth daily 30 tablet 0     [DISCONTINUED] isosorbide  mononitrate (IMDUR) 60 MG 24 hr tablet Take 1 tablet (60 mg) by mouth daily 30 tablet 0     [DISCONTINUED] rivaroxaban ANTICOAGULANT (XARELTO) 15 MG TABS tablet Take 1 tablet (15 mg) by mouth daily (with dinner) 30 tablet 0     No facility-administered encounter medications on file as of 7/16/2019.        Again, thank you for allowing me to participate in the care of your patient.      Sincerely,    Melvi Montaño PA-C     [facility]

## 2019-07-16 NOTE — LETTER
7/16/2019    Rudy Vernon MD  [address]  [place] [address]    RE: Betito Anderson       Dear Colleague,    I had the pleasure of seeing Betito Anderson in the [facility].    [id number]  HPI and Plan:   See dictation    Orders this Visit:  Orders Placed This Encounter   Procedures     Basic metabolic panel     Follow-Up with Cardiac Advanced Practice Provider     Orders Placed This Encounter   Medications     amLODIPine (NORVASC) 5 MG tablet     Sig: Take 1 tablet (5 mg) by mouth daily     Dispense:  90 tablet     Refill:  3     isosorbide mononitrate (IMDUR) 120 MG 24 HR ER tablet     Sig: Take 1 tablet (120 mg) by mouth daily     Dispense:  90 tablet     Refill:  3     furosemide (LASIX) 40 MG tablet     Sig: Take 1 tablet (40 mg) by mouth daily     Dispense:  90 tablet     Refill:  3     clopidogrel (PLAVIX) 75 MG tablet     Sig: Take 1 tablet (75 mg) by mouth daily     Dispense:  90 tablet     Refill:  3     To protect your stent(s).  Do not stop taking unless directed by cardiology.     carvedilol (COREG) 12.5 MG tablet     Sig: Take 1 tablet (12.5 mg) by mouth 2 times daily (with meals)     Dispense:  180 tablet     Refill:  3     atorvastatin (LIPITOR) 40 MG tablet     Sig: Take 1 tablet (40 mg) by mouth daily     Dispense:  90 tablet     Refill:  3     rivaroxaban ANTICOAGULANT (XARELTO) 15 MG TABS tablet     Sig: Take 1 tablet (15 mg) by mouth daily (with dinner)     Dispense:  90 tablet     Refill:  3     Medications Discontinued During This Encounter   Medication Reason     aspirin (ASA) 81 MG EC tablet      amLODIPine (NORVASC) 2.5 MG tablet      isosorbide mononitrate (IMDUR) 60 MG 24 hr tablet      furosemide (LASIX) 40 MG tablet      clopidogrel (PLAVIX) 75 MG tablet      carvedilol (COREG) 12.5 MG tablet      atorvastatin (LIPITOR) 40 MG tablet      rivaroxaban ANTICOAGULANT (XARELTO) 15 MG TABS tablet          Encounter Diagnoses   Name Primary?     Acute  on chronic systolic congestive heart failure (H)      Benign essential hypertension        CURRENT MEDICATIONS:  Current Outpatient Medications   Medication Sig Dispense Refill     amLODIPine (NORVASC) 5 MG tablet Take 1 tablet (5 mg) by mouth daily 90 tablet 3     atorvastatin (LIPITOR) 40 MG tablet Take 1 tablet (40 mg) by mouth daily 90 tablet 3     carvedilol (COREG) 12.5 MG tablet Take 1 tablet (12.5 mg) by mouth 2 times daily (with meals) 180 tablet 3     clopidogrel (PLAVIX) 75 MG tablet Take 1 tablet (75 mg) by mouth daily 90 tablet 3     furosemide (LASIX) 40 MG tablet Take 1 tablet (40 mg) by mouth daily 90 tablet 3     isosorbide mononitrate (IMDUR) 120 MG 24 HR ER tablet Take 1 tablet (120 mg) by mouth daily 90 tablet 3     rivaroxaban ANTICOAGULANT (XARELTO) 15 MG TABS tablet Take 1 tablet (15 mg) by mouth daily (with dinner) 90 tablet 3     allopurinol (ZYLOPRIM) 300 MG tablet TAKE 1 TABLET(300 MG) BY MOUTH DAILY 90 tablet 1     losartan (COZAAR) 100 MG tablet TAKE 1 TABLET(100 MG) BY MOUTH DAILY 90 tablet 1     tiotropium (SPIRIVA RESPIMAT) 2.5 MCG/ACT inhaler Inhale 2 puffs into the lungs daily 1 Inhaler 1       ALLERGIES     Allergies   Allergen Reactions     Ace Inhibitors Anaphylaxis     Edema of ace       PAST MEDICAL HISTORY:  Past Medical History:   Diagnosis Date     ASCVD (arteriosclerotic cardiovascular disease) 1997    angio with 40% circ lesion     Atrial fibrillation (H) 10/09/2018    found on routine physical     Carotid artery plaque 2005    seen on us, about 50% stenosis, fu 2009 us no significant stenosis     Elevated blood sugar      Gout     on allopurinol     HTN (hypertension)      Hypercholesteremia      Hyponatremia 2015    felt due to chlorthalidone     Low mean corpuscular volume (MCV)      Near syncope 5/15    fu est echo low level but neg     Psoriasis     Dr. Marti     Renal mass 06/29/2019    seen on ct chest for sob, needs fu ct for that     Screening 2012    abd us no  aaa       PAST SURGICAL HISTORY:  Past Surgical History:   Procedure Laterality Date     C ANESTH,DX ARTHROSCOPIC PROC KNEE JOINT  2009     CV CORONARY ANGIOGRAM N/A 2019    Procedure: Coronary Angiogram;  Surgeon: Donaldo oLera MD;  Location:  HEART CARDIAC CATH LAB     CV HEART CATHETERIZATION WITH POSSIBLE INTERVENTION N/A 2019    Procedure: Heart Catheterization with Possible Intervention;  Surgeon: Manolo Hu MD;  Location:  HEART CARDIAC CATH LAB     CV LEFT HEART CATH N/A 2019    Procedure: Left Heart Cath;  Surgeon: Donaldo Loera MD;  Location:  HEART CARDIAC CATH LAB     CV LEFT VENTRICULOGRAM N/A 2019    Procedure: Left Ventriculogram;  Surgeon: Donaldo Loera MD;  Location:  HEART CARDIAC CATH LAB     CV PCI STENT DRUG ELUTING N/A 2019    Procedure: PCI Stent Drug Eluting;  Surgeon: Manolo Hu MD;  Location:  HEART CARDIAC CATH LAB     CV RIGHT HEART CATH N/A 2019    Procedure: Right Heart Cath;  Surgeon: Donaldo Loera MD;  Location:  HEART CARDIAC CATH LAB       FAMILY HISTORY:  Family History   Problem Relation Age of Onset     Coronary Artery Disease Father      Medical History Unknown Mother      Medical History Unknown Maternal Grandfather        SOCIAL HISTORY:  Social History     Socioeconomic History     Marital status:      Spouse name: None     Number of children: 2     Years of education: None     Highest education level: None   Occupational History     Occupation: retired   Social Needs     Financial resource strain: None     Food insecurity:     Worry: None     Inability: None     Transportation needs:     Medical: None     Non-medical: None   Tobacco Use     Smoking status: Former Smoker     Packs/day: 1.00     Years: 30.00     Pack years: 30.00     Types: Cigars     Last attempt to quit: 1998     Years since quittin.8     Smokeless tobacco: Never Used   Substance and Sexual Activity  "    Alcohol use: Yes     Alcohol/week: 8.4 oz     Types: 14 Standard drinks or equivalent per week     Frequency: 2-3 times a week     Drinks per session: 1 or 2     Binge frequency: Never     Comment: 1-2 a night     Drug use: No     Sexual activity: Never   Lifestyle     Physical activity:     Days per week: None     Minutes per session: None     Stress: None   Relationships     Social connections:     Talks on phone: None     Gets together: None     Attends [employer]od[employer] service: None     Active member of club or organization: None     Attends meetings of clubs or organizations: None     Relationship status: None     Intimate partner violence:     Fear of current or ex partner: None     Emotionally abused: None     Physically abused: None     Forced sexual activity: None   Other Topics Concern     Parent/sibling w/ CABG, MI or angioplasty before 65F 55M? Not Asked   Social History Narrative     None       Review of Systems:  Skin:  Negative     Eyes:  Negative    ENT:  Negative    Respiratory:  Negative    Cardiovascular:    Positive for;edema  Gastroenterology: Negative    Genitourinary:  Positive for    Musculoskeletal:  Negative    Neurologic:  Negative    Psychiatric:  Negative    Heme/Lymph/Imm:  Negative    Endocrine:  Negative      Physical Exam:  Vitals: /60   Pulse 62   Ht 1.676 m (5' 6\")   Wt 86.2 kg (190 lb)   BMI 30.67 kg/m      Please refer to dictation for physical exam    Recent Lab Results:  LIPID RESULTS:  Lab Results   Component Value Date    CHOL 137 07/06/2019    HDL 39 (L) 07/06/2019    LDL 63 07/06/2019    TRIG 175 (H) 07/06/2019    CHOLHDLRATIO 2.8 02/26/2015       LIVER ENZYME RESULTS:  Lab Results   Component Value Date    AST 21 06/30/2019    ALT 22 06/30/2019       CBC RESULTS:  Lab Results   Component Value Date    WBC 7.9 07/16/2019    RBC 4.77 07/16/2019    HGB 12.9 (L) 07/16/2019    HCT 40.9 07/16/2019    MCV 86 07/16/2019    MCH 27.0 07/16/2019    MCHC 31.5 07/16/2019    RDW " 15.8 (H) 07/16/2019     07/16/2019       BMP RESULTS:  Lab Results   Component Value Date     (L) 07/16/2019    POTASSIUM 4.3 07/16/2019    CHLORIDE 99 07/16/2019    CO2 29 07/16/2019    ANIONGAP 10.3 07/16/2019     (H) 07/16/2019    BUN 19 07/16/2019    CR 1.32 (H) 07/16/2019    GFRESTIMATED 53 (L) 07/16/2019    GFRESTBLACK 64 07/16/2019    GUNNER 9.9 07/16/2019        A1C RESULTS:  Lab Results   Component Value Date    A1C 5.7 (H) 06/30/2019       INR RESULTS:  No results found for: INR        CC  Rudy Vernon MD  [address]  [place] [address]        Thank you for allowing me to participate in the care of your patient.      Sincerely,     Melvi Montaño PARamosC     [facility]    cc:   Rudy Vernon MD  [address]  [place] [address]

## 2019-07-16 NOTE — PATIENT INSTRUCTIONS
Thank you for coming into [facility] today.    We discussed: I'm glad to you're feeling better.    Keep up the great work on the low salt diet.      Medication changes:    Increase Imdur/ isosorbide mononitrate to 120 mg a day in the morning.    Increase amlodipine to 5 mg once a day.     Stop taking aspirin after Friday.    Continue other medications.       Follow up: with me in 1 month with labs before that visit.       Please call my nurse at [phone] with any questions or concerns.    Scheduling phone number: [phone]  Reminder: Please bring in all current medications, over the counter supplements and vitamin bottles to your next appointment.

## 2019-07-16 NOTE — PROGRESS NOTES
[id number]  HPI and Plan:   See dictation    Orders this Visit:  Orders Placed This Encounter   Procedures     Basic metabolic panel     Follow-Up with Cardiac Advanced Practice Provider     Orders Placed This Encounter   Medications     amLODIPine (NORVASC) 5 MG tablet     Sig: Take 1 tablet (5 mg) by mouth daily     Dispense:  90 tablet     Refill:  3     isosorbide mononitrate (IMDUR) 120 MG 24 HR ER tablet     Sig: Take 1 tablet (120 mg) by mouth daily     Dispense:  90 tablet     Refill:  3     furosemide (LASIX) 40 MG tablet     Sig: Take 1 tablet (40 mg) by mouth daily     Dispense:  90 tablet     Refill:  3     clopidogrel (PLAVIX) 75 MG tablet     Sig: Take 1 tablet (75 mg) by mouth daily     Dispense:  90 tablet     Refill:  3     To protect your stent(s).  Do not stop taking unless directed by cardiology.     carvedilol (COREG) 12.5 MG tablet     Sig: Take 1 tablet (12.5 mg) by mouth 2 times daily (with meals)     Dispense:  180 tablet     Refill:  3     atorvastatin (LIPITOR) 40 MG tablet     Sig: Take 1 tablet (40 mg) by mouth daily     Dispense:  90 tablet     Refill:  3     rivaroxaban ANTICOAGULANT (XARELTO) 15 MG TABS tablet     Sig: Take 1 tablet (15 mg) by mouth daily (with dinner)     Dispense:  90 tablet     Refill:  3     Medications Discontinued During This Encounter   Medication Reason     aspirin (ASA) 81 MG EC tablet      amLODIPine (NORVASC) 2.5 MG tablet      isosorbide mononitrate (IMDUR) 60 MG 24 hr tablet      furosemide (LASIX) 40 MG tablet      clopidogrel (PLAVIX) 75 MG tablet      carvedilol (COREG) 12.5 MG tablet      atorvastatin (LIPITOR) 40 MG tablet      rivaroxaban ANTICOAGULANT (XARELTO) 15 MG TABS tablet          Encounter Diagnoses   Name Primary?     Acute on chronic systolic congestive heart failure (H)      Benign essential hypertension        CURRENT MEDICATIONS:  Current Outpatient Medications   Medication Sig Dispense Refill     amLODIPine (NORVASC) 5 MG tablet Take 1  tablet (5 mg) by mouth daily 90 tablet 3     atorvastatin (LIPITOR) 40 MG tablet Take 1 tablet (40 mg) by mouth daily 90 tablet 3     carvedilol (COREG) 12.5 MG tablet Take 1 tablet (12.5 mg) by mouth 2 times daily (with meals) 180 tablet 3     clopidogrel (PLAVIX) 75 MG tablet Take 1 tablet (75 mg) by mouth daily 90 tablet 3     furosemide (LASIX) 40 MG tablet Take 1 tablet (40 mg) by mouth daily 90 tablet 3     isosorbide mononitrate (IMDUR) 120 MG 24 HR ER tablet Take 1 tablet (120 mg) by mouth daily 90 tablet 3     rivaroxaban ANTICOAGULANT (XARELTO) 15 MG TABS tablet Take 1 tablet (15 mg) by mouth daily (with dinner) 90 tablet 3     allopurinol (ZYLOPRIM) 300 MG tablet TAKE 1 TABLET(300 MG) BY MOUTH DAILY 90 tablet 1     losartan (COZAAR) 100 MG tablet TAKE 1 TABLET(100 MG) BY MOUTH DAILY 90 tablet 1     tiotropium (SPIRIVA RESPIMAT) 2.5 MCG/ACT inhaler Inhale 2 puffs into the lungs daily 1 Inhaler 1       ALLERGIES     Allergies   Allergen Reactions     Ace Inhibitors Anaphylaxis     Edema of ace       PAST MEDICAL HISTORY:  Past Medical History:   Diagnosis Date     ASCVD (arteriosclerotic cardiovascular disease) 1997    angio with 40% circ lesion     Atrial fibrillation (H) 10/09/2018    found on routine physical     Carotid artery plaque 2005    seen on us, about 50% stenosis, fu 2009 us no significant stenosis     Elevated blood sugar      Gout     on allopurinol     HTN (hypertension)      Hypercholesteremia      Hyponatremia 2015    felt due to chlorthalidone     Low mean corpuscular volume (MCV)      Near syncope 5/15    fu est echo low level but neg     Psoriasis     Dr. Marti     Renal mass 06/29/2019    seen on ct chest for sob, needs fu ct for that     Screening 2012    abd us no aaa       PAST SURGICAL HISTORY:  Past Surgical History:   Procedure Laterality Date     C ANESTH,DX ARTHROSCOPIC PROC KNEE JOINT  2009     CV CORONARY ANGIOGRAM N/A 7/2/2019    Procedure: Coronary Angiogram;  Surgeon:  Donaldo Loera MD;  Location:  [facility]     CV HEART CATHETERIZATION WITH POSSIBLE INTERVENTION N/A 2019    Procedure: Heart Catheterization with Possible Intervention;  Surgeon: Manolo Hu MD;  Location:  [facility]     CV LEFT HEART CATH N/A 2019    Procedure: Left Heart Cath;  Surgeon: Donaldo Loera MD;  Location:  [facility]     CV LEFT VENTRICULOGRAM N/A 2019    Procedure: Left Ventriculogram;  Surgeon: Donaldo Loera MD;  Location:  [facility]     CV PCI STENT DRUG ELUTING N/A 2019    Procedure: PCI Stent Drug Eluting;  Surgeon: Manolo Hu MD;  Location:  [facility]     CV RIGHT HEART CATH N/A 2019    Procedure: Right Heart Cath;  Surgeon: Donaldo Loera MD;  Location:  [facility]       FAMILY HISTORY:  Family History   Problem Relation Age of Onset     Coronary Artery Disease Father      Medical History Unknown Mother      Medical History Unknown Maternal Grandfather        SOCIAL HISTORY:  Social History     Socioeconomic History     Marital status:      Spouse name: None     Number of children: 2     Years of education: None     Highest education level: None   Occupational History     Occupation: retired   Social Needs     Financial resource strain: None     Food insecurity:     Worry: None     Inability: None     Transportation needs:     Medical: None     Non-medical: None   Tobacco Use     Smoking status: Former Smoker     Packs/day: 1.00     Years: 30.00     Pack years: 30.00     Types: Cigars     Last attempt to quit: 1998     Years since quittin.8     Smokeless tobacco: Never Used   Substance and Sexual Activity     Alcohol use: Yes     Alcohol/week: 8.4 oz     Types: 14 Standard drinks or equivalent per week     Frequency: 2-3 times a week     Drinks per session: 1 or 2     Binge frequency: Never     Comment: 1-2 a night      "Drug use: No     Sexual activity: Never   Lifestyle     Physical activity:     Days per week: None     Minutes per session: None     Stress: None   Relationships     Social connections:     Talks on phone: None     Gets together: None     Attends Muslim service: None     Active member of club or organization: None     Attends meetings of clubs or organizations: None     Relationship status: None     Intimate partner violence:     Fear of current or ex partner: None     Emotionally abused: None     Physically abused: None     Forced sexual activity: None   Other Topics Concern     Parent/sibling w/ CABG, MI or angioplasty before 65F 55M? Not Asked   Social History Narrative     None       Review of Systems:  Skin:  Negative     Eyes:  Negative    ENT:  Negative    Respiratory:  Negative    Cardiovascular:    Positive for;edema  Gastroenterology: Negative    Genitourinary:  Positive for    Musculoskeletal:  Negative    Neurologic:  Negative    Psychiatric:  Negative    Heme/Lymph/Imm:  Negative    Endocrine:  Negative      Physical Exam:  Vitals: /60   Pulse 62   Ht 1.676 m (5' 6\")   Wt 86.2 kg (190 lb)   BMI 30.67 kg/m     Please refer to dictation for physical exam    Recent Lab Results:  LIPID RESULTS:  Lab Results   Component Value Date    CHOL 137 07/06/2019    HDL 39 (L) 07/06/2019    LDL 63 07/06/2019    TRIG 175 (H) 07/06/2019    CHOLHDLRATIO 2.8 02/26/2015       LIVER ENZYME RESULTS:  Lab Results   Component Value Date    AST 21 06/30/2019    ALT 22 06/30/2019       CBC RESULTS:  Lab Results   Component Value Date    WBC 7.9 07/16/2019    RBC 4.77 07/16/2019    HGB 12.9 (L) 07/16/2019    HCT 40.9 07/16/2019    MCV 86 07/16/2019    MCH 27.0 07/16/2019    MCHC 31.5 07/16/2019    RDW 15.8 (H) 07/16/2019     07/16/2019       BMP RESULTS:  Lab Results   Component Value Date     (L) 07/16/2019    POTASSIUM 4.3 07/16/2019    CHLORIDE 99 07/16/2019    CO2 29 07/16/2019    ANIONGAP 10.3 " 07/16/2019     (H) 07/16/2019    BUN 19 07/16/2019    CR 1.32 (H) 07/16/2019    GFRESTIMATED 53 (L) 07/16/2019    GFRESTBLACK 64 07/16/2019    GUNNER 9.9 07/16/2019        A1C RESULTS:  Lab Results   Component Value Date    A1C 5.7 (H) 06/30/2019       INR RESULTS:  No results found for: INR        CC  Rudy Vernon MD  [address] [place] [address]  [place] [id number]

## 2019-07-17 NOTE — RESULT ENCOUNTER NOTE
Reviewed during clinic visit.  Please see progress note for plan.  Melvi Montaño PA-C 7/17/2019 10:02 AM

## 2019-07-18 ENCOUNTER — HOSPITAL ENCOUNTER (OUTPATIENT)
Dept: CARDIAC REHAB | Facility: CLINIC | Age: 74
End: 2019-07-18
Attending: INTERNAL MEDICINE
Payer: COMMERCIAL

## 2019-07-18 ENCOUNTER — APPOINTMENT (OUTPATIENT)
Dept: ULTRASOUND IMAGING | Facility: CLINIC | Age: 74
End: 2019-07-18
Attending: EMERGENCY MEDICINE
Payer: COMMERCIAL

## 2019-07-18 ENCOUNTER — HOSPITAL ENCOUNTER (EMERGENCY)
Facility: CLINIC | Age: 74
Discharge: HOME | End: 2019-07-19
Attending: EMERGENCY MEDICINE | Admitting: EMERGENCY MEDICINE
Payer: COMMERCIAL

## 2019-07-18 DIAGNOSIS — L03.113 CELLULITIS OF RIGHT UPPER EXTREMITY: ICD-10-CM

## 2019-07-18 DIAGNOSIS — I25.10 ASCVD (ARTERIOSCLEROTIC CARDIOVASCULAR DISEASE): ICD-10-CM

## 2019-07-18 LAB
ANION GAP SERPL CALCULATED.3IONS-SCNC: 6 MMOL/L (ref 3–14)
BASOPHILS # BLD AUTO: 0.1 10E9/L (ref 0–0.2)
BASOPHILS NFR BLD AUTO: 1 %
BUN SERPL-MCNC: 26 MG/DL (ref 7–30)
CALCIUM SERPL-MCNC: 9 MG/DL (ref 8.5–10.1)
CHLORIDE SERPL-SCNC: 99 MMOL/L (ref 94–109)
CO2 SERPL-SCNC: 29 MMOL/L (ref 20–32)
CREAT SERPL-MCNC: 1.48 MG/DL (ref 0.66–1.25)
CRP SERPL-MCNC: 12.8 MG/L (ref 0–8)
DIFFERENTIAL METHOD BLD: ABNORMAL
EOSINOPHIL # BLD AUTO: 0.3 10E9/L (ref 0–0.7)
EOSINOPHIL NFR BLD AUTO: 2.8 %
ERYTHROCYTE [DISTWIDTH] IN BLOOD BY AUTOMATED COUNT: 15.6 % (ref 10–15)
ERYTHROCYTE [SEDIMENTATION RATE] IN BLOOD BY WESTERGREN METHOD: 42 MM/H (ref 0–20)
GFR SERPL CREATININE-BSD FRML MDRD: 46 ML/MIN/{1.73_M2}
GLUCOSE SERPL-MCNC: 125 MG/DL (ref 70–99)
HCT VFR BLD AUTO: 39.4 % (ref 40–53)
HGB BLD-MCNC: 12.6 G/DL (ref 13.3–17.7)
IMM GRANULOCYTES # BLD: 0 10E9/L (ref 0–0.4)
IMM GRANULOCYTES NFR BLD: 0.3 %
LYMPHOCYTES # BLD AUTO: 0.9 10E9/L (ref 0.8–5.3)
LYMPHOCYTES NFR BLD AUTO: 8 %
MCH RBC QN AUTO: 27 PG (ref 26.5–33)
MCHC RBC AUTO-ENTMCNC: 32 G/DL (ref 31.5–36.5)
MCV RBC AUTO: 85 FL (ref 78–100)
MONOCYTES # BLD AUTO: 1 10E9/L (ref 0–1.3)
MONOCYTES NFR BLD AUTO: 8.5 %
NEUTROPHILS # BLD AUTO: 9.1 10E9/L (ref 1.6–8.3)
NEUTROPHILS NFR BLD AUTO: 79.4 %
NRBC # BLD AUTO: 0 10*3/UL
NRBC BLD AUTO-RTO: 0 /100
PLATELET # BLD AUTO: 422 10E9/L (ref 150–450)
POTASSIUM SERPL-SCNC: 4.3 MMOL/L (ref 3.4–5.3)
RBC # BLD AUTO: 4.66 10E12/L (ref 4.4–5.9)
SODIUM SERPL-SCNC: 134 MMOL/L (ref 133–144)
WBC # BLD AUTO: 11.4 10E9/L (ref 4–11)

## 2019-07-18 PROCEDURE — 40000116 ZZH STATISTIC OP CR VISIT

## 2019-07-18 PROCEDURE — 36415 COLL VENOUS BLD VENIPUNCTURE: CPT | Performed by: EMERGENCY MEDICINE

## 2019-07-18 PROCEDURE — 85652 RBC SED RATE AUTOMATED: CPT | Performed by: EMERGENCY MEDICINE

## 2019-07-18 PROCEDURE — 85610 PROTHROMBIN TIME: CPT | Performed by: EMERGENCY MEDICINE

## 2019-07-18 PROCEDURE — 85025 COMPLETE CBC W/AUTO DIFF WBC: CPT | Performed by: EMERGENCY MEDICINE

## 2019-07-18 PROCEDURE — 93931 UPPER EXTREMITY STUDY: CPT | Mod: RT

## 2019-07-18 PROCEDURE — 99284 EMERGENCY DEPT VISIT MOD MDM: CPT | Mod: 25

## 2019-07-18 PROCEDURE — 93798 PHYS/QHP OP CAR RHAB W/ECG: CPT

## 2019-07-18 PROCEDURE — 86140 C-REACTIVE PROTEIN: CPT | Performed by: EMERGENCY MEDICINE

## 2019-07-18 PROCEDURE — 80048 BASIC METABOLIC PNL TOTAL CA: CPT | Performed by: EMERGENCY MEDICINE

## 2019-07-18 ASSESSMENT — MIFFLIN-ST. JEOR: SCORE: 1517.83

## 2019-07-18 ASSESSMENT — ENCOUNTER SYMPTOMS: JOINT SWELLING: 1

## 2019-07-18 NOTE — ED AVS SNAPSHOT
Emergency Department  [address] [place] [address]  Phone:  [phone]  Fax:  [phone]                                    Betito Anderson   MRN: [id number]    Department:   Emergency Department   Date of Visit:  7/18/2019           After Visit Summary Signature Page    I have received my discharge instructions, and my questions have been answered. I have discussed any challenges I see with this plan with the nurse or doctor.    ..........................................................................................................................................  Patient/Patient Representative Signature      ..........................................................................................................................................  Patient Representative Print Name and Relationship to Patient    ..................................................               ................................................  Date                                   Time    ..........................................................................................................................................  Reviewed by Signature/Title    ...................................................              ..............................................  Date                                               Time          22EPIC Rev 08/18

## 2019-07-19 VITALS
HEIGHT: 66 IN | RESPIRATION RATE: 16 BRPM | SYSTOLIC BLOOD PRESSURE: 153 MMHG | TEMPERATURE: 97.7 F | HEART RATE: 62 BPM | WEIGHT: 183 LBS | BODY MASS INDEX: 29.41 KG/M2 | DIASTOLIC BLOOD PRESSURE: 78 MMHG | OXYGEN SATURATION: 100 %

## 2019-07-19 LAB — INR PPP: 2.33 (ref 0.86–1.14)

## 2019-07-19 RX ORDER — TRAMADOL HYDROCHLORIDE 50 MG/1
50 TABLET ORAL EVERY 6 HOURS PRN
Qty: 15 TABLET | Refills: 0 | Status: SHIPPED | OUTPATIENT
Start: 2019-07-19 | End: 2019-09-14

## 2019-07-19 RX ORDER — CEPHALEXIN 500 MG/1
500 CAPSULE ORAL 4 TIMES DAILY
Qty: 40 CAPSULE | Refills: 0 | Status: SHIPPED | OUTPATIENT
Start: 2019-07-19 | End: 2019-09-09

## 2019-07-19 NOTE — ED PROVIDER NOTES
"  History     Chief Complaint:  Post-op Hand      HPI   Betito Anderson is a 73 year old male with history of CHF, atrial fibrillation, heart catheterization on plavix who presents to the emergency department today for evaluation of post-op hand. The patient reports that on 07/05/19 he had a heart catheterization procedure done through the right hand. He was told if you find any swelling, to come check into the hospital. The patient noted it was fine, until tonight at dinner he found swelling above his right wrist. He called the number of his surgeons clinic who told him to go to the ED since they are closed until 0800 tomorrow morning. The patient is otherwise well.     Allergies:  Ace inhibitors      Medications:    Zyloprim  Norvasc  Lipitor  Coreg  Plavix  Lasix  Imdur  Cozaar  Spiriva  Xarelto    Past Medical History:    Arteriosclerotic cardiovascular disease  Atrial fibrillation  Hyperglycemia  Carotid artery plaque  Hypertension  Hypercholesteremia  Hyponatremia  Psorias  Gout  CHF    Past Surgical History:    Arthroscopic Proc knee join  Coronary Angiogram  CV heart catheterization   Left heart cath  Left ventriculogram  PCI stent drug eluting  Right heart cath    Family History:    Father: CAD    Social History:  The patient was accompanied to the ED by wife.  Smoking Status: Former Smoker  Smokeless Tobacco: Never Used  Alcohol Use: Positive  Drug Use: Negative  PCP: Rudy Vernon   Marital Status:        Review of Systems   Musculoskeletal: Positive for joint swelling.     10 point review of systems performed and is negative except as above and in HPI.       Physical Exam     Patient Vitals for the past 24 hrs:   BP Temp Temp src Pulse Heart Rate Resp SpO2 Height Weight   07/19/19 0040 153/78 97.7  F (36.5  C) Oral 62 -- 16 100 % -- --   07/18/19 2300 158/76 -- -- 62 -- 16 100 % -- --   07/18/19 2210 164/84 97.7  F (36.5  C) Oral 61 61 16 100 % 1.676 m (5' 6\") 83 kg (183 lb)    "     Physical Exam  General: Resting on the gurney, appears comfortable  Head:  The scalp, face, and head appear normal  Mouth/Throat: Mucus membranes are moist  CV:  Regular rate    Normal S1 and S2  No pathological murmur   Resp:  Breath sounds clear and equal bilaterally    Non-labored, no retractions or accessory muscle use    No coarseness    No wheezing   GI:  Abdomen is soft, no rigidity    No tenderness to palpation  MS:  Normal motor assessment of all extremities.    Good capillary refill noted.    Right wrist slightly tender to palpation, worse on dorsal aspect, arterial line access site on the volar aspect without considerable swelling, bruising over the entire forearm is present, swelling of the wrist and hand as well.   Skin:  No rash or lesions noted.  Neuro:   Speech is normal and fluent. No apparent deficit.  Psych: Awake. Alert.  Normal affect.      Appropriate interactions.      Emergency Department Course     Imaging:  Radiology findings were communicated with the patient who voiced understanding of the findings.    US Upper Extremity  1. No evidence of occlusion of or significant stenosis within the right radial or ulnar arteries.  2. No visualized pseudoaneurysm of the right radial artery.  3. No hematoma is visualized in the soft tissues the right hand.  Reading per radiology    Laboratory:  Laboratory findings were communicated with the patient who voiced understanding of the findings.    CBC: WBC 11.4 (H), HGB 12.6 (L),   BMP: Glucose 125 (H), creatinine 1.48 (H), GFR estimate 46 (L) o/w WNL  CRP inflammation: 12.8 (H)  Erthrocyte sedimentation rate auto: 42 (H)  INR: 2.33 (H)    Emergency Department Course:    2217 Nursing notes and vitals reviewed.    2221 I performed an exam of the patient as documented above.     2317 IV was inserted and blood was drawn for laboratory testing, results above.     2329 The patient was sent for a US upper extremity while in the emergency department,  results above.      0042 I discussed the treatment plan with the patient. They expressed understanding of this plan and consented to discharge. They will be discharged home with instructions for care and follow up. In addition, the patient will return to the emergency department if their symptoms persist, worsen, if new symptoms arise or if there is any concern.  All questions were answered.     Impression & Plan      Medical Decision Making:  Betito Anderson is a 73 year old male who presents to the emergency department today for evaluation of a painful and swollen right wrist after having had a recent cardiac catheterization with access through the right radial artery.  He has considerable surrounding bruising and swelling of the wrist.  There is bruising on the volar aspect and redness and warmth on the dorsal aspect of the forearm.  The forearm appears most consistent with cellulitis.  I did consider septic joint however he does have range of motion at the wrist, there is not a large effusion, and he did just have a arterial puncture which would not violate the joint itself.  Additionally the patient is anticoagulated and this could represent swelling due to bleeding.  Laboratory evaluation was undertaken the patient does have a slightly elevated white count, ESR, and CRP.  Ultrasound was obtained and there is no evidence of vascular injury or pseudoaneurysm.  He is started on Keflex and given a small course of pain medication and will follow tomorrow with his primary care provider and will attempt to see cardiology as well.    Diagnosis:      ICD-10-CM    1. Cellulitis of right upper extremity L03.113      Disposition:   The patient is discharged to home.     Discharge Medications:  Ultram  Keflex     Scribe Disclosure:  Nick YEBOAH, am serving as a scribe at 10:25 PM on 7/18/2019 to document services personally performed by Ilene Young MD based on my observations and the provider's statements to  me.      EMERGENCY DEPARTMENT       Ilene Young MD  08/01/19 1085

## 2019-07-23 ENCOUNTER — TRANSFERRED RECORDS (OUTPATIENT)
Dept: HEALTH INFORMATION MANAGEMENT | Facility: CLINIC | Age: 74
End: 2019-07-23

## 2019-07-25 ENCOUNTER — OFFICE VISIT (OUTPATIENT)
Dept: FAMILY MEDICINE | Facility: CLINIC | Age: 74
End: 2019-07-25
Payer: COMMERCIAL

## 2019-07-25 VITALS
BODY MASS INDEX: 29.7 KG/M2 | SYSTOLIC BLOOD PRESSURE: 149 MMHG | HEART RATE: 68 BPM | WEIGHT: 184 LBS | DIASTOLIC BLOOD PRESSURE: 69 MMHG | OXYGEN SATURATION: 97 %

## 2019-07-25 DIAGNOSIS — I48.20 CHRONIC ATRIAL FIBRILLATION (H): Primary | ICD-10-CM

## 2019-07-25 DIAGNOSIS — N17.9 ACUTE RENAL FAILURE, UNSPECIFIED ACUTE RENAL FAILURE TYPE (H): ICD-10-CM

## 2019-07-25 DIAGNOSIS — R06.02 SOB (SHORTNESS OF BREATH): ICD-10-CM

## 2019-07-25 DIAGNOSIS — I50.22 CHRONIC SYSTOLIC CONGESTIVE HEART FAILURE (H): ICD-10-CM

## 2019-07-25 DIAGNOSIS — J44.9 CHRONIC OBSTRUCTIVE PULMONARY DISEASE, UNSPECIFIED COPD TYPE (H): ICD-10-CM

## 2019-07-25 DIAGNOSIS — N28.89 LEFT RENAL MASS: ICD-10-CM

## 2019-07-25 DIAGNOSIS — I25.10 ASCVD (ARTERIOSCLEROTIC CARDIOVASCULAR DISEASE): ICD-10-CM

## 2019-07-25 LAB
FEF 25/75: NORMAL
FEV-1: NORMAL
FEV1/FVC: NORMAL
FVC: NORMAL

## 2019-07-25 PROCEDURE — 94010 BREATHING CAPACITY TEST: CPT | Performed by: INTERNAL MEDICINE

## 2019-07-25 PROCEDURE — 99214 OFFICE O/P EST MOD 30 MIN: CPT | Mod: 25 | Performed by: INTERNAL MEDICINE

## 2019-07-25 NOTE — PROGRESS NOTES
The patient presents for follow-up of multiple medical issues.  As noted, the patient has a history of congestive heart failure for which she was hospitalized in June.  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  He did have an angioplasty of an LAD lesion and cardiology is monitoring him.  With respect to his CHF he is on med management and his weight is down significantly.  His CHF was felt to be likely in part from hypertension as well as alcohol.  His blood pressure control is better and he has not been drinking.    During the hospital stay a CT of his chest was done and this identified a left renal mass which needs follow-up.    Recently he was in the ER for cellulitis.  This is resolved.    The patient does not have chest pain.  He is not having shortness of breath or dizziness or palpitations.  He has known A. fib which goes back to 2018.    In terms of his blood pressure his control is better but still not optimal.  He was seen by cardiology recently as noted and has not gotten his new medications in order to raise the doses of his Imdur or Norvasc.    At the last office visit I added Spiriva for what I felt was likely COPD.  He just got a 2 days ago.  He was wheezing at that time.    Past Medical History:   Diagnosis Date     ASCVD (arteriosclerotic cardiovascular disease) 1997    angio with 40% circ lesion     Atrial fibrillation (H) 10/09/2018    found on routine physical     Carotid artery plaque 2005    seen on us, about 50% stenosis, fu 2009 us no significant stenosis     Elevated blood sugar      Gout     on allopurinol     HTN (hypertension)      Hypercholesteremia      Hyponatremia 2015    felt due to chlorthalidone     Low mean corpuscular volume (MCV)      Near syncope 5/15    fu est echo low level but neg     Psoriasis     Dr. Marti     Renal mass 06/29/2019    seen on ct chest for sob, needs fu ct for  that     Screening     abd us no aaa     Past Surgical History:   Procedure Laterality Date     C ANESTH,DX ARTHROSCOPIC PROC KNEE JOINT  2009     CV CORONARY ANGIOGRAM N/A 2019    Procedure: Coronary Angiogram;  Surgeon: Donaldo Loera MD;  Location:  [facility]     CV HEART CATHETERIZATION WITH POSSIBLE INTERVENTION N/A 2019    Procedure: Heart Catheterization with Possible Intervention;  Surgeon: Manolo Hu MD;  Location:  [facility]     CV LEFT HEART CATH N/A 2019    Procedure: Left Heart Cath;  Surgeon: Donaldo Loera MD;  Location:  [facility]     CV LEFT VENTRICULOGRAM N/A 2019    Procedure: Left Ventriculogram;  Surgeon: Donaldo Loera MD;  Location:  [facility]     CV PCI STENT DRUG ELUTING N/A 2019    Procedure: PCI Stent Drug Eluting;  Surgeon: Manolo Hu MD;  Location:  [facility]     CV RIGHT HEART CATH N/A 2019    Procedure: Right Heart Cath;  Surgeon: Donaldo Loera MD;  Location:  [facility]ATH [facility]     Social History     Socioeconomic History     Marital status:      Spouse name: Not on file     Number of children: 2     Years of education: Not on file     Highest education level: Not on file   Occupational History     Occupation: retired   Social Needs     Financial resource strain: Not on file     Food insecurity:     Worry: Not on file     Inability: Not on file     Transportation needs:     Medical: Not on file     Non-medical: Not on file   Tobacco Use     Smoking status: Former Smoker     Packs/day: 1.00     Years: 30.00     Pack years: 30.00     Types: Cigars     Last attempt to quit: 1998     Years since quittin.8     Smokeless tobacco: Never Used   Substance and Sexual Activity     Alcohol use: Yes     Alcohol/week: 8.4 oz     Types: 14 Standard drinks or equivalent per week     Frequency: 2-3 times a week     Drinks per  session: 1 or 2     Binge frequency: Never     Comment: 1-2 a night     Drug use: No     Sexual activity: Never   Lifestyle     Physical activity:     Days per week: Not on file     Minutes per session: Not on file     Stress: Not on file   Relationships     Social connections:     Talks on phone: Not on file     Gets together: Not on file     Attends Catholic service: Not on file     Active member of club or organization: Not on file     Attends meetings of clubs or organizations: Not on file     Relationship status: Not on file     Intimate partner violence:     Fear of current or ex partner: Not on file     Emotionally abused: Not on file     Physically abused: Not on file     Forced sexual activity: Not on file   Other Topics Concern     Parent/sibling w/ CABG, MI or angioplasty before 65F 55M? Not Asked   Social History Narrative     Not on file     Current Outpatient Medications   Medication Sig Dispense Refill     allopurinol (ZYLOPRIM) 300 MG tablet TAKE 1 TABLET(300 MG) BY MOUTH DAILY 90 tablet 1     amLODIPine (NORVASC) 5 MG tablet Take 1 tablet (5 mg) by mouth daily 90 tablet 3     ASPIRIN NOT PRESCRIBED (INTENTIONAL) Please choose reason not prescribed, below       atorvastatin (LIPITOR) 40 MG tablet Take 1 tablet (40 mg) by mouth daily 90 tablet 3     carvedilol (COREG) 12.5 MG tablet Take 1 tablet (12.5 mg) by mouth 2 times daily (with meals) 180 tablet 3     cephALEXin (KEFLEX) 500 MG capsule Take 1 capsule (500 mg) by mouth 4 times daily for 10 days 40 capsule 0     clopidogrel (PLAVIX) 75 MG tablet Take 1 tablet (75 mg) by mouth daily 90 tablet 3     furosemide (LASIX) 40 MG tablet Take 1 tablet (40 mg) by mouth daily 90 tablet 3     isosorbide mononitrate (IMDUR) 120 MG 24 HR ER tablet Take 1 tablet (120 mg) by mouth daily 90 tablet 3     losartan (COZAAR) 100 MG tablet TAKE 1 TABLET(100 MG) BY MOUTH DAILY 90 tablet 1     rivaroxaban ANTICOAGULANT (XARELTO) 15 MG TABS tablet Take 1 tablet (15 mg)  by mouth daily (with dinner) 90 tablet 3     tiotropium (SPIRIVA RESPIMAT) 2.5 MCG/ACT inhaler Inhale 2 puffs into the lungs daily 1 Inhaler 1     traMADol (ULTRAM) 50 MG tablet Take 1 tablet (50 mg) by mouth every 6 hours as needed for pain 15 tablet 0     Allergies   Allergen Reactions     Ace Inhibitors Anaphylaxis     Edema of ace     FAMILY HISTORY NOTED AND REVIEWED    REVIEW OF SYSTEMS: above    PHYSICAL EXAM    /69 (BP Location: Right arm, Patient Position: Chair, Cuff Size: Adult Regular)   Pulse 68   Wt 83.5 kg (184 lb)   SpO2 97%   BMI 29.70 kg/m      Patient appears non toxic  Right hand not red, warm or tender  Lungs clear  cv irreg irreg, no jvp or edema, no murmer  Abdomen normal active bowel sounds, soft non-tender    Spirometry - dec lung volumes, normal ratio    ASSESSMENT:  1. Systolic heart failure, doing well with med mgmt  2. Ascvd, med mgmt  3. Hypertension, blood pressure not optimal but raising dose of meds soon  4. Renal mass, to get us, avoid ct for now  5. Elevated creat, arf, suspect due to diuretics/meds  6. Hand cellulitis, resolved  7. Possible mild copd    PLAN:  Us of renal mass  Change lasix to prn weight above 182 or shortness of breath  Call if problems  Follow up 4 weeks and labs then    Rudy Vernon M.D.

## 2019-07-25 NOTE — PATIENT INSTRUCTIONS
1. I will have radiology call you to look at the kidney    2. Call if you get shortness of breath    3. Change the furosemide to only take it if your weight goes up above 182 pounds or you get more shortness of breath.    4. See me in 4 weeks    Rudy Vernon M.D.

## 2019-07-29 ENCOUNTER — HOSPITAL ENCOUNTER (OUTPATIENT)
Dept: CARDIAC REHAB | Facility: CLINIC | Age: 74
End: 2019-07-29
Attending: INTERNAL MEDICINE
Payer: COMMERCIAL

## 2019-07-29 PROCEDURE — 93798 PHYS/QHP OP CAR RHAB W/ECG: CPT

## 2019-07-29 PROCEDURE — 40000116 ZZH STATISTIC OP CR VISIT

## 2019-07-30 ENCOUNTER — TELEPHONE (OUTPATIENT)
Dept: CARDIOLOGY | Facility: CLINIC | Age: 74
End: 2019-07-30

## 2019-07-30 NOTE — TELEPHONE ENCOUNTER
Patient seen by Melvi Montaño PA-C on 7/16/19.    Patient called stating that during OV he and Melvi had a lengthy discussion about his upcoming 12-day trip to [place] and had mutually decided that it would be best to cancel the trip.    Patient requesting a letter signed by Melvi to submit to travel insurance company for reimbursement. Letter drafted for Melvi to review and sign.     WAI BrowerN, RN  Care Coordinator - Cardiology

## 2019-07-31 ENCOUNTER — HOSPITAL ENCOUNTER (OUTPATIENT)
Dept: CARDIAC REHAB | Facility: CLINIC | Age: 74
End: 2019-07-31
Attending: INTERNAL MEDICINE
Payer: COMMERCIAL

## 2019-07-31 ENCOUNTER — HOSPITAL ENCOUNTER (OUTPATIENT)
Dept: ULTRASOUND IMAGING | Facility: CLINIC | Age: 74
Discharge: HOME | End: 2019-07-31
Attending: INTERNAL MEDICINE | Admitting: INTERNAL MEDICINE
Payer: COMMERCIAL

## 2019-07-31 ENCOUNTER — TELEPHONE (OUTPATIENT)
Dept: FAMILY MEDICINE | Facility: CLINIC | Age: 74
End: 2019-07-31

## 2019-07-31 DIAGNOSIS — N28.89 LEFT RENAL MASS: ICD-10-CM

## 2019-07-31 PROCEDURE — 40000116 ZZH STATISTIC OP CR VISIT: Performed by: CLINICAL EXERCISE PHYSIOLOGIST

## 2019-07-31 PROCEDURE — 93798 PHYS/QHP OP CAR RHAB W/ECG: CPT | Performed by: CLINICAL EXERCISE PHYSIOLOGIST

## 2019-07-31 PROCEDURE — 76770 US EXAM ABDO BACK WALL COMP: CPT

## 2019-07-31 NOTE — TELEPHONE ENCOUNTER
Left non detailed message for pt to call back for not-urgent results.  Pt reps, ok to relay below, but can speak to nurse if questions.

## 2019-07-31 NOTE — TELEPHONE ENCOUNTER
Please call and let patient know the kidney us is normal, no masses, so whatever they saw on ct was not real, no follow up needed on this    Rudy Vernon M.D.

## 2019-08-02 ENCOUNTER — HOSPITAL ENCOUNTER (OUTPATIENT)
Dept: CARDIAC REHAB | Facility: CLINIC | Age: 74
End: 2019-08-02
Attending: INTERNAL MEDICINE
Payer: COMMERCIAL

## 2019-08-02 PROCEDURE — 93798 PHYS/QHP OP CAR RHAB W/ECG: CPT | Performed by: CLINICAL EXERCISE PHYSIOLOGIST

## 2019-08-02 PROCEDURE — 40000116 ZZH STATISTIC OP CR VISIT: Performed by: CLINICAL EXERCISE PHYSIOLOGIST

## 2019-08-05 ENCOUNTER — HOSPITAL ENCOUNTER (OUTPATIENT)
Dept: CARDIAC REHAB | Facility: CLINIC | Age: 74
End: 2019-08-05
Attending: INTERNAL MEDICINE
Payer: COMMERCIAL

## 2019-08-05 PROCEDURE — 93798 PHYS/QHP OP CAR RHAB W/ECG: CPT

## 2019-08-05 PROCEDURE — 40000116 ZZH STATISTIC OP CR VISIT

## 2019-08-07 ENCOUNTER — HOSPITAL ENCOUNTER (OUTPATIENT)
Dept: CARDIAC REHAB | Facility: CLINIC | Age: 74
End: 2019-08-07
Attending: INTERNAL MEDICINE
Payer: COMMERCIAL

## 2019-08-07 PROCEDURE — 40000116 ZZH STATISTIC OP CR VISIT: Performed by: REHABILITATION PRACTITIONER

## 2019-08-07 PROCEDURE — 93798 PHYS/QHP OP CAR RHAB W/ECG: CPT | Performed by: REHABILITATION PRACTITIONER

## 2019-08-12 ENCOUNTER — HOSPITAL ENCOUNTER (OUTPATIENT)
Dept: CARDIAC REHAB | Facility: CLINIC | Age: 74
End: 2019-08-12
Attending: INTERNAL MEDICINE
Payer: COMMERCIAL

## 2019-08-12 DIAGNOSIS — M10.9 GOUT, UNSPECIFIED CAUSE, UNSPECIFIED CHRONICITY, UNSPECIFIED SITE: ICD-10-CM

## 2019-08-12 DIAGNOSIS — I10 BENIGN ESSENTIAL HYPERTENSION: ICD-10-CM

## 2019-08-12 PROCEDURE — 93798 PHYS/QHP OP CAR RHAB W/ECG: CPT

## 2019-08-12 PROCEDURE — 40000116 ZZH STATISTIC OP CR VISIT

## 2019-08-12 NOTE — TELEPHONE ENCOUNTER
Allopurinol 300 mg    Last Written Prescription Date:  03/21/19  Last Fill Quantity: 90 tablets,  # refills: 1   Last office visit: 7/25/2019 with prescribing provider:  Saulo   Future Office Visit:   Next 5 appointments (look out 90 days)    Aug 26, 2019 10:30 AM CDT  Return Visit with Melvi Montaño PA-C  [facility] ([facility]) [address] [address] [phone] OPT 2   Aug 27, 2019 10:00 AM CDT  Office Visit with Rudy Vernon MD  [facility] ([facility]) [address] [facility] [address] [phone]   Sep 19, 2019 10:45 AM CDT  Return Visit with Mendez Hidalgo MD  [facility] ([facility]) [facility] [address] [phone] OPT 2         Losartan 100 mg    Last Written Prescription Date:  03/21/19  Last Fill Quantity: 90 tablets,  # refills: 1   Last office visit: 7/25/2019 with prescribing provider:  Saulo   Future Office Visit:   Next 5 appointments (look out 90 days)    Aug 26, 2019 10:30 AM CDT  Return Visit with Melvi Montaño PA-C  [facility] ([facility]) [address] [address] [phone] OPT 2   Aug 27, 2019 10:00 AM CDT  Office Visit with Rudy Vernon MD  [facility] ([facility]) [address] [facility] [address] [phone]   Sep 19, 2019 10:45 AM CDT  Return Visit with Mendez Hidalgo MD  [facility] ([facility]) [address] [address] [phone] OPT 2

## 2019-08-12 NOTE — TELEPHONE ENCOUNTER
Reason for Call:  Medication or medication refill:    Do you use a [facility]?  Name of the pharmacy and phone number for the current request:     [id number] [place] [address] AT [facility] OF  [address]  &  [facility] - [facility] [address]    Name of the medication requested: losartan (COZAAR) 100 MG tablet  &  allopurinol (ZYLOPRIM) 300 MG tablet       Other request: Pt is out of losartan. Please send that to [facility]. Allopurinol can go through mail order    Can we leave a detailed message on this number? YES    Phone number patient can be reached at: Cell number on file:    Telephone Information:   Mobile [phone]     Best Time: any    Call taken on 8/12/2019 at 10:51 AM by Kath Nielsen

## 2019-08-13 RX ORDER — ALLOPURINOL 300 MG/1
TABLET ORAL
Start: 2019-08-13

## 2019-08-13 RX ORDER — LOSARTAN POTASSIUM 100 MG/1
TABLET ORAL
Start: 2019-08-13

## 2019-08-14 ENCOUNTER — HOSPITAL ENCOUNTER (OUTPATIENT)
Dept: CARDIAC REHAB | Facility: CLINIC | Age: 74
End: 2019-08-14
Attending: INTERNAL MEDICINE
Payer: COMMERCIAL

## 2019-08-14 PROCEDURE — 93798 PHYS/QHP OP CAR RHAB W/ECG: CPT | Performed by: CLINICAL EXERCISE PHYSIOLOGIST

## 2019-08-14 PROCEDURE — 40000116 ZZH STATISTIC OP CR VISIT: Performed by: CLINICAL EXERCISE PHYSIOLOGIST

## 2019-08-16 ENCOUNTER — HOSPITAL ENCOUNTER (OUTPATIENT)
Dept: CARDIAC REHAB | Facility: CLINIC | Age: 74
End: 2019-08-16
Attending: INTERNAL MEDICINE
Payer: COMMERCIAL

## 2019-08-16 PROCEDURE — 40000116 ZZH STATISTIC OP CR VISIT

## 2019-08-16 PROCEDURE — 93798 PHYS/QHP OP CAR RHAB W/ECG: CPT

## 2019-08-19 ENCOUNTER — HOSPITAL ENCOUNTER (OUTPATIENT)
Dept: CARDIAC REHAB | Facility: CLINIC | Age: 74
End: 2019-08-19
Attending: INTERNAL MEDICINE
Payer: COMMERCIAL

## 2019-08-19 PROCEDURE — 40000116 ZZH STATISTIC OP CR VISIT

## 2019-08-19 PROCEDURE — 93798 PHYS/QHP OP CAR RHAB W/ECG: CPT

## 2019-08-21 ENCOUNTER — HOSPITAL ENCOUNTER (OUTPATIENT)
Dept: CARDIAC REHAB | Facility: CLINIC | Age: 74
End: 2019-08-21
Attending: INTERNAL MEDICINE
Payer: COMMERCIAL

## 2019-08-21 PROCEDURE — 40000116 ZZH STATISTIC OP CR VISIT

## 2019-08-21 PROCEDURE — 93798 PHYS/QHP OP CAR RHAB W/ECG: CPT

## 2019-08-28 ENCOUNTER — HOSPITAL ENCOUNTER (OUTPATIENT)
Dept: CARDIAC REHAB | Facility: CLINIC | Age: 74
End: 2019-08-28
Attending: INTERNAL MEDICINE
Payer: COMMERCIAL

## 2019-08-28 PROCEDURE — 93798 PHYS/QHP OP CAR RHAB W/ECG: CPT

## 2019-08-28 PROCEDURE — 40000116 ZZH STATISTIC OP CR VISIT

## 2019-08-30 ENCOUNTER — HOSPITAL ENCOUNTER (OUTPATIENT)
Dept: CARDIAC REHAB | Facility: CLINIC | Age: 74
End: 2019-08-30
Attending: INTERNAL MEDICINE
Payer: COMMERCIAL

## 2019-08-30 PROCEDURE — 93798 PHYS/QHP OP CAR RHAB W/ECG: CPT | Performed by: CLINICAL EXERCISE PHYSIOLOGIST

## 2019-08-30 PROCEDURE — 40000116 ZZH STATISTIC OP CR VISIT: Performed by: CLINICAL EXERCISE PHYSIOLOGIST

## 2019-09-04 ENCOUNTER — HOSPITAL ENCOUNTER (OUTPATIENT)
Dept: CARDIAC REHAB | Facility: CLINIC | Age: 74
End: 2019-09-04
Attending: INTERNAL MEDICINE
Payer: COMMERCIAL

## 2019-09-04 PROCEDURE — 40000116 ZZH STATISTIC OP CR VISIT: Performed by: CLINICAL EXERCISE PHYSIOLOGIST

## 2019-09-04 PROCEDURE — 93798 PHYS/QHP OP CAR RHAB W/ECG: CPT | Performed by: CLINICAL EXERCISE PHYSIOLOGIST

## 2019-09-06 ENCOUNTER — HOSPITAL ENCOUNTER (OUTPATIENT)
Dept: CARDIAC REHAB | Facility: CLINIC | Age: 74
End: 2019-09-06
Attending: INTERNAL MEDICINE
Payer: COMMERCIAL

## 2019-09-06 PROCEDURE — 93798 PHYS/QHP OP CAR RHAB W/ECG: CPT | Performed by: OCCUPATIONAL THERAPIST

## 2019-09-06 PROCEDURE — 40000116 ZZH STATISTIC OP CR VISIT: Performed by: OCCUPATIONAL THERAPIST

## 2019-09-09 ENCOUNTER — OFFICE VISIT (OUTPATIENT)
Dept: CARDIOLOGY | Facility: CLINIC | Age: 74
End: 2019-09-09
Attending: PHYSICIAN ASSISTANT
Payer: COMMERCIAL

## 2019-09-09 VITALS
HEART RATE: 50 BPM | BODY MASS INDEX: 30.68 KG/M2 | SYSTOLIC BLOOD PRESSURE: 106 MMHG | DIASTOLIC BLOOD PRESSURE: 60 MMHG | WEIGHT: 190.9 LBS | HEIGHT: 66 IN

## 2019-09-09 DIAGNOSIS — I50.9 ACUTE ON CHRONIC CONGESTIVE HEART FAILURE, UNSPECIFIED HEART FAILURE TYPE (H): ICD-10-CM

## 2019-09-09 DIAGNOSIS — I10 BENIGN ESSENTIAL HYPERTENSION: ICD-10-CM

## 2019-09-09 DIAGNOSIS — I25.10 CORONARY ARTERY DISEASE INVOLVING NATIVE CORONARY ARTERY OF NATIVE HEART WITHOUT ANGINA PECTORIS: Primary | ICD-10-CM

## 2019-09-09 DIAGNOSIS — I50.23 ACUTE ON CHRONIC SYSTOLIC CONGESTIVE HEART FAILURE (H): ICD-10-CM

## 2019-09-09 LAB
ANION GAP SERPL CALCULATED.3IONS-SCNC: 13.7 MMOL/L (ref 6–17)
BUN SERPL-MCNC: 31 MG/DL (ref 7–30)
CALCIUM SERPL-MCNC: 10.1 MG/DL (ref 8.5–10.5)
CHLORIDE SERPL-SCNC: 102 MMOL/L (ref 98–107)
CO2 SERPL-SCNC: 24 MMOL/L (ref 23–29)
CREAT SERPL-MCNC: 1.55 MG/DL (ref 0.7–1.3)
GFR SERPL CREATININE-BSD FRML MDRD: 44 ML/MIN/{1.73_M2}
GLUCOSE SERPL-MCNC: 104 MG/DL (ref 70–105)
NT-PROBNP SERPL-MCNC: 1667 PG/ML (ref 0–125)
POTASSIUM SERPL-SCNC: 4.7 MMOL/L (ref 3.5–5.1)
SODIUM SERPL-SCNC: 135 MMOL/L (ref 136–145)

## 2019-09-09 PROCEDURE — 83880 ASSAY OF NATRIURETIC PEPTIDE: CPT | Performed by: NURSE PRACTITIONER

## 2019-09-09 PROCEDURE — 36415 COLL VENOUS BLD VENIPUNCTURE: CPT | Performed by: NURSE PRACTITIONER

## 2019-09-09 PROCEDURE — 80048 BASIC METABOLIC PNL TOTAL CA: CPT | Performed by: NURSE PRACTITIONER

## 2019-09-09 PROCEDURE — 99214 OFFICE O/P EST MOD 30 MIN: CPT | Performed by: PHYSICIAN ASSISTANT

## 2019-09-09 RX ORDER — FUROSEMIDE 40 MG
20 TABLET ORAL DAILY
Qty: 90 TABLET | Refills: 3 | Status: SHIPPED | OUTPATIENT
Start: 2019-09-09 | End: 2019-09-14

## 2019-09-09 ASSESSMENT — MIFFLIN-ST. JEOR: SCORE: 1553.67

## 2019-09-09 NOTE — PROGRESS NOTES
[id number]  HPI and Plan:   See dictation    Orders this Visit:  Orders Placed This Encounter   Procedures     NM Lexiscan stress test (nuc card)     Orders Placed This Encounter   Medications     furosemide (LASIX) 40 MG tablet     Sig: Take 0.5 tablets (20 mg) by mouth daily     Dispense:  90 tablet     Refill:  3     Do not fill, dose adjustment     Medications Discontinued During This Encounter   Medication Reason     furosemide (LASIX) 40 MG tablet      cephALEXin (KEFLEX) 500 MG capsule          Encounter Diagnoses   Name Primary?     Acute on chronic systolic congestive heart failure (H)      Coronary artery disease involving native coronary artery of native heart without angina pectoris Yes     Benign essential hypertension        CURRENT MEDICATIONS:  Current Outpatient Medications   Medication Sig Dispense Refill     allopurinol (ZYLOPRIM) 300 MG tablet TAKE 1 TABLET(300 MG) BY MOUTH DAILY 90 tablet 1     amLODIPine (NORVASC) 5 MG tablet Take 1 tablet (5 mg) by mouth daily 90 tablet 3     atorvastatin (LIPITOR) 40 MG tablet Take 1 tablet (40 mg) by mouth daily 90 tablet 3     carvedilol (COREG) 12.5 MG tablet Take 1 tablet (12.5 mg) by mouth 2 times daily (with meals) 180 tablet 3     clopidogrel (PLAVIX) 75 MG tablet Take 1 tablet (75 mg) by mouth daily 90 tablet 3     furosemide (LASIX) 40 MG tablet Take 0.5 tablets (20 mg) by mouth daily 90 tablet 3     isosorbide mononitrate (IMDUR) 120 MG 24 HR ER tablet Take 1 tablet (120 mg) by mouth daily 90 tablet 3     losartan (COZAAR) 100 MG tablet TAKE 1 TABLET(100 MG) BY MOUTH DAILY 90 tablet 1     rivaroxaban ANTICOAGULANT (XARELTO) 15 MG TABS tablet Take 1 tablet (15 mg) by mouth daily (with dinner) 90 tablet 3     tiotropium (SPIRIVA RESPIMAT) 2.5 MCG/ACT inhaler Inhale 2 puffs into the lungs daily 1 Inhaler 1     ASPIRIN NOT PRESCRIBED (INTENTIONAL) Please choose reason not prescribed, below (Patient not taking: Reported on 9/9/2019)       traMADol (ULTRAM)  50 MG tablet Take 1 tablet (50 mg) by mouth every 6 hours as needed for pain (Patient not taking: Reported on 9/9/2019) 15 tablet 0       ALLERGIES     Allergies   Allergen Reactions     Ace Inhibitors Anaphylaxis     Edema of ace       PAST MEDICAL HISTORY:  Past Medical History:   Diagnosis Date     ASCVD (arteriosclerotic cardiovascular disease) 1997    angio with 40% circ lesion     Atrial fibrillation (H) 10/09/2018    found on routine physical     Carotid artery plaque 2005    seen on us, about 50% stenosis, fu 2009 us no significant stenosis     Elevated blood sugar      Gout     on allopurinol     HTN (hypertension)      Hypercholesteremia      Hyponatremia 2015    felt due to chlorthalidone     Low mean corpuscular volume (MCV)      Near syncope 5/15    fu est echo low level but neg     Psoriasis     Dr. Marti     Renal mass 06/29/2019    seen on ct chest for sob, needs fu ct for that, fu us done 7/19 and no mass see     Screening 2012    abd us no aaa       PAST SURGICAL HISTORY:  Past Surgical History:   Procedure Laterality Date     C ANESTH,DX ARTHROSCOPIC PROC KNEE JOINT  2009     CV CORONARY ANGIOGRAM N/A 7/2/2019    Procedure: Coronary Angiogram;  Surgeon: Donaldo Loera MD;  Location:  HEART CARDIAC CATH LAB     CV HEART CATHETERIZATION WITH POSSIBLE INTERVENTION N/A 7/5/2019    Procedure: Heart Catheterization with Possible Intervention;  Surgeon: Manolo Hu MD;  Location:  HEART CARDIAC CATH LAB     CV LEFT HEART CATH N/A 7/2/2019    Procedure: Left Heart Cath;  Surgeon: Donaldo Loera MD;  Location:  HEART CARDIAC CATH LAB     CV LEFT VENTRICULOGRAM N/A 7/2/2019    Procedure: Left Ventriculogram;  Surgeon: Donaldo Loera MD;  Location:  HEART CARDIAC CATH LAB     CV PCI STENT DRUG ELUTING N/A 7/5/2019    Procedure: PCI Stent Drug Eluting;  Surgeon: Manolo Hu MD;  Location:  HEART CARDIAC CATH LAB     CV RIGHT HEART CATH N/A 7/2/2019     Procedure: Right Heart Cath;  Surgeon: Donaldo Loera MD;  Location:  HEART CARDIAC CATH LAB       FAMILY HISTORY:  Family History   Problem Relation Age of Onset     Coronary Artery Disease Father      Medical History Unknown Mother      Medical History Unknown Maternal Grandfather        SOCIAL HISTORY:  Social History     Socioeconomic History     Marital status:      Spouse name: None     Number of children: 2     Years of education: None     Highest education level: None   Occupational History     Occupation: retired   Social Needs     Financial resource strain: None     Food insecurity:     Worry: None     Inability: None     Transportation needs:     Medical: None     Non-medical: None   Tobacco Use     Smoking status: Former Smoker     Packs/day: 1.00     Years: 30.00     Pack years: 30.00     Types: Cigars     Last attempt to quit: 1998     Years since quittin.0     Smokeless tobacco: Never Used   Substance and Sexual Activity     Alcohol use: Not Currently     Alcohol/week: 8.4 oz     Types: 14 Standard drinks or equivalent per week     Frequency: 2-3 times a week     Drinks per session: 1 or 2     Binge frequency: Never     Comment: 1-2 a night     Drug use: No     Sexual activity: Never   Lifestyle     Physical activity:     Days per week: None     Minutes per session: None     Stress: None   Relationships     Social connections:     Talks on phone: None     Gets together: None     Attends Yarsani service: None     Active member of club or organization: None     Attends meetings of clubs or organizations: None     Relationship status: None     Intimate partner violence:     Fear of current or ex partner: None     Emotionally abused: None     Physically abused: None     Forced sexual activity: None   Other Topics Concern     Parent/sibling w/ CABG, MI or angioplasty before 65F 55M? Not Asked   Social History Narrative     None       Review of Systems:  Skin:  Negative     Eyes:  " Negative    ENT:  Negative    Respiratory:  Negative    Cardiovascular:    Positive for  Gastroenterology: Negative    Genitourinary:  Negative    Musculoskeletal:  Negative    Neurologic:  Negative    Psychiatric:  Negative    Heme/Lymph/Imm:  Negative    Endocrine:  Negative      Physical Exam:  Vitals: /60   Pulse 50   Ht 1.676 m (5' 6\")   Wt 86.6 kg (190 lb 14.4 oz)   BMI 30.81 kg/m     Please refer to dictation for physical exam    Recent Lab Results:  LIPID RESULTS:  Lab Results   Component Value Date    CHOL 137 07/06/2019    HDL 39 (L) 07/06/2019    LDL 63 07/06/2019    TRIG 175 (H) 07/06/2019    CHOLHDLRATIO 2.8 02/26/2015       LIVER ENZYME RESULTS:  Lab Results   Component Value Date    AST 21 06/30/2019    ALT 22 06/30/2019       CBC RESULTS:  Lab Results   Component Value Date    WBC 11.4 (H) 07/18/2019    RBC 4.66 07/18/2019    HGB 12.6 (L) 07/18/2019    HCT 39.4 (L) 07/18/2019    MCV 85 07/18/2019    MCH 27.0 07/18/2019    MCHC 32.0 07/18/2019    RDW 15.6 (H) 07/18/2019     07/18/2019       BMP RESULTS:  Lab Results   Component Value Date     (L) 09/09/2019    POTASSIUM 4.7 09/09/2019    CHLORIDE 102 09/09/2019    CO2 24 09/09/2019    ANIONGAP 13.7 09/09/2019     09/09/2019    BUN 31 (H) 09/09/2019    CR 1.55 (H) 09/09/2019    GFRESTIMATED 44 (L) 09/09/2019    GFRESTBLACK 53 (L) 09/09/2019    GUNNER 10.1 09/09/2019        A1C RESULTS:  Lab Results   Component Value Date    A1C 5.7 (H) 06/30/2019       INR RESULTS:  Lab Results   Component Value Date    INR 2.33 (H) 07/18/2019           KRISS Montaño PA-C  [address]  [place] [id number]      "

## 2019-09-09 NOTE — LETTER
9/9/2019    Rudy Vernon MD  [address]  [place] [address]    RE: Betito Anderson       Dear Colleague,    I had the pleasure of seeing Betito Anderson in the [facility].    [id number]  HPI and Plan:   See dictation    Orders this Visit:  Orders Placed This Encounter   Procedures     NM Lexiscan stress test (nuc card)     Orders Placed This Encounter   Medications     furosemide (LASIX) 40 MG tablet     Sig: Take 0.5 tablets (20 mg) by mouth daily     Dispense:  90 tablet     Refill:  3     Do not fill, dose adjustment     Medications Discontinued During This Encounter   Medication Reason     furosemide (LASIX) 40 MG tablet      cephALEXin (KEFLEX) 500 MG capsule          Encounter Diagnoses   Name Primary?     Acute on chronic systolic congestive heart failure (H)      Coronary artery disease involving native coronary artery of native heart without angina pectoris Yes     Benign essential hypertension        CURRENT MEDICATIONS:  Current Outpatient Medications   Medication Sig Dispense Refill     allopurinol (ZYLOPRIM) 300 MG tablet TAKE 1 TABLET(300 MG) BY MOUTH DAILY 90 tablet 1     amLODIPine (NORVASC) 5 MG tablet Take 1 tablet (5 mg) by mouth daily 90 tablet 3     atorvastatin (LIPITOR) 40 MG tablet Take 1 tablet (40 mg) by mouth daily 90 tablet 3     carvedilol (COREG) 12.5 MG tablet Take 1 tablet (12.5 mg) by mouth 2 times daily (with meals) 180 tablet 3     clopidogrel (PLAVIX) 75 MG tablet Take 1 tablet (75 mg) by mouth daily 90 tablet 3     furosemide (LASIX) 40 MG tablet Take 0.5 tablets (20 mg) by mouth daily 90 tablet 3     isosorbide mononitrate (IMDUR) 120 MG 24 HR ER tablet Take 1 tablet (120 mg) by mouth daily 90 tablet 3     losartan (COZAAR) 100 MG tablet TAKE 1 TABLET(100 MG) BY MOUTH DAILY 90 tablet 1     rivaroxaban ANTICOAGULANT (XARELTO) 15 MG TABS tablet Take 1 tablet (15 mg) by mouth daily (with dinner) 90 tablet 3     tiotropium (SPIRIVA  RESPIMAT) 2.5 MCG/ACT inhaler Inhale 2 puffs into the lungs daily 1 Inhaler 1     ASPIRIN NOT PRESCRIBED (INTENTIONAL) Please choose reason not prescribed, below (Patient not taking: Reported on 9/9/2019)       traMADol (ULTRAM) 50 MG tablet Take 1 tablet (50 mg) by mouth every 6 hours as needed for pain (Patient not taking: Reported on 9/9/2019) 15 tablet 0       ALLERGIES     Allergies   Allergen Reactions     Ace Inhibitors Anaphylaxis     Edema of ace       PAST MEDICAL HISTORY:  Past Medical History:   Diagnosis Date     ASCVD (arteriosclerotic cardiovascular disease) 1997    angio with 40% circ lesion     Atrial fibrillation (H) 10/09/2018    found on routine physical     Carotid artery plaque 2005    seen on us, about 50% stenosis, fu 2009 us no significant stenosis     Elevated blood sugar      Gout     on allopurinol     HTN (hypertension)      Hypercholesteremia      Hyponatremia 2015    felt due to chlorthalidone     Low mean corpuscular volume (MCV)      Near syncope 5/15    fu est echo low level but neg     Psoriasis     Dr. Marti     Renal mass 06/29/2019    seen on ct chest for sob, needs fu ct for that, fu us done 7/19 and no mass see     Screening 2012    abd us no aaa       PAST SURGICAL HISTORY:  Past Surgical History:   Procedure Laterality Date     C ANESTH,DX ARTHROSCOPIC PROC KNEE JOINT  2009     CV CORONARY ANGIOGRAM N/A 7/2/2019    Procedure: Coronary Angiogram;  Surgeon: Donaldo Loera MD;  Location:  HEART CARDIAC CATH LAB     CV HEART CATHETERIZATION WITH POSSIBLE INTERVENTION N/A 7/5/2019    Procedure: Heart Catheterization with Possible Intervention;  Surgeon: Manolo Hu MD;  Location:  HEART CARDIAC CATH LAB     CV LEFT HEART CATH N/A 7/2/2019    Procedure: Left Heart Cath;  Surgeon: Donaldo Loera MD;  Location:  HEART CARDIAC CATH LAB     CV LEFT VENTRICULOGRAM N/A 7/2/2019    Procedure: Left Ventriculogram;  Surgeon: Donaldo Loera MD;   Location:  HEART CARDIAC CATH LAB     CV PCI STENT DRUG ELUTING N/A 2019    Procedure: PCI Stent Drug Eluting;  Surgeon: Manolo Hu MD;  Location:  HEART CARDIAC CATH LAB     CV RIGHT HEART CATH N/A 2019    Procedure: Right Heart Cath;  Surgeon: Donaldo Loera MD;  Location:  HEART CARDIAC CATH LAB       FAMILY HISTORY:  Family History   Problem Relation Age of Onset     Coronary Artery Disease Father      Medical History Unknown Mother      Medical History Unknown Maternal Grandfather        SOCIAL HISTORY:  Social History     Socioeconomic History     Marital status:      Spouse name: None     Number of children: 2     Years of education: None     Highest education level: None   Occupational History     Occupation: retired   Social Needs     Financial resource strain: None     Food insecurity:     Worry: None     Inability: None     Transportation needs:     Medical: None     Non-medical: None   Tobacco Use     Smoking status: Former Smoker     Packs/day: 1.00     Years: 30.00     Pack years: 30.00     Types: Cigars     Last attempt to quit: 1998     Years since quittin.0     Smokeless tobacco: Never Used   Substance and Sexual Activity     Alcohol use: Not Currently     Alcohol/week: 8.4 oz     Types: 14 Standard drinks or equivalent per week     Frequency: 2-3 times a week     Drinks per session: 1 or 2     Binge frequency: Never     Comment: 1-2 a night     Drug use: No     Sexual activity: Never   Lifestyle     Physical activity:     Days per week: None     Minutes per session: None     Stress: None   Relationships     Social connections:     Talks on phone: None     Gets together: None     Attends Sikhism service: None     Active member of club or organization: None     Attends meetings of clubs or organizations: None     Relationship status: None     Intimate partner violence:     Fear of current or ex partner: None     Emotionally abused: None      "Physically abused: None     Forced sexual activity: None   Other Topics Concern     Parent/sibling w/ CABG, MI or angioplasty before 65F 55M? Not Asked   Social History Narrative     None       Review of Systems:  Skin:  Negative     Eyes:  Negative    ENT:  Negative    Respiratory:  Negative    Cardiovascular:    Positive for  Gastroenterology: Negative    Genitourinary:  Negative    Musculoskeletal:  Negative    Neurologic:  Negative    Psychiatric:  Negative    Heme/Lymph/Imm:  Negative    Endocrine:  Negative      Physical Exam:  Vitals: /60   Pulse 50   Ht 1.676 m (5' 6\")   Wt 86.6 kg (190 lb 14.4 oz)   BMI 30.81 kg/m      Please refer to dictation for physical exam    Recent Lab Results:  LIPID RESULTS:  Lab Results   Component Value Date    CHOL 137 07/06/2019    HDL 39 (L) 07/06/2019    LDL 63 07/06/2019    TRIG 175 (H) 07/06/2019    CHOLHDLRATIO 2.8 02/26/2015       LIVER ENZYME RESULTS:  Lab Results   Component Value Date    AST 21 06/30/2019    ALT 22 06/30/2019       CBC RESULTS:  Lab Results   Component Value Date    WBC 11.4 (H) 07/18/2019    RBC 4.66 07/18/2019    HGB 12.6 (L) 07/18/2019    HCT 39.4 (L) 07/18/2019    MCV 85 07/18/2019    MCH 27.0 07/18/2019    MCHC 32.0 07/18/2019    RDW 15.6 (H) 07/18/2019     07/18/2019       BMP RESULTS:  Lab Results   Component Value Date     (L) 09/09/2019    POTASSIUM 4.7 09/09/2019    CHLORIDE 102 09/09/2019    CO2 24 09/09/2019    ANIONGAP 13.7 09/09/2019     09/09/2019    BUN 31 (H) 09/09/2019    CR 1.55 (H) 09/09/2019    GFRESTIMATED 44 (L) 09/09/2019    GFRESTBLACK 53 (L) 09/09/2019    GUNNER 10.1 09/09/2019        A1C RESULTS:  Lab Results   Component Value Date    A1C 5.7 (H) 06/30/2019       INR RESULTS:  Lab Results   Component Value Date    INR 2.33 (H) 07/18/2019           CC  Melvi Montaño PARamosC  [address]  [place] [id number]        Thank you for allowing me to participate in the care of your " patient.      Sincerely,     Melvi Montaño PA-C     [facility]    cc:   Melvi Montaño PA-C  [address] [place] [id number]

## 2019-09-09 NOTE — LETTER
2019      Rudy Vernon MD  [address]  [facility] [address]      RE: Betito Leeon       Dear Colleague,    I had the pleasure of seeing Betito Anderson in the [facility].    Service Date: 2019      PRIMARY CARDIOLOGIST:  Dr. Hidalgo.      REASON FOR VISIT:   1.  Severe coronary artery disease, hypertension followup.      HISTORY OF PRESENT ILLNESS:  Mr. Anderson is a delightful 73-year-old gentleman with past medical history significant for the followin.  Severe 3-vessel coronary artery disease which had been nonocclusive on cath in , but on angiogram in July showed a normal left main, an 80% mid-LAD lesion, 70% mid circ lesion, and a subtotally occluded RCA with left-to-right collaterals.  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  There is an ongoing 75% D2 lesion, as well as a circumflex lesion.   2.  History of systolic heart failure with an EF of 35%, felt to be hypertensive, normalized to 50%-55% with control his blood pressure during hospitalization, now with chronic  HFpEF.   3.  Dyslipidemia.   4.  Hypertension.   5.  Chronic atrial fibrillation, now on Xarelto.      I met Betito when on rounds with Dr. Hidalgo when he presented with acute heart failure.  He was markedly hypertensive and with control of his blood pressure, his EF normalized.  He also diuresed well.  He was declined for CABG and, in fact, did not want CABG and plans for intervention are as noted above.      He thinks he is overall doing okay, but he feels like he has plateaued.  He denies chest pain, shortness of breath, orthopnea or PND.  But he just is not really feeling more energy.  He had some chest discomfort this weekend that started after eating and felt like he had pain when he was swallowing.  This resolved.  He  otherwise notes that if he walks up hills, he is just plain tired and has tired legs.  He otherwise feels okay, but just not a lot of energy.      SOCIAL HISTORY:  He is .  Today's is his 52nd wedding anniversary.  He is a former smoker, quit in 1998 with a 30-pack-year history.  About 2 drinks of alcohol a night.      PHYSICAL EXAMINATION:   GENERAL:  Well-developed, well-nourished gentleman, in no acute distress.   HEENT:  Normocephalic, atraumatic.   HEART:  Irregularly irregular and distant.  I do not appreciate murmur, rub or gallop today.   RESPIRATORY:  Lungs are clear without wheezes, rales or rhonchi.   EXTREMITIES:  Without peripheral edema.   SKIN:  Warm and dry.      ASSESSMENT AND PLAN:   1.  Severe 3-vessel coronary artery disease, status post drug-eluting stent to the mid LAD at the bifurcation of the D2.  He has remaining 70% circumflex lesion and a chronically occluded RCA.  He is on Xarelto and Plavix for anticoagulation.  At this point, we will do a Lexiscan nuclear stress test, holding his beta blocker to assess if he has circumflex ischemia.  At that point, I will defer to Dr. Hidalgo if he wants to intervene on that vessel.   2.  Hypertension, now with excellent control here and at cardiac rehab, where he ranges between 100-120 systolic.  We will continue on carvedilol 12.5, amlodipine 5, Imdur 120 and losartan 100.   3.  Chronic systolic heart failure, now with EF normalized, with elevated creatinine ongoing.  I am going to decrease his Lasix to 20 mg daily.  If need be, we may want to decrease losartan if the creatinine does not improve.  He is euvolemic today on exam and he will continue to watch his weights and eat low sodium.   4.  Chronic AFib with a slow response today, but no symptoms of this.  Rechecked pulse was at 64.  We will continue him on Xarelto.  He has a CHADS-VASc of 4 for age, hypertension, coronary artery disease and CHF.      He will get a Lexiscan nuclear stress test  and see Dr. Hidalgo back with a repeat BMP before that visit.      Thank you for allowing me to participate in this delightful patient's care.      Melvi Miranda PA-C      cc:   Mendez Hidalgo MD   [facility]   [address], [facility] [address]   [place] [address]         MELVI MIRANDA PA-C             D: 2019   T: 2019   MT: al      Name:     MARCELA TINAJERO   MRN:      [id number]        Account:      [id number]   :      1945           Service Date: 2019      Document: [id number]         Outpatient Encounter Medications as of 2019   Medication Sig Dispense Refill     allopurinol (ZYLOPRIM) 300 MG tablet TAKE 1 TABLET(300 MG) BY MOUTH DAILY 90 tablet 1     amLODIPine (NORVASC) 5 MG tablet Take 1 tablet (5 mg) by mouth daily 90 tablet 3     atorvastatin (LIPITOR) 40 MG tablet Take 1 tablet (40 mg) by mouth daily 90 tablet 3     carvedilol (COREG) 12.5 MG tablet Take 1 tablet (12.5 mg) by mouth 2 times daily (with meals) 180 tablet 3     clopidogrel (PLAVIX) 75 MG tablet Take 1 tablet (75 mg) by mouth daily 90 tablet 3     furosemide (LASIX) 40 MG tablet Take 0.5 tablets (20 mg) by mouth daily 90 tablet 3     isosorbide mononitrate (IMDUR) 120 MG 24 HR ER tablet Take 1 tablet (120 mg) by mouth daily 90 tablet 3     losartan (COZAAR) 100 MG tablet TAKE 1 TABLET(100 MG) BY MOUTH DAILY 90 tablet 1     rivaroxaban ANTICOAGULANT (XARELTO) 15 MG TABS tablet Take 1 tablet (15 mg) by mouth daily (with dinner) 90 tablet 3     tiotropium (SPIRIVA RESPIMAT) 2.5 MCG/ACT inhaler Inhale 2 puffs into the lungs daily 1 Inhaler 1     ASPIRIN NOT PRESCRIBED (INTENTIONAL) Please choose reason not prescribed, below (Patient not taking: Reported on 2019)       traMADol (ULTRAM) 50 MG tablet Take 1 tablet (50 mg) by mouth every 6 hours as needed for pain (Patient not taking: Reported on 2019) 15 tablet 0     [DISCONTINUED] cephALEXin (KEFLEX) 500 MG capsule Take 1 capsule (500 mg) by mouth 4  times daily for 10 days (Patient not taking: Reported on 9/9/2019) 40 capsule 0     [DISCONTINUED] furosemide (LASIX) 40 MG tablet Take 1 tablet (40 mg) by mouth daily 90 tablet 3     No facility-administered encounter medications on file as of 9/9/2019.        Again, thank you for allowing me to participate in the care of your patient.      Sincerely,    Melvi Montaño PA-C     [facility]

## 2019-09-09 NOTE — PATIENT INSTRUCTIONS
Thank you for coming into [facility] today.    We discussed: we'll do a stress test before you see Dr. Hidalgo and this will help us decide whether or not to put in another stent.      Medication changes:  Continue current medications.      Results: kidney numbers are a bit high.  Before you get your next labs make sure you drink a full glass of liquid.     Follow up: as scheduled with Dr. Hidalgo.        Please call my nurse at [phone] with any questions or concerns.    Scheduling phone number: [phone]  Reminder: Please bring in all current medications, over the counter supplements and vitamin bottles to your next appointment.

## 2019-09-09 NOTE — PROGRESS NOTES
Service Date: 2019      PRIMARY CARDIOLOGIST:  Dr. Hidalgo.      REASON FOR VISIT:   1.  Severe coronary artery disease, hypertension followup.      HISTORY OF PRESENT ILLNESS:  Mr. Anderson is a delightful 73-year-old gentleman with past medical history significant for the followin.  Severe 3-vessel coronary artery disease which had been nonocclusive on cath in , but on angiogram in July showed a normal left main, an 80% mid-LAD lesion, 70% mid circ lesion, and a subtotally occluded RCA with left-to-right collaterals.  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  There is an ongoing 75% D2 lesion, as well as a circumflex lesion.   2.  History of systolic heart failure with an EF of 35%, felt to be hypertensive, normalized to 50%-55% with control his blood pressure during hospitalization, now with chronic  HFpEF.   3.  Dyslipidemia.   4.  Hypertension.   5.  Chronic atrial fibrillation, now on Xarelto.      I met Betito when on rounds with Dr. Hidalgo when he presented with acute heart failure.  He was markedly hypertensive and with control of his blood pressure, his EF normalized.  He also diuresed well.  He was declined for CABG and, in fact, did not want CABG and plans for intervention are as noted above.      He thinks he is overall doing okay, but he feels like he has plateaued.  He denies chest pain, shortness of breath, orthopnea or PND.  But he just is not really feeling more energy.  He had some chest discomfort this weekend that started after eating and felt like he had pain when he was swallowing.  This resolved.  He otherwise notes that if he walks up hills, he is just plain tired and has tired legs.  He otherwise feels okay, but just not a lot of energy.      SOCIAL HISTORY:  He is .  Today's is his 52nd wedding anniversary.  He  is a former smoker, quit in 1998 with a 30-pack-year history.  About 2 drinks of alcohol a night.      PHYSICAL EXAMINATION:   GENERAL:  Well-developed, well-nourished gentleman, in no acute distress.   HEENT:  Normocephalic, atraumatic.   HEART:  Irregularly irregular and distant.  I do not appreciate murmur, rub or gallop today.   RESPIRATORY:  Lungs are clear without wheezes, rales or rhonchi.   EXTREMITIES:  Without peripheral edema.   SKIN:  Warm and dry.      ASSESSMENT AND PLAN:   1.  Severe 3-vessel coronary artery disease, status post drug-eluting stent to the mid LAD at the bifurcation of the D2.  He has remaining 70% circumflex lesion and a chronically occluded RCA.  He is on Xarelto and Plavix for anticoagulation.  At this point, we will do a Lexiscan nuclear stress test, holding his beta blocker to assess if he has circumflex ischemia.  At that point, I will defer to Dr. Hidalgo if he wants to intervene on that vessel.   2.  Hypertension, now with excellent control here and at cardiac rehab, where he ranges between 100-120 systolic.  We will continue on carvedilol 12.5, amlodipine 5, Imdur 120 and losartan 100.   3.  Chronic systolic heart failure, now with EF normalized, with elevated creatinine ongoing.  I am going to decrease his Lasix to 20 mg daily.  If need be, we may want to decrease losartan if the creatinine does not improve.  He is euvolemic today on exam and he will continue to watch his weights and eat low sodium.   4.  Chronic AFib with a slow response today, but no symptoms of this.  Rechecked pulse was at 64.  We will continue him on Xarelto.  He has a CHADS-VASc of 4 for age, hypertension, coronary artery disease and CHF.      He will get a Lexiscan nuclear stress test and see Dr. Hidalgo back with a repeat BMP before that visit.      Thank you for allowing me to participate in this delightful patient's care.      Melvi Montaño PA-C      cc:   Mendez Hidalgo MD   [facility]   [address], [facility] [address]   [place] [address]         NATI MIRANDA PA-C             D: 2019   T: 2019   MT: al      Name:     MARCELA TINAJERO   MRN:      [id number]        Account:      [id number]   :      1945           Service Date: 2019      Document: [id number]

## 2019-09-10 NOTE — RESULT ENCOUNTER NOTE
Reviewed during clinic visit.  Please see progress note for plan.  Melvi Montaño PA-C 9/10/2019 10:59 AM

## 2019-09-11 ENCOUNTER — HOSPITAL ENCOUNTER (OUTPATIENT)
Dept: CARDIAC REHAB | Facility: CLINIC | Age: 74
End: 2019-09-11
Attending: INTERNAL MEDICINE
Payer: COMMERCIAL

## 2019-09-11 PROCEDURE — 93798 PHYS/QHP OP CAR RHAB W/ECG: CPT | Performed by: REHABILITATION PRACTITIONER

## 2019-09-11 PROCEDURE — 40000116 ZZH STATISTIC OP CR VISIT: Performed by: REHABILITATION PRACTITIONER

## 2019-09-12 ENCOUNTER — HOSPITAL ENCOUNTER (OUTPATIENT)
Dept: CARDIOLOGY | Facility: CLINIC | Age: 74
Discharge: HOME | End: 2019-09-12
Attending: PHYSICIAN ASSISTANT | Admitting: PHYSICIAN ASSISTANT
Payer: COMMERCIAL

## 2019-09-12 DIAGNOSIS — I25.10 CORONARY ARTERY DISEASE INVOLVING NATIVE CORONARY ARTERY OF NATIVE HEART WITHOUT ANGINA PECTORIS: ICD-10-CM

## 2019-09-12 PROCEDURE — 25000128 H RX IP 250 OP 636: Performed by: PHYSICIAN ASSISTANT

## 2019-09-12 PROCEDURE — A9502 TC99M TETROFOSMIN: HCPCS | Performed by: PHYSICIAN ASSISTANT

## 2019-09-12 PROCEDURE — 78452 HT MUSCLE IMAGE SPECT MULT: CPT

## 2019-09-12 PROCEDURE — 93018 CV STRESS TEST I&R ONLY: CPT | Performed by: INTERNAL MEDICINE

## 2019-09-12 PROCEDURE — 34300033 ZZH RX 343: Performed by: PHYSICIAN ASSISTANT

## 2019-09-12 PROCEDURE — 78452 HT MUSCLE IMAGE SPECT MULT: CPT | Mod: 26 | Performed by: INTERNAL MEDICINE

## 2019-09-12 PROCEDURE — 93016 CV STRESS TEST SUPVJ ONLY: CPT | Performed by: INTERNAL MEDICINE

## 2019-09-12 RX ORDER — REGADENOSON 0.08 MG/ML
0.4 INJECTION, SOLUTION INTRAVENOUS ONCE
Status: COMPLETED | OUTPATIENT
Start: 2019-09-12 | End: 2019-09-12

## 2019-09-12 RX ORDER — ACYCLOVIR 200 MG/1
0-1 CAPSULE ORAL
Status: DISCONTINUED | OUTPATIENT
Start: 2019-09-12 | End: 2019-09-13 | Stop reason: HOSPADM

## 2019-09-12 RX ORDER — ALBUTEROL SULFATE 90 UG/1
2 AEROSOL, METERED RESPIRATORY (INHALATION) EVERY 5 MIN PRN
Status: DISCONTINUED | OUTPATIENT
Start: 2019-09-12 | End: 2019-09-13 | Stop reason: HOSPADM

## 2019-09-12 RX ORDER — AMINOPHYLLINE 25 MG/ML
50-100 INJECTION, SOLUTION INTRAVENOUS
Status: DISCONTINUED | OUTPATIENT
Start: 2019-09-12 | End: 2019-09-13 | Stop reason: HOSPADM

## 2019-09-12 RX ADMIN — REGADENOSON 0.4 MG: 0.08 INJECTION, SOLUTION INTRAVENOUS at 11:15

## 2019-09-12 RX ADMIN — TETROFOSMIN 3.42 MCI.: 1.38 INJECTION, POWDER, LYOPHILIZED, FOR SOLUTION INTRAVENOUS at 09:36

## 2019-09-12 RX ADMIN — TETROFOSMIN 8.98 MCI.: 1.38 INJECTION, POWDER, LYOPHILIZED, FOR SOLUTION INTRAVENOUS at 11:19

## 2019-09-13 ENCOUNTER — HOSPITAL ENCOUNTER (OUTPATIENT)
Dept: CARDIAC REHAB | Facility: CLINIC | Age: 74
End: 2019-09-13
Attending: INTERNAL MEDICINE
Payer: COMMERCIAL

## 2019-09-13 PROCEDURE — 93798 PHYS/QHP OP CAR RHAB W/ECG: CPT | Performed by: REHABILITATION PRACTITIONER

## 2019-09-13 PROCEDURE — 40000116 ZZH STATISTIC OP CR VISIT: Performed by: REHABILITATION PRACTITIONER

## 2019-09-14 ENCOUNTER — APPOINTMENT (OUTPATIENT)
Dept: CT IMAGING | Facility: CLINIC | Age: 74
End: 2019-09-14
Attending: EMERGENCY MEDICINE
Payer: COMMERCIAL

## 2019-09-14 ENCOUNTER — HOSPITAL ENCOUNTER (OUTPATIENT)
Facility: CLINIC | Age: 74
Discharge: HOME | End: 2019-09-15
Attending: EMERGENCY MEDICINE | Admitting: INTERNAL MEDICINE
Payer: COMMERCIAL

## 2019-09-14 ENCOUNTER — APPOINTMENT (OUTPATIENT)
Dept: GENERAL RADIOLOGY | Facility: CLINIC | Age: 74
End: 2019-09-14
Attending: EMERGENCY MEDICINE
Payer: COMMERCIAL

## 2019-09-14 ENCOUNTER — TELEPHONE (OUTPATIENT)
Dept: CARDIOLOGY | Facility: CLINIC | Age: 74
End: 2019-09-14

## 2019-09-14 DIAGNOSIS — I25.10 ASCVD (ARTERIOSCLEROTIC CARDIOVASCULAR DISEASE): Primary | ICD-10-CM

## 2019-09-14 DIAGNOSIS — I48.91 ATRIAL FIBRILLATION, CONTROLLED (H): ICD-10-CM

## 2019-09-14 DIAGNOSIS — R55 SYNCOPE, UNSPECIFIED SYNCOPE TYPE: ICD-10-CM

## 2019-09-14 LAB
ANION GAP SERPL CALCULATED.3IONS-SCNC: 5 MMOL/L (ref 3–14)
APTT PPP: 41 SEC (ref 22–37)
BASOPHILS # BLD AUTO: 0.1 10E9/L (ref 0–0.2)
BASOPHILS NFR BLD AUTO: 0.6 %
BUN SERPL-MCNC: 20 MG/DL (ref 7–30)
CALCIUM SERPL-MCNC: 8.5 MG/DL (ref 8.5–10.1)
CHLORIDE SERPL-SCNC: 100 MMOL/L (ref 94–109)
CO2 SERPL-SCNC: 28 MMOL/L (ref 20–32)
CREAT SERPL-MCNC: 1.29 MG/DL (ref 0.66–1.25)
DIFFERENTIAL METHOD BLD: ABNORMAL
EOSINOPHIL # BLD AUTO: 0.2 10E9/L (ref 0–0.7)
EOSINOPHIL NFR BLD AUTO: 1.8 %
ERYTHROCYTE [DISTWIDTH] IN BLOOD BY AUTOMATED COUNT: 16.3 % (ref 10–15)
GFR SERPL CREATININE-BSD FRML MDRD: 54 ML/MIN/{1.73_M2}
GLUCOSE SERPL-MCNC: 135 MG/DL (ref 70–99)
HCT VFR BLD AUTO: 36.8 % (ref 40–53)
HGB BLD-MCNC: 12.2 G/DL (ref 13.3–17.7)
IMM GRANULOCYTES # BLD: 0.1 10E9/L (ref 0–0.4)
IMM GRANULOCYTES NFR BLD: 0.5 %
INR PPP: 1.18 (ref 0.86–1.14)
INTERPRETATION ECG - MUSE: NORMAL
LYMPHOCYTES # BLD AUTO: 1.1 10E9/L (ref 0.8–5.3)
LYMPHOCYTES NFR BLD AUTO: 10.7 %
MAGNESIUM SERPL-MCNC: 1.5 MG/DL (ref 1.6–2.3)
MCH RBC QN AUTO: 27.9 PG (ref 26.5–33)
MCHC RBC AUTO-ENTMCNC: 33.2 G/DL (ref 31.5–36.5)
MCV RBC AUTO: 84 FL (ref 78–100)
MONOCYTES # BLD AUTO: 0.7 10E9/L (ref 0–1.3)
MONOCYTES NFR BLD AUTO: 6.8 %
NEUTROPHILS # BLD AUTO: 7.9 10E9/L (ref 1.6–8.3)
NEUTROPHILS NFR BLD AUTO: 79.6 %
NRBC # BLD AUTO: 0 10*3/UL
NRBC BLD AUTO-RTO: 0 /100
PLATELET # BLD AUTO: 315 10E9/L (ref 150–450)
POTASSIUM SERPL-SCNC: 4.3 MMOL/L (ref 3.4–5.3)
RBC # BLD AUTO: 4.38 10E12/L (ref 4.4–5.9)
SODIUM SERPL-SCNC: 133 MMOL/L (ref 133–144)
TROPONIN I SERPL-MCNC: <0.015 UG/L (ref 0–0.04)
TROPONIN I SERPL-MCNC: <0.015 UG/L (ref 0–0.04)
WBC # BLD AUTO: 9.9 10E9/L (ref 4–11)

## 2019-09-14 PROCEDURE — 25000128 H RX IP 250 OP 636

## 2019-09-14 PROCEDURE — 70450 CT HEAD/BRAIN W/O DYE: CPT

## 2019-09-14 PROCEDURE — 68300005 ZZH TRAUMA EVALUATION W/O CC LEVEL III

## 2019-09-14 PROCEDURE — 71046 X-RAY EXAM CHEST 2 VIEWS: CPT

## 2019-09-14 PROCEDURE — 85610 PROTHROMBIN TIME: CPT | Performed by: EMERGENCY MEDICINE

## 2019-09-14 PROCEDURE — 85730 THROMBOPLASTIN TIME PARTIAL: CPT | Performed by: EMERGENCY MEDICINE

## 2019-09-14 PROCEDURE — 25000132 ZZH RX MED GY IP 250 OP 250 PS 637: Performed by: INTERNAL MEDICINE

## 2019-09-14 PROCEDURE — 25000128 H RX IP 250 OP 636: Performed by: INTERNAL MEDICINE

## 2019-09-14 PROCEDURE — 93005 ELECTROCARDIOGRAM TRACING: CPT

## 2019-09-14 PROCEDURE — 80048 BASIC METABOLIC PNL TOTAL CA: CPT | Performed by: EMERGENCY MEDICINE

## 2019-09-14 PROCEDURE — 83735 ASSAY OF MAGNESIUM: CPT | Performed by: EMERGENCY MEDICINE

## 2019-09-14 PROCEDURE — 84484 ASSAY OF TROPONIN QUANT: CPT | Performed by: INTERNAL MEDICINE

## 2019-09-14 PROCEDURE — 85025 COMPLETE CBC W/AUTO DIFF WBC: CPT | Performed by: EMERGENCY MEDICINE

## 2019-09-14 PROCEDURE — 21000001 ZZH R&B HEART CARE

## 2019-09-14 PROCEDURE — 99223 1ST HOSP IP/OBS HIGH 75: CPT | Mod: AI | Performed by: INTERNAL MEDICINE

## 2019-09-14 PROCEDURE — 99285 EMERGENCY DEPT VISIT HI MDM: CPT | Mod: 25

## 2019-09-14 PROCEDURE — 36415 COLL VENOUS BLD VENIPUNCTURE: CPT | Performed by: INTERNAL MEDICINE

## 2019-09-14 PROCEDURE — 84484 ASSAY OF TROPONIN QUANT: CPT | Mod: 91 | Performed by: EMERGENCY MEDICINE

## 2019-09-14 RX ORDER — LOSARTAN POTASSIUM 100 MG/1
100 TABLET ORAL DAILY
Status: DISCONTINUED | OUTPATIENT
Start: 2019-09-15 | End: 2019-09-15 | Stop reason: HOSPADM

## 2019-09-14 RX ORDER — ISOSORBIDE MONONITRATE 60 MG/1
120 TABLET, EXTENDED RELEASE ORAL DAILY
Status: DISCONTINUED | OUTPATIENT
Start: 2019-09-15 | End: 2019-09-15 | Stop reason: HOSPADM

## 2019-09-14 RX ORDER — POTASSIUM CHLORIDE 1.5 G/1.58G
20-40 POWDER, FOR SOLUTION ORAL
Status: DISCONTINUED | OUTPATIENT
Start: 2019-09-14 | End: 2019-09-15 | Stop reason: HOSPADM

## 2019-09-14 RX ORDER — POTASSIUM CHLORIDE 29.8 MG/ML
20 INJECTION INTRAVENOUS
Status: DISCONTINUED | OUTPATIENT
Start: 2019-09-14 | End: 2019-09-15 | Stop reason: HOSPADM

## 2019-09-14 RX ORDER — BISACODYL 10 MG
10 SUPPOSITORY, RECTAL RECTAL DAILY PRN
Status: DISCONTINUED | OUTPATIENT
Start: 2019-09-14 | End: 2019-09-15 | Stop reason: HOSPADM

## 2019-09-14 RX ORDER — POLYETHYLENE GLYCOL 3350 17 G/17G
17 POWDER, FOR SOLUTION ORAL DAILY PRN
Status: DISCONTINUED | OUTPATIENT
Start: 2019-09-14 | End: 2019-09-15 | Stop reason: HOSPADM

## 2019-09-14 RX ORDER — CALCIUM CARBONATE 500 MG/1
1000 TABLET, CHEWABLE ORAL 4 TIMES DAILY PRN
Status: DISCONTINUED | OUTPATIENT
Start: 2019-09-14 | End: 2019-09-15 | Stop reason: HOSPADM

## 2019-09-14 RX ORDER — POTASSIUM CHLORIDE 1500 MG/1
20-40 TABLET, EXTENDED RELEASE ORAL
Status: DISCONTINUED | OUTPATIENT
Start: 2019-09-14 | End: 2019-09-15 | Stop reason: HOSPADM

## 2019-09-14 RX ORDER — MAGNESIUM SULFATE HEPTAHYDRATE 40 MG/ML
4 INJECTION, SOLUTION INTRAVENOUS EVERY 4 HOURS PRN
Status: DISCONTINUED | OUTPATIENT
Start: 2019-09-14 | End: 2019-09-15 | Stop reason: HOSPADM

## 2019-09-14 RX ORDER — ONDANSETRON 2 MG/ML
4 INJECTION INTRAMUSCULAR; INTRAVENOUS EVERY 6 HOURS PRN
Status: DISCONTINUED | OUTPATIENT
Start: 2019-09-14 | End: 2019-09-15 | Stop reason: HOSPADM

## 2019-09-14 RX ORDER — FUROSEMIDE 20 MG
20 TABLET ORAL DAILY
Status: DISCONTINUED | OUTPATIENT
Start: 2019-09-15 | End: 2019-09-15 | Stop reason: HOSPADM

## 2019-09-14 RX ORDER — AMOXICILLIN 250 MG
2 CAPSULE ORAL 2 TIMES DAILY PRN
Status: DISCONTINUED | OUTPATIENT
Start: 2019-09-14 | End: 2019-09-15 | Stop reason: HOSPADM

## 2019-09-14 RX ORDER — FUROSEMIDE 20 MG
20 TABLET ORAL DAILY
Status: DISCONTINUED | OUTPATIENT
Start: 2019-09-14 | End: 2019-09-14

## 2019-09-14 RX ORDER — POTASSIUM CHLORIDE 7.45 MG/ML
10 INJECTION INTRAVENOUS
Status: DISCONTINUED | OUTPATIENT
Start: 2019-09-14 | End: 2019-09-15 | Stop reason: HOSPADM

## 2019-09-14 RX ORDER — NALOXONE HYDROCHLORIDE 0.4 MG/ML
.1-.4 INJECTION, SOLUTION INTRAMUSCULAR; INTRAVENOUS; SUBCUTANEOUS
Status: DISCONTINUED | OUTPATIENT
Start: 2019-09-14 | End: 2019-09-15 | Stop reason: HOSPADM

## 2019-09-14 RX ORDER — CLOPIDOGREL BISULFATE 75 MG/1
75 TABLET ORAL DAILY
Status: DISCONTINUED | OUTPATIENT
Start: 2019-09-15 | End: 2019-09-15 | Stop reason: HOSPADM

## 2019-09-14 RX ORDER — FUROSEMIDE 20 MG
40 TABLET ORAL DAILY
COMMUNITY
End: 2020-07-27

## 2019-09-14 RX ORDER — AMOXICILLIN 250 MG
1 CAPSULE ORAL 2 TIMES DAILY PRN
Status: DISCONTINUED | OUTPATIENT
Start: 2019-09-14 | End: 2019-09-15 | Stop reason: HOSPADM

## 2019-09-14 RX ORDER — PROCHLORPERAZINE MALEATE 5 MG
5 TABLET ORAL EVERY 6 HOURS PRN
Status: DISCONTINUED | OUTPATIENT
Start: 2019-09-14 | End: 2019-09-15 | Stop reason: HOSPADM

## 2019-09-14 RX ORDER — CARVEDILOL 6.25 MG/1
6.25 TABLET ORAL 2 TIMES DAILY WITH MEALS
Status: DISCONTINUED | OUTPATIENT
Start: 2019-09-14 | End: 2019-09-15

## 2019-09-14 RX ORDER — ONDANSETRON 4 MG/1
4 TABLET, ORALLY DISINTEGRATING ORAL EVERY 6 HOURS PRN
Status: DISCONTINUED | OUTPATIENT
Start: 2019-09-14 | End: 2019-09-15 | Stop reason: HOSPADM

## 2019-09-14 RX ORDER — AMLODIPINE BESYLATE 5 MG/1
5 TABLET ORAL DAILY
Status: DISCONTINUED | OUTPATIENT
Start: 2019-09-15 | End: 2019-09-15 | Stop reason: HOSPADM

## 2019-09-14 RX ORDER — ALLOPURINOL 300 MG/1
300 TABLET ORAL DAILY
Status: DISCONTINUED | OUTPATIENT
Start: 2019-09-15 | End: 2019-09-15 | Stop reason: HOSPADM

## 2019-09-14 RX ORDER — PROCHLORPERAZINE 25 MG
12.5 SUPPOSITORY, RECTAL RECTAL EVERY 12 HOURS PRN
Status: DISCONTINUED | OUTPATIENT
Start: 2019-09-14 | End: 2019-09-15 | Stop reason: HOSPADM

## 2019-09-14 RX ORDER — ATORVASTATIN CALCIUM 40 MG/1
40 TABLET, FILM COATED ORAL DAILY
Status: DISCONTINUED | OUTPATIENT
Start: 2019-09-15 | End: 2019-09-15 | Stop reason: HOSPADM

## 2019-09-14 RX ORDER — ACETAMINOPHEN 325 MG/1
650 TABLET ORAL EVERY 4 HOURS PRN
Status: DISCONTINUED | OUTPATIENT
Start: 2019-09-14 | End: 2019-09-15 | Stop reason: HOSPADM

## 2019-09-14 RX ADMIN — HEPARIN SODIUM 850 UNITS/HR: 10000 INJECTION, SOLUTION INTRAVENOUS at 19:14

## 2019-09-14 RX ADMIN — Medication 4300 UNITS: at 19:13

## 2019-09-14 RX ADMIN — CARVEDILOL 6.25 MG: 6.25 TABLET, FILM COATED ORAL at 19:14

## 2019-09-14 ASSESSMENT — ACTIVITIES OF DAILY LIVING (ADL)
FALL_HISTORY_WITHIN_LAST_SIX_MONTHS: YES
BATHING: 0-->INDEPENDENT
ADLS_ACUITY_SCORE: 15
SWALLOWING: 0-->SWALLOWS FOODS/LIQUIDS WITHOUT DIFFICULTY
TOILETING: 0-->INDEPENDENT
RETIRED_EATING: 0-->INDEPENDENT
COGNITION: 0 - NO COGNITION ISSUES REPORTED
NUMBER_OF_TIMES_PATIENT_HAS_FALLEN_WITHIN_LAST_SIX_MONTHS: 1
DRESS: 0-->INDEPENDENT
TRANSFERRING: 0-->INDEPENDENT
RETIRED_COMMUNICATION: 0-->UNDERSTANDS/COMMUNICATES WITHOUT DIFFICULTY
ADLS_ACUITY_SCORE: 15
AMBULATION: 0-->INDEPENDENT

## 2019-09-14 ASSESSMENT — ENCOUNTER SYMPTOMS
DIZZINESS: 1
SHORTNESS OF BREATH: 0
VOMITING: 1
NAUSEA: 1
LIGHT-HEADEDNESS: 1

## 2019-09-14 ASSESSMENT — MIFFLIN-ST. JEOR
SCORE: 1538.7
SCORE: 1504.22
SCORE: 1526.75

## 2019-09-14 NOTE — TELEPHONE ENCOUNTER
Patient's wife Diane Peterson calls to tell us patient had a syncopal episode and then vomited.  He is light headed and asked to go to the hospital.  He vomited again before transport.  He is now more awake and will be transported by wife to the ER.  No chest pain.  Rene Curry MD, FACC  September 14, 2019 12:04 PM

## 2019-09-14 NOTE — PHARMACY-ADMISSION MEDICATION HISTORY
Admission medication history interview status for the 9/14/2019  admission is complete. See EPIC admission navigator for prior to admission medications     Medication history source reliability:Good    Actions taken by pharmacist (provider contacted, etc): Called patients wife (Diane) to verify dosages on prescription bottles.      Additional medication history information not noted on PTA med list :  Meds Deleted: Tramadol   Meds Changed: Furosemide     Medication reconciliation/reorder completed by provider prior to medication history? Yes    Time spent in this activity: 15 minutes    Prior to Admission medications    Medication Sig Last Dose Taking? Auth Provider   allopurinol (ZYLOPRIM) 300 MG tablet TAKE 1 TABLET(300 MG) BY MOUTH DAILY 9/14/2019 at AM Yes Rudy Vernon MD   amLODIPine (NORVASC) 5 MG tablet Take 1 tablet (5 mg) by mouth daily 9/14/2019 at AM Yes More, Melvi Mckoy PA-C   atorvastatin (LIPITOR) 40 MG tablet Take 1 tablet (40 mg) by mouth daily 9/14/2019 at AM Yes More, Melvi Mckoy PA-C   carvedilol (COREG) 12.5 MG tablet Take 1 tablet (12.5 mg) by mouth 2 times daily (with meals) 9/14/2019 at AM Yes More, Melvi Mckoy PA-C   clopidogrel (PLAVIX) 75 MG tablet Take 1 tablet (75 mg) by mouth daily 9/14/2019 at AM Yes More, Melvi Mckoy PA-C   furosemide (LASIX) 20 MG tablet Take 20 mg by mouth daily 9/14/2019 at AM Yes Unknown, Entered By History   isosorbide mononitrate (IMDUR) 120 MG 24 HR ER tablet Take 1 tablet (120 mg) by mouth daily 9/14/2019 at AM Yes More, Melvi Mckoy PA-C   losartan (COZAAR) 100 MG tablet TAKE 1 TABLET(100 MG) BY MOUTH DAILY 9/14/2019 at AM Yes Rudy Vernon MD   rivaroxaban ANTICOAGULANT (XARELTO) 15 MG TABS tablet Take 1 tablet (15 mg) by mouth daily (with dinner) 9/13/2019 at PM Yes Sabra, Melvi Mckoy PA-C   tiotropium (SPIRIVA RESPIMAT) 2.5 MCG/ACT inhaler Inhale 2 puffs into the lungs daily 9/14/2019 at AM Yes Saulo  Rudy Rivero MD   ASPIRIN NOT PRESCRIBED (INTENTIONAL) Please choose reason not prescribed, below  Patient not taking: Reported on 9/9/2019   Rudy Vernon MD

## 2019-09-14 NOTE — ED NOTES
"[facility]  ED Nurse Handoff Report    ED Chief complaint: Dizziness      ED Diagnosis:   Final diagnoses:   Syncope, unspecified syncope type   Atrial fibrillation, controlled (H)       Code Status: Full Code    Allergies:   Allergies   Allergen Reactions     Ace Inhibitors Anaphylaxis     Edema of ace       Activity level - Baseline/Home:  Independent  Activity Level - Current:   Independent    Patient's Preferred language: english   Needed?: No    Isolation: No  Infection: Not Applicable  Bariatric?: No    Vital Signs:   Vitals:    09/14/19 1300 09/14/19 1315 09/14/19 1415 09/14/19 1430   BP:   (!) 121/106 134/71   Pulse:   73 66   Resp: 18 14 18 19   Temp:       TempSrc:       SpO2: 99% 99% 93% 98%   Weight:       Height:           Cardiac Rhythm: ,        Pain level:      Is this patient confused?: No   Does this patient have a guardian?  No         If yes, is there guardianship documents in the Epic \"Code/ACP\" activity?  N/A         Guardian Notified?  N/A  [place] - Suicide Severity Rating Scale Completed?  Yes  If yes, what color did the patient score?  White    Patient Report: Initial Complaint: syncope  Focused Assessment: pt was sitting at his desk today and felt lightheaded, then found himself under the desk on the floor. Pt with hx of afib, on plavix, no pain. Alert and oriented. Steady on feet.   Tests Performed: labs, xray, ct  Abnormal Results:   Results for orders placed or performed during the hospital encounter of 09/14/19   XR Chest 2 Views    Narrative    CHEST TWO VIEWS 9/14/2019 1:31 PM     HISTORY: Syncope    COMPARISON: June 29, 2019     FINDINGS: Stable granuloma at the left apex. No pneumothorax. There  are no acute infiltrates. The cardiac silhouette is not enlarged.  Pulmonary vasculature is unremarkable.      Impression    IMPRESSION: No acute disease.    CONNOR RUANO MD   CT Head w/o Contrast    Narrative    CT OF THE HEAD WITHOUT CONTRAST 9/14/2019 2:03 " PM     COMPARISON: None    HISTORY: Syncope, hit head, on two blood thinners.    TECHNIQUE: 5 mm thick axial CT images of the head were acquired  without IV contrast material.    FINDINGS: There is mild diffuse cerebral volume loss. There are subtle  patchy areas of decreased density in the cerebral white matter  bilaterally that are consistent with sequela of chronic small vessel  ischemic disease.    The ventricles and basal cisterns are within normal limits in  configuration given the degree of cerebral volume loss. There is no  midline shift. There are no extra-axial fluid collections.    No intracranial hemorrhage, mass or recent infarct.    The visualized paranasal sinuses are well-aerated. There is no  mastoiditis. There are no fractures of the visualized bones.      Impression    IMPRESSION: Diffuse cerebral volume loss and cerebral white matter  changes consistent with chronic small vessel ischemic disease. No  evidence for acute intracranial pathology.        Radiation dose for this scan was reduced using automated exposure  control, adjustment of the mA and/or kV according to patient size, or  iterative reconstruction technique    PAZ TRIPATHI MD   CBC with platelets differential   Result Value Ref Range    WBC 9.9 4.0 - 11.0 10e9/L    RBC Count 4.38 (L) 4.4 - 5.9 10e12/L    Hemoglobin 12.2 (L) 13.3 - 17.7 g/dL    Hematocrit 36.8 (L) 40.0 - 53.0 %    MCV 84 78 - 100 fl    MCH 27.9 26.5 - 33.0 pg    MCHC 33.2 31.5 - 36.5 g/dL    RDW 16.3 (H) 10.0 - 15.0 %    Platelet Count 315 150 - 450 10e9/L    Diff Method Automated Method     % Neutrophils 79.6 %    % Lymphocytes 10.7 %    % Monocytes 6.8 %    % Eosinophils 1.8 %    % Basophils 0.6 %    % Immature Granulocytes 0.5 %    Nucleated RBCs 0 0 /100    Absolute Neutrophil 7.9 1.6 - 8.3 10e9/L    Absolute Lymphocytes 1.1 0.8 - 5.3 10e9/L    Absolute Monocytes 0.7 0.0 - 1.3 10e9/L    Absolute Eosinophils 0.2 0.0 - 0.7 10e9/L    Absolute Basophils 0.1 0.0 - 0.2  10e9/L    Abs Immature Granulocytes 0.1 0 - 0.4 10e9/L    Absolute Nucleated RBC 0.0    Basic metabolic panel   Result Value Ref Range    Sodium 133 133 - 144 mmol/L    Potassium 4.3 3.4 - 5.3 mmol/L    Chloride 100 94 - 109 mmol/L    Carbon Dioxide 28 20 - 32 mmol/L    Anion Gap 5 3 - 14 mmol/L    Glucose 135 (H) 70 - 99 mg/dL    Urea Nitrogen 20 7 - 30 mg/dL    Creatinine 1.29 (H) 0.66 - 1.25 mg/dL    GFR Estimate 54 (L) >60 mL/min/[1.73_m2]    GFR Estimate If Black 63 >60 mL/min/[1.73_m2]    Calcium 8.5 8.5 - 10.1 mg/dL   Troponin I   Result Value Ref Range    Troponin I ES <0.015 0.000 - 0.045 ug/L   INR   Result Value Ref Range    INR 1.18 (H) 0.86 - 1.14   Partial thromboplastin time   Result Value Ref Range    PTT 41 (H) 22 - 37 sec   EKG 12-lead, tracing only   Result Value Ref Range    Interpretation ECG Click View Image link to view waveform and result      Treatments provided: none    Family Comments: wife and children     OBS brochure/video discussed/provided to patient/family: N/A              Name of person given brochure if not patient: na              Relationship to patient: na    ED Medications: Medications - No data to display    Drips infusing?:  No    For the majority of the shift this patient was Green.   Interventions performed were non.    Severe Sepsis OR Septic Shock Diagnosis Present: No    To be done/followed up on inpatient unit:  none    ED NURSE PHONE NUMBER: [phone]

## 2019-09-14 NOTE — H&P
History and Physical     Betito Anderson MRN# [id number]   YOB: 1945 Age: 73 year old      Date of Admission:  9/14/2019    Primary care provider: Rudy Vernon          Assessment and Plan:   This is a  73 year old male admitted with syncope.    1. Syncope.  Concerning for cardiogenic syncope.  Patient may be having prolonged pauses.  Will reduce Coreg to 6.25 mg twice daily.  Patient may also be having ventricular arrhythmias.  The patient will be admitted to [facility] for continuous cardiac monitoring.  We will consult cardiology for recommendations regarding treatment.  We will cycle troponins.  Will hold Xarelto and place patient on heparin for possible pacemaker placement.    2.  Coronary artery disease.  The patient has a positive Lexiscan from September 12, 2019, demonstrating reversible ischemia in the basal inferolateral wall.  Continue Plavix and Imdur.  Will ask cardiology for recommendations regarding treatment.    3.  Hypertension.  Continue amlodipine and losartan.Coreg resumed at a lower dose due to bradycardia.    4.  Hyperlipidemia.  Continue atorvastatin.    5.  Chronic diastolic heart failure.  Patient appears euvolemic.  Continue furosemide.    6.  Gout.  Continue allopurinol.          Chief Complaint:   This patient is a 73 year old male who presents with syncope.    History is obtained from the patient         History of Present Illness:   Patient states that he is been in good health until this morning.  Patient states that he was sitting at his desk working on his computer when he had a sudden onset of lightheadedness and diaphoresis.  Patient states he lost consciousness and woke up on the floor.  He laid there for 20 minutes.  He then felt nauseated and vomited.  Patient was able to stand up and walk to the kitchen.  He told his wife he needed to go into the emergency department.  Patient states that he had few other episodes of lightheadedness and diaphoresis.  He  denies shortness of breath or chest pain.  He had no loss of bowel or bladder control.  He denies fevers or chills.  He has had no recent nausea, vomiting, or diarrhea.  He denies headaches, nasal congestion, runny nose, sore throat, or cough.  He denies muscle pain joint pains or rashes.  He has had no focal numbness or weakness.             Past Medical History:     Past Medical History:   Diagnosis Date     ASCVD (arteriosclerotic cardiovascular disease) 1997    angio with 40% circ lesion     Atrial fibrillation (H) 10/09/2018    found on routine physical     Carotid artery plaque 2005    seen on us, about 50% stenosis, fu 2009 us no significant stenosis     Elevated blood sugar      Gout     on allopurinol     HTN (hypertension)      Hypercholesteremia      Hyponatremia 2015    felt due to chlorthalidone     Low mean corpuscular volume (MCV)      Near syncope 5/15    fu est echo low level but neg     Psoriasis     Dr. Marti     Renal mass 06/29/2019    seen on ct chest for sob, needs fu ct for that, fu us done 7/19 and no mass see     Screening 2012    abd us no aaa             Past Surgical History:     Past Surgical History:   Procedure Laterality Date     C ANESTH,DX ARTHROSCOPIC PROC KNEE JOINT  2009     CV CORONARY ANGIOGRAM N/A 7/2/2019    Procedure: Coronary Angiogram;  Surgeon: Donaldo Loera MD;  Location:  [facility]     CV HEART CATHETERIZATION WITH POSSIBLE INTERVENTION N/A 7/5/2019    Procedure: Heart Catheterization with Possible Intervention;  Surgeon: Manolo Hu MD;  Location:  [facility]     CV LEFT HEART CATH N/A 7/2/2019    Procedure: Left Heart Cath;  Surgeon: Donaldo Loera MD;  Location:  [facility]     CV LEFT VENTRICULOGRAM N/A 7/2/2019    Procedure: Left Ventriculogram;  Surgeon: Donaldo Loera MD;  Location:  [facility]     CV PCI STENT DRUG ELUTING N/A 7/5/2019    Procedure: PCI Stent Drug  Eluting;  Surgeon: Manolo Hu MD;  Location:  [facility]     CV RIGHT HEART CATH N/A 2019    Procedure: Right Heart Cath;  Surgeon: Donaldo Loera MD;  Location:  [facility]             Social History:     Social History     Tobacco Use     Smoking status: Former Smoker     Packs/day: 1.00     Years: 30.00     Pack years: 30.00     Types: Cigars     Last attempt to quit: 1998     Years since quittin.0     Smokeless tobacco: Never Used   Substance Use Topics     Alcohol use: Not Currently     Alcohol/week: 8.4 oz     Types: 14 Standard drinks or equivalent per week     Frequency: 2-3 times a week     Drinks per session: 1 or 2     Binge frequency: Never     Comment: 1-2 a night             Family History:   Family history reviewed and is noncontributory other than noted in HPI          Immunizations:     Immunization History   Administered Date(s) Administered     Influenza (High Dose) 3 valent vaccine 2018, 10/09/2018     Pneumo Conj 13-V (2010&after) 2016     Pneumococcal 23 valent 2012     TD (ADULT, 7+) 10/24/2006     TDAP Vaccine (Adacel) 2012     Zoster vaccine, live 12/10/2010            Allergies:     Allergies   Allergen Reactions     Ace Inhibitors Anaphylaxis     Edema of ace             Medications:     Prior to Admission medications    Medication Sig Start Date End Date Taking? Authorizing Provider   allopurinol (ZYLOPRIM) 300 MG tablet TAKE 1 TABLET(300 MG) BY MOUTH DAILY 3/21/19  Yes Rudy Vernon MD   amLODIPine (NORVASC) 5 MG tablet Take 1 tablet (5 mg) by mouth daily 19  Yes More, Melvi Mckoy PA-C   atorvastatin (LIPITOR) 40 MG tablet Take 1 tablet (40 mg) by mouth daily 19  Yes More, Melvi Mckoy PA-C   carvedilol (COREG) 12.5 MG tablet Take 1 tablet (12.5 mg) by mouth 2 times daily (with meals) 19  Yes More, Melvi Mckoy PA-C   clopidogrel (PLAVIX) 75 MG tablet Take 1 tablet  (75 mg) by mouth daily 7/16/19  Yes More, Melvi Mckoy PA-C   furosemide (LASIX) 20 MG tablet Take 20 mg by mouth daily   Yes Unknown, Entered By History   isosorbide mononitrate (IMDUR) 120 MG 24 HR ER tablet Take 1 tablet (120 mg) by mouth daily 7/16/19  Yes More, Melvi Mckoy PA-C   losartan (COZAAR) 100 MG tablet TAKE 1 TABLET(100 MG) BY MOUTH DAILY 3/21/19  Yes Rudy Vernon MD   rivaroxaban ANTICOAGULANT (XARELTO) 15 MG TABS tablet Take 1 tablet (15 mg) by mouth daily (with dinner) 7/16/19  Yes More, Melvi Mckoy PA-C   tiotropium (SPIRIVA RESPIMAT) 2.5 MCG/ACT inhaler Inhale 2 puffs into the lungs daily 7/11/19  Yes Rudy Vernon MD   ASPIRIN NOT PRESCRIBED (INTENTIONAL) Please choose reason not prescribed, below  Patient not taking: Reported on 9/9/2019 7/25/19   Rudy Vernon MD            Review of Systems:   The 10 point Review of Systems performed and is negative other than noted in the HPI           Physical Exam:   Vitals were reviewed  Temp: 97.7  F (36.5  C) Temp src: Oral BP: 134/71 Pulse: 66 Heart Rate: 61 Resp: 19 SpO2: 98 %        This is a well nourished well developed male resting comfortably in bed, no acute distress  Head: atraumatic normocephalic, sclera noninjected and anicterric, oral mucosa moist without lesions or exudates.  Neck: supple without spinal abnormality  Chest: clear to auscultation bilaterally, without wheezes, rhonchi, or rales.  Cardiovascular: regular rate and rhythm, no murmurs, gallops, or rubs, no edema  Abdomen: bowel sounds normal, nontender and nondistended, no hepatosplenomegaly or masses.  Musculoskeletal: normal muscle mass and tone  Skin: no rashes  Lymph: no lymphadenopathy.  Neuro: cranial nerves II-XII intact, strength in all four extremities normal, reflexes normal, coordination normal.         Data:   Data reviewed today: I reviewed all medications, new labs and imaging results over the last 24 hours. I personally  reviewed the EKG tracing showing atrial flutter with slow ventricular response and the chest x-ray image(s) showing clear lungs bilaterally.    Recent Labs   Lab 09/14/19  1256 09/09/19  0745   WBC 9.9  --    HGB 12.2*  --    MCV 84  --      --    INR 1.18*  --     135*   POTASSIUM 4.3 4.7   CHLORIDE 100 102   CO2 28 24   BUN 20 31*   CR 1.29* 1.55*   ANIONGAP 5 13.7   GUNNER 8.5 10.1   * 104   TROPI <0.015  --      Recent Results (from the past 24 hour(s))   XR Chest 2 Views    Narrative    CHEST TWO VIEWS 9/14/2019 1:31 PM     HISTORY: Syncope    COMPARISON: June 29, 2019     FINDINGS: Stable granuloma at the left apex. No pneumothorax. There  are no acute infiltrates. The cardiac silhouette is not enlarged.  Pulmonary vasculature is unremarkable.      Impression    IMPRESSION: No acute disease.    CONNOR RUANO MD   CT Head w/o Contrast    Narrative    CT OF THE HEAD WITHOUT CONTRAST 9/14/2019 2:03 PM     COMPARISON: None    HISTORY: Syncope, hit head, on two blood thinners.    TECHNIQUE: 5 mm thick axial CT images of the head were acquired  without IV contrast material.    FINDINGS: There is mild diffuse cerebral volume loss. There are subtle  patchy areas of decreased density in the cerebral white matter  bilaterally that are consistent with sequela of chronic small vessel  ischemic disease.    The ventricles and basal cisterns are within normal limits in  configuration given the degree of cerebral volume loss. There is no  midline shift. There are no extra-axial fluid collections.    No intracranial hemorrhage, mass or recent infarct.    The visualized paranasal sinuses are well-aerated. There is no  mastoiditis. There are no fractures of the visualized bones.      Impression    IMPRESSION: Diffuse cerebral volume loss and cerebral white matter  changes consistent with chronic small vessel ischemic disease. No  evidence for acute intracranial pathology.        Radiation dose for this scan was  reduced using automated exposure  control, adjustment of the mA and/or kV according to patient size, or  iterative reconstruction technique    PAZ TRIPATHI MD

## 2019-09-15 ENCOUNTER — APPOINTMENT (OUTPATIENT)
Dept: CARDIOLOGY | Facility: CLINIC | Age: 74
End: 2019-09-15
Attending: INTERNAL MEDICINE
Payer: COMMERCIAL

## 2019-09-15 VITALS
SYSTOLIC BLOOD PRESSURE: 128 MMHG | BODY MASS INDEX: 30.15 KG/M2 | TEMPERATURE: 97.8 F | OXYGEN SATURATION: 99 % | RESPIRATION RATE: 16 BRPM | HEIGHT: 66 IN | DIASTOLIC BLOOD PRESSURE: 57 MMHG | WEIGHT: 187.6 LBS | HEART RATE: 77 BPM

## 2019-09-15 LAB
ANION GAP SERPL CALCULATED.3IONS-SCNC: 7 MMOL/L (ref 3–14)
APTT PPP: 136 SEC (ref 22–37)
APTT PPP: 72 SEC (ref 22–37)
BASOPHILS # BLD AUTO: 0.1 10E9/L (ref 0–0.2)
BASOPHILS NFR BLD AUTO: 0.8 %
BUN SERPL-MCNC: 19 MG/DL (ref 7–30)
CALCIUM SERPL-MCNC: 8.6 MG/DL (ref 8.5–10.1)
CHLORIDE SERPL-SCNC: 102 MMOL/L (ref 94–109)
CO2 SERPL-SCNC: 24 MMOL/L (ref 20–32)
CREAT SERPL-MCNC: 1.03 MG/DL (ref 0.66–1.25)
DIFFERENTIAL METHOD BLD: ABNORMAL
EOSINOPHIL # BLD AUTO: 0.2 10E9/L (ref 0–0.7)
EOSINOPHIL NFR BLD AUTO: 2.1 %
ERYTHROCYTE [DISTWIDTH] IN BLOOD BY AUTOMATED COUNT: 16.3 % (ref 10–15)
GFR SERPL CREATININE-BSD FRML MDRD: 71 ML/MIN/{1.73_M2}
GLUCOSE SERPL-MCNC: 93 MG/DL (ref 70–99)
HCT VFR BLD AUTO: 39.1 % (ref 40–53)
HGB BLD-MCNC: 12.9 G/DL (ref 13.3–17.7)
IMM GRANULOCYTES # BLD: 0 10E9/L (ref 0–0.4)
IMM GRANULOCYTES NFR BLD: 0.3 %
LMWH PPP CHRO-ACNC: 0.59 IU/ML
LMWH PPP CHRO-ACNC: 0.96 IU/ML
LYMPHOCYTES # BLD AUTO: 1.2 10E9/L (ref 0.8–5.3)
LYMPHOCYTES NFR BLD AUTO: 16 %
MCH RBC QN AUTO: 27.4 PG (ref 26.5–33)
MCHC RBC AUTO-ENTMCNC: 33 G/DL (ref 31.5–36.5)
MCV RBC AUTO: 83 FL (ref 78–100)
MONOCYTES # BLD AUTO: 0.6 10E9/L (ref 0–1.3)
MONOCYTES NFR BLD AUTO: 8.2 %
NEUTROPHILS # BLD AUTO: 5.5 10E9/L (ref 1.6–8.3)
NEUTROPHILS NFR BLD AUTO: 72.6 %
NRBC # BLD AUTO: 0 10*3/UL
NRBC BLD AUTO-RTO: 0 /100
PLATELET # BLD AUTO: 325 10E9/L (ref 150–450)
POTASSIUM SERPL-SCNC: 4 MMOL/L (ref 3.4–5.3)
RBC # BLD AUTO: 4.7 10E12/L (ref 4.4–5.9)
SODIUM SERPL-SCNC: 133 MMOL/L (ref 133–144)
TROPONIN I SERPL-MCNC: <0.015 UG/L (ref 0–0.04)
TROPONIN I SERPL-MCNC: <0.015 UG/L (ref 0–0.04)
WBC # BLD AUTO: 7.6 10E9/L (ref 4–11)

## 2019-09-15 PROCEDURE — 36415 COLL VENOUS BLD VENIPUNCTURE: CPT | Performed by: INTERNAL MEDICINE

## 2019-09-15 PROCEDURE — 84484 ASSAY OF TROPONIN QUANT: CPT | Performed by: INTERNAL MEDICINE

## 2019-09-15 PROCEDURE — 93270 REMOTE 30 DAY ECG REV/REPORT: CPT

## 2019-09-15 PROCEDURE — G0378 HOSPITAL OBSERVATION PER HR: HCPCS

## 2019-09-15 PROCEDURE — 93272 ECG/REVIEW INTERPRET ONLY: CPT | Performed by: INTERNAL MEDICINE

## 2019-09-15 PROCEDURE — 80048 BASIC METABOLIC PNL TOTAL CA: CPT | Performed by: INTERNAL MEDICINE

## 2019-09-15 PROCEDURE — 85730 THROMBOPLASTIN TIME PARTIAL: CPT | Performed by: INTERNAL MEDICINE

## 2019-09-15 PROCEDURE — 99214 OFFICE O/P EST MOD 30 MIN: CPT | Performed by: INTERNAL MEDICINE

## 2019-09-15 PROCEDURE — 25000132 ZZH RX MED GY IP 250 OP 250 PS 637: Performed by: INTERNAL MEDICINE

## 2019-09-15 PROCEDURE — 85025 COMPLETE CBC W/AUTO DIFF WBC: CPT | Performed by: INTERNAL MEDICINE

## 2019-09-15 PROCEDURE — 85520 HEPARIN ASSAY: CPT | Performed by: INTERNAL MEDICINE

## 2019-09-15 PROCEDURE — 99217 ZZC OBSERVATION CARE DISCHARGE: CPT | Performed by: INTERNAL MEDICINE

## 2019-09-15 PROCEDURE — 85520 HEPARIN ASSAY: CPT | Mod: 91 | Performed by: INTERNAL MEDICINE

## 2019-09-15 RX ORDER — CARVEDILOL 3.12 MG/1
3.12 TABLET ORAL 2 TIMES DAILY WITH MEALS
Status: DISCONTINUED | OUTPATIENT
Start: 2019-09-15 | End: 2019-09-15 | Stop reason: HOSPADM

## 2019-09-15 RX ORDER — CARVEDILOL 3.12 MG/1
3.12 TABLET ORAL 2 TIMES DAILY WITH MEALS
Qty: 60 TABLET | Refills: 0 | Status: SHIPPED | OUTPATIENT
Start: 2019-09-15 | End: 2019-09-17

## 2019-09-15 RX ADMIN — ATORVASTATIN CALCIUM 40 MG: 40 TABLET, FILM COATED ORAL at 09:42

## 2019-09-15 RX ADMIN — UMECLIDINIUM 1 PUFF: 62.5 AEROSOL, POWDER ORAL at 09:44

## 2019-09-15 RX ADMIN — AMLODIPINE BESYLATE 5 MG: 5 TABLET ORAL at 09:41

## 2019-09-15 RX ADMIN — ALLOPURINOL 300 MG: 300 TABLET ORAL at 09:41

## 2019-09-15 RX ADMIN — FUROSEMIDE 20 MG: 20 TABLET ORAL at 09:41

## 2019-09-15 RX ADMIN — ISOSORBIDE MONONITRATE 120 MG: 60 TABLET, EXTENDED RELEASE ORAL at 09:40

## 2019-09-15 RX ADMIN — CLOPIDOGREL BISULFATE 75 MG: 75 TABLET ORAL at 09:41

## 2019-09-15 RX ADMIN — LOSARTAN POTASSIUM 100 MG: 100 TABLET ORAL at 09:41

## 2019-09-15 ASSESSMENT — ACTIVITIES OF DAILY LIVING (ADL)
ADLS_ACUITY_SCORE: 14

## 2019-09-15 NOTE — PLAN OF CARE
Patient Name: Betito Anderson Room#: 0244/0244-02      Admit Date: 9/14/2019   Primary Diagnosis:   1. Syncope, unspecified syncope type    2. Atrial fibrillation, controlled (H)       Inpatient Status:  Inpatient Status:[id number]   Procedures:  NA   Drips:  hep   Drains & Devices:  NA   Rhythm:  Afib CvR   Activity Level: SBA   Oxygen needs: NC   Anticipated D/C Date: TBD  Aggression Color: Green     Acuity Rating: _Acuity level:[id number]   Concerns:  Pt arrived from the ED with syncopal episode at home A&O VSS trops -ve cms intact denies pin or nausea, voiding. Pt on hep drip hep-a schedule for this morning, up SBA, Pt refused bed alarmdenies any concern at this time plan to go home pending continue to monitor.

## 2019-09-15 NOTE — PLAN OF CARE
Discharge    Patient discharged to home via wife with belongings, tele event monitor and discharge medication. Discharge medications were filled and sent with pt. Pt educated on medications.    Care plan note: A&Ox4. IND, ambulated halls. VSS on RA. Denied pain, SOB, dizziness or nausea.

## 2019-09-15 NOTE — DISCHARGE SUMMARY
"Discharge Summary    Betito Anderson MRN# [id number]   YOB: 1945 Age: 73 year old     Date of Admission:  9/14/2019  Date of Discharge:  9/15/2019  Admitting Physician:  Curtis Rivas MD  Discharge Physician:  Curtis Rivas MD  Discharging Service:  Hospitalist     Primary Provider: Rudy Vernon          Admission Diagnoses:   Atrial fibrillation, controlled (H) [I48.91]  Syncope, unspecified syncope type [R55]          Discharge Diagnosis:   Patient Active Problem List   Diagnosis     ASCVD (arteriosclerotic cardiovascular disease)     Benign essential hypertension     Hyperlipidemia LDL goal <100     Elevated blood sugar     Psoriasis     Non morbid obesity, unspecified obesity type     Gout, unspecified cause, unspecified chronicity, unspecified site     Carotid artery plaque     Hyponatremia     Low mean corpuscular volume (MCV)     Atrial fibrillation (H)     CHF (congestive heart failure) (H)     Renal mass     COPD (chronic obstructive pulmonary disease) (H)     Syncope             Condition on Discharge:       Discharge vitals: Blood pressure 128/57, pulse 77, temperature 97.8  F (36.6  C), temperature source Oral, resp. rate 16, height 1.676 m (5' 6\"), weight 85.1 kg (187 lb 9.6 oz), SpO2 99 %.         Gen: Well nourished, well developed, alert and oriented x 3, no acute distressed  HEENT: Atraumatic, normocephalic  Lungs: Clear to ausculation without wheezes, rhonchi, or rales  Heart: Regular rate and rhythm, no gallops or rubs, no murmurs  GI: Bowel sound normal, no hepatosplenomegaly or masses  Lymph: No lymphadenopathy or edema  Skin: No rashes          Procedures / Labs / Imaging:   Most Recent 3 CBC's:  Recent Labs   Lab Test 09/15/19  0558 09/14/19  1256 07/18/19  2317   WBC 7.6 9.9 11.4*   HGB 12.9* 12.2* 12.6*   MCV 83 84 85    315 422      Most Recent 3 BMP's:  Recent Labs   Lab Test 09/15/19  0558 09/14/19  1256 09/09/19  0745    133 135* "   POTASSIUM 4.0 4.3 4.7   CHLORIDE 102 100 102   CO2 24 28 24   BUN 19 20 31*   CR 1.03 1.29* 1.55*   ANIONGAP 7 5 13.7   GUNNER 8.6 8.5 10.1   GLC 93 135* 104     Most Recent 3 Troponin's:  Recent Labs   Lab Test 09/15/19  0558 09/15/19  0043 09/14/19  1742   TROPI <0.015 <0.015 <0.015     Most Recent 3 INR's:  Recent Labs   Lab Test 09/14/19  1256 07/18/19  2317   INR 1.18* 2.33*     Most Recent 2 LFT's:  Recent Labs   Lab Test 06/30/19  0640 10/09/18  1039   AST 21 29   ALT 22 27   ALKPHOS 96 91   BILITOTAL 1.3 1.0     Most Recent Cholesterol Panel:  Recent Labs   Lab Test 07/06/19  0547   CHOL 137   LDL 63   HDL 39*   TRIG 175*     Most Recent 6 Bacteria Isolates From Any Culture (See EPIC Reports for Culture Details):No lab results found.  Most Recent TSH, T4 and HgbA1c:   Recent Labs   Lab Test 06/30/19  0640 06/27/19  1151   TSH  --  3.22   A1C 5.7*  --      Results for orders placed or performed during the hospital encounter of 09/14/19   XR Chest 2 Views    Narrative    CHEST TWO VIEWS 9/14/2019 1:31 PM     HISTORY: Syncope    COMPARISON: June 29, 2019     FINDINGS: Stable granuloma at the left apex. No pneumothorax. There  are no acute infiltrates. The cardiac silhouette is not enlarged.  Pulmonary vasculature is unremarkable.      Impression    IMPRESSION: No acute disease.    CONNOR RUANO MD   CT Head w/o Contrast    Narrative    CT OF THE HEAD WITHOUT CONTRAST 9/14/2019 2:03 PM     COMPARISON: None    HISTORY: Syncope, hit head, on two blood thinners.    TECHNIQUE: 5 mm thick axial CT images of the head were acquired  without IV contrast material.    FINDINGS: There is mild diffuse cerebral volume loss. There are subtle  patchy areas of decreased density in the cerebral white matter  bilaterally that are consistent with sequela of chronic small vessel  ischemic disease.    The ventricles and basal cisterns are within normal limits in  configuration given the degree of cerebral volume loss. There is  no  midline shift. There are no extra-axial fluid collections.    No intracranial hemorrhage, mass or recent infarct.    The visualized paranasal sinuses are well-aerated. There is no  mastoiditis. There are no fractures of the visualized bones.      Impression    IMPRESSION: Diffuse cerebral volume loss and cerebral white matter  changes consistent with chronic small vessel ischemic disease. No  evidence for acute intracranial pathology.        Radiation dose for this scan was reduced using automated exposure  control, adjustment of the mA and/or kV according to patient size, or  iterative reconstruction technique    PAZ TRIPATHI MD             Medications Prior to Admission:     Medications Prior to Admission   Medication Sig Dispense Refill Last Dose     allopurinol (ZYLOPRIM) 300 MG tablet TAKE 1 TABLET(300 MG) BY MOUTH DAILY 90 tablet 1 9/14/2019 at AM     amLODIPine (NORVASC) 5 MG tablet Take 1 tablet (5 mg) by mouth daily 90 tablet 3 9/14/2019 at AM     atorvastatin (LIPITOR) 40 MG tablet Take 1 tablet (40 mg) by mouth daily 90 tablet 3 9/14/2019 at AM     clopidogrel (PLAVIX) 75 MG tablet Take 1 tablet (75 mg) by mouth daily 90 tablet 3 9/14/2019 at AM     furosemide (LASIX) 20 MG tablet Take 20 mg by mouth daily   9/14/2019 at AM     isosorbide mononitrate (IMDUR) 120 MG 24 HR ER tablet Take 1 tablet (120 mg) by mouth daily 90 tablet 3 9/14/2019 at AM     losartan (COZAAR) 100 MG tablet TAKE 1 TABLET(100 MG) BY MOUTH DAILY 90 tablet 1 9/14/2019 at AM     rivaroxaban ANTICOAGULANT (XARELTO) 15 MG TABS tablet Take 1 tablet (15 mg) by mouth daily (with dinner) 90 tablet 3 9/13/2019 at PM     tiotropium (SPIRIVA RESPIMAT) 2.5 MCG/ACT inhaler Inhale 2 puffs into the lungs daily 1 Inhaler 1 9/14/2019 at AM     ASPIRIN NOT PRESCRIBED (INTENTIONAL) Please choose reason not prescribed, below (Patient not taking: Reported on 9/9/2019)   Not Taking     [DISCONTINUED] carvedilol (COREG) 12.5 MG tablet Take 1 tablet  (12.5 mg) by mouth 2 times daily (with meals) 180 tablet 3 9/14/2019 at AM             Discharge Medications:     Current Discharge Medication List      CONTINUE these medications which have CHANGED    Details   carvedilol (COREG) 3.125 MG tablet Take 1 tablet (3.125 mg) by mouth 2 times daily (with meals)  Qty: 60 tablet, Refills: 0    Associated Diagnoses: ASCVD (arteriosclerotic cardiovascular disease)         CONTINUE these medications which have NOT CHANGED    Details   allopurinol (ZYLOPRIM) 300 MG tablet TAKE 1 TABLET(300 MG) BY MOUTH DAILY  Qty: 90 tablet, Refills: 1    Associated Diagnoses: Gout, unspecified cause, unspecified chronicity, unspecified site      amLODIPine (NORVASC) 5 MG tablet Take 1 tablet (5 mg) by mouth daily  Qty: 90 tablet, Refills: 3    Associated Diagnoses: Acute on chronic systolic congestive heart failure (H)      atorvastatin (LIPITOR) 40 MG tablet Take 1 tablet (40 mg) by mouth daily  Qty: 90 tablet, Refills: 3    Associated Diagnoses: Acute on chronic systolic congestive heart failure (H)      clopidogrel (PLAVIX) 75 MG tablet Take 1 tablet (75 mg) by mouth daily  Qty: 90 tablet, Refills: 3    Comments: To protect your stent(s).  Do not stop taking unless directed by cardiology.  Associated Diagnoses: Acute on chronic systolic congestive heart failure (H)      furosemide (LASIX) 20 MG tablet Take 20 mg by mouth daily      isosorbide mononitrate (IMDUR) 120 MG 24 HR ER tablet Take 1 tablet (120 mg) by mouth daily  Qty: 90 tablet, Refills: 3    Associated Diagnoses: Acute on chronic systolic congestive heart failure (H)      losartan (COZAAR) 100 MG tablet TAKE 1 TABLET(100 MG) BY MOUTH DAILY  Qty: 90 tablet, Refills: 1    Associated Diagnoses: Benign essential hypertension      rivaroxaban ANTICOAGULANT (XARELTO) 15 MG TABS tablet Take 1 tablet (15 mg) by mouth daily (with dinner)  Qty: 90 tablet, Refills: 3    Associated Diagnoses: Acute on chronic systolic congestive heart  failure (H)      tiotropium (SPIRIVA RESPIMAT) 2.5 MCG/ACT inhaler Inhale 2 puffs into the lungs daily  Qty: 1 Inhaler, Refills: 1    Associated Diagnoses: Cough      ASPIRIN NOT PRESCRIBED (INTENTIONAL) Please choose reason not prescribed, below    Associated Diagnoses: ASCVD (arteriosclerotic cardiovascular disease)                   Brief History of Illness:   Betito Anderson is a 73 year old male who was admitted for syncope.          Hospital Course:   The patient was brought in to the [facility] for continuous cardiac monitoring.  Telemetry demonstrated no evidence of arrhythmias or significant bradycardias.  The patient had no instances of lightheadedness, dizziness, palpitations, or diaphoresis.  Serial troponins were undetectable.  The patient was evaluated by cardiology who felt that his presentation was most consistent with a vasovagal syncope.  His Coreg was reduced to 3.125 mg twice daily.  The patient was discharged home with a 30-day event monitor.               Pending Results:   Unresulted Labs Ordered in the Past 30 Days of this Admission     No orders found for last 31 day(s).

## 2019-09-15 NOTE — CONSULTS
Cardiology Consultation      Betito Anderson MRN# [id number]   YOB: 1945 Age: 73 year old   Date of Admission: 9/14/2019     Reason for consult: Syncope           Assessment and Plan:     73-year-old gentleman with a past medical history significant for chronic persistent atrial fibrillation and coronary artery disease, status post recent PCI to LAD.  Known to have a chronic total occlusion of the right coronary artery and disease of the circumflex at the origin of the terminal marginal branch.  Admitted yesterday with a syncopal episode that occurred at rest.      1. Syncope, possibly vasovagal or bradycardia related  2. CAD s/p recent PCI to LAD, known  of RCA and Cx disease, mildly abnormal nuclear stress 09/12/2019 demonstrating a small area of inferolateral ischemia  3. Chronic persistent AF  4. HTN    PLAN  -Given the fact that his symptoms occurred at rest and he has been exercising without any limitations (as well as the small area of ischemia noted on his stress) I doubt that this represents a manifestation of his underlying coronary artery disease.  Given his baseline bradycardia, I think that this is a more likely possibility and the  symptom complex he describes is also consistent with a vasovagal episode.    -At this point in time I would recommend reassessing his left ventricular systolic function with a limited echocardiogram.  I also agree with the decision to decrease his carvedilol dosage and I have reduced it further to 3.125 twice daily.    -If he has no further episodes and the echocardiographic findings are unchanged I would recommend discharge with a 30-day event monitor.  He does have a follow-up appointment scheduled later on this week with cardiology at which point this plan can be reviewed.           Chief Complaint:   Dizziness           History of Present Illness:   This patient is a 73 year old male who is followed by Dr Curry in our cardiology clinic. He has a  significant history of coronary artery disease as well as chronic persistent atrial fibrillation and is status post recent LAD revascularization in July of this year.      He was actually admitted with acute systolic heart failure with decreased left ventricular systolic function in the context of a non-ST elevation myocardial infarction.  Coronary angiography demonstrated significant three-vessel coronary artery disease with a chronic total occlusion of the right coronary artery and a 90% proximal LAD lesion straddling the second diagonal.  The circumflex had a tubular distal narrowing before the takeoff of the terminal marginal branch.  At that point it was thought that coronary artery bypass grafting should be considered, but due to a porcelain aorta this was not an option.    Ultimately he underwent PCI of the LAD, straddling the second diagonal with a drug-eluting stent on 7/5/2019.  At the time of his admission he was also markedly hypertensive and this was addressed successfully with medical therapy.  His last echocardiogram on 7/3/2019 demonstrated normal left ventricular systolic function with mild hypokinesis of the basal inferior wall.    To address the significance of his residual coronary artery disease, a stress perfusion study was performed on 9/12/2019.  This demonstrated normal left ventricular systolic function although a small area of reversible ischemia involving the basal inferolateral wall was noted.    He was admitted to [facility] yesterday after a syncopal episode that occurred while he was sitting at his desk working on his computer.  He states that he experienced lightheadedness and diaphoresis of sudden onset and then lost consciousness.  This was followed by nausea and at least 2 episodes of vomiting. He denies any proceeding chest pain or shortness of breath.    He has never experienced similar symptoms and feels back to baseline today.  He has been walking without any  "difficulty.  He checks his blood pressure regularly at home and systolics are generally between 100- 120 mmHg.  He has been participating regularly in cardiac rehab and worked out as recently as Friday without any limitations.    Interestingly, he wore a Holter monitor at the time of his diagnosis of atrial fibrillation back in 2018.  This demonstrated atrial fibrillation and an average heart rate of 52 bpm.  Multiple pauses greater than 2 seconds were noted, the longest being 3.92 seconds.         Physical Exam:   Vitals were reviewed  Blood pressure 126/67, pulse 77, temperature 97.8  F (36.6  C), temperature source Oral, resp. rate 16, height 1.676 m (5' 6\"), weight 85.1 kg (187 lb 9.6 oz), SpO2 99 %.  Temperatures:  Current - Temp: 97.8  F (36.6  C); Max - Temp  Av.6  F (36.4  C)  Min: 97  F (36.1  C)  Max: 97.9  F (36.6  C)  Respiration range: Resp  Av.5  Min: 14  Max: 19  Pulse range: Pulse  Av  Min: 63  Max: 78  Blood pressure range: Systolic (24hrs), Av , Min:110 , Max:138   ; Diastolic (24hrs), Av, Min:60, Max:106    Pulse oximetry range: SpO2  Av.1 %  Min: 93 %  Max: 100 %    Intake/Output Summary (Last 24 hours) at 9/15/2019 0800  Last data filed at 2019 1923  Gross per 24 hour   Intake 240 ml   Output --   Net 240 ml     Constitutional:   awake, alert, cooperative, no apparent distress, and appears stated age     Eyes:   Lids and lashes normal, pupils equal, round and reactive to light, extra ocular muscles intact, sclera clear, conjunctiva normal     Neck:   supple, symmetrical, trachea midline, no JVD     Back:   symmetric     Lungs:   No increased work of breathing, good air exchange, clear to auscultation bilaterally, no crackles or wheezing     Cardiovascular:   Irregular, bradycardic     Abdomen:   non-tender     Musculoskeletal:   motor strength is 5 out of 5 all extremities bilaterally     Neurologic:   Grossly nonfocal     Skin:   no bruising or bleeding "     Additional findings:     No edema               Past Medical History:   I have reviewed this patient's past medical history  Past Medical History:   Diagnosis Date     ASCVD (arteriosclerotic cardiovascular disease) 1997    angio with 40% circ lesion     Atrial fibrillation (H) 10/09/2018    found on routine physical     Carotid artery plaque 2005    seen on us, about 50% stenosis, fu 2009 us no significant stenosis     Elevated blood sugar      Gout     on allopurinol     HTN (hypertension)      Hypercholesteremia      Hyponatremia 2015    felt due to chlorthalidone     Low mean corpuscular volume (MCV)      Near syncope 5/15    fu est echo low level but neg     Psoriasis     Dr. Marti     Renal mass 06/29/2019    seen on ct chest for sob, needs fu ct for that, fu us done 7/19 and no mass see     Screening 2012    abd us no aaa             Past Surgical History:   I have reviewed this patient's past surgical history  Past Surgical History:   Procedure Laterality Date     C ANESTH,DX ARTHROSCOPIC PROC KNEE JOINT  2009     CV CORONARY ANGIOGRAM N/A 7/2/2019    Procedure: Coronary Angiogram;  Surgeon: Donaldo Loera MD;  Location:  HEART CARDIAC CATH LAB     CV HEART CATHETERIZATION WITH POSSIBLE INTERVENTION N/A 7/5/2019    Procedure: Heart Catheterization with Possible Intervention;  Surgeon: Manolo Hu MD;  Location:  HEART CARDIAC CATH LAB     CV LEFT HEART CATH N/A 7/2/2019    Procedure: Left Heart Cath;  Surgeon: Donaldo Loera MD;  Location:  HEART CARDIAC CATH LAB     CV LEFT VENTRICULOGRAM N/A 7/2/2019    Procedure: Left Ventriculogram;  Surgeon: Donaldo Loera MD;  Location:  HEART CARDIAC CATH LAB     CV PCI STENT DRUG ELUTING N/A 7/5/2019    Procedure: PCI Stent Drug Eluting;  Surgeon: Manolo Hu MD;  Location:  HEART CARDIAC CATH LAB     CV RIGHT HEART CATH N/A 7/2/2019    Procedure: Right Heart Cath;  Surgeon: Donaldo Loera MD;   Location:  HEART CARDIAC CATH LAB               Social History:   I have reviewed this patient's social history  Social History     Tobacco Use     Smoking status: Former Smoker     Packs/day: 1.00     Years: 30.00     Pack years: 30.00     Types: Cigars     Last attempt to quit: 1998     Years since quittin.0     Smokeless tobacco: Never Used   Substance Use Topics     Alcohol use: Not Currently     Alcohol/week: 8.4 oz     Types: 14 Standard drinks or equivalent per week     Frequency: 2-3 times a week     Drinks per session: 1 or 2     Binge frequency: Never     Comment: 1-2 a night             Family History:   I have reviewed this patient's family history  Family History   Problem Relation Age of Onset     Coronary Artery Disease Father      Medical History Unknown Mother      Medical History Unknown Maternal Grandfather              Allergies:     Allergies   Allergen Reactions     Ace Inhibitors Anaphylaxis     Edema of ace             Medications:   I have reviewed this patient's current medications  Medications Prior to Admission   Medication Sig Dispense Refill Last Dose     allopurinol (ZYLOPRIM) 300 MG tablet TAKE 1 TABLET(300 MG) BY MOUTH DAILY 90 tablet 1 2019 at AM     amLODIPine (NORVASC) 5 MG tablet Take 1 tablet (5 mg) by mouth daily 90 tablet 3 2019 at AM     atorvastatin (LIPITOR) 40 MG tablet Take 1 tablet (40 mg) by mouth daily 90 tablet 3 2019 at AM     carvedilol (COREG) 12.5 MG tablet Take 1 tablet (12.5 mg) by mouth 2 times daily (with meals) 180 tablet 3 2019 at AM     clopidogrel (PLAVIX) 75 MG tablet Take 1 tablet (75 mg) by mouth daily 90 tablet 3 2019 at AM     furosemide (LASIX) 20 MG tablet Take 20 mg by mouth daily   2019 at AM     isosorbide mononitrate (IMDUR) 120 MG 24 HR ER tablet Take 1 tablet (120 mg) by mouth daily 90 tablet 3 2019 at AM     losartan (COZAAR) 100 MG tablet TAKE 1 TABLET(100 MG) BY MOUTH DAILY 90 tablet 1  9/14/2019 at AM     rivaroxaban ANTICOAGULANT (XARELTO) 15 MG TABS tablet Take 1 tablet (15 mg) by mouth daily (with dinner) 90 tablet 3 9/13/2019 at PM     tiotropium (SPIRIVA RESPIMAT) 2.5 MCG/ACT inhaler Inhale 2 puffs into the lungs daily 1 Inhaler 1 9/14/2019 at AM     ASPIRIN NOT PRESCRIBED (INTENTIONAL) Please choose reason not prescribed, below (Patient not taking: Reported on 9/9/2019)   Not Taking     Current Facility-Administered Medications Ordered in Epic   Medication Dose Route Frequency Last Rate Last Dose     acetaminophen (TYLENOL) tablet 650 mg  650 mg Oral Q4H PRN         allopurinol (ZYLOPRIM) tablet 300 mg  300 mg Oral Daily         amLODIPine (NORVASC) tablet 5 mg  5 mg Oral Daily         atorvastatin (LIPITOR) tablet 40 mg  40 mg Oral Daily         bisacodyl (DULCOLAX) Suppository 10 mg  10 mg Rectal Daily PRN         calcium carbonate (TUMS) chewable tablet 1,000 mg  1,000 mg Oral 4x Daily PRN         carvedilol (COREG) tablet 6.25 mg  6.25 mg Oral BID w/meals   6.25 mg at 09/14/19 1914     clopidogrel (PLAVIX) tablet 75 mg  75 mg Oral Daily         furosemide (LASIX) tablet 20 mg  20 mg Oral Daily         heparin infusion 25,000 units in 0.45% NaCl 250 mL  700 Units/hr Intravenous Continuous 7 mL/hr at 09/15/19 0147 700 Units/hr at 09/15/19 0147     isosorbide mononitrate (IMDUR) 24 hr tablet 120 mg  120 mg Oral Daily         losartan (COZAAR) tablet 100 mg  100 mg Oral Daily         magnesium sulfate 4 g in 100 mL sterile water (premade)  4 g Intravenous Q4H PRN         melatonin tablet 1 mg  1 mg Oral At Bedtime PRN         naloxone (NARCAN) injection 0.1-0.4 mg  0.1-0.4 mg Intravenous Q2 Min PRN         ondansetron (ZOFRAN-ODT) ODT tab 4 mg  4 mg Oral Q6H PRN        Or     ondansetron (ZOFRAN) injection 4 mg  4 mg Intravenous Q6H PRN         polyethylene glycol (MIRALAX/GLYCOLAX) Packet 17 g  17 g Oral Daily PRN         potassium chloride (KLOR-CON) Packet 20-40 mEq  20-40 mEq Oral or  Feeding Tube Q2H PRN         potassium chloride 10 mEq in 100 mL sterile water intermittent infusion (premix)  10 mEq Intravenous Q1H PRN         potassium chloride 20 mEq in 50 mL intermittent infusion  20 mEq Intravenous Q1H PRN         potassium chloride ER (K-DUR/KLOR-CON M) CR tablet 20-40 mEq  20-40 mEq Oral Q2H PRN         prochlorperazine (COMPAZINE) injection 5 mg  5 mg Intravenous Q6H PRN        Or     prochlorperazine (COMPAZINE) tablet 5 mg  5 mg Oral Q6H PRN        Or     prochlorperazine (COMPAZINE) Suppository 12.5 mg  12.5 mg Rectal Q12H PRN         senna-docusate (SENOKOT-S/PERICOLACE) 8.6-50 MG per tablet 1 tablet  1 tablet Oral BID PRN        Or     senna-docusate (SENOKOT-S/PERICOLACE) 8.6-50 MG per tablet 2 tablet  2 tablet Oral BID PRN         umeclidinium (INCRUSE ELLIPTA) 62.5 MCG/INH inhaler 1 puff  1 puff Inhalation Daily         No current [facility]-ordered outpatient medications on file.             Review of Systems:   The 10 point Review of Systems is negative other than noted in the HPI            Data:   All laboratory data reviewed  Results for orders placed or performed during the hospital encounter of 09/14/19 (from the past 24 hour(s))   EKG 12-lead, tracing only   Result Value Ref Range    Interpretation ECG Click View Image link to view waveform and result    CBC with platelets differential   Result Value Ref Range    WBC 9.9 4.0 - 11.0 10e9/L    RBC Count 4.38 (L) 4.4 - 5.9 10e12/L    Hemoglobin 12.2 (L) 13.3 - 17.7 g/dL    Hematocrit 36.8 (L) 40.0 - 53.0 %    MCV 84 78 - 100 fl    MCH 27.9 26.5 - 33.0 pg    MCHC 33.2 31.5 - 36.5 g/dL    RDW 16.3 (H) 10.0 - 15.0 %    Platelet Count 315 150 - 450 10e9/L    Diff Method Automated Method     % Neutrophils 79.6 %    % Lymphocytes 10.7 %    % Monocytes 6.8 %    % Eosinophils 1.8 %    % Basophils 0.6 %    % Immature Granulocytes 0.5 %    Nucleated RBCs 0 0 /100    Absolute Neutrophil 7.9 1.6 - 8.3 10e9/L    Absolute Lymphocytes 1.1 0.8 -  5.3 10e9/L    Absolute Monocytes 0.7 0.0 - 1.3 10e9/L    Absolute Eosinophils 0.2 0.0 - 0.7 10e9/L    Absolute Basophils 0.1 0.0 - 0.2 10e9/L    Abs Immature Granulocytes 0.1 0 - 0.4 10e9/L    Absolute Nucleated RBC 0.0    Basic metabolic panel   Result Value Ref Range    Sodium 133 133 - 144 mmol/L    Potassium 4.3 3.4 - 5.3 mmol/L    Chloride 100 94 - 109 mmol/L    Carbon Dioxide 28 20 - 32 mmol/L    Anion Gap 5 3 - 14 mmol/L    Glucose 135 (H) 70 - 99 mg/dL    Urea Nitrogen 20 7 - 30 mg/dL    Creatinine 1.29 (H) 0.66 - 1.25 mg/dL    GFR Estimate 54 (L) >60 mL/min/[1.73_m2]    GFR Estimate If Black 63 >60 mL/min/[1.73_m2]    Calcium 8.5 8.5 - 10.1 mg/dL   Troponin I   Result Value Ref Range    Troponin I ES <0.015 0.000 - 0.045 ug/L   INR   Result Value Ref Range    INR 1.18 (H) 0.86 - 1.14   Partial thromboplastin time   Result Value Ref Range    PTT 41 (H) 22 - 37 sec   Magnesium   Result Value Ref Range    Magnesium 1.5 (L) 1.6 - 2.3 mg/dL   XR Chest 2 Views    Narrative    CHEST TWO VIEWS 9/14/2019 1:31 PM     HISTORY: Syncope    COMPARISON: June 29, 2019     FINDINGS: Stable granuloma at the left apex. No pneumothorax. There  are no acute infiltrates. The cardiac silhouette is not enlarged.  Pulmonary vasculature is unremarkable.      Impression    IMPRESSION: No acute disease.    CONNOR RUANO MD   CT Head w/o Contrast    Narrative    CT OF THE HEAD WITHOUT CONTRAST 9/14/2019 2:03 PM     COMPARISON: None    HISTORY: Syncope, hit head, on two blood thinners.    TECHNIQUE: 5 mm thick axial CT images of the head were acquired  without IV contrast material.    FINDINGS: There is mild diffuse cerebral volume loss. There are subtle  patchy areas of decreased density in the cerebral white matter  bilaterally that are consistent with sequela of chronic small vessel  ischemic disease.    The ventricles and basal cisterns are within normal limits in  configuration given the degree of cerebral volume loss. There is  no  midline shift. There are no extra-axial fluid collections.    No intracranial hemorrhage, mass or recent infarct.    The visualized paranasal sinuses are well-aerated. There is no  mastoiditis. There are no fractures of the visualized bones.      Impression    IMPRESSION: Diffuse cerebral volume loss and cerebral white matter  changes consistent with chronic small vessel ischemic disease. No  evidence for acute intracranial pathology.        Radiation dose for this scan was reduced using automated exposure  control, adjustment of the mA and/or kV according to patient size, or  iterative reconstruction technique    PAZ TRIPATHI MD   Troponin I   Result Value Ref Range    Troponin I ES <0.015 0.000 - 0.045 ug/L   Troponin I   Result Value Ref Range    Troponin I ES <0.015 0.000 - 0.045 ug/L   Heparin 10a Level   Result Value Ref Range    Heparin 10A Level 0.96 IU/mL   Partial thromboplastin time   Result Value Ref Range     (HH) 22 - 37 sec   Troponin I   Result Value Ref Range    Troponin I ES <0.015 0.000 - 0.045 ug/L   Basic metabolic panel   Result Value Ref Range    Sodium 133 133 - 144 mmol/L    Potassium 4.0 3.4 - 5.3 mmol/L    Chloride 102 94 - 109 mmol/L    Carbon Dioxide 24 20 - 32 mmol/L    Anion Gap 7 3 - 14 mmol/L    Glucose 93 70 - 99 mg/dL    Urea Nitrogen 19 7 - 30 mg/dL    Creatinine 1.03 0.66 - 1.25 mg/dL    GFR Estimate 71 >60 mL/min/[1.73_m2]    GFR Estimate If Black 83 >60 mL/min/[1.73_m2]    Calcium 8.6 8.5 - 10.1 mg/dL   CBC with platelets differential   Result Value Ref Range    WBC 7.6 4.0 - 11.0 10e9/L    RBC Count 4.70 4.4 - 5.9 10e12/L    Hemoglobin 12.9 (L) 13.3 - 17.7 g/dL    Hematocrit 39.1 (L) 40.0 - 53.0 %    MCV 83 78 - 100 fl    MCH 27.4 26.5 - 33.0 pg    MCHC 33.0 31.5 - 36.5 g/dL    RDW 16.3 (H) 10.0 - 15.0 %    Platelet Count 325 150 - 450 10e9/L    Diff Method Automated Method     % Neutrophils 72.6 %    % Lymphocytes 16.0 %    % Monocytes 8.2 %    % Eosinophils 2.1  %    % Basophils 0.8 %    % Immature Granulocytes 0.3 %    Nucleated RBCs 0 0 /100    Absolute Neutrophil 5.5 1.6 - 8.3 10e9/L    Absolute Lymphocytes 1.2 0.8 - 5.3 10e9/L    Absolute Monocytes 0.6 0.0 - 1.3 10e9/L    Absolute Eosinophils 0.2 0.0 - 0.7 10e9/L    Absolute Basophils 0.1 0.0 - 0.2 10e9/L    Abs Immature Granulocytes 0.0 0 - 0.4 10e9/L    Absolute Nucleated RBC 0.0      EKG results: AF with controlled ventricular response    Telemetry demonstrates several pauses of up to 3 seconds.  These are not associated with symptoms.

## 2019-09-16 ENCOUNTER — TELEPHONE (OUTPATIENT)
Dept: FAMILY MEDICINE | Facility: CLINIC | Age: 74
End: 2019-09-16

## 2019-09-16 NOTE — TELEPHONE ENCOUNTER
Chief Complaint: Syncope, Unspecified Syncope Type, Ascvd (Arteriosclerotic Cardiovascular Disease),  SUN 15-SEP-2019   1 / 2    [phone] (home)

## 2019-09-16 NOTE — TELEPHONE ENCOUNTER
"Hospital/TCU/ED for chronic condition Discharge Protocol    \"Hi, my name is Caroline Molina RN, a registered nurse, and I am calling from [facility].  I am calling to follow up and see how things are going for you after your recent emergency visit/hospital/TCU stay.\"    Tell me how you are doing now that you are home?\" patient doing fine and wearing a cardiac monitor      Discharge Instructions    \"Let's review your discharge instructions.  What is/are the follow-up recommendations?  Pt. Response: patient has appointment scheduled with PCP as follow up.  Patient has cardiac rehab scheduled.    \"Has an appointment with your primary care provider been scheduled?\"   Yes. (confirm)    \"When you see the provider, I would recommend that you bring your medications with you.\"    Medications    \"Tell me what changed about your medicines when you discharged?\"    Changes to chronic meds?    0-1    \"What questions do you have about your medications?\"    None     New diagnoses of heart failure, COPD, diabetes, or MI?    No              Post Discharge Medication Reconciliation Status: discharge medications reconciled, continue medications without change.    Was MTM referral placed (*Make sure to put transitions as reason for referral)?   No    Call Summary    \"What questions or concerns do you have about your recent visit and your follow-up care?\"     none    \"If you have questions or things don't continue to improve, we encourage you contact us through the main clinic number (give number).  Even if the clinic is not open, triage nurses are available 24/7 to help you.     We would like you to know that our clinic has extended hours (provide information).  We also have urgent care (provide details on closest location and hours/contact info)\"      \"Thank you for your time and take care!\"      KEVIN Cobian, RN  [employer] Triage         "

## 2019-09-17 ENCOUNTER — OFFICE VISIT (OUTPATIENT)
Dept: FAMILY MEDICINE | Facility: CLINIC | Age: 74
End: 2019-09-17
Payer: COMMERCIAL

## 2019-09-17 ENCOUNTER — HOSPITAL ENCOUNTER (OUTPATIENT)
Dept: CARDIOLOGY | Facility: CLINIC | Age: 74
Discharge: HOME | End: 2019-09-17
Attending: INTERNAL MEDICINE | Admitting: INTERNAL MEDICINE
Payer: COMMERCIAL

## 2019-09-17 VITALS
BODY MASS INDEX: 29.89 KG/M2 | TEMPERATURE: 97.2 F | SYSTOLIC BLOOD PRESSURE: 118 MMHG | DIASTOLIC BLOOD PRESSURE: 76 MMHG | OXYGEN SATURATION: 98 % | HEART RATE: 83 BPM | WEIGHT: 186 LBS | HEIGHT: 66 IN

## 2019-09-17 DIAGNOSIS — I10 BENIGN ESSENTIAL HYPERTENSION: ICD-10-CM

## 2019-09-17 DIAGNOSIS — M10.9 GOUT, UNSPECIFIED CAUSE, UNSPECIFIED CHRONICITY, UNSPECIFIED SITE: ICD-10-CM

## 2019-09-17 DIAGNOSIS — R55 SYNCOPE, UNSPECIFIED SYNCOPE TYPE: Primary | ICD-10-CM

## 2019-09-17 DIAGNOSIS — I25.10 ASCVD (ARTERIOSCLEROTIC CARDIOVASCULAR DISEASE): ICD-10-CM

## 2019-09-17 DIAGNOSIS — R55 SYNCOPE, UNSPECIFIED SYNCOPE TYPE: ICD-10-CM

## 2019-09-17 PROCEDURE — 93325 DOPPLER ECHO COLOR FLOW MAPG: CPT | Mod: 26 | Performed by: INTERNAL MEDICINE

## 2019-09-17 PROCEDURE — 93308 TTE F-UP OR LMTD: CPT

## 2019-09-17 PROCEDURE — 93321 DOPPLER ECHO F-UP/LMTD STD: CPT | Mod: 26 | Performed by: INTERNAL MEDICINE

## 2019-09-17 PROCEDURE — 99495 TRANSJ CARE MGMT MOD F2F 14D: CPT | Performed by: INTERNAL MEDICINE

## 2019-09-17 PROCEDURE — 93308 TTE F-UP OR LMTD: CPT | Mod: 26 | Performed by: INTERNAL MEDICINE

## 2019-09-17 RX ORDER — CARVEDILOL 3.12 MG/1
3.12 TABLET ORAL 2 TIMES DAILY WITH MEALS
Qty: 180 TABLET | Refills: 3 | Status: SHIPPED | OUTPATIENT
Start: 2019-09-17 | End: 2020-03-27

## 2019-09-17 RX ORDER — LOSARTAN POTASSIUM 100 MG/1
TABLET ORAL
Qty: 90 TABLET | Refills: 3 | Status: SHIPPED | OUTPATIENT
Start: 2019-09-17 | End: 2019-10-01

## 2019-09-17 RX ORDER — ALLOPURINOL 300 MG/1
TABLET ORAL
Qty: 90 TABLET | Refills: 3 | Status: SHIPPED | OUTPATIENT
Start: 2019-09-17 | End: 2020-11-02

## 2019-09-17 ASSESSMENT — MIFFLIN-ST. JEOR: SCORE: 1531.44

## 2019-09-17 NOTE — PROGRESS NOTES
Subjective     Betito Anderson is a 73 year old male who presents to clinic today for the following health issues:    HPI   ED/UC Followup:    Facility:  [facility] hosp stay for syncope  Has follow up with cards this week  No med changes since discontinue  No follow up labs needed today  Patient taking meds reg    Date of visit: 09/14/2019, dc'd 9/15/19  Reason for visit: Atrial fibrillation, controlled (H) [I48.91]  Syncope, unspecified syncope type [R55  Current Status: Discharged.      Patient presents for hospital stay follow-up for syncope.  The patient was sitting at his desk when he suddenly felt lightheaded and went out.  This occurred twice in the second time he had nausea and vomiting.  He went to the hospital and was monitored overnight and they did not find a cause.  He had not been sick that during the days before.  He is never fainted.  Since being out he said no symptoms.  He has had no chest pain or palpitations.  No shortness of breath or edema.  He is wearing an event monitor.    He has a significant history of heart disease which was reviewed.  His last echo showed a normal EF.  He also has presumed COPD and is using Spiriva which has helped.    During the hospital stay his Coreg dose was lowered.  No other changes were made.    Review of systems is otherwise negative.    Past Medical History:   Diagnosis Date     ASCVD (arteriosclerotic cardiovascular disease) 1997    angio with 40% circ lesion; 6/19 hosp for chf, 3 vessel dz on angio but porcelin aorta so ptca done     Atrial fibrillation (H) 10/09/2018    found on routine physical     Carotid artery plaque 2005    seen on us, about 50% stenosis, fu 2009 us no significant stenosis     CHF (congestive heart failure) (H) 06/2019    hosp fsd, then fu echo ef nl     Elevated blood sugar      Gout     on allopurinol     HTN (hypertension)      Hypercholesteremia      Hyponatremia 2015    felt due to chlorthalidone     Low mean corpuscular volume (MCV)       Near syncope 5/15    fu est echo low level but neg     Psoriasis     Dr. Marti     Renal mass 06/29/2019    seen on ct chest for sob, needs fu ct for that, fu us done 7/19 and no mass seen     Screening 2012    abd us no aaa     Syncope 09/2019    hosp fsd, cause not clear, lowered coreg dose     Past Surgical History:   Procedure Laterality Date     C ANESTH,DX ARTHROSCOPIC PROC KNEE JOINT  2009     CV CORONARY ANGIOGRAM N/A 7/2/2019    Procedure: Coronary Angiogram;  Surgeon: Donaldo Loera MD;  Location:  HEART CARDIAC CATH LAB     CV HEART CATHETERIZATION WITH POSSIBLE INTERVENTION N/A 7/5/2019    Procedure: Heart Catheterization with Possible Intervention;  Surgeon: Manolo Hu MD;  Location:  HEART CARDIAC CATH LAB     CV LEFT HEART CATH N/A 7/2/2019    Procedure: Left Heart Cath;  Surgeon: Donaldo Loera MD;  Location:  HEART CARDIAC CATH LAB     CV LEFT VENTRICULOGRAM N/A 7/2/2019    Procedure: Left Ventriculogram;  Surgeon: Donaldo Loera MD;  Location:  HEART CARDIAC CATH LAB     CV PCI STENT DRUG ELUTING N/A 7/5/2019    Procedure: PCI Stent Drug Eluting;  Surgeon: Manolo Hu MD;  Location:  HEART CARDIAC CATH LAB     CV RIGHT HEART CATH N/A 7/2/2019    Procedure: Right Heart Cath;  Surgeon: Donaldo Loera MD;  Location:  HEART CARDIAC CATH LAB     Social History     Socioeconomic History     Marital status:      Spouse name: Not on file     Number of children: 2     Years of education: Not on file     Highest education level: Not on file   Occupational History     Occupation: retired   Social Needs     Financial resource strain: Not on file     Food insecurity:     Worry: Not on file     Inability: Not on file     Transportation needs:     Medical: Not on file     Non-medical: Not on file   Tobacco Use     Smoking status: Former Smoker     Packs/day: 1.00     Years: 30.00     Pack years: 30.00     Types: Cigars     Last attempt to  quit: 1998     Years since quittin.0     Smokeless tobacco: Never Used   Substance and Sexual Activity     Alcohol use: Not Currently     Alcohol/week: 8.4 oz     Types: 14 Standard drinks or equivalent per week     Frequency: 2-3 times a week     Drinks per session: 1 or 2     Binge frequency: Never     Comment: 1-2 a night     Drug use: No     Sexual activity: Never     Partners: Female   Lifestyle     Physical activity:     Days per week: Not on file     Minutes per session: Not on file     Stress: Not on file   Relationships     Social connections:     Talks on phone: Not on file     Gets together: Not on file     Attends Orthodox service: Not on file     Active member of club or organization: Not on file     Attends meetings of clubs or organizations: Not on file     Relationship status: Not on file     Intimate partner violence:     Fear of current or ex partner: Not on file     Emotionally abused: Not on file     Physically abused: Not on file     Forced sexual activity: Not on file   Other Topics Concern     Parent/sibling w/ CABG, MI or angioplasty before 65F 55M? Not Asked   Social History Narrative     Not on file     Current Outpatient Medications   Medication Sig Dispense Refill     allopurinol (ZYLOPRIM) 300 MG tablet TAKE 1 TABLET(300 MG) BY MOUTH DAILY 90 tablet 3     amLODIPine (NORVASC) 5 MG tablet Take 1 tablet (5 mg) by mouth daily 90 tablet 3     ASPIRIN NOT PRESCRIBED (INTENTIONAL) Please choose reason not prescribed, below       atorvastatin (LIPITOR) 40 MG tablet Take 1 tablet (40 mg) by mouth daily 90 tablet 3     carvedilol (COREG) 3.125 MG tablet Take 1 tablet (3.125 mg) by mouth 2 times daily (with meals) 180 tablet 3     clopidogrel (PLAVIX) 75 MG tablet Take 1 tablet (75 mg) by mouth daily 90 tablet 3     furosemide (LASIX) 20 MG tablet Take 20 mg by mouth daily       isosorbide mononitrate (IMDUR) 120 MG 24 HR ER tablet Take 1 tablet (120 mg) by mouth daily 90 tablet 3      "losartan (COZAAR) 100 MG tablet TAKE 1 TABLET(100 MG) BY MOUTH DAILY 90 tablet 3     rivaroxaban ANTICOAGULANT (XARELTO) 15 MG TABS tablet Take 1 tablet (15 mg) by mouth daily (with dinner) 90 tablet 3     tiotropium (SPIRIVA RESPIMAT) 2.5 MCG/ACT inhaler Inhale 2 puffs into the lungs daily 1 Inhaler 1     Allergies   Allergen Reactions     Ace Inhibitors Anaphylaxis     Edema of ace     FAMILY HISTORY NOTED AND REVIEWED    REVIEW OF SYSTEMS: above    PHYSICAL EXAM    /76 (BP Location: Left arm, Patient Position: Sitting, Cuff Size: Adult Regular)   Pulse 83   Temp 97.2  F (36.2  C) (Oral)   Ht 1.676 m (5' 6\")   Wt 84.4 kg (186 lb)   SpO2 98%   BMI 30.02 kg/m      Patient appears non toxic  Lungs - clear, normal flow  Cardiovascular - irregular rate and rhythm, no murmer, rub or gallop, no jvp or edema, carotids within normal limits, no bruits.  Abdomen - normal active bowel sounds, soft, non tender, no masses, guarding or rebound, no hepatosplenomegaly    Labs noted    ASSESSMENT:  1. Syncope, cause not clear but I wonder about bradycardia/pauses, doubt ischemia, sepsis, gi\ bleed  2. Cad, stable  3. Chf, stable  4. Afib, on xarelto    PLAN:  Call if dizziness, l.h  Follow up 4 weeks    Rudy Vernon M.D.        Rudy Vernon M.D.          "

## 2019-09-18 ENCOUNTER — HOSPITAL ENCOUNTER (OUTPATIENT)
Dept: CARDIAC REHAB | Facility: CLINIC | Age: 74
End: 2019-09-18
Attending: INTERNAL MEDICINE
Payer: COMMERCIAL

## 2019-09-18 PROCEDURE — 93798 PHYS/QHP OP CAR RHAB W/ECG: CPT

## 2019-09-18 PROCEDURE — 40000116 ZZH STATISTIC OP CR VISIT

## 2019-09-19 ENCOUNTER — OFFICE VISIT (OUTPATIENT)
Dept: CARDIOLOGY | Facility: CLINIC | Age: 74
End: 2019-09-19
Payer: COMMERCIAL

## 2019-09-19 VITALS
BODY MASS INDEX: 30.74 KG/M2 | WEIGHT: 191.3 LBS | HEART RATE: 76 BPM | HEIGHT: 66 IN | SYSTOLIC BLOOD PRESSURE: 150 MMHG | DIASTOLIC BLOOD PRESSURE: 77 MMHG

## 2019-09-19 DIAGNOSIS — I25.10 CORONARY ARTERY DISEASE INVOLVING NATIVE HEART WITHOUT ANGINA PECTORIS, UNSPECIFIED VESSEL OR LESION TYPE: Primary | ICD-10-CM

## 2019-09-19 DIAGNOSIS — I25.10 ASCVD (ARTERIOSCLEROTIC CARDIOVASCULAR DISEASE): ICD-10-CM

## 2019-09-19 LAB
ANION GAP SERPL CALCULATED.3IONS-SCNC: 13.7 MMOL/L (ref 6–17)
BUN SERPL-MCNC: 16 MG/DL (ref 7–30)
CALCIUM SERPL-MCNC: 10 MG/DL (ref 8.5–10.5)
CHLORIDE SERPL-SCNC: 104 MMOL/L (ref 98–107)
CO2 SERPL-SCNC: 27 MMOL/L (ref 23–29)
CREAT SERPL-MCNC: 1.14 MG/DL (ref 0.7–1.3)
ERYTHROCYTE [DISTWIDTH] IN BLOOD BY AUTOMATED COUNT: 16.5 % (ref 10–15)
GFR SERPL CREATININE-BSD FRML MDRD: 63 ML/MIN/{1.73_M2}
GLUCOSE SERPL-MCNC: 107 MG/DL (ref 70–105)
HCT VFR BLD AUTO: 40.4 % (ref 40–53)
HGB BLD-MCNC: 12.8 G/DL (ref 13.3–17.7)
MCH RBC QN AUTO: 27.6 PG (ref 26.5–33)
MCHC RBC AUTO-ENTMCNC: 31.7 G/DL (ref 31.5–36.5)
MCV RBC AUTO: 87 FL (ref 78–100)
PLATELET # BLD AUTO: 326 10E9/L (ref 150–450)
POTASSIUM SERPL-SCNC: 4.7 MMOL/L (ref 3.5–5.1)
RBC # BLD AUTO: 4.64 10E12/L (ref 4.4–5.9)
SODIUM SERPL-SCNC: 140 MMOL/L (ref 136–145)
WBC # BLD AUTO: 8 10E9/L (ref 4–11)

## 2019-09-19 PROCEDURE — 85027 COMPLETE CBC AUTOMATED: CPT | Performed by: PHYSICIAN ASSISTANT

## 2019-09-19 PROCEDURE — 36415 COLL VENOUS BLD VENIPUNCTURE: CPT | Performed by: PHYSICIAN ASSISTANT

## 2019-09-19 PROCEDURE — 80048 BASIC METABOLIC PNL TOTAL CA: CPT | Performed by: PHYSICIAN ASSISTANT

## 2019-09-19 PROCEDURE — 99215 OFFICE O/P EST HI 40 MIN: CPT | Performed by: INTERNAL MEDICINE

## 2019-09-19 ASSESSMENT — MIFFLIN-ST. JEOR: SCORE: 1555.48

## 2019-09-19 NOTE — PROGRESS NOTES
Service Date: 09/19/2019      REASON FOR VISIT:  Post-hospital followup for severe 3-vessel coronary artery disease.      HISTORY OF PRESENT ILLNESS:  This is a very delightful 73-year-old gentleman who I had first met in the hospital in summer of 2019.  The patient has a history of chronic atrial fibrillation on anticoagulation and was seen by Dr. Lezama in 2018.  He presented in 07/2019, with acute heart failure, volume overload and hypertensive emergency and LV dysfunction.  EF was initially 30%-35% and actually normalized to 55% with control of his blood pressure.  During that admission, he had a coronary angiography that revealed a very tight lesion in the mid LAD straddling into a large second diagonal.  He also had a distal circumflex lesion which was quite significant and tight, but was a smallish vessel in that area and subtotally occluded  of the right coronary artery with robust collaterals from the LAD.  He was initially referred for surgery, but given his porcelain aorta, he underwent a PCI off his bifurcation LAD into the diagonal.        Subsequently, he had a nuclear stress test that showed very mild basilar inferolateral ischemia in the setting of no clinical angina.  As such, this is under conservative medical management.        He also has a history of hypertension, dyslipidemia and atrial fibrillation on Xarelto.  His antiplatelet regimen includes 15 mg of Xarelto and 75 mg of Plavix.      He was last seen by Melvi Montaño on 09/09/2019 and seemed to be doing okay except for mild kidney injury following which his torsemide was reduced and his creatinine has normalized.  He has NYHA class II heart failure symptoms.  Blood pressure has been doing okay.      He was scheduled for routine followup with me today.  However, earlier this week, he had a significant event at home for which he had a hospitalization.  The patient says that it was a routine day for him and at 10:30 in the morning he was sitting  at his computer after normally having breakfast.  He was not dehydrated, he was not feeling sick that day.  He had taken his medications as normal.  His blood pressure has been running fine.  While on the computer, he suddenly started feeling lightheaded and passed out and slid down from the chair onto the floor.  Subsequently, he gained consciousness and vomited on himself.  A few minutes later he walked to his wife and they decided to go to the Emergency Department, but he had another episode of vomiting.  He came into the hospital, seen by Dr. Smith in consultation.  Troponins were negative.  ECG was benign at that time.  Repeat echocardiogram revealed normal LV function.  This was thought to be vasovagal syncope, potentially contributed by baseline atrial fibrillation with slow ventricular response versus hypotension.  His blood pressure indeed when he saw Melvi Montaño was 106 systolic, but in the hospital was okay.  Dr. Smith cut his carvedilol down to 3.125 mg b.i.d. and now he is here for followup after having a 30-day event monitor put on him.      Again, continues to deny any sort of angina or any further episodes of syncope or presyncope.  Blood pressure is slightly high today since his carvedilol has been cut down.      PERTINENT MEDICATIONS:   1.  Allopurinol 300 daily.   2.  Amlodipine 5 mg daily.   3.  Atorvastatin 40 mg daily.   4.  Coreg 3.125 mg b.i.d.   5.  Plavix 75 mg daily.   6.  Lasix 20 mg daily.   7.  Imdur 120 mg daily.   8.  Losartan 100 mg daily.   9.  Xarelto 15 mg daily.   10.  Spiriva.      PHYSICAL EXAMINATION:   VITAL SIGNS:  Blood pressure is 150/77 with a pulse of 76.   GENERAL:  Alert and oriented x3, in no acute distress.   NECK:  Supple.  JVP is normal.   LUNGS:  Clear.   CARDIOVASCULAR:  Cardiac sounds are irregular without murmur, rubs or gallops.   EXTREMITIES:  Warm, no edema.   PSYCHIATRIC:  Appropriate affect.   HEENT:  No icterus, pallor.   ABDOMEN:  Nontender.      ASSESSMENT  AND PLAN:  Mr. Betito Anderson overall, he is doing well from a CAD standpoint.  He does not have any residual angina.  He has good collaterals to his  of the right coronary artery from the LAD.  At this point, given mild ischemia and the absence of anginal symptoms, I do not think PCO attempt with a chip procedure of his right coronary artery is indicated given the risks and benefits ratio.  He understands that and will contact us if he has further angina breakthrough on Imdur or beta blocker therapy.      In regards to heart failure, his EF was acutely down to the hospital to 35% in the setting of hypertensive emergency.  This has normalized.  Good blood pressure control is warranted.  Last echocardiogram was last week in the hospital.  His EF was 55% on that.  We will monitor that.      The most thing I am concerned about his episode of syncope.  His history is quite concerning that he passed out while on the computer, sitting in his chair.  Although his EF is normal, given his CAD history and some degree of ventricular scarring that he may have.  I want him to take an opinion from Cardiac Electrophysiology to evaluate him for this episode of syncope that happened at rest with a somewhat concerning history.  We will follow up on the event monitor results.  If he has any further episodes of syncope, he needs to come into the Emergency Department by calling 911.  I have instructed that to him and his wife.   We should be notified for any VT on his event monitor.      At this point, it is reasonable for him to continue a low dose of Coreg.  His blood pressure continues to stay high in followup, which I will schedule for him the next couple of weeks with Melvi Montaño, then his amlodipine could be increased to 10 mg daily.  I have told him there is a small possibility EP study would be considered, but we will see what the event monitor shows and what our EP team says.        I will personally see him in close followup  in 3 months, sooner if anything changes clinically before we start spacing out visits depending on his stability.        cc:      Rudy Vernon MD    [facility]   [address]   [place] [address]      Melvi Montaño PA-C   [facility]   [address]   [place] [address]         MANISH YEBOAH MD             D: 2019   T: 2019   MT: JODIE      Name:     MARCELA TINAJERO   MRN:      [id number]        Account:      [id number]   :      1945           Service Date: 2019      Document: [id number]

## 2019-09-19 NOTE — LETTER
9/19/2019    Rudy Vernon MD  [address]  [facility] [address]    RE: Betito Anderson       Dear Colleague,    I had the pleasure of seeing Betito Anderson in the [facility].    HPI and Plan:   See dictation [id number]    Orders Placed This Encounter   Procedures     Follow-Up with Cardiac Advanced Practice Provider     Follow-Up with Electrophysiologist     Follow-Up with Cardiologist     No orders of the defined types were placed in this encounter.    There are no discontinued medications.      Encounter Diagnosis   Name Primary?     Coronary artery disease involving native heart without angina pectoris, unspecified vessel or lesion type Yes       CURRENT MEDICATIONS:  Current Outpatient Medications   Medication Sig Dispense Refill     allopurinol (ZYLOPRIM) 300 MG tablet TAKE 1 TABLET(300 MG) BY MOUTH DAILY 90 tablet 3     amLODIPine (NORVASC) 5 MG tablet Take 1 tablet (5 mg) by mouth daily 90 tablet 3     atorvastatin (LIPITOR) 40 MG tablet Take 1 tablet (40 mg) by mouth daily 90 tablet 3     carvedilol (COREG) 3.125 MG tablet Take 1 tablet (3.125 mg) by mouth 2 times daily (with meals) 180 tablet 3     clopidogrel (PLAVIX) 75 MG tablet Take 1 tablet (75 mg) by mouth daily 90 tablet 3     furosemide (LASIX) 20 MG tablet Take 20 mg by mouth daily       isosorbide mononitrate (IMDUR) 120 MG 24 HR ER tablet Take 1 tablet (120 mg) by mouth daily 90 tablet 3     losartan (COZAAR) 100 MG tablet TAKE 1 TABLET(100 MG) BY MOUTH DAILY 90 tablet 3     rivaroxaban ANTICOAGULANT (XARELTO) 15 MG TABS tablet Take 1 tablet (15 mg) by mouth daily (with dinner) 90 tablet 3     tiotropium (SPIRIVA RESPIMAT) 2.5 MCG/ACT inhaler Inhale 2 puffs into the lungs daily 1 Inhaler 1     ASPIRIN NOT PRESCRIBED (INTENTIONAL) Please choose reason not prescribed, below (Patient not taking: Reported on 9/19/2019)         ALLERGIES     Allergies   Allergen Reactions     Ace Inhibitors Anaphylaxis      Edema of ace       PAST MEDICAL HISTORY:  Past Medical History:   Diagnosis Date     ASCVD (arteriosclerotic cardiovascular disease) 1997    angio with 40% circ lesion; 6/19 hosp for chf, 3 vessel dz on angio but porcelin aorta so ptca done     Atrial fibrillation (H) 10/09/2018    found on routine physical     Carotid artery plaque 2005    seen on us, about 50% stenosis, fu 2009 us no significant stenosis     CHF (congestive heart failure) (H) 06/2019    hosp fsd, then fu echo ef nl     Elevated blood sugar      Gout     on allopurinol     HTN (hypertension)      Hypercholesteremia      Hyponatremia 2015    felt due to chlorthalidone     Low mean corpuscular volume (MCV)      Near syncope 5/15    fu est echo low level but neg     Psoriasis     Dr. Marti     Renal mass 06/29/2019    seen on ct chest for sob, needs fu ct for that, fu us done 7/19 and no mass seen, should have fu ct in December     Screening 2012    abd us no aaa     Syncope 09/2019    hosp fsd, cause not clear, lowered coreg dose       PAST SURGICAL HISTORY:  Past Surgical History:   Procedure Laterality Date     C ANESTH,DX ARTHROSCOPIC PROC KNEE JOINT  2009     CV CORONARY ANGIOGRAM N/A 7/2/2019    Procedure: Coronary Angiogram;  Surgeon: Donaldo Loera MD;  Location:  HEART CARDIAC CATH LAB     CV HEART CATHETERIZATION WITH POSSIBLE INTERVENTION N/A 7/5/2019    Procedure: Heart Catheterization with Possible Intervention;  Surgeon: Manolo Hu MD;  Location:  HEART CARDIAC CATH LAB     CV LEFT HEART CATH N/A 7/2/2019    Procedure: Left Heart Cath;  Surgeon: Donaldo Loera MD;  Location:  HEART CARDIAC CATH LAB     CV LEFT VENTRICULOGRAM N/A 7/2/2019    Procedure: Left Ventriculogram;  Surgeon: Donaldo Loera MD;  Location:  HEART CARDIAC CATH LAB     CV PCI STENT DRUG ELUTING N/A 7/5/2019    Procedure: PCI Stent Drug Eluting;  Surgeon: Manolo Hu MD;  Location:  HEART CARDIAC CATH LAB     CV  RIGHT HEART CATH N/A 2019    Procedure: Right Heart Cath;  Surgeon: Donaldo Loera MD;  Location:  HEART CARDIAC CATH LAB       FAMILY HISTORY:  Family History   Problem Relation Age of Onset     Coronary Artery Disease Father      Medical History Unknown Mother      Medical History Unknown Maternal Grandfather        SOCIAL HISTORY:  Social History     Socioeconomic History     Marital status:      Spouse name: None     Number of children: 2     Years of education: None     Highest education level: None   Occupational History     Occupation: retired   Social Needs     Financial resource strain: None     Food insecurity:     Worry: None     Inability: None     Transportation needs:     Medical: None     Non-medical: None   Tobacco Use     Smoking status: Former Smoker     Packs/day: 1.00     Years: 30.00     Pack years: 30.00     Types: Cigars     Last attempt to quit: 1998     Years since quittin.0     Smokeless tobacco: Never Used   Substance and Sexual Activity     Alcohol use: Not Currently     Alcohol/week: 8.4 oz     Types: 14 Standard drinks or equivalent per week     Frequency: 2-3 times a week     Drinks per session: 1 or 2     Binge frequency: Never     Comment: 1-2 a night     Drug use: No     Sexual activity: Never     Partners: Female   Lifestyle     Physical activity:     Days per week: None     Minutes per session: None     Stress: None   Relationships     Social connections:     Talks on phone: None     Gets together: None     Attends Congregation service: None     Active member of club or organization: None     Attends meetings of clubs or organizations: None     Relationship status: None     Intimate partner violence:     Fear of current or ex partner: None     Emotionally abused: None     Physically abused: None     Forced sexual activity: None   Other Topics Concern     Parent/sibling w/ CABG, MI or angioplasty before 65F 55M? Not Asked   Social History Narrative      "None       Review of Systems:  Skin:  Negative     Eyes:  Negative    ENT:  Negative    Respiratory:  Negative    Cardiovascular:  Negative;palpitations;chest pain;lightheadedness;dizziness;fatigue;edema    Gastroenterology: Negative    Genitourinary:  Negative    Musculoskeletal:  Negative    Neurologic:  Negative    Psychiatric:  Negative    Heme/Lymph/Imm:  Negative    Endocrine:  Negative      Physical Exam:  Vitals: BP (!) 150/77   Pulse 76   Ht 1.676 m (5' 6\")   Wt 86.8 kg (191 lb 4.8 oz)   BMI 30.88 kg/m       Recent Lab Results:  LIPID RESULTS:  Lab Results   Component Value Date    CHOL 137 07/06/2019    HDL 39 (L) 07/06/2019    LDL 63 07/06/2019    TRIG 175 (H) 07/06/2019    CHOLHDLRATIO 2.8 02/26/2015       LIVER ENZYME RESULTS:  Lab Results   Component Value Date    AST 21 06/30/2019    ALT 22 06/30/2019       CBC RESULTS:  Lab Results   Component Value Date    WBC 8.0 09/19/2019    RBC 4.64 09/19/2019    HGB 12.8 (L) 09/19/2019    HCT 40.4 09/19/2019    MCV 87 09/19/2019    MCH 27.6 09/19/2019    MCHC 31.7 09/19/2019    RDW 16.5 (H) 09/19/2019     09/19/2019       BMP RESULTS:  Lab Results   Component Value Date     09/19/2019    POTASSIUM 4.7 09/19/2019    CHLORIDE 104 09/19/2019    CO2 27 09/19/2019    ANIONGAP 13.7 09/19/2019     (H) 09/19/2019    BUN 16 09/19/2019    CR 1.14 09/19/2019    GFRESTIMATED 63 09/19/2019    GFRESTBLACK 76 09/19/2019    GUNNER 10.0 09/19/2019        A1C RESULTS:  Lab Results   Component Value Date    A1C 5.7 (H) 06/30/2019       INR RESULTS:  Lab Results   Component Value Date    INR 1.18 (H) 09/14/2019    INR 2.33 (H) 07/18/2019           CC  Rudy Vernon MD  [address]  [place] [address]                    Thank you for allowing me to participate in the care of your patient.      Sincerely,     Mendez Hidalgo MD     [facility]    cc:   Rudy Vernon MD  [address]  [place], [place] " [address]

## 2019-09-19 NOTE — LETTER
9/19/2019      Rudy Vernon MD  [address]  [facility] [address]      RE: Betito Anderson       Dear Colleague,    I had the pleasure of seeing Betito Anderson in the [facility].    Service Date: 09/19/2019      REASON FOR VISIT:  Post-hospital followup for severe 3-vessel coronary artery disease.      HISTORY OF PRESENT ILLNESS:  This is a very delightful 73-year-old gentleman who I had first met in the hospital in summer of 2019.  The patient has a history of chronic atrial fibrillation on anticoagulation and was seen by Dr. Lezama in 2018.  He presented in 07/2019, with acute heart failure, volume overload and hypertensive emergency and LV dysfunction.  EF was initially 30%-35% and actually normalized to 55% with control of his blood pressure.  During that admission, he had a coronary angiography that revealed a very tight lesion in the mid LAD straddling into a large second diagonal.  He also had a distal circumflex lesion which was quite significant and tight, but was a smallish vessel in that area and subtotally occluded  of the right coronary artery with robust collaterals from the LAD.  He was initially referred for surgery, but given his porcelain aorta, he underwent a PCI off his bifurcation LAD into the diagonal.        Subsequently, he had a nuclear stress test that showed very mild basilar inferolateral ischemia in the setting of no clinical angina.  As such, this is under conservative medical management.        He also has a history of hypertension, dyslipidemia and atrial fibrillation on Xarelto.  His antiplatelet regimen includes 15 mg of Xarelto and 75 mg of Plavix.      He was last seen by Melvi Montaño on 09/09/2019 and seemed to be doing okay except for mild kidney injury following which his torsemide was reduced and his creatinine has normalized.  He has NYHA class II heart failure symptoms.  Blood pressure has been doing okay.        He was scheduled  for routine followup with me today.  However, earlier this week, he had a significant event at home for which he had a hospitalization.  The patient says that it was a routine day for him and at 10:30 in the morning he was sitting at his computer after normally having breakfast.  He was not dehydrated, he was not feeling sick that day.  He had taken his medications as normal.  His blood pressure has been running fine.  While on the computer, he suddenly started feeling lightheaded and passed out and slid down from the chair onto the floor.  Subsequently, he gained consciousness and vomited on himself.  A few minutes later he walked to his wife and they decided to go to the Emergency Department, but he had another episode of vomiting.  He came into the hospital, seen by Dr. Smith in consultation.  Troponins were negative.  ECG was benign at that time.  Repeat echocardiogram revealed normal LV function.  This was thought to be vasovagal syncope, potentially contributed by baseline atrial fibrillation with slow ventricular response versus hypotension.  His blood pressure indeed when he saw Melvi Montaño was 106 systolic, but in the hospital was okay.  Dr. Smith cut his carvedilol down to 3.125 mg b.i.d. and now he is here for followup after having a 30-day event monitor put on him.      Again, continues to deny any sort of angina or any further episodes of syncope or presyncope.  Blood pressure is slightly high today since his carvedilol has been cut down.      PERTINENT MEDICATIONS:   1.  Allopurinol 300 daily.   2.  Amlodipine 5 mg daily.   3.  Atorvastatin 40 mg daily.   4.  Coreg 3.125 mg b.i.d.   5.  Plavix 75 mg daily.   6.  Lasix 20 mg daily.   7.  Imdur 120 mg daily.   8.  Losartan 100 mg daily.   9.  Xarelto 15 mg daily.   10.  Spiriva.      PHYSICAL EXAMINATION:   VITAL SIGNS:  Blood pressure is 150/77 with a pulse of 76.   GENERAL:  Alert and oriented x3, in no acute distress.   NECK:  Supple.  JVP is normal.    LUNGS:  Clear.   CARDIOVASCULAR:  Cardiac sounds are irregular without murmur, rubs or gallops.   EXTREMITIES:  Warm, no edema.   PSYCHIATRIC:  Appropriate affect.   HEENT:  No icterus, pallor.   ABDOMEN:  Nontender.      ASSESSMENT AND PLAN:  Mr. Betito Anderson overall, he is doing well from a CAD standpoint.  He does not have any residual angina.  He has good collaterals to his  of the right coronary artery from the LAD.  At this point, given mild ischemia and the absence of anginal symptoms, I do not think PCO attempt with a chip procedure of his right coronary artery is indicated given the risks and benefits ratio.  He understands that and will contact us if he has further angina breakthrough on Imdur or beta blocker therapy.      In regards to heart failure, his EF was acutely down to the hospital to 35% in the setting of hypertensive emergency.  This has normalized.  Good blood pressure control is warranted.  Last echocardiogram was last week in the hospital.  His EF was 55% on that.  We will monitor that.      The most thing I am concerned about his episode of syncope.  His history is quite concerning that he passed out while on the computer, sitting in his chair.  Although his EF is normal, given his CAD history and some degree of ventricular scarring that he may have.  I want him to take an opinion from Cardiac Electrophysiology to evaluate him for this episode of syncope that happened at rest with a somewhat concerning history.  We will follow up on the event monitor results.  If he has any further episodes of syncope, he needs to come into the Emergency Department by calling 911.  I have instructed that to him and his wife.   We should be notified for any VT on his event monitor.      At this point, it is reasonable for him to continue a low dose of Coreg.  His blood pressure continues to stay high in followup, which I will schedule for him the next couple of weeks with Melvi Montaño, then his  amlodipine could be increased to 10 mg daily.  I have told him there is a small possibility EP study would be considered, but we will see what the event monitor shows and what our EP team says.        I will personally see him in close followup in 3 months, sooner if anything changes clinically before we start spacing out visits depending on his stability.        cc:      Rudy Vernon MD    [facility]   [address]   [place] [address]      Melvi Montaño PA-C   [facility]   [address]   [place] [address]         MANISH YEBOAH MD             D: 2019   T: 2019   MT: JODIE      Name:     MARCELA TINAJERO   MRN:      [id number]        Account:      [id number]   :      1945           Service Date: 2019      Document: [id number]           Outpatient Encounter Medications as of 2019   Medication Sig Dispense Refill     allopurinol (ZYLOPRIM) 300 MG tablet TAKE 1 TABLET(300 MG) BY MOUTH DAILY 90 tablet 3     amLODIPine (NORVASC) 5 MG tablet Take 1 tablet (5 mg) by mouth daily 90 tablet 3     atorvastatin (LIPITOR) 40 MG tablet Take 1 tablet (40 mg) by mouth daily 90 tablet 3     carvedilol (COREG) 3.125 MG tablet Take 1 tablet (3.125 mg) by mouth 2 times daily (with meals) 180 tablet 3     clopidogrel (PLAVIX) 75 MG tablet Take 1 tablet (75 mg) by mouth daily 90 tablet 3     furosemide (LASIX) 20 MG tablet Take 20 mg by mouth daily       isosorbide mononitrate (IMDUR) 120 MG 24 HR ER tablet Take 1 tablet (120 mg) by mouth daily 90 tablet 3     losartan (COZAAR) 100 MG tablet TAKE 1 TABLET(100 MG) BY MOUTH DAILY 90 tablet 3     rivaroxaban ANTICOAGULANT (XARELTO) 15 MG TABS tablet Take 1 tablet (15 mg) by mouth daily (with dinner) 90 tablet 3     tiotropium (SPIRIVA RESPIMAT) 2.5 MCG/ACT inhaler Inhale 2 puffs into the lungs daily 1 Inhaler 1     ASPIRIN NOT PRESCRIBED (INTENTIONAL) Please choose reason not prescribed, below (Patient not  taking: Reported on 9/19/2019)       No facility-administered encounter medications on file as of 9/19/2019.        Again, thank you for allowing me to participate in the care of your patient.      Sincerely,    Mendez Hidalgo MD     [facility]

## 2019-09-19 NOTE — PROGRESS NOTES
HPI and Plan:   See dictation [id number]    Orders Placed This Encounter   Procedures     Follow-Up with Cardiac Advanced Practice Provider     Follow-Up with Electrophysiologist     Follow-Up with Cardiologist     No orders of the defined types were placed in this encounter.    There are no discontinued medications.      Encounter Diagnosis   Name Primary?     Coronary artery disease involving native heart without angina pectoris, unspecified vessel or lesion type Yes       CURRENT MEDICATIONS:  Current Outpatient Medications   Medication Sig Dispense Refill     allopurinol (ZYLOPRIM) 300 MG tablet TAKE 1 TABLET(300 MG) BY MOUTH DAILY 90 tablet 3     amLODIPine (NORVASC) 5 MG tablet Take 1 tablet (5 mg) by mouth daily 90 tablet 3     atorvastatin (LIPITOR) 40 MG tablet Take 1 tablet (40 mg) by mouth daily 90 tablet 3     carvedilol (COREG) 3.125 MG tablet Take 1 tablet (3.125 mg) by mouth 2 times daily (with meals) 180 tablet 3     clopidogrel (PLAVIX) 75 MG tablet Take 1 tablet (75 mg) by mouth daily 90 tablet 3     furosemide (LASIX) 20 MG tablet Take 20 mg by mouth daily       isosorbide mononitrate (IMDUR) 120 MG 24 HR ER tablet Take 1 tablet (120 mg) by mouth daily 90 tablet 3     losartan (COZAAR) 100 MG tablet TAKE 1 TABLET(100 MG) BY MOUTH DAILY 90 tablet 3     rivaroxaban ANTICOAGULANT (XARELTO) 15 MG TABS tablet Take 1 tablet (15 mg) by mouth daily (with dinner) 90 tablet 3     tiotropium (SPIRIVA RESPIMAT) 2.5 MCG/ACT inhaler Inhale 2 puffs into the lungs daily 1 Inhaler 1     ASPIRIN NOT PRESCRIBED (INTENTIONAL) Please choose reason not prescribed, below (Patient not taking: Reported on 9/19/2019)         ALLERGIES     Allergies   Allergen Reactions     Ace Inhibitors Anaphylaxis     Edema of ace       PAST MEDICAL HISTORY:  Past Medical History:   Diagnosis Date     ASCVD (arteriosclerotic cardiovascular disease) 1997    angio with 40% circ lesion; 6/19 hosp for chf, 3 vessel dz on angio but porcelin aorta  so ptca done     Atrial fibrillation (H) 10/09/2018    found on routine physical     Carotid artery plaque 2005    seen on us, about 50% stenosis, fu 2009 us no significant stenosis     CHF (congestive heart failure) (H) 06/2019    hosp fsd, then fu echo ef nl     Elevated blood sugar      Gout     on allopurinol     HTN (hypertension)      Hypercholesteremia      Hyponatremia 2015    felt due to chlorthalidone     Low mean corpuscular volume (MCV)      Near syncope 5/15    fu est echo low level but neg     Psoriasis     Dr. Marti     Renal mass 06/29/2019    seen on ct chest for sob, needs fu ct for that, fu us done 7/19 and no mass seen, should have fu ct in December     Screening 2012    abd us no aaa     Syncope 09/2019    hosp fsd, cause not clear, lowered coreg dose       PAST SURGICAL HISTORY:  Past Surgical History:   Procedure Laterality Date     C ANESTH,DX ARTHROSCOPIC PROC KNEE JOINT  2009     CV CORONARY ANGIOGRAM N/A 7/2/2019    Procedure: Coronary Angiogram;  Surgeon: Donaldo Loera MD;  Location:  HEART CARDIAC CATH LAB     CV HEART CATHETERIZATION WITH POSSIBLE INTERVENTION N/A 7/5/2019    Procedure: Heart Catheterization with Possible Intervention;  Surgeon: Manolo Hu MD;  Location:  HEART CARDIAC CATH LAB     CV LEFT HEART CATH N/A 7/2/2019    Procedure: Left Heart Cath;  Surgeon: Donaldo Loera MD;  Location:  HEART CARDIAC CATH LAB     CV LEFT VENTRICULOGRAM N/A 7/2/2019    Procedure: Left Ventriculogram;  Surgeon: Donaldo Loera MD;  Location:  HEART CARDIAC CATH LAB     CV PCI STENT DRUG ELUTING N/A 7/5/2019    Procedure: PCI Stent Drug Eluting;  Surgeon: Manolo Hu MD;  Location:  HEART CARDIAC CATH LAB     CV RIGHT HEART CATH N/A 7/2/2019    Procedure: Right Heart Cath;  Surgeon: Donaldo Loera MD;  Location:  HEART CARDIAC CATH LAB       FAMILY HISTORY:  Family History   Problem Relation Age of Onset     Coronary Artery  Disease Father      Medical History Unknown Mother      Medical History Unknown Maternal Grandfather        SOCIAL HISTORY:  Social History     Socioeconomic History     Marital status:      Spouse name: None     Number of children: 2     Years of education: None     Highest education level: None   Occupational History     Occupation: retired   Social Needs     Financial resource strain: None     Food insecurity:     Worry: None     Inability: None     Transportation needs:     Medical: None     Non-medical: None   Tobacco Use     Smoking status: Former Smoker     Packs/day: 1.00     Years: 30.00     Pack years: 30.00     Types: Cigars     Last attempt to quit: 1998     Years since quittin.0     Smokeless tobacco: Never Used   Substance and Sexual Activity     Alcohol use: Not Currently     Alcohol/week: 8.4 oz     Types: 14 Standard drinks or equivalent per week     Frequency: 2-3 times a week     Drinks per session: 1 or 2     Binge frequency: Never     Comment: 1-2 a night     Drug use: No     Sexual activity: Never     Partners: Female   Lifestyle     Physical activity:     Days per week: None     Minutes per session: None     Stress: None   Relationships     Social connections:     Talks on phone: None     Gets together: None     Attends Mandaen service: None     Active member of club or organization: None     Attends meetings of clubs or organizations: None     Relationship status: None     Intimate partner violence:     Fear of current or ex partner: None     Emotionally abused: None     Physically abused: None     Forced sexual activity: None   Other Topics Concern     Parent/sibling w/ CABG, MI or angioplasty before 65F 55M? Not Asked   Social History Narrative     None       Review of Systems:  Skin:  Negative     Eyes:  Negative    ENT:  Negative    Respiratory:  Negative    Cardiovascular:  Negative;palpitations;chest pain;lightheadedness;dizziness;fatigue;edema    Gastroenterology:  "Negative    Genitourinary:  Negative    Musculoskeletal:  Negative    Neurologic:  Negative    Psychiatric:  Negative    Heme/Lymph/Imm:  Negative    Endocrine:  Negative      Physical Exam:  Vitals: BP (!) 150/77   Pulse 76   Ht 1.676 m (5' 6\")   Wt 86.8 kg (191 lb 4.8 oz)   BMI 30.88 kg/m      Recent Lab Results:  LIPID RESULTS:  Lab Results   Component Value Date    CHOL 137 07/06/2019    HDL 39 (L) 07/06/2019    LDL 63 07/06/2019    TRIG 175 (H) 07/06/2019    CHOLHDLRATIO 2.8 02/26/2015       LIVER ENZYME RESULTS:  Lab Results   Component Value Date    AST 21 06/30/2019    ALT 22 06/30/2019       CBC RESULTS:  Lab Results   Component Value Date    WBC 8.0 09/19/2019    RBC 4.64 09/19/2019    HGB 12.8 (L) 09/19/2019    HCT 40.4 09/19/2019    MCV 87 09/19/2019    MCH 27.6 09/19/2019    MCHC 31.7 09/19/2019    RDW 16.5 (H) 09/19/2019     09/19/2019       BMP RESULTS:  Lab Results   Component Value Date     09/19/2019    POTASSIUM 4.7 09/19/2019    CHLORIDE 104 09/19/2019    CO2 27 09/19/2019    ANIONGAP 13.7 09/19/2019     (H) 09/19/2019    BUN 16 09/19/2019    CR 1.14 09/19/2019    GFRESTIMATED 63 09/19/2019    GFRESTBLACK 76 09/19/2019    GUNNER 10.0 09/19/2019        A1C RESULTS:  Lab Results   Component Value Date    A1C 5.7 (H) 06/30/2019       INR RESULTS:  Lab Results   Component Value Date    INR 1.18 (H) 09/14/2019    INR 2.33 (H) 07/18/2019           CC  Rudy Vernon MD  [address]  [place] [id number]                  "

## 2019-09-20 ENCOUNTER — TELEPHONE (OUTPATIENT)
Dept: CARDIOLOGY | Facility: CLINIC | Age: 74
End: 2019-09-20

## 2019-09-20 ENCOUNTER — HOSPITAL ENCOUNTER (OUTPATIENT)
Dept: CARDIAC REHAB | Facility: CLINIC | Age: 74
End: 2019-09-20
Attending: INTERNAL MEDICINE
Payer: COMMERCIAL

## 2019-09-20 PROCEDURE — 40000116 ZZH STATISTIC OP CR VISIT

## 2019-09-20 PROCEDURE — 93798 PHYS/QHP OP CAR RHAB W/ECG: CPT

## 2019-09-20 NOTE — TELEPHONE ENCOUNTER
----- Message from Mendez Hidalgo MD sent at 9/19/2019 11:28 AM CDT -----  Please follow up on this patient's event monitor. If he has ANY ventricular arrhythmia, I would put a life vest on him and notify me and/ or Melvi More. He is planning EP consultation soon.

## 2019-09-20 NOTE — TELEPHONE ENCOUNTER
I called EKG department and had  changed to the physician who will be notified on pt's event monitor. It was ordered at hospital discharge by the hospitalist, so current results are going to pt's PMD. All strips so far will be faxed to us, and we should not get future strips. Mukesh HOLLAND September 20, 2019, 10:47 AM

## 2019-09-25 ENCOUNTER — HOSPITAL ENCOUNTER (OUTPATIENT)
Dept: CARDIAC REHAB | Facility: CLINIC | Age: 74
End: 2019-09-25
Attending: INTERNAL MEDICINE
Payer: COMMERCIAL

## 2019-09-25 PROCEDURE — 93798 PHYS/QHP OP CAR RHAB W/ECG: CPT | Performed by: OCCUPATIONAL THERAPIST

## 2019-09-25 PROCEDURE — 40000116 ZZH STATISTIC OP CR VISIT: Performed by: OCCUPATIONAL THERAPIST

## 2019-09-30 NOTE — PROGRESS NOTES
Cardiology Clinic Progress Note  Betito Anderson MRN# [id number]   YOB: 1945 Age: 73 year old   Primary Cardiologist: Dr. Hidalgo Reason for visit: Cardiology follow up            Assessment and Plan:   Betito Anderson is a very pleasant 73 year old male with a history of HFrEF, coronary artery disease, atrial fibrillation, hypertension, and dyslipidemia. Patient here today for cardiology follow up on his blood pressure.     1. Coronary artery disease - s/p OLESYA to mid LAD at the bifurcation of the D2, known severe 3 vessel disease, coronary angiogram in July 2019 showed normal LM, 80% mid LAD stenosis, 70% mid circumflex stenosis, subtotally occluded RCA with left to right collaterals. Patient was initially referred for surgery, but given his porcelain aorta, he ultimately underwent PCI. Stable, no signs/symptoms of angina.    - Continue aspirin, plavix, atorvastatin, carvedilol   - Continue cardiac rehab   - Counseled on lifestyle modifications    2. History of systolic heart failure, now with chronic diastolic heart failure- EF was initially 30-35% and normalized to 55% with blood pressure control. Patient appears compensated and euvolemic   - Continue furosemide 20mg daily     3. Syncope - no recurrent episodes, episode in April while sitting at his computer, hospitalized, echocardiogram showed normal LVEF. Syncopal event was thought to be related to vasovagal, potentially contributed by baseline atrial fibrillation with slow ventricular response versus hypotension. Patients carvedilol was decreased to 3.125mg BID. 30 day event monitor was placed.   - Event monitor currently in place   - EP referral pending, scheduled for October 2019    4. Atrial fibrillation, chronic - stable, asymptomatic, rate controlled   - [id number] score 4 (age, HTN, vascular HF)   - Continue rivaroxaban 15mg daily for thromboembolic prophylaxis   - Continue carvedilol 3.125mg BID    5. Hypertension - controlled,  concerns during last visit of elevated blood pressures with decrease in carvedilol. Patient has been monitoring blood pressure at home and at cardiac rehab, 100-120s/60-80s. Continue current medications.   - Reinforced lifestyle modifications.     6. Dyslipidemia - LDL goal < 70, lipid panel 7/6/2019, total cholesterol 137, triglycerides 175, HDL 39 and LDL 63.         Changes today: none    Follow up plan:     Follow up with Dr. Lezama 10/9/2019    Follow up with Dr. Hidalgo in December 2019        History of Presenting Illness:    Betito Anderson is a very pleasant 73 year old male with a history of HFrEF, coronary artery disease, atrial fibrillation, hypertension, and dyslipidemia.     Patient started following with Dr. Hidalgo after a hospitalization in July 2019 for acute systolic heart failure, volume overload and hypertensive emergency. EF was initially 30-35% and normalized to 55% with blood pressure control. Coronary angiogram was completed which demonstrated a very tight lesion in the mid LAD straddling into a large second diagonal, distal circumflex 70% stenosis and subtotal occluded  of RCA with robust collaterals from LAD. Patient was initially referred for surgery, but given his porcelain aorta, he underwent a PCI of his bifurcation LAD into the diagonal.     Patient then had outpatient nuclear stress test in 9/12/2019 which showed mild basilar inferolateral ischemia, no angina, being medically managed.     Patient was hospitalized 9/14/19-9/15/19 after syncopal event which occurred while sitting at his computer. Upon regaining consciousness he vomited. Patient was seen by cardiology while hospitalized, troponins were negative, echocardiogram showed normal LVEF. Syncopal event was thought to be related to vasovagal, potentially contributed by baseline atrial fibrillation with slow ventricular response versus hypotension. Patients carvedilol was decreased to 3.125mg BID. 30 day event monitor was placed.      Patient was most recently seen by Dr. Hidalgo 9/19/2019, recommended continued medical management of  to RCA, given good collaterals from LAD, mild ischemia on stress test and no anginal symptoms. EP referral for follow up after syncopal event. It was recommended if blood pressure remains elevated that amlodipine should be increased to 10mg daily.     Patient is here today for cardiology follow up related to blood pressure.     Patient reports feeling good. Blood pressure was noted to be slightly elevated after his carvedilol was decreased during most recent hospitalization. Patient denies chest pain or chest tightness. Denies shortness of breath at rest. Denies exertional dyspnea. Energy levels are improved. Denies dizziness, lightheadedness or other presyncopal symptoms. Denies recurrent syncope. Denies tachycardia or palpitations. Weights have been stable.     Labs from 9/19/19 showed stable kidney function (creatinine 1.14) and electrolytes stable. Hemoglobin 12.8. Blood pressure 138/48 and HR 64 in clinic today. Monitoring blood pressure at home running 100-120s/60-80s.     Appetite good. Eating most meals at home, eating salads. Trying to limit sodium intake. Attending cardiac rehab 3 days a week. Doing some light weight lifting in the exercise room in his [employer] building. Right knee pain limits activity. Denies alcohol use. Denies tobacco use.         Recent Hospitalizations   9/14/19-9/15/19 after syncopal event, troponins were negative, echocardiogram showed normal LVEF. Syncopal event was thought to be related to vasovagal, potentially contributed by baseline atrial fibrillation with slow ventricular response versus hypotension. Patients carvedilol was decreased to 3.125mg BID. 30 day event monitor was placed.   6/29/19-7/6/2019 acute decompensated systolic heart failure EF 35-40% improved to 55-60% with blood pressure control, multivessel coronary artery disease s/p PCI to LAD.         Social History     , 2 children, 4 grandchildren.   Social History     Socioeconomic History     Marital status:      Spouse name: Not on file     Number of children: 2     Years of education: Not on file     Highest education level: Not on file   Occupational History     Occupation: retired   Social Needs     Financial resource strain: Not on file     Food insecurity:     Worry: Not on file     Inability: Not on file     Transportation needs:     Medical: Not on file     Non-medical: Not on file   Tobacco Use     Smoking status: Former Smoker     Packs/day: 1.00     Years: 30.00     Pack years: 30.00     Types: Cigars     Last attempt to quit: 1998     Years since quittin.0     Smokeless tobacco: Never Used   Substance and Sexual Activity     Alcohol use: Not Currently     Alcohol/week: 14.0 standard drinks     Types: 14 Standard drinks or equivalent per week     Frequency: 2-3 times a week     Drinks per session: 1 or 2     Binge frequency: Never     Comment: 1-2 a night     Drug use: No     Sexual activity: Never     Partners: Female   Lifestyle     Physical activity:     Days per week: Not on file     Minutes per session: Not on file     Stress: Not on file   Relationships     Social connections:     Talks on phone: Not on file     Gets together: Not on file     Attends Synagogue service: Not on file     Active member of club or organization: Not on file     Attends meetings of clubs or organizations: Not on file     Relationship status: Not on file     Intimate partner violence:     Fear of current or ex partner: Not on file     Emotionally abused: Not on file     Physically abused: Not on file     Forced sexual activity: Not on file   Other Topics Concern     Parent/sibling w/ CABG, MI or angioplasty before 65F 55M? Not Asked   Social History Narrative     Not on file            Review of Systems:   Skin:  Negative     Eyes:  Negative    ENT:  Negative    Respiratory:  Negative    Cardiovascular:   "Negative    Gastroenterology: Negative    Genitourinary:  Negative    Musculoskeletal:  Negative    Neurologic:  Negative    Psychiatric:  Negative    Heme/Lymph/Imm:  Negative    Endocrine:  Negative           Physical Exam:   Vitals: /48   Pulse 64   Ht 1.676 m (5' 6\")   Wt 86.6 kg (191 lb)   BMI 30.83 kg/m     Wt Readings from Last 4 Encounters:   10/01/19 86.6 kg (191 lb)   09/19/19 86.8 kg (191 lb 4.8 oz)   09/17/19 84.4 kg (186 lb)   09/14/19 85.1 kg (187 lb 9.6 oz)     GEN: well nourished, in no acute distress.  HEENT:  Pupils equal, round. Sclerae nonicteric.   NECK: Supple, no masses appreciated. No JVD appreciated   C/V:  Irregularly irregular, no murmur, rub or gallop.   RESP: Respirations are unlabored. Clear to auscultation bilaterally without wheezing, rales, or rhonchi.  GI: Abdomen soft, nontender.  EXTREM: No LE edema.  NEURO: Alert and oriented, cooperative.  SKIN: Warm and dry.        Data:   ECHO 9/17/2019  Left ventricular systolic function is normal.  The visual ejection fraction is estimated at 55-60%.  Basal inferior hypokinesis.  The study was technically limited. Compared to the prior study dated 7/19,  there have been no changes.    Cardiac catheterization 7/5/2019  Left Main   The vessel was visualized by selective angiography and is moderate in size. The left main was selectively injected.   Mid LM to Dist LM lesion is 30% stenosed.   Left Anterior Descending   The LAD was selectively injected. The proximal segment is mildly to moderately calcified with no significant narrowing. The mid segment is very tortuous with a focal 85 to 90% narrowing just after the takeoff of the large first diagonal branch. Just first diagonal branch bifurcates into 2 equally sized medial and lateral branches that had no significant narrowing.   Ost LAD to Prox LAD lesion is 45% stenosed.   Prox LAD to Mid LAD lesion is 90% stenosed. Culprit lesion. The lesion is type B2 - medium risk, located at the " major branch, bifurcated and eccentric. The lesion is severely calcified.   Second Diagonal Branch   Ost 2nd Diag lesion is 75% stenosed.   Left Circumflex   The left circumflex coronary artery was selectively injected. There is a calcified ostial and proximal narrowing of about 40 to 50%. There is a mid vessel 30 to 40% narrowing followed by a 70% narrowing in the distal vessel before the takeoff of the terminal marginal branch. There is a very large epicardial collateral vessel seen to fill the distal right coronary.   Right Coronary Artery   The dominant right coronary was selectively injected. The ostium and proximal segment are heavily calcified. There is an eccentric 90% proximal narrowing. There is a second 95% subtotal narrowing in the proximal segment. There is very slow antegrade flow in the distal right coronary and competitive filling is seen from collateral vessels on the left system. Injections of the LAD demonstrate profuse collaterals via septal  branches to the distal right coronary and posterior lateral branch.   Prox RCA lesion is 100% stenosed. The lesion is chronic total occlusion.   Mid RCA lesion is 90% stenosed.   Right Posterior Descending Artery   Collaterals   RPDA filled by collaterals from Dist LAD.       LIPID RESULTS:  Lab Results   Component Value Date    CHOL 137 07/06/2019    HDL 39 (L) 07/06/2019    LDL 63 07/06/2019    TRIG 175 (H) 07/06/2019    CHOLHDLRATIO 2.8 02/26/2015     LIVER ENZYME RESULTS:  Lab Results   Component Value Date    AST 21 06/30/2019    ALT 22 06/30/2019     CBC RESULTS:  Lab Results   Component Value Date    WBC 8.0 09/19/2019    RBC 4.64 09/19/2019    HGB 12.8 (L) 09/19/2019    HCT 40.4 09/19/2019    MCV 87 09/19/2019    MCH 27.6 09/19/2019    MCHC 31.7 09/19/2019    RDW 16.5 (H) 09/19/2019     09/19/2019     BMP RESULTS:  Lab Results   Component Value Date     09/19/2019    POTASSIUM 4.7 09/19/2019    CHLORIDE 104 09/19/2019    CO2 27  09/19/2019    ANIONGAP 13.7 09/19/2019     (H) 09/19/2019    BUN 16 09/19/2019    CR 1.14 09/19/2019    GFRESTIMATED 63 09/19/2019    GFRESTBLACK 76 09/19/2019    GUNNER 10.0 09/19/2019      A1C RESULTS:  Lab Results   Component Value Date    A1C 5.7 (H) 06/30/2019     INR RESULTS:  Lab Results   Component Value Date    INR 1.18 (H) 09/14/2019    INR 2.33 (H) 07/18/2019            Medications     Current Outpatient Medications   Medication Sig Dispense Refill     allopurinol (ZYLOPRIM) 300 MG tablet TAKE 1 TABLET(300 MG) BY MOUTH DAILY 90 tablet 3     amLODIPine (NORVASC) 5 MG tablet Take 1 tablet (5 mg) by mouth daily 90 tablet 3     ASPIRIN NOT PRESCRIBED (INTENTIONAL) Please choose reason not prescribed, below       atorvastatin (LIPITOR) 40 MG tablet Take 1 tablet (40 mg) by mouth daily 90 tablet 3     carvedilol (COREG) 3.125 MG tablet Take 1 tablet (3.125 mg) by mouth 2 times daily (with meals) 180 tablet 3     clopidogrel (PLAVIX) 75 MG tablet Take 1 tablet (75 mg) by mouth daily 90 tablet 3     furosemide (LASIX) 20 MG tablet Take 20 mg by mouth daily       isosorbide mononitrate (IMDUR) 120 MG 24 HR ER tablet Take 1 tablet (120 mg) by mouth daily 90 tablet 3     losartan (COZAAR) 100 MG tablet TAKE 1 TABLET(100 MG) BY MOUTH DAILY 90 tablet 3     rivaroxaban ANTICOAGULANT (XARELTO) 15 MG TABS tablet Take 1 tablet (15 mg) by mouth daily (with dinner) 90 tablet 3     tiotropium (SPIRIVA RESPIMAT) 2.5 MCG/ACT inhaler Inhale 2 puffs into the lungs daily 1 Inhaler 1          Past Medical History     Past Medical History:   Diagnosis Date     ASCVD (arteriosclerotic cardiovascular disease) 1997    angio with 40% circ lesion; 6/19 hosp for chf, 3 vessel dz on angio but porcelin aorta so ptca done     Atrial fibrillation (H) 10/09/2018    found on routine physical     Carotid artery plaque 2005    seen on us, about 50% stenosis, fu 2009 us no significant stenosis     CHF (congestive heart failure) (H) 06/2019     hosp fsd, then fu echo ef nl     Elevated blood sugar      Gout     on allopurinol     HTN (hypertension)      Hypercholesteremia      Hyponatremia 2015    felt due to chlorthalidone     Low mean corpuscular volume (MCV)      Near syncope 5/15    fu est echo low level but neg     Psoriasis     Dr. Marti     Renal mass 06/29/2019    seen on ct chest for sob, needs fu ct for that, fu us done 7/19 and no mass seen, should have fu ct in December     Screening 2012    abd us no aaa     Syncope 09/2019    hosp fsd, cause not clear, lowered coreg dose     Past Surgical History:   Procedure Laterality Date     C ANESTH,DX ARTHROSCOPIC PROC KNEE JOINT  2009     CV CORONARY ANGIOGRAM N/A 7/2/2019    Procedure: Coronary Angiogram;  Surgeon: Donaldo Loera MD;  Location:  [facility]     CV HEART CATHETERIZATION WITH POSSIBLE INTERVENTION N/A 7/5/2019    Procedure: Heart Catheterization with Possible Intervention;  Surgeon: Manolo Hu MD;  Location:  [facility]     CV LEFT HEART CATH N/A 7/2/2019    Procedure: Left Heart Cath;  Surgeon: Donaldo Loera MD;  Location:  [facility]     CV LEFT VENTRICULOGRAM N/A 7/2/2019    Procedure: Left Ventriculogram;  Surgeon: Donaldo Loera MD;  Location:  [facility]ATH [facility]     CV PCI STENT DRUG ELUTING N/A 7/5/2019    Procedure: PCI Stent Drug Eluting;  Surgeon: Manolo Hu MD;  Location:  [facility]ATH [facility]     CV RIGHT HEART CATH N/A 7/2/2019    Procedure: Right Heart Cath;  Surgeon: Donaldo Loera MD;  Location:  HEART CARD[facility] CATH LAB     Family History   Problem Relation Age of Onset     Coronary Artery Disease Father      Medical History Unknown Mother      Medical History Unknown Maternal Grandfather             Allergies   Ace inhibitors        Leidy Lawson, ALFRED [facility]  Pager: [phone]

## 2019-10-01 ENCOUNTER — OFFICE VISIT (OUTPATIENT)
Dept: CARDIOLOGY | Facility: CLINIC | Age: 74
End: 2019-10-01
Attending: INTERNAL MEDICINE
Payer: COMMERCIAL

## 2019-10-01 VITALS
WEIGHT: 191 LBS | HEART RATE: 64 BPM | SYSTOLIC BLOOD PRESSURE: 138 MMHG | DIASTOLIC BLOOD PRESSURE: 48 MMHG | HEIGHT: 66 IN | BODY MASS INDEX: 30.7 KG/M2

## 2019-10-01 DIAGNOSIS — E78.5 HYPERLIPIDEMIA LDL GOAL <100: ICD-10-CM

## 2019-10-01 DIAGNOSIS — I25.10 CORONARY ARTERY DISEASE INVOLVING NATIVE HEART WITHOUT ANGINA PECTORIS, UNSPECIFIED VESSEL OR LESION TYPE: Primary | ICD-10-CM

## 2019-10-01 DIAGNOSIS — I10 BENIGN ESSENTIAL HYPERTENSION: ICD-10-CM

## 2019-10-01 DIAGNOSIS — I48.20 CHRONIC ATRIAL FIBRILLATION (H): ICD-10-CM

## 2019-10-01 DIAGNOSIS — R55 SYNCOPE, UNSPECIFIED SYNCOPE TYPE: ICD-10-CM

## 2019-10-01 PROCEDURE — 99214 OFFICE O/P EST MOD 30 MIN: CPT | Performed by: NURSE PRACTITIONER

## 2019-10-01 RX ORDER — LOSARTAN POTASSIUM 100 MG/1
TABLET ORAL
Qty: 90 TABLET | Refills: 3 | Status: SHIPPED | OUTPATIENT
Start: 2019-10-01 | End: 2020-07-27

## 2019-10-01 ASSESSMENT — MIFFLIN-ST. JEOR: SCORE: 1554.12

## 2019-10-01 NOTE — LETTER
10/1/2019    Rudy Vernon MD  [address]  [place] [address]    RE: Betito Anderson       Dear Colleague,    I had the pleasure of seeing Betito Anderson in the [facility].    Cardiology Clinic Progress Note  Betito Anderson MRN# [id number]   YOB: 1945 Age: 73 year old   Primary Cardiologist: Dr. Hidalgo Reason for visit: Cardiology follow up            Assessment and Plan:   Betito Anderson is a very pleasant 73 year old male with a history of HFrEF, coronary artery disease, atrial fibrillation, hypertension, and dyslipidemia. Patient here today for cardiology follow up on his blood pressure.     1. Coronary artery disease - s/p OLESYA to mid LAD at the bifurcation of the D2, known severe 3 vessel disease, coronary angiogram in July 2019 showed normal LM, 80% mid LAD stenosis, 70% mid circumflex stenosis, subtotally occluded RCA with left to right collaterals. Patient was initially referred for surgery, but given his porcelain aorta, he ultimately underwent PCI. Stable, no signs/symptoms of angina.    - Continue aspirin, plavix, atorvastatin, carvedilol   - Continue cardiac rehab   - Counseled on lifestyle modifications    2. History of systolic heart failure, now with chronic diastolic heart failure- EF was initially 30-35% and normalized to 55% with blood pressure control. Patient appears compensated and euvolemic   - Continue furosemide 20mg daily     3. Syncope - no recurrent episodes, episode in April while sitting at his computer, hospitalized, echocardiogram showed normal LVEF. Syncopal event was thought to be related to vasovagal, potentially contributed by baseline atrial fibrillation with slow ventricular response versus hypotension. Patients carvedilol was decreased to 3.125mg BID. 30 day event monitor was placed.   - Event monitor currently in place   - EP referral pending, scheduled for October 2019    4. Atrial fibrillation, chronic -  stable, asymptomatic, rate controlled   - [id number] score 4 (age, HTN, vascular HF)   - Continue rivaroxaban 15mg daily for thromboembolic prophylaxis   - Continue carvedilol 3.125mg BID    5. Hypertension - controlled, concerns during last visit of elevated blood pressures with decrease in carvedilol. Patient has been monitoring blood pressure at home and at cardiac rehab, 100-120s/60-80s. Continue current medications.   - Reinforced lifestyle modifications.     6. Dyslipidemia - LDL goal < 70, lipid panel 7/6/2019, total cholesterol 137, triglycerides 175, HDL 39 and LDL 63.         Changes today: none    Follow up plan:     Follow up with Dr. Lezama 10/9/2019    Follow up with Dr. Hidalgo in December 2019        History of Presenting Illness:    Betito Anderson is a very pleasant 73 year old male with a history of HFrEF, coronary artery disease, atrial fibrillation, hypertension, and dyslipidemia.     Patient started following with Dr. Hidalgo after a hospitalization in July 2019 for acute systolic heart failure, volume overload and hypertensive emergency. EF was initially 30-35% and normalized to 55% with blood pressure control. Coronary angiogram was completed which demonstrated a very tight lesion in the mid LAD straddling into a large second diagonal, distal circumflex 70% stenosis and subtotal occluded  of RCA with robust collaterals from LAD. Patient was initially referred for surgery, but given his porcelain aorta, he underwent a PCI of his bifurcation LAD into the diagonal.     Patient then had outpatient nuclear stress test in 9/12/2019 which showed mild basilar inferolateral ischemia, no angina, being medically managed.     Patient was hospitalized 9/14/19-9/15/19 after syncopal event which occurred while sitting at his computer. Upon regaining consciousness he vomited. Patient was seen by cardiology while hospitalized, troponins were negative, echocardiogram showed normal LVEF. Syncopal event was  thought to be related to vasovagal, potentially contributed by baseline atrial fibrillation with slow ventricular response versus hypotension. Patients carvedilol was decreased to 3.125mg BID. 30 day event monitor was placed.     Patient was most recently seen by Dr. Hidalgo 9/19/2019, recommended continued medical management of  to RCA, given good collaterals from LAD, mild ischemia on stress test and no anginal symptoms. EP referral for follow up after syncopal event. It was recommended if blood pressure remains elevated that amlodipine should be increased to 10mg daily.     Patient is here today for cardiology follow up related to blood pressure.     Patient reports feeling good. Blood pressure was noted to be slightly elevated after his carvedilol was decreased during most recent hospitalization. Patient denies chest pain or chest tightness. Denies shortness of breath at rest. Denies exertional dyspnea. Energy levels are improved. Denies dizziness, lightheadedness or other presyncopal symptoms. Denies recurrent syncope. Denies tachycardia or palpitations. Weights have been stable.     Labs from 9/19/19 showed stable kidney function (creatinine 1.14) and electrolytes stable. Hemoglobin 12.8. Blood pressure 138/48 and HR 64 in clinic today. Monitoring blood pressure at home running 100-120s/60-80s.     Appetite good. Eating most meals at home, eating salads. Trying to limit sodium intake. Attending cardiac rehab 3 days a week. Doing some light weight lifting in the exercise room in his [employer] building. Right knee pain limits activity. Denies alcohol use. Denies tobacco use.         Recent Hospitalizations   9/14/19-9/15/19 after syncopal event, troponins were negative, echocardiogram showed normal LVEF. Syncopal event was thought to be related to vasovagal, potentially contributed by baseline atrial fibrillation with slow ventricular response versus hypotension. Patients carvedilol was decreased to 3.125mg BID. 30  day event monitor was placed.    acute decompensated systolic heart failure EF 35-40% improved to 55-60% with blood pressure control, multivessel coronary artery disease s/p PCI to LAD.         Social History    , 2 children, 4 grandchildren.   Social History     Socioeconomic History     Marital status:      Spouse name: Not on file     Number of children: 2     Years of education: Not on file     Highest education level: Not on file   Occupational History     Occupation: retired   Social Needs     Financial resource strain: Not on file     Food insecurity:     Worry: Not on file     Inability: Not on file     Transportation needs:     Medical: Not on file     Non-medical: Not on file   Tobacco Use     Smoking status: Former Smoker     Packs/day: 1.00     Years: 30.00     Pack years: 30.00     Types: Cigars     Last attempt to quit: 1998     Years since quittin.0     Smokeless tobacco: Never Used   Substance and Sexual Activity     Alcohol use: Not Currently     Alcohol/week: 14.0 standard drinks     Types: 14 Standard drinks or equivalent per week     Frequency: 2-3 times a week     Drinks per session: 1 or 2     Binge frequency: Never     Comment: 1-2 a night     Drug use: No     Sexual activity: Never     Partners: Female   Lifestyle     Physical activity:     Days per week: Not on file     Minutes per session: Not on file     Stress: Not on file   Relationships     Social connections:     Talks on phone: Not on file     Gets together: Not on file     Attends Caodaism service: Not on file     Active member of club or organization: Not on file     Attends meetings of clubs or organizations: Not on file     Relationship status: Not on file     Intimate partner violence:     Fear of current or ex partner: Not on file     Emotionally abused: Not on file     Physically abused: Not on file     Forced sexual activity: Not on file   Other Topics Concern     Parent/sibling w/  "CABG, MI or angioplasty before 65F 55M? Not Asked   Social History Narrative     Not on file            Review of Systems:   Skin:  Negative     Eyes:  Negative    ENT:  Negative    Respiratory:  Negative    Cardiovascular:  Negative    Gastroenterology: Negative    Genitourinary:  Negative    Musculoskeletal:  Negative    Neurologic:  Negative    Psychiatric:  Negative    Heme/Lymph/Imm:  Negative    Endocrine:  Negative           Physical Exam:   Vitals: /48   Pulse 64   Ht 1.676 m (5' 6\")   Wt 86.6 kg (191 lb)   BMI 30.83 kg/m      Wt Readings from Last 4 Encounters:   10/01/19 86.6 kg (191 lb)   09/19/19 86.8 kg (191 lb 4.8 oz)   09/17/19 84.4 kg (186 lb)   09/14/19 85.1 kg (187 lb 9.6 oz)     GEN: well nourished, in no acute distress.  HEENT:  Pupils equal, round. Sclerae nonicteric.   NECK: Supple, no masses appreciated. No JVD appreciated   C/V:  Irregularly irregular, no murmur, rub or gallop.   RESP: Respirations are unlabored. Clear to auscultation bilaterally without wheezing, rales, or rhonchi.  GI: Abdomen soft, nontender.  EXTREM: No LE edema.  NEURO: Alert and oriented, cooperative.  SKIN: Warm and dry.        Data:   ECHO 9/17/2019  Left ventricular systolic function is normal.  The visual ejection fraction is estimated at 55-60%.  Basal inferior hypokinesis.  The study was technically limited. Compared to the prior study dated 7/19,  there have been no changes.    Cardiac catheterization 7/5/2019  Left Main   The vessel was visualized by selective angiography and is moderate in size. The left main was selectively injected.   Mid LM to Dist LM lesion is 30% stenosed.   Left Anterior Descending   The LAD was selectively injected. The proximal segment is mildly to moderately calcified with no significant narrowing. The mid segment is very tortuous with a focal 85 to 90% narrowing just after the takeoff of the large first diagonal branch. Just first diagonal branch bifurcates into 2 equally " sized medial and lateral branches that had no significant narrowing.   Ost LAD to Prox LAD lesion is 45% stenosed.   Prox LAD to Mid LAD lesion is 90% stenosed. Culprit lesion. The lesion is type B2 - medium risk, located at the major branch, bifurcated and eccentric. The lesion is severely calcified.   Second Diagonal Branch   Ost 2nd Diag lesion is 75% stenosed.   Left Circumflex   The left circumflex coronary artery was selectively injected. There is a calcified ostial and proximal narrowing of about 40 to 50%. There is a mid vessel 30 to 40% narrowing followed by a 70% narrowing in the distal vessel before the takeoff of the terminal marginal branch. There is a very large epicardial collateral vessel seen to fill the distal right coronary.   Right Coronary Artery   The dominant right coronary was selectively injected. The ostium and proximal segment are heavily calcified. There is an eccentric 90% proximal narrowing. There is a second 95% subtotal narrowing in the proximal segment. There is very slow antegrade flow in the distal right coronary and competitive filling is seen from collateral vessels on the left system. Injections of the LAD demonstrate profuse collaterals via septal  branches to the distal right coronary and posterior lateral branch.   Prox RCA lesion is 100% stenosed. The lesion is chronic total occlusion.   Mid RCA lesion is 90% stenosed.   Right Posterior Descending Artery   Collaterals   RPDA filled by collaterals from Dist LAD.       LIPID RESULTS:  Lab Results   Component Value Date    CHOL 137 07/06/2019    HDL 39 (L) 07/06/2019    LDL 63 07/06/2019    TRIG 175 (H) 07/06/2019    CHOLHDLRATIO 2.8 02/26/2015     LIVER ENZYME RESULTS:  Lab Results   Component Value Date    AST 21 06/30/2019    ALT 22 06/30/2019     CBC RESULTS:  Lab Results   Component Value Date    WBC 8.0 09/19/2019    RBC 4.64 09/19/2019    HGB 12.8 (L) 09/19/2019    HCT 40.4 09/19/2019    MCV 87 09/19/2019     MCH 27.6 09/19/2019    MCHC 31.7 09/19/2019    RDW 16.5 (H) 09/19/2019     09/19/2019     BMP RESULTS:  Lab Results   Component Value Date     09/19/2019    POTASSIUM 4.7 09/19/2019    CHLORIDE 104 09/19/2019    CO2 27 09/19/2019    ANIONGAP 13.7 09/19/2019     (H) 09/19/2019    BUN 16 09/19/2019    CR 1.14 09/19/2019    GFRESTIMATED 63 09/19/2019    GFRESTBLACK 76 09/19/2019    GUNNER 10.0 09/19/2019      A1C RESULTS:  Lab Results   Component Value Date    A1C 5.7 (H) 06/30/2019     INR RESULTS:  Lab Results   Component Value Date    INR 1.18 (H) 09/14/2019    INR 2.33 (H) 07/18/2019            Medications     Current Outpatient Medications   Medication Sig Dispense Refill     allopurinol (ZYLOPRIM) 300 MG tablet TAKE 1 TABLET(300 MG) BY MOUTH DAILY 90 tablet 3     amLODIPine (NORVASC) 5 MG tablet Take 1 tablet (5 mg) by mouth daily 90 tablet 3     ASPIRIN NOT PRESCRIBED (INTENTIONAL) Please choose reason not prescribed, below       atorvastatin (LIPITOR) 40 MG tablet Take 1 tablet (40 mg) by mouth daily 90 tablet 3     carvedilol (COREG) 3.125 MG tablet Take 1 tablet (3.125 mg) by mouth 2 times daily (with meals) 180 tablet 3     clopidogrel (PLAVIX) 75 MG tablet Take 1 tablet (75 mg) by mouth daily 90 tablet 3     furosemide (LASIX) 20 MG tablet Take 20 mg by mouth daily       isosorbide mononitrate (IMDUR) 120 MG 24 HR ER tablet Take 1 tablet (120 mg) by mouth daily 90 tablet 3     losartan (COZAAR) 100 MG tablet TAKE 1 TABLET(100 MG) BY MOUTH DAILY 90 tablet 3     rivaroxaban ANTICOAGULANT (XARELTO) 15 MG TABS tablet Take 1 tablet (15 mg) by mouth daily (with dinner) 90 tablet 3     tiotropium (SPIRIVA RESPIMAT) 2.5 MCG/ACT inhaler Inhale 2 puffs into the lungs daily 1 Inhaler 1          Past Medical History     Past Medical History:   Diagnosis Date     ASCVD (arteriosclerotic cardiovascular disease) 1997    angio with 40% circ lesion; 6/19 hosp for chf, 3 vessel dz on angio but porcelin aorta  so ptca done     Atrial fibrillation (H) 10/09/2018    found on routine physical     Carotid artery plaque 2005    seen on us, about 50% stenosis, fu 2009 us no significant stenosis     CHF (congestive heart failure) (H) 06/2019    hosp fsd, then fu echo ef nl     Elevated blood sugar      Gout     on allopurinol     HTN (hypertension)      Hypercholesteremia      Hyponatremia 2015    felt due to chlorthalidone     Low mean corpuscular volume (MCV)      Near syncope 5/15    fu est echo low level but neg     Psoriasis     Dr. Marti     Renal mass 06/29/2019    seen on ct chest for sob, needs fu ct for that, fu us done 7/19 and no mass seen, should have fu ct in December     Screening 2012    abd us no aaa     Syncope 09/2019    hosp fsd, cause not clear, lowered coreg dose     Past Surgical History:   Procedure Laterality Date     C ANESTH,DX ARTHROSCOPIC PROC KNEE JOINT  2009     CV CORONARY ANGIOGRAM N/A 7/2/2019    Procedure: Coronary Angiogram;  Surgeon: Donaldo Loera MD;  Location:  HEART CARDIAC CATH LAB     CV HEART CATHETERIZATION WITH POSSIBLE INTERVENTION N/A 7/5/2019    Procedure: Heart Catheterization with Possible Intervention;  Surgeon: Manolo Hu MD;  Location:  HEART [facility]ARDIAC CATH LAB     CV LEFT HEART CATH N/A 7/2/2019    Procedure: Left Heart Cath;  Surgeon: Donaldo Loera MD;  Location:  HEART [facility]ARD[facility]AC CATH LAB     CV LEFT VENTRICULOGRAM N/A 7/2/2019    Procedure: Left Ventriculogram;  Surgeon: Donaldo Loera MD;  Location:  HEART CARDIAC CATH LAB     CV PCI STENT DRUG ELUTING N/A 7/5/2019    Procedure: PCI Stent Drug Eluting;  Surgeon: Manolo Hu MD;  Location:  HEART CARDIAC CATH LAB     CV RIGHT HEART CATH N/A 7/2/2019    Procedure: Right Heart Cath;  Surgeon: Donaldo Loera MD;  Location:  HEART CARDIAC CATH LAB     Family History   Problem Relation Age of Onset     Coronary Artery Disease Father      Medical History Unknown Mother       Medical History Unknown Maternal Grandfather             Allergies   Ace inhibitors        AFLRED Kuhn CNP  [facility]  Pager: [phone]      Thank you for allowing me to participate in the care of your patient.    Sincerely,     ALFRED Kuhn CNP     [facility]

## 2019-10-01 NOTE — PATIENT INSTRUCTIONS
1. No medication changes  2. Continue cardiac rehab  3. Follow up with Dr. Hidalgo in December 4. Please call with any questions/concerns [phone]

## 2019-10-02 ENCOUNTER — HOSPITAL ENCOUNTER (OUTPATIENT)
Dept: CARDIAC REHAB | Facility: CLINIC | Age: 74
End: 2019-10-02
Attending: INTERNAL MEDICINE
Payer: COMMERCIAL

## 2019-10-02 PROCEDURE — 93798 PHYS/QHP OP CAR RHAB W/ECG: CPT | Performed by: CLINICAL EXERCISE PHYSIOLOGIST

## 2019-10-02 PROCEDURE — 40000116 ZZH STATISTIC OP CR VISIT: Performed by: CLINICAL EXERCISE PHYSIOLOGIST

## 2019-10-09 ENCOUNTER — HOSPITAL ENCOUNTER (OUTPATIENT)
Dept: CARDIAC REHAB | Facility: CLINIC | Age: 74
End: 2019-10-09
Attending: INTERNAL MEDICINE
Payer: COMMERCIAL

## 2019-10-09 ENCOUNTER — OFFICE VISIT (OUTPATIENT)
Dept: CARDIOLOGY | Facility: CLINIC | Age: 74
End: 2019-10-09
Attending: INTERNAL MEDICINE
Payer: COMMERCIAL

## 2019-10-09 VITALS
HEART RATE: 77 BPM | BODY MASS INDEX: 30.86 KG/M2 | DIASTOLIC BLOOD PRESSURE: 68 MMHG | HEIGHT: 66 IN | WEIGHT: 192 LBS | SYSTOLIC BLOOD PRESSURE: 108 MMHG

## 2019-10-09 DIAGNOSIS — I25.10 CORONARY ARTERY DISEASE INVOLVING NATIVE HEART WITHOUT ANGINA PECTORIS, UNSPECIFIED VESSEL OR LESION TYPE: ICD-10-CM

## 2019-10-09 PROCEDURE — 93798 PHYS/QHP OP CAR RHAB W/ECG: CPT | Performed by: OCCUPATIONAL THERAPIST

## 2019-10-09 PROCEDURE — 40000116 ZZH STATISTIC OP CR VISIT: Performed by: OCCUPATIONAL THERAPIST

## 2019-10-09 PROCEDURE — 99215 OFFICE O/P EST HI 40 MIN: CPT | Performed by: INTERNAL MEDICINE

## 2019-10-09 RX ORDER — SODIUM CHLORIDE 450 MG/100ML
INJECTION, SOLUTION INTRAVENOUS CONTINUOUS
Status: CANCELLED | OUTPATIENT
Start: 2019-10-09

## 2019-10-09 RX ORDER — LIDOCAINE 40 MG/G
CREAM TOPICAL
Status: CANCELLED | OUTPATIENT
Start: 2019-10-09

## 2019-10-09 RX ORDER — CEFAZOLIN SODIUM 2 G/100ML
2 INJECTION, SOLUTION INTRAVENOUS
Status: CANCELLED | OUTPATIENT
Start: 2019-10-09

## 2019-10-09 ASSESSMENT — MIFFLIN-ST. JEOR: SCORE: 1558.66

## 2019-10-09 NOTE — PROGRESS NOTES
HPI and Plan:   See dictation  [id number]  No orders of the defined types were placed in this encounter.      No orders of the defined types were placed in this encounter.      There are no discontinued medications.      Encounter Diagnosis   Name Primary?     Coronary artery disease involving native heart without angina pectoris, unspecified vessel or lesion type        CURRENT MEDICATIONS:  Current Outpatient Medications   Medication Sig Dispense Refill     allopurinol (ZYLOPRIM) 300 MG tablet TAKE 1 TABLET(300 MG) BY MOUTH DAILY 90 tablet 3     amLODIPine (NORVASC) 5 MG tablet Take 1 tablet (5 mg) by mouth daily 90 tablet 3     atorvastatin (LIPITOR) 40 MG tablet Take 1 tablet (40 mg) by mouth daily 90 tablet 3     carvedilol (COREG) 3.125 MG tablet Take 1 tablet (3.125 mg) by mouth 2 times daily (with meals) 180 tablet 3     clopidogrel (PLAVIX) 75 MG tablet Take 1 tablet (75 mg) by mouth daily 90 tablet 3     furosemide (LASIX) 20 MG tablet Take 20 mg by mouth daily       isosorbide mononitrate (IMDUR) 120 MG 24 HR ER tablet Take 1 tablet (120 mg) by mouth daily 90 tablet 3     losartan (COZAAR) 100 MG tablet TAKE 1 TABLET(100 MG) BY MOUTH DAILY 90 tablet 3     rivaroxaban ANTICOAGULANT (XARELTO) 15 MG TABS tablet Take 1 tablet (15 mg) by mouth daily (with dinner) 90 tablet 3     ASPIRIN NOT PRESCRIBED (INTENTIONAL) Please choose reason not prescribed, below (Patient not taking: Reported on 10/9/2019)       tiotropium (SPIRIVA RESPIMAT) 2.5 MCG/ACT inhaler Inhale 2 puffs into the lungs daily (Patient not taking: Reported on 10/9/2019) 1 Inhaler 1       ALLERGIES     Allergies   Allergen Reactions     Ace Inhibitors Anaphylaxis     Edema of ace       PAST MEDICAL HISTORY:  Past Medical History:   Diagnosis Date     ASCVD (arteriosclerotic cardiovascular disease) 1997    angio with 40% circ lesion; 6/19 hosp for chf, 3 vessel dz on angio but porcelin aorta so ptca done     Atrial fibrillation (H) 10/09/2018    found  on routine physical     Carotid artery plaque 2005    seen on us, about 50% stenosis, fu 2009 us no significant stenosis     CHF (congestive heart failure) (H) 06/2019    hosp fsd, then fu echo ef nl     Elevated blood sugar      Gout     on allopurinol     HTN (hypertension)      Hypercholesteremia      Hyponatremia 2015    felt due to chlorthalidone     Low mean corpuscular volume (MCV)      Near syncope 5/15    fu est echo low level but neg     Psoriasis     Dr. Marti     Renal mass 06/29/2019    seen on ct chest for sob, needs fu ct for that, fu us done 7/19 and no mass seen, should have fu ct in December     Screening 2012    abd us no aaa     Syncope 09/2019    hosp fsd, cause not clear, lowered coreg dose       PAST SURGICAL HISTORY:  Past Surgical History:   Procedure Laterality Date     C ANESTH,DX ARTHROSCOPIC PROC KNEE JOINT  2009     CV CORONARY ANGIOGRAM N/A 7/2/2019    Procedure: Coronary Angiogram;  Surgeon: Donaldo Loera MD;  Location:  HEART CARDIAC CATH LAB     CV HEART CATHETERIZATION WITH POSSIBLE INTERVENTION N/A 7/5/2019    Procedure: Heart Catheterization with Possible Intervention;  Surgeon: Manolo Hu MD;  Location:  HEART CARDIAC CATH LAB     CV LEFT HEART CATH N/A 7/2/2019    Procedure: Left Heart Cath;  Surgeon: Donaldo Loera MD;  Location:  HEART CARDIAC CATH LAB     CV LEFT VENTRICULOGRAM N/A 7/2/2019    Procedure: Left Ventriculogram;  Surgeon: Donaldo Loera MD;  Location:  HEART CARDIAC CATH LAB     CV PCI STENT DRUG ELUTING N/A 7/5/2019    Procedure: PCI Stent Drug Eluting;  Surgeon: Manolo Hu MD;  Location:  HEART CARDIAC CATH LAB     CV RIGHT HEART CATH N/A 7/2/2019    Procedure: Right Heart Cath;  Surgeon: Donaldo Loera MD;  Location:  HEART CARDIAC CATH LAB       FAMILY HISTORY:  Family History   Problem Relation Age of Onset     Coronary Artery Disease Father      Medical History Unknown Mother      Medical  History Unknown Maternal Grandfather        SOCIAL HISTORY:  Social History     Socioeconomic History     Marital status:      Spouse name: None     Number of children: 2     Years of education: None     Highest education level: None   Occupational History     Occupation: retired   Social Needs     Financial resource strain: None     Food insecurity:     Worry: None     Inability: None     Transportation needs:     Medical: None     Non-medical: None   Tobacco Use     Smoking status: Former Smoker     Packs/day: 1.00     Years: 30.00     Pack years: 30.00     Types: Cigars     Last attempt to quit: 1998     Years since quittin.0     Smokeless tobacco: Never Used   Substance and Sexual Activity     Alcohol use: Not Currently     Alcohol/week: 14.0 standard drinks     Types: 14 Standard drinks or equivalent per week     Frequency: 2-3 times a week     Drinks per session: 1 or 2     Binge frequency: Never     Comment: 1-2 a night     Drug use: No     Sexual activity: Never     Partners: Female   Lifestyle     Physical activity:     Days per week: None     Minutes per session: None     Stress: None   Relationships     Social connections:     Talks on phone: None     Gets together: None     Attends [employer] service: None     Active member of club or organization: None     Attends meetings of clubs or organizations: None     Relationship status: None     Intimate partner violence:     Fear of current or ex partner: None     Emotionally abused: None     Physically abused: None     Forced sexual activity: None   Other Topics Concern     Parent/sibling w/ CABG, MI or angioplasty before 65F 55M? Not Asked   Social History Narrative     None       Review of Systems:  Skin:  Negative       Eyes:  Negative      ENT:  Negative      Respiratory:  Negative       Cardiovascular:  Negative      Gastroenterology: Negative      Genitourinary:  Negative   kidney stone  Musculoskeletal:  Negative      Neurologic:   "Negative      Psychiatric:  Negative      Heme/Lymph/Imm:  Negative      Endocrine:  Negative        Physical Exam:  Vitals: /68   Pulse 77   Ht 1.676 m (5' 6\")   Wt 87.1 kg (192 lb)   BMI 30.99 kg/m      Constitutional:  cooperative, alert and oriented, well developed, well nourished, in no acute distress overweight      Skin:  warm and dry to the touch, no apparent skin lesions or masses noted          Head:  normocephalic, no masses or lesions        Eyes:  pupils equal and round, conjunctivae and lids unremarkable, sclera white, no xanthalasma, EOMS intact, no nystagmus        Lymph:No Cervical lymphadenopathy present     ENT:  no pallor or cyanosis, dentition good        Neck:  carotid pulses are full and equal bilaterally, JVP normal, no carotid bruit        Respiratory:  normal breath sounds, clear to auscultation, normal A-P diameter, normal symmetry, normal respiratory excursion, no use of accessory muscles         Cardiac:   irregular rhythm              pulses full and equal, no bruits auscultated                                        GI:  abdomen soft, non-tender, BS normoactive, no mass, no HSM, no bruits        Extremities and Muscular Skeletal:                 Neurological:  no gross motor deficits        Psych:  Alert and Oriented x 3        CC  Rudy Vernon MD  [address]  [place] [address]              "

## 2019-10-09 NOTE — LETTER
10/9/2019    Rudy Vernon MD  [address]  [facility] [address]    RE: Betito Anderson       Dear Colleague,    I had the pleasure of seeing Betito Anderson in the [facility].    HPI and Plan:   See dictation  [id number]  No orders of the defined types were placed in this encounter.      No orders of the defined types were placed in this encounter.      There are no discontinued medications.      Encounter Diagnosis   Name Primary?     Coronary artery disease involving native heart without angina pectoris, unspecified vessel or lesion type        CURRENT MEDICATIONS:  Current Outpatient Medications   Medication Sig Dispense Refill     allopurinol (ZYLOPRIM) 300 MG tablet TAKE 1 TABLET(300 MG) BY MOUTH DAILY 90 tablet 3     amLODIPine (NORVASC) 5 MG tablet Take 1 tablet (5 mg) by mouth daily 90 tablet 3     atorvastatin (LIPITOR) 40 MG tablet Take 1 tablet (40 mg) by mouth daily 90 tablet 3     carvedilol (COREG) 3.125 MG tablet Take 1 tablet (3.125 mg) by mouth 2 times daily (with meals) 180 tablet 3     clopidogrel (PLAVIX) 75 MG tablet Take 1 tablet (75 mg) by mouth daily 90 tablet 3     furosemide (LASIX) 20 MG tablet Take 20 mg by mouth daily       isosorbide mononitrate (IMDUR) 120 MG 24 HR ER tablet Take 1 tablet (120 mg) by mouth daily 90 tablet 3     losartan (COZAAR) 100 MG tablet TAKE 1 TABLET(100 MG) BY MOUTH DAILY 90 tablet 3     rivaroxaban ANTICOAGULANT (XARELTO) 15 MG TABS tablet Take 1 tablet (15 mg) by mouth daily (with dinner) 90 tablet 3     ASPIRIN NOT PRESCRIBED (INTENTIONAL) Please choose reason not prescribed, below (Patient not taking: Reported on 10/9/2019)       tiotropium (SPIRIVA RESPIMAT) 2.5 MCG/ACT inhaler Inhale 2 puffs into the lungs daily (Patient not taking: Reported on 10/9/2019) 1 Inhaler 1       ALLERGIES     Allergies   Allergen Reactions     Ace Inhibitors Anaphylaxis     Edema of ace       PAST MEDICAL HISTORY:  Past Medical History:    Diagnosis Date     ASCVD (arteriosclerotic cardiovascular disease) 1997    angio with 40% circ lesion; 6/19 hosp for chf, 3 vessel dz on angio but porcelin aorta so ptca done     Atrial fibrillation (H) 10/09/2018    found on routine physical     Carotid artery plaque 2005    seen on us, about 50% stenosis, fu 2009 us no significant stenosis     CHF (congestive heart failure) (H) 06/2019    hosp fsd, then fu echo ef nl     Elevated blood sugar      Gout     on allopurinol     HTN (hypertension)      Hypercholesteremia      Hyponatremia 2015    felt due to chlorthalidone     Low mean corpuscular volume (MCV)      Near syncope 5/15    fu est echo low level but neg     Psoriasis     Dr. Marti     Renal mass 06/29/2019    seen on ct chest for sob, needs fu ct for that, fu us done 7/19 and no mass seen, should have fu ct in December     Screening 2012    abd us no aaa     Syncope 09/2019    hosp fsd, cause not clear, lowered coreg dose       PAST SURGICAL HISTORY:  Past Surgical History:   Procedure Laterality Date     C ANESTH,DX ARTHROSCOPIC PROC KNEE JOINT  2009     CV CORONARY ANGIOGRAM N/A 7/2/2019    Procedure: Coronary Angiogram;  Surgeon: Donaldo Loera MD;  Location:  HEART CARDIAC CATH LAB     CV HEART CATHETERIZATION WITH POSSIBLE INTERVENTION N/A 7/5/2019    Procedure: Heart Catheterization with Possible Intervention;  Surgeon: Manolo Hu MD;  Location:  HEART CARDIAC CATH LAB     CV LEFT HEART CATH N/A 7/2/2019    Procedure: Left Heart Cath;  Surgeon: Donaldo Loera MD;  Location:  HEART CARDIAC CATH LAB     CV LEFT VENTRICULOGRAM N/A 7/2/2019    Procedure: Left Ventriculogram;  Surgeon: Donaldo Loera MD;  Location:  HEART CARDIAC CATH LAB     CV PCI STENT DRUG ELUTING N/A 7/5/2019    Procedure: PCI Stent Drug Eluting;  Surgeon: Manolo Hu MD;  Location:  HEART CARDIAC CATH LAB     CV RIGHT HEART CATH N/A 7/2/2019    Procedure: Right Heart Cath;   Surgeon: Donaldo Loera MD;  Location:  HEART CARDIAC CATH LAB       FAMILY HISTORY:  Family History   Problem Relation Age of Onset     Coronary Artery Disease Father      Medical History Unknown Mother      Medical History Unknown Maternal Grandfather        SOCIAL HISTORY:  Social History     Socioeconomic History     Marital status:      Spouse name: None     Number of children: 2     Years of education: None     Highest education level: None   Occupational History     Occupation: retired   Social Needs     Financial resource strain: None     Food insecurity:     Worry: None     Inability: None     Transportation needs:     Medical: None     Non-medical: None   Tobacco Use     Smoking status: Former Smoker     Packs/day: 1.00     Years: 30.00     Pack years: 30.00     Types: Cigars     Last attempt to quit: 1998     Years since quittin.0     Smokeless tobacco: Never Used   Substance and Sexual Activity     Alcohol use: Not Currently     Alcohol/week: 14.0 standard drinks     Types: 14 Standard drinks or equivalent per week     Frequency: 2-3 times a week     Drinks per session: 1 or 2     Binge frequency: Never     Comment: 1-2 a night     Drug use: No     Sexual activity: Never     Partners: Female   Lifestyle     Physical activity:     Days per week: None     Minutes per session: None     Stress: None   Relationships     Social connections:     Talks on phone: None     Gets together: None     Attends [employer] service: None     Active member of club or organization: None     Attends meetings of clubs or organizations: None     Relationship status: None     Intimate partner violence:     Fear of current or ex partner: None     Emotionally abused: None     Physically abused: None     Forced sexual activity: None   Other Topics Concern     Parent/sibling w/ CABG, MI or angioplasty before 65F 55M? Not Asked   Social History Narrative     None       Review of Systems:  Skin:  Negative      "  Eyes:  Negative      ENT:  Negative      Respiratory:  Negative       Cardiovascular:  Negative      Gastroenterology: Negative      Genitourinary:  Negative   kidney stone  Musculoskeletal:  Negative      Neurologic:  Negative      Psychiatric:  Negative      Heme/Lymph/Imm:  Negative      Endocrine:  Negative        Physical Exam:  Vitals: /68   Pulse 77   Ht 1.676 m (5' 6\")   Wt 87.1 kg (192 lb)   BMI 30.99 kg/m       Constitutional:  cooperative, alert and oriented, well developed, well nourished, in no acute distress overweight      Skin:  warm and dry to the touch, no apparent skin lesions or masses noted          Head:  normocephalic, no masses or lesions        Eyes:  pupils equal and round, conjunctivae and lids unremarkable, sclera white, no xanthalasma, EOMS intact, no nystagmus        Lymph:No Cervical lymphadenopathy present     ENT:  no pallor or cyanosis, dentition good        Neck:  carotid pulses are full and equal bilaterally, JVP normal, no carotid bruit        Respiratory:  normal breath sounds, clear to auscultation, normal A-P diameter, normal symmetry, normal respiratory excursion, no use of accessory muscles         Cardiac:   irregular rhythm              pulses full and equal, no bruits auscultated                                        GI:  abdomen soft, non-tender, BS normoactive, no mass, no HSM, no bruits        Extremities and Muscular Skeletal:                 Neurological:  no gross motor deficits        Psych:  Alert and Oriented x 3        CC  Rudy Vernon MD  [address]  [place] [address]                Thank you for allowing me to participate in the care of your patient.      Sincerely,     Fuad Rausch MD     [facility]    cc:   Rudy Vernon MD  [address]  [place] [address]        "

## 2019-10-09 NOTE — LETTER
10/9/2019      Rudy Vernon MD  [address]  [facility] [address]      RE: Betito Leeon       Dear Colleague,    I had the pleasure of seeing Betito Anderson in the [facility].    Service Date: 10/09/2019      HISTORY OF PRESENT ILLNESS:  Thank you for allowing me to participate in the care of this very delightful patient.  As you know, Mr. Anderson is a 73-year-old gentleman whom I had the pleasure of meeting for the first time about a year ago for evaluation of atrial fibrillation that was noted incidentally during an office visit.  He is known to have hypertension and has preserved LV systolic function.  At that time, I recommended either an attempt at restoring sinus rhythm, as it was the first time, versus rate control strategy along with anticoagulation.  The patient opted for the latter but could not tolerate Eliquis due to facial numbness.  Fortunately, he has been able to tolerate Xarelto.  Eventually, the patient was noted to have ischemic symptoms prompting coronary angiogram which demonstrated disease in LAD requiring a PCI and chronic total occlusion of the RCA.  His LV function was reduced at that time but has normalized now.      The patient was doing quite well until a month ago when he was sitting down in front of the computer and felt severely nauseated and promptly collapsed onto the floor within seconds.  He did throw up after he woke up.  Ten minutes later he had the same sensation prior to passing out completely.  His wife was there and noted he had a glassy-eyed look.  The patient was hospitalized for which he underwent another stress test which showed minimal reversible perfusion defect in the inferior wall consistent with a known  of the RCA.  LV function remained normal.  He was sent home on a 30-day event monitor and asked her to see me for further evaluation.      Mr. Anderson states those 2 episodes, I think it was the first ones ever for  him.  So far, while wearing the monitor, he has had no events such as near syncope or syncope.  The monitor, as reviewed by myself, demonstrates atrial fibrillation with 3-second pause at night when he was sleeping but otherwise no pause in the daytime.  He did have a 9-beat run of nonsustained VT while he was awake at a rate of 192 beats per minute for which he was asymptomatic.      Even though the patient had normal LV function, however, in light of his CAD and nonsustained VT and sudden onset of syncope without much warning, I am concerned that he may have ventricular arrhythmias as a contributing factor.  I would recommend EP study and if it is positive, proceed to an ICD.  If it is negative, I would implant a loop recorder.  I do not have evidence of bradyarrhythmias that would warrant a pacemaker at this point in time, especially if the EP study is negative for inducible ventricular arrhythmia.  The few-second pause at night when he is sleeping is not unexpected and is not an indication for a pacemaker.      Thank you for allowing me to participate in the care of this very delightful patient.  I went over the risks in detail including but not limited to vascular injury and CVA.        cc:   Rudy Vernon MD    [facility]    [address]   [place] [address]         ROSANNA LANDAVERDE MD             D: 10/09/2019   T: 10/09/2019   MT: DAI      Name:     MARCELA TINAJERO   MRN:      [id number]        Account:      [id number]   :      1945           Service Date: 10/09/2019      Document: [id number]           Outpatient Encounter Medications as of 10/9/2019   Medication Sig Dispense Refill     allopurinol (ZYLOPRIM) 300 MG tablet TAKE 1 TABLET(300 MG) BY MOUTH DAILY 90 tablet 3     amLODIPine (NORVASC) 5 MG tablet Take 1 tablet (5 mg) by mouth daily 90 tablet 3     atorvastatin (LIPITOR) 40 MG tablet Take 1 tablet (40 mg) by mouth daily 90 tablet 3     carvedilol (COREG) 3.125 MG  tablet Take 1 tablet (3.125 mg) by mouth 2 times daily (with meals) 180 tablet 3     clopidogrel (PLAVIX) 75 MG tablet Take 1 tablet (75 mg) by mouth daily 90 tablet 3     furosemide (LASIX) 20 MG tablet Take 20 mg by mouth daily       isosorbide mononitrate (IMDUR) 120 MG 24 HR ER tablet Take 1 tablet (120 mg) by mouth daily 90 tablet 3     losartan (COZAAR) 100 MG tablet TAKE 1 TABLET(100 MG) BY MOUTH DAILY 90 tablet 3     rivaroxaban ANTICOAGULANT (XARELTO) 15 MG TABS tablet Take 1 tablet (15 mg) by mouth daily (with dinner) 90 tablet 3     ASPIRIN NOT PRESCRIBED (INTENTIONAL) Please choose reason not prescribed, below (Patient not taking: Reported on 10/9/2019)       tiotropium (SPIRIVA RESPIMAT) 2.5 MCG/ACT inhaler Inhale 2 puffs into the lungs daily (Patient not taking: Reported on 10/9/2019) 1 Inhaler 1     No facility-administered encounter medications on file as of 10/9/2019.        Again, thank you for allowing me to participate in the care of your patient.      Sincerely,    Fuad Rausch MD     [facility]

## 2019-10-09 NOTE — PROGRESS NOTES
Service Date: 10/09/2019      HISTORY OF PRESENT ILLNESS:  Thank you for allowing me to participate in the care of this very delightful patient.  As you know, Mr. Anderson is a 73-year-old gentleman whom I had the pleasure of meeting for the first time about a year ago for evaluation of atrial fibrillation that was noted incidentally during an office visit.  He is known to have hypertension and has preserved LV systolic function.  At that time, I recommended either an attempt at restoring sinus rhythm, as it was the first time, versus rate control strategy along with anticoagulation.  The patient opted for the latter but could not tolerate Eliquis due to facial numbness.  Fortunately, he has been able to tolerate Xarelto.  Eventually, the patient was noted to have ischemic symptoms prompting coronary angiogram which demonstrated disease in LAD requiring a PCI and chronic total occlusion of the RCA.  His LV function was reduced at that time but has normalized now.      The patient was doing quite well until a month ago when he was sitting down in front of the computer and felt severely nauseated and promptly collapsed onto the floor within seconds.  He did throw up after he woke up.  Ten minutes later he had the same sensation prior to passing out completely.  His wife was there and noted he had a glassy-eyed look.  The patient was hospitalized for which he underwent another stress test which showed minimal reversible perfusion defect in the inferior wall consistent with a known  of the RCA.  LV function remained normal.  He was sent home on a 30-day event monitor and asked her to see me for further evaluation.      Mr. Anderson states those 2 episodes, I think it was the first ones ever for him.  So far, while wearing the monitor, he has had no events such as near syncope or syncope.  The monitor, as reviewed by myself, demonstrates atrial fibrillation with 3-second pause at night when he was sleeping but  otherwise no pause in the daytime.  He did have a 9-beat run of nonsustained VT while he was awake at a rate of 192 beats per minute for which he was asymptomatic.      Even though the patient had normal LV function, however, in light of his CAD and nonsustained VT and sudden onset of syncope without much warning, I am concerned that he may have ventricular arrhythmias as a contributing factor.  I would recommend EP study and if it is positive, proceed to an ICD.  If it is negative, I would implant a loop recorder.  I do not have evidence of bradyarrhythmias that would warrant a pacemaker at this point in time, especially if the EP study is negative for inducible ventricular arrhythmia.  The few-second pause at night when he is sleeping is not unexpected and is not an indication for a pacemaker.      Thank you for allowing me to participate in the care of this very delightful patient.  I went over the risks in detail including but not limited to vascular injury and CVA.        cc:   Rudy Vernon MD    [facility]    [address]   [place] [address]         ROSANNA LANDAVERDE MD             D: 10/09/2019   T: 10/09/2019   MT: DAI      Name:     MARCELA TINAJERO   MRN:      [id number]        Account:      [id number]   :      1945           Service Date: 10/09/2019      Document: [id number]

## 2019-10-09 NOTE — PROGRESS NOTES
Called patient with pre-procedure instructions for device implant:     Anticoagulation: Pt instructed to hold Xarelto for 2 days prior to procedure (10/9 and 10/10).     Oral diabetes meds: N/A    Insulin: N/A    Diuretic: Pt instructed to hold Lasix the morning of the procedure.    Contrast allergy: None       Pt informed to be NPO at midnight.    Instructed pt to shower the morning of the procedure, and then put on a clean shirt in order to help prevent infection.     Pt has post-procedure transportation and 24 hours monitoring set up.   Pt aware of no driving for 24 hours post procedure due to sedation.     Pt aware of arrival time of 0830 and location. Pt verbalized understanding of instructions. Pt will call the clinic if he has any questions, clinic phone number provided.      AMALIA Ackerman

## 2019-10-10 ENCOUNTER — ANESTHESIA EVENT (OUTPATIENT)
Dept: CARDIOLOGY | Facility: CLINIC | Age: 74
End: 2019-10-10

## 2019-10-10 RX ORDER — SODIUM CHLORIDE, SODIUM LACTATE, POTASSIUM CHLORIDE, CALCIUM CHLORIDE 600; 310; 30; 20 MG/100ML; MG/100ML; MG/100ML; MG/100ML
INJECTION, SOLUTION INTRAVENOUS CONTINUOUS
Status: CANCELLED | OUTPATIENT
Start: 2019-10-10

## 2019-10-10 RX ORDER — ONDANSETRON 2 MG/ML
4 INJECTION INTRAMUSCULAR; INTRAVENOUS EVERY 30 MIN PRN
Status: CANCELLED | OUTPATIENT
Start: 2019-10-10

## 2019-10-10 RX ORDER — FENTANYL CITRATE 50 UG/ML
25-50 INJECTION, SOLUTION INTRAMUSCULAR; INTRAVENOUS
Status: CANCELLED | OUTPATIENT
Start: 2019-10-10

## 2019-10-10 RX ORDER — HYDROMORPHONE HYDROCHLORIDE 1 MG/ML
.3-.5 INJECTION, SOLUTION INTRAMUSCULAR; INTRAVENOUS; SUBCUTANEOUS EVERY 5 MIN PRN
Status: CANCELLED | OUTPATIENT
Start: 2019-10-10

## 2019-10-10 RX ORDER — ONDANSETRON 4 MG/1
4 TABLET, ORALLY DISINTEGRATING ORAL EVERY 30 MIN PRN
Status: CANCELLED | OUTPATIENT
Start: 2019-10-10

## 2019-10-10 ASSESSMENT — ENCOUNTER SYMPTOMS: DYSRHYTHMIAS: 1

## 2019-10-11 ENCOUNTER — HOSPITAL ENCOUNTER (OUTPATIENT)
Facility: CLINIC | Age: 74
Discharge: HOME | End: 2019-10-11
Admitting: INTERNAL MEDICINE
Payer: COMMERCIAL

## 2019-10-11 ENCOUNTER — SURGERY (OUTPATIENT)
Age: 74
End: 2019-10-11
Payer: COMMERCIAL

## 2019-10-11 ENCOUNTER — ANESTHESIA (OUTPATIENT)
Dept: CARDIOLOGY | Facility: CLINIC | Age: 74
End: 2019-10-11

## 2019-10-11 VITALS
WEIGHT: 192 LBS | BODY MASS INDEX: 30.86 KG/M2 | HEIGHT: 66 IN | HEART RATE: 68 BPM | TEMPERATURE: 97.7 F | RESPIRATION RATE: 16 BRPM | SYSTOLIC BLOOD PRESSURE: 164 MMHG | DIASTOLIC BLOOD PRESSURE: 81 MMHG | OXYGEN SATURATION: 98 %

## 2019-10-11 DIAGNOSIS — I25.10 CORONARY ARTERY DISEASE INVOLVING NATIVE HEART WITHOUT ANGINA PECTORIS, UNSPECIFIED VESSEL OR LESION TYPE: ICD-10-CM

## 2019-10-11 LAB
ANION GAP SERPL CALCULATED.3IONS-SCNC: 7 MMOL/L (ref 3–14)
BUN SERPL-MCNC: 21 MG/DL (ref 7–30)
CALCIUM SERPL-MCNC: 8.6 MG/DL (ref 8.5–10.1)
CHLORIDE SERPL-SCNC: 110 MMOL/L (ref 94–109)
CO2 SERPL-SCNC: 24 MMOL/L (ref 20–32)
CREAT SERPL-MCNC: 1.32 MG/DL (ref 0.66–1.25)
ERYTHROCYTE [DISTWIDTH] IN BLOOD BY AUTOMATED COUNT: 16.6 % (ref 10–15)
GFR SERPL CREATININE-BSD FRML MDRD: 53 ML/MIN/{1.73_M2}
GLUCOSE SERPL-MCNC: 100 MG/DL (ref 70–99)
HCT VFR BLD AUTO: 38.7 % (ref 40–53)
HGB BLD-MCNC: 12.3 G/DL (ref 13.3–17.7)
MCH RBC QN AUTO: 27.2 PG (ref 26.5–33)
MCHC RBC AUTO-ENTMCNC: 31.8 G/DL (ref 31.5–36.5)
MCV RBC AUTO: 86 FL (ref 78–100)
PLATELET # BLD AUTO: 379 10E9/L (ref 150–450)
POTASSIUM SERPL-SCNC: 4 MMOL/L (ref 3.4–5.3)
RBC # BLD AUTO: 4.52 10E12/L (ref 4.4–5.9)
SODIUM SERPL-SCNC: 141 MMOL/L (ref 133–144)
WBC # BLD AUTO: 7.4 10E9/L (ref 4–11)

## 2019-10-11 PROCEDURE — C1764 EVENT RECORDER, CARDIAC: HCPCS | Performed by: INTERNAL MEDICINE

## 2019-10-11 PROCEDURE — 25000125 ZZHC RX 250: Performed by: INTERNAL MEDICINE

## 2019-10-11 PROCEDURE — 27210794 ZZH OR GENERAL SUPPLY STERILE: Performed by: INTERNAL MEDICINE

## 2019-10-11 PROCEDURE — 80048 BASIC METABOLIC PNL TOTAL CA: CPT | Performed by: INTERNAL MEDICINE

## 2019-10-11 PROCEDURE — 40000235 ZZH STATISTIC TELEMETRY

## 2019-10-11 PROCEDURE — 99152 MOD SED SAME PHYS/QHP 5/>YRS: CPT | Performed by: INTERNAL MEDICINE

## 2019-10-11 PROCEDURE — 93612 INTRAVENTRICULAR PACING: CPT | Mod: 26 | Performed by: INTERNAL MEDICINE

## 2019-10-11 PROCEDURE — C1730 CATH, EP, 19 OR FEW ELECT: HCPCS | Performed by: INTERNAL MEDICINE

## 2019-10-11 PROCEDURE — 33285 INSJ SUBQ CAR RHYTHM MNTR: CPT | Performed by: INTERNAL MEDICINE

## 2019-10-11 PROCEDURE — 25000128 H RX IP 250 OP 636: Performed by: INTERNAL MEDICINE

## 2019-10-11 PROCEDURE — 93612 INTRAVENTRICULAR PACING: CPT | Performed by: INTERNAL MEDICINE

## 2019-10-11 PROCEDURE — 25800029 ZZH RX IP 258 OP 250: Performed by: INTERNAL MEDICINE

## 2019-10-11 PROCEDURE — 99153 MOD SED SAME PHYS/QHP EA: CPT | Performed by: INTERNAL MEDICINE

## 2019-10-11 PROCEDURE — 85027 COMPLETE CBC AUTOMATED: CPT | Performed by: INTERNAL MEDICINE

## 2019-10-11 PROCEDURE — 93603 RIGHT VENTRICULAR RECORDING: CPT | Mod: 26 | Performed by: INTERNAL MEDICINE

## 2019-10-11 PROCEDURE — 93603 RIGHT VENTRICULAR RECORDING: CPT

## 2019-10-11 PROCEDURE — 40000852 ZZH STATISTIC HEART CATH LAB OR EP LAB

## 2019-10-11 PROCEDURE — 36415 COLL VENOUS BLD VENIPUNCTURE: CPT

## 2019-10-11 DEVICE — SYS INS CARD MNTR REVEAL LINQ: Type: IMPLANTABLE DEVICE | Status: FUNCTIONAL

## 2019-10-11 RX ORDER — LIDOCAINE 40 MG/G
CREAM TOPICAL
Status: DISCONTINUED | OUTPATIENT
Start: 2019-10-11 | End: 2019-10-11 | Stop reason: HOSPADM

## 2019-10-11 RX ORDER — HEPARIN SODIUM 200 [USP'U]/100ML
100-600 INJECTION, SOLUTION INTRAVENOUS CONTINUOUS PRN
Status: DISCONTINUED | OUTPATIENT
Start: 2019-10-11 | End: 2019-10-11 | Stop reason: HOSPADM

## 2019-10-11 RX ORDER — DOBUTAMINE HYDROCHLORIDE 200 MG/100ML
5-40 INJECTION INTRAVENOUS CONTINUOUS PRN
Status: DISCONTINUED | OUTPATIENT
Start: 2019-10-11 | End: 2019-10-11 | Stop reason: HOSPADM

## 2019-10-11 RX ORDER — CEFAZOLIN SODIUM 2 G/100ML
2 INJECTION, SOLUTION INTRAVENOUS
Status: COMPLETED | OUTPATIENT
Start: 2019-10-11 | End: 2019-10-11

## 2019-10-11 RX ORDER — OXYCODONE AND ACETAMINOPHEN 5; 325 MG/1; MG/1
1 TABLET ORAL EVERY 4 HOURS PRN
Status: DISCONTINUED | OUTPATIENT
Start: 2019-10-11 | End: 2019-10-11 | Stop reason: HOSPADM

## 2019-10-11 RX ORDER — FENTANYL CITRATE 50 UG/ML
INJECTION, SOLUTION INTRAMUSCULAR; INTRAVENOUS
Status: DISCONTINUED | OUTPATIENT
Start: 2019-10-11 | End: 2019-10-11 | Stop reason: HOSPADM

## 2019-10-11 RX ORDER — HEPARIN SODIUM 200 [USP'U]/100ML
100-500 INJECTION, SOLUTION INTRAVENOUS CONTINUOUS PRN
Status: DISCONTINUED | OUTPATIENT
Start: 2019-10-11 | End: 2019-10-11 | Stop reason: HOSPADM

## 2019-10-11 RX ORDER — SODIUM CHLORIDE 450 MG/100ML
INJECTION, SOLUTION INTRAVENOUS CONTINUOUS
Status: DISCONTINUED | OUTPATIENT
Start: 2019-10-11 | End: 2019-10-11 | Stop reason: HOSPADM

## 2019-10-11 RX ORDER — NALOXONE HYDROCHLORIDE 0.4 MG/ML
.1-.4 INJECTION, SOLUTION INTRAMUSCULAR; INTRAVENOUS; SUBCUTANEOUS
Status: DISCONTINUED | OUTPATIENT
Start: 2019-10-11 | End: 2019-10-11 | Stop reason: HOSPADM

## 2019-10-11 RX ORDER — CEFAZOLIN SODIUM 1 G/3ML
1 INJECTION, POWDER, FOR SOLUTION INTRAMUSCULAR; INTRAVENOUS
Status: DISCONTINUED | OUTPATIENT
Start: 2019-10-11 | End: 2019-10-11 | Stop reason: HOSPADM

## 2019-10-11 RX ADMIN — FENTANYL CITRATE 25 MCG: 50 INJECTION, SOLUTION INTRAMUSCULAR; INTRAVENOUS at 11:52

## 2019-10-11 RX ADMIN — SODIUM CHLORIDE: 4.5 INJECTION, SOLUTION INTRAVENOUS at 10:15

## 2019-10-11 RX ADMIN — MIDAZOLAM 0.5 MG: 1 INJECTION INTRAMUSCULAR; INTRAVENOUS at 11:52

## 2019-10-11 RX ADMIN — LIDOCAINE HYDROCHLORIDE 10 ML: 10 INJECTION, SOLUTION EPIDURAL; INFILTRATION; INTRACAUDAL; PERINEURAL at 11:11

## 2019-10-11 RX ADMIN — FENTANYL CITRATE 50 MCG: 50 INJECTION, SOLUTION INTRAMUSCULAR; INTRAVENOUS at 11:11

## 2019-10-11 RX ADMIN — MIDAZOLAM 0.5 MG: 1 INJECTION INTRAMUSCULAR; INTRAVENOUS at 11:34

## 2019-10-11 RX ADMIN — MIDAZOLAM 1 MG: 1 INJECTION INTRAMUSCULAR; INTRAVENOUS at 11:11

## 2019-10-11 RX ADMIN — CEFAZOLIN SODIUM 2 G: 2 INJECTION, SOLUTION INTRAVENOUS at 11:40

## 2019-10-11 RX ADMIN — FENTANYL CITRATE 50 MCG: 50 INJECTION, SOLUTION INTRAMUSCULAR; INTRAVENOUS at 11:12

## 2019-10-11 RX ADMIN — LIDOCAINE HYDROCHLORIDE 8 ML: 10 INJECTION, SOLUTION EPIDURAL; INFILTRATION; INTRACAUDAL; PERINEURAL at 11:46

## 2019-10-11 RX ADMIN — MIDAZOLAM 1 MG: 1 INJECTION INTRAMUSCULAR; INTRAVENOUS at 11:12

## 2019-10-11 RX ADMIN — FENTANYL CITRATE 25 MCG: 50 INJECTION, SOLUTION INTRAMUSCULAR; INTRAVENOUS at 11:20

## 2019-10-11 ASSESSMENT — MIFFLIN-ST. JEOR: SCORE: 1558.66

## 2019-10-11 NOTE — PROGRESS NOTES
Care Suites Admission Nursing Note    Reason for admission: EPS with possible ICD or possible Loop recorder implant.  CS arrival time: 0830  Accompanied by: Diane-wife  Name/phone of DC : Diane [phone]  Medications held: Lasix and Xarelto.  Consent signed: Yes.  Abnormal assessment/labs: Yes, slightly abnormal.  If abnormal, provider notified: Yes  Education/questions answered: Yes.  Discussed/reviewed pre/post procedure.  All questions answered at this time.  Prep completed (chest hair clipped and ash wipes done).  Plan:  Proceed when EP lab ready.

## 2019-10-11 NOTE — PRE-PROCEDURE
GENERAL PRE-PROCEDURE:     Written consent obtained?: Yes    Risks and benefits: Risks, benefits and alternatives were discussed    Consent given by:  Patient  Patient states understanding of procedure being performed: Yes    Patient's understanding of procedure matches consent: Yes    Procedure consent matches procedure scheduled: Yes    Expected level of sedation:  Moderate  Appropriately NPO:  Yes  ASA Class:  Class 1- healthy patient  Mallampati  :  Grade 1- soft palate, uvula, tonsillar pillars, and posterior pharyngeal wall visible  Lungs:  Lungs clear with good breath sounds bilaterally  Heart:  Normal heart sounds and rate  History & Physical reviewed:  History and physical reviewed and no updates needed  Statement of review:  I have reviewed the lab findings, diagnostic data, medications, and the plan for sedation

## 2019-10-11 NOTE — PROGRESS NOTES
Care Suites Discharge Nursing Note    Education/questions answered: YES  Patient DC location: home  Accompanied by: wife  CS discharge time: 1635    Patients right groin site is CDI after bedrest complete and up to ambulate and void-no hematoma or swelling.  Loop recorder dressing also CDI without hematoma or swelling.  States has no further questions regarding AVS instructions.  Discharged home with a .

## 2019-10-11 NOTE — DISCHARGE INSTRUCTIONS
EP Study & Loop Recorder Implant Discharge Instructions    After you go home:      Have an adult stay with you until tomorrow.    You may resume your normal diet.       For 24 hours - due to the sedation you received:    Relax and take it easy.    Do NOT make any important or legal decisions.    Do NOT drive or operate machines at home or at work.    Do NOT drink alcohol.    Care of Chest Incision:      Keep the bandage on for 3 days. You may remove the dressing on 10/14/19. Change it only if it gets loose or soaked. If you need to change it, use 4x4-inch gauze and a large clear bandage.     Leave the strips of tape on. They will fall off on their own, or we will remove them at your first check-up.    Check your wound daily for signs of infection, such as increased redness, severe swelling or draining. Fever may also be a sign of infection. Call us if you see any of these signs.    If there are no signs of infection, you may shower after the bandage comes off in 3 days. If you take a tub bath, keep the wound dry.    No soaking the incision (swimming pool, bathtub, hot tub) for 2 weeks.    You may have mild to medium pain for 3 to 5 days. Take Acetaminophen (Tylenol) or Ibuprofen (Advil) for the pain. If the pain persists or is severe, call us.    Care of Groin Puncture Site:      For the first 24 hrs - check the puncture site every 1-2 hours while awake.    For the first 2 days, when you cough, sneeze, laugh or move your bowels, hold your hand over the puncture site and press firmly.    Remove the bandaid after 24 hours. If there is minor oozing, apply another bandaid and remove it after 12 hours.    It is normal to have a small bruise or pea size lump at the site.    Do NOT take a bath, or use a hot tub or pool for at least 3 days. Do NOT scrub the site. Do not use lotion or powder near the puncture site.    Activity    Chest:    Avoid strenuous activity until incision well healed.    Groin:    For 2 days:    No  stooping or squatting    Do NOT do any heavy activity such as exercise, lifting, or straining.     No housework, yard work or any activity that make you sweat    Do NOT lift more than 10 pounds    Bleeding:    Chest:    If you start bleeding from the incision site, sit down and press firmly on the site for 10 minutes.     Once bleeding stops, call [facility] as soon as you can.    Groin:     If you start bleeding from the site in your groin, lie down flat and press firmly on the site for 10 minutes.     Once bleeding stops, lay flat for 2 hours.     Call [facility] as soon as you can.       Call 911 right away if you have heavy bleeding or bleeding that does not stop.      Medicines:      Take your medications, including blood thinners, unless your provider tells you not to.    If you have stopped any medicines, check with your provider about when to restart them.    If you have pain or shortness of breath, you may take Advil (ibuprofen) or Tylenol (acetaminophen).    Follow Up Appointments:      Follow up with Device Clinic at [facility] of patient preference in 7-10 days.    Call the clinic if:      You have increased pain or a large or growing hard lump around the site.    The site is red, swollen, hot or tender.    Blood or fluid is draining from the site.    You have chills or a fever greater than 101 F (38 C).    Your leg feels numb, cool or changes color.    Increased pain in the chest and/or groin.    New pain in the back or belly that you cannot control with Tylenol.    Shortness of breath or chest pain that is not relieved by Advil or Tylenol.    Recurrent irregular or fast heart rate lasting over 2 hours.    Any questions or concerns.    Heart rhythms:    You may have some premature heartbeats. These feel very strong. They may make you feel that the fast heart rhythm (tachycardia) is going to start again.  Give it time. The premature beats should occur less often.    Telling others about  your device:      Before you leave the hospital, you will receive a temporary ID card. A permanent card will be mailed to you about 6 to 8 weeks later. Always carry the ID card with you. It has important details about your device.    You may also get a Medical Alert bracelet or tag that says you have a pacemaker. Go to [email or web address].     Always tell doctors, dentists and other care providers that you have a device implanted in you.    Let us know before you plan any surgeries. Your care team must take special steps to keep you safe during certain procedures. These steps will depend on the type of device you have. Your provider will need to see your ID card. They may need to call us for instructions.    Device Safety:      Please refer to device  s booklet for further information.      [facility] [place]:    [phone] UM (7 days a week)

## 2019-10-11 NOTE — PROGRESS NOTES
No inducible sustained ventricular tachyarrhyhtmia. An ILR was implanted. Home after 4 hours bedrest. Resume all cardiac meds including Xarelto.

## 2019-10-11 NOTE — PROGRESS NOTES
Care Suites Post-Procedure Note    Procedure: EPS with Loop implant  CS arrival time: 1200  Accompanied by: EP RN  Concerns/abnormal assessment after procedure: VSS.  Pain free.  Sleepy.  Right groin site covered with gauze and tegaderm.  C/D/I, no hematoma.  Pulses at baseline.   Plan: Bedrest x 4 hours.  Up at 1600 if meets clinical course.      1500  AVS/Discharge instructions given and reviewed.  All questions and concerns addressed.  Verbal understanding received.  Dangelo from [employer] here.  Loop recorder kit given as well as reviewed with client.      1520  Handoff report given to Kait MOREAU RN

## 2019-10-11 NOTE — PROGRESS NOTES
- Cdiff negative  - Improved with imodium  - Will monitor.   Care Suites Post-Procedure Note    Procedure:  EPS/loop implant left chest  CS arrival time:  1203  Accompanied by:  EP staff  Concerns/abnormal assessment after procedure:  none  Plan:  4 hours bedrest, up at 1600.  Taking sips po fluids.  Denies pain.  ________________________    1250  Report to Lissa DE LA O RN.

## 2019-10-14 ENCOUNTER — TELEPHONE (OUTPATIENT)
Dept: CARDIOLOGY | Facility: CLINIC | Age: 74
End: 2019-10-14

## 2019-10-14 DIAGNOSIS — Z95.818 STATUS POST PLACEMENT OF IMPLANTABLE LOOP RECORDER: Primary | ICD-10-CM

## 2019-10-14 NOTE — TELEPHONE ENCOUNTER
Pt had ILR implant last week on Friday. Called pt, no answer, left voicemail to call back to device clinic to schedule an appointment.     ADDENDUM 4:15PM. Second attempt to call pt to go through discharge instructions and scheduled 1 week ILR check. No answer.     ------------------------------------------------------------------------  When pt calls back will discuss:     Post device implant discharge phone call.    Reviewed the following:  Remove outer dressing 3 days after implant. May shower after outer dressing removed. Leave steri-strips in place, will be removed at 1 week device check  Watch for redness, drainage, warmth, or fever. Call device clinic if any signs of infection.     1 week device check scheduled: 10/23/19 at 11:00. This was scheduled to line up with patient's cardiac rehab appointment.     Pt states understanding of all instructions.   Reviewed all instructions with patient 10/15/19. Annmarie Steven RN

## 2019-10-16 ENCOUNTER — HOSPITAL ENCOUNTER (OUTPATIENT)
Dept: CARDIAC REHAB | Facility: CLINIC | Age: 74
End: 2019-10-16
Attending: INTERNAL MEDICINE
Payer: COMMERCIAL

## 2019-10-16 PROCEDURE — 93798 PHYS/QHP OP CAR RHAB W/ECG: CPT

## 2019-10-16 PROCEDURE — 40000116 ZZH STATISTIC OP CR VISIT

## 2019-10-18 ENCOUNTER — HOSPITAL ENCOUNTER (OUTPATIENT)
Dept: CARDIAC REHAB | Facility: CLINIC | Age: 74
End: 2019-10-18
Attending: INTERNAL MEDICINE
Payer: COMMERCIAL

## 2019-10-18 PROCEDURE — 93798 PHYS/QHP OP CAR RHAB W/ECG: CPT | Performed by: REHABILITATION PRACTITIONER

## 2019-10-18 PROCEDURE — 40000116 ZZH STATISTIC OP CR VISIT: Performed by: REHABILITATION PRACTITIONER

## 2019-10-21 ENCOUNTER — HOSPITAL ENCOUNTER (OUTPATIENT)
Dept: CARDIAC REHAB | Facility: CLINIC | Age: 74
End: 2019-10-21
Attending: INTERNAL MEDICINE
Payer: COMMERCIAL

## 2019-10-21 PROCEDURE — 40000116 ZZH STATISTIC OP CR VISIT

## 2019-10-21 PROCEDURE — 93798 PHYS/QHP OP CAR RHAB W/ECG: CPT

## 2019-10-22 ENCOUNTER — OFFICE VISIT (OUTPATIENT)
Dept: FAMILY MEDICINE | Facility: CLINIC | Age: 74
End: 2019-10-22
Payer: COMMERCIAL

## 2019-10-22 VITALS
TEMPERATURE: 97 F | DIASTOLIC BLOOD PRESSURE: 74 MMHG | OXYGEN SATURATION: 99 % | BODY MASS INDEX: 30.86 KG/M2 | HEIGHT: 66 IN | HEART RATE: 71 BPM | SYSTOLIC BLOOD PRESSURE: 136 MMHG | WEIGHT: 192 LBS

## 2019-10-22 DIAGNOSIS — R05.9 COUGH: ICD-10-CM

## 2019-10-22 DIAGNOSIS — I47.20 V-TACH (H): ICD-10-CM

## 2019-10-22 PROCEDURE — 99214 OFFICE O/P EST MOD 30 MIN: CPT | Performed by: INTERNAL MEDICINE

## 2019-10-22 ASSESSMENT — MIFFLIN-ST. JEOR: SCORE: 1558.66

## 2019-10-22 NOTE — PROGRESS NOTES
This is a complicated patient with multiple problems here for follow-up.    The patient has a history of syncope as noted.  He was recently seen by cardiology for this in follow-up as noted.  I reviewed all those notes.  He was wearing an event monitor and on that he did have ventricular tachycardia, 9 beats.  He also had a 3-second pause but this was during sleep.  Because of the tachycardia he underwent an EP study and they could not induce any tachycardia.  A loop monitor was implanted.  The patient has had no cardiovascular symptoms.  No chest pain, dizziness or syncope.  No shortness of breath or edema.    Patient has chronic A. fib for which he is on anticoagulation.  He has coronary artery disease with a fairly recent stent placed.  He has a history of congestive heart failure which is been doing quite well.  He also has  had a cough for which I added Spiriva.  At this point he is doing super.  He is off the inhaler and does not have any cough or shortness of breath.    The patient otherwise is doing well.  No GI symptoms.    Past Medical History:   Diagnosis Date     ASCVD (arteriosclerotic cardiovascular disease) 1997    angio with 40% circ lesion; 6/19 hosp for chf, 3 vessel dz on angio but porcelin aorta so ptca done     Atrial fibrillation (H) 10/09/2018    found on routine physical     Carotid artery plaque 2005    seen on us, about 50% stenosis, fu 2009 us no significant stenosis     CHF (congestive heart failure) (H) 06/2019    hosp fsd, then fu echo ef nl     Elevated blood sugar      Gout     on allopurinol     HTN (hypertension)      Hypercholesteremia      Hyponatremia 2015    felt due to chlorthalidone     Low mean corpuscular volume (MCV)      Near syncope 5/15    fu est echo low level but neg     Psoriasis     Dr. Marti     Renal mass 06/29/2019    seen on ct chest for sob, needs fu ct for that, fu us done 7/19 and no mass seen, should have fu ct in December     Screening 2012    abd us no aaa      Syncope 09/2019    hosp fsd, cause not clear, lowered coreg dose; seen by Dr. Lezama of ep and ep study neg, implanted event monitor     V-tach (H) 09/2019    seen on event monitor for fu syncope, then ep study and no inducible vtach     Past Surgical History:   Procedure Laterality Date     C ANESTH,DX ARTHROSCOPIC PROC KNEE JOINT  2009     CV CORONARY ANGIOGRAM N/A 7/2/2019    Procedure: Coronary Angiogram;  Surgeon: Donaldo Loera MD;  Location:  HE[facility]ATH [facility]     CV HEART CATHETERIZATION WITH POSSIBLE INTERVENTION N/A 7/5/2019    Procedure: Heart Catheterization with Possible Intervention;  Surgeon: Manolo Hu MD;  Location:  [facility]ATH [facility]     CV LEFT HEART CATH N/A 7/2/2019    Procedure: Left Heart Cath;  Surgeon: Donaldo Loera MD;  Location:  HE[facility] CARD[facility] CATH LAB     CV LEFT VENTRICULOGRAM N/A 7/2/2019    Procedure: Left Ventriculogram;  Surgeon: Donaldo Loera MD;  Location:  HE[facility]ARD[facility] CATH LAB     CV PCI STENT DRUG ELUTING N/A 7/5/2019    Procedure: PCI Stent Drug Eluting;  Surgeon: Manolo Hu MD;  Location:  HEART CARDIAC CATH LAB     CV RIGHT HEART CATH N/A 7/2/2019    Procedure: Right Heart Cath;  Surgeon: Donaldo Loera MD;  Location:  HE[facility]ATH [facility]     EP LOOP RECORDER IMPLANT N/A 10/11/2019    Procedure: EP Loop Recorder Implant;  Surgeon: Fuad Lezama MD;  Location:  HE[facility]ARD[facility] CATH LAB     EP VENTRICULAR PACING N/A 10/11/2019    Procedure: EP Ventricular Pacing;  Surgeon: Fuad Lezama MD;  Location:  HEART CARDIAC CATH LAB     Social History     Socioeconomic History     Marital status:      Spouse name: Not on file     Number of children: 2     Years of education: Not on file     Highest education level: Not on file   Occupational History     Occupation: retired   Social Needs     Financial resource strain: Not on file     Food insecurity:     Worry: Not on file     Inability: Not  on file     Transportation needs:     Medical: Not on file     Non-medical: Not on file   Tobacco Use     Smoking status: Former Smoker     Packs/day: 1.00     Years: 30.00     Pack years: 30.00     Types: Cigars     Last attempt to quit: 1998     Years since quittin.1     Smokeless tobacco: Never Used   Substance and Sexual Activity     Alcohol use: Not Currently     Alcohol/week: 14.0 standard drinks     Types: 14 Standard drinks or equivalent per week     Frequency: 2-3 times a week     Drinks per session: 1 or 2     Binge frequency: Never     Comment: 1-2 a night     Drug use: No     Sexual activity: Never     Partners: Female   Lifestyle     Physical activity:     Days per week: Not on file     Minutes per session: Not on file     Stress: Not on file   Relationships     Social connections:     Talks on phone: Not on file     Gets together: Not on file     Attends Sikhism service: Not on file     Active member of club or organization: Not on file     Attends meetings of clubs or organizations: Not on file     Relationship status: Not on file     Intimate partner violence:     Fear of current or ex partner: Not on file     Emotionally abused: Not on file     Physically abused: Not on file     Forced sexual activity: Not on file   Other Topics Concern     Parent/sibling w/ CABG, MI or angioplasty before 65F 55M? Not Asked   Social History Narrative     Not on file     Current Outpatient Medications   Medication Sig Dispense Refill     tiotropium (SPIRIVA RESPIMAT) 2.5 MCG/ACT inhaler Inhale 2 puffs into the lungs daily 1 Inhaler 1     allopurinol (ZYLOPRIM) 300 MG tablet TAKE 1 TABLET(300 MG) BY MOUTH DAILY 90 tablet 3     amLODIPine (NORVASC) 5 MG tablet Take 1 tablet (5 mg) by mouth daily 90 tablet 3     ASPIRIN NOT PRESCRIBED (INTENTIONAL) Please choose reason not prescribed, below (Patient not taking: Reported on 10/9/2019)       atorvastatin (LIPITOR) 40 MG tablet Take 1 tablet (40 mg) by mouth  "daily 90 tablet 3     carvedilol (COREG) 3.125 MG tablet Take 1 tablet (3.125 mg) by mouth 2 times daily (with meals) 180 tablet 3     clopidogrel (PLAVIX) 75 MG tablet Take 1 tablet (75 mg) by mouth daily 90 tablet 3     furosemide (LASIX) 20 MG tablet Take 20 mg by mouth daily       isosorbide mononitrate (IMDUR) 120 MG 24 HR ER tablet Take 1 tablet (120 mg) by mouth daily 90 tablet 3     losartan (COZAAR) 100 MG tablet TAKE 1 TABLET(100 MG) BY MOUTH DAILY 90 tablet 3     rivaroxaban ANTICOAGULANT (XARELTO) 15 MG TABS tablet Take 1 tablet (15 mg) by mouth daily (with dinner) 90 tablet 3     Allergies   Allergen Reactions     Ace Inhibitors Anaphylaxis     Edema of ace     Eliquis [Apixaban] Other (See Comments)     FAMILY HISTORY NOTED AND REVIEWED    REVIEW OF SYSTEMS: above    PHYSICAL EXAM    /74   Pulse 71   Temp 97  F (36.1  C) (Oral)   Ht 1.676 m (5' 6\")   Wt 87.1 kg (192 lb)   SpO2 99%   BMI 30.99 kg/m      Patient appears non toxic  Lungs clear  cv irreg, irreg, no murmer, rub or gallop, no jvp or edema    ASSESSMENT:  1. Syncope, may be vasovagal, ?low blood pressure, they did lower his coreg in hosp, has loop recorder in place  2. Cad, stable  3. Hypertension, control fine, lower at rehab  4. Vtach, neg ep  5. Chf, no issues  6. Copd, doing well    PLAN:  Use spiriva prn  Follow up cards  If any chest pain or shortness of breath to call  If any dizziness to call  Flu shot  shingrix at pharm    Rudy Vernon M.D.        "

## 2019-10-23 ENCOUNTER — HOSPITAL ENCOUNTER (OUTPATIENT)
Dept: CARDIAC REHAB | Facility: CLINIC | Age: 74
End: 2019-10-23
Attending: INTERNAL MEDICINE
Payer: COMMERCIAL

## 2019-10-23 ENCOUNTER — ANCILLARY PROCEDURE (OUTPATIENT)
Dept: CARDIOLOGY | Facility: CLINIC | Age: 74
End: 2019-10-23
Attending: INTERNAL MEDICINE
Payer: COMMERCIAL

## 2019-10-23 DIAGNOSIS — Z95.818 STATUS POST PLACEMENT OF IMPLANTABLE LOOP RECORDER: ICD-10-CM

## 2019-10-23 DIAGNOSIS — R55 SYNCOPE: Primary | ICD-10-CM

## 2019-10-23 PROCEDURE — 93299 CARDIAC DEVICE CHECK - IN CLINIC: CPT | Performed by: INTERNAL MEDICINE

## 2019-10-23 PROCEDURE — 93798 PHYS/QHP OP CAR RHAB W/ECG: CPT | Performed by: OCCUPATIONAL THERAPIST

## 2019-10-23 PROCEDURE — 40000116 ZZH STATISTIC OP CR VISIT: Performed by: OCCUPATIONAL THERAPIST

## 2019-10-23 PROCEDURE — 93298 REM INTERROG DEV EVAL SCRMS: CPT | Performed by: INTERNAL MEDICINE

## 2019-10-25 ENCOUNTER — HOSPITAL ENCOUNTER (OUTPATIENT)
Dept: CARDIAC REHAB | Facility: CLINIC | Age: 74
End: 2019-10-25
Attending: INTERNAL MEDICINE
Payer: COMMERCIAL

## 2019-10-25 PROCEDURE — 40000116 ZZH STATISTIC OP CR VISIT: Performed by: REHABILITATION PRACTITIONER

## 2019-10-25 PROCEDURE — 93798 PHYS/QHP OP CAR RHAB W/ECG: CPT | Performed by: REHABILITATION PRACTITIONER

## 2019-10-28 ENCOUNTER — HOSPITAL ENCOUNTER (OUTPATIENT)
Dept: CARDIAC REHAB | Facility: CLINIC | Age: 74
End: 2019-10-28
Attending: INTERNAL MEDICINE
Payer: COMMERCIAL

## 2019-10-28 PROCEDURE — 40000116 ZZH STATISTIC OP CR VISIT

## 2019-10-28 PROCEDURE — 93798 PHYS/QHP OP CAR RHAB W/ECG: CPT

## 2019-10-30 ENCOUNTER — HOSPITAL ENCOUNTER (OUTPATIENT)
Dept: CARDIAC REHAB | Facility: CLINIC | Age: 74
End: 2019-10-30
Attending: INTERNAL MEDICINE
Payer: COMMERCIAL

## 2019-10-30 PROCEDURE — 40000116 ZZH STATISTIC OP CR VISIT: Performed by: CLINICAL EXERCISE PHYSIOLOGIST

## 2019-10-30 PROCEDURE — 93798 PHYS/QHP OP CAR RHAB W/ECG: CPT | Performed by: CLINICAL EXERCISE PHYSIOLOGIST

## 2019-11-01 ENCOUNTER — HOSPITAL ENCOUNTER (OUTPATIENT)
Dept: CARDIAC REHAB | Facility: CLINIC | Age: 74
End: 2019-11-01
Attending: INTERNAL MEDICINE
Payer: COMMERCIAL

## 2019-11-01 PROCEDURE — 40000116 ZZH STATISTIC OP CR VISIT

## 2019-11-01 PROCEDURE — 93798 PHYS/QHP OP CAR RHAB W/ECG: CPT

## 2019-11-04 LAB
MDC_IDC_MSMT_BATTERY_STATUS: NORMAL
MDC_IDC_PG_IMPLANT_DTM: NORMAL
MDC_IDC_PG_MFG: NORMAL
MDC_IDC_PG_MODEL: NORMAL
MDC_IDC_PG_SERIAL: NORMAL
MDC_IDC_PG_TYPE: NORMAL
MDC_IDC_SESS_CLINIC_NAME: NORMAL
MDC_IDC_SESS_DTM: NORMAL
MDC_IDC_SESS_TYPE: NORMAL
MDC_IDC_SET_ZONE_DETECTION_INTERVAL: 2000 MS
MDC_IDC_SET_ZONE_DETECTION_INTERVAL: 3000 MS
MDC_IDC_SET_ZONE_DETECTION_INTERVAL: 380 MS
MDC_IDC_SET_ZONE_TYPE: NORMAL
MDC_IDC_STAT_AT_BURDEN_PERCENT: 100 %
MDC_IDC_STAT_AT_DTM_END: NORMAL
MDC_IDC_STAT_AT_DTM_START: NORMAL
MDC_IDC_STAT_EPISODE_RECENT_COUNT: 0
MDC_IDC_STAT_EPISODE_RECENT_COUNT: 1
MDC_IDC_STAT_EPISODE_RECENT_COUNT_DTM_END: NORMAL
MDC_IDC_STAT_EPISODE_RECENT_COUNT_DTM_START: NORMAL
MDC_IDC_STAT_EPISODE_TOTAL_COUNT: 0
MDC_IDC_STAT_EPISODE_TOTAL_COUNT: 1
MDC_IDC_STAT_EPISODE_TOTAL_COUNT_DTM_END: NORMAL
MDC_IDC_STAT_EPISODE_TOTAL_COUNT_DTM_START: NORMAL
MDC_IDC_STAT_EPISODE_TYPE: NORMAL

## 2019-11-06 ENCOUNTER — TELEPHONE (OUTPATIENT)
Dept: CARDIOLOGY | Facility: CLINIC | Age: 74
End: 2019-11-06

## 2019-11-06 DIAGNOSIS — I50.23 ACUTE ON CHRONIC SYSTOLIC CONGESTIVE HEART FAILURE (H): ICD-10-CM

## 2019-11-06 RX ORDER — ISOSORBIDE MONONITRATE 120 MG/1
120 TABLET, EXTENDED RELEASE ORAL DAILY
Qty: 30 TABLET | Refills: 3 | Status: SHIPPED | OUTPATIENT
Start: 2019-11-06 | End: 2020-07-27

## 2019-11-06 RX ORDER — CLOPIDOGREL BISULFATE 75 MG/1
75 TABLET ORAL DAILY
Qty: 30 TABLET | Refills: 3 | Status: SHIPPED | OUTPATIENT
Start: 2019-11-06 | End: 2020-06-10

## 2019-11-06 RX ORDER — ATORVASTATIN CALCIUM 40 MG/1
40 TABLET, FILM COATED ORAL DAILY
Qty: 30 TABLET | Refills: 3 | Status: SHIPPED | OUTPATIENT
Start: 2019-11-06 | End: 2020-06-10

## 2019-11-06 RX ORDER — AMLODIPINE BESYLATE 5 MG/1
5 TABLET ORAL DAILY
Qty: 30 TABLET | Refills: 3 | Status: SHIPPED | OUTPATIENT
Start: 2019-11-06 | End: 2020-01-06

## 2019-11-07 ENCOUNTER — HOSPITAL ENCOUNTER (OUTPATIENT)
Dept: CARDIAC REHAB | Facility: CLINIC | Age: 74
End: 2019-11-07
Attending: INTERNAL MEDICINE
Payer: COMMERCIAL

## 2019-11-07 PROCEDURE — 40000116 ZZH STATISTIC OP CR VISIT: Performed by: OCCUPATIONAL THERAPIST

## 2019-11-07 PROCEDURE — 93798 PHYS/QHP OP CAR RHAB W/ECG: CPT | Performed by: OCCUPATIONAL THERAPIST

## 2019-11-07 PROCEDURE — 40000575 ZZH STATISTIC OP CARDIAC VISIT #2: Performed by: OCCUPATIONAL THERAPIST

## 2019-11-07 PROCEDURE — 93797 PHYS/QHP OP CAR RHAB WO ECG: CPT | Mod: 59 | Performed by: OCCUPATIONAL THERAPIST

## 2019-12-09 ENCOUNTER — CARE COORDINATION (OUTPATIENT)
Dept: CARDIOLOGY | Facility: CLINIC | Age: 74
End: 2019-12-09

## 2019-12-09 NOTE — PROGRESS NOTES
PT called and said he is experiencing a gout flare up in his right ankle. He would like to know if  is ok with him taking a few doses of ibuprofen 600 mg, as that typically helps when he has gout flare up. Pt has hx of CAD, afib, and does take xarelto. I told him typically long term NSAIDs are not recommended, but I will see if  is ok with him taking a few doses. Mukesh HOLLAND December 9, 2019, 11:01 AM

## 2019-12-10 NOTE — PROGRESS NOTES
I called pt and let him know  is ok with him taking a few doses of the ibuprofen. It is not recommended long term and pt should discuss gout symptoms with PCP if they don't improve. Mukesh HOLLAND December 10, 2019, 10:54 AM

## 2019-12-10 NOTE — TELEPHONE ENCOUNTER
No ideal from a bleeding, CAD and renal dysfunction standpoint but a few doses are OK if he needs them. Please discuss with PCP too if colchicine is an option?

## 2020-01-06 ENCOUNTER — OFFICE VISIT (OUTPATIENT)
Dept: CARDIOLOGY | Facility: CLINIC | Age: 75
End: 2020-01-06
Payer: COMMERCIAL

## 2020-01-06 VITALS
HEART RATE: 74 BPM | BODY MASS INDEX: 31.19 KG/M2 | HEIGHT: 66 IN | DIASTOLIC BLOOD PRESSURE: 79 MMHG | SYSTOLIC BLOOD PRESSURE: 150 MMHG | WEIGHT: 194.1 LBS

## 2020-01-06 DIAGNOSIS — I50.23 ACUTE ON CHRONIC SYSTOLIC CONGESTIVE HEART FAILURE (H): ICD-10-CM

## 2020-01-06 PROCEDURE — 99214 OFFICE O/P EST MOD 30 MIN: CPT | Performed by: INTERNAL MEDICINE

## 2020-01-06 RX ORDER — AMLODIPINE BESYLATE 10 MG/1
10 TABLET ORAL DAILY
Qty: 90 TABLET | Refills: 3 | Status: SHIPPED | OUTPATIENT
Start: 2020-01-06 | End: 2020-07-27

## 2020-01-06 ASSESSMENT — MIFFLIN-ST. JEOR: SCORE: 1563.18

## 2020-01-06 NOTE — LETTER
1/6/2020    Rudy Vernon MD  [address]  [facility] [address]    RE: Betito Anderson       Dear Colleague,    I had the pleasure of seeing Betitojohn Anderson in the [facility].    CARDIOLOGY CLINIC CONSULTATION    REASON FOR CONSULT: CAD    PRIMARY CARE PHYSICIAN:  Rudy Vernon    HISTORY OF PRESENT ILLNESS:  This is a very delightful 74-year-old gentleman who I had first met in the hospital in summer of 2019.  The patient has a history of chronic atrial fibrillation on anticoagulation and was seen by Dr. Lezama in 2018.  He presented in 07/2019, with acute heart failure, volume overload and hypertensive emergency and LV dysfunction.  EF was initially 30%-35% and actually normalized to 55% with control of his blood pressure.  During that admission, he had a coronary angiography that revealed a very tight lesion in the mid LAD straddling into a large second diagonal.  He also had a distal circumflex lesion which was quite significant and tight, but was a smallish vessel in that area and subtotally occluded  of the right coronary artery with robust collaterals from the LAD.  He was initially referred for surgery, but given his porcelain aorta, he underwent a PCI off his bifurcation LAD into the diagonal.  Subsequently, he had a nuclear stress test that showed very mild basilar inferolateral ischemia in the setting of no clinical angina.  He also has a history of hypertension, dyslipidemia.      When he had last seen me in September 2019, he had complained of episodes of syncope which were concerning.  As such he was referred to EP and underwent an EP study because his monitor showed nonsustained VT.  EP study was negative and Dr. Lezama implanted a loop recorder.  Since that he is not had any syncopal events.  He does not check his blood pressure at home.  Today blood pressure reading is high otherwise he is compliant with all his medications.    His antiplatelet regimen  includes 15 mg of Xarelto and 75 mg of Plavix.  He denies any sort of bleeding problems at this time.     PAST MEDICAL HISTORY:  Past Medical History:   Diagnosis Date     ASCVD (arteriosclerotic cardiovascular disease) 1997    angio with 40% circ lesion; 6/19 hosp for chf, 3 vessel dz on angio but porcelin aorta so ptca done     Atrial fibrillation (H) 10/09/2018    found on routine physical     Carotid artery plaque 2005    seen on us, about 50% stenosis, fu 2009 us no significant stenosis     CHF (congestive heart failure) (H) 06/2019    hosp fsd, then fu echo ef nl     Elevated blood sugar      Gout     on allopurinol     HTN (hypertension)      Hypercholesteremia      Hyponatremia 2015    felt due to chlorthalidone     Low mean corpuscular volume (MCV)      Near syncope 5/15    fu est echo low level but neg     Psoriasis     Dr. Marti     Renal mass 06/29/2019    seen on ct chest for sob, needs fu ct for that, fu us done 7/19 and no mass seen, should have fu ct in December     Screening 2012    abd us no aaa     Syncope 09/2019    hosp fsd, cause not clear, lowered coreg dose; seen by Dr. Lezama of ep and ep study neg, implanted event monitor     V-tach (H) 09/2019    seen on event monitor for fu syncope, then ep study and no inducible vtach       MEDICATIONS:  Current Outpatient Medications   Medication     allopurinol (ZYLOPRIM) 300 MG tablet     amLODIPine (NORVASC) 10 MG tablet     atorvastatin (LIPITOR) 40 MG tablet     carvedilol (COREG) 3.125 MG tablet     clopidogrel (PLAVIX) 75 MG tablet     furosemide (LASIX) 20 MG tablet     isosorbide mononitrate CR (IMDUR) 120 MG 24 HR ER tablet     losartan (COZAAR) 100 MG tablet     rivaroxaban ANTICOAGULANT (XARELTO) 15 MG TABS tablet     ASPIRIN NOT PRESCRIBED (INTENTIONAL)     tiotropium (SPIRIVA RESPIMAT) 2.5 MCG/ACT inhaler     No current facility-administered medications for this visit.        ALLERGIES:  Allergies   Allergen Reactions     Ace Inhibitors  Anaphylaxis     Edema of ace     Eliquis [Apixaban] Other (See Comments)       SOCIAL HISTORY:  I have reviewed this patient's social history and updated it with pertinent information if needed. Betito Anderson  reports that he quit smoking about 21 years ago. His smoking use included cigars. He has a 30.00 pack-year smoking history. He has never used smokeless tobacco. He reports previous alcohol use of about 14.0 standard drinks of alcohol per week. He reports that he does not use drugs.    FAMILY HISTORY:  I have reviewed this patient's family history and updated it with pertinent information if needed.   Family History   Problem Relation Age of Onset     Coronary Artery Disease Father      Medical History Unknown Mother      Medical History Unknown Maternal Grandfather        REVIEW OF SYSTEMS:  Skin:  Positive for     Eyes:  Negative    ENT:  Positive for hearing loss  Respiratory:  Negative    Cardiovascular:  Negative;palpitations;chest pain;dizziness;lightheadedness;syncope or near-syncope;cyanosis;fatigue;edema    Gastroenterology: Negative    Genitourinary:  Positive for nocturia  Musculoskeletal:  Negative    Neurologic:  Negative    Psychiatric:  Negative    Heme/Lymph/Imm:  Negative    Endocrine:  Negative        PHYSICAL EXAM:      BP: (!) 150/79 Pulse: 74            Vital Signs with Ranges  Pulse:  [74] 74  BP: (150)/(79) 150/79  194 lbs 1.6 oz    Constitutional: awake, alert, no distress  Eyes: PERRL, sclera nonicteric  ENT: trachea midline  Respiratory: Clear to auscultation bilaterally  Cardiovascular: Normal first and second heart sounds with a soft systolic murmur without rubs or gallops.  Extremities are warm without edema and JVP is normal  GI: nondistended, nontender, bowel sounds present  Lymph/Hematologic: no bleeding signs  Skin: dry, no rash  Musculoskeletal: good muscle tone, strength 5/5 in upper and lower extremities  Neurologic: no focal deficits  Neuropsychiatric: appropriate  affact    DATA:  Labs: Pertinent cardiac labs reviewed    Echocardiogram: Normal EF September 2019    ASSESSMENT:  Permanent atrial fibrillation on anticoagulation  Severe three-vessel coronary artery disease status post LAD and diagonal PCI in July 2019  Medical therapy for other obstructive coronary artery disease with mild ischemia without angina  Heart failure with recovered ejection fraction in the setting of hypertensive emergency  Hypertension  Mild chronic kidney disease  Syncope with NSVT and negative EP study in Oct 2019 s/p ILR without recurrence.    RECOMMENDATIONS:  1. At this point the patient is asymptomatic from a cardiac standpoint.  He overall feels very well.  Denies any bleeding problems.  2. I recommend continuing Xarelto 15 mg in addition to 75 mg of Plavix until July 2019.  At that point I recommend stopping Plavix.  Routinely doing monotherapy with anticoagulation would be reasonable however given the complexity of his LAD diagonal intervention may decide to use aspirin 81 mg in addition to renally adjusted Xarelto.  3. Continue other antihypertensive meds including carvedilol and losartan and Imdur.  Increase amlodipine to 10 mg daily today.  I have encouraged him to check home blood pressure readings and if they are consistently over 130 systolic he needs to call the clinic.  4. Return to clinic in 6 months.  5. Call us with any change in clinical status.    JOE Estevez, [facility]  Cardiology - [facility]  January 6, 2020          Thank you for allowing me to participate in the care of your patient.      Sincerely,     Mendez Hidalgo MD     [facility]    cc:   No referring provider defined for this encounter.

## 2020-01-06 NOTE — LETTER
1/6/2020    Rudy Vernon MD  [address]  [facility] [address]    RE: Betito Anderson       Dear Colleague,    I had the pleasure of seeing Betitojohn Anderson in the [facility].    CARDIOLOGY CLINIC CONSULTATION    REASON FOR CONSULT: CAD    PRIMARY CARE PHYSICIAN:  Rudy Vernon    HISTORY OF PRESENT ILLNESS:  This is a very delightful 74-year-old gentleman who I had first met in the hospital in summer of 2019.  The patient has a history of chronic atrial fibrillation on anticoagulation and was seen by Dr. Lezama in 2018.  He presented in 07/2019, with acute heart failure, volume overload and hypertensive emergency and LV dysfunction.  EF was initially 30%-35% and actually normalized to 55% with control of his blood pressure.  During that admission, he had a coronary angiography that revealed a very tight lesion in the mid LAD straddling into a large second diagonal.  He also had a distal circumflex lesion which was quite significant and tight, but was a smallish vessel in that area and subtotally occluded  of the right coronary artery with robust collaterals from the LAD.  He was initially referred for surgery, but given his porcelain aorta, he underwent a PCI off his bifurcation LAD into the diagonal.  Subsequently, he had a nuclear stress test that showed very mild basilar inferolateral ischemia in the setting of no clinical angina.  He also has a history of hypertension, dyslipidemia.      When he had last seen me in September 2019, he had complained of episodes of syncope which were concerning.  As such he was referred to EP and underwent an EP study because his monitor showed nonsustained VT.  EP study was negative and Dr. Lezama implanted a loop recorder.  Since that he is not had any syncopal events.  He does not check his blood pressure at home.  Today blood pressure reading is high otherwise he is compliant with all his medications.    His antiplatelet regimen  includes 15 mg of Xarelto and 75 mg of Plavix.  He denies any sort of bleeding problems at this time.     PAST MEDICAL HISTORY:  Past Medical History:   Diagnosis Date     ASCVD (arteriosclerotic cardiovascular disease) 1997    angio with 40% circ lesion; 6/19 hosp for chf, 3 vessel dz on angio but porcelin aorta so ptca done     Atrial fibrillation (H) 10/09/2018    found on routine physical     Carotid artery plaque 2005    seen on us, about 50% stenosis, fu 2009 us no significant stenosis     CHF (congestive heart failure) (H) 06/2019    hosp fsd, then fu echo ef nl     Elevated blood sugar      Gout     on allopurinol     HTN (hypertension)      Hypercholesteremia      Hyponatremia 2015    felt due to chlorthalidone     Low mean corpuscular volume (MCV)      Near syncope 5/15    fu est echo low level but neg     Psoriasis     Dr. Marti     Renal mass 06/29/2019    seen on ct chest for sob, needs fu ct for that, fu us done 7/19 and no mass seen, should have fu ct in December     Screening 2012    abd us no aaa     Syncope 09/2019    hosp fsd, cause not clear, lowered coreg dose; seen by Dr. Lezama of ep and ep study neg, implanted event monitor     V-tach (H) 09/2019    seen on event monitor for fu syncope, then ep study and no inducible vtach       MEDICATIONS:  Current Outpatient Medications   Medication     allopurinol (ZYLOPRIM) 300 MG tablet     amLODIPine (NORVASC) 10 MG tablet     atorvastatin (LIPITOR) 40 MG tablet     carvedilol (COREG) 3.125 MG tablet     clopidogrel (PLAVIX) 75 MG tablet     furosemide (LASIX) 20 MG tablet     isosorbide mononitrate CR (IMDUR) 120 MG 24 HR ER tablet     losartan (COZAAR) 100 MG tablet     rivaroxaban ANTICOAGULANT (XARELTO) 15 MG TABS tablet     ASPIRIN NOT PRESCRIBED (INTENTIONAL)     tiotropium (SPIRIVA RESPIMAT) 2.5 MCG/ACT inhaler     No current facility-administered medications for this visit.        ALLERGIES:  Allergies   Allergen Reactions     Ace Inhibitors  Anaphylaxis     Edema of ace     Eliquis [Apixaban] Other (See Comments)       SOCIAL HISTORY:  I have reviewed this patient's social history and updated it with pertinent information if needed. Betito Anderson  reports that he quit smoking about 21 years ago. His smoking use included cigars. He has a 30.00 pack-year smoking history. He has never used smokeless tobacco. He reports previous alcohol use of about 14.0 standard drinks of alcohol per week. He reports that he does not use drugs.    FAMILY HISTORY:  I have reviewed this patient's family history and updated it with pertinent information if needed.   Family History   Problem Relation Age of Onset     Coronary Artery Disease Father      Medical History Unknown Mother      Medical History Unknown Maternal Grandfather        REVIEW OF SYSTEMS:  Skin:  Positive for     Eyes:  Negative    ENT:  Positive for hearing loss  Respiratory:  Negative    Cardiovascular:  Negative;palpitations;chest pain;dizziness;lightheadedness;syncope or near-syncope;cyanosis;fatigue;edema    Gastroenterology: Negative    Genitourinary:  Positive for nocturia  Musculoskeletal:  Negative    Neurologic:  Negative    Psychiatric:  Negative    Heme/Lymph/Imm:  Negative    Endocrine:  Negative        PHYSICAL EXAM:      BP: (!) 150/79 Pulse: 74            Vital Signs with Ranges  Pulse:  [74] 74  BP: (150)/(79) 150/79  194 lbs 1.6 oz    Constitutional: awake, alert, no distress  Eyes: PERRL, sclera nonicteric  ENT: trachea midline  Respiratory: Clear to auscultation bilaterally  Cardiovascular: Normal first and second heart sounds with a soft systolic murmur without rubs or gallops.  Extremities are warm without edema and JVP is normal  GI: nondistended, nontender, bowel sounds present  Lymph/Hematologic: no bleeding signs  Skin: dry, no rash  Musculoskeletal: good muscle tone, strength 5/5 in upper and lower extremities  Neurologic: no focal deficits  Neuropsychiatric: appropriate  affact    DATA:  Labs: Pertinent cardiac labs reviewed    Echocardiogram: Normal EF September 2019    ASSESSMENT:  Permanent atrial fibrillation on anticoagulation  Severe three-vessel coronary artery disease status post LAD and diagonal PCI in July 2019  Medical therapy for other obstructive coronary artery disease with mild ischemia without angina  Heart failure with recovered ejection fraction in the setting of hypertensive emergency  Hypertension  Mild chronic kidney disease  Syncope with NSVT and negative EP study in Oct 2019 s/p ILR without recurrence.    RECOMMENDATIONS:  1. At this point the patient is asymptomatic from a cardiac standpoint.  He overall feels very well.  Denies any bleeding problems.  2. I recommend continuing Xarelto 15 mg in addition to 75 mg of Plavix until July 2019.  At that point I recommend stopping Plavix.  Routinely doing monotherapy with anticoagulation would be reasonable however given the complexity of his LAD diagonal intervention may decide to use aspirin 81 mg in addition to renally adjusted Xarelto.  3. Continue other antihypertensive meds including carvedilol and losartan and Imdur.  Increase amlodipine to 10 mg daily today.  I have encouraged him to check home blood pressure readings and if they are consistently over 130 systolic he needs to call the clinic.  4. Return to clinic in 6 months.  5. Call us with any change in clinical status.    JOE Estevez, [facility]  Cardiology - [facility]  January 6, 2020        Thank you for allowing me to participate in the care of your patient.    Sincerely,     Mendez Hidalgo MD     [facility]

## 2020-01-06 NOTE — PROGRESS NOTES
CARDIOLOGY CLINIC CONSULTATION    REASON FOR CONSULT: CAD    PRIMARY CARE PHYSICIAN:  Rudy Vernon    HISTORY OF PRESENT ILLNESS:  This is a very delightful 74-year-old gentleman who I had first met in the hospital in summer of 2019.  The patient has a history of chronic atrial fibrillation on anticoagulation and was seen by Dr. Lezama in 2018.  He presented in 07/2019, with acute heart failure, volume overload and hypertensive emergency and LV dysfunction.  EF was initially 30%-35% and actually normalized to 55% with control of his blood pressure.  During that admission, he had a coronary angiography that revealed a very tight lesion in the mid LAD straddling into a large second diagonal.  He also had a distal circumflex lesion which was quite significant and tight, but was a smallish vessel in that area and subtotally occluded  of the right coronary artery with robust collaterals from the LAD.  He was initially referred for surgery, but given his porcelain aorta, he underwent a PCI off his bifurcation LAD into the diagonal.  Subsequently, he had a nuclear stress test that showed very mild basilar inferolateral ischemia in the setting of no clinical angina.  He also has a history of hypertension, dyslipidemia.      When he had last seen me in September 2019, he had complained of episodes of syncope which were concerning.  As such he was referred to EP and underwent an EP study because his monitor showed nonsustained VT.  EP study was negative and Dr. Lezama implanted a loop recorder.  Since that he is not had any syncopal events.  He does not check his blood pressure at home.  Today blood pressure reading is high otherwise he is compliant with all his medications.    His antiplatelet regimen includes 15 mg of Xarelto and 75 mg of Plavix.  He denies any sort of bleeding problems at this time.     PAST MEDICAL HISTORY:  Past Medical History:   Diagnosis Date     ASCVD (arteriosclerotic cardiovascular disease) 1997     angio with 40% circ lesion; 6/19 hosp for chf, 3 vessel dz on angio but porcelin aorta so ptca done     Atrial fibrillation (H) 10/09/2018    found on routine physical     Carotid artery plaque 2005    seen on us, about 50% stenosis, fu 2009 us no significant stenosis     CHF (congestive heart failure) (H) 06/2019    hosp fsd, then fu echo ef nl     Elevated blood sugar      Gout     on allopurinol     HTN (hypertension)      Hypercholesteremia      Hyponatremia 2015    felt due to chlorthalidone     Low mean corpuscular volume (MCV)      Near syncope 5/15    fu est echo low level but neg     Psoriasis     Dr. Marti     Renal mass 06/29/2019    seen on ct chest for sob, needs fu ct for that, fu us done 7/19 and no mass seen, should have fu ct in December     Screening 2012    abd us no aaa     Syncope 09/2019    hosp fsd, cause not clear, lowered coreg dose; seen by Dr. Lezama of ep and ep study neg, implanted event monitor     V-tach (H) 09/2019    seen on event monitor for fu syncope, then ep study and no inducible vtach       MEDICATIONS:  Current Outpatient Medications   Medication     allopurinol (ZYLOPRIM) 300 MG tablet     amLODIPine (NORVASC) 10 MG tablet     atorvastatin (LIPITOR) 40 MG tablet     carvedilol (COREG) 3.125 MG tablet     clopidogrel (PLAVIX) 75 MG tablet     furosemide (LASIX) 20 MG tablet     isosorbide mononitrate CR (IMDUR) 120 MG 24 HR ER tablet     losartan (COZAAR) 100 MG tablet     rivaroxaban ANTICOAGULANT (XARELTO) 15 MG TABS tablet     ASPIRIN NOT PRESCRIBED (INTENTIONAL)     tiotropium (SPIRIVA RESPIMAT) 2.5 MCG/ACT inhaler     No current facility-administered medications for this visit.        ALLERGIES:  Allergies   Allergen Reactions     Ace Inhibitors Anaphylaxis     Edema of ace     Eliquis [Apixaban] Other (See Comments)       SOCIAL HISTORY:  I have reviewed this patient's social history and updated it with pertinent information if needed. Betito Anderson  reports that he  quit smoking about 21 years ago. His smoking use included cigars. He has a 30.00 pack-year smoking history. He has never used smokeless tobacco. He reports previous alcohol use of about 14.0 standard drinks of alcohol per week. He reports that he does not use drugs.    FAMILY HISTORY:  I have reviewed this patient's family history and updated it with pertinent information if needed.   Family History   Problem Relation Age of Onset     Coronary Artery Disease Father      Medical History Unknown Mother      Medical History Unknown Maternal Grandfather        REVIEW OF SYSTEMS:  Skin:  Positive for     Eyes:  Negative    ENT:  Positive for hearing loss  Respiratory:  Negative    Cardiovascular:  Negative;palpitations;chest pain;dizziness;lightheadedness;syncope or near-syncope;cyanosis;fatigue;edema    Gastroenterology: Negative    Genitourinary:  Positive for nocturia  Musculoskeletal:  Negative    Neurologic:  Negative    Psychiatric:  Negative    Heme/Lymph/Imm:  Negative    Endocrine:  Negative        PHYSICAL EXAM:      BP: (!) 150/79 Pulse: 74            Vital Signs with Ranges  Pulse:  [74] 74  BP: (150)/(79) 150/79  194 lbs 1.6 oz    Constitutional: awake, alert, no distress  Eyes: PERRL, sclera nonicteric  ENT: trachea midline  Respiratory: Clear to auscultation bilaterally  Cardiovascular: Normal first and second heart sounds with a soft systolic murmur without rubs or gallops.  Extremities are warm without edema and JVP is normal  GI: nondistended, nontender, bowel sounds present  Lymph/Hematologic: no bleeding signs  Skin: dry, no rash  Musculoskeletal: good muscle tone, strength 5/5 in upper and lower extremities  Neurologic: no focal deficits  Neuropsychiatric: appropriate affact    DATA:  Labs: Pertinent cardiac labs reviewed    Echocardiogram: Normal EF September 2019    ASSESSMENT:  Permanent atrial fibrillation on anticoagulation  Severe three-vessel coronary artery disease status post LAD and  diagonal PCI in July 2019  Medical therapy for other obstructive coronary artery disease with mild ischemia without angina  Heart failure with recovered ejection fraction in the setting of hypertensive emergency  Hypertension  Mild chronic kidney disease  Syncope with NSVT and negative EP study in Oct 2019 s/p ILR without recurrence.    RECOMMENDATIONS:  1. At this point the patient is asymptomatic from a cardiac standpoint.  He overall feels very well.  Denies any bleeding problems.  2. I recommend continuing Xarelto 15 mg in addition to 75 mg of Plavix until July 2019.  At that point I recommend stopping Plavix.  Routinely doing monotherapy with anticoagulation would be reasonable however given the complexity of his LAD diagonal intervention may decide to use aspirin 81 mg in addition to renally adjusted Xarelto.  3. Continue other antihypertensive meds including carvedilol and losartan and Imdur.  Increase amlodipine to 10 mg daily today.  I have encouraged him to check home blood pressure readings and if they are consistently over 130 systolic he needs to call the clinic.  4. Return to clinic in 6 months.  5. Call us with any change in clinical status.    JOE Estevez, [facility]  Cardiology - [facility]  January 6, 2020

## 2020-02-04 ENCOUNTER — DOCUMENTATION ONLY (OUTPATIENT)
Dept: CARDIOLOGY | Facility: CLINIC | Age: 75
End: 2020-02-04

## 2020-02-04 ENCOUNTER — ANCILLARY PROCEDURE (OUTPATIENT)
Dept: CARDIOLOGY | Facility: CLINIC | Age: 75
End: 2020-02-04
Attending: INTERNAL MEDICINE
Payer: COMMERCIAL

## 2020-02-04 DIAGNOSIS — R55 SYNCOPE: ICD-10-CM

## 2020-02-04 DIAGNOSIS — Z95.818 STATUS POST PLACEMENT OF IMPLANTABLE LOOP RECORDER: ICD-10-CM

## 2020-02-04 PROCEDURE — 93298 REM INTERROG DEV EVAL SCRMS: CPT | Performed by: INTERNAL MEDICINE

## 2020-02-04 PROCEDURE — G2066 INTER DEVC REMOTE 30D: HCPCS | Performed by: INTERNAL MEDICINE

## 2020-02-04 NOTE — TELEPHONE ENCOUNTER
Patient missed  [employer] transmission on 1/27/20. Sent letter 1/28, no response. Sent 1 week letter today.

## 2020-02-10 ENCOUNTER — TELEPHONE (OUTPATIENT)
Dept: CARDIOLOGY | Facility: CLINIC | Age: 75
End: 2020-02-10

## 2020-02-10 NOTE — TELEPHONE ENCOUNTER
Remote alert received for pause episode on 2/7/20 at 2345.  Pt has underlying rhythm of AFib and pause lasted 3.5 seconds. Loop recorder was implanted for syncope.  Pt is on Xarelto for known AFib.    Pt called and stated he was sleeping at the time and was unaware of the pause.  Will continue to monitor as this was an asymptomatic nighttime pause.    AMALIA Ackerman

## 2020-02-25 LAB
MDC_IDC_MSMT_BATTERY_STATUS: NORMAL
MDC_IDC_PG_IMPLANT_DTM: NORMAL
MDC_IDC_PG_MFG: NORMAL
MDC_IDC_PG_MODEL: NORMAL
MDC_IDC_PG_SERIAL: NORMAL
MDC_IDC_PG_TYPE: NORMAL
MDC_IDC_SESS_CLINIC_NAME: NORMAL
MDC_IDC_SESS_DTM: NORMAL
MDC_IDC_SESS_TYPE: NORMAL
MDC_IDC_SET_ZONE_DETECTION_INTERVAL: 2000 MS
MDC_IDC_SET_ZONE_DETECTION_INTERVAL: 3000 MS
MDC_IDC_SET_ZONE_DETECTION_INTERVAL: 380 MS
MDC_IDC_SET_ZONE_TYPE: NORMAL
MDC_IDC_STAT_AT_BURDEN_PERCENT: 100 %
MDC_IDC_STAT_AT_DTM_END: NORMAL
MDC_IDC_STAT_AT_DTM_START: NORMAL
MDC_IDC_STAT_EPISODE_RECENT_COUNT: 0
MDC_IDC_STAT_EPISODE_RECENT_COUNT: 2
MDC_IDC_STAT_EPISODE_RECENT_COUNT_DTM_END: NORMAL
MDC_IDC_STAT_EPISODE_RECENT_COUNT_DTM_START: NORMAL
MDC_IDC_STAT_EPISODE_TOTAL_COUNT: 0
MDC_IDC_STAT_EPISODE_TOTAL_COUNT: 1
MDC_IDC_STAT_EPISODE_TOTAL_COUNT: 2
MDC_IDC_STAT_EPISODE_TOTAL_COUNT_DTM_END: NORMAL
MDC_IDC_STAT_EPISODE_TOTAL_COUNT_DTM_START: NORMAL
MDC_IDC_STAT_EPISODE_TYPE: NORMAL

## 2020-03-20 ENCOUNTER — CARE COORDINATION (OUTPATIENT)
Dept: CARDIOLOGY | Facility: CLINIC | Age: 75
End: 2020-03-20

## 2020-03-20 DIAGNOSIS — I25.10 ASCVD (ARTERIOSCLEROTIC CARDIOVASCULAR DISEASE): ICD-10-CM

## 2020-03-20 NOTE — PROGRESS NOTES
I called pt back and he wasn't sure if he used to take a higher dose of coreg. I looked through his records and it looks like pt had issues with lightheadedness and syncopal spells this fall and his coreg was titrated down at that time. PT doesn't remember that. Pt said he has only been checking his BP for the last two days. I recommended pt check his BP And his HR for the next several days and call with an update. Mukesh HOLLAND March 20, 2020, 5:08 PM

## 2020-03-20 NOTE — TELEPHONE ENCOUNTER
Has he been on a higher dose of BB? Can we check? I would increase Coreg to 6.25 BID and if needed to even 12.5 BID if he tolerates it.

## 2020-03-20 NOTE — PROGRESS NOTES
Pt called to report that his BP has been running in the 150s/70s in the past couple of weeks. Pt started checking BP routinely because he has had some swelling in his right ankle. The swelling typically resolves by the end of each day. PT denied any other symptoms such as SOB or chest discomfort. Pt takes amlodipine 10 mg daily, losartan 100 mg daily, isosorbide 120 mg daily, and coreg 3.125 mg BID.  increased amlodipine to 10 mg daily at last OV d/t elevated BP. Pt now thinks he has always been taking amlodipine 10 mg daily. Pt doesn't know what his HRs have been, but in clinic they have been in the 70s. I will message  to get recommendations for BP management. Mukesh HOLLAND March 20, 2020, 12:15 PM

## 2020-03-26 NOTE — PROGRESS NOTES
Pt called back with an update on his BPs. Pt called with a couple of high readings last week.  recommended possibly increasing coreg. Pt decided to check for a few more days to see if it continued to remain elevated. Pt's BP has continued to be 140s/70s-150s/70s, HR mid 60s- 70s.     Pt was on a higher dose of coreg in the past, but it was titrate down d/t syncopal spells. Pt had a negative EP study and currently has a loop records. No arrhythmias for syncopal spells since the loop recorder was implanted. Will message  to confirm if he is ok with pt increasing coreg to 6.25 mg BID. Mukesh HOLLAND March 26, 2020, 2:26 PM

## 2020-03-27 RX ORDER — CARVEDILOL 3.12 MG/1
6.25 TABLET ORAL 2 TIMES DAILY WITH MEALS
Qty: 180 TABLET | Refills: 3 | COMMUNITY
Start: 2020-03-27 | End: 2020-07-27

## 2020-03-27 NOTE — PROGRESS NOTES
I called pt and reviewed 's recommendation to increase coreg to 6.25 mg BID. Pt will try this for one week and call with an update on his BP,HR. If pt tolerates will send in script for 6.25 mg tablets at that time. Pt currently has 3.125 mg tablets that he will double. Mukesh HOLLAND March 27, 2020, 4:45 PM

## 2020-05-06 ENCOUNTER — ANCILLARY PROCEDURE (OUTPATIENT)
Dept: CARDIOLOGY | Facility: CLINIC | Age: 75
End: 2020-05-06
Attending: INTERNAL MEDICINE
Payer: COMMERCIAL

## 2020-05-06 DIAGNOSIS — Z95.818 STATUS POST PLACEMENT OF IMPLANTABLE LOOP RECORDER: ICD-10-CM

## 2020-05-06 DIAGNOSIS — R55 SYNCOPE: ICD-10-CM

## 2020-05-06 PROCEDURE — G2066 INTER DEVC REMOTE 30D: HCPCS | Performed by: INTERNAL MEDICINE

## 2020-05-06 PROCEDURE — 93297 REM INTERROG DEV EVAL ICPMS: CPT | Performed by: INTERNAL MEDICINE

## 2020-05-08 LAB
MDC_IDC_MSMT_BATTERY_STATUS: NORMAL
MDC_IDC_PG_IMPLANT_DTM: NORMAL
MDC_IDC_PG_MFG: NORMAL
MDC_IDC_PG_MODEL: NORMAL
MDC_IDC_PG_SERIAL: NORMAL
MDC_IDC_PG_TYPE: NORMAL
MDC_IDC_SESS_CLINIC_NAME: NORMAL
MDC_IDC_SESS_DTM: NORMAL
MDC_IDC_SESS_TYPE: NORMAL
MDC_IDC_SET_ZONE_DETECTION_INTERVAL: 2000 MS
MDC_IDC_SET_ZONE_DETECTION_INTERVAL: 3000 MS
MDC_IDC_SET_ZONE_DETECTION_INTERVAL: 380 MS
MDC_IDC_SET_ZONE_TYPE: NORMAL
MDC_IDC_STAT_AT_BURDEN_PERCENT: 100 %
MDC_IDC_STAT_AT_DTM_END: NORMAL
MDC_IDC_STAT_AT_DTM_START: NORMAL
MDC_IDC_STAT_EPISODE_RECENT_COUNT: 0
MDC_IDC_STAT_EPISODE_RECENT_COUNT: 1
MDC_IDC_STAT_EPISODE_RECENT_COUNT: 73
MDC_IDC_STAT_EPISODE_RECENT_COUNT_DTM_END: NORMAL
MDC_IDC_STAT_EPISODE_RECENT_COUNT_DTM_START: NORMAL
MDC_IDC_STAT_EPISODE_TOTAL_COUNT: 0
MDC_IDC_STAT_EPISODE_TOTAL_COUNT: 2
MDC_IDC_STAT_EPISODE_TOTAL_COUNT: 75
MDC_IDC_STAT_EPISODE_TOTAL_COUNT_DTM_END: NORMAL
MDC_IDC_STAT_EPISODE_TOTAL_COUNT_DTM_START: NORMAL
MDC_IDC_STAT_EPISODE_TYPE: NORMAL

## 2020-05-27 ENCOUNTER — TELEPHONE (OUTPATIENT)
Dept: CARDIOLOGY | Facility: CLINIC | Age: 75
End: 2020-05-27

## 2020-05-27 NOTE — TELEPHONE ENCOUNTER
Loop Recorder remote alert received for a 3 second pause during AFib on 5/26/20 at 1551.  This is not a new finding.  Called and left message requesting patient call back if he had any symptoms.  Will continue to monitor.    AMALIA Ackerman

## 2020-06-03 ENCOUNTER — TELEPHONE (OUTPATIENT)
Dept: CARDIOLOGY | Facility: CLINIC | Age: 75
End: 2020-06-03

## 2020-06-05 ENCOUNTER — CARE COORDINATION (OUTPATIENT)
Dept: CARDIOLOGY | Facility: CLINIC | Age: 75
End: 2020-06-05

## 2020-06-05 NOTE — PROGRESS NOTES
Pt called and left message stating he wants to discuss his BPs and recent loop monitor recordings.     I called pt back. He said he was called twice this week as he had some brief pauses on his loop recorder. EP RN has notes in the chart about loop recorder results. On 5/26Loop Recorder remote alert received for a 3 second pause during AFib on 5/26/20 at 1551. 6/2-- Remote alert received for pause episode on 6/2/20 at 0948.  EGM shows AFib with a 2 second pause followed by a single beat and then a 4 second pause. Pt has loop recorder d/t hx of syncope. Pt denied any issues with lightheadedness.     Pt told me he also has some swelling in his right ankle, and has had this for the past few months. Pt's amlodipine was increased at 1/2020 visit with . I told pt that could be causing his swelling. Also, pt called back in March to report elevated BP and his coreg was increased to 6.25 mg BID at that time. Pt gave me some BP readings from this past week. It was 144/77, HR 64 on 5/28, 131/76, HR 73 on 5/31, and 133/65, HR 75 today. I told pt he should have an RASHI visit to discuss his BP and recent loop recorder results, and have medications adjusted. I will also update . Message sent to scheduling to contact pt to set up appt. Mukesh HOLLAND June 5, 2020, 11:37 AM

## 2020-06-08 NOTE — TELEPHONE ENCOUNTER
Only if he is symptomatic I would back off BB. Otherwise I would continue current Rx for less than 5 sec pause while in AF and less than 3 second pause when in NSR

## 2020-06-10 DIAGNOSIS — I50.23 ACUTE ON CHRONIC SYSTOLIC CONGESTIVE HEART FAILURE (H): ICD-10-CM

## 2020-06-10 RX ORDER — ATORVASTATIN CALCIUM 40 MG/1
40 TABLET, FILM COATED ORAL DAILY
Qty: 90 TABLET | Refills: 0 | Status: SHIPPED | OUTPATIENT
Start: 2020-06-10 | End: 2020-07-27

## 2020-06-10 RX ORDER — CLOPIDOGREL BISULFATE 75 MG/1
75 TABLET ORAL DAILY
Qty: 90 TABLET | Refills: 0 | Status: SHIPPED | OUTPATIENT
Start: 2020-06-10 | End: 2020-07-27

## 2020-06-11 ENCOUNTER — VIRTUAL VISIT (OUTPATIENT)
Dept: CARDIOLOGY | Facility: CLINIC | Age: 75
End: 2020-06-11
Payer: COMMERCIAL

## 2020-06-11 DIAGNOSIS — E78.5 HYPERLIPIDEMIA LDL GOAL <100: ICD-10-CM

## 2020-06-11 DIAGNOSIS — I50.23 ACUTE ON CHRONIC SYSTOLIC CONGESTIVE HEART FAILURE (H): Primary | ICD-10-CM

## 2020-06-11 DIAGNOSIS — I10 BENIGN ESSENTIAL HYPERTENSION: ICD-10-CM

## 2020-06-11 DIAGNOSIS — I48.20 CHRONIC ATRIAL FIBRILLATION (H): ICD-10-CM

## 2020-06-11 PROCEDURE — 99214 OFFICE O/P EST MOD 30 MIN: CPT | Mod: 95 | Performed by: NURSE PRACTITIONER

## 2020-06-11 NOTE — PROGRESS NOTES
"Betito Anderson is a 74 year old male who is being evaluated via a billable video visit.      The patient has been notified of following:     \"This video visit will be conducted via a call between you and your physician/provider. We have found that certain health care needs can be provided without the need for an in-person physical exam.  This service lets us provide the care you need with a video conversation.  If a prescription is necessary we can send it directly to your pharmacy.  If lab work is needed we can place an order for that and you can then stop by our lab to have the test done at a later time.    Video visits are billed at different rates depending on your insurance coverage.  Please reach out to your insurance provider with any questions.    If during the course of the call the physician/provider feels a video visit is not appropriate, you will not be charged for this service.\"    BP: 164/81, 135/77: 6/10 142/72 hr 64  HR: 76  Weight: 198lb  Review Of Systems  Skin: bruising  Eyes:Ears/Nose/Throat: NEGATIVE  Respiratory: NEGATIVE  Cardiovascular: lightheadedness, when bending over, occ., edema RLE, improved somewhat, goes down at night  Gastrointestinal: NEGATIVE  Genitourinary:NEGATIVE   Musculoskeletal: NEGATIVE  Neurologic: NEGATIVE  Psychiatric: NEGATIVE  Hematologic/Lymphatic/Immunologic: easy bruising  Endocrine:  NEGATIVE    Natalie Melara LPN    Patient has given verbal consent for Video visit? Yes    How would you like to obtain your AVS? Mail a copy  Patient would like the video invitation sent by: [phone]    Will anyone else be joining your video visit? No        Video-Visit Details    Type of service:  Video Visit    Video Start Time: 8:43 AM  Video End Time: 9:00 AM    Originating Location (pt. Location): Home    Distant Location (provider location):  [facility]     Platform used for Video Visit: [employer] RASHI NOTE:  This visit was " completed via video due to COVID-19 precautions.  The patient provided consent for a video visit.    I had the pleasure evaluating Betito Anderson during a video visit today.    The patient is a delightful 75 yo male with the following chronic medical issues:  1. HTN  2. CAD-s/p OLESYA to mid LAD at the bifurcation of the D2, known severe 3 vessel disease, coronary angiogram in July 2019 showed normal LM, 80% mid LAD stenosis, 70% mid circumflex stenosis, subtotally occluded RCA with left to right collaterals. Patient was initially referred for surgery, but given his porcelain aorta, he ultimately underwent PCI. Stable, no signs/symptoms of angina.   3. Chronic A. fib on Xarelto 15 mg daily  4. Syncope with negative EP study and implantable loop recorder 2019  5. Dyslipidemia LDL goal less than 70  6. Systolic heart failure with initial EF of 30 to 35%.  EF has since normalized  7. Diastolic heart failure  8. Right knee discomfort with need for knee replacement.    It was my pleasure meeting Mr. Anderson this morning via video visit due to the COVID-19 pandemic.  His concerns have been fluctuation of his high blood pressure as well as worsening lower extremity edema to his right leg.  His weight is up about 4 to 10 pounds over the past several months.  He contributes this to being sedentary however, has had some hidden sodium in his diet when we reviewed it during our visit today.  He denies chest pain, orthopnea, shortness of breath, dyspnea, PND, or palpitations.  He recently had 2 short pauses noted during the day while in A. fib ranging from 2 to 4 seconds.  He was asymptomatic.  Again, his loop recorder was implanted when he had an episode of  2 syncopal episodes last fall.  Event recording at the time showed brief, but rapid episodes of  monomorphic NSVT.  His EP study was negative for ventricular arrhythmias.  He is followed by Dr. Lezama for his electrophysiology needs.      PHYSICAL EXAMINATION:  General:  no  apparent distress, normal body habitus, sitting upright.  ENT/Mouth:  no nasal discharge.  Normal head shape, no apparent injury or laceration.  Eyes:  normal conjunctivae.  No observed jaundice.  Neck:  no apparent neck swelling.   Chest/Lungs:  no breathing difficulty while speaking.  No audible wheezing.  No cough during conversation.  Cardiovascular:  reported HR is regular.  No obviously elevated jugular venous pressure.  No reported edema in LE.   Abdomen:  no apparent abdominal distention.   Extremities:  no apparent cyanosis.  Skin:  no xanthelasma.  No facial lacerations.  Neurologic:  Normal arm motion bilateral, no tremors.    Psychiatric:  Alert and oriented x3, calm demeanor  The rest of the comprehensive physical examination is deferred due to public health emergency video visit restrictions.         DIAGNOSTIC STUDIES:  Interpretation Summary 2019  Left ventricular systolic function is normal.  The visual ejection fraction is estimated at 55-60%.  Basal inferior hypokinesis.  The study was technically limited. Compared to the prior study dated 7/19, there have been no changes    Lexiscan impression 9/2019  1.  Myocardial perfusion imaging using single isotope technique demonstrated a small area of reversible myocardial ischemia the basal inferolateral wall segment.   2. Gated images demonstrated normal left ventricular cavity size, hypokinesis of the basal inferolateral wall with stress.  The left ventricular systolic function is 54%.  3. There are no prior studies available for comparison.    5/2020 [employer] Reveal Linq Loop Recorder   Symptom: 0  Tachy: 0    Pause: 73 (Previously documented on as alerts)    Everardo: 0    AT: 0    AF: 1 (Previously documented on as alert)    Time in AT/AF: 100 % . On Xarelto.  Heart rate: Irregular VS.     Battery: Ok    Careplan: Follow-up in three months with remote loop check on 8/5/20. Patient scheduled for follow-up with Dr. Hidalgo on 7/21/20. Discussed results  and follow-up plan with patient.  IMPRESSION:  1. Severe ischemic heart disease.  No complaints of angina at this time.  On clopidogrel, carvedilol, Imdur 120 mg daily, atorvastatin 40 mg daily, losartan 100 mg daily, and amlodipine 10 mg daily  2. Hypercholesterolemia on atorvastatin 40 mg daily.  LDL 63, HDL 39, total cholesterol 137 on 7/6/2019  3. HFpEF.  Denies shortness of breath, but has significant right lower extremity edema.  On Lasix 20 mg daily  4. Chronic kidney disease.  Recent creatinine 1.32, GFR 53  5. Atrial fibrillation with intermittent pauses during the daytime noted on loop recorder.  These pauses are 2 to 4 seconds and patient is asymptomatic.    RECOMMENDATIONS:  1. Encourage patient to truly modify his diet.  He needs to limit sodium intake through his food as well as decrease his caloric intake.  I believe his weight gain is secondary to being sedentary from his knee pain and consuming too many calories.  He is reluctant to add or increase additional medications for his blood pressure.  I am unable to increase carvedilol due to intermittent pauses noted on loop recorder.  His amlodipine, and losartan are at max doses.  Patient is truly interested at the end of our conversation to work on diet.  2. Purchase compression stockings.  Wear during the day off at night  3. Repeat video visit in 2 to 3 weeks to reassess weight and symptoms.  4. I have added a lipid ALT and BMP to his echo visit next month.  He will then follow-up with  to review the results.    Thank you for including us in his care.  Contact me with any questions or concerns.    Elba Loza, NP, APRN CNP

## 2020-06-11 NOTE — LETTER
6/11/2020    Rudy Vernon MD  [address]  [facility] [address]    RE: Betito Leeon       Dear Colleague,    I had the pleasure of seeing Betito Anderson in the [facility].    Betito Anderson is a 74 year old male who is being evaluated via a billable video visit.        EP RASHI NOTE:  This visit was completed via video due to COVID-19 precautions.  The patient provided consent for a video visit.    I had the pleasure evaluating Betito Anderson during a video visit today.    The patient is a delightful 73 yo male with the following chronic medical issues:  1. HTN  2. CAD-s/p OLESYA to mid LAD at the bifurcation of the D2, known severe 3 vessel disease, coronary angiogram in July 2019 showed normal LM, 80% mid LAD stenosis, 70% mid circumflex stenosis, subtotally occluded RCA with left to right collaterals. Patient was initially referred for surgery, but given his porcelain aorta, he ultimately underwent PCI. Stable, no signs/symptoms of angina.   3. Chronic A. fib on Xarelto 15 mg daily  4. Syncope with negative EP study and implantable loop recorder 2019  5. Dyslipidemia LDL goal less than 70  6. Systolic heart failure with initial EF of 30 to 35%.  EF has since normalized  7. Diastolic heart failure  8. Right knee discomfort with need for knee replacement.    It was my pleasure meeting Mr. Anderson this morning via video visit due to the COVID-19 pandemic.  His concerns have been fluctuation of his high blood pressure as well as worsening lower extremity edema to his right leg.  His weight is up about 4 to 10 pounds over the past several months.  He contributes this to being sedentary however, has had some hidden sodium in his diet when we reviewed it during our visit today.  He denies chest pain, orthopnea, shortness of breath, dyspnea, PND, or palpitations.  He recently had 2 short pauses noted during the day while in A. fib ranging from 2 to 4 seconds.  He was  asymptomatic.  Again, his loop recorder was implanted when he had an episode of  2 syncopal episodes last fall.  Event recording at the time showed brief, but rapid episodes of  monomorphic NSVT.  His EP study was negative for ventricular arrhythmias.  He is followed by Dr. Lezama for his electrophysiology needs.      PHYSICAL EXAMINATION:  General:  no apparent distress, normal body habitus, sitting upright.  ENT/Mouth:  no nasal discharge.  Normal head shape, no apparent injury or laceration.  Eyes:  normal conjunctivae.  No observed jaundice.  Neck:  no apparent neck swelling.   Chest/Lungs:  no breathing difficulty while speaking.  No audible wheezing.  No cough during conversation.  Cardiovascular:  reported HR is regular.  No obviously elevated jugular venous pressure.  No reported edema in LE.   Abdomen:  no apparent abdominal distention.   Extremities:  no apparent cyanosis.  Skin:  no xanthelasma.  No facial lacerations.  Neurologic:  Normal arm motion bilateral, no tremors.    Psychiatric:  Alert and oriented x3, calm demeanor  The rest of the comprehensive physical examination is deferred due to public health emergency video visit restrictions.         DIAGNOSTIC STUDIES:  Interpretation Summary 2019  Left ventricular systolic function is normal.  The visual ejection fraction is estimated at 55-60%.  Basal inferior hypokinesis.  The study was technically limited. Compared to the prior study dated 7/19, there have been no changes    Lexiscan impression 9/2019  1.  Myocardial perfusion imaging using single isotope technique demonstrated a small area of reversible myocardial ischemia the basal inferolateral wall segment.   2. Gated images demonstrated normal left ventricular cavity size, hypokinesis of the basal inferolateral wall with stress.  The left ventricular systolic function is 54%.  3. There are no prior studies available for comparison.    5/2020 [employer] Reveal Linq Loop Recorder   Symptom:  0  Tachy: 0    Pause: 73 (Previously documented on as alerts)    Everardo: 0    AT: 0    AF: 1 (Previously documented on as alert)    Time in AT/AF: 100 % . On Xarelto.  Heart rate: Irregular VS.     Battery: Ok    Careplan: Follow-up in three months with remote loop check on 8/5/20. Patient scheduled for follow-up with Dr. Hidalgo on 7/21/20. Discussed results and follow-up plan with patient.  IMPRESSION:  1. Severe ischemic heart disease.  No complaints of angina at this time.  On clopidogrel, carvedilol, Imdur 120 mg daily, atorvastatin 40 mg daily, losartan 100 mg daily, and amlodipine 10 mg daily  2. Hypercholesterolemia on atorvastatin 40 mg daily.  LDL 63, HDL 39, total cholesterol 137 on 7/6/2019  3. HFpEF.  Denies shortness of breath, but has significant right lower extremity edema.  On Lasix 20 mg daily  4. Chronic kidney disease.  Recent creatinine 1.32, GFR 53  5. Atrial fibrillation with intermittent pauses during the daytime noted on loop recorder.  These pauses are 2 to 4 seconds and patient is asymptomatic.    RECOMMENDATIONS:  1. Encourage patient to truly modify his diet.  He needs to limit sodium intake through his food as well as decrease his caloric intake.  I believe his weight gain is secondary to being sedentary from his knee pain and consuming too many calories.  He is reluctant to add or increase additional medications for his blood pressure.  I am unable to increase carvedilol due to intermittent pauses noted on loop recorder.  His amlodipine, and losartan are at max doses.  Patient is truly interested at the end of our conversation to work on diet.  2. Purchase compression stockings.  Wear during the day off at night  3. Repeat video visit in 2 to 3 weeks to reassess weight and symptoms.  4. I have added a lipid ALT and BMP to his echo visit next month.  He will then follow-up with  to review the results.    Thank you for including us in his care.  Contact me with any questions or  concerns.      Thank you for allowing me to participate in the care of your patient.    Sincerely,     Elba Loza NP, APRN [facility]

## 2020-06-11 NOTE — PATIENT INSTRUCTIONS
Call my nurse with any questions or concerns:  [phone]  *If you have concerns after hours, please call [phone], option 2 to speak with on call Cardiologist.    It was nice meeting you today, Mr. Anderson.  As mentioned during our conversation, please watch her caloric and sodium intake.  Consider purchasing knee-high support stockings with moderate pressure (15 to 20 mmHg).  This will help with the swelling.  Weight loss goal is 5 to 10 pounds.  I look forward to her next visit in 2 to 3 weeks to see how you are progressing.  Call with any questions or concerns.  Thank you-  Elba

## 2020-06-16 ENCOUNTER — TELEPHONE (OUTPATIENT)
Dept: CARDIOLOGY | Facility: CLINIC | Age: 75
End: 2020-06-16

## 2020-06-16 NOTE — TELEPHONE ENCOUNTER
"Remote alert received for 4.6 second pause episode on 6/15/20 at 1041 while in AF.  Per Dr. Hidalgo on 6/8/20, \"Only if he is symptomatic I would back off BB. Otherwise I would continue current Rx for less than 5 sec pause while in AF and less than 3 second pause when in NSR\".     Called and spoke with patient who stated he was unaware of this episode and asymptomatic.  Will continue to monitor.     AMALIA Ackerman  "

## 2020-06-30 ENCOUNTER — TELEPHONE (OUTPATIENT)
Dept: CARDIOLOGY | Facility: CLINIC | Age: 75
End: 2020-06-30

## 2020-07-01 ENCOUNTER — HOSPITAL ENCOUNTER (OUTPATIENT)
Dept: CARDIOLOGY | Facility: CLINIC | Age: 75
Discharge: HOME | End: 2020-07-01
Attending: INTERNAL MEDICINE | Admitting: INTERNAL MEDICINE
Payer: COMMERCIAL

## 2020-07-01 DIAGNOSIS — E78.5 HYPERLIPIDEMIA LDL GOAL <100: ICD-10-CM

## 2020-07-01 DIAGNOSIS — I50.23 ACUTE ON CHRONIC SYSTOLIC CONGESTIVE HEART FAILURE (H): ICD-10-CM

## 2020-07-01 LAB
ALT SERPL W P-5'-P-CCNC: <5 U/L (ref 5–30)
ANION GAP SERPL CALCULATED.3IONS-SCNC: 12.3 MMOL/L (ref 6–17)
BUN SERPL-MCNC: 28 MG/DL (ref 7–30)
CALCIUM SERPL-MCNC: 9.1 MG/DL (ref 8.5–10.5)
CHLORIDE SERPL-SCNC: 105 MMOL/L (ref 98–107)
CHOLEST SERPL-MCNC: 148 MG/DL
CO2 SERPL-SCNC: 23 MMOL/L (ref 23–29)
CREAT SERPL-MCNC: 1.28 MG/DL (ref 0.7–1.3)
GFR SERPL CREATININE-BSD FRML MDRD: 55 ML/MIN/{1.73_M2}
GLUCOSE SERPL-MCNC: 107 MG/DL (ref 70–105)
HDLC SERPL-MCNC: 33 MG/DL
LDLC SERPL CALC-MCNC: 82 MG/DL
NONHDLC SERPL-MCNC: 115 MG/DL
POTASSIUM SERPL-SCNC: 4.3 MMOL/L (ref 3.5–5.1)
SODIUM SERPL-SCNC: 136 MMOL/L (ref 136–145)
TRIGL SERPL-MCNC: 167 MG/DL

## 2020-07-01 PROCEDURE — 36415 COLL VENOUS BLD VENIPUNCTURE: CPT | Performed by: NURSE PRACTITIONER

## 2020-07-01 PROCEDURE — 84460 ALANINE AMINO (ALT) (SGPT): CPT | Performed by: NURSE PRACTITIONER

## 2020-07-01 PROCEDURE — 80048 BASIC METABOLIC PNL TOTAL CA: CPT | Performed by: NURSE PRACTITIONER

## 2020-07-01 PROCEDURE — 93306 TTE W/DOPPLER COMPLETE: CPT

## 2020-07-01 PROCEDURE — 25500064 ZZH RX 255 OP 636: Performed by: INTERNAL MEDICINE

## 2020-07-01 PROCEDURE — 80061 LIPID PANEL: CPT | Performed by: NURSE PRACTITIONER

## 2020-07-01 PROCEDURE — 93306 TTE W/DOPPLER COMPLETE: CPT | Mod: 26 | Performed by: INTERNAL MEDICINE

## 2020-07-01 RX ADMIN — HUMAN ALBUMIN MICROSPHERES AND PERFLUTREN 9 ML: 10; .22 INJECTION, SOLUTION INTRAVENOUS at 11:59

## 2020-07-21 ENCOUNTER — VIRTUAL VISIT (OUTPATIENT)
Dept: CARDIOLOGY | Facility: CLINIC | Age: 75
End: 2020-07-21
Attending: INTERNAL MEDICINE
Payer: COMMERCIAL

## 2020-07-21 DIAGNOSIS — I50.23 ACUTE ON CHRONIC SYSTOLIC CONGESTIVE HEART FAILURE (H): ICD-10-CM

## 2020-07-21 PROCEDURE — 99214 OFFICE O/P EST MOD 30 MIN: CPT | Mod: 95 | Performed by: INTERNAL MEDICINE

## 2020-07-21 NOTE — LETTER
7/21/2020    Rudy Vernon MD  [address] [facility] [address]  [facility] [address]    RE: Betito Anderson       Dear Colleague,    I had the pleasure of seeing Betito Anderson in the [facility].    Betito Anderson is a 74 year old male who is being evaluated via a billable video visit.      No orders of the defined types were placed in this encounter.    No orders of the defined types were placed in this encounter.    There are no discontinued medications.      Encounter Diagnosis   Name Primary?     Acute on chronic systolic congestive heart failure (H)        CURRENT MEDICATIONS:  Current Outpatient Medications   Medication Sig Dispense Refill     allopurinol (ZYLOPRIM) 300 MG tablet TAKE 1 TABLET(300 MG) BY MOUTH DAILY 90 tablet 3     amLODIPine (NORVASC) 10 MG tablet Take 1 tablet (10 mg) by mouth daily 90 tablet 3     atorvastatin (LIPITOR) 40 MG tablet Take 1 tablet (40 mg) by mouth daily 90 tablet 0     carvedilol (COREG) 3.125 MG tablet Take 2 tablets (6.25 mg) by mouth 2 times daily (with meals) 180 tablet 3     clopidogrel (PLAVIX) 75 MG tablet Take 1 tablet (75 mg) by mouth daily 90 tablet 0     furosemide (LASIX) 20 MG tablet Take 20 mg by mouth daily       isosorbide mononitrate CR (IMDUR) 120 MG 24 HR ER tablet Take 1 tablet (120 mg) by mouth daily 30 tablet 3     losartan (COZAAR) 100 MG tablet TAKE 1 TABLET(100 MG) BY MOUTH DAILY 90 tablet 3     rivaroxaban ANTICOAGULANT (XARELTO) 15 MG TABS tablet Take 1 tablet (15 mg) by mouth daily (with dinner) 90 tablet 3     tiotropium (SPIRIVA RESPIMAT) 2.5 MCG/ACT inhaler Inhale 2 puffs into the lungs daily 1 Inhaler 1     ASPIRIN NOT PRESCRIBED (INTENTIONAL) Please choose reason not prescribed, below (Patient not taking: Reported on 10/9/2019)         ALLERGIES     Allergies   Allergen Reactions     Ace Inhibitors Anaphylaxis     Edema of ace     Eliquis [Apixaban] Other (See Comments)       PAST MEDICAL HISTORY:  Past Medical  History:   Diagnosis Date     ASCVD (arteriosclerotic cardiovascular disease) 1997    angio with 40% circ lesion; 6/19 hosp for chf, 3 vessel dz on angio but porcelin aorta so ptca done     Atrial fibrillation (H) 10/09/2018    found on routine physical     Carotid artery plaque 2005    seen on us, about 50% stenosis, fu 2009 us no significant stenosis     CHF (congestive heart failure) (H) 06/2019    hosp fsd, then fu echo ef nl     Elevated blood sugar      Gout     on allopurinol     HTN (hypertension)      Hypercholesteremia      Hyponatremia 2015    felt due to chlorthalidone     Low mean corpuscular volume (MCV)      Near syncope 5/15    fu est echo low level but neg     Psoriasis     Dr. Marti     Renal mass 06/29/2019    seen on ct chest for sob, needs fu ct for that, fu us done 7/19 and no mass seen, should have fu ct in December     Screening 2012    abd us no aaa     Syncope 09/2019    hosp fsd, cause not clear, lowered coreg dose; seen by Dr. Lezama of ep and ep study neg, implanted event monitor     V-tach (H) 09/2019    seen on event monitor for fu syncope, then ep study and no inducible vtach       PAST SURGICAL HISTORY:  Past Surgical History:   Procedure Laterality Date     C ANESTH,DX ARTHROSCOPIC PROC KNEE JOINT  2009     CV CORONARY ANGIOGRAM N/A 7/2/2019    Procedure: Coronary Angiogram;  Surgeon: Donaldo Loera MD;  Location:  HEART CARDIAC CATH LAB     CV HEART CATHETERIZATION WITH POSSIBLE INTERVENTION N/A 7/5/2019    Procedure: Heart Catheterization with Possible Intervention;  Surgeon: Manolo Hu MD;  Location:  HEART CARDIAC CATH LAB     CV LEFT HEART CATH N/A 7/2/2019    Procedure: Left Heart Cath;  Surgeon: Donaldo Loera MD;  Location:  HEART CARDIAC CATH LAB     CV LEFT VENTRICULOGRAM N/A 7/2/2019    Procedure: Left Ventriculogram;  Surgeon: Donaldo Loera MD;  Location:  HEART CARDIAC CATH LAB     CV PCI STENT DRUG ELUTING N/A 7/5/2019     Procedure: PCI Stent Drug Eluting;  Surgeon: Manolo Hu MD;  Location:  HEART CARDIAC CATH LAB     CV RIGHT HEART CATH N/A 2019    Procedure: Right Heart Cath;  Surgeon: Donaldo Loera MD;  Location:  HEART CARDIAC CATH LAB     EP LOOP RECORDER IMPLANT N/A 10/11/2019    Procedure: EP Loop Recorder Implant;  Surgeon: Fuad Lezama MD;  Location:  HEART CARDIAC CATH LAB     EP VENTRICULAR PACING N/A 10/11/2019    Procedure: EP Ventricular Pacing;  Surgeon: Fuad Lezama MD;  Location:  HEART CARDIAC CATH LAB       FAMILY HISTORY:  Family History   Problem Relation Age of Onset     Coronary Artery Disease Father      Medical History Unknown Mother      Medical History Unknown Maternal Grandfather        SOCIAL HISTORY:  Social History     Socioeconomic History     Marital status:      Spouse name: None     Number of children: 2     Years of education: None     Highest education level: None   Occupational History     Occupation: retired   Social Needs     Financial resource strain: None     Food insecurity     Worry: None     Inability: None     Transportation needs     Medical: None     Non-medical: None   Tobacco Use     Smoking status: Former Smoker     Packs/day: 1.00     Years: 30.00     Pack years: 30.00     Types: Cigars     Last attempt to quit: 1998     Years since quittin.8     Smokeless tobacco: Never Used   Substance and Sexual Activity     Alcohol use: Not Currently     Alcohol/week: 14.0 standard drinks     Types: 14 Standard drinks or equivalent per week     Frequency: 2-3 times a week     Drinks per session: 1 or 2     Binge frequency: Never     Comment: 1-2 a night     Drug use: No     Sexual activity: Never     Partners: Female   Lifestyle     Physical activity     Days per week: None     Minutes per session: None     Stress: None   Relationships     Social connections     Talks on phone: None     Gets together: None     Attends Confucianist service:  None     Active member of club or organization: None     Attends meetings of clubs or organizations: None     Relationship status: None     Intimate partner violence     Fear of current or ex partner: None     Emotionally abused: None     Physically abused: None     Forced sexual activity: None   Other Topics Concern     Parent/sibling w/ CABG, MI or angioplasty before 65F 55M? Not Asked   Social History Narrative     None       Review of Systems:  Skin:        Eyes:       ENT:       Respiratory:       Cardiovascular:       Gastroenterology:      Genitourinary:       Musculoskeletal:       Neurologic:       Psychiatric:       Heme/Lymph/Imm:       Endocrine:         Physical Exam:  Vitals: There were no vitals taken for this visit.    Recent Lab Results:  LIPID RESULTS:  Lab Results   Component Value Date    CHOL 148 07/01/2020    HDL 33 (L) 07/01/2020    LDL 82 07/01/2020    TRIG 167 (H) 07/01/2020    CHOLHDLRATIO 2.8 02/26/2015       LIVER ENZYME RESULTS:  Lab Results   Component Value Date    AST 21 06/30/2019    ALT <5 (L) 07/01/2020       CBC RESULTS:  Lab Results   Component Value Date    WBC 7.4 10/11/2019    RBC 4.52 10/11/2019    HGB 12.3 (L) 10/11/2019    HCT 38.7 (L) 10/11/2019    MCV 86 10/11/2019    MCH 27.2 10/11/2019    MCHC 31.8 10/11/2019    RDW 16.6 (H) 10/11/2019     10/11/2019       BMP RESULTS:  Lab Results   Component Value Date     07/01/2020    POTASSIUM 4.3 07/01/2020    CHLORIDE 105 07/01/2020    CO2 23 07/01/2020    ANIONGAP 12.3 07/01/2020     (H) 07/01/2020    BUN 28 07/01/2020    CR 1.28 07/01/2020    GFRESTIMATED 55 (L) 07/01/2020    GFRESTBLACK 66 07/01/2020    GUNNER 9.1 07/01/2020        A1C RESULTS:  Lab Results   Component Value Date    A1C 5.7 (H) 06/30/2019       INR RESULTS:  Lab Results   Component Value Date    INR 1.18 (H) 09/14/2019    INR 2.33 (H) 07/18/2019       Service Date: 07/21/2020      CARDIOLOGY CLINIC VIDEO CONSULTATION       REASON FOR VISIT:   Coronary artery disease.      HISTORY OF PRESENTING ILLNESS:  This is a very pleasant 74-year-old gentleman who I am doing a video visit with in the setting of this coronavirus pandemic.  The patient was first seen by me in summer of 2019.  He has a history of chronic atrial fibrillation, on anticoagulation since 2018.  It appears, in 07/2019, he presented with acute heart failure in the setting of volume overload and hypertensive emergency and he was noted to have LV dysfunction with an EF of 30% which actually normalized to 55% just with control of his blood pressure.  Angiogram was done at that admission which revealed a tight lesion in the mid LAD spanning into the second diagonal.  He also had a distal circumflex lesion which was quite significant and tight but a smallish vessel in that area and also subtotally occluded right coronary artery with robust collaterals from LAD.  He was initially referred to surgery but due to porcelain aorta, he underwent PCI of his bifurcation of the LAD into the diagonal.  Subsequently, his nuclear stress test has shown mild basilar inferolateral ischemia without angina and as such, this has been under medical management.  He also has a history of obesity, heart failure with recovered ejection fraction, NYHA class II and history of hypertension and diabetes.  Of note, in addition to all of this, in 09/2019, he had episodes of syncope which were concerning and the monitor showed some nonsustained VT.  Because of which, he underwent EP study that was negative and had a loop recorder implanted.  Of note, the loop recorder has shown 4.5 second pauses but he has been asymptomatic.  These have been during atrial fibrillation, I am told.  He is on 3.125 b.i.d. of carvedilol but overall remains asymptomatic with these pauses.        CURRENT MEDICAL REGIMEN:  Includes:    1.  Xarelto 15 mg daily.    2.  Plavix 75 mg daily.   3.  Amlodipine 10 mg daily.   4.  Atorvastatin 40 mg daily.    5.  Carvedilol 6.25 mg b.i.d.   6.  Allopurinol 300 mg daily.   7.  Lasix 20 mg daily.   8.  Imdur 120 mg daily.   9.  Losartan 100 mg daily.      Today, he is having routine followup and he denies any major cardiovascular symptoms.  He is NYHA class II.  He is limited because of his knee but otherwise he can climb a couple flights of stairs without any exertional limitations.  Denies orthopnea, PND.  He has been complaining of some lower extremity edema that has been going on for the last few weeks and potentially getting slightly worse.  His home blood pressures have been ranging in the 120-140 range.  No anginal symptoms reported.  No syncope, presyncope, palpitations.      PHYSICAL EXAMINATION:   VITAL SIGNS:  Blood pressure initially today was 139 at home.  That came down to 120 after repeat check.   GENERAL:  Alert and oriented x3, in no acute distress.   NECK:  Thick.  He is obese.  JVP is hard to see.   LUNGS:  Respirations are normal.  He is not short of breath.   ABDOMEN:  He is obese.   EXTREMITIES:  Appear to have trace to mild 1+ edema.   PSYCHIATRIC:  Appropriate affect.   HEENT:  No icterus, pallor.      ASSESSMENT AND PLAN:  Mr. Betito Anderson overall he is doing okay, except for mild edema.   1.  In regards to his edema, he seems to be slightly volume overloaded based on the fact that he is telling me that he has gained weight, about 6-8 pounds from his baseline, and he is having some lower extremity edema, although it is more on the right side.  This could be because of amlodipine.  However, because of weight gain, I am concerned that he might just be retaining a little fluid.  I have told him to increase Lasix to 40 mg daily instead of 20 and check his weights every day and his blood pressures and write them down.  I will personally see him in clinic next week in person with labs prior to that that include a basic metabolic panel.   2.  In regards to coronary artery disease, he has been stented  previously.  It has been a year after his stents.  At the next visit when I see him, I will increase his Xarelto to 20 mg, add aspirin 81 mg and stop his Plavix.   3.  In regards to atrial fibrillation, he is anticoagulated and rate controlled as above.   4.  Hypertensive cardiomyopathy.  His heart failure has recovered.  He has had anaphylaxis with ACE inhibitors in the past and as such, I would not recommend using Entresto for him.  He is tolerating losartan 100 mg very well in addition to amlodipine, carvedilol and Imdur.    5.  Lastly, in regards to his LDL, his last LDL was 82.  His ALT has been fine.  Again, when I see him in clinic next visit, we will talk about bumping up his atorvastatin to full dose.     Should his lower extremity edema not respond to more diuresis and/or if we are met with worsening renal injury, then I would recommend stopping his amlodipine and switching that to t.i.d. hydralazine regimen.  We will make all of those decisions in the next week when I see him in person on 07/27.  I have told him to get all his medication bottles with him.        Thank you for allowing me to participate in the care of your patient.    Sincerely,     Mendez Hidalgo MD     [facility]

## 2020-07-21 NOTE — PROGRESS NOTES
Service Date: 07/21/2020      CARDIOLOGY CLINIC VIDEO CONSULTATION       REASON FOR VISIT:  Coronary artery disease.      HISTORY OF PRESENTING ILLNESS:  This is a very pleasant 74-year-old gentleman who I am doing a video visit with in the setting of this coronavirus pandemic.  The patient was first seen by me in summer of 2019.  He has a history of chronic atrial fibrillation, on anticoagulation since 2018.  It appears, in 07/2019, he presented with acute heart failure in the setting of volume overload and hypertensive emergency and he was noted to have LV dysfunction with an EF of 30% which actually normalized to 55% just with control of his blood pressure.  Angiogram was done at that admission which revealed a tight lesion in the mid LAD spanning into the second diagonal.  He also had a distal circumflex lesion which was quite significant and tight but a smallish vessel in that area and also subtotally occluded right coronary artery with robust collaterals from LAD.  He was initially referred to surgery but due to porcelain aorta, he underwent PCI of his bifurcation of the LAD into the diagonal.  Subsequently, his nuclear stress test has shown mild basilar inferolateral ischemia without angina and as such, this has been under medical management.  He also has a history of obesity, heart failure with recovered ejection fraction, NYHA class II and history of hypertension and diabetes.  Of note, in addition to all of this, in 09/2019, he had episodes of syncope which were concerning and the monitor showed some nonsustained VT.  Because of which, he underwent EP study that was negative and had a loop recorder implanted.  Of note, the loop recorder has shown 4.5 second pauses but he has been asymptomatic.  These have been during atrial fibrillation, I am told.  He is on 3.125 b.i.d. of carvedilol but overall remains asymptomatic with these pauses.        CURRENT MEDICAL REGIMEN:  Includes:    1.  Xarelto 15 mg daily.     2.  Plavix 75 mg daily.   3.  Amlodipine 10 mg daily.   4.  Atorvastatin 40 mg daily.   5.  Carvedilol 6.25 mg b.i.d.   6.  Allopurinol 300 mg daily.   7.  Lasix 20 mg daily.   8.  Imdur 120 mg daily.   9.  Losartan 100 mg daily.      Today, he is having routine followup and he denies any major cardiovascular symptoms.  He is NYHA class II.  He is limited because of his knee but otherwise he can climb a couple flights of stairs without any exertional limitations.  Denies orthopnea, PND.  He has been complaining of some lower extremity edema that has been going on for the last few weeks and potentially getting slightly worse.  His home blood pressures have been ranging in the 120-140 range.  No anginal symptoms reported.  No syncope, presyncope, palpitations.      PHYSICAL EXAMINATION:   VITAL SIGNS:  Blood pressure initially today was 139 at home.  That came down to 120 after repeat check.   GENERAL:  Alert and oriented x3, in no acute distress.   NECK:  Thick.  He is obese.  JVP is hard to see.   LUNGS:  Respirations are normal.  He is not short of breath.   ABDOMEN:  He is obese.   EXTREMITIES:  Appear to have trace to mild 1+ edema.   PSYCHIATRIC:  Appropriate affect.   HEENT:  No icterus, pallor.      ASSESSMENT AND PLAN:  Mr. Betito Anderson overall he is doing okay, except for mild edema.   1.  In regards to his edema, he seems to be slightly volume overloaded based on the fact that he is telling me that he has gained weight, about 6-8 pounds from his baseline, and he is having some lower extremity edema, although it is more on the right side.  This could be because of amlodipine.  However, because of weight gain, I am concerned that he might just be retaining a little fluid.  I have told him to increase Lasix to 40 mg daily instead of 20 and check his weights every day and his blood pressures and write them down.  I will personally see him in clinic next week in person with labs prior to that that include a  basic metabolic panel.   2.  In regards to coronary artery disease, he has been stented previously.  It has been a year after his stents.  At the next visit when I see him, I will increase his Xarelto to 20 mg, add aspirin 81 mg and stop his Plavix.   3.  In regards to atrial fibrillation, he is anticoagulated and rate controlled as above.   4.  Hypertensive cardiomyopathy.  His heart failure has recovered.  He has had anaphylaxis with ACE inhibitors in the past and as such, I would not recommend using Entresto for him.  He is tolerating losartan 100 mg very well in addition to amlodipine, carvedilol and Imdur.    5.  Lastly, in regards to his LDL, his last LDL was 82.  His ALT has been fine.  Again, when I see him in clinic next visit, we will talk about bumping up his atorvastatin to full dose.     Should his lower extremity edema not respond to more diuresis and/or if we are met with worsening renal injury, then I would recommend stopping his amlodipine and switching that to t.i.d. hydralazine regimen.  We will make all of those decisions in the next week when I see him in person on .  I have told him to get all his medication bottles with him.         cc:   Rudy Vernon MD    [facility]    [address]   [place] [address]         MANISH YEBOAH MD             D: 2020   T: 2020   MT: DAI      Name:     MARCELA TINAJERO   MRN:      [id number]        Account:      [id number]   :      1945           Service Date: 2020      Document: [id number]

## 2020-07-21 NOTE — PROGRESS NOTES
"Review Of Systems  Skin: negative  Eyes: negative  Ears/Nose/Throat: negative  Respiratory: No shortness of breath, dyspnea on exertion, cough, or hemoptysis  Cardiovascular: negative  Gastrointestinal: negative  Genitourinary: negative  Musculoskeletal: negative  Neurologic: negative  Psychiatric: negative  Hematologic/Lymphatic/Immunologic: negative  Endocrine: negative    Vitals - Patient Reported  Systolic (Patient Reported): 123  Diastolic (Patient Reported): 77  Weight (Patient Reported): 88.9 kg (196 lb)  Height (Patient Reported): 167.6 cm (5' 6\")  BMI (Based on Pt Reported Ht/Wt): 31.63  Pulse (Patient Reported): 63    Betito Anderson is a 74 year old male who is being evaluated via a billable video visit.      The patient has been notified of following:     \"This video visit will be conducted via a call between you and your physician/provider. We have found that certain health care needs can be provided without the need for an in-person physical exam.  This service lets us provide the care you need with a video conversation.  If a prescription is necessary we can send it directly to your pharmacy.  If lab work is needed we can place an order for that and you can then stop by our lab to have the test done at a later time.    Video visits are billed at different rates depending on your insurance coverage.  Please reach out to your insurance provider with any questions.    If during the course of the call the physician/provider feels a video visit is not appropriate, you will not be charged for this service.\"    Patient has given verbal consent for Video visit? Yes  How would you like to obtain your AVS? Mail a copy  If you are dropped from the video visit, the video invite should be resent to: Text to cell phone: [phone]  Will anyone else be joining your video visit? No      Reviewed:  JOHN Mo  07/21/20         Video-Visit Details    Type of service:  Video Visit    Video Start Time: 9:53 AM  Video " End Time: 10:08 AM    Originating Location (pt. Location): Home    Distant Location (provider location):  [facility]     Platform used for Video Visit: Mele Hidalgo MD    HPI and Plan:   See dictation [id number]    No orders of the defined types were placed in this encounter.    No orders of the defined types were placed in this encounter.    There are no discontinued medications.      Encounter Diagnosis   Name Primary?     Acute on chronic systolic congestive heart failure (H)        CURRENT MEDICATIONS:  Current Outpatient Medications   Medication Sig Dispense Refill     allopurinol (ZYLOPRIM) 300 MG tablet TAKE 1 TABLET(300 MG) BY MOUTH DAILY 90 tablet 3     amLODIPine (NORVASC) 10 MG tablet Take 1 tablet (10 mg) by mouth daily 90 tablet 3     atorvastatin (LIPITOR) 40 MG tablet Take 1 tablet (40 mg) by mouth daily 90 tablet 0     carvedilol (COREG) 3.125 MG tablet Take 2 tablets (6.25 mg) by mouth 2 times daily (with meals) 180 tablet 3     clopidogrel (PLAVIX) 75 MG tablet Take 1 tablet (75 mg) by mouth daily 90 tablet 0     furosemide (LASIX) 20 MG tablet Take 20 mg by mouth daily       isosorbide mononitrate CR (IMDUR) 120 MG 24 HR ER tablet Take 1 tablet (120 mg) by mouth daily 30 tablet 3     losartan (COZAAR) 100 MG tablet TAKE 1 TABLET(100 MG) BY MOUTH DAILY 90 tablet 3     rivaroxaban ANTICOAGULANT (XARELTO) 15 MG TABS tablet Take 1 tablet (15 mg) by mouth daily (with dinner) 90 tablet 3     tiotropium (SPIRIVA RESPIMAT) 2.5 MCG/ACT inhaler Inhale 2 puffs into the lungs daily 1 Inhaler 1     ASPIRIN NOT PRESCRIBED (INTENTIONAL) Please choose reason not prescribed, below (Patient not taking: Reported on 10/9/2019)         ALLERGIES     Allergies   Allergen Reactions     Ace Inhibitors Anaphylaxis     Edema of ace     Eliquis [Apixaban] Other (See Comments)       PAST MEDICAL HISTORY:  Past Medical History:   Diagnosis Date     ASCVD (arteriosclerotic  cardiovascular disease) 1997    angio with 40% circ lesion; 6/19 hosp for chf, 3 vessel dz on angio but porcelin aorta so ptca done     Atrial fibrillation (H) 10/09/2018    found on routine physical     Carotid artery plaque 2005    seen on us, about 50% stenosis, fu 2009 us no significant stenosis     CHF (congestive heart failure) (H) 06/2019    hosp fsd, then fu echo ef nl     Elevated blood sugar      Gout     on allopurinol     HTN (hypertension)      Hypercholesteremia      Hyponatremia 2015    felt due to chlorthalidone     Low mean corpuscular volume (MCV)      Near syncope 5/15    fu est echo low level but neg     Psoriasis     Dr. Marti     Renal mass 06/29/2019    seen on ct chest for sob, needs fu ct for that, fu us done 7/19 and no mass seen, should have fu ct in December     Screening 2012    abd us no aaa     Syncope 09/2019    hosp fsd, cause not clear, lowered coreg dose; seen by Dr. Lezama of ep and ep study neg, implanted event monitor     V-tach (H) 09/2019    seen on event monitor for fu syncope, then ep study and no inducible vtach       PAST SURGICAL HISTORY:  Past Surgical History:   Procedure Laterality Date     C ANESTH,DX ARTHROSCOPIC PROC KNEE JOINT  2009     CV CORONARY ANGIOGRAM N/A 7/2/2019    Procedure: Coronary Angiogram;  Surgeon: Donaldo Loera MD;  Location:  HEART CARDIAC CATH LAB     CV HEART CATHETERIZATION WITH POSSIBLE INTERVENTION N/A 7/5/2019    Procedure: Heart Catheterization with Possible Intervention;  Surgeon: Manolo Hu MD;  Location:  HEART CARDIAC CATH LAB     CV LEFT HEART CATH N/A 7/2/2019    Procedure: Left Heart Cath;  Surgeon: Donaldo Loera MD;  Location:  HEART CARDIAC CATH LAB     CV LEFT VENTRICULOGRAM N/A 7/2/2019    Procedure: Left Ventriculogram;  Surgeon: Donaldo Loera MD;  Location:  HEART CARDIAC CATH LAB     CV PCI STENT DRUG ELUTING N/A 7/5/2019    Procedure: PCI Stent Drug Eluting;  Surgeon: Manolo Hu  MD He;  Location:  HEART CARDIAC CATH LAB     CV RIGHT HEART CATH N/A 2019    Procedure: Right Heart Cath;  Surgeon: Donaldo Loera MD;  Location:  HEART CARDIAC CATH LAB     EP LOOP RECORDER IMPLANT N/A 10/11/2019    Procedure: EP Loop Recorder Implant;  Surgeon: Fuad Lezama MD;  Location:  HEART CARDIAC CATH LAB     EP VENTRICULAR PACING N/A 10/11/2019    Procedure: EP Ventricular Pacing;  Surgeon: Fuad Lezama MD;  Location:  HEART CARDIAC CATH LAB       FAMILY HISTORY:  Family History   Problem Relation Age of Onset     Coronary Artery Disease Father      Medical History Unknown Mother      Medical History Unknown Maternal Grandfather        SOCIAL HISTORY:  Social History     Socioeconomic History     Marital status:      Spouse name: None     Number of children: 2     Years of education: None     Highest education level: None   Occupational History     Occupation: retired   Social Needs     Financial resource strain: None     Food insecurity     Worry: None     Inability: None     Transportation needs     Medical: None     Non-medical: None   Tobacco Use     Smoking status: Former Smoker     Packs/day: 1.00     Years: 30.00     Pack years: 30.00     Types: Cigars     Last attempt to quit: 1998     Years since quittin.8     Smokeless tobacco: Never Used   Substance and Sexual Activity     Alcohol use: Not Currently     Alcohol/week: 14.0 standard drinks     Types: 14 Standard drinks or equivalent per week     Frequency: 2-3 times a week     Drinks per session: 1 or 2     Binge frequency: Never     Comment: 1-2 a night     Drug use: No     Sexual activity: Never     Partners: Female   Lifestyle     Physical activity     Days per week: None     Minutes per session: None     Stress: None   Relationships     Social connections     Talks on phone: None     Gets together: None     Attends Rastafarian service: None     Active member of club or organization: None      Attends meetings of clubs or organizations: None     Relationship status: None     Intimate partner violence     Fear of current or ex partner: None     Emotionally abused: None     Physically abused: None     Forced sexual activity: None   Other Topics Concern     Parent/sibling w/ CABG, MI or angioplasty before 65F 55M? Not Asked   Social History Narrative     None       Review of Systems:  Skin:        Eyes:       ENT:       Respiratory:       Cardiovascular:       Gastroenterology:      Genitourinary:       Musculoskeletal:       Neurologic:       Psychiatric:       Heme/Lymph/Imm:       Endocrine:         Physical Exam:  Vitals: There were no vitals taken for this visit.    Recent Lab Results:  LIPID RESULTS:  Lab Results   Component Value Date    CHOL 148 07/01/2020    HDL 33 (L) 07/01/2020    LDL 82 07/01/2020    TRIG 167 (H) 07/01/2020    CHOLHDLRATIO 2.8 02/26/2015       LIVER ENZYME RESULTS:  Lab Results   Component Value Date    AST 21 06/30/2019    ALT <5 (L) 07/01/2020       CBC RESULTS:  Lab Results   Component Value Date    WBC 7.4 10/11/2019    RBC 4.52 10/11/2019    HGB 12.3 (L) 10/11/2019    HCT 38.7 (L) 10/11/2019    MCV 86 10/11/2019    MCH 27.2 10/11/2019    MCHC 31.8 10/11/2019    RDW 16.6 (H) 10/11/2019     10/11/2019       BMP RESULTS:  Lab Results   Component Value Date     07/01/2020    POTASSIUM 4.3 07/01/2020    CHLORIDE 105 07/01/2020    CO2 23 07/01/2020    ANIONGAP 12.3 07/01/2020     (H) 07/01/2020    BUN 28 07/01/2020    CR 1.28 07/01/2020    GFRESTIMATED 55 (L) 07/01/2020    GFRESTBLACK 66 07/01/2020    GUNNER 9.1 07/01/2020        A1C RESULTS:  Lab Results   Component Value Date    A1C 5.7 (H) 06/30/2019       INR RESULTS:  Lab Results   Component Value Date    INR 1.18 (H) 09/14/2019    INR 2.33 (H) 07/18/2019           CC  Mendez Hidalgo MD  [id number]  [place] [id number]

## 2020-07-24 ENCOUNTER — TELEPHONE (OUTPATIENT)
Dept: CARDIOLOGY | Facility: CLINIC | Age: 75
End: 2020-07-24

## 2020-07-27 ENCOUNTER — CARE COORDINATION (OUTPATIENT)
Dept: CARDIOLOGY | Facility: CLINIC | Age: 75
End: 2020-07-27

## 2020-07-27 ENCOUNTER — OFFICE VISIT (OUTPATIENT)
Dept: CARDIOLOGY | Facility: CLINIC | Age: 75
End: 2020-07-27
Attending: INTERNAL MEDICINE
Payer: COMMERCIAL

## 2020-07-27 VITALS
OXYGEN SATURATION: 98 % | WEIGHT: 201.1 LBS | HEART RATE: 75 BPM | SYSTOLIC BLOOD PRESSURE: 139 MMHG | BODY MASS INDEX: 32.32 KG/M2 | DIASTOLIC BLOOD PRESSURE: 73 MMHG | HEIGHT: 66 IN

## 2020-07-27 DIAGNOSIS — I10 BENIGN ESSENTIAL HYPERTENSION: ICD-10-CM

## 2020-07-27 DIAGNOSIS — I25.10 ASCVD (ARTERIOSCLEROTIC CARDIOVASCULAR DISEASE): ICD-10-CM

## 2020-07-27 DIAGNOSIS — I48.20 CHRONIC ATRIAL FIBRILLATION (H): Primary | ICD-10-CM

## 2020-07-27 DIAGNOSIS — E78.5 HYPERLIPIDEMIA LDL GOAL <100: ICD-10-CM

## 2020-07-27 DIAGNOSIS — I50.23 ACUTE ON CHRONIC SYSTOLIC CONGESTIVE HEART FAILURE (H): ICD-10-CM

## 2020-07-27 PROCEDURE — 99214 OFFICE O/P EST MOD 30 MIN: CPT | Performed by: INTERNAL MEDICINE

## 2020-07-27 RX ORDER — ATORVASTATIN CALCIUM 40 MG/1
40 TABLET, FILM COATED ORAL DAILY
Qty: 90 TABLET | Refills: 3 | Status: SHIPPED | OUTPATIENT
Start: 2020-07-27 | End: 2021-02-19

## 2020-07-27 RX ORDER — AMLODIPINE BESYLATE 10 MG/1
10 TABLET ORAL DAILY
Qty: 90 TABLET | Refills: 3 | Status: SHIPPED | OUTPATIENT
Start: 2020-07-27 | End: 2021-02-19

## 2020-07-27 RX ORDER — CARVEDILOL 6.25 MG/1
6.25 TABLET ORAL 2 TIMES DAILY WITH MEALS
Qty: 180 TABLET | Refills: 3 | Status: SHIPPED | OUTPATIENT
Start: 2020-07-27 | End: 2021-02-19

## 2020-07-27 RX ORDER — FUROSEMIDE 20 MG
40 TABLET ORAL DAILY
Qty: 180 TABLET | Refills: 3 | Status: SHIPPED | OUTPATIENT
Start: 2020-07-27 | End: 2021-02-19

## 2020-07-27 RX ORDER — ISOSORBIDE MONONITRATE 120 MG/1
120 TABLET, EXTENDED RELEASE ORAL DAILY
Qty: 90 TABLET | Refills: 3 | Status: SHIPPED | OUTPATIENT
Start: 2020-07-27 | End: 2021-02-19

## 2020-07-27 RX ORDER — CLOPIDOGREL BISULFATE 75 MG/1
75 TABLET ORAL DAILY
Qty: 90 TABLET | Refills: 3 | Status: CANCELLED | OUTPATIENT
Start: 2020-07-27

## 2020-07-27 RX ORDER — LOSARTAN POTASSIUM 100 MG/1
TABLET ORAL
Qty: 90 TABLET | Refills: 3 | Status: SHIPPED | OUTPATIENT
Start: 2020-07-27 | End: 2021-02-19

## 2020-07-27 ASSESSMENT — MIFFLIN-ST. JEOR: SCORE: 1594.93

## 2020-07-27 NOTE — PROGRESS NOTES
"Service Date: 07/27/2020      REASON FOR VISIT:  A very pleasant, 74-year-old gentleman I had spoken to him as a virtual visit last week.  He started seeing me in the summer of 2019.  He has a history of chronic atrial fibrillation since 2018, for which he was on anticoagulation, but in 07/2019, he presented to the hospital with acute hypertensive emergency and acute LV dysfunction.  EF actually was down to 30%, but normalized to 55% with control of blood pressure.  However, angiogram at that admission showed a tight lesion in the mid LAD spanning into the diagonal and also had other small-vessel disease and a subtotally occluded right coronary artery with robust collaterals from the LAD.  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  He underwent PCI of his LAD bifurcation into the diagonal.  Subsequently, a nuclear stress test had shown mild basilar inferolateral ischemia without angina, and as such, this has been under conservative management.      He also has a history of obesity, heart failure with recovered ejection fraction, NYHA class II, history of hypertension and diabetes.  Subsequently, in 11/2019, he had episodes of syncope, and a monitor had shown nonsustained VT, and then EP study was negative.  Subsequently, he had a loop recorder implanted.  He has been having some 4 to 4-1/2 second pauses, but he has been asymptomatic.  He currently takes 6.25 b.i.d. of carvedilol with this.      Last week when he had a virtual routine followup visit with me, his wife was quite concerned that he was having some lower extremity edema.  His weight was also up by 6 pounds.  We increased his Lasix from 20-40 mg daily, and since then, his edema has gone.  He has trace edema in his right lower extremity, and today he is here for a followup in-person visit with me to have himself physically examined.  He denies any cardiovascular symptoms.  He says the only thing that is wrong \"is that his wife feels " "worried.\"  He does not believe he has any edema, and he is feeling very stable from a cardiac standpoint.      PHYSICAL EXAMINATION:   VITAL SIGNS:  Blood pressure today is 139/73.  At home, it was running in the 120s.   GENERAL:  Alert and oriented x3, in no acute distress.   NECK:  Supple.  JVP is normal.   LUNGS:  Clear.   CARDIAC:  Sounds are regular without murmur, rubs or gallops.   EXTREMITIES:  Warm.  There is trace edema in his right ankle.   ABDOMEN:  Obese, but nontender.   PSYCHIATRIC:  Appropriate affect.   HEENT:  No icterus or pallor.      ASSESSMENT AND PLAN:  In regard to mild heart failure with recovered ejection fraction, he is going to get labs today or tomorrow done at his PCP in the [facility].  If his creatinine is stable, I would recommend continuing 40 mg of Lasix.  He is on 6.25 b.i.d. of carvedilol, and he takes 100 of losartan.  He has had anaphylaxis with ACE inhibitors in the past.  As such, we will not switch him to Entresto, plus he is asymptomatic, and his EF is normal right now.        In regard to coronary artery disease, he was stented a year ago.  We will stop his Plavix today.  I would put him on baby aspirin and increase his Xarelto to 20 mg.  If he has any bleeding problems, then we could probably stop the aspirin.  However, given his complicated PCI with LAD spanning into the diagonal with bifurcation stenting, I would prefer if he stays on aspirin for the time being.        In regards to his hypertension, his blood pressure at home seems to be very well controlled.  If his edema remains to be a problem, then we could readdress it by stopping the amlodipine, but otherwise at this time he is asymptomatic and tolerating things well.  We will have him follow up with an RASHI in 3 months.      cc:   Rudy Vernon MD   [address] [address]         MANISH YEBOAH MD             D: 07/27/2020   T: 07/27/2020   MT: dmitri      Name:    "  TINAJERO MARCELA   MRN:      [id number]        Account:      [id number]   :      1945           Service Date: 2020      Document: [id number]

## 2020-07-27 NOTE — PROGRESS NOTES
HPI and Plan:   See dictation [id number]    Orders Placed This Encounter   Procedures     Follow-Up with Cardiac Advanced Practice Provider     Follow-Up with Cardiologist     Orders Placed This Encounter   Medications     isosorbide mononitrate CR (IMDUR) 120 MG 24 HR ER tablet     Sig: Take 1 tablet (120 mg) by mouth daily     Dispense:  90 tablet     Refill:  3     atorvastatin (LIPITOR) 40 MG tablet     Sig: Take 1 tablet (40 mg) by mouth daily     Dispense:  90 tablet     Refill:  3     carvedilol (COREG) 6.25 MG tablet     Sig: Take 1 tablet (6.25 mg) by mouth 2 times daily (with meals)     Dispense:  180 tablet     Refill:  3     losartan (COZAAR) 100 MG tablet     Sig: TAKE 1 TABLET(100 MG) BY MOUTH DAILY     Dispense:  90 tablet     Refill:  3     amLODIPine (NORVASC) 10 MG tablet     Sig: Take 1 tablet (10 mg) by mouth daily     Dispense:  90 tablet     Refill:  3     rivaroxaban ANTICOAGULANT (XARELTO) 20 MG TABS tablet     Sig: Take 1 tablet (20 mg) by mouth daily (with dinner)     Dispense:  90 tablet     Refill:  3     aspirin (ASA) 81 MG EC tablet     Sig: Take 1 tablet (81 mg) by mouth daily     Dispense:  90 tablet     Refill:  3     furosemide (LASIX) 20 MG tablet     Sig: Take 2 tablets (40 mg) by mouth daily     Dispense:  180 tablet     Refill:  3     Medications Discontinued During This Encounter   Medication Reason     tiotropium (SPIRIVA RESPIMAT) 2.5 MCG/ACT inhaler Stopped by Patient     clopidogrel (PLAVIX) 75 MG tablet      isosorbide mononitrate CR (IMDUR) 120 MG 24 HR ER tablet Reorder     atorvastatin (LIPITOR) 40 MG tablet Reorder     carvedilol (COREG) 3.125 MG tablet Reorder     losartan (COZAAR) 100 MG tablet Reorder     amLODIPine (NORVASC) 10 MG tablet Reorder     rivaroxaban ANTICOAGULANT (XARELTO) 15 MG TABS tablet Reorder     furosemide (LASIX) 20 MG tablet          Encounter Diagnoses   Name Primary?     Acute on chronic systolic congestive heart failure (H)      Chronic atrial  fibrillation (H) Yes     Benign essential hypertension      Hyperlipidemia LDL goal <70      ASCVD (arteriosclerotic cardiovascular disease)        CURRENT MEDICATIONS:  Current Outpatient Medications   Medication Sig Dispense Refill     allopurinol (ZYLOPRIM) 300 MG tablet TAKE 1 TABLET(300 MG) BY MOUTH DAILY 90 tablet 3     amLODIPine (NORVASC) 10 MG tablet Take 1 tablet (10 mg) by mouth daily 90 tablet 3     aspirin (ASA) 81 MG EC tablet Take 1 tablet (81 mg) by mouth daily 90 tablet 3     atorvastatin (LIPITOR) 40 MG tablet Take 1 tablet (40 mg) by mouth daily 90 tablet 3     carvedilol (COREG) 6.25 MG tablet Take 1 tablet (6.25 mg) by mouth 2 times daily (with meals) 180 tablet 3     furosemide (LASIX) 20 MG tablet Take 2 tablets (40 mg) by mouth daily 180 tablet 3     isosorbide mononitrate CR (IMDUR) 120 MG 24 HR ER tablet Take 1 tablet (120 mg) by mouth daily 90 tablet 3     losartan (COZAAR) 100 MG tablet TAKE 1 TABLET(100 MG) BY MOUTH DAILY 90 tablet 3     rivaroxaban ANTICOAGULANT (XARELTO) 20 MG TABS tablet Take 1 tablet (20 mg) by mouth daily (with dinner) 90 tablet 3     ASPIRIN NOT PRESCRIBED (INTENTIONAL) Please choose reason not prescribed, below (Patient not taking: Reported on 10/9/2019)         ALLERGIES     Allergies   Allergen Reactions     Ace Inhibitors Anaphylaxis     Edema of ace     Eliquis [Apixaban] Other (See Comments)       PAST MEDICAL HISTORY:  Past Medical History:   Diagnosis Date     ASCVD (arteriosclerotic cardiovascular disease) 1997    angio with 40% circ lesion; 6/19 hosp for chf, 3 vessel dz on angio but porcelin aorta so ptca done     Atrial fibrillation (H) 10/09/2018    found on routine physical     Carotid artery plaque 2005    seen on us, about 50% stenosis, fu 2009 us no significant stenosis     CHF (congestive heart failure) (H) 06/2019    hosp fsd, then fu echo ef nl     Elevated blood sugar      Gout     on allopurinol     HTN (hypertension)      Hypercholesteremia       Hyponatremia 2015    felt due to chlorthalidone     Low mean corpuscular volume (MCV)      Near syncope 5/15    fu est echo low level but neg     Psoriasis     Dr. Marti     Renal mass 06/29/2019    seen on ct chest for sob, needs fu ct for that, fu us done 7/19 and no mass seen, should have fu ct in December     Screening 2012    abd us no aaa     Syncope 09/2019    hosp fsd, cause not clear, lowered coreg dose; seen by Dr. Lezama of ep and ep study neg, implanted event monitor     V-tach (H) 09/2019    seen on event monitor for fu syncope, then ep study and no inducible vtach       PAST SURGICAL HISTORY:  Past Surgical History:   Procedure Laterality Date     C ANESTH,DX ARTHROSCOPIC PROC KNEE JOINT  2009     CV CORONARY ANGIOGRAM N/A 7/2/2019    Procedure: Coronary Angiogram;  Surgeon: Donaldo Loera MD;  Location:  HEART CARDIAC CATH LAB     CV HEART CATHETERIZATION WITH POSSIBLE INTERVENTION N/A 7/5/2019    Procedure: Heart Catheterization with Possible Intervention;  Surgeon: Manolo Hu MD;  Location:  HEART CARDIAC CATH LAB     CV LEFT HEART CATH N/A 7/2/2019    Procedure: Left Heart Cath;  Surgeon: Donaldo Loera MD;  Location:  HEART CARDIAC CATH LAB     CV LEFT VENTRICULOGRAM N/A 7/2/2019    Procedure: Left Ventriculogram;  Surgeon: Donaldo Loera MD;  Location:  HEART CARDIAC CATH LAB     CV PCI STENT DRUG ELUTING N/A 7/5/2019    Procedure: PCI Stent Drug Eluting;  Surgeon: Manolo Hu MD;  Location:  HEART CARDIAC CATH LAB     CV RIGHT HEART CATH N/A 7/2/2019    Procedure: Right Heart Cath;  Surgeon: Donaldo Loera MD;  Location:  HEART CARDIAC CATH LAB     EP LOOP RECORDER IMPLANT N/A 10/11/2019    Procedure: EP Loop Recorder Implant;  Surgeon: Fuad Lezama MD;  Location:  HEART CARDIAC CATH LAB     EP VENTRICULAR PACING N/A 10/11/2019    Procedure: EP Ventricular Pacing;  Surgeon: Fuad Lezama MD;  Location:  HEART CARDIAC CATH  LAB       FAMILY HISTORY:  Family History   Problem Relation Age of Onset     Coronary Artery Disease Father      Medical History Unknown Mother      Medical History Unknown Maternal Grandfather        SOCIAL HISTORY:  Social History     Socioeconomic History     Marital status:      Spouse name: None     Number of children: 2     Years of education: None     Highest education level: None   Occupational History     Occupation: retired   Social Needs     Financial resource strain: None     Food insecurity     Worry: None     Inability: None     Transportation needs     Medical: None     Non-medical: None   Tobacco Use     Smoking status: Former Smoker     Packs/day: 1.00     Years: 30.00     Pack years: 30.00     Types: Cigars     Last attempt to quit: 1998     Years since quittin.8     Smokeless tobacco: Never Used   Substance and Sexual Activity     Alcohol use: Not Currently     Alcohol/week: 14.0 standard drinks     Types: 14 Standard drinks or equivalent per week     Frequency: 2-3 times a week     Drinks per session: 1 or 2     Binge frequency: Never     Comment: 1-2 a night     Drug use: No     Sexual activity: Never     Partners: Female   Lifestyle     Physical activity     Days per week: None     Minutes per session: None     Stress: None   Relationships     Social connections     Talks on phone: None     Gets together: None     Attends Gnosticist service: None     Active member of club or organization: None     Attends meetings of clubs or organizations: None     Relationship status: None     Intimate partner violence     Fear of current or ex partner: None     Emotionally abused: None     Physically abused: None     Forced sexual activity: None   Other Topics Concern     Parent/sibling w/ CABG, MI or angioplasty before 65F 55M? Not Asked   Social History Narrative     None       Review of Systems:  Skin:  Positive for     Eyes:  Positive for glasses  ENT:  Positive for hearing  "loss  Respiratory:  Negative    Cardiovascular:  Negative    Gastroenterology: Negative    Genitourinary:  Positive for nocturia  Musculoskeletal:  Positive for joint pain  Neurologic:  Negative    Psychiatric:  Negative    Heme/Lymph/Imm:  Negative    Endocrine:  Negative      Physical Exam:  Vitals: /73   Pulse 75   Ht 1.676 m (5' 6\")   Wt 91.2 kg (201 lb 1.6 oz)   SpO2 98%   BMI 32.46 kg/m      Constitutional:           Skin:             Head:           Eyes:           Lymph:      ENT:           Neck:           Respiratory:            Cardiac:                                                           GI:           Extremities and Muscular Skeletal:                 Neurological:           Psych:           Recent Lab Results:  LIPID RESULTS:  Lab Results   Component Value Date    CHOL 148 07/01/2020    HDL 33 (L) 07/01/2020    LDL 82 07/01/2020    TRIG 167 (H) 07/01/2020    CHOLHDLRATIO 2.8 02/26/2015       LIVER ENZYME RESULTS:  Lab Results   Component Value Date    AST 21 06/30/2019    ALT <5 (L) 07/01/2020       CBC RESULTS:  Lab Results   Component Value Date    WBC 7.4 10/11/2019    RBC 4.52 10/11/2019    HGB 12.3 (L) 10/11/2019    HCT 38.7 (L) 10/11/2019    MCV 86 10/11/2019    MCH 27.2 10/11/2019    MCHC 31.8 10/11/2019    RDW 16.6 (H) 10/11/2019     10/11/2019       BMP RESULTS:  Lab Results   Component Value Date     07/01/2020    POTASSIUM 4.3 07/01/2020    CHLORIDE 105 07/01/2020    CO2 23 07/01/2020    ANIONGAP 12.3 07/01/2020     (H) 07/01/2020    BUN 28 07/01/2020    CR 1.28 07/01/2020    GFRESTIMATED 55 (L) 07/01/2020    GFRESTBLACK 66 07/01/2020    GUNNER 9.1 07/01/2020        A1C RESULTS:  Lab Results   Component Value Date    A1C 5.7 (H) 06/30/2019       INR RESULTS:  Lab Results   Component Value Date    INR 1.18 (H) 09/14/2019    INR 2.33 (H) 07/18/2019           CC  Mendez Hidalgo MD  [id number] [place] [id number]  [place] [id number]                  "

## 2020-07-27 NOTE — PROGRESS NOTES
Call out to pt, informed pt to call PCP Office at [phone] and request lab only appt (BMP order in chart per Dr. Hidalgo) for tomorrow. Pt verbalized understanding of plan. Althea Corona RN on 7/27/2020 at 3:59 PM      Message   Received: Today   Message Contents   Mendez Hidalgo MD Sletteboe, Erin B., RN; Althea Corona, AMALIA               He wants to get BMP done at [facility] P[facility]. Please make sure it gets done today or tomorrow.

## 2020-07-27 NOTE — LETTER
7/27/2020    Rudy Vernon MD  [address] [facility] [address]  [facility] [address]    RE: Betito Anderson       Dear Colleague,    I had the pleasure of seeing Betito Anderson in the [facility].    HPI and Plan:   See dictation [id number]    Orders Placed This Encounter   Procedures     Follow-Up with Cardiac Advanced Practice Provider     Follow-Up with Cardiologist     Orders Placed This Encounter   Medications     isosorbide mononitrate CR (IMDUR) 120 MG 24 HR ER tablet     Sig: Take 1 tablet (120 mg) by mouth daily     Dispense:  90 tablet     Refill:  3     atorvastatin (LIPITOR) 40 MG tablet     Sig: Take 1 tablet (40 mg) by mouth daily     Dispense:  90 tablet     Refill:  3     carvedilol (COREG) 6.25 MG tablet     Sig: Take 1 tablet (6.25 mg) by mouth 2 times daily (with meals)     Dispense:  180 tablet     Refill:  3     losartan (COZAAR) 100 MG tablet     Sig: TAKE 1 TABLET(100 MG) BY MOUTH DAILY     Dispense:  90 tablet     Refill:  3     amLODIPine (NORVASC) 10 MG tablet     Sig: Take 1 tablet (10 mg) by mouth daily     Dispense:  90 tablet     Refill:  3     rivaroxaban ANTICOAGULANT (XARELTO) 20 MG TABS tablet     Sig: Take 1 tablet (20 mg) by mouth daily (with dinner)     Dispense:  90 tablet     Refill:  3     aspirin (ASA) 81 MG EC tablet     Sig: Take 1 tablet (81 mg) by mouth daily     Dispense:  90 tablet     Refill:  3     furosemide (LASIX) 20 MG tablet     Sig: Take 2 tablets (40 mg) by mouth daily     Dispense:  180 tablet     Refill:  3     Medications Discontinued During This Encounter   Medication Reason     tiotropium (SPIRIVA RESPIMAT) 2.5 MCG/ACT inhaler Stopped by Patient     clopidogrel (PLAVIX) 75 MG tablet      isosorbide mononitrate CR (IMDUR) 120 MG 24 HR ER tablet Reorder     atorvastatin (LIPITOR) 40 MG tablet Reorder     carvedilol (COREG) 3.125 MG tablet Reorder     losartan (COZAAR) 100 MG tablet Reorder     amLODIPine (NORVASC) 10 MG tablet Reorder      rivaroxaban ANTICOAGULANT (XARELTO) 15 MG TABS tablet Reorder     furosemide (LASIX) 20 MG tablet          Encounter Diagnoses   Name Primary?     Acute on chronic systolic congestive heart failure (H)      Chronic atrial fibrillation (H) Yes     Benign essential hypertension      Hyperlipidemia LDL goal <70      ASCVD (arteriosclerotic cardiovascular disease)        CURRENT MEDICATIONS:  Current Outpatient Medications   Medication Sig Dispense Refill     allopurinol (ZYLOPRIM) 300 MG tablet TAKE 1 TABLET(300 MG) BY MOUTH DAILY 90 tablet 3     amLODIPine (NORVASC) 10 MG tablet Take 1 tablet (10 mg) by mouth daily 90 tablet 3     aspirin (ASA) 81 MG EC tablet Take 1 tablet (81 mg) by mouth daily 90 tablet 3     atorvastatin (LIPITOR) 40 MG tablet Take 1 tablet (40 mg) by mouth daily 90 tablet 3     carvedilol (COREG) 6.25 MG tablet Take 1 tablet (6.25 mg) by mouth 2 times daily (with meals) 180 tablet 3     furosemide (LASIX) 20 MG tablet Take 2 tablets (40 mg) by mouth daily 180 tablet 3     isosorbide mononitrate CR (IMDUR) 120 MG 24 HR ER tablet Take 1 tablet (120 mg) by mouth daily 90 tablet 3     losartan (COZAAR) 100 MG tablet TAKE 1 TABLET(100 MG) BY MOUTH DAILY 90 tablet 3     rivaroxaban ANTICOAGULANT (XARELTO) 20 MG TABS tablet Take 1 tablet (20 mg) by mouth daily (with dinner) 90 tablet 3     ASPIRIN NOT PRESCRIBED (INTENTIONAL) Please choose reason not prescribed, below (Patient not taking: Reported on 10/9/2019)         ALLERGIES     Allergies   Allergen Reactions     Ace Inhibitors Anaphylaxis     Edema of ace     Eliquis [Apixaban] Other (See Comments)       PAST MEDICAL HISTORY:  Past Medical History:   Diagnosis Date     ASCVD (arteriosclerotic cardiovascular disease) 1997    angio with 40% circ lesion; 6/19 hosp for chf, 3 vessel dz on angio but porcelin aorta so ptca done     Atrial fibrillation (H) 10/09/2018    found on routine physical     Carotid artery plaque 2005    seen on us, about 50%  stenosis, fu 2009 us no significant stenosis     CHF (congestive heart failure) (H) 06/2019    hosp fsd, then fu echo ef nl     Elevated blood sugar      Gout     on allopurinol     HTN (hypertension)      Hypercholesteremia      Hyponatremia 2015    felt due to chlorthalidone     Low mean corpuscular volume (MCV)      Near syncope 5/15    fu est echo low level but neg     Psoriasis     Dr. Marti     Renal mass 06/29/2019    seen on ct chest for sob, needs fu ct for that, fu us done 7/19 and no mass seen, should have fu ct in December     Screening 2012    abd us no aaa     Syncope 09/2019    hosp fsd, cause not clear, lowered coreg dose; seen by Dr. Lezama of ep and ep study neg, implanted event monitor     V-tach (H) 09/2019    seen on event monitor for fu syncope, then ep study and no inducible vtach       PAST SURGICAL HISTORY:  Past Surgical History:   Procedure Laterality Date     C ANESTH,DX ARTHROSCOPIC PROC KNEE JOINT  2009     CV CORONARY ANGIOGRAM N/A 7/2/2019    Procedure: Coronary Angiogram;  Surgeon: Donaldo Loera MD;  Location:  HEART CARDIAC CATH LAB     CV HEART CATHETERIZATION WITH POSSIBLE INTERVENTION N/A 7/5/2019    Procedure: Heart Catheterization with Possible Intervention;  Surgeon: Manolo Hu MD;  Location:  HEART CARDIAC CATH LAB     CV LEFT HEART CATH N/A 7/2/2019    Procedure: Left Heart Cath;  Surgeon: Donaldo Loera MD;  Location:  HEART CARDIAC CATH LAB     CV LEFT VENTRICULOGRAM N/A 7/2/2019    Procedure: Left Ventriculogram;  Surgeon: Donaldo Loera MD;  Location:  HEART CARDIAC CATH LAB     CV PCI STENT DRUG ELUTING N/A 7/5/2019    Procedure: PCI Stent Drug Eluting;  Surgeon: Manolo Hu MD;  Location:  HEART CARDIAC CATH LAB     CV RIGHT HEART CATH N/A 7/2/2019    Procedure: Right Heart Cath;  Surgeon: Donaldo Loera MD;  Location:  HEART CARDIAC CATH LAB     EP LOOP RECORDER IMPLANT N/A 10/11/2019    Procedure: EP Loop  Recorder Implant;  Surgeon: Fuad Lezama MD;  Location:  HEART CARDIAC CATH LAB     EP VENTRICULAR PACING N/A 10/11/2019    Procedure: EP Ventricular Pacing;  Surgeon: Fuad Lezama MD;  Location:  HEART CARDIAC CATH LAB       FAMILY HISTORY:  Family History   Problem Relation Age of Onset     Coronary Artery Disease Father      Medical History Unknown Mother      Medical History Unknown Maternal Grandfather        SOCIAL HISTORY:  Social History     Socioeconomic History     Marital status:      Spouse name: None     Number of children: 2     Years of education: None     Highest education level: None   Occupational History     Occupation: retired   Social Needs     Financial resource strain: None     Food insecurity     Worry: None     Inability: None     Transportation needs     Medical: None     Non-medical: None   Tobacco Use     Smoking status: Former Smoker     Packs/day: 1.00     Years: 30.00     Pack years: 30.00     Types: Cigars     Last attempt to quit: 1998     Years since quittin.8     Smokeless tobacco: Never Used   Substance and Sexual Activity     Alcohol use: Not Currently     Alcohol/week: 14.0 standard drinks     Types: 14 Standard drinks or equivalent per week     Frequency: 2-3 times a week     Drinks per session: 1 or 2     Binge frequency: Never     Comment: 1-2 a night     Drug use: No     Sexual activity: Never     Partners: Female   Lifestyle     Physical activity     Days per week: None     Minutes per session: None     Stress: None   Relationships     Social connections     Talks on phone: None     Gets together: None     Attends Cheondoism service: None     Active member of club or organization: None     Attends meetings of clubs or organizations: None     Relationship status: None     Intimate partner violence     Fear of current or ex partner: None     Emotionally abused: None     Physically abused: None     Forced sexual activity: None   Other Topics Concern  "    Parent/sibling w/ CABG, MI or angioplasty before 65F 55M? Not Asked   Social History Narrative     None       Review of Systems:  Skin:  Positive for     Eyes:  Positive for glasses  ENT:  Positive for hearing loss  Respiratory:  Negative    Cardiovascular:  Negative    Gastroenterology: Negative    Genitourinary:  Positive for nocturia  Musculoskeletal:  Positive for joint pain  Neurologic:  Negative    Psychiatric:  Negative    Heme/Lymph/Imm:  Negative    Endocrine:  Negative      Physical Exam:  Vitals: /73   Pulse 75   Ht 1.676 m (5' 6\")   Wt 91.2 kg (201 lb 1.6 oz)   SpO2 98%   BMI 32.46 kg/m      Constitutional:           Skin:             Head:           Eyes:           Lymph:      ENT:           Neck:           Respiratory:            Cardiac:                                                           GI:           Extremities and Muscular Skeletal:                 Neurological:           Psych:           Recent Lab Results:  LIPID RESULTS:  Lab Results   Component Value Date    CHOL 148 07/01/2020    HDL 33 (L) 07/01/2020    LDL 82 07/01/2020    TRIG 167 (H) 07/01/2020    CHOLHDLRATIO 2.8 02/26/2015       LIVER ENZYME RESULTS:  Lab Results   Component Value Date    AST 21 06/30/2019    ALT <5 (L) 07/01/2020       CBC RESULTS:  Lab Results   Component Value Date    WBC 7.4 10/11/2019    RBC 4.52 10/11/2019    HGB 12.3 (L) 10/11/2019    HCT 38.7 (L) 10/11/2019    MCV 86 10/11/2019    MCH 27.2 10/11/2019    MCHC 31.8 10/11/2019    RDW 16.6 (H) 10/11/2019     10/11/2019       BMP RESULTS:  Lab Results   Component Value Date     07/01/2020    POTASSIUM 4.3 07/01/2020    CHLORIDE 105 07/01/2020    CO2 23 07/01/2020    ANIONGAP 12.3 07/01/2020     (H) 07/01/2020    BUN 28 07/01/2020    CR 1.28 07/01/2020    GFRESTIMATED 55 (L) 07/01/2020    GFRESTBLACK 66 07/01/2020    GUNNER 9.1 07/01/2020        A1C RESULTS:  Lab Results   Component Value Date    A1C 5.7 (H) 06/30/2019       INR " RESULTS:  Lab Results   Component Value Date    INR 1.18 (H) 09/14/2019    INR 2.33 (H) 07/18/2019           CC  Mendez Hidalgo MD  [address]  [place] [id number]                    Thank you for allowing me to participate in the care of your patient.      Sincerely,     Mendez Hidalgo MD     [facility]    cc:   Mendez Hidalgo MD  [address]  [place] [id number]

## 2020-07-27 NOTE — LETTER
7/27/2020      Rudy Vernon MD  [address]  [facility] [address]      RE: Betito PENNIE Anderson       Dear Colleague,    I had the pleasure of seeing Betito Anderson in the [facility].    Service Date: 07/27/2020      REASON FOR VISIT:  A very pleasant, 74-year-old gentleman I had spoken to him as a virtual visit last week.  He started seeing me in the summer of 2019.  He has a history of chronic atrial fibrillation since 2018, for which he was on anticoagulation, but in 07/2019, he presented to the hospital with acute hypertensive emergency and acute LV dysfunction.  EF actually was down to 30%, but normalized to 55% with control of blood pressure.  However, angiogram at that admission showed a tight lesion in the mid LAD spanning into the diagonal and also had other small-vessel disease and a subtotally occluded right coronary artery with robust collaterals from the LAD.  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  He underwent PCI of his LAD bifurcation into the diagonal.  Subsequently, a nuclear stress test had shown mild basilar inferolateral ischemia without angina, and as such, this has been under conservative management.      He also has a history of obesity, heart failure with recovered ejection fraction, NYHA class II, history of hypertension and diabetes.  Subsequently, in 11/2019, he had episodes of syncope, and a monitor had shown nonsustained VT, and then EP study was negative.  Subsequently, he had a loop recorder implanted.  He has been having some 4 to 4-1/2 second pauses, but he has been asymptomatic.  He currently takes 6.25 b.i.d. of carvedilol with this.      Last week when he had a virtual routine followup visit with me, his wife was quite concerned that he was having some lower extremity edema.  His weight was also up by 6 pounds.  We increased his Lasix from 20-40 mg daily, and since then, his edema has gone.  He has trace edema in his  "right lower extremity, and today he is here for a followup in-person visit with me to have himself physically examined.  He denies any cardiovascular symptoms.  He says the only thing that is wrong \"is that his wife feels worried.\"  He does not believe he has any edema, and he is feeling very stable from a cardiac standpoint.      PHYSICAL EXAMINATION:   VITAL SIGNS:  Blood pressure today is 139/73.  At home, it was running in the 120s.   GENERAL:  Alert and oriented x3, in no acute distress.   NECK:  Supple.  JVP is normal.   LUNGS:  Clear.   CARDIAC:  Sounds are regular without murmur, rubs or gallops.   EXTREMITIES:  Warm.  There is trace edema in his right ankle.   ABDOMEN:  Obese, but nontender.   PSYCHIATRIC:  Appropriate affect.   HEENT:  No icterus or pallor.      ASSESSMENT AND PLAN:  In regard to mild heart failure with recovered ejection fraction, he is going to get labs today or tomorrow done at his PCP in the [facility] system.  If his creatinine is stable, I would recommend continuing 40 mg of Lasix.  He is on 6.25 b.i.d. of carvedilol, and he takes 100 of losartan.  He has had anaphylaxis with ACE inhibitors in the past.  As such, we will not switch him to Entresto, plus he is asymptomatic, and his EF is normal right now.        In regard to coronary artery disease, he was stented a year ago.  We will stop his Plavix today.  I would put him on baby aspirin and increase his Xarelto to 20 mg.  If he has any bleeding problems, then we could probably stop the aspirin.  However, given his complicated PCI with LAD spanning into the diagonal with bifurcation stenting, I would prefer if he stays on aspirin for the time being.        In regards to his hypertension, his blood pressure at home seems to be very well controlled.  If his edema remains to be a problem, then we could readdress it by stopping the amlodipine, but otherwise at this time he is asymptomatic and tolerating things well.  We will have him " follow up with an RASHI in 3 months.      cc:   Rudy Vernon MD   [address] [place] [address]         MANISH YEBOAH MD             D: 2020   T: 2020   MT: dmitri      Name:     MARCELA TINAJERO   MRN:      [id number]        Account:      [id number]   :      1945           Service Date: 2020      Document: [id number]           Outpatient Encounter Medications as of 2020   Medication Sig Dispense Refill     allopurinol (ZYLOPRIM) 300 MG tablet TAKE 1 TABLET(300 MG) BY MOUTH DAILY 90 tablet 3     amLODIPine (NORVASC) 10 MG tablet Take 1 tablet (10 mg) by mouth daily 90 tablet 3     aspirin (ASA) 81 MG EC tablet Take 1 tablet (81 mg) by mouth daily 90 tablet 3     atorvastatin (LIPITOR) 40 MG tablet Take 1 tablet (40 mg) by mouth daily 90 tablet 3     carvedilol (COREG) 6.25 MG tablet Take 1 tablet (6.25 mg) by mouth 2 times daily (with meals) 180 tablet 3     furosemide (LASIX) 20 MG tablet Take 2 tablets (40 mg) by mouth daily 180 tablet 3     isosorbide mononitrate CR (IMDUR) 120 MG 24 HR ER tablet Take 1 tablet (120 mg) by mouth daily 90 tablet 3     losartan (COZAAR) 100 MG tablet TAKE 1 TABLET(100 MG) BY MOUTH DAILY 90 tablet 3     rivaroxaban ANTICOAGULANT (XARELTO) 20 MG TABS tablet Take 1 tablet (20 mg) by mouth daily (with dinner) 90 tablet 3     ASPIRIN NOT PRESCRIBED (INTENTIONAL) Please choose reason not prescribed, below (Patient not taking: Reported on 10/9/2019)       [DISCONTINUED] amLODIPine (NORVASC) 10 MG tablet Take 1 tablet (10 mg) by mouth daily 90 tablet 3     [DISCONTINUED] atorvastatin (LIPITOR) 40 MG tablet Take 1 tablet (40 mg) by mouth daily 90 tablet 0     [DISCONTINUED] carvedilol (COREG) 3.125 MG tablet Take 2 tablets (6.25 mg) by mouth 2 times daily (with meals) 180 tablet 3     [DISCONTINUED] clopidogrel (PLAVIX) 75 MG tablet Take 1 tablet (75 mg) by mouth daily 90 tablet 0     [DISCONTINUED] furosemide (LASIX) 20  MG tablet Take 40 mg by mouth daily        [DISCONTINUED] isosorbide mononitrate CR (IMDUR) 120 MG 24 HR ER tablet Take 1 tablet (120 mg) by mouth daily 30 tablet 3     [DISCONTINUED] losartan (COZAAR) 100 MG tablet TAKE 1 TABLET(100 MG) BY MOUTH DAILY 90 tablet 3     [DISCONTINUED] rivaroxaban ANTICOAGULANT (XARELTO) 15 MG TABS tablet Take 1 tablet (15 mg) by mouth daily (with dinner) 90 tablet 3     [DISCONTINUED] tiotropium (SPIRIVA RESPIMAT) 2.5 MCG/ACT inhaler Inhale 2 puffs into the lungs daily 1 Inhaler 1     No facility-administered encounter medications on file as of 7/27/2020.        Again, thank you for allowing me to participate in the care of your patient.      Sincerely,    Mendez Hidalgo MD     [facility]

## 2020-07-29 DIAGNOSIS — I50.23 ACUTE ON CHRONIC SYSTOLIC CONGESTIVE HEART FAILURE (H): ICD-10-CM

## 2020-07-29 PROCEDURE — 80048 BASIC METABOLIC PNL TOTAL CA: CPT | Performed by: INTERNAL MEDICINE

## 2020-07-29 PROCEDURE — 36415 COLL VENOUS BLD VENIPUNCTURE: CPT | Performed by: INTERNAL MEDICINE

## 2020-07-30 LAB
ANION GAP SERPL CALCULATED.3IONS-SCNC: 8 MMOL/L (ref 3–14)
BUN SERPL-MCNC: 37 MG/DL (ref 7–30)
CALCIUM SERPL-MCNC: 8.9 MG/DL (ref 8.5–10.1)
CHLORIDE SERPL-SCNC: 104 MMOL/L (ref 94–109)
CO2 SERPL-SCNC: 25 MMOL/L (ref 20–32)
CREAT SERPL-MCNC: 1.48 MG/DL (ref 0.66–1.25)
GFR SERPL CREATININE-BSD FRML MDRD: 46 ML/MIN/{1.73_M2}
GLUCOSE SERPL-MCNC: 115 MG/DL (ref 70–99)
POTASSIUM SERPL-SCNC: 4.5 MMOL/L (ref 3.4–5.3)
SODIUM SERPL-SCNC: 137 MMOL/L (ref 133–144)

## 2020-07-30 NOTE — PROGRESS NOTES
Call out to pt to review BMP lab, pt's Lasix was increased from 20 mg daily to 40 mg daily on 7/21/20 per Dr. iHdalgo for ~ 6lb weight gain and LE edema. Pt had a follow up OV w/Dr Hidalgo on 7/27/20 and his edema was much improved.     I called pt to review his swelling and weight. Pt said his swelling remains improved. He did not have his weights or BP available. I advised pt decrease Lasix back to 20 mg daily. Advised pt to call us if weight increases by 2-3lb overnight, or 5lb in a week or increase in SOB. I told pt I will call in a week for an update. Routed to Dr. Hidalgo to see if he agrees and for any other recommendations. Althea Corona RN on 7/30/2020 at 3:18 PM      Component      Latest Ref Rng & Units 9/19/2019 10/11/2019 7/1/2020 7/29/2020   Sodium      133 - 144 mmol/L 140 141 136 137   Potassium      3.4 - 5.3 mmol/L 4.7 4.0 4.3 4.5   Chloride      94 - 109 mmol/L 104 110 (H) 105 104   Carbon Dioxide      20 - 32 mmol/L 27 24 23 25   Anion Gap      3 - 14 mmol/L 13.7 7 12.3 8   Glucose      70 - 99 mg/dL 107 (H) 100 (H) 107 (H) 115 (H)   Urea Nitrogen      7 - 30 mg/dL 16 21 28 37 (H)   Creatinine      0.66 - 1.25 mg/dL 1.14 1.32 (H) 1.28 1.48 (H)   GFR Estimate      >60 mL/min/1.73:m2 63 53 (L) 55 (L) 46 (L)   GFR Estimate If Black      >60 mL/min/1.73:m2 76 61 66 53 (L)   Calcium      8.5 - 10.1 mg/dL 10.0 8.6 9.1 8.9

## 2020-07-31 NOTE — TELEPHONE ENCOUNTER
Agree since Cr has bumped. If LE swelling comes back, have an RASHI visit to discuss amlodipine discontinuation to an alternate.

## 2020-07-31 NOTE — PROGRESS NOTES
Mendez Hidalgo MD  You 25 minutes ago (10:18 AM)          Agree since Cr has bumped. If LE swelling comes back, have an RASHI visit to discuss amlodipine discontinuation to an alternate.         Documentation       Noted. Reminder sent to Team RN board to call pt in 1 week for update. Althea Corona RN on 7/31/2020 at 10:45 AM

## 2020-08-06 ENCOUNTER — ANCILLARY PROCEDURE (OUTPATIENT)
Dept: CARDIOLOGY | Facility: CLINIC | Age: 75
End: 2020-08-06
Attending: INTERNAL MEDICINE
Payer: COMMERCIAL

## 2020-08-06 DIAGNOSIS — Z45.09 ENCOUNTER FOR LOOP RECORDER CHECK: ICD-10-CM

## 2020-08-06 DIAGNOSIS — R55 SYNCOPE: Primary | ICD-10-CM

## 2020-08-06 PROCEDURE — 93298 REM INTERROG DEV EVAL SCRMS: CPT | Performed by: INTERNAL MEDICINE

## 2020-08-06 PROCEDURE — G2066 INTER DEVC REMOTE 30D: HCPCS | Performed by: INTERNAL MEDICINE

## 2020-08-10 NOTE — PROGRESS NOTES
Call out to pt to follow up on pt LE swelling since decreasing Lasix from 40 mg to 20 mg on 7/30/20 - see previous notes. Left VM for pt requesting call back to give an update. Dr Hidalgo said if his LE swelling comes back he should have an RASHI visit to discuss alternate med to amlodipine. He shouldn't need more diuretic as his Cr & BUN were going up with the Lasix 40 mg. Althea Corona RN on 8/10/2020 at 11:44 AM

## 2020-09-03 ENCOUNTER — TRANSFERRED RECORDS (OUTPATIENT)
Dept: HEALTH INFORMATION MANAGEMENT | Facility: CLINIC | Age: 75
End: 2020-09-03

## 2020-10-12 ENCOUNTER — DOCUMENTATION ONLY (OUTPATIENT)
Dept: CARDIOLOGY | Facility: CLINIC | Age: 75
End: 2020-10-12

## 2020-10-12 NOTE — PROGRESS NOTES
[employer] Loop recorder    Remote alert for 16 three second pauses over the wknd. The stored EGM's all showed these to be occurring during sleeping hours. We however only have 3 to review out of the 16. I will update Dr Hidalgo on the frequency of these pauses. In past year there have been 570 pauses detected. Unable to give time on all events. AMALIA Abreu

## 2020-10-12 NOTE — PLAN OF CARE
Heart Failure Care Pathway  GOALS TO BE MET BEFORE DISCHARGE:    1. Decrease congestion and/or edema with diuretic therapy to achieve near      optimal volume status.            Dyspnea improved:  Yes            Edema improved:     Yes        Net I/O and Weights since admission:          06/03 0700 - 07/03 0659  In: 1380 [P.O.:1280; I.V.:100]  Out: 6290 [Urine:6290]  Net: -4910            Vitals:    06/29/19 1841 06/30/19 0547 07/01/19 0611 07/02/19 0417   Weight: 92.5 kg (204 lb) 90.1 kg (198 lb 9.6 oz) 89.8 kg (197 lb 15.6 oz) 89.7 kg (197 lb 12.8 oz)         2.  O2 sats > 92% on RA or at prior home O2 therapy level.          Current oxygenation status:       SpO2: 95 %         O2 Device: None (Room air),           Able to wean O2 this shift to keep sats > 92%:  Yes       Does patient use Home O2? No    3.  Tolerates ambulation and mobility near baseline: Yes        How many times did the patient ambulate with nursing staff this shift? 1    Please review the Heart Failure Care Pathway for additional HF goal outcomes.    Kolby Roblero RN  7/3/2019     Pt is A+OX4 though forgetful. VSS on RA ex occasional bradycardia. Several pauses noted on telemetry overnight- longest noted pause was 3.2 seconds. Tele afib SVR/CVR. Heparin drip restarted at 2200, 10a check later this morning. Denies pain. Denies SOB. Lung sounds diminished with wheezes. R radial and brachial sites CDI. CMS intact. Up with assist of 1. Voiding per urinal. UA collected, unremarkable. Currently being worked up by CV surgery.        Pt will meet >75% of estimated protein-energy needs

## 2020-10-14 NOTE — TELEPHONE ENCOUNTER
Thank you. I am assuming he is still in chronic AF. And these were during sleep and asymptomatic. In that case, no further changes.

## 2020-11-05 ENCOUNTER — ANCILLARY PROCEDURE (OUTPATIENT)
Dept: CARDIOLOGY | Facility: CLINIC | Age: 75
End: 2020-11-05
Attending: INTERNAL MEDICINE
Payer: COMMERCIAL

## 2020-11-05 DIAGNOSIS — Z45.09 ENCOUNTER FOR LOOP RECORDER CHECK: ICD-10-CM

## 2020-11-05 DIAGNOSIS — R55 SYNCOPE: ICD-10-CM

## 2020-11-05 PROCEDURE — 93297 REM INTERROG DEV EVAL ICPMS: CPT | Performed by: INTERNAL MEDICINE

## 2020-11-05 PROCEDURE — G2066 INTER DEVC REMOTE 30D: HCPCS | Performed by: INTERNAL MEDICINE

## 2020-11-09 LAB
MDC_IDC_EPISODE_DTM: NORMAL
MDC_IDC_EPISODE_DURATION: 3.25 S
MDC_IDC_EPISODE_ID: 648
MDC_IDC_EPISODE_TYPE: NORMAL
MDC_IDC_MSMT_BATTERY_STATUS: NORMAL
MDC_IDC_PG_IMPLANT_DTM: NORMAL
MDC_IDC_PG_MFG: NORMAL
MDC_IDC_PG_MODEL: NORMAL
MDC_IDC_PG_SERIAL: NORMAL
MDC_IDC_PG_TYPE: NORMAL
MDC_IDC_SESS_CLINIC_NAME: NORMAL
MDC_IDC_SESS_DTM: NORMAL
MDC_IDC_SESS_TYPE: NORMAL
MDC_IDC_SET_ZONE_TYPE: NORMAL
MDC_IDC_STAT_AT_BURDEN_PERCENT: 99.93
MDC_IDC_STAT_AT_DTM_END: NORMAL
MDC_IDC_STAT_AT_DTM_START: NORMAL
MDC_IDC_STAT_EPISODE_RECENT_COUNT: 0
MDC_IDC_STAT_EPISODE_RECENT_COUNT: 3
MDC_IDC_STAT_EPISODE_RECENT_COUNT_DTM_END: NORMAL
MDC_IDC_STAT_EPISODE_RECENT_COUNT_DTM_START: NORMAL
MDC_IDC_STAT_EPISODE_TOTAL_COUNT: 0
MDC_IDC_STAT_EPISODE_TOTAL_COUNT: 641
MDC_IDC_STAT_EPISODE_TOTAL_COUNT: 8
MDC_IDC_STAT_EPISODE_TOTAL_COUNT_DTM_END: NORMAL
MDC_IDC_STAT_EPISODE_TOTAL_COUNT_DTM_START: NORMAL
MDC_IDC_STAT_EPISODE_TYPE: NORMAL

## 2021-02-04 ENCOUNTER — ANCILLARY PROCEDURE (OUTPATIENT)
Dept: CARDIOLOGY | Facility: CLINIC | Age: 76
End: 2021-02-04
Attending: INTERNAL MEDICINE
Payer: COMMERCIAL

## 2021-02-04 DIAGNOSIS — Z45.09 ENCOUNTER FOR LOOP RECORDER CHECK: ICD-10-CM

## 2021-02-04 DIAGNOSIS — R55 SYNCOPE: ICD-10-CM

## 2021-02-04 PROCEDURE — 93297 REM INTERROG DEV EVAL ICPMS: CPT | Performed by: INTERNAL MEDICINE

## 2021-02-04 PROCEDURE — G2066 INTER DEVC REMOTE 30D: HCPCS | Performed by: INTERNAL MEDICINE

## 2021-02-04 NOTE — PROGRESS NOTES
[employer] Reveal Linq Loop Recorder   Symptom: 0    Tachy: 0    Pause: 598 episodes logged since last scheduled check on 08/06/2020. EGM's all came through as alerts all showing 3 second pauses logged during Afib. Not a new finding.     Everardo: 0    AT: 0    AF: 0    Time in AT/AF: 100%. On Xarelto  Heart rate: Afib. Good variability  Battery: Ok    Careplan: Follow-up in three months with remote loop check on 05/06/2021.      I have reviewed and interpreted the device interrogation, settings, programming and nurse's summary. The device is functioning within normal device parameters. I agree with the current findings, assessment and plan.

## 2021-02-05 ENCOUNTER — TELEPHONE (OUTPATIENT)
Dept: CARDIOLOGY | Facility: CLINIC | Age: 76
End: 2021-02-05

## 2021-02-05 NOTE — TELEPHONE ENCOUNTER
Returned pt's wife's call regarding the COVID-19 vaccine. She is requesting assistance for her  to get the vaccine. I let her know that as of yesterday we were told that there are currently no vaccine appointments available and they need to make sure their contact info is up to date in [facility]X[employer]. I informed her they should receive some sort of VHXt communication once Mykel is eligible to receive the vaccine, so he should log in frequently to check.

## 2021-02-09 LAB
MDC_IDC_EPISODE_DTM: NORMAL
MDC_IDC_EPISODE_DURATION: 3.07 S
MDC_IDC_EPISODE_ID: 932
MDC_IDC_EPISODE_TYPE: NORMAL
MDC_IDC_MSMT_BATTERY_STATUS: NORMAL
MDC_IDC_PG_IMPLANT_DTM: NORMAL
MDC_IDC_PG_MFG: NORMAL
MDC_IDC_PG_MODEL: NORMAL
MDC_IDC_PG_SERIAL: NORMAL
MDC_IDC_PG_TYPE: NORMAL
MDC_IDC_SESS_CLINIC_NAME: NORMAL
MDC_IDC_SESS_DTM: NORMAL
MDC_IDC_SESS_TYPE: NORMAL
MDC_IDC_SET_ZONE_TYPE: NORMAL
MDC_IDC_STAT_AT_BURDEN_PERCENT: 100 %
MDC_IDC_STAT_AT_DTM_END: NORMAL
MDC_IDC_STAT_AT_DTM_START: NORMAL
MDC_IDC_STAT_EPISODE_RECENT_COUNT: 0
MDC_IDC_STAT_EPISODE_RECENT_COUNT: 6
MDC_IDC_STAT_EPISODE_RECENT_COUNT_DTM_END: NORMAL
MDC_IDC_STAT_EPISODE_RECENT_COUNT_DTM_START: NORMAL
MDC_IDC_STAT_EPISODE_TOTAL_COUNT: 0
MDC_IDC_STAT_EPISODE_TOTAL_COUNT: 8
MDC_IDC_STAT_EPISODE_TOTAL_COUNT: 929
MDC_IDC_STAT_EPISODE_TOTAL_COUNT_DTM_END: NORMAL
MDC_IDC_STAT_EPISODE_TOTAL_COUNT_DTM_START: NORMAL
MDC_IDC_STAT_EPISODE_TYPE: NORMAL

## 2021-02-16 ENCOUNTER — OFFICE VISIT (OUTPATIENT)
Dept: CARDIOLOGY | Facility: CLINIC | Age: 76
End: 2021-02-16
Attending: INTERNAL MEDICINE
Payer: COMMERCIAL

## 2021-02-16 VITALS
SYSTOLIC BLOOD PRESSURE: 147 MMHG | WEIGHT: 211 LBS | BODY MASS INDEX: 33.91 KG/M2 | DIASTOLIC BLOOD PRESSURE: 68 MMHG | HEIGHT: 66 IN | HEART RATE: 73 BPM

## 2021-02-16 DIAGNOSIS — I10 BENIGN ESSENTIAL HYPERTENSION: ICD-10-CM

## 2021-02-16 DIAGNOSIS — E78.5 HYPERLIPIDEMIA LDL GOAL <100: ICD-10-CM

## 2021-02-16 DIAGNOSIS — I50.23 ACUTE ON CHRONIC SYSTOLIC CONGESTIVE HEART FAILURE (H): ICD-10-CM

## 2021-02-16 DIAGNOSIS — I48.20 CHRONIC ATRIAL FIBRILLATION (H): ICD-10-CM

## 2021-02-16 LAB
ALBUMIN SERPL-MCNC: 3.9 G/DL (ref 3.4–5)
ALP SERPL-CCNC: 98 U/L (ref 40–150)
ALT SERPL W P-5'-P-CCNC: 23 U/L (ref 0–70)
ANION GAP SERPL CALCULATED.3IONS-SCNC: 5 MMOL/L (ref 3–14)
AST SERPL W P-5'-P-CCNC: 14 U/L (ref 0–45)
BILIRUB SERPL-MCNC: 0.6 MG/DL (ref 0.2–1.3)
BUN SERPL-MCNC: 26 MG/DL (ref 7–30)
CALCIUM SERPL-MCNC: 9.4 MG/DL (ref 8.5–10.1)
CHLORIDE SERPL-SCNC: 108 MMOL/L (ref 94–109)
CHOLEST SERPL-MCNC: 194 MG/DL
CO2 SERPL-SCNC: 26 MMOL/L (ref 20–32)
CREAT SERPL-MCNC: 1.28 MG/DL (ref 0.66–1.25)
GFR SERPL CREATININE-BSD FRML MDRD: 54 ML/MIN/{1.73_M2}
GLUCOSE SERPL-MCNC: 112 MG/DL (ref 70–99)
HDLC SERPL-MCNC: 41 MG/DL
LDLC SERPL CALC-MCNC: 114 MG/DL
NONHDLC SERPL-MCNC: 153 MG/DL
POTASSIUM SERPL-SCNC: 4.2 MMOL/L (ref 3.4–5.3)
PROT SERPL-MCNC: 8 G/DL (ref 6.8–8.8)
SODIUM SERPL-SCNC: 139 MMOL/L (ref 133–144)
TRIGL SERPL-MCNC: 193 MG/DL

## 2021-02-16 PROCEDURE — 80061 LIPID PANEL: CPT | Performed by: INTERNAL MEDICINE

## 2021-02-16 PROCEDURE — 99214 OFFICE O/P EST MOD 30 MIN: CPT | Performed by: INTERNAL MEDICINE

## 2021-02-16 PROCEDURE — 80053 COMPREHEN METABOLIC PANEL: CPT | Performed by: INTERNAL MEDICINE

## 2021-02-16 PROCEDURE — 36415 COLL VENOUS BLD VENIPUNCTURE: CPT | Performed by: INTERNAL MEDICINE

## 2021-02-16 ASSESSMENT — MIFFLIN-ST. JEOR: SCORE: 1634.84

## 2021-02-16 NOTE — LETTER
2/16/2021    Rudy Vernon MD  [address]  [facility] [address]    RE: Betito Anderson       Dear Colleague,    I had the pleasure of seeing Betito Anderson in the [facility].    CARDIOLOGY CLINIC CONSULTATION    REASON FOR CONSULT: Coronary artery disease and heart failure with recovered ejection fraction     PRIMARY CARE PHYSICIAN:  Rudy Vernon    HISTORY OF PRESENT ILLNESS:  A very pleasant, 75-year-old gentleman who started seeing me in the summer of 2019.  He has a history of chronic atrial fibrillation since 2018 for which he is on anticoagulation, but in 07/2019, he presented to the hospital with acute hypertensive emergency and acute LV dysfunction.  EF actually was down to 30%, but normalized to 55% with control of blood pressure.  However, angiogram at that admission showed a tight lesion in the mid LAD spanning into the diagonal and also had other small-vessel disease and a subtotally occluded right coronary artery with robust collaterals from the LAD.  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  He underwent PCI of his LAD bifurcation into the diagonal. Subsequently, a nuclear stress test had shown mild basilar inferolateral ischemia without angina, and as such, this has been under conservative management. He also has a history of obesity, heart failure with recovered ejection fraction, NYHA class II, history of hypertension and diabetes.  Subsequently, in 11/2019, he had episodes of syncope, and a monitor had shown nonsustained VT, and then EP study was negative.  Subsequently, he had a loop recorder implanted.  He has been having some 4 to 4-1/2 second pauses, but he has been asymptomatic.  He currently takes 6.25 b.i.d. of carvedilol with this.     He was last evaluated in July 2020.  Today is here for routine follow-up.  Overall from a cardiac perspective he says he is been doing okay.  However home blood pressure  readings have been in the 150s to 170 systolic.  Been compliant with his medications.  Currently he is on full doses of amlodipine losartan along with 120 mg of Imdur and 6.25 twice daily of carvedilol.  He is on aspirin statin.  He is on Xarelto.  He has not had labs checked in 6 to 8 months.  More recently he says he is been gaining weight.  Indeed he is about 11 pounds up since last visit in July.  In fact he is close to about 20 pounds up in the last 1 year.  NYHA class II heart failure symptoms.  Edema.  No orthopnea angina syncope presyncope.    PAST MEDICAL HISTORY:  Past Medical History:   Diagnosis Date     ASCVD (arteriosclerotic cardiovascular disease) 1997    angio with 40% circ lesion; 6/19 hosp for chf, 3 vessel dz on angio but porcelin aorta so ptca done     Atrial fibrillation (H) 10/09/2018    found on routine physical     Carotid artery plaque 2005    seen on us, about 50% stenosis, fu 2009 us no significant stenosis     CHF (congestive heart failure) (H) 06/2019    hosp fsd, then fu echo ef nl     Elevated blood sugar      Gout     on allopurinol     HTN (hypertension)      Hypercholesteremia      Hyponatremia 2015    felt due to chlorthalidone     Low mean corpuscular volume (MCV)      Near syncope 5/15    fu est echo low level but neg     Psoriasis     Dr. Marti     Renal mass 06/29/2019    seen on ct chest for sob, needs fu ct for that, fu us done 7/19 and no mass seen, should have fu ct in December     Screening 2012    abd us no aaa     Syncope 09/2019    hosp fsd, cause not clear, lowered coreg dose; seen by Dr. Lezama of ep and ep study neg, implanted event monitor     V-tach (H) 09/2019    seen on event monitor for fu syncope, then ep study and no inducible vtach       MEDICATIONS:  Current Outpatient Medications   Medication     allopurinol (ZYLOPRIM) 300 MG tablet     amLODIPine (NORVASC) 10 MG tablet     aspirin (ASA) 81 MG EC tablet     ASPIRIN NOT PRESCRIBED (INTENTIONAL)      atorvastatin (LIPITOR) 40 MG tablet     carvedilol (COREG) 6.25 MG tablet     furosemide (LASIX) 20 MG tablet     isosorbide mononitrate CR (IMDUR) 120 MG 24 HR ER tablet     losartan (COZAAR) 100 MG tablet     rivaroxaban ANTICOAGULANT (XARELTO) 20 MG TABS tablet     No current facility-administered medications for this visit.        ALLERGIES:  Allergies   Allergen Reactions     Ace Inhibitors Anaphylaxis     Edema of ace     Eliquis [Apixaban] Other (See Comments)       SOCIAL HISTORY:  I have reviewed this patient's social history and updated it with pertinent information if needed. Betito Anderson  reports that he quit smoking about 22 years ago. His smoking use included cigars. He has a 30.00 pack-year smoking history. He has never used smokeless tobacco. He reports previous alcohol use of about 14.0 standard drinks of alcohol per week. He reports that he does not use drugs.    FAMILY HISTORY:  I have reviewed this patient's family history and updated it with pertinent information if needed.   Family History   Problem Relation Age of Onset     Coronary Artery Disease Father      Medical History Unknown Mother      Medical History Unknown Maternal Grandfather        REVIEW OF SYSTEMS:  Skin:  Positive for     Eyes:  Positive for glasses  ENT:  Positive for hearing loss  Respiratory:  Negative    Cardiovascular:  Negative    Gastroenterology: Negative    Genitourinary:  Positive for nocturia  Musculoskeletal:  Positive for joint pain  Neurologic:  Negative    Psychiatric:  Negative    Heme/Lymph/Imm:  Negative    Endocrine:  Negative        PHYSICAL EXAM:      BP: (!) 147/68 Pulse: 73            Vital Signs with Ranges  Pulse:  [73-80] 73  BP: (147-151)/(68-76) 147/68  211 lbs 0 oz    Constitutional: awake, alert, no distress  Eyes: sclera nonicteric  ENT: trachea midline  Respiratory: Clear to auscultation bilaterally  Cardiovascular: JVP is elevated.  There is 2+ bilateral pitting edema.  Extremities are  warm.  Cardiac sounds are irregular.  Soft systolic murmur no rubs or gallops.  GI: Obese  Neurologic: no obvious gross focal deficits  Neuropsychiatric: appropriate affact    DATA:  Labs: Pertinent cardiac labs reviewed    Echocardiogram: Last echocardiogram from July 2020 showing normal LV function    EKG: Last device check that his loop recorder interrogation showing 100% atrial fibrillation with occasional 3 to 4-second pauses.    ASSESSMENT:  No angina but clinically appears volume overloaded.  Blood pressure seems elevated in clinic and at home.    RECOMMENDATIONS:  1. At this point I recommend further outpatient diuresis.   2. Labs today.  3. I told him to increase his Lasix.  Currently takes 20 mg daily.  He will increase it to 40 mg twice daily for 3 days and then 40 mg once daily.  4. See me in core clinic in 2 to 3 weeks.  Labs on that day as well.  Echocardiogram prior.  5. I told him to write down daily weights and blood pressure at home.  After diuresis let see where we are with his blood pressure.  May need to add hydralazine.  Cannot increase carvedilol further due to pauses although asymptomatic.  6. If clinically gets worse he needs to contact us in clinic.  For the short-term I recommend close follow-up given that he seems to have significantly gained weight.    JOE Estevez, [facility]  Cardiology - [facility]  February 16, 2021      Thank you for allowing me to participate in the care of your patient.    Sincerely,     Mendez Hidalgo MD     [facility]

## 2021-02-16 NOTE — PATIENT INSTRUCTIONS
1. Labs today  2. Echocardiogram in 1 week  3. See Dr. Hidalgo in 2-3 weeks with labs again  4. Change Lasix (Furosemide dosing) - 40 mg twice daily for 3 days then 40 mg once daily thereafter  5. Daily write down your weights and BP and get it to clinic next time

## 2021-02-16 NOTE — PROGRESS NOTES
CARDIOLOGY CLINIC CONSULTATION    REASON FOR CONSULT: Coronary artery disease and heart failure with recovered ejection fraction     PRIMARY CARE PHYSICIAN:  Rudy Vernon    HISTORY OF PRESENT ILLNESS:  A very pleasant, 75-year-old gentleman who started seeing me in the summer of 2019.  He has a history of chronic atrial fibrillation since 2018 for which he is on anticoagulation, but in 07/2019, he presented to the hospital with acute hypertensive emergency and acute LV dysfunction.  EF actually was down to 30%, but normalized to 55% with control of blood pressure.  However, angiogram at that admission showed a tight lesion in the mid LAD spanning into the diagonal and also had other small-vessel disease and a subtotally occluded right coronary artery with robust collaterals from the LAD.  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  He underwent PCI of his LAD bifurcation into the diagonal. Subsequently, a nuclear stress test had shown mild basilar inferolateral ischemia without angina, and as such, this has been under conservative management. He also has a history of obesity, heart failure with recovered ejection fraction, NYHA class II, history of hypertension and diabetes.  Subsequently, in 11/2019, he had episodes of syncope, and a monitor had shown nonsustained VT, and then EP study was negative.  Subsequently, he had a loop recorder implanted.  He has been having some 4 to 4-1/2 second pauses, but he has been asymptomatic.  He currently takes 6.25 b.i.d. of carvedilol with this.     He was last evaluated in July 2020.  Today is here for routine follow-up.  Overall from a cardiac perspective he says he is been doing okay.  However home blood pressure readings have been in the 150s to 170 systolic.  Been compliant with his medications.  Currently he is on full doses of amlodipine losartan along with 120 mg of Imdur and 6.25 twice daily of carvedilol.  He is on aspirin statin.  He is on Xarelto.   He has not had labs checked in 6 to 8 months.  More recently he says he is been gaining weight.  Indeed he is about 11 pounds up since last visit in July.  In fact he is close to about 20 pounds up in the last 1 year.  NYHA class II heart failure symptoms.  Edema.  No orthopnea angina syncope presyncope.    PAST MEDICAL HISTORY:  Past Medical History:   Diagnosis Date     ASCVD (arteriosclerotic cardiovascular disease) 1997    angio with 40% circ lesion; 6/19 hosp for chf, 3 vessel dz on angio but porcelin aorta so ptca done     Atrial fibrillation (H) 10/09/2018    found on routine physical     Carotid artery plaque 2005    seen on us, about 50% stenosis, fu 2009 us no significant stenosis     CHF (congestive heart failure) (H) 06/2019    hosp fsd, then fu echo ef nl     Elevated blood sugar      Gout     on allopurinol     HTN (hypertension)      Hypercholesteremia      Hyponatremia 2015    felt due to chlorthalidone     Low mean corpuscular volume (MCV)      Near syncope 5/15    fu est echo low level but neg     Psoriasis     Dr. Marti     Renal mass 06/29/2019    seen on ct chest for sob, needs fu ct for that, fu us done 7/19 and no mass seen, should have fu ct in December     Screening 2012    abd us no aaa     Syncope 09/2019    hosp fsd, cause not clear, lowered coreg dose; seen by Dr. Lezama of ep and ep study neg, implanted event monitor     V-tach (H) 09/2019    seen on event monitor for fu syncope, then ep study and no inducible vtach       MEDICATIONS:  Current Outpatient Medications   Medication     allopurinol (ZYLOPRIM) 300 MG tablet     amLODIPine (NORVASC) 10 MG tablet     aspirin (ASA) 81 MG EC tablet     ASPIRIN NOT PRESCRIBED (INTENTIONAL)     atorvastatin (LIPITOR) 40 MG tablet     carvedilol (COREG) 6.25 MG tablet     furosemide (LASIX) 20 MG tablet     isosorbide mononitrate CR (IMDUR) 120 MG 24 HR ER tablet     losartan (COZAAR) 100 MG tablet     rivaroxaban ANTICOAGULANT (XARELTO) 20 MG TABS  tablet     No current facility-administered medications for this visit.        ALLERGIES:  Allergies   Allergen Reactions     Ace Inhibitors Anaphylaxis     Edema of ace     Eliquis [Apixaban] Other (See Comments)       SOCIAL HISTORY:  I have reviewed this patient's social history and updated it with pertinent information if needed. Betito Anderson  reports that he quit smoking about 22 years ago. His smoking use included cigars. He has a 30.00 pack-year smoking history. He has never used smokeless tobacco. He reports previous alcohol use of about 14.0 standard drinks of alcohol per week. He reports that he does not use drugs.    FAMILY HISTORY:  I have reviewed this patient's family history and updated it with pertinent information if needed.   Family History   Problem Relation Age of Onset     Coronary Artery Disease Father      Medical History Unknown Mother      Medical History Unknown Maternal Grandfather        REVIEW OF SYSTEMS:  Skin:  Positive for     Eyes:  Positive for glasses  ENT:  Positive for hearing loss  Respiratory:  Negative    Cardiovascular:  Negative    Gastroenterology: Negative    Genitourinary:  Positive for nocturia  Musculoskeletal:  Positive for joint pain  Neurologic:  Negative    Psychiatric:  Negative    Heme/Lymph/Imm:  Negative    Endocrine:  Negative        PHYSICAL EXAM:      BP: (!) 147/68 Pulse: 73            Vital Signs with Ranges  Pulse:  [73-80] 73  BP: (147-151)/(68-76) 147/68  211 lbs 0 oz    Constitutional: awake, alert, no distress  Eyes: sclera nonicteric  ENT: trachea midline  Respiratory: Clear to auscultation bilaterally  Cardiovascular: JVP is elevated.  There is 2+ bilateral pitting edema.  Extremities are warm.  Cardiac sounds are irregular.  Soft systolic murmur no rubs or gallops.  GI: Obese  Neurologic: no obvious gross focal deficits  Neuropsychiatric: appropriate affact    DATA:  Labs: Pertinent cardiac labs reviewed    Echocardiogram: Last echocardiogram  from July 2020 showing normal LV function    EKG: Last device check that his loop recorder interrogation showing 100% atrial fibrillation with occasional 3 to 4-second pauses.    ASSESSMENT:  No angina but clinically appears volume overloaded.  Blood pressure seems elevated in clinic and at home.    RECOMMENDATIONS:  1. At this point I recommend further outpatient diuresis.   2. Labs today.  3. I told him to increase his Lasix.  Currently takes 20 mg daily.  He will increase it to 40 mg twice daily for 3 days and then 40 mg once daily.  4. See me in core clinic in 2 to 3 weeks.  Labs on that day as well.  Echocardiogram prior.  5. I told him to write down daily weights and blood pressure at home.  After diuresis let see where we are with his blood pressure.  May need to add hydralazine.  Cannot increase carvedilol further due to pauses although asymptomatic.  6. If clinically gets worse he needs to contact us in clinic.  For the short-term I recommend close follow-up given that he seems to have significantly gained weight.    JOE Estevez, [facility]  Cardiology - [facility]  February 16, 2021

## 2021-02-18 ENCOUNTER — CARE COORDINATION (OUTPATIENT)
Dept: CARDIOLOGY | Facility: CLINIC | Age: 76
End: 2021-02-18

## 2021-02-19 DIAGNOSIS — I25.10 ASCVD (ARTERIOSCLEROTIC CARDIOVASCULAR DISEASE): ICD-10-CM

## 2021-02-19 DIAGNOSIS — I50.23 ACUTE ON CHRONIC SYSTOLIC CONGESTIVE HEART FAILURE (H): ICD-10-CM

## 2021-02-19 DIAGNOSIS — I10 BENIGN ESSENTIAL HYPERTENSION: ICD-10-CM

## 2021-02-19 DIAGNOSIS — I48.20 CHRONIC ATRIAL FIBRILLATION (H): ICD-10-CM

## 2021-02-19 DIAGNOSIS — E78.5 HYPERLIPIDEMIA LDL GOAL <100: Primary | ICD-10-CM

## 2021-02-19 RX ORDER — ISOSORBIDE MONONITRATE 120 MG/1
120 TABLET, EXTENDED RELEASE ORAL DAILY
Qty: 90 TABLET | Refills: 3 | Status: SHIPPED | OUTPATIENT
Start: 2021-02-19 | End: 2022-03-01

## 2021-02-19 RX ORDER — CARVEDILOL 6.25 MG/1
6.25 TABLET ORAL 2 TIMES DAILY WITH MEALS
Qty: 180 TABLET | Refills: 3 | Status: SHIPPED | OUTPATIENT
Start: 2021-02-19 | End: 2022-03-01

## 2021-02-19 RX ORDER — LOSARTAN POTASSIUM 100 MG/1
TABLET ORAL
Qty: 90 TABLET | Refills: 3 | Status: SHIPPED | OUTPATIENT
Start: 2021-02-19 | End: 2021-06-01

## 2021-02-19 RX ORDER — AMLODIPINE BESYLATE 10 MG/1
10 TABLET ORAL DAILY
Qty: 90 TABLET | Refills: 3 | Status: SHIPPED | OUTPATIENT
Start: 2021-02-19 | End: 2022-03-01

## 2021-02-19 RX ORDER — ATORVASTATIN CALCIUM 40 MG/1
40 TABLET, FILM COATED ORAL DAILY
Qty: 90 TABLET | Refills: 3 | Status: SHIPPED | OUTPATIENT
Start: 2021-02-19 | End: 2022-03-01

## 2021-02-19 RX ORDER — FUROSEMIDE 20 MG
40 TABLET ORAL DAILY
Qty: 180 TABLET | Refills: 3 | Status: SHIPPED | OUTPATIENT
Start: 2021-02-19 | End: 2021-03-01

## 2021-02-19 RX ORDER — ATORVASTATIN CALCIUM 20 MG/1
20 TABLET, FILM COATED ORAL DAILY
Qty: 90 TABLET | Refills: 3 | Status: SHIPPED | OUTPATIENT
Start: 2021-02-19 | End: 2022-03-01

## 2021-02-22 ENCOUNTER — IMMUNIZATION (OUTPATIENT)
Dept: NURSING | Facility: CLINIC | Age: 76
End: 2021-02-22
Payer: COMMERCIAL

## 2021-02-22 PROCEDURE — 0001A PR COVID VAC PFIZER DIL RECON 30 MCG/0.3 ML IM: CPT

## 2021-02-22 PROCEDURE — 91300 PR COVID VAC PFIZER DIL RECON 30 MCG/0.3 ML IM: CPT

## 2021-02-26 ENCOUNTER — HOSPITAL ENCOUNTER (OUTPATIENT)
Dept: CARDIOLOGY | Facility: CLINIC | Age: 76
Discharge: HOME | End: 2021-02-26
Attending: INTERNAL MEDICINE | Admitting: INTERNAL MEDICINE
Payer: COMMERCIAL

## 2021-02-26 DIAGNOSIS — I10 BENIGN ESSENTIAL HYPERTENSION: ICD-10-CM

## 2021-02-26 DIAGNOSIS — E78.5 HYPERLIPIDEMIA LDL GOAL <100: ICD-10-CM

## 2021-02-26 PROCEDURE — 93306 TTE W/DOPPLER COMPLETE: CPT

## 2021-02-26 PROCEDURE — 93306 TTE W/DOPPLER COMPLETE: CPT | Mod: 26 | Performed by: INTERNAL MEDICINE

## 2021-03-01 ENCOUNTER — OFFICE VISIT (OUTPATIENT)
Dept: CARDIOLOGY | Facility: CLINIC | Age: 76
End: 2021-03-01
Attending: INTERNAL MEDICINE
Payer: COMMERCIAL

## 2021-03-01 VITALS
DIASTOLIC BLOOD PRESSURE: 67 MMHG | SYSTOLIC BLOOD PRESSURE: 166 MMHG | HEART RATE: 56 BPM | HEIGHT: 66 IN | BODY MASS INDEX: 34.07 KG/M2 | WEIGHT: 212 LBS

## 2021-03-01 DIAGNOSIS — E78.5 HYPERLIPIDEMIA LDL GOAL <100: ICD-10-CM

## 2021-03-01 DIAGNOSIS — I10 BENIGN ESSENTIAL HYPERTENSION: ICD-10-CM

## 2021-03-01 LAB
ANION GAP SERPL CALCULATED.3IONS-SCNC: 6 MMOL/L (ref 3–14)
BUN SERPL-MCNC: 35 MG/DL (ref 7–30)
CALCIUM SERPL-MCNC: 9 MG/DL (ref 8.5–10.1)
CHLORIDE SERPL-SCNC: 108 MMOL/L (ref 94–109)
CO2 SERPL-SCNC: 27 MMOL/L (ref 20–32)
CREAT SERPL-MCNC: 1.38 MG/DL (ref 0.66–1.25)
GFR SERPL CREATININE-BSD FRML MDRD: 50 ML/MIN/{1.73_M2}
GLUCOSE SERPL-MCNC: 102 MG/DL (ref 70–99)
POTASSIUM SERPL-SCNC: 4.3 MMOL/L (ref 3.4–5.3)
SODIUM SERPL-SCNC: 141 MMOL/L (ref 133–144)

## 2021-03-01 PROCEDURE — 36415 COLL VENOUS BLD VENIPUNCTURE: CPT | Performed by: INTERNAL MEDICINE

## 2021-03-01 PROCEDURE — 99214 OFFICE O/P EST MOD 30 MIN: CPT | Performed by: INTERNAL MEDICINE

## 2021-03-01 PROCEDURE — 80048 BASIC METABOLIC PNL TOTAL CA: CPT | Performed by: INTERNAL MEDICINE

## 2021-03-01 RX ORDER — HYDRALAZINE HYDROCHLORIDE 25 MG/1
25 TABLET, FILM COATED ORAL 3 TIMES DAILY
Qty: 90 TABLET | Refills: 3 | Status: SHIPPED | OUTPATIENT
Start: 2021-03-01 | End: 2021-04-19

## 2021-03-01 RX ORDER — TORSEMIDE 20 MG/1
40 TABLET ORAL DAILY
Qty: 60 TABLET | Refills: 3 | Status: SHIPPED | OUTPATIENT
Start: 2021-03-01 | End: 2021-04-19

## 2021-03-01 ASSESSMENT — MIFFLIN-ST. JEOR: SCORE: 1639.38

## 2021-03-01 NOTE — PROGRESS NOTES
CARDIOLOGY CLINIC CONSULTATION    REASON FOR CONSULT: HF recovered EF    PRIMARY CARE PHYSICIAN:  Rudy Vernon    HISTORY OF PRESENT ILLNESS:  A very pleasant, 75-year-old gentleman who started seeing me in the summer of 2019.  He has a history of chronic atrial fibrillation since 2018 for which he is on anticoagulation, but in 07/2019, he presented to the hospital with acute hypertensive emergency and acute LV dysfunction.  EF actually was down to 30%, but normalized to 55% with control of blood pressure.  However, angiogram at that admission showed a tight lesion in the mid LAD spanning into the diagonal and also had other small-vessel disease and a subtotally occluded right coronary artery with robust collaterals from the LAD.  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  He underwent PCI of his LAD bifurcation into the diagonal. Subsequently, a nuclear stress test had shown mild basilar inferolateral ischemia without angina, and as such, this has been under conservative management. He also has a history of obesity, heart failure with recovered ejection fraction, NYHA class II, history of hypertension and diabetes.  Subsequently, in 11/2019, he had episodes of syncope, and a monitor had shown nonsustained VT, and then EP study was negative.  Subsequently, he had a loop recorder implanted.  He has been having some 4 to 4-1/2 second pauses, but he has been asymptomatic.  He currently takes 6.25 b.i.d. of carvedilol with this.     See last note for details from 2/16/21. Then he was hypertensive and volume overloaded. Lasix was increased to 40 BID for 3 days and then 40 daily. He lost weight soon after the first increase in lasix dosing, but then started gaining again on 40 daily. Today he still has 2+ edema. NYHA class II. Echo shows normal function. JVP is elevated. BP is up. Home BP in the 130-140 range.    Plan  1. Stop lasix  2. Start hydralazine 25 TID  3. Start Torsemide 40 mg daily and see RASHI  in 3 week with labs. If weight not coming down, then go to 40/20. If Creatinine worsens and persistently dyspneic despite HTN control, then RHC.   4. See me back in 6 months.     PAST MEDICAL HISTORY:  Past Medical History:   Diagnosis Date     ASCVD (arteriosclerotic cardiovascular disease) 1997    angio with 40% circ lesion; 6/19 hosp for chf, 3 vessel dz on angio but porcelin aorta so ptca done     Atrial fibrillation (H) 10/09/2018    found on routine physical     Carotid artery plaque 2005    seen on us, about 50% stenosis, fu 2009 us no significant stenosis     CHF (congestive heart failure) (H) 06/2019    hosp fsd, then fu echo ef nl     Elevated blood sugar      Gout     on allopurinol     HTN (hypertension)      Hypercholesteremia      Hyponatremia 2015    felt due to chlorthalidone     Low mean corpuscular volume (MCV)      Near syncope 5/15    fu est echo low level but neg     Psoriasis     Dr. Marti     Renal mass 06/29/2019    seen on ct chest for sob, needs fu ct for that, fu us done 7/19 and no mass seen, should have fu ct in December     Screening 2012    abd us no aaa     Syncope 09/2019    hosp fsd, cause not clear, lowered coreg dose; seen by Dr. Lezama of ep and ep study neg, implanted event monitor     V-tach (H) 09/2019    seen on event monitor for fu syncope, then ep study and no inducible vtach       MEDICATIONS:  Current Outpatient Medications   Medication     allopurinol (ZYLOPRIM) 300 MG tablet     amLODIPine (NORVASC) 10 MG tablet     aspirin (ASA) 81 MG EC tablet     atorvastatin (LIPITOR) 40 MG tablet     carvedilol (COREG) 6.25 MG tablet     hydrALAZINE (APRESOLINE) 25 MG tablet     isosorbide mononitrate CR (IMDUR) 120 MG 24 HR ER tablet     losartan (COZAAR) 100 MG tablet     rivaroxaban ANTICOAGULANT (XARELTO) 20 MG TABS tablet     torsemide (DEMADEX) 20 MG tablet     atorvastatin (LIPITOR) 20 MG tablet     No current facility-administered medications for this visit.         ALLERGIES:  Allergies   Allergen Reactions     Ace Inhibitors Anaphylaxis     Edema of ace     Eliquis [Apixaban] Other (See Comments)       SOCIAL HISTORY:  I have reviewed this patient's social history and updated it with pertinent information if needed. Betito Anderson  reports that he quit smoking about 22 years ago. His smoking use included cigars. He has a 30.00 pack-year smoking history. He has never used smokeless tobacco. He reports previous alcohol use of about 14.0 standard drinks of alcohol per week. He reports that he does not use drugs.    FAMILY HISTORY:  I have reviewed this patient's family history and updated it with pertinent information if needed.   Family History   Problem Relation Age of Onset     Coronary Artery Disease Father      Medical History Unknown Mother      Medical History Unknown Maternal Grandfather        REVIEW OF SYSTEMS:  Skin:  Positive for     Eyes:  Positive for glasses  ENT:  Positive for hearing loss  Respiratory:  Negative    Cardiovascular:    lightheadedness;Positive for  Gastroenterology: Negative    Genitourinary:  Positive for nocturia  Musculoskeletal:  Negative    Neurologic:  Negative    Psychiatric:  Negative    Heme/Lymph/Imm:  Negative    Endocrine:  Negative        PHYSICAL EXAM:      BP: (!) 166/67 Pulse: 56            Vital Signs with Ranges  Pulse:  [56] 56  BP: (166)/(67) 166/67  212 lbs 0 oz    Constitutional: awake, alert, no distress  Respiratory: Clear  Cardiovascular: JVP about 10 cms. Regular  GI: nondistended  Neuropsychiatric: appropriate affact    DATA:  Labs: Pertinent cardiac labs reviewed    JOE Estevez, [facility]  Cardiology - [facility]  March 1, 2021

## 2021-03-01 NOTE — PATIENT INSTRUCTIONS
Stop lasix  Start Torsemide 40 mg daily.  Start hydralazine 25 mg three times daily  Labs and visit in 2-3 weeks

## 2021-03-01 NOTE — LETTER
3/1/2021    Rudy Vernon MD  [address]  [facility] [address]    RE: Betito Anderson       Dear Colleague,    I had the pleasure of seeing Betito Anderson in the [facility].    CARDIOLOGY CLINIC CONSULTATION    REASON FOR CONSULT: HF recovered EF    PRIMARY CARE PHYSICIAN:  Rudy Vernon    HISTORY OF PRESENT ILLNESS:  A very pleasant, 75-year-old gentleman who started seeing me in the summer of 2019.  He has a history of chronic atrial fibrillation since 2018 for which he is on anticoagulation, but in 07/2019, he presented to the hospital with acute hypertensive emergency and acute LV dysfunction.  EF actually was down to 30%, but normalized to 55% with control of blood pressure.  However, angiogram at that admission showed a tight lesion in the mid LAD spanning into the diagonal and also had other small-vessel disease and a subtotally occluded right coronary artery with robust collaterals from the LAD.  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  He underwent PCI of his LAD bifurcation into the diagonal. Subsequently, a nuclear stress test had shown mild basilar inferolateral ischemia without angina, and as such, this has been under conservative management. He also has a history of obesity, heart failure with recovered ejection fraction, NYHA class II, history of hypertension and diabetes.  Subsequently, in 11/2019, he had episodes of syncope, and a monitor had shown nonsustained VT, and then EP study was negative.  Subsequently, he had a loop recorder implanted.  He has been having some 4 to 4-1/2 second pauses, but he has been asymptomatic.  He currently takes 6.25 b.i.d. of carvedilol with this.     See last note for details from 2/16/21. Then he was hypertensive and volume overloaded. Lasix was increased to 40 BID for 3 days and then 40 daily. He lost weight soon after the first increase in lasix dosing, but then started  gaining again on 40 daily. Today he still has 2+ edema. NYHA class II. Echo shows normal function. JVP is elevated. BP is up. Home BP in the 130-140 range.    Plan  1. Stop lasix  2. Start hydralazine 25 TID  3. Start Torsemide 40 mg daily and see RASHI in 3 week with labs. If weight not coming down, then go to 40/20. If Creatinine worsens and persistently dyspneic despite HTN control, then RHC.   4. See me back in 6 months.     PAST MEDICAL HISTORY:  Past Medical History:   Diagnosis Date     ASCVD (arteriosclerotic cardiovascular disease) 1997    angio with 40% circ lesion; 6/19 hosp for chf, 3 vessel dz on angio but porcelin aorta so ptca done     Atrial fibrillation (H) 10/09/2018    found on routine physical     Carotid artery plaque 2005    seen on us, about 50% stenosis, fu 2009 us no significant stenosis     CHF (congestive heart failure) (H) 06/2019    hosp fsd, then fu echo ef nl     Elevated blood sugar      Gout     on allopurinol     HTN (hypertension)      Hypercholesteremia      Hyponatremia 2015    felt due to chlorthalidone     Low mean corpuscular volume (MCV)      Near syncope 5/15    fu est echo low level but neg     Psoriasis     Dr. Marti     Renal mass 06/29/2019    seen on ct chest for sob, needs fu ct for that, fu us done 7/19 and no mass seen, should have fu ct in December     Screening 2012    abd us no aaa     Syncope 09/2019    hosp fsd, cause not clear, lowered coreg dose; seen by Dr. Lezama of ep and ep study neg, implanted event monitor     V-tach (H) 09/2019    seen on event monitor for fu syncope, then ep study and no inducible vtach       MEDICATIONS:  Current Outpatient Medications   Medication     allopurinol (ZYLOPRIM) 300 MG tablet     amLODIPine (NORVASC) 10 MG tablet     aspirin (ASA) 81 MG EC tablet     atorvastatin (LIPITOR) 40 MG tablet     carvedilol (COREG) 6.25 MG tablet     hydrALAZINE (APRESOLINE) 25 MG tablet     isosorbide mononitrate CR (IMDUR) 120 MG 24 HR ER tablet      losartan (COZAAR) 100 MG tablet     rivaroxaban ANTICOAGULANT (XARELTO) 20 MG TABS tablet     torsemide (DEMADEX) 20 MG tablet     atorvastatin (LIPITOR) 20 MG tablet     No current facility-administered medications for this visit.        ALLERGIES:  Allergies   Allergen Reactions     Ace Inhibitors Anaphylaxis     Edema of ace     Eliquis [Apixaban] Other (See Comments)       SOCIAL HISTORY:  I have reviewed this patient's social history and updated it with pertinent information if needed. Betito Anderson  reports that he quit smoking about 22 years ago. His smoking use included cigars. He has a 30.00 pack-year smoking history. He has never used smokeless tobacco. He reports previous alcohol use of about 14.0 standard drinks of alcohol per week. He reports that he does not use drugs.    FAMILY HISTORY:  I have reviewed this patient's family history and updated it with pertinent information if needed.   Family History   Problem Relation Age of Onset     Coronary Artery Disease Father      Medical History Unknown Mother      Medical History Unknown Maternal Grandfather        REVIEW OF SYSTEMS:  Skin:  Positive for     Eyes:  Positive for glasses  ENT:  Positive for hearing loss  Respiratory:  Negative    Cardiovascular:    lightheadedness;Positive for  Gastroenterology: Negative    Genitourinary:  Positive for nocturia  Musculoskeletal:  Negative    Neurologic:  Negative    Psychiatric:  Negative    Heme/Lymph/Imm:  Negative    Endocrine:  Negative        PHYSICAL EXAM:      BP: (!) 166/67 Pulse: 56            Vital Signs with Ranges  Pulse:  [56] 56  BP: (166)/(67) 166/67  212 lbs 0 oz    Constitutional: awake, alert, no distress  Respiratory: Clear  Cardiovascular: JVP about 10 cms. Regular  GI: nondistended  Neuropsychiatric: appropriate affact    DATA:  Labs: Pertinent cardiac labs reviewed    JOE Estevez, [facility]  Cardiology - [facility]  March 1, 2021    cc:   Mendez Hidalgo MD  [address]  [address]  [place] [id number]

## 2021-03-02 ENCOUNTER — CARE COORDINATION (OUTPATIENT)
Dept: CARDIOLOGY | Facility: CLINIC | Age: 76
End: 2021-03-02

## 2021-03-02 NOTE — PROGRESS NOTES
Received fax from [employer] requesting to review appropriateness of Xarelto 20 mg standard dosing in this patient. They are suggesting evaluation whether dose adjustment is needed for age and renal function.     Chart reviewed, patient was previously on Xarelto 15 mg until 7/2020. At that time his Plavix was discontinued and it was decided to increase his Xarelto to the standard 20 mg dose along with continuing aspirin.     Will update Dr. Hidalgo. Patient has a lab appt on 3/31 and follow up with Melvi BARAHONA On 4/5/21. Althea Corona RN on 3/2/2021 at 2:13 PM      Component      Latest Ref Rng & Units 3/1/2021   Sodium      133 - 144 mmol/L 141   Potassium      3.4 - 5.3 mmol/L 4.3   Chloride      94 - 109 mmol/L 108   Carbon Dioxide      20 - 32 mmol/L 27   Anion Gap      3 - 14 mmol/L 6   Glucose      70 - 99 mg/dL 102 (H)   Urea Nitrogen      7 - 30 mg/dL 35 (H)   Creatinine      0.66 - 1.25 mg/dL 1.38 (H)   GFR Estimate      >60 mL/min/1.73:m2 50 (L)   GFR Estimate If Black      >60 mL/min/1.73:m2 57 (L)   Calcium      8.5 - 10.1 mg/dL 9.0

## 2021-03-02 NOTE — TELEPHONE ENCOUNTER
His dose was 15 mg based on PIONEER AF PCI approach. He was on plavix at that time.     His Cr Cl is just around 50. As such a 20 mg dose is appropriate unless pharmacist thinks otherwise and I am missing something?

## 2021-03-03 NOTE — PROGRESS NOTES
Mendez Hidalgo MD  You 6 hours ago (9:39 AM)        No 20 mg is fine for now unless Creatinine worsens or has bleeding issues.         Documentation       You  Mendez Hidalgo MD 23 hours ago (4:41 PM)     Don't think so. Figured 15 mg was due to concomitant therapy with Plavix and now with just asa 81 mg the Xarelto 20 mg is appropriate. Sent your way to confirm. I think pharmacist wanted to verify renal function due to age 75 now.     Thanks!   Althea Champagne comment     No changes needed with Xarelto dosing. Reviewed per faxed request from pharmacy to consider if alternate lower dose was needed due to patient's age. Encounter closed. Althea Corona RN on 3/3/2021 at 3:47 PM

## 2021-03-15 ENCOUNTER — IMMUNIZATION (OUTPATIENT)
Dept: NURSING | Facility: CLINIC | Age: 76
End: 2021-03-15
Attending: INTERNAL MEDICINE
Payer: COMMERCIAL

## 2021-03-15 PROCEDURE — 91300 PR COVID VAC PFIZER DIL RECON 30 MCG/0.3 ML IM: CPT

## 2021-03-15 PROCEDURE — 0002A PR COVID VAC PFIZER DIL RECON 30 MCG/0.3 ML IM: CPT

## 2021-03-21 ENCOUNTER — HEALTH MAINTENANCE LETTER (OUTPATIENT)
Age: 76
End: 2021-03-21

## 2021-03-29 DIAGNOSIS — I50.22 CHRONIC SYSTOLIC CONGESTIVE HEART FAILURE (H): Primary | ICD-10-CM

## 2021-04-05 ENCOUNTER — OFFICE VISIT (OUTPATIENT)
Dept: CARDIOLOGY | Facility: CLINIC | Age: 76
End: 2021-04-05
Attending: INTERNAL MEDICINE
Payer: COMMERCIAL

## 2021-04-05 ENCOUNTER — CARE COORDINATION (OUTPATIENT)
Dept: CARDIOLOGY | Facility: CLINIC | Age: 76
End: 2021-04-05

## 2021-04-05 VITALS
HEIGHT: 66 IN | SYSTOLIC BLOOD PRESSURE: 128 MMHG | BODY MASS INDEX: 33.94 KG/M2 | DIASTOLIC BLOOD PRESSURE: 64 MMHG | HEART RATE: 56 BPM | WEIGHT: 211.2 LBS

## 2021-04-05 DIAGNOSIS — E78.5 HYPERLIPIDEMIA LDL GOAL <100: ICD-10-CM

## 2021-04-05 DIAGNOSIS — I10 BENIGN ESSENTIAL HYPERTENSION: ICD-10-CM

## 2021-04-05 DIAGNOSIS — I50.30 HEART FAILURE WITH PRESERVED EJECTION FRACTION, UNSPECIFIED HF CHRONICITY (H): Primary | ICD-10-CM

## 2021-04-05 DIAGNOSIS — I50.22 CHRONIC SYSTOLIC CONGESTIVE HEART FAILURE (H): ICD-10-CM

## 2021-04-05 DIAGNOSIS — I50.22 CHRONIC SYSTOLIC CONGESTIVE HEART FAILURE (H): Primary | ICD-10-CM

## 2021-04-05 LAB
ANION GAP SERPL CALCULATED.3IONS-SCNC: 5 MMOL/L (ref 3–14)
BUN SERPL-MCNC: 40 MG/DL (ref 7–30)
CALCIUM SERPL-MCNC: 8.8 MG/DL (ref 8.5–10.1)
CHLORIDE SERPL-SCNC: 102 MMOL/L (ref 94–109)
CO2 SERPL-SCNC: 29 MMOL/L (ref 20–32)
CREAT SERPL-MCNC: 1.65 MG/DL (ref 0.66–1.25)
GFR SERPL CREATININE-BSD FRML MDRD: 40 ML/MIN/{1.73_M2}
GLUCOSE SERPL-MCNC: 102 MG/DL (ref 70–99)
HGB BLD-MCNC: 11.9 G/DL (ref 13.3–17.7)
NT-PROBNP SERPL-MCNC: 2076 PG/ML (ref 0–450)
POTASSIUM SERPL-SCNC: 4.3 MMOL/L (ref 3.4–5.3)
SODIUM SERPL-SCNC: 136 MMOL/L (ref 133–144)
TSH SERPL DL<=0.005 MIU/L-ACNC: 2.83 MU/L (ref 0.4–4)

## 2021-04-05 PROCEDURE — 99213 OFFICE O/P EST LOW 20 MIN: CPT | Performed by: PHYSICIAN ASSISTANT

## 2021-04-05 PROCEDURE — 85018 HEMOGLOBIN: CPT | Performed by: PHYSICIAN ASSISTANT

## 2021-04-05 PROCEDURE — 80048 BASIC METABOLIC PNL TOTAL CA: CPT | Performed by: PHYSICIAN ASSISTANT

## 2021-04-05 PROCEDURE — 36415 COLL VENOUS BLD VENIPUNCTURE: CPT | Performed by: PHYSICIAN ASSISTANT

## 2021-04-05 PROCEDURE — 84443 ASSAY THYROID STIM HORMONE: CPT | Performed by: PHYSICIAN ASSISTANT

## 2021-04-05 PROCEDURE — 83880 ASSAY OF NATRIURETIC PEPTIDE: CPT | Performed by: PHYSICIAN ASSISTANT

## 2021-04-05 ASSESSMENT — MIFFLIN-ST. JEOR: SCORE: 1635.75

## 2021-04-05 NOTE — PATIENT INSTRUCTIONS
Call [facility] nurse for any questions or concerns Mon-Fri 8am-4pm:                                                #[phone]                                       For concerns after hours:                                               #[phone]     1: Medication changes: continue current medications.      2: Plan from today: I'll see you back in about 2 months with labs before that visit.      3: Lab results: we'll call with your lab results.

## 2021-04-05 NOTE — LETTER
4/5/2021    Rudy Vernon MD  [address]  [facility] [address]    RE: Betito Anderson       Dear Colleague,    I had the pleasure of seeing Betito Anderson in the [facility].    [id number]      HPI and Plan:   See dictation    Orders this Visit:  Orders Placed This Encounter   Procedures     Basic metabolic panel     Follow-Up with [facility] - RASHI visit     No orders of the defined types were placed in this encounter.    There are no discontinued medications.      Encounter Diagnoses   Name Primary?     Benign essential hypertension      Hyperlipidemia LDL goal <70      Heart failure with preserved ejection fraction, unspecified HF chronicity (H) Yes       CURRENT MEDICATIONS:  Current Outpatient Medications   Medication Sig Dispense Refill     allopurinol (ZYLOPRIM) 300 MG tablet TAKE 1 TABLET EVERY DAY 90 tablet 0     amLODIPine (NORVASC) 10 MG tablet Take 1 tablet (10 mg) by mouth daily 90 tablet 3     aspirin (ASA) 81 MG EC tablet Take 1 tablet (81 mg) by mouth daily 90 tablet 3     atorvastatin (LIPITOR) 20 MG tablet Take 1 tablet (20 mg) by mouth daily 90 tablet 3     atorvastatin (LIPITOR) 40 MG tablet Take 1 tablet (40 mg) by mouth daily 90 tablet 3     carvedilol (COREG) 6.25 MG tablet Take 1 tablet (6.25 mg) by mouth 2 times daily (with meals) 180 tablet 3     hydrALAZINE (APRESOLINE) 25 MG tablet Take 1 tablet (25 mg) by mouth 3 times daily 90 tablet 3     isosorbide mononitrate CR (IMDUR) 120 MG 24 HR ER tablet Take 1 tablet (120 mg) by mouth daily 90 tablet 3     losartan (COZAAR) 100 MG tablet TAKE 1 TABLET(100 MG) BY MOUTH DAILY 90 tablet 3     rivaroxaban ANTICOAGULANT (XARELTO) 20 MG TABS tablet Take 1 tablet (20 mg) by mouth daily (with dinner) 90 tablet 3     torsemide (DEMADEX) 20 MG tablet Take 2 tablets (40 mg) by mouth daily 60 tablet 3       ALLERGIES     Allergies   Allergen Reactions     Ace Inhibitors Anaphylaxis     Edema  of ace     Eliquis [Apixaban] Other (See Comments)       PAST MEDICAL HISTORY:  Past Medical History:   Diagnosis Date     ASCVD (arteriosclerotic cardiovascular disease) 1997    angio with 40% circ lesion; 6/19 hosp for chf, 3 vessel dz on angio but porcelin aorta so ptca done     Atrial fibrillation (H) 10/09/2018    found on routine physical     Carotid artery plaque 2005    seen on us, about 50% stenosis, fu 2009 us no significant stenosis     CHF (congestive heart failure) (H) 06/2019    hosp fsd, then fu echo ef nl     Elevated blood sugar      Gout     on allopurinol     HTN (hypertension)      Hypercholesteremia      Hyponatremia 2015    felt due to chlorthalidone     Low mean corpuscular volume (MCV)      Near syncope 5/15    fu est echo low level but neg     Psoriasis     Dr. Marti     Renal mass 06/29/2019    seen on ct chest for sob, needs fu ct for that, fu us done 7/19 and no mass seen, should have fu ct in December     Screening 2012    abd us no aaa     Syncope 09/2019    hosp fsd, cause not clear, lowered coreg dose; seen by Dr. Lezama of ep and ep study neg, implanted event monitor     V-tach (H) 09/2019    seen on event monitor for fu syncope, then ep study and no inducible vtach       PAST SURGICAL HISTORY:  Past Surgical History:   Procedure Laterality Date     C ANESTH,DX ARTHROSCOPIC PROC KNEE JOINT  2009     CV CORONARY ANGIOGRAM N/A 7/2/2019    Procedure: Coronary Angiogram;  Surgeon: Donaldo Loera MD;  Location:  HEART CARDIAC CATH LAB     CV HEART CATHETERIZATION WITH POSSIBLE INTERVENTION N/A 7/5/2019    Procedure: Heart Catheterization with Possible Intervention;  Surgeon: Manolo Hu MD;  Location:  HEART CARDIAC CATH LAB     CV LEFT HEART CATH N/A 7/2/2019    Procedure: Left Heart Cath;  Surgeon: Donaldo Loera MD;  Location:  HEART CARDIAC CATH LAB     CV LEFT VENTRICULOGRAM N/A 7/2/2019    Procedure: Left Ventriculogram;  Surgeon: Donaldo Loera,  MD;  Location:  HEART CARDIAC CATH LAB     CV PCI STENT DRUG ELUTING N/A 2019    Procedure: PCI Stent Drug Eluting;  Surgeon: Manolo Hu MD;  Location:  HEART CARDIAC CATH LAB     CV RIGHT HEART CATH MEASUREMENTS RECORDED N/A 2019    Procedure: Right Heart Cath;  Surgeon: Donaldo Loera MD;  Location:  HEART CARDIAC CATH LAB     EP LOOP RECORDER IMPLANT N/A 10/11/2019    Procedure: EP Loop Recorder Implant;  Surgeon: Fuad Lezama MD;  Location:  HEART CARDIAC CATH LAB     EP VENTRICULAR PACING N/A 10/11/2019    Procedure: EP Ventricular Pacing;  Surgeon: Fuad Lezama MD;  Location:  HEART CARDIAC CATH LAB       FAMILY HISTORY:  Family History   Problem Relation Age of Onset     Coronary Artery Disease Father      Medical History Unknown Mother      Medical History Unknown Maternal Grandfather        SOCIAL HISTORY:  Social History     Socioeconomic History     Marital status:      Spouse name: None     Number of children: 2     Years of education: None     Highest education level: None   Occupational History     Occupation: retired   Social Needs     Financial resource strain: None     Food insecurity     Worry: None     Inability: None     Transportation needs     Medical: None     Non-medical: None   Tobacco Use     Smoking status: Former Smoker     Packs/day: 1.00     Years: 30.00     Pack years: 30.00     Types: Cigars     Quit date: 1998     Years since quittin.5     Smokeless tobacco: Never Used   Substance and Sexual Activity     Alcohol use: Not Currently     Alcohol/week: 14.0 standard drinks     Types: 14 Standard drinks or equivalent per week     Frequency: 2-3 times a week     Drinks per session: 1 or 2     Binge frequency: Never     Comment: 1-2 a night     Drug use: No     Sexual activity: Never     Partners: Female   Lifestyle     Physical activity     Days per week: None     Minutes per session: None     Stress: None   Relationships      "Social connections     Talks on phone: None     Gets together: None     Attends Jain service: None     Active member of club or organization: None     Attends meetings of clubs or organizations: None     Relationship status: None     Intimate partner violence     Fear of current or ex partner: None     Emotionally abused: None     Physically abused: None     Forced sexual activity: None   Other Topics Concern     Parent/sibling w/ CABG, MI or angioplasty before 65F 55M? Not Asked   Social History Narrative     None       Review of Systems:  Skin:  Negative     Eyes:  Negative    ENT:  Negative    Respiratory:  Negative    Cardiovascular:    Positive for;dizziness  Gastroenterology: Negative    Genitourinary:  Negative    Musculoskeletal:  Positive for joint pain(right knee)  Neurologic:  Negative    Psychiatric:  Negative    Heme/Lymph/Imm:  Negative    Endocrine:  Negative      Physical Exam:  Vitals: /64   Pulse 56   Ht 1.676 m (5' 6\")   Wt 95.8 kg (211 lb 3.2 oz)   BMI 34.09 kg/m     Please refer to dictation for physical exam    Recent Lab Results:  LIPID RESULTS:  Lab Results   Component Value Date    CHOL 194 02/16/2021    HDL 41 02/16/2021     (H) 02/16/2021    TRIG 193 (H) 02/16/2021    CHOLHDLRATIO 2.8 02/26/2015       LIVER ENZYME RESULTS:  Lab Results   Component Value Date    AST 14 02/16/2021    ALT 23 02/16/2021       CBC RESULTS:  Lab Results   Component Value Date    WBC 7.4 10/11/2019    RBC 4.52 10/11/2019    HGB 11.9 (L) 04/05/2021    HCT 38.7 (L) 10/11/2019    MCV 86 10/11/2019    MCH 27.2 10/11/2019    MCHC 31.8 10/11/2019    RDW 16.6 (H) 10/11/2019     10/11/2019       BMP RESULTS:  Lab Results   Component Value Date     04/05/2021    POTASSIUM 4.3 04/05/2021    CHLORIDE 102 04/05/2021    CO2 PENDING 04/05/2021    ANIONGAP PENDING 04/05/2021    GLC PENDING 04/05/2021    BUN PENDING 04/05/2021    CR PENDING 04/05/2021    GFRESTIMATED PENDING 04/05/2021    " GFRESTBLACK PENDING 04/05/2021    GUNNER PENDING 04/05/2021        A1C RESULTS:  Lab Results   Component Value Date    A1C 5.7 (H) 06/30/2019       INR RESULTS:  Lab Results   Component Value Date    INR 1.18 (H) 09/14/2019    INR 2.33 (H) 07/18/2019     Thank you for allowing me to participate in the care of your patient.      Sincerely,     Melvi Montaño PA-C     [facility]    cc:   Mendez Hidalgo MD  [address]  [place]  [place] [address]

## 2021-04-05 NOTE — PROGRESS NOTES
[id number]      HPI and Plan:   See dictation    Orders this Visit:  Orders Placed This Encounter   Procedures     Basic metabolic panel     Follow-Up with [facility] visit     No orders of the defined types were placed in this encounter.    There are no discontinued medications.      Encounter Diagnoses   Name Primary?     Benign essential hypertension      Hyperlipidemia LDL goal <70      Heart failure with preserved ejection fraction, unspecified HF chronicity (H) Yes       CURRENT MEDICATIONS:  Current Outpatient Medications   Medication Sig Dispense Refill     allopurinol (ZYLOPRIM) 300 MG tablet TAKE 1 TABLET EVERY DAY 90 tablet 0     amLODIPine (NORVASC) 10 MG tablet Take 1 tablet (10 mg) by mouth daily 90 tablet 3     aspirin (ASA) 81 MG EC tablet Take 1 tablet (81 mg) by mouth daily 90 tablet 3     atorvastatin (LIPITOR) 20 MG tablet Take 1 tablet (20 mg) by mouth daily 90 tablet 3     atorvastatin (LIPITOR) 40 MG tablet Take 1 tablet (40 mg) by mouth daily 90 tablet 3     carvedilol (COREG) 6.25 MG tablet Take 1 tablet (6.25 mg) by mouth 2 times daily (with meals) 180 tablet 3     hydrALAZINE (APRESOLINE) 25 MG tablet Take 1 tablet (25 mg) by mouth 3 times daily 90 tablet 3     isosorbide mononitrate CR (IMDUR) 120 MG 24 HR ER tablet Take 1 tablet (120 mg) by mouth daily 90 tablet 3     losartan (COZAAR) 100 MG tablet TAKE 1 TABLET(100 MG) BY MOUTH DAILY 90 tablet 3     rivaroxaban ANTICOAGULANT (XARELTO) 20 MG TABS tablet Take 1 tablet (20 mg) by mouth daily (with dinner) 90 tablet 3     torsemide (DEMADEX) 20 MG tablet Take 2 tablets (40 mg) by mouth daily 60 tablet 3       ALLERGIES     Allergies   Allergen Reactions     Ace Inhibitors Anaphylaxis     Edema of ace     Eliquis [Apixaban] Other (See Comments)       PAST MEDICAL HISTORY:  Past Medical History:   Diagnosis Date     ASCVD (arteriosclerotic cardiovascular disease) 1997    angio with 40% circ lesion; 6/19 hosp for chf, 3 vessel dz on angio  but porcelin aorta so ptca done     Atrial fibrillation (H) 10/09/2018    found on routine physical     Carotid artery plaque 2005    seen on us, about 50% stenosis, fu 2009 us no significant stenosis     CHF (congestive heart failure) (H) 06/2019    hosp fsd, then fu echo ef nl     Elevated blood sugar      Gout     on allopurinol     HTN (hypertension)      Hypercholesteremia      Hyponatremia 2015    felt due to chlorthalidone     Low mean corpuscular volume (MCV)      Near syncope 5/15    fu est echo low level but neg     Psoriasis     Dr. Marti     Renal mass 06/29/2019    seen on ct chest for sob, needs fu ct for that, fu us done 7/19 and no mass seen, should have fu ct in December     Screening 2012    abd us no aaa     Syncope 09/2019    hosp fsd, cause not clear, lowered coreg dose; seen by Dr. Lezama of ep and ep study neg, implanted event monitor     V-tach (H) 09/2019    seen on event monitor for fu syncope, then ep study and no inducible vtach       PAST SURGICAL HISTORY:  Past Surgical History:   Procedure Laterality Date     C ANESTH,DX ARTHROSCOPIC PROC KNEE JOINT  2009     CV CORONARY ANGIOGRAM N/A 7/2/2019    Procedure: Coronary Angiogram;  Surgeon: Donaldo Loera MD;  Location:  [facility]     CV HEART CATHETERIZATION WITH POSSIBLE INTERVENTION N/A 7/5/2019    Procedure: Heart Catheterization with Possible Intervention;  Surgeon: Manolo Hu MD;  Location:  [facility]     CV LEFT HEART CATH N/A 7/2/2019    Procedure: Left Heart Cath;  Surgeon: Donaldo Loera MD;  Location:  [facility]     CV LEFT VENTRICULOGRAM N/A 7/2/2019    Procedure: Left Ventriculogram;  Surgeon: Donaldo Loera MD;  Location:  [facility]     CV PCI STENT DRUG ELUTING N/A 7/5/2019    Procedure: PCI Stent Drug Eluting;  Surgeon: Manolo Hu MD;  Location:  [facility]     CV RIGHT HEART CATH MEASUREMENTS RECORDED N/A  2019    Procedure: Right Heart Cath;  Surgeon: Donaldo Loera MD;  Location:  [facility]     EP LOOP RECORDER IMPLANT N/A 10/11/2019    Procedure: EP Loop Recorder Implant;  Surgeon: Fuad Lezama MD;  Location:  [facility]     EP VENTRICULAR PACING N/A 10/11/2019    Procedure: EP Ventricular Pacing;  Surgeon: Fuad Lezama MD;  Location:  [facility]       FAMILY HISTORY:  Family History   Problem Relation Age of Onset     Coronary Artery Disease Father      Medical History Unknown Mother      Medical History Unknown Maternal Grandfather        SOCIAL HISTORY:  Social History     Socioeconomic History     Marital status:      Spouse name: None     Number of children: 2     Years of education: None     Highest education level: None   Occupational History     Occupation: retired   Social Needs     Financial resource strain: None     Food insecurity     Worry: None     Inability: None     Transportation needs     Medical: None     Non-medical: None   Tobacco Use     Smoking status: Former Smoker     Packs/day: 1.00     Years: 30.00     Pack years: 30.00     Types: Cigars     Quit date: 1998     Years since quittin.5     Smokeless tobacco: Never Used   Substance and Sexual Activity     Alcohol use: Not Currently     Alcohol/week: 14.0 standard drinks     Types: 14 Standard drinks or equivalent per week     Frequency: 2-3 times a week     Drinks per session: 1 or 2     Binge frequency: Never     Comment: 1-2 a night     Drug use: No     Sexual activity: Never     Partners: Female   Lifestyle     Physical activity     Days per week: None     Minutes per session: None     Stress: None   Relationships     Social connections     Talks on phone: None     Gets together: None     Attends Zoroastrianism service: None     Active member of club or organization: None     Attends meetings of clubs or organizations: None     Relationship status: None     Intimate  "partner violence     Fear of current or ex partner: None     Emotionally abused: None     Physically abused: None     Forced sexual activity: None   Other Topics Concern     Parent/sibling w/ CABG, MI or angioplasty before 65F 55M? Not Asked   Social History Narrative     None       Review of Systems:  Skin:  Negative     Eyes:  Negative    ENT:  Negative    Respiratory:  Negative    Cardiovascular:    Positive for;dizziness  Gastroenterology: Negative    Genitourinary:  Negative    Musculoskeletal:  Positive for joint pain(right knee)  Neurologic:  Negative    Psychiatric:  Negative    Heme/Lymph/Imm:  Negative    Endocrine:  Negative      Physical Exam:  Vitals: /64   Pulse 56   Ht 1.676 m (5' 6\")   Wt 95.8 kg (211 lb 3.2 oz)   BMI 34.09 kg/m     Please refer to dictation for physical exam    Recent Lab Results:  LIPID RESULTS:  Lab Results   Component Value Date    CHOL 194 02/16/2021    HDL 41 02/16/2021     (H) 02/16/2021    TRIG 193 (H) 02/16/2021    CHOLHDLRATIO 2.8 02/26/2015       LIVER ENZYME RESULTS:  Lab Results   Component Value Date    AST 14 02/16/2021    ALT 23 02/16/2021       CBC RESULTS:  Lab Results   Component Value Date    WBC 7.4 10/11/2019    RBC 4.52 10/11/2019    HGB 11.9 (L) 04/05/2021    HCT 38.7 (L) 10/11/2019    MCV 86 10/11/2019    MCH 27.2 10/11/2019    MCHC 31.8 10/11/2019    RDW 16.6 (H) 10/11/2019     10/11/2019       BMP RESULTS:  Lab Results   Component Value Date     04/05/2021    POTASSIUM 4.3 04/05/2021    CHLORIDE 102 04/05/2021    CO2 PENDING 04/05/2021    ANIONGAP PENDING 04/05/2021    GLC PENDING 04/05/2021    BUN PENDING 04/05/2021    CR PENDING 04/05/2021    GFRESTIMATED PENDING 04/05/2021    GFRESTBLACK PENDING 04/05/2021    GUNNER PENDING 04/05/2021        A1C RESULTS:  Lab Results   Component Value Date    A1C 5.7 (H) 06/30/2019       INR RESULTS:  Lab Results   Component Value Date    INR 1.18 (H) 09/14/2019    INR 2.33 (H) 07/18/2019 "           CC  Mendez Hidalgo MD  [id number] [place] [id number]  [place] [id number]

## 2021-04-05 NOTE — PROGRESS NOTES
Call out to patient to review labs/plan per S.GUS Montaño. BMP order placed for two weeks from now. Pt did not answer, so I sent a whistleBox message. I also scheduled him for a lab appt on 4/19 at 12:30pm, since scheduling is backed up, this was easier than the pt potentially having to wait on hold for a long time. Pt to contact us if this date/time doesn't work.     Melvi Montaño PA-C Sletteboe, Erin B., RN   Cc: Althea Corona RN             Labs were not available at time of clinic visit.  Cr and bun up from previous- likely due to switch to torsemide.  Although, pt feels better, edema is better and no signs of volume on exam.  Hgb lowish, but not concerning.  BNP elevated but not severely so.  At this time, he should continue current medications.  Repeat bmp in 2 weeks if Cr still elevated will consider decreasing torsemide.  Melvi Montaño PA-C 4/5/2021 2:59 PM          Component      Latest Ref Rng & Units 4/5/2021   Sodium      133 - 144 mmol/L 136   Potassium      3.4 - 5.3 mmol/L 4.3   Chloride      94 - 109 mmol/L 102   Carbon Dioxide      20 - 32 mmol/L 29   Anion Gap      3 - 14 mmol/L 5   Glucose      70 - 99 mg/dL 102 (H)   Urea Nitrogen      7 - 30 mg/dL 40 (H)   Creatinine      0.66 - 1.25 mg/dL 1.65 (H)   GFR Estimate      >60 mL/min/1.73:m2 40 (L)   GFR Estimate If Black      >60 mL/min/1.73:m2 46 (L)   Calcium      8.5 - 10.1 mg/dL 8.8

## 2021-04-05 NOTE — PROGRESS NOTES
Service Date: 2021      CLINIC VISIT      PRIMARY CARDIOLOGIST:  Dr. Hidalgo      REASON FOR VISIT:  Hypertension, HFpEF.      HISTORY OF PRESENT ILLNESS:  Mr. Anderson is a delightful 75-year-old gentleman with past medical history significant for the followin.  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  He has an ongoing 70% circumflex lesion and 90% proximal RCA lesion with good left-to-right collaterals, managed medically.   2.  Hypertension.   3.  Heart failure with preserved EF with history of systolic heart failure with EF as low as 35%, improved after normalization of his blood pressure.   4.  Dyslipidemia.   5.  Chronic AFib, on Xarelto.   6.  History of nonsustained VT with negative EP study and history of syncope.  Loop monitor in place.   7.  Obesity.      Mr. Anderson comes in today, as he was last found to be hypertensive and volume overloaded by Dr. Hidalgo.  He was switched to torsemide from Lasix and hydralazine was added at 25 mg t.i.d.      Today, he comes in stating he feels better.  He denies chest pain, shortness of breath, orthopnea or PND.  His swelling has improved.  He continues to have swelling in his right leg more than his left but it is much better than it had been before.  His breathing overall is okay.  Yesterday morning, he did have several hours where he felt woozy.  He had taken some Tylenol, as he had knee pain and was sore all over and felt goofy for a few hours and then after that, felt fine.  When he was feeling better, he took his blood pressure.  It was 117/60-something.  He had no CVA symptoms.  He was not syncopal or presyncopal.  Today, he feels great.  He  otherwise has felt well with the exception of this time yesterday.      SOCIAL HISTORY:  He has recently gotten a NuStep at home.  He is a full gym at home but has not been using it.  He is planning on getting a .  He is trying to improve his diet.  He knows he has gained weight during COVID.  He is .  He is a former smoker, quit in 1998 with 30-pack-year history.  Alcohol daily.      PHYSICAL EXAMINATION:   GENERAL:  Well-developed, well-nourished gentleman in no acute distress.   HEENT:  Normocephalic, atraumatic.   HEART:  Irregularly irregular.  I do not appreciate murmur, rub or gallop.   LUNGS:  Clear without wheezes, rales or rhonchi.   EXTREMITIES:  With 1+ pitting edema on the right, minimal-to-no edema on the left.   NECK:  Veins are flat at 30 degrees.   SKIN:  Warm and dry.      ASSESSMENT AND PLAN:   1.  Hypertension, improved control on current medications and we will continue.   2.  Heart failure with preserved EF.  Appears euvolemic today with some ongoing right lower extremity edema.  I do not want to katlin that with increased diuretic, as his exam is otherwise normal.  We talked about using compression stockings on that leg and elevating as needed.  We also talked about watching his salt again, getting down to a low-sodium diet and increasing his exercise.  All these things will help.   3.  Coronary artery disease with known circumflex lesion with last stress test done in 2019 showing a small basal inferolateral wall area of ischemia.  He is considering a knee replacement.  He does seem optimized for this, although I am unclear if he can do 4 METs and may need a repeat stress test.   4.  Dyslipidemia.  Last , HDL 41, total cholesterol 194.  Appears Lipitor was increased to 40.  We will continue.      I will see this kind gentleman back in about 2 months, sooner with concerns.  Thank you for allowing me to participate in his care.      GUS Marino  VAIBHAV MIRANDA PA-C             D: 2021   T: 2021   MT: DAI      Name:     MARCELA TINAJERO   MRN:      [id number]        Account:      [id number]   :      1945           Service Date: 2021      Document: [id number]

## 2021-04-19 DIAGNOSIS — I50.22 CHRONIC SYSTOLIC CONGESTIVE HEART FAILURE (H): ICD-10-CM

## 2021-04-19 DIAGNOSIS — E78.5 HYPERLIPIDEMIA LDL GOAL <100: ICD-10-CM

## 2021-04-19 DIAGNOSIS — I10 BENIGN ESSENTIAL HYPERTENSION: ICD-10-CM

## 2021-04-19 LAB
ANION GAP SERPL CALCULATED.3IONS-SCNC: 4 MMOL/L (ref 3–14)
BUN SERPL-MCNC: 40 MG/DL (ref 7–30)
CALCIUM SERPL-MCNC: 9.1 MG/DL (ref 8.5–10.1)
CHLORIDE SERPL-SCNC: 104 MMOL/L (ref 94–109)
CO2 SERPL-SCNC: 31 MMOL/L (ref 20–32)
CREAT SERPL-MCNC: 1.62 MG/DL (ref 0.66–1.25)
GFR SERPL CREATININE-BSD FRML MDRD: 41 ML/MIN/{1.73_M2}
GLUCOSE SERPL-MCNC: 118 MG/DL (ref 70–99)
POTASSIUM SERPL-SCNC: 3.9 MMOL/L (ref 3.4–5.3)
SODIUM SERPL-SCNC: 139 MMOL/L (ref 133–144)

## 2021-04-19 PROCEDURE — 36415 COLL VENOUS BLD VENIPUNCTURE: CPT | Performed by: PHYSICIAN ASSISTANT

## 2021-04-19 PROCEDURE — 80048 BASIC METABOLIC PNL TOTAL CA: CPT | Performed by: PHYSICIAN ASSISTANT

## 2021-04-19 RX ORDER — HYDRALAZINE HYDROCHLORIDE 25 MG/1
25 TABLET, FILM COATED ORAL 3 TIMES DAILY
Qty: 90 TABLET | Refills: 3 | Status: SHIPPED | OUTPATIENT
Start: 2021-04-19 | End: 2021-06-01

## 2021-04-19 RX ORDER — TORSEMIDE 20 MG/1
40 TABLET ORAL DAILY
Qty: 180 TABLET | Refills: 1 | Status: SHIPPED | OUTPATIENT
Start: 2021-04-19 | End: 2021-04-21

## 2021-04-19 NOTE — PROGRESS NOTES
Component      Latest Ref Rng & Units 4/19/2021   Sodium      133 - 144 mmol/L 139   Potassium      3.4 - 5.3 mmol/L 3.9   Chloride      94 - 109 mmol/L 104   Carbon Dioxide      20 - 32 mmol/L 31   Anion Gap      3 - 14 mmol/L 4   Glucose      70 - 99 mg/dL 118 (H)   Urea Nitrogen      7 - 30 mg/dL 40 (H)   Creatinine      0.66 - 1.25 mg/dL 1.62 (H)   GFR Estimate      >60 mL/min/1.73:m2 41 (L)   GFR Estimate If Black      >60 mL/min/1.73:m2 47 (L)   Calcium      8.5 - 10.1 mg/dL 9.1     See previous RN notes. Messaged to RASHI.  Leda Kaiser RN 04/19/21 5:43 PM

## 2021-04-20 NOTE — PROGRESS NOTES
It's interesting his labs are almost exactly the same.  Please have him decrease his torsemide to 20 mg M, W, F.  Continue on 40 mg other days of the week.  Watch for wt and sx of chf closely.  Call if worsening.  Repeat bmp prior to June visit.  Melvi Montaño PA-C 4/20/2021 3:18 PM

## 2021-04-21 DIAGNOSIS — E78.5 HYPERLIPIDEMIA LDL GOAL <100: ICD-10-CM

## 2021-04-21 DIAGNOSIS — I10 BENIGN ESSENTIAL HYPERTENSION: ICD-10-CM

## 2021-04-21 RX ORDER — TORSEMIDE 20 MG/1
TABLET ORAL
Qty: 1 TABLET | Refills: 0 | COMMUNITY
Start: 2021-04-21 | End: 2021-08-12

## 2021-04-21 NOTE — PROGRESS NOTES
Melvi Montaño PA-C 4/20/21(3:19 PM)  It's interesting his labs are almost exactly the same.  Please have him decrease his torsemide to 20 mg M, W, F.  Continue on 40 mg other days of the week.  Watch for wt and sx of chf closely.  Call if worsening.  Repeat bmp prior to June visit.  Melvi Montaño PA-C 4/20/2021 3:18 PM        ====================    Called to patient with Melvi Montaño PA-C plan as above. Patient reports he has torsemide 20 mg tablets and read back instructions as above and agreement to medication change. Patient states right now he feels he is stable in weight and has no complaints of shortness of breath or swelling. Patient reports he monitors for weight and symptoms closely and will will contact us if changes.    Future Appointments   Date Time Provider Department Center   5/6/2021 12:00 AM [facility]   5/28/2021 12:45 PM [facility]   6/1/2021  2:30 PM Melvi Montaño PA-C [facility]      Leda Kaiser RN 04/21/21 9:14 AM

## 2021-05-13 ENCOUNTER — ANCILLARY PROCEDURE (OUTPATIENT)
Dept: CARDIOLOGY | Facility: CLINIC | Age: 76
End: 2021-05-13
Attending: INTERNAL MEDICINE
Payer: COMMERCIAL

## 2021-05-13 PROCEDURE — 93297 REM INTERROG DEV EVAL ICPMS: CPT | Performed by: INTERNAL MEDICINE

## 2021-05-13 PROCEDURE — G2066 INTER DEVC REMOTE 30D: HCPCS | Performed by: INTERNAL MEDICINE

## 2021-05-24 DIAGNOSIS — R55 SYNCOPE: ICD-10-CM

## 2021-05-24 DIAGNOSIS — Z45.09 ENCOUNTER FOR LOOP RECORDER CHECK: Primary | ICD-10-CM

## 2021-05-28 DIAGNOSIS — I50.30 HEART FAILURE WITH PRESERVED EJECTION FRACTION, UNSPECIFIED HF CHRONICITY (H): ICD-10-CM

## 2021-05-28 LAB
ANION GAP SERPL CALCULATED.3IONS-SCNC: 7 MMOL/L (ref 3–14)
BUN SERPL-MCNC: 44 MG/DL (ref 7–30)
CALCIUM SERPL-MCNC: 8.7 MG/DL (ref 8.5–10.1)
CHLORIDE SERPL-SCNC: 106 MMOL/L (ref 94–109)
CO2 SERPL-SCNC: 27 MMOL/L (ref 20–32)
CREAT SERPL-MCNC: 1.55 MG/DL (ref 0.66–1.25)
GFR SERPL CREATININE-BSD FRML MDRD: 43 ML/MIN/{1.73_M2}
GLUCOSE SERPL-MCNC: 105 MG/DL (ref 70–99)
POTASSIUM SERPL-SCNC: 3.9 MMOL/L (ref 3.4–5.3)
SODIUM SERPL-SCNC: 140 MMOL/L (ref 133–144)

## 2021-05-28 PROCEDURE — 36415 COLL VENOUS BLD VENIPUNCTURE: CPT | Performed by: PHYSICIAN ASSISTANT

## 2021-05-28 PROCEDURE — 80048 BASIC METABOLIC PNL TOTAL CA: CPT | Performed by: PHYSICIAN ASSISTANT

## 2021-06-01 ENCOUNTER — OFFICE VISIT (OUTPATIENT)
Dept: CARDIOLOGY | Facility: CLINIC | Age: 76
End: 2021-06-01
Attending: PHYSICIAN ASSISTANT
Payer: COMMERCIAL

## 2021-06-01 VITALS
DIASTOLIC BLOOD PRESSURE: 70 MMHG | BODY MASS INDEX: 34.68 KG/M2 | WEIGHT: 215.8 LBS | SYSTOLIC BLOOD PRESSURE: 134 MMHG | OXYGEN SATURATION: 98 % | HEART RATE: 66 BPM | HEIGHT: 66 IN

## 2021-06-01 DIAGNOSIS — I10 BENIGN ESSENTIAL HYPERTENSION: ICD-10-CM

## 2021-06-01 DIAGNOSIS — E78.5 HYPERLIPIDEMIA LDL GOAL <100: ICD-10-CM

## 2021-06-01 DIAGNOSIS — N18.30 STAGE 3 CHRONIC KIDNEY DISEASE, UNSPECIFIED WHETHER STAGE 3A OR 3B CKD (H): ICD-10-CM

## 2021-06-01 DIAGNOSIS — I50.30 HEART FAILURE WITH PRESERVED EJECTION FRACTION, UNSPECIFIED HF CHRONICITY (H): ICD-10-CM

## 2021-06-01 PROCEDURE — 99215 OFFICE O/P EST HI 40 MIN: CPT | Performed by: PHYSICIAN ASSISTANT

## 2021-06-01 RX ORDER — HYDRALAZINE HYDROCHLORIDE 50 MG/1
50 TABLET, FILM COATED ORAL 3 TIMES DAILY
Qty: 270 TABLET | Refills: 3 | Status: SHIPPED | OUTPATIENT
Start: 2021-06-01 | End: 2022-03-01

## 2021-06-01 RX ORDER — LOSARTAN POTASSIUM 50 MG/1
50 TABLET ORAL DAILY
Qty: 90 TABLET | Refills: 3 | Status: SHIPPED | OUTPATIENT
Start: 2021-06-01 | End: 2022-03-01

## 2021-06-01 ASSESSMENT — MIFFLIN-ST. JEOR: SCORE: 1656.61

## 2021-06-01 NOTE — RESULT ENCOUNTER NOTE
Will review or did review during clinic visit.  Please see progress note for plan.  Melvi Montaño PA-C 6/1/2021 3:20 PM

## 2021-06-01 NOTE — LETTER
6/1/2021    Rudy Vernon MD  [address]  [facility] [address]    RE: Betito Anderson       Dear Colleague,    I had the pleasure of seeing Betito Anderson in the [facility].    [id number]  {2021 E&M time:060846}    HPI and Plan:   See dictation    Orders this Visit:  Orders Placed This Encounter   Procedures     Basic metabolic panel     Follow-Up with [facility] - Return MD visit     Orders Placed This Encounter   Medications     hydrALAZINE (APRESOLINE) 50 MG tablet     Sig: Take 1 tablet (50 mg) by mouth 3 times daily     Dispense:  270 tablet     Refill:  3     losartan (COZAAR) 50 MG tablet     Sig: Take 1 tablet (50 mg) by mouth daily     Dispense:  90 tablet     Refill:  3     Medications Discontinued During This Encounter   Medication Reason     losartan (COZAAR) 100 MG tablet      hydrALAZINE (APRESOLINE) 25 MG tablet          Encounter Diagnoses   Name Primary?     Heart failure with preserved ejection fraction, unspecified HF chronicity (H)      Benign essential hypertension      Hyperlipidemia LDL goal <70      Stage 3 chronic kidney disease, unspecified whether stage 3a or 3b CKD        CURRENT MEDICATIONS:  Current Outpatient Medications   Medication Sig Dispense Refill     allopurinol (ZYLOPRIM) 300 MG tablet TAKE 1 TABLET EVERY DAY 90 tablet 0     amLODIPine (NORVASC) 10 MG tablet Take 1 tablet (10 mg) by mouth daily 90 tablet 3     aspirin (ASA) 81 MG EC tablet Take 1 tablet (81 mg) by mouth daily 90 tablet 3     atorvastatin (LIPITOR) 20 MG tablet Take 1 tablet (20 mg) by mouth daily 90 tablet 3     atorvastatin (LIPITOR) 40 MG tablet Take 1 tablet (40 mg) by mouth daily 90 tablet 3     carvedilol (COREG) 6.25 MG tablet Take 1 tablet (6.25 mg) by mouth 2 times daily (with meals) 180 tablet 3     hydrALAZINE (APRESOLINE) 50 MG tablet Take 1 tablet (50 mg) by mouth 3 times daily 270 tablet 3     isosorbide mononitrate CR (IMDUR)  120 MG 24 HR ER tablet Take 1 tablet (120 mg) by mouth daily 90 tablet 3     losartan (COZAAR) 50 MG tablet Take 1 tablet (50 mg) by mouth daily 90 tablet 3     rivaroxaban ANTICOAGULANT (XARELTO) 20 MG TABS tablet Take 1 tablet (20 mg) by mouth daily (with dinner) 90 tablet 3     torsemide (DEMADEX) 20 MG tablet Take 1 tablet (20 mg) Monday, Wednesday, Friday and take 2 tablets (40 mg) other days by mouth 1 tablet 0       ALLERGIES     Allergies   Allergen Reactions     Ace Inhibitors Anaphylaxis     Edema of ace     Eliquis [Apixaban] Other (See Comments)       PAST MEDICAL HISTORY:  Past Medical History:   Diagnosis Date     ASCVD (arteriosclerotic cardiovascular disease) 1997    angio with 40% circ lesion; 6/19 hosp for chf, 3 vessel dz on angio but porcelin aorta so ptca done     Atrial fibrillation (H) 10/09/2018    found on routine physical     Carotid artery plaque 2005    seen on us, about 50% stenosis, fu 2009 us no significant stenosis     CHF (congestive heart failure) (H) 06/2019    hosp fsd, then fu echo ef nl     Elevated blood sugar      Gout     on allopurinol     HTN (hypertension)      Hypercholesteremia      Hyponatremia 2015    felt due to chlorthalidone     Low mean corpuscular volume (MCV)      Near syncope 5/15    fu est echo low level but neg     Psoriasis     Dr. Marti     Renal mass 06/29/2019    seen on ct chest for sob, needs fu ct for that, fu us done 7/19 and no mass seen, should have fu ct in December     Screening 2012    abd us no aaa     Syncope 09/2019    hosp fsd, cause not clear, lowered coreg dose; seen by Dr. Lezama of ep and ep study neg, implanted event monitor     V-tach (H) 09/2019    seen on event monitor for fu syncope, then ep study and no inducible vtach       PAST SURGICAL HISTORY:  Past Surgical History:   Procedure Laterality Date     C ANESTH,DX ARTHROSCOPIC PROC KNEE JOINT  2009     CV CORONARY ANGIOGRAM N/A 7/2/2019    Procedure: Coronary Angiogram;  Surgeon:  Donaldo Loera MD;  Location:  HEART CARDIAC CATH LAB     CV HEART CATHETERIZATION WITH POSSIBLE INTERVENTION N/A 7/5/2019    Procedure: Heart Catheterization with Possible Intervention;  Surgeon: Manolo Hu MD;  Location:  HEART CARDIAC CATH LAB     CV LEFT HEART CATH N/A 7/2/2019    Procedure: Left Heart Cath;  Surgeon: Donaldo Loera MD;  Location:  HEART CARDIAC CATH LAB     CV LEFT VENTRICULOGRAM N/A 7/2/2019    Procedure: Left Ventriculogram;  Surgeon: Donaldo Loera MD;  Location:  HEART CARDIAC CATH LAB     CV PCI STENT DRUG ELUTING N/A 7/5/2019    Procedure: PCI Stent Drug Eluting;  Surgeon: Manolo Hu MD;  Location:  HEART CARDIAC CATH LAB     CV RIGHT HEART CATH MEASUREMENTS RECORDED N/A 7/2/2019    Procedure: Right Heart Cath;  Surgeon: Donaldo Loera MD;  Location:  HEART CARDIAC CATH LAB     EP LOOP RECORDER IMPLANT N/A 10/11/2019    Procedure: EP Loop Recorder Implant;  Surgeon: Fuad Lezama MD;  Location:  HEART CARDI[facility] CATH LAB     EP VENTRICULAR PACING N/A 10/11/2019    Procedure: EP Ventricular Pacing;  Surgeon: Fuad Lezama MD;  Location:  HEART CARDIAC CATH LAB       FAMILY HISTORY:  Family History   Problem Relation Age of Onset     Coronary Artery Disease Father      Medical History Unknown Mother      Medical History Unknown Maternal Grandfather        SOCIAL HISTORY:  Social History     Socioeconomic History     Marital status:      Spouse name: None     Number of children: 2     Years of education: None     Highest education level: None   Occupational History     Occupation: retired   Social Needs     Financial resource strain: None     Food insecurity     Worry: None     Inability: None     Transportation needs     Medical: None     Non-medical: None   Tobacco Use     Smoking status: Former Smoker     Packs/day: 1.00     Years: 30.00     Pack years: 30.00     Types: Cigars     Quit date: 9/11/1998     Years  "since quittin.7     Smokeless tobacco: Never Used   Substance and Sexual Activity     Alcohol use: Not Currently     Alcohol/week: 14.0 standard drinks     Types: 14 Standard drinks or equivalent per week     Frequency: 2-3 times a week     Drinks per session: 1 or 2     Binge frequency: Never     Comment: 1-2 a night     Drug use: No     Sexual activity: Never     Partners: Female   Lifestyle     Physical activity     Days per week: None     Minutes per session: None     Stress: None   Relationships     Social connections     Talks on phone: None     Gets together: None     Attends Yarsanism service: None     Active member of club or organization: None     Attends meetings of clubs or organizations: None     Relationship status: None     Intimate partner violence     Fear of current or ex partner: None     Emotionally abused: None     Physically abused: None     Forced sexual activity: None   Other Topics Concern     Parent/sibling w/ CABG, MI or angioplasty before 65F 55M? Not Asked   Social History Narrative     None       Review of Systems:  Skin:  Negative     Eyes:  Negative glasses  ENT:  Negative hearing loss  Respiratory:  Negative    Cardiovascular:  Negative Positive for;dizziness  Gastroenterology: Negative    Genitourinary:  Negative nocturia  Musculoskeletal:  Positive for joint pain(right knee)  Neurologic:  Negative    Psychiatric:  Negative    Heme/Lymph/Imm:  Negative    Endocrine:  Negative      Physical Exam:  Vitals: /70   Pulse 66   Ht 1.676 m (5' 6\")   Wt 97.9 kg (215 lb 12.8 oz)   SpO2 98%   BMI 34.83 kg/m     Please refer to dictation for physical exam    Recent Lab Results: all reviewed today  CBC RESULTS:  Lab Results   Component Value Date    WBC 7.4 10/11/2019    RBC 4.52 10/11/2019    HGB 11.9 (L) 2021    HCT 38.7 (L) 10/11/2019    MCV 86 10/11/2019    MCH 27.2 10/11/2019    MCHC 31.8 10/11/2019    RDW 16.6 (H) 10/11/2019     10/11/2019       BMP " RESULTS:  Lab Results   Component Value Date     2021    POTASSIUM 3.9 2021    CHLORIDE 106 2021    CO2 27 2021    ANIONGAP 7 2021     (H) 2021    BUN 44 (H) 2021    CR 1.55 (H) 2021    GFRESTIMATED 43 (L) 2021    GFRESTBLACK 50 (L) 2021    GUNNER 8.7 2021        INR RESULTS:  Lab Results   Component Value Date    INR 1.18 (H) 2019    INR 2.33 (H) 2019           CC  Melvi Montaño PA-C  [address]  [place] [id number]        Service Date: 2021    PRIMARY CARDIOLOGIST:  Dr. Mendez Hidalgo.    REASON FOR VISIT:  HFpEF, hypertension, followup.    HISTORY OF PRESENT ILLNESS:  Mr. Anderson is a delightful 75-year-old gentleman with past medical history significant for the followin.  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  He underwent drug-eluting stent to the bifurcation of the LAD and D2 and has ongoing 70% circular lesion, 90% proximal RCA lesion and good left to right collaterals.  Last intervention 2019.  2.  Hypertension.  3.  Heart failure with preserved EF with history of systolic heart failure with EF as low as 35%, recovered on last echocardiogram in 2021.  4.  Mild aortic stenosis.  5.  Dyslipidemia.  6.  Chronic AFib, on Xarelto.  7.  History of nonsustained VT with a negative EP study.  8.  History of syncope with a loop monitor in place, with the patient having symptomatic bradycardia and pauses in the 4-second range.  9.  Obesity.    I am seeing Mr. Anderson today in followup.  When I last saw him, his creatinine was up and we decreased this a bit as he had been switched from Lasix to torsemide.  Today, he comes in saying he feels better.  He wore  compression stockings for a while, but they were very hard to get on, so he has not been wearing them.  He thinks his right peripheral edema is improved.  He denies orthopnea or PND.  He has not had dyspnea on exertion.  His wife is concerned about his right leg swelling.  He continues to have plans for a right knee replacement.  From an activity standpoint, he could walk retirement across the skyway and had to stop due to knee pain.  No breathing issues.    SOCIAL HISTORY:  He is now working with a  at home and this seems to have helped his peripheral edema.  He has been eating whatever he wants, though, and he now knows he is eating way too much salt.  He is .  Former smoker.  Quit in 1998 with 30-pack-year history.  Daily alcohol.    PHYSICAL EXAMINATION:    GENERAL:  Well-developed, well-nourished gentleman, in no acute distress.  HEENT:  Normocephalic, atraumatic.  HEART:  Regular rate and rhythm.  I do not appreciate murmur, rub or gallop.  LUNGS:  Clear, without wheezes, rales, or rhonchi.  EXTREMITIES:  With 1+ pitting edema to the knee on the right with multiple varicosities obvious on this leg.  Left leg with trace peripheral edema.  I do not appreciate JVP at 90 degrees.  Skin is warm and dry.    ASSESSMENT AND PLAN:  1.  Heart failure with preserved EF, relatively euvolemic today, with ongoing lower extremity edema, which is likely somewhat dependent edema due to varicosities.  With his creatinine remaining up at 1.55 with a BUN of 44, I do not want to push diuretics further.  In fact, we will keep them the same, which is a 40 mg dose on Tuesday, Thursday, Saturday, Sunday, and a 20 mg dose on Monday, Wednesday, and Friday.  I am going to decrease his losartan to 50 mg a day and increase his hydralazine to 50 mg t.i.d.  Hopefully, this will control his blood pressure and give his kidneys a little bit of a break.  2.  Coronary artery disease with a known 70% circumflex lesion and a 90% proximal  RCA lesion, being managed medically.  Appropriately on aspirin and statin.  3.  Dyslipidemia.  Last , HDL 41, total cholesterol 194.  Patient has 2 meds listed in his chart.  I will confirm that he is actually taking 60 mg of Lipitor a day in that format.  4.  History of syncope with LINQ in place.  This is short pauses, but no correlation with lightheadedness.  At this point, I will not increase his carvedilol, though, because he has been seen to have pauses.  Next LINQ visit in August.    Thank you for allowing me to participate in this delightful patient's care.  He will follow up with Dr. Hidalgo with a BMP prior in August, sooner with concerns.    GUS Marino PA-C        D: 2021   T: 2021   MT: al    Name:     MARCELA TINAJERO  MRN:      [id number]        Account:      [id number]   :      1945           Service Date: 2021       Document: [id number]    Thank you for allowing me to participate in the care of your patient.      Sincerely,     Melvi Montaño PA-C     [facility]    cc:   Melvi Montaño PA-C  [address]  [place] [id number]

## 2021-06-01 NOTE — LETTER
6/1/2021       RE: Betito Anderson  [address]  [place] [address]     Dear Colleague,    Thank you for referring your patient, Betito Anderson, to the [facility] at [facility]. Please see a copy of my visit note below.    [id number]      HPI and Plan:   See dictation    Orders this Visit:  Orders Placed This Encounter   Procedures     Basic metabolic panel     Follow-Up with [facility] - Return MD visit     Orders Placed This Encounter   Medications     hydrALAZINE (APRESOLINE) 50 MG tablet     Sig: Take 1 tablet (50 mg) by mouth 3 times daily     Dispense:  270 tablet     Refill:  3     losartan (COZAAR) 50 MG tablet     Sig: Take 1 tablet (50 mg) by mouth daily     Dispense:  90 tablet     Refill:  3     Medications Discontinued During This Encounter   Medication Reason     losartan (COZAAR) 100 MG tablet      hydrALAZINE (APRESOLINE) 25 MG tablet          Encounter Diagnoses   Name Primary?     Heart failure with preserved ejection fraction, unspecified HF chronicity (H)      Benign essential hypertension      Hyperlipidemia LDL goal <70      Stage 3 chronic kidney disease, unspecified whether stage 3a or 3b CKD        CURRENT MEDICATIONS:  Current Outpatient Medications   Medication Sig Dispense Refill     allopurinol (ZYLOPRIM) 300 MG tablet TAKE 1 TABLET EVERY DAY 90 tablet 0     amLODIPine (NORVASC) 10 MG tablet Take 1 tablet (10 mg) by mouth daily 90 tablet 3     aspirin (ASA) 81 MG EC tablet Take 1 tablet (81 mg) by mouth daily 90 tablet 3     atorvastatin (LIPITOR) 20 MG tablet Take 1 tablet (20 mg) by mouth daily 90 tablet 3     atorvastatin (LIPITOR) 40 MG tablet Take 1 tablet (40 mg) by mouth daily 90 tablet 3     carvedilol (COREG) 6.25 MG tablet Take 1 tablet (6.25 mg) by mouth 2 times daily (with meals) 180 tablet 3     hydrALAZINE (APRESOLINE) 50 MG tablet Take 1 tablet (50 mg) by mouth 3 times daily 270 tablet  3     isosorbide mononitrate CR (IMDUR) 120 MG 24 HR ER tablet Take 1 tablet (120 mg) by mouth daily 90 tablet 3     losartan (COZAAR) 50 MG tablet Take 1 tablet (50 mg) by mouth daily 90 tablet 3     rivaroxaban ANTICOAGULANT (XARELTO) 20 MG TABS tablet Take 1 tablet (20 mg) by mouth daily (with dinner) 90 tablet 3     torsemide (DEMADEX) 20 MG tablet Take 1 tablet (20 mg) Monday, Wednesday, Friday and take 2 tablets (40 mg) other days by mouth 1 tablet 0       ALLERGIES     Allergies   Allergen Reactions     Ace Inhibitors Anaphylaxis     Edema of ace     Eliquis [Apixaban] Other (See Comments)       PAST MEDICAL HISTORY:  Past Medical History:   Diagnosis Date     ASCVD (arteriosclerotic cardiovascular disease) 1997    angio with 40% circ lesion; 6/19 hosp for chf, 3 vessel dz on angio but porcelin aorta so ptca done     Atrial fibrillation (H) 10/09/2018    found on routine physical     Carotid artery plaque 2005    seen on us, about 50% stenosis, fu 2009 us no significant stenosis     CHF (congestive heart failure) (H) 06/2019    hosp fsd, then fu echo ef nl     Elevated blood sugar      Gout     on allopurinol     HTN (hypertension)      Hypercholesteremia      Hyponatremia 2015    felt due to chlorthalidone     Low mean corpuscular volume (MCV)      Near syncope 5/15    fu est echo low level but neg     Psoriasis     Dr. Marti     Renal mass 06/29/2019    seen on ct chest for sob, needs fu ct for that, fu us done 7/19 and no mass seen, should have fu ct in December     Screening 2012    abd us no aaa     Syncope 09/2019    hosp fsd, cause not clear, lowered coreg dose; seen by Dr. Lezama of ep and ep study neg, implanted event monitor     V-tach (H) 09/2019    seen on event monitor for fu syncope, then ep study and no inducible vtach       PAST SURGICAL HISTORY:  Past Surgical History:   Procedure Laterality Date     C ANESTH,DX ARTHROSCOPIC PROC KNEE JOINT  2009     CV CORONARY ANGIOGRAM N/A 7/2/2019     Procedure: Coronary Angiogram;  Surgeon: Donaldo Loera MD;  Location:  HEART CARDIAC CATH LAB     CV HEART CATHETERIZATION WITH POSSIBLE INTERVENTION N/A 7/5/2019    Procedure: Heart Catheterization with Possible Intervention;  Surgeon: Manolo Hu MD;  Location:  HEART CARDIAC CATH LAB     CV LEFT HEART CATH N/A 7/2/2019    Procedure: Left Heart Cath;  Surgeon: Donaldo Loera MD;  Location:  HEART CARDIAC CATH LAB     CV LEFT VENTRICULOGRAM N/A 7/2/2019    Procedure: Left Ventriculogram;  Surgeon: Donaldo Loera MD;  Location:  HEART CARDIAC CATH LAB     CV PCI STENT DRUG ELUTING N/A 7/5/2019    Procedure: PCI Stent Drug Eluting;  Surgeon: Manolo Hu MD;  Location:  HEART CARDIAC CATH LAB     CV RIGHT HEART CATH MEASUREMENTS RECORDED N/A 7/2/2019    Procedure: Right Heart Cath;  Surgeon: Donaldo Loera MD;  Location:  HEART CARDIAC CATH LAB     EP LOOP RECORDER IMPLANT N/A 10/11/2019    Procedure: EP Loop Recorder Implant;  Surgeon: Fuad Lezama MD;  Location:  HEART CARDIAC CATH LAB     EP VENTRICULAR PACING N/A 10/11/2019    Procedure: EP Ventricular Pacing;  Surgeon: Fuad Lezama MD;  Location:  HEART CARDIAC CATH LAB       FAMILY HISTORY:  Family History   Problem Relation Age of Onset     Coronary Artery Disease Father      Medical History Unknown Mother      Medical History Unknown Maternal Grandfather        SOCIAL HISTORY:  Social History     Socioeconomic History     Marital status:      Spouse name: None     Number of children: 2     Years of education: None     Highest education level: None   Occupational History     Occupation: retired   Social Needs     Financial resource strain: None     Food insecurity     Worry: None     Inability: None     Transportation needs     Medical: None     Non-medical: None   Tobacco Use     Smoking status: Former Smoker     Packs/day: 1.00     Years: 30.00     Pack years: 30.00     Types:  "Cigars     Quit date: 1998     Years since quittin.7     Smokeless tobacco: Never Used   Substance and Sexual Activity     Alcohol use: Not Currently     Alcohol/week: 14.0 standard drinks     Types: 14 Standard drinks or equivalent per week     Frequency: 2-3 times a week     Drinks per session: 1 or 2     Binge frequency: Never     Comment: 1-2 a night     Drug use: No     Sexual activity: Never     Partners: Female   Lifestyle     Physical activity     Days per week: None     Minutes per session: None     Stress: None   Relationships     Social connections     Talks on phone: None     Gets together: None     Attends Hindu service: None     Active member of club or organization: None     Attends meetings of clubs or organizations: None     Relationship status: None     Intimate partner violence     Fear of current or ex partner: None     Emotionally abused: None     Physically abused: None     Forced sexual activity: None   Other Topics Concern     Parent/sibling w/ CABG, MI or angioplasty before 65F 55M? Not Asked   Social History Narrative     None       Review of Systems:  Skin:  Negative     Eyes:  Negative glasses  ENT:  Negative hearing loss  Respiratory:  Negative    Cardiovascular:  Negative Positive for;dizziness  Gastroenterology: Negative    Genitourinary:  Negative nocturia  Musculoskeletal:  Positive for joint pain(right knee)  Neurologic:  Negative    Psychiatric:  Negative    Heme/Lymph/Imm:  Negative    Endocrine:  Negative      Physical Exam:  Vitals: /70   Pulse 66   Ht 1.676 m (5' 6\")   Wt 97.9 kg (215 lb 12.8 oz)   SpO2 98%   BMI 34.83 kg/m     Please refer to dictation for physical exam    Recent Lab Results: all reviewed today  CBC RESULTS:  Lab Results   Component Value Date    WBC 7.4 10/11/2019    RBC 4.52 10/11/2019    HGB 11.9 (L) 2021    HCT 38.7 (L) 10/11/2019    MCV 86 10/11/2019    MCH 27.2 10/11/2019    MCHC 31.8 10/11/2019    RDW 16.6 (H) 10/11/2019 "     10/11/2019       BMP RESULTS:  Lab Results   Component Value Date     2021    POTASSIUM 3.9 2021    CHLORIDE 106 2021    CO2 27 2021    ANIONGAP 7 2021     (H) 2021    BUN 44 (H) 2021    CR 1.55 (H) 2021    GFRESTIMATED 43 (L) 2021    GFRESTBLACK 50 (L) 2021    GUNNER 8.7 2021        INR RESULTS:  Lab Results   Component Value Date    INR 1.18 (H) 2019    INR 2.33 (H) 2019           CC  Melvi Montaño PA-C  [id number]  [place] [id number]        Service Date: 2021    PRIMARY CARDIOLOGIST:  Dr. Mendez Hidalgo.    REASON FOR VISIT:  HFpEF, hypertension, followup.    HISTORY OF PRESENT ILLNESS:  Mr. Anderson is a delightful 75-year-old gentleman with past medical history significant for the followin.  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  He underwent drug-eluting stent to the bifurcation of the LAD and D2 and has ongoing 70% circular lesion, 90% proximal RCA lesion and good left to right collaterals.  Last intervention 2019.  2.  Hypertension.  3.  Heart failure with preserved EF with history of systolic heart failure with EF as low as 35%, recovered on last echocardiogram in 2021.  4.  Mild aortic stenosis.  5.  Dyslipidemia.  6.  Chronic AFib, on Xarelto.  7.  History of nonsustained VT with a negative EP study.  8.  History of syncope with a loop monitor in place, with the patient having symptomatic bradycardia and pauses in the 4-second range.  9.  Obesity.    I am seeing Mr. Anderson today in followup.  When I last saw him, his creatinine was up and we decreased this a bit as he had been switched from Lasix to torsemide.  Today, he comes in saying he  feels better.  He wore compression stockings for a while, but they were very hard to get on, so he has not been wearing them.  He thinks his right peripheral edema is improved.  He denies orthopnea or PND.  He has not had dyspnea on exertion.  His wife is concerned about his right leg swelling.  He continues to have plans for a right knee replacement.  From an activity standpoint, he could walk assisted across the skyway and had to stop due to knee pain.  No breathing issues.    SOCIAL HISTORY:  He is now working with a  at home and this seems to have helped his peripheral edema.  He has been eating whatever he wants, though, and he now knows he is eating way too much salt.  He is .  Former smoker.  Quit in 1998 with 30-pack-year history.  Daily alcohol.    PHYSICAL EXAMINATION:    GENERAL:  Well-developed, well-nourished gentleman, in no acute distress.  HEENT:  Normocephalic, atraumatic.  HEART:  Regular rate and rhythm.  I do not appreciate murmur, rub or gallop.  LUNGS:  Clear, without wheezes, rales, or rhonchi.  EXTREMITIES:  With 1+ pitting edema to the knee on the right with multiple varicosities obvious on this leg.  Left leg with trace peripheral edema.  I do not appreciate JVP at 90 degrees.  Skin is warm and dry.    ASSESSMENT AND PLAN:  1.  Heart failure with preserved EF, relatively euvolemic today, with ongoing lower extremity edema, which is likely somewhat dependent edema due to varicosities.  With his creatinine remaining up at 1.55 with a BUN of 44, I do not want to push diuretics further.  In fact, we will keep them the same, which is a 40 mg dose on Tuesday, Thursday, Saturday, Sunday, and a 20 mg dose on Monday, Wednesday, and Friday.  I am going to decrease his losartan to 50 mg a day and increase his hydralazine to 50 mg t.i.d.  Hopefully, this will control his blood pressure and give his kidneys a little bit of a break.  2.  Coronary artery disease with a known 70% circumflex  lesion and a 90% proximal RCA lesion, being managed medically.  Appropriately on aspirin and statin.  3.  Dyslipidemia.  Last , HDL 41, total cholesterol 194.  Patient has 2 meds listed in his chart.  I will confirm that he is actually taking 60 mg of Lipitor a day in that format.  4.  History of syncope with LINQ in place.  This is short pauses, but no correlation with lightheadedness.  At this point, I will not increase his carvedilol, though, because he has been seen to have pauses.  Next LINQ visit in August.    Thank you for allowing me to participate in this delightful patient's care.  He will follow up with Dr. Hidalgo with a BMP prior in August, sooner with concerns.    GUS Marino PA-C        D: 2021   T: 2021   MT: al    Name:     MARCELA TINAJERO  MRN:      [id number]        Account:      [id number]   :      1945           Service Date: 2021       Document: [id number]        Again, thank you for allowing me to participate in the care of your patient.      Sincerely,    Melvi Montaño PA-C

## 2021-06-01 NOTE — PATIENT INSTRUCTIONS
Call [facility] nurse for any questions or concerns Mon-Fri 8am-4pm:                                                #[phone]                                       For concerns after hours:                                               #[phone]     1: Medication changes: decrease losartan to 50 mg once a day.  You can cut your current losartan pills in half until you get the new pills.   Increase hydralazine to 50 mg three times a day.  You can take 2 of your current pills 3 times a day until the new ones arrive.      2: Plan from today: please try and keep your salt intake below 2,000 mg a day.      3: Lab results: see attached; labs look better but not back to normal.    Component      Latest Ref Rng & Units 5/28/2021   Sodium      133 - 144 mmol/L 140   Potassium      3.4 - 5.3 mmol/L 3.9   Chloride      94 - 109 mmol/L 106   Carbon Dioxide      20 - 32 mmol/L 27   Anion Gap      3 - 14 mmol/L 7   Glucose      70 - 99 mg/dL 105 (H)   Urea Nitrogen      7 - 30 mg/dL 44 (H)   Creatinine      0.66 - 1.25 mg/dL 1.55 (H)   GFR Estimate      >60 mL/min/1.73:m2 43 (L)   GFR Estimate If Black      >60 mL/min/1.73:m2 50 (L)   Calcium      8.5 - 10.1 mg/dL 8.7

## 2021-06-01 NOTE — PROGRESS NOTES
[id number]      HPI and Plan:   See dictation    Orders this Visit:  Orders Placed This Encounter   Procedures     Basic metabolic panel     [facility] with [facility] - Return MD visit     Orders Placed This Encounter   Medications     hydrALAZINE (APRESOLINE) 50 MG tablet     Sig: Take 1 tablet (50 mg) by mouth 3 times daily     Dispense:  270 tablet     Refill:  3     losartan (COZAAR) 50 MG tablet     Sig: Take 1 tablet (50 mg) by mouth daily     Dispense:  90 tablet     Refill:  3     Medications Discontinued During This Encounter   Medication Reason     losartan (COZAAR) 100 MG tablet      hydrALAZINE (APRESOLINE) 25 MG tablet          Encounter Diagnoses   Name Primary?     Heart failure with preserved ejection fraction, unspecified HF chronicity (H)      Benign essential hypertension      Hyperlipidemia LDL goal <70      Stage 3 chronic kidney disease, unspecified whether stage 3a or 3b CKD        CURRENT MEDICATIONS:  Current Outpatient Medications   Medication Sig Dispense Refill     allopurinol (ZYLOPRIM) 300 MG tablet TAKE 1 TABLET EVERY DAY 90 tablet 0     amLODIPine (NORVASC) 10 MG tablet Take 1 tablet (10 mg) by mouth daily 90 tablet 3     aspirin (ASA) 81 MG EC tablet Take 1 tablet (81 mg) by mouth daily 90 tablet 3     atorvastatin (LIPITOR) 20 MG tablet Take 1 tablet (20 mg) by mouth daily 90 tablet 3     atorvastatin (LIPITOR) 40 MG tablet Take 1 tablet (40 mg) by mouth daily 90 tablet 3     carvedilol (COREG) 6.25 MG tablet Take 1 tablet (6.25 mg) by mouth 2 times daily (with meals) 180 tablet 3     hydrALAZINE (APRESOLINE) 50 MG tablet Take 1 tablet (50 mg) by mouth 3 times daily 270 tablet 3     isosorbide mononitrate CR (IMDUR) 120 MG 24 HR ER tablet Take 1 tablet (120 mg) by mouth daily 90 tablet 3     losartan (COZAAR) 50 MG tablet Take 1 tablet (50 mg) by mouth daily 90 tablet 3     rivaroxaban ANTICOAGULANT (XARELTO) 20 MG TABS tablet Take 1 tablet (20 mg) by mouth daily (with dinner) 90  tablet 3     torsemide (DEMADEX) 20 MG tablet Take 1 tablet (20 mg) Monday, Wednesday, Friday and take 2 tablets (40 mg) other days by mouth 1 tablet 0       ALLERGIES     Allergies   Allergen Reactions     Ace Inhibitors Anaphylaxis     Edema of ace     Eliquis [Apixaban] Other (See Comments)       PAST MEDICAL HISTORY:  Past Medical History:   Diagnosis Date     ASCVD (arteriosclerotic cardiovascular disease) 1997    angio with 40% circ lesion; 6/19 hosp for chf, 3 vessel dz on angio but porcelin aorta so ptca done     Atrial fibrillation (H) 10/09/2018    found on routine physical     Carotid artery plaque 2005    seen on us, about 50% stenosis, fu 2009 us no significant stenosis     CHF (congestive heart failure) (H) 06/2019    hosp fsd, then fu echo ef nl     Elevated blood sugar      Gout     on allopurinol     HTN (hypertension)      Hypercholesteremia      Hyponatremia 2015    felt due to chlorthalidone     Low mean corpuscular volume (MCV)      Near syncope 5/15    fu est echo low level but neg     Psoriasis     Dr. Marti     Renal mass 06/29/2019    seen on ct chest for sob, needs fu ct for that, fu us done 7/19 and no mass seen, should have fu ct in December     Screening 2012    abd us no aaa     Syncope 09/2019    hosp fsd, cause not clear, lowered coreg dose; seen by Dr. Lezama of ep and ep study neg, implanted event monitor     V-tach (H) 09/2019    seen on event monitor for fu syncope, then ep study and no inducible vtach       PAST SURGICAL HISTORY:  Past Surgical History:   Procedure Laterality Date     C ANESTH,DX ARTHROSCOPIC PROC KNEE JOINT  2009     CV CORONARY ANGIOGRAM N/A 7/2/2019    Procedure: Coronary Angiogram;  Surgeon: Donaldo Loera MD;  Location:  HEART CARDIAC CATH LAB     CV HEART CATHETERIZATION WITH POSSIBLE INTERVENTION N/A 7/5/2019    Procedure: Heart Catheterization with Possible Intervention;  Surgeon: Manolo Hu MD;  Location:  HEART CARDIAC CATH LAB      CV LEFT HEART CATH N/A 2019    Procedure: Left Heart Cath;  Surgeon: Donaldo Loera MD;  Location:  HEART CARDIAC CATH LAB     CV LEFT VENTRICULOGRAM N/A 2019    Procedure: Left Ventriculogram;  Surgeon: Donaldo Loera MD;  Location:  HEART CARDIAC CATH LAB     CV PCI STENT DRUG ELUTING N/A 2019    Procedure: PCI Stent Drug Eluting;  Surgeon: Manolo Hu MD;  Location:  HEART CARDIAC CATH LAB     CV RIGHT HEART CATH MEASUREMENTS RECORDED N/A 2019    Procedure: Right Heart Cath;  Surgeon: Donaldo Loera MD;  Location:  HEART CARDIAC CATH LAB     EP LOOP RECORDER IMPLANT N/A 10/11/2019    Procedure: EP Loop Recorder Implant;  Surgeon: Fuad Lezama MD;  Location:  HEART CARDIAC CATH LAB     EP VENTRICULAR PACING N/A 10/11/2019    Procedure: EP Ventricular Pacing;  Surgeon: Fuad Lezama MD;  Location:  HEART CARDIAC CATH LAB       FAMILY HISTORY:  Family History   Problem Relation Age of Onset     Coronary Artery Disease Father      Medical History Unknown Mother      Medical History Unknown Maternal Grandfather        SOCIAL HISTORY:  Social History     Socioeconomic History     Marital status:      Spouse name: None     Number of children: 2     Years of education: None     Highest education level: None   Occupational History     Occupation: retired   Social Needs     Financial resource strain: None     Food insecurity     Worry: None     Inability: None     Transportation needs     Medical: None     Non-medical: None   Tobacco Use     Smoking status: Former Smoker     Packs/day: 1.00     Years: 30.00     Pack years: 30.00     Types: Cigars     Quit date: 1998     Years since quittin.7     Smokeless tobacco: Never Used   Substance and Sexual Activity     Alcohol use: Not Currently     Alcohol/week: 14.0 standard drinks     Types: 14 Standard drinks or equivalent per week     Frequency: 2-3 times a week     Drinks per session: 1 or 2      "Binge frequency: Never     Comment: 1-2 a night     Drug use: No     Sexual activity: Never     Partners: Female   Lifestyle     Physical activity     Days per week: None     Minutes per session: None     Stress: None   Relationships     Social connections     Talks on phone: None     Gets together: None     Attends Spiritism service: None     Active member of club or organization: None     Attends meetings of clubs or organizations: None     Relationship status: None     Intimate partner violence     Fear of current or ex partner: None     Emotionally abused: None     Physically abused: None     Forced sexual activity: None   Other Topics Concern     Parent/sibling w/ CABG, MI or angioplasty before 65F 55M? Not Asked   Social History Narrative     None       Review of Systems:  Skin:  Negative     Eyes:  Negative glasses  ENT:  Negative hearing loss  Respiratory:  Negative    Cardiovascular:  Negative Positive for;dizziness  Gastroenterology: Negative    Genitourinary:  Negative nocturia  Musculoskeletal:  Positive for joint pain(right knee)  Neurologic:  Negative    Psychiatric:  Negative    Heme/Lymph/Imm:  Negative    Endocrine:  Negative      Physical Exam:  Vitals: /70   Pulse 66   Ht 1.676 m (5' 6\")   Wt 97.9 kg (215 lb 12.8 oz)   SpO2 98%   BMI 34.83 kg/m     Please refer to dictation for physical exam    Recent Lab Results: all reviewed today  CBC RESULTS:  Lab Results   Component Value Date    WBC 7.4 10/11/2019    RBC 4.52 10/11/2019    HGB 11.9 (L) 04/05/2021    HCT 38.7 (L) 10/11/2019    MCV 86 10/11/2019    MCH 27.2 10/11/2019    MCHC 31.8 10/11/2019    RDW 16.6 (H) 10/11/2019     10/11/2019       BMP RESULTS:  Lab Results   Component Value Date     05/28/2021    POTASSIUM 3.9 05/28/2021    CHLORIDE 106 05/28/2021    CO2 27 05/28/2021    ANIONGAP 7 05/28/2021     (H) 05/28/2021    BUN 44 (H) 05/28/2021    CR 1.55 (H) 05/28/2021    GFRESTIMATED 43 (L) 05/28/2021    " GFRESTBLACK 50 (L) 05/28/2021    GUNNER 8.7 05/28/2021        INR RESULTS:  Lab Results   Component Value Date    INR 1.18 (H) 09/14/2019    INR 2.33 (H) 07/18/2019           CC  Melvi Montaño, PA-C  [address]  [place] [id number]

## 2021-06-01 NOTE — PROGRESS NOTES
Service Date: 2021    PRIMARY CARDIOLOGIST:  Dr. eMndez Hidalgo.    REASON FOR VISIT:  HFpEF, hypertension, followup.    HISTORY OF PRESENT ILLNESS:  Mr. Anderson is a delightful 75-year-old gentleman with past medical history significant for the followin.  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  He underwent drug-eluting stent to the bifurcation of the LAD and D2 and has ongoing 70% circular lesion, 90% proximal RCA lesion and good left to right collaterals.  Last intervention 2019.  2.  Hypertension.  3.  Heart failure with preserved EF with history of systolic heart failure with EF as low as 35%, recovered on last echocardiogram in 2021.  4.  Mild aortic stenosis.  5.  Dyslipidemia.  6.  Chronic AFib, on Xarelto.  7.  History of nonsustained VT with a negative EP study.  8.  History of syncope with a loop monitor in place, with the patient having symptomatic bradycardia and pauses in the 4-second range.  9.  Obesity.    I am seeing Mr. Anderson today in followup.  When I last saw him, his creatinine was up and we decreased this a bit as he had been switched from Lasix to torsemide.  Today, he comes in saying he feels better.  He wore compression stockings for a while, but they were very hard to get on, so he has not been wearing them.  He thinks his right peripheral edema is improved.  He denies orthopnea or PND.  He has not had dyspnea on exertion.  His wife is concerned about his right leg swelling.  He continues to have plans for a right knee replacement.  From an activity standpoint, he could walk [place] across the skyway and had to stop due to knee pain.  No breathing issues.    SOCIAL HISTORY:  He is now working with a  at home and this seems to have  helped his peripheral edema.  He has been eating whatever he wants, though, and he now knows he is eating way too much salt.  He is .  Former smoker.  Quit in 1998 with 30-pack-year history.  Daily alcohol.    PHYSICAL EXAMINATION:    GENERAL:  Well-developed, well-nourished gentleman, in no acute distress.  HEENT:  Normocephalic, atraumatic.  HEART:  Regular rate and rhythm.  I do not appreciate murmur, rub or gallop.  LUNGS:  Clear, without wheezes, rales, or rhonchi.  EXTREMITIES:  With 1+ pitting edema to the knee on the right with multiple varicosities obvious on this leg.  Left leg with trace peripheral edema.  I do not appreciate JVP at 90 degrees.  Skin is warm and dry.    ASSESSMENT AND PLAN:  1.  Heart failure with preserved EF, relatively euvolemic today, with ongoing lower extremity edema, which is likely somewhat dependent edema due to varicosities.  With his creatinine remaining up at 1.55 with a BUN of 44, I do not want to push diuretics further.  In fact, we will keep them the same, which is a 40 mg dose on Tuesday, Thursday, Saturday, Sunday, and a 20 mg dose on Monday, Wednesday, and Friday.  I am going to decrease his losartan to 50 mg a day and increase his hydralazine to 50 mg t.i.d.  Hopefully, this will control his blood pressure and give his kidneys a little bit of a break.  2.  Coronary artery disease with a known 70% circumflex lesion and a 90% proximal RCA lesion, being managed medically.  Appropriately on aspirin and statin.  3.  Dyslipidemia.  Last , HDL 41, total cholesterol 194.  Patient has 2 meds listed in his chart.  I will confirm that he is actually taking 60 mg of Lipitor a day in that format.  4.  History of syncope with LINQ in place.  This is short pauses, but no correlation with lightheadedness.  At this point, I will not increase his carvedilol, though, because he has been seen to have pauses.  Next LINQ visit in August.    Thank you for allowing me to  participate in this delightful patient's care.  He will follow up with Dr. Hidalgo with a BMP prior in August, sooner with concerns.    GUS Marino PA-C        D: 2021   T: 2021   MT: al    Name:     MARCELA TINAJERO  MRN:      [id number]        Account:      [id number]   :      1945           Service Date: 2021       Document: [id number]

## 2021-07-06 ENCOUNTER — DOCUMENTATION ONLY (OUTPATIENT)
Dept: CARDIOLOGY | Facility: CLINIC | Age: 76
End: 2021-07-06

## 2021-07-06 NOTE — PROGRESS NOTES
Alert for 3 second pause during AF on 7/5/2021 at 12:27am - sleeping hours. This is not a new finding. Will continue to monitor.

## 2021-07-07 ENCOUNTER — DOCUMENTATION ONLY (OUTPATIENT)
Dept: CARDIOLOGY | Facility: CLINIC | Age: 76
End: 2021-07-07

## 2021-07-07 NOTE — PROGRESS NOTES
Alert for 3 second pause during AF on 7/6/2021 at 12:10am - sleeping hours. This is not a new finding. Will continue to monitor.

## 2021-07-08 ENCOUNTER — DOCUMENTATION ONLY (OUTPATIENT)
Dept: CARDIOLOGY | Facility: CLINIC | Age: 76
End: 2021-07-08

## 2021-07-08 NOTE — PROGRESS NOTES
"Remote loop alert received for pause episode on 7/7/21 at 0059.  EGM shows 3 second pause while in AFib during sleeping hours.      This is not new for patient and per Dr. Hidalgo on 6/8/20, \"Only if he is symptomatic I would back off BB. Otherwise I would continue current Rx for less than 5 sec pause while in AF and less than 3 second pause when in NSR\".     Will continue to monitor at this time.      AMALIA Ackerman   "

## 2021-08-09 ENCOUNTER — LAB (OUTPATIENT)
Dept: LAB | Facility: CLINIC | Age: 76
End: 2021-08-09
Payer: COMMERCIAL

## 2021-08-09 DIAGNOSIS — I50.30 HEART FAILURE WITH PRESERVED EJECTION FRACTION, UNSPECIFIED HF CHRONICITY (H): ICD-10-CM

## 2021-08-09 LAB
ANION GAP SERPL CALCULATED.3IONS-SCNC: 6 MMOL/L (ref 3–14)
BUN SERPL-MCNC: 38 MG/DL (ref 7–30)
CALCIUM SERPL-MCNC: 8.9 MG/DL (ref 8.5–10.1)
CHLORIDE BLD-SCNC: 106 MMOL/L (ref 94–109)
CO2 SERPL-SCNC: 27 MMOL/L (ref 20–32)
CREAT SERPL-MCNC: 1.34 MG/DL (ref 0.66–1.25)
GFR SERPL CREATININE-BSD FRML MDRD: 51 ML/MIN/1.73M2
GLUCOSE BLD-MCNC: 101 MG/DL (ref 70–99)
POTASSIUM BLD-SCNC: 3.8 MMOL/L (ref 3.4–5.3)
SODIUM SERPL-SCNC: 139 MMOL/L (ref 133–144)

## 2021-08-09 PROCEDURE — 80048 BASIC METABOLIC PNL TOTAL CA: CPT | Performed by: INTERNAL MEDICINE

## 2021-08-09 PROCEDURE — 36415 COLL VENOUS BLD VENIPUNCTURE: CPT | Performed by: INTERNAL MEDICINE

## 2021-08-12 ENCOUNTER — OFFICE VISIT (OUTPATIENT)
Dept: CARDIOLOGY | Facility: CLINIC | Age: 76
End: 2021-08-12
Attending: PHYSICIAN ASSISTANT
Payer: COMMERCIAL

## 2021-08-12 VITALS
HEART RATE: 66 BPM | WEIGHT: 208.2 LBS | SYSTOLIC BLOOD PRESSURE: 122 MMHG | HEIGHT: 66 IN | DIASTOLIC BLOOD PRESSURE: 66 MMHG | BODY MASS INDEX: 33.46 KG/M2 | OXYGEN SATURATION: 97 %

## 2021-08-12 DIAGNOSIS — I50.30 HEART FAILURE WITH PRESERVED EJECTION FRACTION, UNSPECIFIED HF CHRONICITY (H): ICD-10-CM

## 2021-08-12 DIAGNOSIS — I10 BENIGN ESSENTIAL HYPERTENSION: ICD-10-CM

## 2021-08-12 DIAGNOSIS — E78.5 HYPERLIPIDEMIA LDL GOAL <100: ICD-10-CM

## 2021-08-12 PROCEDURE — 99214 OFFICE O/P EST MOD 30 MIN: CPT | Performed by: INTERNAL MEDICINE

## 2021-08-12 RX ORDER — TORSEMIDE 20 MG/1
TABLET ORAL
Qty: 1 TABLET | Refills: 0 | Status: SHIPPED | OUTPATIENT
Start: 2021-08-12 | End: 2021-08-17

## 2021-08-12 ASSESSMENT — MIFFLIN-ST. JEOR: SCORE: 1622.14

## 2021-08-12 NOTE — LETTER
2021    Rudy Vernon MD  [address]  [facility] [address]    RE: Betito CASPER Justin       Dear Colleague,    I had the pleasure of seeing Betito Anderson in the [facility].    CARDIOLOGY CLINIC CONSULTATION    REASON FOR CONSULT: HF with recovered EF    PRIMARY CARE PHYSICIAN:  Rudy Vernon    HISTORY OF PRESENT ILLNESS:   Mr. Anderson is a delightful 75-year-old gentleman who is seeing me since summer of 2019 and has a past medical history significant for the followin. Chronic atrial fibrillation since  for which he is on anticoagulation  2. In 2019 he presented to the hospital with acute hypertensive emergency and acute LV dysfunction. EF was down to 30%, but normalized to 55% with control of blood pressure.    3. Angiogram at that admission showed a tight lesion in the mid LAD spanning into the diagonal and also had other small-vessel disease and a subtotally occluded right coronary artery with robust collaterals from the LAD.  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  He underwent PCI of his LAD bifurcation into the diagonal. Subsequently, a nuclear stress test had shown mild basilar inferolateral ischemia without angina, and as such, this has been under conservative management.   4. Obesity, hypertension, dyslipidemia, and diabetes.    5. 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 Subsequently, in 2019, he had episodes of syncope, and a monitor had shown nonsustained VT, and then EP study was negative.  Subsequently, he had a loop recorder implanted.    7. On the loop he has been having some 4 second pauses, but he has been asymptomatic.    8. Mild aortic stenosis    He was last seen by Melvi in [facility] in  and he is here for a 6 month follow up with me. He denies any new symptoms. NYHA class II. No angina, syncope or presyncope.    PAST  MEDICAL HISTORY:  Past Medical History:   Diagnosis Date     ASCVD (arteriosclerotic cardiovascular disease) 1997    angio with 40% circ lesion; 6/19 hosp for chf, 3 vessel dz on angio but porcelin aorta so ptca done     Atrial fibrillation (H) 10/09/2018    found on routine physical     Carotid artery plaque 2005    seen on us, about 50% stenosis, fu 2009 us no significant stenosis     CHF (congestive heart failure) (H) 06/2019    hosp fsd, then fu echo ef nl     Elevated blood sugar      Gout     on allopurinol     HTN (hypertension)      Hypercholesteremia      Hyponatremia 2015    felt due to chlorthalidone     Low mean corpuscular volume (MCV)      Near syncope 5/15    fu est echo low level but neg     Psoriasis     Dr. Marti     Renal mass 06/29/2019    seen on ct chest for sob, needs fu ct for that, fu us done 7/19 and no mass seen, should have fu ct in December     Screening 2012    abd us no aaa     Syncope 09/2019    hosp fsd, cause not clear, lowered coreg dose; seen by Dr. Lezama of ep and ep study neg, implanted event monitor     V-tach (H) 09/2019    seen on event monitor for fu syncope, then ep study and no inducible vtach       MEDICATIONS:  Current Outpatient Medications   Medication     allopurinol (ZYLOPRIM) 300 MG tablet     amLODIPine (NORVASC) 10 MG tablet     aspirin (ASA) 81 MG EC tablet     atorvastatin (LIPITOR) 20 MG tablet     atorvastatin (LIPITOR) 40 MG tablet     carvedilol (COREG) 6.25 MG tablet     hydrALAZINE (APRESOLINE) 50 MG tablet     isosorbide mononitrate CR (IMDUR) 120 MG 24 HR ER tablet     losartan (COZAAR) 50 MG tablet     rivaroxaban ANTICOAGULANT (XARELTO) 20 MG TABS tablet     torsemide (DEMADEX) 20 MG tablet     No current facility-administered medications for this visit.       ALLERGIES:  Allergies   Allergen Reactions     Ace Inhibitors Anaphylaxis     Edema of ace     Eliquis [Apixaban] Other (See Comments)       SOCIAL HISTORY:  I have reviewed this patient's social  history and updated it with pertinent information if needed. Betito Anderson  reports that he quit smoking about 22 years ago. His smoking use included cigars. He has a 30.00 pack-year smoking history. He has never used smokeless tobacco. He reports previous alcohol use of about 14.0 standard drinks of alcohol per week. He reports that he does not use drugs.    FAMILY HISTORY:  I have reviewed this patient's family history and updated it with pertinent information if needed.   Family History   Problem Relation Age of Onset     Coronary Artery Disease Father      Medical History Unknown Mother      Medical History Unknown Maternal Grandfather        REVIEW OF SYSTEMS:  Skin:  Positive for bruising   Eyes:  Positive for glasses  ENT:  Positive for hearing loss  Respiratory:  Negative for shortness of breath  Cardiovascular:  palpitations;chest pain;dizziness;lightheadedness;fatigue;Negative for edema  Gastroenterology: Negative for heartburn;reflux  Genitourinary:  Negative nocturia  Musculoskeletal:  Positive for joint pain (right knee)  Neurologic:  Negative for headaches;migraine headaches  Psychiatric:  not assessed    Heme/Lymph/Imm:  Negative    Endocrine:  Negative        PHYSICAL EXAM:      BP: 122/66 Pulse: 66     SpO2: 97 %      Vital Signs with Ranges  Pulse:  [66] 66  BP: (122)/(66) 122/66  SpO2:  [97 %] 97 %  208 lbs 3.2 oz    Constitutional: awake, alert, no distress  Respiratory: Clear  Cardiovascular: Irregular sounds, normal JVP, no edema, soft ESM, S2 preserved  GI: nondistended  Neuropsychiatric: appropriate affact    DATA:  Labs: Pertinent cardiac labs reviewed    ASSESSMENT:  HF with recovered EF from HTN and CAD  Chronic atrial fibrillation   Coronary disease with complex PCI    RECOMMENDATIONS:  1. Mykel is doing well from a cardiac standpoint. He denies any new symptoms. His edema has nicely responded to Torsemide and BP control is excellent now.   2. Continue aspirin and statin. Given low  bleeding risk at this time and complex LAD PCI, OK to use aspirin in addition to DOAC. If patient has bleeding, stop aspirin and continue DOAC alone.   3. His lipids are worsening. Wife tells me that he is eating non compliantly. They will watch this better they say. If worsening, increase lipitor to 80 or add Zetia.   4. No angina. Medical management for CAD at this time.   5. Mild aortic stenosis - echo in a year or two.   6. Pauses on Linq - Alert me if greater than 5 seconds while in AF or during awake hours/ symptomatic.    7. Call us with any change in clinical status such as CP, syncope or SOB or edema.    If remains stable for the next 6 months, could discharge from [facility] into general cardiology clinic with me.    JOE Estevez, [facility]  Cardiology - [facility]  August 12, 2021            Thank you for allowing me to participate in the care of your patient.      Sincerely,     Mendez Hidalgo MD     [facility]  cc:   Melvi Montaño PA-C  [address]  [place] [id number]

## 2021-08-12 NOTE — PROGRESS NOTES
CARDIOLOGY CLINIC CONSULTATION    REASON FOR CONSULT: HF with recovered EF    PRIMARY CARE PHYSICIAN:  Rudy Vernon    HISTORY OF PRESENT ILLNESS:   Mr. Anderson is a delightful 75-year-old gentleman who is seeing me since summer of 2019 and has a past medical history significant for the followin. Chronic atrial fibrillation since  for which he is on anticoagulation  2. In 2019 he presented to the hospital with acute hypertensive emergency and acute LV dysfunction. EF was down to 30%, but normalized to 55% with control of blood pressure.    3. Angiogram at that admission showed a tight lesion in the mid LAD spanning into the diagonal and also had other small-vessel disease and a subtotally occluded right coronary artery with robust collaterals from the LAD.  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  He underwent PCI of his LAD bifurcation into the diagonal. Subsequently, a nuclear stress test had shown mild basilar inferolateral ischemia without angina, and as such, this has been under conservative management.   4. Obesity, hypertension, dyslipidemia, and diabetes.    5. 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 Subsequently, in 2019, he had episodes of syncope, and a monitor had shown nonsustained VT, and then EP study was negative.  Subsequently, he had a loop recorder implanted.    7. On the loop he has been having some 4 second pauses, but he has been asymptomatic.    8. Mild aortic stenosis    He was last seen by Melvi in [facility] in  and he is here for a 6 month follow up with me. He denies any new symptoms. NYHA class II. No angina, syncope or presyncope.    PAST MEDICAL HISTORY:  Past Medical History:   Diagnosis Date     ASCVD (arteriosclerotic cardiovascular disease)     angio with 40% circ lesion;  hosp for chf, 3 vessel dz on angio but porcelin aorta so ptca done     Atrial fibrillation (H)  10/09/2018    found on routine physical     Carotid artery plaque 2005    seen on us, about 50% stenosis, fu 2009 us no significant stenosis     CHF (congestive heart failure) (H) 06/2019    hosp fsd, then fu echo ef nl     Elevated blood sugar      Gout     on allopurinol     HTN (hypertension)      Hypercholesteremia      Hyponatremia 2015    felt due to chlorthalidone     Low mean corpuscular volume (MCV)      Near syncope 5/15    fu est echo low level but neg     Psoriasis     Dr. Marti     Renal mass 06/29/2019    seen on ct chest for sob, needs fu ct for that, fu us done 7/19 and no mass seen, should have fu ct in December     Screening 2012    abd us no aaa     Syncope 09/2019    hosp fsd, cause not clear, lowered coreg dose; seen by Dr. Lezama of ep and ep study neg, implanted event monitor     V-tach (H) 09/2019    seen on event monitor for fu syncope, then ep study and no inducible vtach       MEDICATIONS:  Current Outpatient Medications   Medication     allopurinol (ZYLOPRIM) 300 MG tablet     amLODIPine (NORVASC) 10 MG tablet     aspirin (ASA) 81 MG EC tablet     atorvastatin (LIPITOR) 20 MG tablet     atorvastatin (LIPITOR) 40 MG tablet     carvedilol (COREG) 6.25 MG tablet     hydrALAZINE (APRESOLINE) 50 MG tablet     isosorbide mononitrate CR (IMDUR) 120 MG 24 HR ER tablet     losartan (COZAAR) 50 MG tablet     rivaroxaban ANTICOAGULANT (XARELTO) 20 MG TABS tablet     torsemide (DEMADEX) 20 MG tablet     No current facility-administered medications for this visit.       ALLERGIES:  Allergies   Allergen Reactions     Ace Inhibitors Anaphylaxis     Edema of ace     Eliquis [Apixaban] Other (See Comments)       SOCIAL HISTORY:  I have reviewed this patient's social history and updated it with pertinent information if needed. Betito Anderson  reports that he quit smoking about 22 years ago. His smoking use included cigars. He has a 30.00 pack-year smoking history. He has never used smokeless tobacco. He  reports previous alcohol use of about 14.0 standard drinks of alcohol per week. He reports that he does not use drugs.    FAMILY HISTORY:  I have reviewed this patient's family history and updated it with pertinent information if needed.   Family History   Problem Relation Age of Onset     Coronary Artery Disease Father      Medical History Unknown Mother      Medical History Unknown Maternal Grandfather        REVIEW OF SYSTEMS:  Skin:  Positive for bruising   Eyes:  Positive for glasses  ENT:  Positive for hearing loss  Respiratory:  Negative for shortness of breath  Cardiovascular:  palpitations;chest pain;dizziness;lightheadedness;fatigue;Negative for edema  Gastroenterology: Negative for heartburn;reflux  Genitourinary:  Negative nocturia  Musculoskeletal:  Positive for joint pain (right knee)  Neurologic:  Negative for headaches;migraine headaches  Psychiatric:  not assessed    Heme/Lymph/Imm:  Negative    Endocrine:  Negative        PHYSICAL EXAM:      BP: 122/66 Pulse: 66     SpO2: 97 %      Vital Signs with Ranges  Pulse:  [66] 66  BP: (122)/(66) 122/66  SpO2:  [97 %] 97 %  208 lbs 3.2 oz    Constitutional: awake, alert, no distress  Respiratory: Clear  Cardiovascular: Irregular sounds, normal JVP, no edema, soft ESM, S2 preserved  GI: nondistended  Neuropsychiatric: appropriate affact    DATA:  Labs: Pertinent cardiac labs reviewed    ASSESSMENT:  HF with recovered EF from HTN and CAD  Chronic atrial fibrillation   Coronary disease with complex PCI    RECOMMENDATIONS:  1. Mykel is doing well from a cardiac standpoint. He denies any new symptoms. His edema has nicely responded to Torsemide and BP control is excellent now.   2. Continue aspirin and statin. Given low bleeding risk at this time and complex LAD PCI, OK to use aspirin in addition to DOAC. If patient has bleeding, stop aspirin and continue DOAC alone.   3. His lipids are worsening. Wife tells me that he is eating non compliantly. They will watch  this better they say. If worsening, increase lipitor to 80 or add Zetia.   4. No angina. Medical management for CAD at this time.   5. Mild aortic stenosis - echo in a year or two.   6. Pauses on Linq - Alert me if greater than 5 seconds while in AF or during awake hours/ symptomatic.    7. Call us with any change in clinical status such as CP, syncope or SOB or edema.    If remains stable for the next 6 months, could discharge from [facility] into general cardiology clinic with me.    JOE Estevez, [facility]  Cardiology - [facility]  August 12, 2021

## 2021-08-17 DIAGNOSIS — I10 BENIGN ESSENTIAL HYPERTENSION: ICD-10-CM

## 2021-08-17 DIAGNOSIS — E78.5 HYPERLIPIDEMIA LDL GOAL <100: ICD-10-CM

## 2021-08-17 RX ORDER — TORSEMIDE 20 MG/1
TABLET ORAL
Qty: 90 TABLET | Refills: 1 | Status: SHIPPED | OUTPATIENT
Start: 2021-08-17 | End: 2022-03-01

## 2021-08-25 ENCOUNTER — ANCILLARY PROCEDURE (OUTPATIENT)
Dept: CARDIOLOGY | Facility: CLINIC | Age: 76
End: 2021-08-25
Attending: INTERNAL MEDICINE
Payer: COMMERCIAL

## 2021-08-25 DIAGNOSIS — Z45.09 ENCOUNTER FOR LOOP RECORDER CHECK: ICD-10-CM

## 2021-08-25 DIAGNOSIS — R55 SYNCOPE: ICD-10-CM

## 2021-08-25 PROCEDURE — 93297 REM INTERROG DEV EVAL ICPMS: CPT | Performed by: INTERNAL MEDICINE

## 2021-08-25 PROCEDURE — G2066 INTER DEVC REMOTE 30D: HCPCS | Performed by: INTERNAL MEDICINE

## 2021-08-27 LAB
MDC_IDC_EPISODE_DTM: NORMAL
MDC_IDC_EPISODE_DURATION: 3.12 S
MDC_IDC_EPISODE_ID: 2097
MDC_IDC_EPISODE_TYPE: NORMAL
MDC_IDC_MSMT_BATTERY_STATUS: NORMAL
MDC_IDC_PG_IMPLANT_DTM: NORMAL
MDC_IDC_PG_MFG: NORMAL
MDC_IDC_PG_MODEL: NORMAL
MDC_IDC_PG_SERIAL: NORMAL
MDC_IDC_PG_TYPE: NORMAL
MDC_IDC_SESS_CLINIC_NAME: NORMAL
MDC_IDC_SESS_DTM: NORMAL
MDC_IDC_SESS_TYPE: NORMAL
MDC_IDC_SET_ZONE_TYPE: NORMAL
MDC_IDC_STAT_AT_BURDEN_PERCENT: 99.86
MDC_IDC_STAT_AT_DTM_END: NORMAL
MDC_IDC_STAT_AT_DTM_START: NORMAL
MDC_IDC_STAT_EPISODE_RECENT_COUNT: 0
MDC_IDC_STAT_EPISODE_RECENT_COUNT: 1
MDC_IDC_STAT_EPISODE_RECENT_COUNT: 4
MDC_IDC_STAT_EPISODE_RECENT_COUNT_DTM_END: NORMAL
MDC_IDC_STAT_EPISODE_RECENT_COUNT_DTM_START: NORMAL
MDC_IDC_STAT_EPISODE_TOTAL_COUNT: 0
MDC_IDC_STAT_EPISODE_TOTAL_COUNT: 0
MDC_IDC_STAT_EPISODE_TOTAL_COUNT: 1
MDC_IDC_STAT_EPISODE_TOTAL_COUNT: 15
MDC_IDC_STAT_EPISODE_TOTAL_COUNT: 2105
MDC_IDC_STAT_EPISODE_TOTAL_COUNT: 3
MDC_IDC_STAT_EPISODE_TOTAL_COUNT_DTM_END: NORMAL
MDC_IDC_STAT_EPISODE_TOTAL_COUNT_DTM_START: NORMAL
MDC_IDC_STAT_EPISODE_TYPE: NORMAL

## 2021-09-04 ENCOUNTER — HEALTH MAINTENANCE LETTER (OUTPATIENT)
Age: 76
End: 2021-09-04

## 2021-10-07 NOTE — ED PROVIDER NOTES
"  History     Chief Complaint:    Dizziness    The history is provided by the patient.      Betito Anderosn is a 73 year old male with a history of hyperlipidemia, hypertension, multivessel CAD s/p stend, and atrial fibrillation on Plavix and Xarelto who presents with dizziness/syncope. The pt was sitting in a chair at 1030a and had the sudden onset of \"dizziness\" (lightheadedness) The next thing he knew he was on the floor under the desk with one of his slippers off. He is unsure if he hit his head. He then got nauseous and vomited. He states that he felt a \"rush\" of lightheadedness a few more times afterwards, but those did not last as long. The patient denies any chest pain or shortness of breath. Of note: the patient had a cardiac stent placed in July. In addition, the patient had a nuclear stress test done 2 days ago, but is unsure of his results yet.    Allergies:  Ace Inhibitors    Medications:    Zyloprim  Norvasc  Aspirin, not prescribed  Lipitor  Coreg  Plavix  Lasix  Imdur  Cozaar  Xarelto  Spiriva respimat  Ultram    Past Medical History:    COPD  Congestive heart failure  Renal mass  Atrial fibrillation  Non morbid obesity  Gout  Carotid artery plaque  Hyponatremia  Low mean corpuscular volume  Psoriasis  Hyperlipidemia  ASCVD  Elevated blood sugar  Benign essential hypertension  Hypercholesteremia    Past Surgical History:    C anesth, dx arthroscopic proc knee joint  CV coronary angiogram  CV heart catheterization with possible intervention  CV left heart cath  CV left ventriculogram  CV PCI stent drug eluting  CV right heart cath    Family History:    Coronary artery disease - father    Social History:  Negative for tobacco use - former smoker, quit 1998  Negative for alcohol use.  Negative for drug use.  Patient accompanied to the ER by his wife and son.  Marital Status:   [2]     Review of Systems   Respiratory: Negative for shortness of breath.    Cardiovascular: Negative for chest pain. " "  Gastrointestinal: Positive for nausea and vomiting.   Neurological: Positive for dizziness and light-headedness.   All other systems reviewed and are negative.      Physical Exam     Patient Vitals for the past 24 hrs:   BP Temp Temp src Pulse Heart Rate Resp SpO2 Height Weight   09/14/19 1430 134/71 -- -- 66 61 19 98 % -- --   09/14/19 1415 (!) 121/106 -- -- 73 65 18 93 % -- --   09/14/19 1315 -- -- -- -- 68 14 99 % -- --   09/14/19 1300 -- -- -- -- 57 18 99 % -- --   09/14/19 1236 138/73 97.7  F (36.5  C) Oral 72 -- 16 99 % 1.676 m (5' 6\") 81.6 kg (180 lb)     Physical Exam  General/Appearance: appears stated age, well-groomed, appears comfortable  Eyes: EOMI, no scleral injection, no icterus  ENT: MMM  Neck: supple, nl ROM, no stiffness  Cardiovascular: irregularly irregular, nl S1S2, no m/r/g, 2+ pulses in all 4 extremities, cap refill <2sec  Respiratory: CTAB, good air movement throughout, no wheezes/rhonchi/rales, no increased WOB, no retractions  Back: no lesions  GI: abd soft, non-distended, nttp,  no HSM, no rebound, no guarding, nl BS  MSK: BONILLA, good tone, no bony abnormality  Skin: warm and well-perfused, no rash, no edema, no ecchymosis, nl turgor  Neuro: GCS 15, alert and oriented, no gross focal neuro deficits  Psych: interacts appropriately  Heme: no petechia, no purpura, no active bleeding    Emergency Department Course     ECG (12:37:43):  Rate 56 bpm. WY interval *. QRS duration 92. QT/QTc 394/380. P-R-T axes * 6 23. Atrial fibrillation with slow ventricular response. Abnormal ECG. No significant change compared to EKG dated 7/5/19. Interpreted at 1305 by Debra Kauffman MD.    Imaging:  Radiology findings were communicated with the patient who voiced understanding of the findings.    CT Head w/o IV contrast:   Diffuse cerebral volume loss and cerebral white matter  changes consistent with chronic small vessel ischemic disease. No  evidence for acute intracranial pathology, as per " radiology.    XR Chest 2 Views:   No acute disease, as per radiology.     Laboratory:  Laboratory findings were communicated with the patient who voiced understanding of the findings.    CBC: HGB 12.2 L o/w WNL. (WBC 9.9, )   BMP: Glucose 135 H, Creatinine 1.29 H, GFR Estimate  54 L o/w WNL.  Troponin: <0.015  INR: 1.18 H  Partial thromboplastin time: 41 H    Emergency Department Course:  Past medical records, nursing notes, and vitals reviewed.    1247: I performed an exam of the patient as documented above.     EKG obtained in the ED, see results above.     IV was inserted and blood was drawn for laboratory testing, results above.    The patient was sent for a chest x-ray and a head CT while in the emergency department, results above.     1425: I spoke with Dr. Stiles from cardiology regarding the patient    1436: Patient rechecked and updated.      1441: I spoke with Dr. Rivas regarding the patient    I personally reviewed the laboratory, imaging, and EKG results with the Patient and answered all related questions prior to admission.    Findings and plan explained to the Patient who consents to admission.     1441: Discussed the patient with Dr. Rivas, who will admit the patient to a cardiac telemetry bed for further monitoring, evaluation, and treatment.      Impression & Plan     Medical Decision Making:  This patient is a 73-year-old male with multivessel coronary artery disease status post 1 stent, on Plavix and Xarelto, who presents after syncopal episode with multiple presyncopal episodes.  He does not have any obvious dysrhythmia but his A. fib with frequent longer pauses.  I am concerned that he may have had a prolonged pauses or can frequent long pauses that are leading to this that may benefit from pacemaker.  Because of this I think coming in for continuous telemetry and cardiology consult will be beneficial.  Is also possible that he is having some ischemic induced dysrhythmias.  He did have  a stress test, nuclear medicine, a few days ago which did show some inducible ischemia.  Whether or not this is related to today's episode could be further assessed out by cardiology.  Either way he is not having active chest pain, shortness of breath, EKG changes, troponin changes that are concerning for ACS.  Electrolytes are unremarkable.  We did get a head CT as he found himself on the floor with possible trauma.  This thankfully was negative.  He will come into [facility] for further work-up and management.    Discharge Diagnosis:    ICD-10-CM   1. Syncope, unspecified syncope type R55   2. Atrial fibrillation, controlled (H) I48.91     Disposition:  Admitted to cardiac telemetry bed.    Scribe Disclosure:  Jenifer YEBOAH, am serving as a scribe at 12:56 PM on 9/14/2019 to document services personally performed by Debra Kauffman MD based on my observations and the provider's statements to me.     Jenifer Enrique  9/14/2019    EMERGENCY DEPARTMENT       Debra Kauffman MD  09/14/19 1639     Detail Level: Detailed Quality 431: Preventive Care And Screening: Unhealthy Alcohol Use - Screening: Patient screened for unhealthy alcohol use using a single question and scores less than 2 times per year Quality 226: Preventive Care And Screening: Tobacco Use: Screening And Cessation Intervention: Patient screened for tobacco use and is an ex/non-smoker Quality 130: Documentation Of Current Medications In The Medical Record: Current Medications Documented

## 2021-12-03 ENCOUNTER — ANCILLARY PROCEDURE (OUTPATIENT)
Dept: CARDIOLOGY | Facility: CLINIC | Age: 76
End: 2021-12-03
Attending: INTERNAL MEDICINE
Payer: COMMERCIAL

## 2021-12-03 PROCEDURE — 93297 REM INTERROG DEV EVAL ICPMS: CPT | Performed by: INTERNAL MEDICINE

## 2021-12-03 PROCEDURE — G2066 INTER DEVC REMOTE 30D: HCPCS | Performed by: INTERNAL MEDICINE

## 2021-12-22 ENCOUNTER — DOCUMENTATION ONLY (OUTPATIENT)
Dept: CARDIOLOGY | Facility: CLINIC | Age: 76
End: 2021-12-22
Payer: COMMERCIAL

## 2021-12-22 NOTE — PROGRESS NOTES
[facility]    I received a call from [employer] requesting to confirm losartan dosing, as they received refill request for both current dose of 50 mg daily, and former dose of 100 mg daily.    Reviewed notes and current med list and confirmed with pharmacy that active dose is 50 mg daily.    Jade Byers RN BSN   3:08 PM 12/22/21

## 2022-02-02 ENCOUNTER — TELEPHONE (OUTPATIENT)
Dept: CARDIOLOGY | Facility: CLINIC | Age: 77
End: 2022-02-02
Payer: COMMERCIAL

## 2022-02-02 NOTE — TELEPHONE ENCOUNTER
"Loop recorder remote alert received for episode logged on 2/1/2022 at 1628.  EGM shows 2 back to back 3 second pauses while in AF.  Pt frequently has 3-4 second nighttime pauses.     Per Dr. Hidalgo on 6/8/20, \"Only if he is symptomatic I would back off BB. Otherwise I would continue current Rx for less than 5 sec pause while in AF and less than 3 second pause when in NSR\". Dr. Hidalgo was also notified of high frequency of pauses on 10/12/20 and recommended monitoring as they were primarily during sleep and asymptomatic while in AF.      Called and spoke with patient who stated he was watching TV at this time and thinks he fell asleep. He denies any symptoms with episode.  Will continue to monitor.     AMALIA Ackerman        "

## 2022-03-01 ENCOUNTER — OFFICE VISIT (OUTPATIENT)
Dept: CARDIOLOGY | Facility: CLINIC | Age: 77
End: 2022-03-01
Payer: COMMERCIAL

## 2022-03-01 VITALS
SYSTOLIC BLOOD PRESSURE: 131 MMHG | HEIGHT: 67 IN | HEART RATE: 70 BPM | BODY MASS INDEX: 32.79 KG/M2 | WEIGHT: 208.9 LBS | OXYGEN SATURATION: 99 % | DIASTOLIC BLOOD PRESSURE: 70 MMHG

## 2022-03-01 DIAGNOSIS — I10 BENIGN ESSENTIAL HYPERTENSION: ICD-10-CM

## 2022-03-01 DIAGNOSIS — E78.5 HYPERLIPIDEMIA LDL GOAL <100: ICD-10-CM

## 2022-03-01 DIAGNOSIS — I50.23 ACUTE ON CHRONIC SYSTOLIC CONGESTIVE HEART FAILURE (H): ICD-10-CM

## 2022-03-01 DIAGNOSIS — I25.10 ASCVD (ARTERIOSCLEROTIC CARDIOVASCULAR DISEASE): ICD-10-CM

## 2022-03-01 DIAGNOSIS — I50.30 HEART FAILURE WITH PRESERVED EJECTION FRACTION, UNSPECIFIED HF CHRONICITY (H): ICD-10-CM

## 2022-03-01 PROCEDURE — 99214 OFFICE O/P EST MOD 30 MIN: CPT | Performed by: PHYSICIAN ASSISTANT

## 2022-03-01 RX ORDER — ATORVASTATIN CALCIUM 20 MG/1
20 TABLET, FILM COATED ORAL DAILY
Qty: 90 TABLET | Refills: 3 | Status: SHIPPED | OUTPATIENT
Start: 2022-03-01 | End: 2022-07-08

## 2022-03-01 RX ORDER — ISOSORBIDE MONONITRATE 120 MG/1
120 TABLET, EXTENDED RELEASE ORAL DAILY
Qty: 90 TABLET | Refills: 3 | Status: SHIPPED | OUTPATIENT
Start: 2022-03-01 | End: 2023-01-05

## 2022-03-01 RX ORDER — HYDRALAZINE HYDROCHLORIDE 50 MG/1
50 TABLET, FILM COATED ORAL 3 TIMES DAILY
Qty: 270 TABLET | Refills: 3 | Status: SHIPPED | OUTPATIENT
Start: 2022-03-01 | End: 2022-09-23

## 2022-03-01 RX ORDER — LOSARTAN POTASSIUM 50 MG/1
50 TABLET ORAL DAILY
Qty: 90 TABLET | Refills: 3 | Status: SHIPPED | OUTPATIENT
Start: 2022-03-01 | End: 2023-01-05

## 2022-03-01 RX ORDER — AMLODIPINE BESYLATE 10 MG/1
10 TABLET ORAL DAILY
Qty: 90 TABLET | Refills: 3 | Status: SHIPPED | OUTPATIENT
Start: 2022-03-01 | End: 2023-01-05

## 2022-03-01 RX ORDER — TORSEMIDE 20 MG/1
TABLET ORAL
Qty: 135 TABLET | Refills: 3 | Status: SHIPPED | OUTPATIENT
Start: 2022-03-01 | End: 2022-03-07

## 2022-03-01 RX ORDER — ATORVASTATIN CALCIUM 40 MG/1
40 TABLET, FILM COATED ORAL DAILY
Qty: 90 TABLET | Refills: 3 | Status: SHIPPED | OUTPATIENT
Start: 2022-03-01 | End: 2022-07-08

## 2022-03-01 RX ORDER — CARVEDILOL 6.25 MG/1
6.25 TABLET ORAL 2 TIMES DAILY WITH MEALS
Qty: 180 TABLET | Refills: 3 | Status: SHIPPED | OUTPATIENT
Start: 2022-03-01 | End: 2023-01-05

## 2022-03-01 NOTE — PROGRESS NOTES
Service Date: 2022    PRIMARY CARDIOLOGIST:  Dr. Hidalgo.    REASON FOR VISIT:  HFpEF, hypertension followup.    HISTORY OF PRESENT ILLNESS:  Mr. Anderson is a delightful 76-year-old gentleman with past medical history significant for the followin.  Severe 3-vessel coronary artery disease diagnosed in 2019 with a normal left main, 80% mid LAD, 70% circ, subtotally occluded RCA with left-to-right collaterals.  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  He then underwent drug-eluting stent to the LAD and D2.  He has ongoing 70% circumflex and 90% proximal RCA lesion with left-to-right collaterals.  2.  Hypertension.  3.  HFpEF with history of likely ischemic cardiomyopathy and EF as low as 35%.  4.  Mild aortic stenosis.  5.  Dyslipidemia.  6.  Chronic AFib, on Xarelto.  7.  History of nonsustained VT with negative EP study.  8.  History of syncope with the patient having asymptomatic bradycardia and pauses in about the 4-second range.  This is stable.  9.  Obesity.    I am seeing Mr. Anderson in routine followup today.  He has upcoming cataract surgery and has his preop next week.  He says since we switched him from Lasix to torsemide, he has done so much better.  He does not need to wear compression stockings.  He denies chest pain, shortness of breath, orthopnea, or PND.  At this point, his knee which needs to be replaced is limiting him more than anything else.  He is shoveling at home if it snows less than an inch, and he gets a little bit winded with this but not more than he would expect.  The only other time he complains of being short of breath is when he works with his  and this is intentional.    SOCIAL HISTORY:  He works with a  once a week at home.  This helps with his edema and his strength.  He knows he is eating too much salt.  He is .  Former smoker, quit in , with a 30-pack-year  history.  Daily alcohol.  Plan is to take his grandkids to [place] this spring.    PHYSICAL EXAMINATION:    GENERAL:  Well-developed, well-nourished gentleman in no acute distress.  HEENT:  Normocephalic, atraumatic.  HEART:  Irregularly irregular.  I do not appreciate murmur, rub or gallop.  LUNGS:  Clear, without wheezes, rales, or rhonchi.  EXTREMITIES:  With trace peripheral edema.  SKIN:  Warm and dry.    Studies reviewed include BMP done in 08/2021 showing a creatinine of 1.34, BUN 38, potassium 3.8, sodium 139.    Last echocardiogram was reviewed.  This is dated 02/2021 showing EF 60%-65% with moderate biatrial enlargement and mild AS.    Last remote device check shows AFib 99.8% of the time with multiple 3-4 second pauses, all asymptomatic.    ASSESSMENT AND PLAN:    1.  Heart failure with preserved EF, euvolemic with minimal edema and excellent symptoms.  That being said, he is held back by his knee pain.  He is overall doing well.  Weight is stable at 208 pounds on our scale.  At this point, we will continue his current medications.  2.  Coronary artery disease with known residual 70% circular lesion, 90% proximal RCA lesion, being managed medically without angina.  We will follow expectantly.  Appropriately on aspirin and statin.  3.  Dyslipidemia.  The patient takes 60 mg of Lipitor a day, with last lipid panel done in February showing an , HDL 41, total cholesterol 194, which is out of range from previous.  He needs to work on diet and exercise.  4.  Permanent AFib.  History of syncope with LINQ in place with no cause of syncope noted.  He is appropriately on Xarelto 20.  5.  Preoperative discussion.  The patient has upcoming cataract surgery.  I do not recommend any further testing prior to surgery.  If the surgeon is willing, I would go ahead on Xarelto and aspirin.  If not, it would be reasonable to hold either or both for whatever time period needed before cataract surgery.    This patient is  overall doing well and we have [facility] from [facility].  He will follow up with Dr. Hidalgo in 6 months, sooner with concerns.    Melvi Montaño PA-C        D: 2022   T: 2022   MT: sagrario    Name:     MARCELA TINAJERO  MRN:      [id number]        Account:      [id number]   :      1945           Service Date: 2022       Document: [id number]

## 2022-03-01 NOTE — LETTER
3/1/2022    Rudy Vernon MD  [address]  [place] [address]    RE: Betito Anderson       Dear Colleague,     I had the pleasure of seeing Betito Anderson in the [facility].    HPI and Plan:       Orders this Visit:  Orders Placed This Encounter   Procedures     Basic metabolic panel     Basic metabolic panel     Follow-Up with Cardiology     Orders Placed This Encounter   Medications     amLODIPine (NORVASC) 10 MG tablet     Sig: Take 1 tablet (10 mg) by mouth daily     Dispense:  90 tablet     Refill:  3     atorvastatin (LIPITOR) 20 MG tablet     Sig: Take 1 tablet (20 mg) by mouth daily     Dispense:  90 tablet     Refill:  3     atorvastatin (LIPITOR) 40 MG tablet     Sig: Take 1 tablet (40 mg) by mouth daily     Dispense:  90 tablet     Refill:  3     carvedilol (COREG) 6.25 MG tablet     Sig: Take 1 tablet (6.25 mg) by mouth 2 times daily (with meals)     Dispense:  180 tablet     Refill:  3     hydrALAZINE (APRESOLINE) 50 MG tablet     Sig: Take 1 tablet (50 mg) by mouth 3 times daily     Dispense:  270 tablet     Refill:  3     isosorbide mononitrate CR (IMDUR) 120 MG 24 HR ER tablet     Sig: Take 1 tablet (120 mg) by mouth daily     Dispense:  90 tablet     Refill:  3     losartan (COZAAR) 50 MG tablet     Sig: Take 1 tablet (50 mg) by mouth daily     Dispense:  90 tablet     Refill:  3     rivaroxaban ANTICOAGULANT (XARELTO) 20 MG TABS tablet     Sig: Take 1 tablet (20 mg) by mouth daily (with dinner)     Dispense:  90 tablet     Refill:  3     torsemide (DEMADEX) 20 MG tablet     Sig: Take 1 tablet (20 mg) Monday, Wednesday, Friday and take 2 tablets (40 mg) other days by mouth     Dispense:  135 tablet     Refill:  3     Medications Discontinued During This Encounter   Medication Reason     rivaroxaban ANTICOAGULANT (XARELTO) 20 MG TABS tablet Reorder     isosorbide mononitrate CR (IMDUR) 120 MG 24 HR ER tablet Reorder     carvedilol (COREG) 6.25 MG tablet Reorder      atorvastatin (LIPITOR) 40 MG tablet Reorder     atorvastatin (LIPITOR) 20 MG tablet Reorder     amLODIPine (NORVASC) 10 MG tablet Reorder     hydrALAZINE (APRESOLINE) 50 MG tablet Reorder     losartan (COZAAR) 50 MG tablet Reorder     torsemide (DEMADEX) 20 MG tablet          Encounter Diagnoses   Name Primary?     Heart failure with preserved ejection fraction, unspecified HF chronicity (H)      Benign essential hypertension      Hyperlipidemia LDL goal <70      Acute on chronic systolic congestive heart failure (H)      ASCVD (arteriosclerotic cardiovascular disease)      Hyperlipidemia LDL goal <100        CURRENT MEDICATIONS:  Current Outpatient Medications   Medication Sig Dispense Refill     allopurinol (ZYLOPRIM) 300 MG tablet TAKE 1 TABLET EVERY DAY 90 tablet 0     amLODIPine (NORVASC) 10 MG tablet Take 1 tablet (10 mg) by mouth daily 90 tablet 3     aspirin (ASA) 81 MG EC tablet Take 1 tablet (81 mg) by mouth daily 90 tablet 3     atorvastatin (LIPITOR) 20 MG tablet Take 1 tablet (20 mg) by mouth daily 90 tablet 3     atorvastatin (LIPITOR) 40 MG tablet Take 1 tablet (40 mg) by mouth daily 90 tablet 3     carvedilol (COREG) 6.25 MG tablet Take 1 tablet (6.25 mg) by mouth 2 times daily (with meals) 180 tablet 3     hydrALAZINE (APRESOLINE) 50 MG tablet Take 1 tablet (50 mg) by mouth 3 times daily 270 tablet 3     isosorbide mononitrate CR (IMDUR) 120 MG 24 HR ER tablet Take 1 tablet (120 mg) by mouth daily 90 tablet 3     losartan (COZAAR) 50 MG tablet Take 1 tablet (50 mg) by mouth daily 90 tablet 3     rivaroxaban ANTICOAGULANT (XARELTO) 20 MG TABS tablet Take 1 tablet (20 mg) by mouth daily (with dinner) 90 tablet 3     torsemide (DEMADEX) 20 MG tablet Take 1 tablet (20 mg) Monday, Wednesday, Friday and take 2 tablets (40 mg) other days by mouth 135 tablet 3       ALLERGIES     Allergies   Allergen Reactions     Ace Inhibitors Anaphylaxis     Edema of ace     Eliquis [Apixaban] Other (See Comments)        PAST MEDICAL HISTORY:  Past Medical History:   Diagnosis Date     ASCVD (arteriosclerotic cardiovascular disease) 1997    angio with 40% circ lesion; 6/19 hosp for chf, 3 vessel dz on angio but porcelin aorta so ptca done     Atrial fibrillation (H) 10/09/2018    found on routine physical     Carotid artery plaque 2005    seen on us, about 50% stenosis, fu 2009 us no significant stenosis     CHF (congestive heart failure) (H) 06/2019    hosp fsd, then fu echo ef nl     Elevated blood sugar      Gout     on allopurinol     HTN (hypertension)      Hypercholesteremia      Hyponatremia 2015    felt due to chlorthalidone     Low mean corpuscular volume (MCV)      Near syncope 5/15    fu est echo low level but neg     Psoriasis     Dr. Marti     Renal mass 06/29/2019    seen on ct chest for sob, needs fu ct for that, fu us done 7/19 and no mass seen, should have fu ct in December     Screening 2012    abd us no aaa     Syncope 09/2019    hosp fsd, cause not clear, lowered coreg dose; seen by Dr. Lezama of ep and ep study neg, implanted event monitor     V-tach (H) 09/2019    seen on event monitor for fu syncope, then ep study and no inducible vtach       PAST SURGICAL HISTORY:  Past Surgical History:   Procedure Laterality Date     CV CORONARY ANGIOGRAM N/A 7/2/2019    Procedure: Coronary Angiogram;  Surgeon: Donaldo Loera MD;  Location:  [facility]     CV HEART CATHETERIZATION WITH POSSIBLE INTERVENTION N/A 7/5/2019    Procedure: Heart Catheterization with Possible Intervention;  Surgeon: Manolo Hu MD;  Location:  [facility]     CV LEFT HEART CATH N/A 7/2/2019    Procedure: Left Heart Cath;  Surgeon: Donaldo Loera MD;  Location:  [facility]     CV LEFT VENTRICULOGRAM N/A 7/2/2019    Procedure: Left Ventriculogram;  Surgeon: Donaldo Loera MD;  Location:  HE[facility]     CV PCI STENT DRUG ELUTING N/A 7/5/2019    Procedure: PCI  Stent Drug Eluting;  Surgeon: Manolo Hu MD;  Location:  HE[facility] CATH LAB     CV RIGHT HEART CATH MEASUREMENTS RECORDED N/A 2019    Procedure: Right Heart Cath;  Surgeon: Donaldo Loera MD;  Location:  HE[facility] CATH LAB     EP LOOP RECORDER IMPLANT N/A 10/11/2019    Procedure: EP Loop Recorder Implant;  Surgeon: Fuad Lezama MD;  Location:  HEART [facility]ARD[facility] CATH LAB     EP RIGHT VENTRICULAR RECORDING N/A 10/11/2019    Procedure: EP Ventricular Pacing;  Surgeon: Fuad Lezama MD;  Location:  HEART [facility]ARD[facility] CATH LAB     ZZC ANESTH,DX ARTHROSCOPIC PROC KNEE JOINT         FAMILY HISTORY:  Family History   Problem Relation Age of Onset     Coronary Artery Disease Father      Medical History Unknown Mother      Medical History Unknown Maternal Grandfather        SOCIAL HISTORY:  Social History     Socioeconomic History     Marital status:      Spouse name: None     Number of children: 2     Years of education: None     Highest education level: None   Occupational History     Occupation: retired   Tobacco Use     Smoking status: Former Smoker     Packs/day: 1.00     Years: 30.00     Pack years: 30.00     Types: Cigars     Quit date: 1998     Years since quittin.4     Smokeless tobacco: Never Used   Substance and Sexual Activity     Alcohol use: Not Currently     Alcohol/week: 14.0 standard drinks     Types: 14 Standard drinks or equivalent per week     Drug use: No     Sexual activity: Never     Partners: Female   Other Topics Concern     Parent/sibling w/ CABG, MI or angioplasty before 65F 55M? Not Asked   Social History Narrative     None     Social Determinants of Health     Financial Resource Strain: Not on file   Food Insecurity: Not on file   Transportation Needs: Not on file   Physical Activity: Not on file   Stress: Not on file   Social Connections: Not on file   Intimate Partner Violence: Not on file   Housing Stability: Not on file       Review of  "Systems:  Skin:  Positive for bruising   Eyes:  Positive for glasses  ENT:  Positive for hearing loss  Respiratory:  Negative for shortness of breath  Cardiovascular:  palpitations;chest pain;dizziness;lightheadedness;fatigue;Negative for;edema    Gastroenterology: Negative for heartburn;reflux  Genitourinary:  Negative nocturia  Musculoskeletal:  Positive for joint pain (right knee)  Neurologic:  Negative for headaches;migraine headaches  Psychiatric:  not assessed    Heme/Lymph/Imm:  Negative    Endocrine:  Negative      Physical Exam:  Vitals: /70   Pulse 70   Ht 1.702 m (5' 7\")   Wt 94.8 kg (208 lb 14.4 oz)   SpO2 99%   BMI 32.72 kg/m     Please refer to dictation for physical exam    Recent Lab Results: all reviewed today  CBC RESULTS:  Lab Results   Component Value Date    WBC 7.4 10/11/2019    RBC 4.52 10/11/2019    HGB 11.9 (L) 04/05/2021    HCT 38.7 (L) 10/11/2019    MCV 86 10/11/2019    MCH 27.2 10/11/2019    MCHC 31.8 10/11/2019    RDW 16.6 (H) 10/11/2019     10/11/2019       BMP RESULTS:  Lab Results   Component Value Date     08/09/2021     05/28/2021    POTASSIUM 3.8 08/09/2021    POTASSIUM 3.9 05/28/2021    CHLORIDE 106 08/09/2021    CHLORIDE 106 05/28/2021    CO2 27 08/09/2021    CO2 27 05/28/2021    ANIONGAP 6 08/09/2021    ANIONGAP 7 05/28/2021     (H) 08/09/2021     (H) 05/28/2021    BUN 38 (H) 08/09/2021    BUN 44 (H) 05/28/2021    CR 1.34 (H) 08/09/2021    CR 1.55 (H) 05/28/2021    GFRESTIMATED 51 (L) 08/09/2021    GFRESTIMATED 43 (L) 05/28/2021    GFRESTBLACK 50 (L) 05/28/2021    GUNNER 8.9 08/09/2021    GUNNER 8.7 05/28/2021        INR RESULTS:  Lab Results   Component Value Date    INR 1.18 (H) 09/14/2019    INR 2.33 (H) 07/18/2019       Thank you for allowing me to participate in the care of your patient.      Sincerely,     Melvi Montaño PA-C     [facility]  cc:   Mendez Hidalgo MD  [address]  [place] [address]

## 2022-03-01 NOTE — PATIENT INSTRUCTIONS
Thank you for visiting with me today.    We discussed: I'm glad you're feeling well.      Medication changes:  Continue current medications.     Follow up: with Dr. Hidalgo in about 6 months with labs before visit.        Please call my nurse, Leda, at [phone] with any questions or concerns.    Scheduling phone number: [phone]  Reminder: Please bring in all current medications, over the counter supplements and vitamin bottles to your next appointment.

## 2022-03-01 NOTE — PROGRESS NOTES
[id number]      HPI and Plan:   See dictation    Orders this Visit:  Orders Placed This Encounter   Procedures     Basic metabolic panel     Basic metabolic panel     Follow-Up with Cardiology     Orders Placed This Encounter   Medications     amLODIPine (NORVASC) 10 MG tablet     Sig: Take 1 tablet (10 mg) by mouth daily     Dispense:  90 tablet     Refill:  3     atorvastatin (LIPITOR) 20 MG tablet     Sig: Take 1 tablet (20 mg) by mouth daily     Dispense:  90 tablet     Refill:  3     atorvastatin (LIPITOR) 40 MG tablet     Sig: Take 1 tablet (40 mg) by mouth daily     Dispense:  90 tablet     Refill:  3     carvedilol (COREG) 6.25 MG tablet     Sig: Take 1 tablet (6.25 mg) by mouth 2 times daily (with meals)     Dispense:  180 tablet     Refill:  3     hydrALAZINE (APRESOLINE) 50 MG tablet     Sig: Take 1 tablet (50 mg) by mouth 3 times daily     Dispense:  270 tablet     Refill:  3     isosorbide mononitrate CR (IMDUR) 120 MG 24 HR ER tablet     Sig: Take 1 tablet (120 mg) by mouth daily     Dispense:  90 tablet     Refill:  3     losartan (COZAAR) 50 MG tablet     Sig: Take 1 tablet (50 mg) by mouth daily     Dispense:  90 tablet     Refill:  3     rivaroxaban ANTICOAGULANT (XARELTO) 20 MG TABS tablet     Sig: Take 1 tablet (20 mg) by mouth daily (with dinner)     Dispense:  90 tablet     Refill:  3     torsemide (DEMADEX) 20 MG tablet     Sig: Take 1 tablet (20 mg) Monday, Wednesday, Friday and take 2 tablets (40 mg) other days by mouth     Dispense:  135 tablet     Refill:  3     Medications Discontinued During This Encounter   Medication Reason     rivaroxaban ANTICOAGULANT (XARELTO) 20 MG TABS tablet Reorder     isosorbide mononitrate CR (IMDUR) 120 MG 24 HR ER tablet Reorder     carvedilol (COREG) 6.25 MG tablet Reorder     atorvastatin (LIPITOR) 40 MG tablet Reorder     atorvastatin (LIPITOR) 20 MG tablet Reorder     amLODIPine (NORVASC) 10 MG tablet Reorder     hydrALAZINE (APRESOLINE) 50 MG tablet  Reorder     losartan (COZAAR) 50 MG tablet Reorder     torsemide (DEMADEX) 20 MG tablet          Encounter Diagnoses   Name Primary?     Heart failure with preserved ejection fraction, unspecified HF chronicity (H)      Benign essential hypertension      Hyperlipidemia LDL goal <70      Acute on chronic systolic congestive heart failure (H)      ASCVD (arteriosclerotic cardiovascular disease)      Hyperlipidemia LDL goal <100        CURRENT MEDICATIONS:  Current Outpatient Medications   Medication Sig Dispense Refill     allopurinol (ZYLOPRIM) 300 MG tablet TAKE 1 TABLET EVERY DAY 90 tablet 0     amLODIPine (NORVASC) 10 MG tablet Take 1 tablet (10 mg) by mouth daily 90 tablet 3     aspirin (ASA) 81 MG EC tablet Take 1 tablet (81 mg) by mouth daily 90 tablet 3     atorvastatin (LIPITOR) 20 MG tablet Take 1 tablet (20 mg) by mouth daily 90 tablet 3     atorvastatin (LIPITOR) 40 MG tablet Take 1 tablet (40 mg) by mouth daily 90 tablet 3     carvedilol (COREG) 6.25 MG tablet Take 1 tablet (6.25 mg) by mouth 2 times daily (with meals) 180 tablet 3     hydrALAZINE (APRESOLINE) 50 MG tablet Take 1 tablet (50 mg) by mouth 3 times daily 270 tablet 3     isosorbide mononitrate CR (IMDUR) 120 MG 24 HR ER tablet Take 1 tablet (120 mg) by mouth daily 90 tablet 3     losartan (COZAAR) 50 MG tablet Take 1 tablet (50 mg) by mouth daily 90 tablet 3     rivaroxaban ANTICOAGULANT (XARELTO) 20 MG TABS tablet Take 1 tablet (20 mg) by mouth daily (with dinner) 90 tablet 3     torsemide (DEMADEX) 20 MG tablet Take 1 tablet (20 mg) Monday, Wednesday, Friday and take 2 tablets (40 mg) other days by mouth 135 tablet 3       ALLERGIES     Allergies   Allergen Reactions     Ace Inhibitors Anaphylaxis     Edema of ace     Eliquis [Apixaban] Other (See Comments)       PAST MEDICAL HISTORY:  Past Medical History:   Diagnosis Date     ASCVD (arteriosclerotic cardiovascular disease) 1997    angio with 40% circ lesion; 6/19 hosp for chf, 3 vessel dz  on angio but porcelin aorta so ptca done     Atrial fibrillation (H) 10/09/2018    found on routine physical     Carotid artery plaque 2005    seen on us, about 50% stenosis, fu 2009 us no significant stenosis     CHF (congestive heart failure) (H) 06/2019    hosp fsd, then fu echo ef nl     Elevated blood sugar      Gout     on allopurinol     HTN (hypertension)      Hypercholesteremia      Hyponatremia 2015    felt due to chlorthalidone     Low mean corpuscular volume (MCV)      Near syncope 5/15    fu est echo low level but neg     Psoriasis     Dr. Marti     Renal mass 06/29/2019    seen on ct chest for sob, needs fu ct for that, fu us done 7/19 and no mass seen, should have fu ct in December     Screening 2012    abd us no aaa     Syncope 09/2019    hosp fsd, cause not clear, lowered coreg dose; seen by Dr. Lezama of ep and ep study neg, implanted event monitor     V-tach (H) 09/2019    seen on event monitor for fu syncope, then ep study and no inducible vtach       PAST SURGICAL HISTORY:  Past Surgical History:   Procedure Laterality Date     CV CORONARY ANGIOGRAM N/A 7/2/2019    Procedure: Coronary Angiogram;  Surgeon: Donaldo Loera MD;  Location:  HE[facility]ATH [facility]     CV HEART CATHETERIZATION WITH POSSIBLE INTERVENTION N/A 7/5/2019    Procedure: Heart Catheterization with Possible Intervention;  Surgeon: Manolo Hu MD;  Location:  [facility]ATH [facility]     CV LEFT HEART CATH N/A 7/2/2019    Procedure: Left Heart Cath;  Surgeon: Donaldo Loera MD;  Location:  [facility]     CV LEFT VENTRICULOGRAM N/A 7/2/2019    Procedure: Left Ventriculogram;  Surgeon: Donaldo Loera MD;  Location:  [facility]     CV PCI STENT DRUG ELUTING N/A 7/5/2019    Procedure: PCI Stent Drug Eluting;  Surgeon: Manolo Hu MD;  Location:  HE[facility]ATH [facility]     CV RIGHT HEART CATH MEASUREMENTS RECORDED N/A 7/2/2019    Procedure: Right Heart Cath;   Surgeon: Donaldo Loera MD;  Location:  HE[facility]ATH [facility]     EP LOOP RECORDER IMPLANT N/A 10/11/2019    Procedure: EP Loop Recorder Implant;  Surgeon: Fuad Lezama MD;  Location:  HEART [facility]ARD[facility] CATH LAB     EP RIGHT VENTRICULAR RECORDING N/A 10/11/2019    Procedure: EP Ventricular Pacing;  Surgeon: Fuad Lezama MD;  Location:  HEART CARDI[facility] CATH LAB     ZZC ANESTH,DX ARTHROSCOPIC PROC KNEE JOINT  2009       FAMILY HISTORY:  Family History   Problem Relation Age of Onset     Coronary Artery Disease Father      Medical History Unknown Mother      Medical History Unknown Maternal Grandfather        SOCIAL HISTORY:  Social History     Socioeconomic History     Marital status:      Spouse name: None     Number of children: 2     Years of education: None     Highest education level: None   Occupational History     Occupation: retired   Tobacco Use     Smoking status: Former Smoker     Packs/day: 1.00     Years: 30.00     Pack years: 30.00     Types: Cigars     Quit date: 1998     Years since quittin.4     Smokeless tobacco: Never Used   Substance and Sexual Activity     Alcohol use: Not Currently     Alcohol/week: 14.0 standard drinks     Types: 14 Standard drinks or equivalent per week     Drug use: No     Sexual activity: Never     Partners: Female   Other Topics Concern     Parent/sibling w/ CABG, MI or angioplasty before 65F 55M? Not Asked   Social History Narrative     None     Social Determinants of Health     Financial Resource Strain: Not on file   Food Insecurity: Not on file   Transportation Needs: Not on file   Physical Activity: Not on file   Stress: Not on file   Social Connections: Not on file   Intimate Partner Violence: Not on file   Housing Stability: Not on file       Review of Systems:  Skin:  Positive for bruising   Eyes:  Positive for glasses  ENT:  Positive for hearing loss  Respiratory:  Negative for shortness of breath  Cardiovascular:  palpitations;chest  "pain;dizziness;lightheadedness;fatigue;Negative for;edema    Gastroenterology: Negative for heartburn;reflux  Genitourinary:  Negative nocturia  Musculoskeletal:  Positive for joint pain (right knee)  Neurologic:  Negative for headaches;migraine headaches  Psychiatric:  not assessed    Heme/Lymph/Imm:  Negative    Endocrine:  Negative      Physical Exam:  Vitals: /70   Pulse 70   Ht 1.702 m (5' 7\")   Wt 94.8 kg (208 lb 14.4 oz)   SpO2 99%   BMI 32.72 kg/m     Please refer to dictation for physical exam    Recent Lab Results: all reviewed today  CBC RESULTS:  Lab Results   Component Value Date    WBC 7.4 10/11/2019    RBC 4.52 10/11/2019    HGB 11.9 (L) 04/05/2021    HCT 38.7 (L) 10/11/2019    MCV 86 10/11/2019    MCH 27.2 10/11/2019    MCHC 31.8 10/11/2019    RDW 16.6 (H) 10/11/2019     10/11/2019       BMP RESULTS:  Lab Results   Component Value Date     08/09/2021     05/28/2021    POTASSIUM 3.8 08/09/2021    POTASSIUM 3.9 05/28/2021    CHLORIDE 106 08/09/2021    CHLORIDE 106 05/28/2021    CO2 27 08/09/2021    CO2 27 05/28/2021    ANIONGAP 6 08/09/2021    ANIONGAP 7 05/28/2021     (H) 08/09/2021     (H) 05/28/2021    BUN 38 (H) 08/09/2021    BUN 44 (H) 05/28/2021    CR 1.34 (H) 08/09/2021    CR 1.55 (H) 05/28/2021    GFRESTIMATED 51 (L) 08/09/2021    GFRESTIMATED 43 (L) 05/28/2021    GFRESTBLACK 50 (L) 05/28/2021    GUNNER 8.9 08/09/2021    GUNNER 8.7 05/28/2021        INR RESULTS:  Lab Results   Component Value Date    INR 1.18 (H) 09/14/2019    INR 2.33 (H) 07/18/2019           CC  Mendez Hidalgo MD  [id number]  [place] [id number]      "

## 2022-03-07 DIAGNOSIS — E78.5 HYPERLIPIDEMIA LDL GOAL <100: ICD-10-CM

## 2022-03-07 DIAGNOSIS — I10 BENIGN ESSENTIAL HYPERTENSION: ICD-10-CM

## 2022-03-07 RX ORDER — TORSEMIDE 20 MG/1
TABLET ORAL
Qty: 20 TABLET | Refills: 0 | Status: SHIPPED | OUTPATIENT
Start: 2022-03-07 | End: 2023-05-08

## 2022-03-07 NOTE — TELEPHONE ENCOUNTER
[facility] Cardiology Refill Guideline reviewed.  Medication meets criteria for refill. Torsemide (20 pills until receives other prescription from [employer]) sent to [facility] in [place].

## 2022-03-08 ENCOUNTER — ANCILLARY PROCEDURE (OUTPATIENT)
Dept: CARDIOLOGY | Facility: CLINIC | Age: 77
End: 2022-03-08
Attending: INTERNAL MEDICINE
Payer: COMMERCIAL

## 2022-03-11 ENCOUNTER — OFFICE VISIT (OUTPATIENT)
Dept: FAMILY MEDICINE | Facility: CLINIC | Age: 77
End: 2022-03-11
Payer: COMMERCIAL

## 2022-03-11 VITALS
OXYGEN SATURATION: 98 % | HEART RATE: 65 BPM | TEMPERATURE: 96.9 F | RESPIRATION RATE: 20 BRPM | DIASTOLIC BLOOD PRESSURE: 82 MMHG | BODY MASS INDEX: 32.96 KG/M2 | HEIGHT: 67 IN | SYSTOLIC BLOOD PRESSURE: 137 MMHG | WEIGHT: 210 LBS

## 2022-03-11 DIAGNOSIS — M10.9 GOUT, UNSPECIFIED CAUSE, UNSPECIFIED CHRONICITY, UNSPECIFIED SITE: ICD-10-CM

## 2022-03-11 DIAGNOSIS — H26.9 CATARACT, UNSPECIFIED CATARACT TYPE, UNSPECIFIED LATERALITY: ICD-10-CM

## 2022-03-11 DIAGNOSIS — N18.30 STAGE 3 CHRONIC KIDNEY DISEASE, UNSPECIFIED WHETHER STAGE 3A OR 3B CKD (H): ICD-10-CM

## 2022-03-11 DIAGNOSIS — I48.20 CHRONIC ATRIAL FIBRILLATION, UNSPECIFIED (H): ICD-10-CM

## 2022-03-11 DIAGNOSIS — I47.20 V-TACH (H): ICD-10-CM

## 2022-03-11 DIAGNOSIS — Z01.818 PRE-OP EXAMINATION: Primary | ICD-10-CM

## 2022-03-11 DIAGNOSIS — I25.10 CORONARY ARTERY DISEASE INVOLVING NATIVE HEART WITHOUT ANGINA PECTORIS, UNSPECIFIED VESSEL OR LESION TYPE: ICD-10-CM

## 2022-03-11 DIAGNOSIS — J44.9 CHRONIC OBSTRUCTIVE PULMONARY DISEASE, UNSPECIFIED COPD TYPE (H): ICD-10-CM

## 2022-03-11 DIAGNOSIS — R73.9 ELEVATED BLOOD SUGAR: ICD-10-CM

## 2022-03-11 DIAGNOSIS — E78.5 HYPERLIPIDEMIA LDL GOAL <100: ICD-10-CM

## 2022-03-11 DIAGNOSIS — I10 BENIGN ESSENTIAL HYPERTENSION: ICD-10-CM

## 2022-03-11 DIAGNOSIS — N28.89 RENAL MASS: ICD-10-CM

## 2022-03-11 PROBLEM — I50.9 CHF (CONGESTIVE HEART FAILURE) (H): Status: RESOLVED | Noted: 2019-06-29 | Resolved: 2022-03-11

## 2022-03-11 PROBLEM — I50.31 ACUTE DIASTOLIC CONGESTIVE HEART FAILURE (H): Status: ACTIVE | Noted: 2019-06-01

## 2022-03-11 LAB
ANION GAP SERPL CALCULATED.3IONS-SCNC: 9 MMOL/L (ref 3–14)
BUN SERPL-MCNC: 32 MG/DL (ref 7–30)
CALCIUM SERPL-MCNC: 9 MG/DL (ref 8.5–10.1)
CHLORIDE BLD-SCNC: 109 MMOL/L (ref 94–109)
CHOLEST SERPL-MCNC: 169 MG/DL
CO2 SERPL-SCNC: 23 MMOL/L (ref 20–32)
CREAT SERPL-MCNC: 1.56 MG/DL (ref 0.66–1.25)
ERYTHROCYTE [DISTWIDTH] IN BLOOD BY AUTOMATED COUNT: 16.8 % (ref 10–15)
FASTING STATUS PATIENT QL REPORTED: YES
GFR SERPL CREATININE-BSD FRML MDRD: 46 ML/MIN/1.73M2
GLUCOSE BLD-MCNC: 123 MG/DL (ref 70–99)
HBA1C MFR BLD: 5.6 % (ref 0–5.6)
HCT VFR BLD AUTO: 43.7 % (ref 40–53)
HDLC SERPL-MCNC: 48 MG/DL
HGB BLD-MCNC: 13.6 G/DL (ref 13.3–17.7)
LDLC SERPL CALC-MCNC: 92 MG/DL
MCH RBC QN AUTO: 25.7 PG (ref 26.5–33)
MCHC RBC AUTO-ENTMCNC: 31.1 G/DL (ref 31.5–36.5)
MCV RBC AUTO: 83 FL (ref 78–100)
NONHDLC SERPL-MCNC: 121 MG/DL
PLATELET # BLD AUTO: 366 10E3/UL (ref 150–450)
POTASSIUM BLD-SCNC: 4 MMOL/L (ref 3.4–5.3)
RBC # BLD AUTO: 5.3 10E6/UL (ref 4.4–5.9)
SODIUM SERPL-SCNC: 141 MMOL/L (ref 133–144)
TRIGL SERPL-MCNC: 145 MG/DL
WBC # BLD AUTO: 8.2 10E3/UL (ref 4–11)

## 2022-03-11 PROCEDURE — 99214 OFFICE O/P EST MOD 30 MIN: CPT | Performed by: INTERNAL MEDICINE

## 2022-03-11 PROCEDURE — 85027 COMPLETE CBC AUTOMATED: CPT | Performed by: INTERNAL MEDICINE

## 2022-03-11 PROCEDURE — 80048 BASIC METABOLIC PNL TOTAL CA: CPT | Performed by: INTERNAL MEDICINE

## 2022-03-11 PROCEDURE — 83036 HEMOGLOBIN GLYCOSYLATED A1C: CPT | Performed by: INTERNAL MEDICINE

## 2022-03-11 PROCEDURE — 36415 COLL VENOUS BLD VENIPUNCTURE: CPT | Performed by: INTERNAL MEDICINE

## 2022-03-11 PROCEDURE — 80061 LIPID PANEL: CPT | Performed by: INTERNAL MEDICINE

## 2022-03-11 ASSESSMENT — PAIN SCALES - GENERAL: PAINLEVEL: NO PAIN (0)

## 2022-03-13 RX ORDER — ATORVASTATIN CALCIUM 80 MG/1
80 TABLET, FILM COATED ORAL DAILY
Qty: 90 TABLET | Refills: 3 | Status: SHIPPED | OUTPATIENT
Start: 2022-03-13 | End: 2023-01-05

## 2022-03-13 NOTE — RESULT ENCOUNTER NOTE
It was nice to see you.  Your should be able to view the lab results.    The labs for the most part look good.  The only thing I would suggest is raising the lipitor dose to 80mg once daily.  Anyone with prior heart issues or blockages should have the ldl or bad cholesterol under 70.  I would also recommend getting your weight down a bit.    Your blood salts, kidney tests and blood count are all fine.    I will send in a new prescription for the 80mg pill and you can change to that once you use up what you have.    If you have any questions please call me.    Rudy Vernon M.D.

## 2022-03-21 ENCOUNTER — ANCILLARY PROCEDURE (OUTPATIENT)
Dept: CARDIOLOGY | Facility: CLINIC | Age: 77
End: 2022-03-21
Attending: INTERNAL MEDICINE
Payer: COMMERCIAL

## 2022-03-21 ENCOUNTER — HOSPITAL ENCOUNTER (OUTPATIENT)
Dept: ULTRASOUND IMAGING | Facility: CLINIC | Age: 77
Discharge: HOME | End: 2022-03-21
Attending: INTERNAL MEDICINE | Admitting: INTERNAL MEDICINE
Payer: COMMERCIAL

## 2022-03-21 DIAGNOSIS — N28.89 RENAL MASS: ICD-10-CM

## 2022-03-21 PROCEDURE — 76770 US EXAM ABDO BACK WALL COMP: CPT

## 2022-03-21 PROCEDURE — G2066 INTER DEVC REMOTE 30D: HCPCS | Performed by: INTERNAL MEDICINE

## 2022-03-21 PROCEDURE — 93297 REM INTERROG DEV EVAL ICPMS: CPT | Performed by: INTERNAL MEDICINE

## 2022-04-16 ENCOUNTER — HEALTH MAINTENANCE LETTER (OUTPATIENT)
Age: 77
End: 2022-04-16

## 2022-05-06 ENCOUNTER — TELEPHONE (OUTPATIENT)
Dept: FAMILY MEDICINE | Facility: CLINIC | Age: 77
End: 2022-05-06
Payer: COMMERCIAL

## 2022-05-06 NOTE — TELEPHONE ENCOUNTER
Pt had Pre-op exam on 3/11, for left cataract removal.  Has surgery for Right eye on 5/16.  Wondering if you can addend the pre-op notes from 3/11, to clear him for surgery on Right eye?  Or do you need to see him again for another pre-op exam?    If ok to addend notes, please fax updated Pre-op notes to:  And notify pt.    Surgery/Procedure: Cataract Removal, Right  Surgery Location: [facility]  Surgeon: Curtis Solis  Surgery Date: 5/16/2022  Time of Surgery: n/a  Where patient plans to recover: At home with family  Fax number for surgical facility: [phone]

## 2022-05-08 NOTE — TELEPHONE ENCOUNTER
I can but need to know if any health changes since last office visit, any chest pain or shortness of breath, any problems with his recent surgery    Rudy Vernon M.D.

## 2022-05-09 NOTE — TELEPHONE ENCOUNTER
Writer printed and faxed addended preop note from 3/11/22 office visit to [facility], Curtis Ferguson, fax# [phone].    Writer called patient and notified them that no additional preop appt needed and that writer faxed over addended preop notes.     Lina Winkler RN  [facility] [place]

## 2022-05-09 NOTE — TELEPHONE ENCOUNTER
Per pt, no health changes. No chest pain, no SOB.   Cataract Surgery for his left eye went well, no complications. Healing well.     Once pre-op notes are addended, please fax to:  [facility], Curtis Ferguson, fax# [phone]

## 2022-07-01 ENCOUNTER — TELEPHONE (OUTPATIENT)
Dept: CARDIOLOGY | Facility: CLINIC | Age: 77
End: 2022-07-01

## 2022-07-01 NOTE — TELEPHONE ENCOUNTER
----- Message from Gloria Levine RN sent at 7/1/2022 11:48 AM CDT -----  Regarding: FW: Swelling in Legs  [place] [facility]  ----- Message -----  From: Solange Park  Sent: 7/1/2022  11:44 AM CDT  To: Gloria Levine RN  Subject: Swelling in Legs                                 Mykel called to talk to someone about the swelling in his legs. He asked to possibly talk to Melvi More; but I  thought may be you could call him to see what it is he exactly needs. I did get him in with Dr Hidalgo in Sept but not sure if he needs to be seen  sooner?    ThanksSolange

## 2022-07-01 NOTE — TELEPHONE ENCOUNTER
[facility] Call Center    Phone Message    May a detailed message be left on voicemail: yes     Reason for Call: Other: Patient called looking to schedule an appointment with Melvi Montaño or Gwen. First available was not soon enough according to the patient. Please call patient back to discuss further options, thank you.    Action Taken: Message routed to:  Other: Cardiology    Travel Screening: Not Applicable

## 2022-07-01 NOTE — TELEPHONE ENCOUNTER
[facility]  HF follow up phone assessment    Background: Mykel is calling with c/o leg swelling. Last saw Melvi Montaño on 3/1/22 with no medication changes. EF 60-65% on 2/26/21.      Recent weights: States weight is ~200#   7/1/22 213#   3/1/22 208# in clinic    Current diuretic plan:   Torsemide 20 mg daily MWF and 40 mg daily all other days       Breathing: Denies SOB at rest or with activity, orthopnea, PND  Edema: Bilateral swelling in both legs  Diet/Fluids: no change in diet and fluid intake    Labs:   Component      Latest Ref Rng & Units 4/5/2021 4/19/2021 5/28/2021 8/9/2021   Sodium      133 - 144 mmol/L 136 139 140 139   Potassium      3.4 - 5.3 mmol/L 4.3 3.9 3.9 3.8   Chloride      94 - 109 mmol/L 102 104 106 106   Carbon Dioxide      20 - 32 mmol/L 29 31 27 27   Anion Gap      3 - 14 mmol/L 5 4 7 6   Glucose      70 - 99 mg/dL 102 (H) 118 (H) 105 (H) 101 (H)   Urea Nitrogen      7 - 30 mg/dL 40 (H) 40 (H) 44 (H) 38 (H)   Creatinine      0.66 - 1.25 mg/dL 1.65 (H) 1.62 (H) 1.55 (H) 1.34 (H)   GFR Estimate      >60 mL/min/1.73m2 40 (L) 41 (L) 43 (L) 51 (L)   GFR Estimate If Black      >60 mL/min/1.73:m2 46 (L) 47 (L) 50 (L)    Calcium      8.5 - 10.1 mg/dL 8.8 9.1 8.7 8.9     Component      Latest Ref Rng & Units 3/11/2022   Sodium      133 - 144 mmol/L 141   Potassium      3.4 - 5.3 mmol/L 4.0   Chloride      94 - 109 mmol/L 109   Carbon Dioxide      20 - 32 mmol/L 23   Anion Gap      3 - 14 mmol/L 9   Glucose      70 - 99 mg/dL 123 (H)   Urea Nitrogen      7 - 30 mg/dL 32 (H)   Creatinine      0.66 - 1.25 mg/dL 1.56 (H)   GFR Estimate      >60 mL/min/1.73m2 46 (L)   GFR Estimate If Black      >60 mL/min/1.73:m2    Calcium      8.5 - 10.1 mg/dL 9.0       Assessment and Plan:    Mykel states his legs have increased in swelling over the last week. He states he has a  come in on Tuesday and on 6/21 there was no swelling now on 6/28 there was swelling and has gotten worse  since then.  Will send to GUSTABO Beal to review in Melvi's absence.       Future Appointments   Date Time Provider Department Center   7/8/2022  8:30 AM Rudy Vernon MD [facility]   9/23/2022  8:15 AM [facility] [place]   9/23/2022  8:45 AM Mendez Hidalgo MD [facility]

## 2022-07-01 NOTE — TELEPHONE ENCOUNTER
[facility]    - Mykel will take an extra 20 mg Torsemide today, and 20 mg on Monday to equal 40 mg daily. Will send a message to the board to call and check on weights and symptoms.  Requested he weigh himself daily going forward.     Future Appointments   Date Time Provider Department Center   7/8/2022  8:30 AM Rudy Vernon MD [facility]   9/23/2022  8:15 AM [facility]   9/23/2022  8:45 AM Mendez Hidalgo MD [facility]       Yancy Eaton RN on 7/1/2022 at 3:03 PM

## 2022-07-01 NOTE — TELEPHONE ENCOUNTER
I would recommend increasing Torsemide to 40 mg daily for the next few days to see if that helps his edema. Watch sodium. If no improvement, he can let us know. Thanks. jessenia

## 2022-07-05 NOTE — TELEPHONE ENCOUNTER
"[facility]    Called patient to assess response to extra torsemide doses. He confirmed taking torsemide 40mg on Sun 7/3 and Mon 7/4 for increased LE swelling. Today he stated this swelling much improved. His R leg \"back to normal\" and his L leg \"almost normal.\"  He stated the L leg had pitting edema from ankle to knee. Today the leg continue to look puffy, but no longer pitting. He denied swelling to his feet or any sensation of abdominal bloating. He further denied any SOB/[place], PND or orthopnea.    He has not been weighing himself daily. His last home weight \"several days ago\" was 213#. He did step on the scale during our conversation and his weight 211# after eating. I requested he weigh and record weights daily, explaining why this is important. He will start tomorrow am.     Current diuretic regimen:   Torsemide 20mg qMWF,  40mg every day the remainder of the week    Patient will resume above regimen and call [facility] w/worsening sx, weight gain. He verbalized understanding and had no other questions/concerns.  Koki Garcia, RN on 7/5/2022 at 10:08 AM    Future Appointments   Date Time Provider Department Center   7/8/2022  8:30 AM Rudy Vernon MD [facility]   9/23/2022  8:15 AM [facility]   9/23/2022  8:45 AM Mendez Hidalgo MD [facility]       "

## 2022-07-06 ENCOUNTER — DOCUMENTATION ONLY (OUTPATIENT)
Dept: CARDIOLOGY | Facility: CLINIC | Age: 77
End: 2022-07-06

## 2022-07-07 NOTE — PROGRESS NOTES
[facility]  [address] [place] [address], [address]  [facility] [address]  Phone: [phone]  Primary Provider: Rudy Vernon        PREOPERATIVE EVALUATION:  Today's date: 7/8/2022    Betito Anderson is a 76 year old male who presents for a preoperative evaluation.    Surgical Information:  Surgery/Procedure: Cataract  Surgery Location: [facility] [place]  Surgeon: dr salgado  Surgery Date: 7/12/22  Time of Surgery: 1 pm  Where patient plans to recover: At home with family  Fax number for surgical facility:     Type of Anesthesia Anticipated: to be determined        Subjective     HPI related to upcoming procedure: This is a very pleasant 76-year-old I am seeing for preoperative evaluation.  He has multiple medical problems as noted which have been quite stable.  He had prior cataract surgery but we had to cancel the second 1 so is having it now.  He had no issues with the first surgery.  He is on both aspirin and Xarelto and will check with his surgeon to make sure he can remain on these although he was able to during his prior surgery.  He is feeling quite well recently with no chest pain or shortness of breath but has had some pedal edema for some time.  He called cardiology who raised his torsemide dose and this is improved.                Past Medical History:      Past Medical History:   Diagnosis Date     Acute diastolic congestive heart failure (H) 06/2019    hosp fsd, then fu echo ef nl     ASCVD (arteriosclerotic cardiovascular disease) 1997    angio with 40% circ lesion; 6/19 hosp for chf, 3 vessel dz on angio but porcelin aorta so ptca done     Atrial fibrillation (H) 10/09/2018    found on routine physical     Carotid artery plaque 2005    seen on us, about 50% stenosis, fu 2009 us no significant stenosis     Elevated blood sugar      Gout     on allopurinol     HTN (hypertension)      Hypercholesteremia      Hyponatremia 2015    felt due to chlorthalidone     Low mean corpuscular  volume (MCV)      Near syncope 05/2015    fu est echo low level but neg     Psoriasis     Dr. Marti     Renal mass 06/29/2019    seen on ct chest for sob, needs fu ct for that, fu us done 7/19 and no mass seen, should have fu ct in December, had fu us 3/22 and no fu needed     Screening 2012    abd us no aaa     Syncope 09/2019    hosp fsd, cause not clear, lowered coreg dose; seen by Dr. Lezama of ep and ep study neg, implanted event monitor     V-tach (H) 09/2019    seen on event monitor for fu syncope, then ep study and no inducible vtach             Past Surgical History:      Past Surgical History:   Procedure Laterality Date     CATARACT EXTRACTION  03/2022     CV CORONARY ANGIOGRAM N/A 07/02/2019    Procedure: Coronary Angiogram;  Surgeon: Donaldo Loera MD;  Location: [facility] CARDIAC CATH LAB     CV HEART CATHETERIZATION WITH POSSIBLE INTERVENTION N/A 07/05/2019    Procedure: Heart Catheterization with Possible Intervention;  Surgeon: Manolo Hu MD;  Location:  HEART CARDIAC CATH LAB     CV LEFT HEART CATH N/A 07/02/2019    Procedure: Left Heart Cath;  Surgeon: Donaldo Loera MD;  Location: [facility] CARDIAC CATH LAB     CV LEFT VENTRICULOGRAM N/A 07/02/2019    Procedure: Left Ventriculogram;  Surgeon: Donaldo Loera MD;  Location: [facility] CARDIAC CATH LAB     CV PCI STENT DRUG ELUTING N/A 07/05/2019    Procedure: PCI Stent Drug Eluting;  Surgeon: Manolo Hu MD;  Location:  HEART CARDIAC CATH LAB     CV RIGHT HEART CATH MEASUREMENTS RECORDED N/A 07/02/2019    Procedure: Right Heart Cath;  Surgeon: Donaldo Loera MD;  Location:  HEART CARDIAC CATH LAB     EP LOOP RECORDER IMPLANT N/A 10/11/2019    Procedure: EP Loop Recorder Implant;  Surgeon: Fuad Lezama MD;  Location: [facility] CARDIAC CATH LAB     EP RIGHT VENTRICULAR RECORDING N/A 10/11/2019    Procedure: EP Ventricular Pacing;  Surgeon: Fuad Lezama MD;  Location: [facility] CARDIAC CATH LAB      ZZC ANESTH,DX ARTHROSCOPIC PROC KNEE JOINT  2009             Social History:     Social History     Tobacco Use     Smoking status: Former Smoker     Packs/day: 1.00     Years: 30.00     Pack years: 30.00     Types: Cigars     Quit date: 1998     Years since quittin.8     Smokeless tobacco: Never Used   Substance Use Topics     Alcohol use: Not Currently     Comment: minimal to none as of 3/22             Family History:   No family history of bleeding difficulty, anesthesia problems or blood clots.         Allergies:     Allergies   Allergen Reactions     Ace Inhibitors Anaphylaxis     Edema of ace     Eliquis [Apixaban] Other (See Comments)             Medications:     Current Outpatient Medications   Medication Sig Dispense Refill     amLODIPine (NORVASC) 10 MG tablet Take 1 tablet (10 mg) by mouth daily 90 tablet 3     aspirin (ASA) 81 MG EC tablet Take 1 tablet (81 mg) by mouth daily 90 tablet 3     atorvastatin (LIPITOR) 80 MG tablet Take 1 tablet (80 mg) by mouth daily 90 tablet 3     carvedilol (COREG) 6.25 MG tablet Take 1 tablet (6.25 mg) by mouth 2 times daily (with meals) 180 tablet 3     hydrALAZINE (APRESOLINE) 50 MG tablet Take 1 tablet (50 mg) by mouth 3 times daily 270 tablet 3     isosorbide mononitrate CR (IMDUR) 120 MG 24 HR ER tablet Take 1 tablet (120 mg) by mouth daily 90 tablet 3     losartan (COZAAR) 50 MG tablet Take 1 tablet (50 mg) by mouth daily 90 tablet 3     rivaroxaban ANTICOAGULANT (XARELTO) 20 MG TABS tablet Take 1 tablet (20 mg) by mouth daily (with dinner) 90 tablet 3     torsemide (DEMADEX) 20 MG tablet Take 1 tablet (20 mg) Monday, Wednesday, Friday and take 2 tablets (40 mg) other days by mouth 20 tablet 0               Review of Systems:   No history of bleeding difficulty, anesthesia problems or blood clots.  The 10 point Review of Systems is negative other than noted in the HPI           Physical Exam:   Blood pressure 136/60, pulse 80, height 1.702 m  "(5' 7\"), weight 95.3 kg (210 lb).    Constitutional: healthy appearing, alert and in no distress  Heent: Normocephalic. Head without obvious masses or lesions. PERRLDC, EOMI. Mouth exam within normal limits: tongue, mucous membranes, posterior pharynx all normal, no lesions or abnormalities seen.  Tm's and canals within normal limits bilaterally. Neck supple, no nuchal rigidity or masses. No supraclavicular, or cervical adenopathy. Thyroid symmetric, no masses.  Cardiovascular: Regular rate and rhythm, no murmer, rub or gallops.  JVP not elevated, trace bilat pedal edema.  Carotids within normal limits bilaterally, no bruits.  Respiratory: Normal respiratory effort.  Lungs clear, normal flow, no wheezing or crackles.  Gastrointestinal: Normal active bowel sounds.   Soft, not tender, no masses, guarding or rebound.  No hepatosplenomegaly.   Musculoskeletal: extremities normal, no gross deformities noted.  Skin: no suspicious lesions or rashes   Neurologic: Mental status within normal limits.  Speech fluent.  No gross motor abnormalities and gait intact.  Psychiatric: mentation appears normal and affect normal.         Data:   Labs sent        Assessment:   1. Normal pre op exam, this patient should be low risk for the procedure  2. bilat pedal edema, suspect from higher dose norvasc not cv, renal or hepatic  3. Cad, stable  4. Ckd, follow up labs  5. Hfpef, no issues  6. Chronic afib, on noac  7. Elevated cholesterol, change lipitor dose to 80mg  8. Hypertension, contorlled         Plan:   The patient is ok for the procedure  He will check with surgery to make sure he can continue aspirin and Xarelto.  If he has to stop them temporarily that is fine.  He will change the amlodipine dose to 5 mg.  He will let me know if his pedal edema does not resolve soon.  He can take his other medications on the day of surgery with a sip of water.      Rudy Vernon M.D.    "

## 2022-07-08 ENCOUNTER — OFFICE VISIT (OUTPATIENT)
Dept: FAMILY MEDICINE | Facility: CLINIC | Age: 77
End: 2022-07-08
Payer: COMMERCIAL

## 2022-07-08 VITALS
DIASTOLIC BLOOD PRESSURE: 60 MMHG | HEIGHT: 67 IN | WEIGHT: 210 LBS | BODY MASS INDEX: 32.96 KG/M2 | HEART RATE: 80 BPM | SYSTOLIC BLOOD PRESSURE: 136 MMHG

## 2022-07-08 DIAGNOSIS — Z01.818 PRE-OP EXAMINATION: Primary | ICD-10-CM

## 2022-07-08 DIAGNOSIS — N18.30 STAGE 3 CHRONIC KIDNEY DISEASE, UNSPECIFIED WHETHER STAGE 3A OR 3B CKD (H): ICD-10-CM

## 2022-07-08 DIAGNOSIS — R60.0 PEDAL EDEMA: ICD-10-CM

## 2022-07-08 DIAGNOSIS — E78.5 HYPERLIPIDEMIA LDL GOAL <100: ICD-10-CM

## 2022-07-08 DIAGNOSIS — I50.31 ACUTE DIASTOLIC CONGESTIVE HEART FAILURE (H): ICD-10-CM

## 2022-07-08 DIAGNOSIS — I48.20 CHRONIC ATRIAL FIBRILLATION (H): ICD-10-CM

## 2022-07-08 DIAGNOSIS — I25.10 ASCVD (ARTERIOSCLEROTIC CARDIOVASCULAR DISEASE): ICD-10-CM

## 2022-07-08 DIAGNOSIS — H26.9 CATARACT, UNSPECIFIED CATARACT TYPE, UNSPECIFIED LATERALITY: ICD-10-CM

## 2022-07-08 DIAGNOSIS — L98.9 SKIN LESION: ICD-10-CM

## 2022-07-08 PROBLEM — N28.89 RENAL MASS: Status: RESOLVED | Noted: 2019-06-29 | Resolved: 2022-07-08

## 2022-07-08 LAB
ANION GAP SERPL CALCULATED.3IONS-SCNC: 7 MMOL/L (ref 3–14)
BUN SERPL-MCNC: 23 MG/DL (ref 7–30)
CALCIUM SERPL-MCNC: 9.3 MG/DL (ref 8.5–10.1)
CHLORIDE BLD-SCNC: 106 MMOL/L (ref 94–109)
CO2 SERPL-SCNC: 26 MMOL/L (ref 20–32)
CREAT SERPL-MCNC: 1.46 MG/DL (ref 0.66–1.25)
GFR SERPL CREATININE-BSD FRML MDRD: 50 ML/MIN/1.73M2
GLUCOSE BLD-MCNC: 100 MG/DL (ref 70–99)
POTASSIUM BLD-SCNC: 3.6 MMOL/L (ref 3.4–5.3)
SODIUM SERPL-SCNC: 139 MMOL/L (ref 133–144)

## 2022-07-08 PROCEDURE — 80048 BASIC METABOLIC PNL TOTAL CA: CPT | Performed by: INTERNAL MEDICINE

## 2022-07-08 PROCEDURE — 99214 OFFICE O/P EST MOD 30 MIN: CPT | Performed by: INTERNAL MEDICINE

## 2022-07-08 PROCEDURE — 36415 COLL VENOUS BLD VENIPUNCTURE: CPT | Performed by: INTERNAL MEDICINE

## 2022-07-08 NOTE — PATIENT INSTRUCTIONS
Change the dose of amlodipine to 5mg daily as I think the higher dose is causing your swelling.  The swelling should go down over the next 10 days.  Let me know if it does not.    Please check with your eye doctor to make sure you can continue the aspirin and xarelto during surgery, if not let me know.    You can take all your usual morning medicines on the day of surgery with a sip of water    Change the dose of the atorvastatin to 80mg once daily    Rudy Vernon M.D.

## 2022-07-10 ENCOUNTER — ANCILLARY PROCEDURE (OUTPATIENT)
Dept: CARDIOLOGY | Facility: CLINIC | Age: 77
End: 2022-07-10
Attending: INTERNAL MEDICINE
Payer: COMMERCIAL

## 2022-07-10 DIAGNOSIS — Z95.0 CARDIAC PACEMAKER IN SITU: ICD-10-CM

## 2022-07-10 PROCEDURE — G2066 INTER DEVC REMOTE 30D: HCPCS | Performed by: INTERNAL MEDICINE

## 2022-07-10 PROCEDURE — 93297 REM INTERROG DEV EVAL ICPMS: CPT | Performed by: INTERNAL MEDICINE

## 2022-07-12 DIAGNOSIS — Z95.0 CARDIAC PACEMAKER IN SITU: Primary | ICD-10-CM

## 2022-08-05 LAB
MDC_IDC_MSMT_BATTERY_STATUS: NORMAL
MDC_IDC_PG_IMPLANT_DTM: NORMAL
MDC_IDC_PG_MFG: NORMAL
MDC_IDC_PG_MODEL: NORMAL
MDC_IDC_PG_SERIAL: NORMAL
MDC_IDC_PG_TYPE: NORMAL
MDC_IDC_SESS_CLINIC_NAME: NORMAL
MDC_IDC_SESS_DTM: NORMAL
MDC_IDC_SESS_TYPE: NORMAL
MDC_IDC_SET_ZONE_DETECTION_INTERVAL: 2000 MS
MDC_IDC_SET_ZONE_DETECTION_INTERVAL: 3000 MS
MDC_IDC_SET_ZONE_DETECTION_INTERVAL: 380 MS
MDC_IDC_SET_ZONE_TYPE: NORMAL
MDC_IDC_STAT_AT_BURDEN_PERCENT: 99.3 %
MDC_IDC_STAT_AT_DTM_END: NORMAL
MDC_IDC_STAT_AT_DTM_START: NORMAL
MDC_IDC_STAT_EPISODE_RECENT_COUNT: 0
MDC_IDC_STAT_EPISODE_RECENT_COUNT: 0
MDC_IDC_STAT_EPISODE_RECENT_COUNT: 1
MDC_IDC_STAT_EPISODE_RECENT_COUNT: 11
MDC_IDC_STAT_EPISODE_RECENT_COUNT: 4
MDC_IDC_STAT_EPISODE_RECENT_COUNT: 657
MDC_IDC_STAT_EPISODE_RECENT_COUNT_DTM_END: NORMAL
MDC_IDC_STAT_EPISODE_RECENT_COUNT_DTM_START: NORMAL
MDC_IDC_STAT_EPISODE_TOTAL_COUNT: 0
MDC_IDC_STAT_EPISODE_TOTAL_COUNT: 0
MDC_IDC_STAT_EPISODE_TOTAL_COUNT: 20
MDC_IDC_STAT_EPISODE_TOTAL_COUNT: 24
MDC_IDC_STAT_EPISODE_TOTAL_COUNT: 3814
MDC_IDC_STAT_EPISODE_TOTAL_COUNT: 6
MDC_IDC_STAT_EPISODE_TOTAL_COUNT_DTM_END: NORMAL
MDC_IDC_STAT_EPISODE_TOTAL_COUNT_DTM_START: NORMAL
MDC_IDC_STAT_EPISODE_TYPE: NORMAL

## 2022-08-10 NOTE — Clinical Note
Procedure is scheduled in [facility].   Retention Suture Text: Retention sutures were placed to support the closure and prevent dehiscence.

## 2022-09-23 ENCOUNTER — LAB (OUTPATIENT)
Dept: LAB | Facility: CLINIC | Age: 77
End: 2022-09-23
Payer: COMMERCIAL

## 2022-09-23 ENCOUNTER — OFFICE VISIT (OUTPATIENT)
Dept: CARDIOLOGY | Facility: CLINIC | Age: 77
End: 2022-09-23
Attending: PHYSICIAN ASSISTANT
Payer: COMMERCIAL

## 2022-09-23 VITALS
HEIGHT: 67 IN | WEIGHT: 210.2 LBS | DIASTOLIC BLOOD PRESSURE: 70 MMHG | OXYGEN SATURATION: 97 % | SYSTOLIC BLOOD PRESSURE: 148 MMHG | BODY MASS INDEX: 32.99 KG/M2 | HEART RATE: 63 BPM

## 2022-09-23 DIAGNOSIS — I10 BENIGN ESSENTIAL HYPERTENSION: ICD-10-CM

## 2022-09-23 DIAGNOSIS — I50.30 HEART FAILURE WITH PRESERVED EJECTION FRACTION, UNSPECIFIED HF CHRONICITY (H): ICD-10-CM

## 2022-09-23 DIAGNOSIS — E78.5 HYPERLIPIDEMIA LDL GOAL <100: ICD-10-CM

## 2022-09-23 DIAGNOSIS — I25.10 ASCVD (ARTERIOSCLEROTIC CARDIOVASCULAR DISEASE): ICD-10-CM

## 2022-09-23 PROCEDURE — 99214 OFFICE O/P EST MOD 30 MIN: CPT | Performed by: INTERNAL MEDICINE

## 2022-09-23 RX ORDER — HYDRALAZINE HYDROCHLORIDE 100 MG/1
100 TABLET, FILM COATED ORAL 3 TIMES DAILY
Qty: 90 TABLET | Refills: 3 | Status: SHIPPED | OUTPATIENT
Start: 2022-09-23 | End: 2023-11-13

## 2022-09-23 NOTE — LETTER
2022    Rudy Vernon MD  [address]  [place] [address]    RE: Betito Leeon       Dear Colleague,     I had the pleasure of seeing Betito Anderson in the [facility].  CARDIOLOGY CLINIC CONSULTATION    REASON FOR CONSULT: Heart failure with recovered EF and coronary artery disease    PRIMARY CARE PHYSICIAN:  Rudy Vernon    Today's clinic visit entailed:  Review of the result(s) of each unique test - Echo labs Loop recorder interrogation  30 minutes spent on the date of the encounter doing chart review, history and exam, documentation and further activities per the note  Provider  Link to [facility] Help Grid     The level of medical decision making during this visit was of moderate complexity.      HISTORY OF PRESENT ILLNESS:  Mr. Anderson is a delightful 76-year-old gentleman who is seeing me since summer of 2019 and has a past medical history significant for the followin. Chronic atrial fibrillation since 2018 for which he is on anticoagulation  2. In 2019 he presented to the hospital with acute hypertensive emergency and acute LV dysfunction. EF was down to 30%, but normalized to 55% with control of blood pressure.    3. Angiogram at that admission showed a tight lesion in the mid LAD spanning into the diagonal and also had other small-vessel disease and a subtotally occluded right coronary artery with robust collaterals from the LAD.  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  He underwent PCI of his LAD bifurcation into the diagonal. Subsequently, a nuclear stress test had shown mild basilar inferolateral ischemia without angina, and as such, this has been under conservative management.   4. Obesity, hypertension, dyslipidemia, and diabetes.    5. 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 Subsequently, in 2019, he had episodes of syncope, and a monitor had shown nonsustained VT, and then EP  study was negative.  Subsequently, he had a loop recorder implanted.  On the loop he has been having some 4 second pauses, but he has been asymptomatic.    7. Mild aortic stenosis  8. Chronic kidney disease     He is here for routine cardiology follow-up.  NYHA class II. No angina, syncope or presyncope.  Blood pressure is running borderline high still despite multiple medications.    PAST MEDICAL HISTORY:  Past Medical History:   Diagnosis Date     Acute diastolic congestive heart failure (H) 06/2019    hosp fsd, then fu echo ef nl     ASCVD (arteriosclerotic cardiovascular disease) 1997    angio with 40% circ lesion; 6/19 hosp for chf, 3 vessel dz on angio but porcelin aorta so ptca done     Atrial fibrillation (H) 10/09/2018    found on routine physical     Carotid artery plaque 2005    seen on us, about 50% stenosis, fu 2009 us no significant stenosis     Elevated blood sugar      Gout     on allopurinol     HTN (hypertension)      Hypercholesteremia      Hyponatremia 2015    felt due to chlorthalidone     Low mean corpuscular volume (MCV)      Near syncope 05/2015    fu est echo low level but neg     Psoriasis     Dr. Marti     Renal mass 06/29/2019    seen on ct chest for sob, needs fu ct for that, fu us done 7/19 and no mass seen, should have fu ct in December, had fu us 3/22 and no fu needed     Screening 2012    abd us no aaa     Syncope 09/2019    hosp fsd, cause not clear, lowered coreg dose; seen by Dr. Lezama of ep and ep study neg, implanted event monitor     V-tach (H) 09/2019    seen on event monitor for fu syncope, then ep study and no inducible vtach       MEDICATIONS:  Current Outpatient Medications   Medication     allopurinol (ZYLOPRIM) 300 MG tablet     amLODIPine (NORVASC) 10 MG tablet     aspirin (ASA) 81 MG EC tablet     atorvastatin (LIPITOR) 80 MG tablet     carvedilol (COREG) 6.25 MG tablet     hydrALAZINE (APRESOLINE) 100 MG tablet     isosorbide mononitrate CR (IMDUR) 120 MG 24 HR ER  tablet     losartan (COZAAR) 50 MG tablet     rivaroxaban ANTICOAGULANT (XARELTO) 20 MG TABS tablet     torsemide (DEMADEX) 20 MG tablet     No current facility-administered medications for this visit.       ALLERGIES:  Allergies   Allergen Reactions     Ace Inhibitors Anaphylaxis     Edema of ace     Eliquis [Apixaban] Other (See Comments)       SOCIAL HISTORY:  I have reviewed this patient's social history and updated it with pertinent information if needed. Betito Anderson  reports that he quit smoking about 24 years ago. His smoking use included cigars. He has a 30.00 pack-year smoking history. He has never used smokeless tobacco. He reports previous alcohol use. He reports that he does not use drugs.    FAMILY HISTORY:  I have reviewed this patient's family history and updated it with pertinent information if needed.   Family History   Problem Relation Age of Onset     Coronary Artery Disease Father      Medical History Unknown Mother      Medical History Unknown Maternal Grandfather        REVIEW OF SYSTEMS:  Skin:  not assessed     Eyes:  not assessed    ENT:  not assessed    Respiratory:  Negative    Cardiovascular:  Negative    Gastroenterology: not assessed    Genitourinary:  not assessed    Musculoskeletal:  not assessed    Neurologic:  not assessed    Psychiatric:  not assessed    Heme/Lymph/Imm:  not assessed    Endocrine:  Negative        PHYSICAL EXAM:      BP: (!) 148/70 Pulse: 63     SpO2: 97 %      Vital Signs with Ranges  Pulse:  [63] 63  BP: (148)/(70) 148/70  SpO2:  [97 %] 97 %  210 lbs 3.2 oz    Constitutional: awake, alert, no distress  Respiratory: Clear to auscultation  Cardiovascular: Irregular heart sounds soft systolic murmur no rubs or gallops  GI: nondistended  Neuropsychiatric: appropriate affact    DATA:  Labs: Pertinent cardiac labs reviewed    ASSESSMENT:  History of coronary artery disease  Atrial fibrillation  Heart failure with recovered EF    RECOMMENDATIONS:  1. In regards to  coronary artery disease, patient is asymptomatic.  Continue guideline directed medical therapy.  Call us with any change in symptoms.  2. In regards to heart failure with recovered EF, appears to be volume up.  Continue current dose of diuretics.  3. Hypertension is uncontrolled.  Recommend increasing hydralazine to 100 mg 3 times daily.  4. Continue anticoagulation.  Denies any bleeding problems.  Given complex severe coronary artery disease, we have mutually decided to continue aspirin therapy.  If bleeding risk increases this can be discontinued.  5. No significant bradycardic events on the loop recorder.  He is asymptomatic in this regard.  6. Follow-up in 1 year with repeat echocardiography to follow-up on aortic stenosis sooner if anything changes clinically.    JOE Estevez, [facility]  Cardiology - [facility]  September 23, 2022      Thank you for allowing me to participate in the care of your patient.      Sincerely,     Mendez Hidalgo MD     [facility]  cc:   Melvi Montaño, PA-C  [address]  [place] [address]

## 2022-09-23 NOTE — PROGRESS NOTES
CARDIOLOGY CLINIC CONSULTATION    REASON FOR CONSULT: Heart failure with recovered EF and coronary artery disease    PRIMARY CARE PHYSICIAN:  Rudy Vernon    Today's clinic visit entailed:  Review of the result(s) of each unique test - Echo labs Loop recorder interrogation  30 minutes spent on the date of the encounter doing chart review, history and exam, documentation and further activities per the note  Provider  Link to [facility] Help Grid     The level of medical decision making during this visit was of moderate complexity.      HISTORY OF PRESENT ILLNESS:  Mr. Anderson is a delightful 76-year-old gentleman who is seeing me since summer of 2019 and has a past medical history significant for the followin. Chronic atrial fibrillation since 2018 for which he is on anticoagulation  2. In 2019 he presented to the hospital with acute hypertensive emergency and acute LV dysfunction. EF was down to 30%, but normalized to 55% with control of blood pressure.    3. Angiogram at that admission showed a tight lesion in the mid LAD spanning into the diagonal and also had other small-vessel disease and a subtotally occluded right coronary artery with robust collaterals from the LAD.  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  He underwent PCI of his LAD bifurcation into the diagonal. Subsequently, a nuclear stress test had shown mild basilar inferolateral ischemia without angina, and as such, this has been under conservative management.   4. Obesity, hypertension, dyslipidemia, and diabetes.    5. 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 Subsequently, in 2019, he had episodes of syncope, and a monitor had shown nonsustained VT, and then EP study was negative.  Subsequently, he had a loop recorder implanted.  On the loop he has been having some 4 second pauses, but he has been asymptomatic.    7. Mild aortic stenosis  8. Chronic kidney  disease     He is here for routine cardiology follow-up.  NYHA class II. No angina, syncope or presyncope.  Blood pressure is running borderline high still despite multiple medications.    PAST MEDICAL HISTORY:  Past Medical History:   Diagnosis Date     Acute diastolic congestive heart failure (H) 06/2019    hosp fsd, then fu echo ef nl     ASCVD (arteriosclerotic cardiovascular disease) 1997    angio with 40% circ lesion; 6/19 hosp for chf, 3 vessel dz on angio but porcelin aorta so ptca done     Atrial fibrillation (H) 10/09/2018    found on routine physical     Carotid artery plaque 2005    seen on us, about 50% stenosis, fu 2009 us no significant stenosis     Elevated blood sugar      Gout     on allopurinol     HTN (hypertension)      Hypercholesteremia      Hyponatremia 2015    felt due to chlorthalidone     Low mean corpuscular volume (MCV)      Near syncope 05/2015    fu est echo low level but neg     Psoriasis     Dr. Marti     Renal mass 06/29/2019    seen on ct chest for sob, needs fu ct for that, fu us done 7/19 and no mass seen, should have fu ct in December, had fu us 3/22 and no fu needed     Screening 2012    abd us no aaa     Syncope 09/2019    hosp fsd, cause not clear, lowered coreg dose; seen by Dr. Lezama of ep and ep study neg, implanted event monitor     V-tach (H) 09/2019    seen on event monitor for fu syncope, then ep study and no inducible vtach       MEDICATIONS:  Current Outpatient Medications   Medication     allopurinol (ZYLOPRIM) 300 MG tablet     amLODIPine (NORVASC) 10 MG tablet     aspirin (ASA) 81 MG EC tablet     atorvastatin (LIPITOR) 80 MG tablet     carvedilol (COREG) 6.25 MG tablet     hydrALAZINE (APRESOLINE) 100 MG tablet     isosorbide mononitrate CR (IMDUR) 120 MG 24 HR ER tablet     losartan (COZAAR) 50 MG tablet     rivaroxaban ANTICOAGULANT (XARELTO) 20 MG TABS tablet     torsemide (DEMADEX) 20 MG tablet     No current facility-administered medications for this visit.        ALLERGIES:  Allergies   Allergen Reactions     Ace Inhibitors Anaphylaxis     Edema of ace     Eliquis [Apixaban] Other (See Comments)       SOCIAL HISTORY:  I have reviewed this patient's social history and updated it with pertinent information if needed. Betito Anderson  reports that he quit smoking about 24 years ago. His smoking use included cigars. He has a 30.00 pack-year smoking history. He has never used smokeless tobacco. He reports previous alcohol use. He reports that he does not use drugs.    FAMILY HISTORY:  I have reviewed this patient's family history and updated it with pertinent information if needed.   Family History   Problem Relation Age of Onset     Coronary Artery Disease Father      Medical History Unknown Mother      Medical History Unknown Maternal Grandfather        REVIEW OF SYSTEMS:  Skin:  not assessed     Eyes:  not assessed    ENT:  not assessed    Respiratory:  Negative    Cardiovascular:  Negative    Gastroenterology: not assessed    Genitourinary:  not assessed    Musculoskeletal:  not assessed    Neurologic:  not assessed    Psychiatric:  not assessed    Heme/Lymph/Imm:  not assessed    Endocrine:  Negative        PHYSICAL EXAM:      BP: (!) 148/70 Pulse: 63     SpO2: 97 %      Vital Signs with Ranges  Pulse:  [63] 63  BP: (148)/(70) 148/70  SpO2:  [97 %] 97 %  210 lbs 3.2 oz    Constitutional: awake, alert, no distress  Respiratory: Clear to auscultation  Cardiovascular: Irregular heart sounds soft systolic murmur no rubs or gallops  GI: nondistended  Neuropsychiatric: appropriate affact    DATA:  Labs: Pertinent cardiac labs reviewed    ASSESSMENT:  History of coronary artery disease  Atrial fibrillation  Heart failure with recovered EF    RECOMMENDATIONS:  1. In regards to coronary artery disease, patient is asymptomatic.  Continue guideline directed medical therapy.  Call us with any change in symptoms.  2. In regards to heart failure with recovered EF, appears to be  volume up.  Continue current dose of diuretics.  3. Hypertension is uncontrolled.  Recommend increasing hydralazine to 100 mg 3 times daily.  4. Continue anticoagulation.  Denies any bleeding problems.  Given complex severe coronary artery disease, we have mutually decided to continue aspirin therapy.  If bleeding risk increases this can be discontinued.  5. No significant bradycardic events on the loop recorder.  He is asymptomatic in this regard.  6. Follow-up in 1 year with repeat echocardiography to follow-up on aortic stenosis sooner if anything changes clinically.    JOE Estevez, [facility]  Cardiology - [facility]  September 23, 2022

## 2022-10-22 ENCOUNTER — HEALTH MAINTENANCE LETTER (OUTPATIENT)
Age: 77
End: 2022-10-22

## 2022-11-08 ENCOUNTER — ANCILLARY PROCEDURE (OUTPATIENT)
Dept: CARDIOLOGY | Facility: CLINIC | Age: 77
End: 2022-11-08
Attending: INTERNAL MEDICINE
Payer: COMMERCIAL

## 2022-11-08 PROCEDURE — 93297 REM INTERROG DEV EVAL ICPMS: CPT | Performed by: INTERNAL MEDICINE

## 2022-11-08 PROCEDURE — G2066 INTER DEVC REMOTE 30D: HCPCS | Performed by: INTERNAL MEDICINE

## 2022-12-09 DIAGNOSIS — M10.9 GOUT, UNSPECIFIED CAUSE, UNSPECIFIED CHRONICITY, UNSPECIFIED SITE: ICD-10-CM

## 2022-12-09 RX ORDER — ALLOPURINOL 300 MG/1
TABLET ORAL
Qty: 90 TABLET | Refills: 3 | Status: SHIPPED | OUTPATIENT
Start: 2022-12-09 | End: 2024-01-10

## 2023-01-04 ASSESSMENT — ENCOUNTER SYMPTOMS
WEAKNESS: 0
MYALGIAS: 0
ABDOMINAL PAIN: 0
CHILLS: 0
JOINT SWELLING: 0
FEVER: 0
CONSTIPATION: 0
HEADACHES: 0
HEARTBURN: 0
HEMATURIA: 0
DIARRHEA: 0
PARESTHESIAS: 0
EYE PAIN: 0
FREQUENCY: 0
SORE THROAT: 0
SHORTNESS OF BREATH: 0
PALPITATIONS: 0
HEMATOCHEZIA: 0
DYSURIA: 0
NERVOUS/ANXIOUS: 0
NAUSEA: 0
COUGH: 1
ARTHRALGIAS: 0
DIZZINESS: 0

## 2023-01-04 ASSESSMENT — ACTIVITIES OF DAILY LIVING (ADL): CURRENT_FUNCTION: NO ASSISTANCE NEEDED

## 2023-01-05 ENCOUNTER — OFFICE VISIT (OUTPATIENT)
Dept: FAMILY MEDICINE | Facility: CLINIC | Age: 78
End: 2023-01-05
Payer: COMMERCIAL

## 2023-01-05 VITALS
BODY MASS INDEX: 33.74 KG/M2 | HEIGHT: 67 IN | WEIGHT: 215 LBS | DIASTOLIC BLOOD PRESSURE: 66 MMHG | HEART RATE: 80 BPM | RESPIRATION RATE: 16 BRPM | SYSTOLIC BLOOD PRESSURE: 128 MMHG | OXYGEN SATURATION: 94 % | TEMPERATURE: 97.7 F

## 2023-01-05 DIAGNOSIS — Z23 HIGH PRIORITY FOR 2019-NCOV VACCINE: ICD-10-CM

## 2023-01-05 DIAGNOSIS — I50.23 ACUTE ON CHRONIC SYSTOLIC CONGESTIVE HEART FAILURE (H): ICD-10-CM

## 2023-01-05 DIAGNOSIS — I47.20 V-TACH (H): ICD-10-CM

## 2023-01-05 DIAGNOSIS — R73.9 ELEVATED BLOOD SUGAR: ICD-10-CM

## 2023-01-05 DIAGNOSIS — I48.20 CHRONIC ATRIAL FIBRILLATION (H): ICD-10-CM

## 2023-01-05 DIAGNOSIS — M10.9 GOUT, UNSPECIFIED CAUSE, UNSPECIFIED CHRONICITY, UNSPECIFIED SITE: ICD-10-CM

## 2023-01-05 DIAGNOSIS — E78.5 HYPERLIPIDEMIA LDL GOAL <100: ICD-10-CM

## 2023-01-05 DIAGNOSIS — Z00.00 MEDICARE ANNUAL WELLNESS VISIT, SUBSEQUENT: Primary | ICD-10-CM

## 2023-01-05 DIAGNOSIS — I25.10 ASCVD (ARTERIOSCLEROTIC CARDIOVASCULAR DISEASE): ICD-10-CM

## 2023-01-05 DIAGNOSIS — I50.31 ACUTE DIASTOLIC CONGESTIVE HEART FAILURE (H): ICD-10-CM

## 2023-01-05 DIAGNOSIS — J44.9 CHRONIC OBSTRUCTIVE PULMONARY DISEASE, UNSPECIFIED COPD TYPE (H): ICD-10-CM

## 2023-01-05 DIAGNOSIS — N18.30 STAGE 3 CHRONIC KIDNEY DISEASE, UNSPECIFIED WHETHER STAGE 3A OR 3B CKD (H): ICD-10-CM

## 2023-01-05 DIAGNOSIS — E66.9 NON MORBID OBESITY, UNSPECIFIED OBESITY TYPE: ICD-10-CM

## 2023-01-05 DIAGNOSIS — Z23 NEED FOR VACCINATION: ICD-10-CM

## 2023-01-05 DIAGNOSIS — I10 BENIGN ESSENTIAL HYPERTENSION: ICD-10-CM

## 2023-01-05 LAB
ALBUMIN SERPL BCG-MCNC: 4.2 G/DL (ref 3.5–5.2)
ALP SERPL-CCNC: 92 U/L (ref 40–129)
ALT SERPL W P-5'-P-CCNC: 10 U/L (ref 10–50)
ANION GAP SERPL CALCULATED.3IONS-SCNC: 12 MMOL/L (ref 7–15)
AST SERPL W P-5'-P-CCNC: 21 U/L (ref 10–50)
BILIRUB SERPL-MCNC: 0.7 MG/DL
BUN SERPL-MCNC: 24.6 MG/DL (ref 8–23)
CALCIUM SERPL-MCNC: 8.9 MG/DL (ref 8.8–10.2)
CHLORIDE SERPL-SCNC: 104 MMOL/L (ref 98–107)
CHOLEST SERPL-MCNC: 183 MG/DL
CREAT SERPL-MCNC: 1.63 MG/DL (ref 0.67–1.17)
DEPRECATED HCO3 PLAS-SCNC: 24 MMOL/L (ref 22–29)
ERYTHROCYTE [DISTWIDTH] IN BLOOD BY AUTOMATED COUNT: 18.4 % (ref 10–15)
GFR SERPL CREATININE-BSD FRML MDRD: 43 ML/MIN/1.73M2
GLUCOSE SERPL-MCNC: 115 MG/DL (ref 70–99)
HBA1C MFR BLD: 5.6 % (ref 0–5.6)
HCT VFR BLD AUTO: 42.7 % (ref 40–53)
HDLC SERPL-MCNC: 39 MG/DL
HGB BLD-MCNC: 13.4 G/DL (ref 13.3–17.7)
LDLC SERPL CALC-MCNC: 97 MG/DL
MCH RBC QN AUTO: 26.5 PG (ref 26.5–33)
MCHC RBC AUTO-ENTMCNC: 31.4 G/DL (ref 31.5–36.5)
MCV RBC AUTO: 84 FL (ref 78–100)
NONHDLC SERPL-MCNC: 144 MG/DL
PLATELET # BLD AUTO: 306 10E3/UL (ref 150–450)
POTASSIUM SERPL-SCNC: 4.2 MMOL/L (ref 3.4–5.3)
PROT SERPL-MCNC: 6.9 G/DL (ref 6.4–8.3)
RBC # BLD AUTO: 5.06 10E6/UL (ref 4.4–5.9)
SODIUM SERPL-SCNC: 140 MMOL/L (ref 136–145)
TRIGL SERPL-MCNC: 234 MG/DL
WBC # BLD AUTO: 7.8 10E3/UL (ref 4–11)

## 2023-01-05 PROCEDURE — 80053 COMPREHEN METABOLIC PANEL: CPT | Performed by: INTERNAL MEDICINE

## 2023-01-05 PROCEDURE — G0439 PPPS, SUBSEQ VISIT: HCPCS | Performed by: INTERNAL MEDICINE

## 2023-01-05 PROCEDURE — 83036 HEMOGLOBIN GLYCOSYLATED A1C: CPT | Performed by: INTERNAL MEDICINE

## 2023-01-05 PROCEDURE — 91313 COVID-19 VACCINE BIVALENT BOOSTER 18+ (MODERNA): CPT | Performed by: INTERNAL MEDICINE

## 2023-01-05 PROCEDURE — 0134A COVID-19 VACCINE BIVALENT BOOSTER 18+ (MODERNA): CPT | Performed by: INTERNAL MEDICINE

## 2023-01-05 PROCEDURE — 85027 COMPLETE CBC AUTOMATED: CPT | Performed by: INTERNAL MEDICINE

## 2023-01-05 PROCEDURE — 80061 LIPID PANEL: CPT | Performed by: INTERNAL MEDICINE

## 2023-01-05 PROCEDURE — 36415 COLL VENOUS BLD VENIPUNCTURE: CPT | Performed by: INTERNAL MEDICINE

## 2023-01-05 PROCEDURE — 90714 TD VACC NO PRESV 7 YRS+ IM: CPT | Performed by: INTERNAL MEDICINE

## 2023-01-05 PROCEDURE — 99214 OFFICE O/P EST MOD 30 MIN: CPT | Mod: 25 | Performed by: INTERNAL MEDICINE

## 2023-01-05 PROCEDURE — 90471 IMMUNIZATION ADMIN: CPT | Performed by: INTERNAL MEDICINE

## 2023-01-05 RX ORDER — CARVEDILOL 6.25 MG/1
6.25 TABLET ORAL 2 TIMES DAILY WITH MEALS
Qty: 180 TABLET | Refills: 3 | Status: SHIPPED | OUTPATIENT
Start: 2023-01-05 | End: 2024-01-22

## 2023-01-05 RX ORDER — ATORVASTATIN CALCIUM 80 MG/1
80 TABLET, FILM COATED ORAL DAILY
Qty: 90 TABLET | Refills: 3 | Status: SHIPPED | OUTPATIENT
Start: 2023-01-05 | End: 2023-06-16

## 2023-01-05 RX ORDER — LOSARTAN POTASSIUM 50 MG/1
50 TABLET ORAL DAILY
Qty: 90 TABLET | Refills: 3 | Status: SHIPPED | OUTPATIENT
Start: 2023-01-05 | End: 2023-08-29

## 2023-01-05 RX ORDER — AMLODIPINE BESYLATE 10 MG/1
10 TABLET ORAL DAILY
Qty: 90 TABLET | Refills: 3 | Status: SHIPPED | OUTPATIENT
Start: 2023-01-05 | End: 2023-08-29

## 2023-01-05 RX ORDER — ISOSORBIDE MONONITRATE 120 MG/1
120 TABLET, EXTENDED RELEASE ORAL DAILY
Qty: 90 TABLET | Refills: 3 | Status: SHIPPED | OUTPATIENT
Start: 2023-01-05 | End: 2024-01-29

## 2023-01-05 ASSESSMENT — ACTIVITIES OF DAILY LIVING (ADL): CURRENT_FUNCTION: NO ASSISTANCE NEEDED

## 2023-01-05 ASSESSMENT — PAIN SCALES - GENERAL: PAINLEVEL: NO PAIN (0)

## 2023-01-05 NOTE — PROGRESS NOTES
SUBJECTIVE:   Mykel is a 77 year old who presents for Preventive Visit.    He is doing well and works out reg.  He has no c/o.  Leaving soon for 4 months to [facility].               Past Medical History:      Past Medical History:   Diagnosis Date     Acute diastolic congestive heart failure (H) 06/2019    hosp fsd, then fu echo ef nl     ASCVD (arteriosclerotic cardiovascular disease) 1997    angio with 40% circ lesion; 6/19 hosp for chf, 3 vessel dz on angio but porcelin aorta so ptca done     Atrial fibrillation (H) 10/09/2018    found on routine physical     Carotid artery plaque 2005    seen on us, about 50% stenosis, fu 2009 us no significant stenosis     Elevated blood sugar      Gout     on allopurinol     HTN (hypertension)      Hypercholesteremia      Hyponatremia 2015    felt due to chlorthalidone     Low mean corpuscular volume (MCV)      Near syncope 05/2015    fu est echo low level but neg     Psoriasis     Dr. Marti     Renal mass 06/29/2019    seen on ct chest for sob, needs fu ct for that, fu us done 7/19 and no mass seen, should have fu ct in December, had fu us 3/22 and no fu needed     Screening 2012    abd us no aaa     Syncope 09/2019    hosp fsd, cause not clear, lowered coreg dose; seen by Dr. Lezama of ep and ep study neg, implanted event monitor     V-tach 09/2019    seen on event monitor for fu syncope, then ep study and no inducible vtach             Past Surgical History:      Past Surgical History:   Procedure Laterality Date     CATARACT EXTRACTION  03/2022     CATARACT EXTRACTION  2022     CV CORONARY ANGIOGRAM N/A 07/02/2019    Procedure: Coronary Angiogram;  Surgeon: Donaldo Loera MD;  Location:  HEART CARDIAC CATH LAB     CV HEART CATHETERIZATION WITH POSSIBLE INTERVENTION N/A 07/05/2019    Procedure: Heart Catheterization with Possible Intervention;  Surgeon: Manolo Hu MD;  Location:  HEART CARDIAC CATH LAB     CV LEFT HEART CATH N/A 07/02/2019     Procedure: Left Heart Cath;  Surgeon: Donaldo Loera MD;  Location:  HEART CARDIAC CATH LAB     CV LEFT VENTRICULOGRAM N/A 2019    Procedure: Left Ventriculogram;  Surgeon: Donaldo Loera MD;  Location:  HEART CARDIAC CATH LAB     CV PCI STENT DRUG ELUTING N/A 2019    Procedure: PCI Stent Drug Eluting;  Surgeon: Manolo Hu MD;  Location:  HEART CARDIAC CATH LAB     CV RIGHT HEART CATH MEASUREMENTS RECORDED N/A 2019    Procedure: Right Heart Cath;  Surgeon: Donaldo Loera MD;  Location:  HEART CARDIAC CATH LAB     EP LOOP RECORDER IMPLANT N/A 10/11/2019    Procedure: EP Loop Recorder Implant;  Surgeon: Fuad Lezama MD;  Location:  HEART CARDIAC CATH LAB     EP RIGHT VENTRICULAR RECORDING N/A 10/11/2019    Procedure: EP Ventricular Pacing;  Surgeon: Fuad Lezama MD;  Location:  HEART CARDIAC CATH LAB     ZZC ANESTH,DX ARTHROSCOPIC PROC KNEE JOINT  2009             Social History:     Social History     Socioeconomic History     Marital status:      Spouse name: Not on file     Number of children: 2     Years of education: Not on file     Highest education level: Not on file   Occupational History     Occupation: retired   Tobacco Use     Smoking status: Former     Packs/day: 1.00     Years: 30.00     Pack years: 30.00     Types: Cigars, Cigarettes     Quit date: 1998     Years since quittin.3     Smokeless tobacco: Never   Substance and Sexual Activity     Alcohol use: Not Currently     Comment: minimal to none as of 3/22     Drug use: No     Sexual activity: Not Currently     Partners: Female   Other Topics Concern     Parent/sibling w/ CABG, MI or angioplasty before 65F 55M? Not Asked   Social History Narrative     Not on file     Social Determinants of Health     Financial Resource Strain: Not on file   Food Insecurity: Not on file   Transportation Needs: Not on file   Physical Activity: Not on file   Stress: Not on file  "  Social Connections: Not on file   Intimate Partner Violence: Not on file   Housing Stability: Not on file             Family History:   reviewed         Allergies:     Allergies   Allergen Reactions     Ace Inhibitors Anaphylaxis     Edema of ace     Eliquis [Apixaban] Other (See Comments)             Medications:     Current Outpatient Medications   Medication Sig Dispense Refill     allopurinol (ZYLOPRIM) 300 MG tablet TAKE 1 TABLET DAILY 90 tablet 3     amLODIPine (NORVASC) 10 MG tablet Take 1 tablet (10 mg) by mouth daily 90 tablet 3     aspirin (ASA) 81 MG EC tablet Take 1 tablet (81 mg) by mouth daily 90 tablet 3     atorvastatin (LIPITOR) 80 MG tablet Take 1 tablet (80 mg) by mouth daily 90 tablet 3     carvedilol (COREG) 6.25 MG tablet Take 1 tablet (6.25 mg) by mouth 2 times daily (with meals) 180 tablet 3     hydrALAZINE (APRESOLINE) 100 MG tablet Take 1 tablet (100 mg) by mouth 3 times daily 90 tablet 3     isosorbide mononitrate CR (IMDUR) 120 MG 24 HR ER tablet Take 1 tablet (120 mg) by mouth daily 90 tablet 3     losartan (COZAAR) 50 MG tablet Take 1 tablet (50 mg) by mouth daily 90 tablet 3     rivaroxaban ANTICOAGULANT (XARELTO) 20 MG TABS tablet Take 1 tablet (20 mg) by mouth daily (with dinner) 90 tablet 3     torsemide (DEMADEX) 20 MG tablet Take 1 tablet (20 mg) Monday, Wednesday, Friday and take 2 tablets (40 mg) other days by mouth 20 tablet 0               Review of Systems:   The 10 point Review of Systems is negative other than noted in the HPI           Physical Exam:   Blood pressure 128/66, pulse 80, temperature 97.7  F (36.5  C), temperature source Temporal, resp. rate 16, height 1.702 m (5' 7.01\"), weight 97.5 kg (215 lb), SpO2 94 %.    Exam:  Constitutional: healthy appearing, alert and in no distress  Heent: Normocephalic. Head without obvious masses or lesions. PERRLDC, EOMI. Mouth exam within normal limits: tongue, mucous membranes, posterior pharynx all normal, no lesions or " "abnormalities seen.  Tm's and canals within normal limits bilaterally. Neck supple, no nuchal rigidity or masses. No supraclavicular, or cervical adenopathy. Thyroid symmetric, no masses.  Cardiovascular: Regular rate and rhythm, no murmer, rub or gallops.  JVP not elevated, no edema.  Carotids within normal limits bilaterally, no bruits.  Respiratory: Normal respiratory effort.  Lungs clear, normal flow, no wheezing or crackles.  Breasts: Normal bilaterally.  No masses or lesions.  Nipples within normal limites.  No axillary lesions or nodes.  Gastrointestinal: Normal active bowel sounds.   Soft, not tender, no masses, guarding or rebound.  No hepatosplenomegaly.   Genitourinary: Rectal min bph  Musculoskeletal: extremities normal, no gross deformities noted.  Skin: no suspicious lesions or rashes   Neurologic: Mental status within normal limits.  Speech fluent.  No gross motor abnormalities and gait intact.  Psychiatric: mentation appears normal and affect normal.         Data:   Labs sent        Assessment:   1. Normal complete physical exam  2. Cad, stable, med mgmt  3. Ckd, follow up labs, blood pressure fine  4. Copd, no issues  5. Afib, on noac  6. Elevated cholesterol on statin  7. Hypertension, controlled  8. Elevated sugar, weight loss  9. Obesity, weight loss  10. Gout, no issues  11. Chf, no issues  12. Vtach, no issues  13. hcm         Plan:   covid and td shot  Letter with labs  Exercise, diet and weight loss      Rudy Vernon M.D.            Are you in the first 12 months of your Medicare coverage?  No    Healthy Habits:     In general, how would you rate your overall health?  Good    Frequency of exercise:  2-3 days/week    Duration of exercise:  N/A    Do you usually eat at least 4 servings of fruit and vegetables a day, include whole grains    & fiber and avoid regularly eating high fat or \"junk\" foods?  No    Taking medications regularly:  Yes    Medication side effects:  None    Ability to " successfully perform activities of daily living:  No assistance needed    Home Safety:  No safety concerns identified    Hearing Impairment:  Difficulty following a conversation in a noisy restaurant or crowded room, need to ask people to speak up or repeat themselves and difficulty understanding soft or whispered speech    In the past 6 months, have you been bothered by leaking of urine?  No    In general, how would you rate your overall mental or emotional health?  Good      PHQ-2 Total Score: 0    Additional concerns today:  No      Have you ever done Advance Care Planning? (For example, a Health Directive, POLST, or a discussion with a medical provider or your loved ones about your wishes): No, advance care planning information given to patient to review.  Patient plans to discuss their wishes with loved ones or provider.         Fall risk  Fallen 2 or more times in the past year?: No  Any fall with injury in the past year?: Yes    Cognitive Screening   1) Repeat 3 items (Leader, Season, Table)    2) Clock draw: NORMAL  3) 3 item recall: Recalls 3 objects  Results: 3 items recalled: COGNITIVE IMPAIRMENT LESS LIKELY    Mini-CogTM Copyright SANGEETA Turcios. Licensed by the author for use in [facility]; reprinted with permission (valentín@[facility]). All rights reserved.      Do you have sleep apnea, excessive snoring or daytime drowsiness?: no    Reviewed and updated as needed this visit by clinical staff                  Reviewed and updated as needed this visit by Provider                 Social History     Tobacco Use     Smoking status: Former     Packs/day: 1.00     Years: 30.00     Pack years: 30.00     Types: Cigars, Cigarettes     Quit date: 1998     Years since quittin.3     Smokeless tobacco: Never   Substance Use Topics     Alcohol use: Not Currently     Comment: minimal to none as of 3/22         Alcohol Use 2023   Prescreen: >3 drinks/day or >7 drinks/week? No   Prescreen: >3 drinks/day  "or >7 drinks/week? -   No flowsheet data found.            Current providers sharing in care for this patient include:   Patient Care Team:  Rudy Vernon MD as PCP - General (Internal Medicine)  Rudy Vernon MD as Assigned PCP  Mendez Hidalgo MD as Assigned Heart and Vascular Provider    The following health maintenance items are reviewed in Epic and correct as of today:  Health Maintenance   Topic Date Due     HF ACTION PLAN  Never done     MICROALBUMIN  Never done     ANNUAL REVIEW OF HM ORDERS  Never done     COPD ACTION PLAN  Never done     ADVANCE CARE PLANNING  10/29/2018     MEDICARE ANNUAL WELLNESS VISIT  10/09/2019     ALT  02/16/2022     COVID-19 Vaccine (5 - Booster for Pfizer series) 08/03/2022     DTAP/TDAP/TD IMMUNIZATION (3 - Td or Tdap) 09/13/2022     BMP  01/08/2023     HEMOGLOBIN  03/11/2023     LIPID  03/11/2023     CBC  03/11/2023     FALL RISK ASSESSMENT  01/05/2024     TSH W/FREE T4 REFLEX  Completed     SPIROMETRY  Completed     HEPATITIS C SCREENING  Completed     PHQ-2 (once per calendar year)  Completed     INFLUENZA VACCINE  Completed     Pneumococcal Vaccine: 65+ Years  Completed     URINALYSIS  Completed     ZOSTER IMMUNIZATION  Completed     IPV IMMUNIZATION  Aged Out     MENINGITIS IMMUNIZATION  Aged Out     COLORECTAL CANCER SCREENING  Discontinued               Review of Systems      OBJECTIVE:   There were no vitals taken for this visit. Estimated body mass index is 32.91 kg/m  as calculated from the following:    Height as of 9/23/22: 1.702 m (5' 7.01\").    Weight as of 9/23/22: 95.3 kg (210 lb 3.2 oz).  Physical Exam          ASSESSMENT / PLAN:             COUNSELING:  Reviewed preventive health counseling, as reflected in patient instructions       Regular exercise       Healthy diet/nutrition      BMI:   Estimated body mass index is 32.91 kg/m  as calculated from the following:    Height as of 9/23/22: 1.702 m (5' 7.01\").    Weight as of 9/23/22: 95.3 kg (210 " lb 3.2 oz).   Weight management plan: Discussed healthy diet and exercise guidelines      He reports that he quit smoking about 24 years ago. His smoking use included cigars. He has a 30.00 pack-year smoking history. He has never used smokeless tobacco.      Appropriate preventive services were discussed with this patient, including applicable screening as appropriate for cardiovascular disease, diabetes, osteopenia/osteoporosis, and glaucoma.  As appropriate for age/gender, discussed screening for colorectal cancer, prostate cancer, breast cancer, and cervical cancer. Checklist reviewing preventive services available has been given to the patient.    Reviewed patients plan of care and provided an AVS. The Basic Care Plan (routine screening as documented in Health Maintenance) for Betito meets the Care Plan requirement. This Care Plan has been established and reviewed with the Patient.          Rudy Vernon MD  [facility]    Identified Health Risks:

## 2023-01-05 NOTE — RESULT ENCOUNTER NOTE
It was nice to see you for your physical.  You should be able to view your test results.    Your CBC your complete blood count is normal with no signs of anemia or blood disorders.  Your chemistry panel shows a slightly elevated blood glucose or sugar but a second test for diabetes called the hemoglobin A1c is normal.  The bottom line as you are at risk of developing diabetes so please be sure to exercise and try to get your weight down for this.  Your blood salts, kidney tests, liver tests, and proteins are all very stable.    Your total cholesterol is good.  Your good cholesterol or HDL is just a bit low which is genetics.  Your LDL or bad cholesterol is good at 97.  The triglycerides are high and once again weight loss should help this.    Overall your tests are fine.  Please let me know if you have questions.    Rudy Vernon M.D.

## 2023-01-05 NOTE — NURSING NOTE
Prior to immunization administration, verified patients identity using patient s name and date of birth. Please see Immunization Activity for additional information.     Screening Questionnaire for Adult Immunization    Are you sick today?   No   Do you have allergies to medications, food, a vaccine component or latex?   No   Have you ever had a serious reaction after receiving a vaccination?   No   Do you have a long-term health problem with heart, lung, kidney, or metabolic disease (e.g., diabetes), asthma, a blood disorder, no spleen, complement component deficiency, a cochlear implant, or a spinal fluid leak?  Are you on long-term aspirin therapy?   Yes   Do you have cancer, leukemia, HIV/AIDS, or any other immune system problem?   No   Do you have a parent, brother, or sister with an immune system problem?   No   In the past 3 months, have you taken medications that affect  your immune system, such as prednisone, other steroids, or anticancer drugs; drugs for the treatment of rheumatoid arthritis, Crohn s disease, or psoriasis; or have you had radiation treatments?   No   Have you had a seizure, or a brain or other nervous system problem?   No   During the past year, have you received a transfusion of blood or blood    products, or been given immune (gamma) globulin or antiviral drug?   No   For women: Are you pregnant or is there a chance you could become       pregnant during the next month?   No   Have you received any vaccinations in the past 4 weeks?   No     Immunization questionnaire:One positive answer, long term health problem     Per orders of Dr. Vernon, injection of Td given by Eleanor Mora MA. Patient instructed to remain in clinic for 15 minutes afterwards, and to report any adverse reaction to me immediately.  Patient verified   Screening performed by Eleanor Mora MA on 1/5/2023 at 7:57 AM.

## 2023-02-10 ENCOUNTER — ANCILLARY PROCEDURE (OUTPATIENT)
Dept: CARDIOLOGY | Facility: CLINIC | Age: 78
End: 2023-02-10
Attending: INTERNAL MEDICINE
Payer: COMMERCIAL

## 2023-03-13 ENCOUNTER — TELEPHONE (OUTPATIENT)
Dept: CARDIOLOGY | Facility: CLINIC | Age: 78
End: 2023-03-13
Payer: COMMERCIAL

## 2023-03-13 NOTE — TELEPHONE ENCOUNTER
Alert for RTT reached 3/10/23.  Called pt and informed him of his options which would be: doing remotes until batter dies and then deciding what he would like to do (keep loop in chest and not explant or explant loop), or he could move forward with taking the loop recorder out now if he would like.      Pt decieded to continue with doing remotes and wants to decide what to do when battery dies (keep loop in chest or explant) when that time comes.

## 2023-04-05 ENCOUNTER — APPOINTMENT (OUTPATIENT)
Dept: FAMILY MEDICINE | Facility: CLINIC | Age: 78
End: 2023-04-05
Payer: COMMERCIAL

## 2023-04-06 ENCOUNTER — OFFICE VISIT (OUTPATIENT)
Dept: URGENT CARE | Facility: URGENT CARE | Age: 78
End: 2023-04-06
Payer: COMMERCIAL

## 2023-04-06 VITALS
BODY MASS INDEX: 33.66 KG/M2 | OXYGEN SATURATION: 95 % | RESPIRATION RATE: 16 BRPM | HEART RATE: 54 BPM | DIASTOLIC BLOOD PRESSURE: 72 MMHG | WEIGHT: 215 LBS | SYSTOLIC BLOOD PRESSURE: 160 MMHG | TEMPERATURE: 97.8 F

## 2023-04-06 DIAGNOSIS — R05.8 PRODUCTIVE COUGH: Primary | ICD-10-CM

## 2023-04-06 PROCEDURE — 99213 OFFICE O/P EST LOW 20 MIN: CPT | Performed by: FAMILY MEDICINE

## 2023-04-06 RX ORDER — DOXYCYCLINE 100 MG/1
100 TABLET ORAL 2 TIMES DAILY
Qty: 14 TABLET | Refills: 0 | Status: SHIPPED | OUTPATIENT
Start: 2023-04-06 | End: 2023-04-13

## 2023-04-06 NOTE — PROGRESS NOTES
SUBJECTIVE: Betito Anderson is a 77 year old male presenting with a chief complaint of cough .  Onset of symptoms was 8 day(s) ago.  Course of illness is same.    Severity moderate  Current and Associated symptoms: cough - productive  Predisposing factors include ill contact: Family member .    Past Medical History:   Diagnosis Date     Acute diastolic congestive heart failure (H) 2019    hosp fsd, then fu echo ef nl     ASCVD (arteriosclerotic cardiovascular disease)     angio with 40% circ lesion;  hosp for chf, 3 vessel dz on angio but porcelin aorta so ptca done     Atrial fibrillation (H) 10/09/2018    found on routine physical     Carotid artery plaque     seen on us, about 50% stenosis, fu  us no significant stenosis     Elevated blood sugar      Gout     on allopurinol     HTN (hypertension)      Hypercholesteremia      Hyponatremia     felt due to chlorthalidone     Low mean corpuscular volume (MCV)      Near syncope 2015    fu est echo low level but neg     Psoriasis     Dr. Marti     Renal mass 2019    seen on ct chest for sob, needs fu ct for that, fu us done  and no mass seen, should have fu ct in December, had fu us 3/22 and no fu needed     Screening     abd us no aaa     Syncope 2019    hosp fsd, cause not clear, lowered coreg dose; seen by Dr. Lezama of ep and ep study neg, implanted event monitor     V-tach (H) 2019    seen on event monitor for fu syncope, then ep study and no inducible vtach     Allergies   Allergen Reactions     Ace Inhibitors Anaphylaxis     Edema of ace     Eliquis [Apixaban] Other (See Comments)     Social History     Tobacco Use     Smoking status: Former     Packs/day: 1.00     Years: 30.00     Pack years: 30.00     Types: Cigars, Cigarettes     Quit date: 1998     Years since quittin.5     Smokeless tobacco: Never   Vaping Use     Vaping status: Not on file   Substance Use Topics     Alcohol use: Not Currently      Comment: minimal to none as of 3/22       ROS:  SKIN: no rash  GI: no vomiting    OBJECTIVE:  BP (!) 160/72   Pulse 54   Temp 97.8  F (36.6  C)   Resp 16   Wt 97.5 kg (215 lb)   SpO2 95%   BMI 33.66 kg/m  GENERAL APPEARANCE: healthy, alert and no distress  EYES: EOMI,  PERRL, conjunctiva clear  HENT: ear canals and TM's normal.  Nose and mouth without ulcers, erythema or lesions  RESP: lungs clear to auscultation - no rales, rhonchi or wheezes  SKIN: no suspicious lesions or rashes      ICD-10-CM    1. Productive cough  R05.8 doxycycline monohydrate (ADOXA) 100 MG tablet          Fluids/Rest, f/u if worse/not any better

## 2023-05-05 ENCOUNTER — APPOINTMENT (OUTPATIENT)
Dept: ULTRASOUND IMAGING | Facility: CLINIC | Age: 78
End: 2023-05-05
Attending: EMERGENCY MEDICINE
Payer: COMMERCIAL

## 2023-05-05 ENCOUNTER — HOSPITAL ENCOUNTER (EMERGENCY)
Facility: CLINIC | Age: 78
Discharge: HOME | End: 2023-05-05
Attending: EMERGENCY MEDICINE | Admitting: EMERGENCY MEDICINE
Payer: COMMERCIAL

## 2023-05-05 ENCOUNTER — NURSE TRIAGE (OUTPATIENT)
Dept: CARDIOLOGY | Facility: CLINIC | Age: 78
End: 2023-05-05

## 2023-05-05 VITALS
TEMPERATURE: 98 F | SYSTOLIC BLOOD PRESSURE: 159 MMHG | BODY MASS INDEX: 32.14 KG/M2 | RESPIRATION RATE: 16 BRPM | WEIGHT: 200 LBS | OXYGEN SATURATION: 97 % | HEART RATE: 70 BPM | HEIGHT: 66 IN | DIASTOLIC BLOOD PRESSURE: 95 MMHG

## 2023-05-05 DIAGNOSIS — R60.0 PERIPHERAL EDEMA: ICD-10-CM

## 2023-05-05 DIAGNOSIS — I50.9 CHRONIC CONGESTIVE HEART FAILURE, UNSPECIFIED HEART FAILURE TYPE (H): ICD-10-CM

## 2023-05-05 DIAGNOSIS — I50.31 ACUTE DIASTOLIC CONGESTIVE HEART FAILURE (H): Primary | ICD-10-CM

## 2023-05-05 LAB
ANION GAP SERPL CALCULATED.3IONS-SCNC: 11 MMOL/L (ref 7–15)
ATRIAL RATE - MUSE: NORMAL BPM
BASOPHILS # BLD AUTO: 0.1 10E3/UL (ref 0–0.2)
BASOPHILS NFR BLD AUTO: 1 %
BUN SERPL-MCNC: 16.9 MG/DL (ref 8–23)
CALCIUM SERPL-MCNC: 8.6 MG/DL (ref 8.8–10.2)
CHLORIDE SERPL-SCNC: 102 MMOL/L (ref 98–107)
CREAT SERPL-MCNC: 1.39 MG/DL (ref 0.67–1.17)
DEPRECATED HCO3 PLAS-SCNC: 23 MMOL/L (ref 22–29)
DIASTOLIC BLOOD PRESSURE - MUSE: NORMAL MMHG
EOSINOPHIL # BLD AUTO: 0 10E3/UL (ref 0–0.7)
EOSINOPHIL NFR BLD AUTO: 0 %
ERYTHROCYTE [DISTWIDTH] IN BLOOD BY AUTOMATED COUNT: 18 % (ref 10–15)
GFR SERPL CREATININE-BSD FRML MDRD: 52 ML/MIN/1.73M2
GLUCOSE SERPL-MCNC: 105 MG/DL (ref 70–99)
HCT VFR BLD AUTO: 40.9 % (ref 40–53)
HGB BLD-MCNC: 12.5 G/DL (ref 13.3–17.7)
IMM GRANULOCYTES # BLD: 0 10E3/UL
IMM GRANULOCYTES NFR BLD: 0 %
INTERPRETATION ECG - MUSE: NORMAL
LYMPHOCYTES # BLD AUTO: 1 10E3/UL (ref 0.8–5.3)
LYMPHOCYTES NFR BLD AUTO: 11 %
MCH RBC QN AUTO: 26.7 PG (ref 26.5–33)
MCHC RBC AUTO-ENTMCNC: 30.6 G/DL (ref 31.5–36.5)
MCV RBC AUTO: 87 FL (ref 78–100)
MONOCYTES # BLD AUTO: 0.8 10E3/UL (ref 0–1.3)
MONOCYTES NFR BLD AUTO: 8 %
NEUTROPHILS # BLD AUTO: 7.3 10E3/UL (ref 1.6–8.3)
NEUTROPHILS NFR BLD AUTO: 80 %
NRBC # BLD AUTO: 0 10E3/UL
NRBC BLD AUTO-RTO: 0 /100
NT-PROBNP SERPL-MCNC: 2643 PG/ML (ref 0–1800)
NT-PROBNP SERPL-MCNC: 2793 PG/ML (ref 0–1800)
P AXIS - MUSE: NORMAL DEGREES
PLATELET # BLD AUTO: 285 10E3/UL (ref 150–450)
POTASSIUM SERPL-SCNC: 4.3 MMOL/L (ref 3.4–5.3)
PR INTERVAL - MUSE: NORMAL MS
QRS DURATION - MUSE: 88 MS
QT - MUSE: 400 MS
QTC - MUSE: 416 MS
R AXIS - MUSE: -10 DEGREES
RBC # BLD AUTO: 4.69 10E6/UL (ref 4.4–5.9)
SODIUM SERPL-SCNC: 136 MMOL/L (ref 136–145)
SYSTOLIC BLOOD PRESSURE - MUSE: NORMAL MMHG
T AXIS - MUSE: 42 DEGREES
TROPONIN T SERPL HS-MCNC: 23 NG/L
TROPONIN T SERPL HS-MCNC: 27 NG/L
VENTRICULAR RATE- MUSE: 65 BPM
WBC # BLD AUTO: 9.2 10E3/UL (ref 4–11)

## 2023-05-05 PROCEDURE — 83880 ASSAY OF NATRIURETIC PEPTIDE: CPT | Performed by: EMERGENCY MEDICINE

## 2023-05-05 PROCEDURE — 85025 COMPLETE CBC W/AUTO DIFF WBC: CPT | Performed by: EMERGENCY MEDICINE

## 2023-05-05 PROCEDURE — 84484 ASSAY OF TROPONIN QUANT: CPT | Mod: 91 | Performed by: EMERGENCY MEDICINE

## 2023-05-05 PROCEDURE — 84484 ASSAY OF TROPONIN QUANT: CPT | Performed by: EMERGENCY MEDICINE

## 2023-05-05 PROCEDURE — 93970 EXTREMITY STUDY: CPT

## 2023-05-05 PROCEDURE — 80048 BASIC METABOLIC PNL TOTAL CA: CPT | Performed by: EMERGENCY MEDICINE

## 2023-05-05 PROCEDURE — 36415 COLL VENOUS BLD VENIPUNCTURE: CPT | Performed by: EMERGENCY MEDICINE

## 2023-05-05 PROCEDURE — 93005 ELECTROCARDIOGRAM TRACING: CPT | Mod: RTG

## 2023-05-05 PROCEDURE — C9803 HOPD COVID-19 SPEC COLLECT: HCPCS

## 2023-05-05 PROCEDURE — 99285 EMERGENCY DEPT VISIT HI MDM: CPT | Mod: 25

## 2023-05-05 RX ORDER — TORSEMIDE 20 MG/1
40 TABLET ORAL DAILY
Qty: 10 TABLET | Refills: 0 | Status: SHIPPED | OUTPATIENT
Start: 2023-05-05 | End: 2023-05-12

## 2023-05-05 ASSESSMENT — ACTIVITIES OF DAILY LIVING (ADL): ADLS_ACUITY_SCORE: 35

## 2023-05-05 ASSESSMENT — ENCOUNTER SYMPTOMS
COLOR CHANGE: 1
SHORTNESS OF BREATH: 0

## 2023-05-05 NOTE — TELEPHONE ENCOUNTER
Elba Tolbert, RN  [facility] Heart Team 7 24 minutes ago (10:17 AM)     IRMA THORNE      Pt was told by Triage RN to seek emergent care in ED. Noted.     May 10, 2023  Addendum    Chart reviewed for ED discharge plan - pt was prescribed additional torsemide dose and told to follow up with his PCP. Call out to pt to recommend he follow up with PCP or at our clinic with an RASHI or Dr. Hidalgo for ED discharge follow up.  Dr. Hidalgo happens to have an Urgent opening avail on 5/17/23. Pt would like that appt. Placed on hold. Scheduling will call to set up OV and lab appt prior. Pt states he's doing better w/o acute concerns today. Advised he call back prior to appt as needed if symptoms worsen. Althea Corona RN on 5/10/2023 at 12:40 PM

## 2023-05-05 NOTE — ED PROVIDER NOTES
"  History   Chief Complaint:  Leg Swelling     The history is provided by the patient.      Betito Anderson is a 77 year old male on Xarelto with a history of CHF, COPD, Afib, hyperlipidemia and hypertension who presents with leg swelling. Patient reports a few days of bilateral leg and feet swelling. He states that he has a bruise on his left foot as well, which was concerning for him. He notes that he has had leg swelling in the past. No recent change in medications or diet. He denies worsening chest pain or shortness of breath.     Independent Historian:   None - Patient Only    Review of External Notes: none    ROS:  Review of Systems   Respiratory: Negative for shortness of breath.    Cardiovascular: Positive for leg swelling. Negative for chest pain.   Skin: Positive for color change.   All other systems reviewed and are negative.    Allergies:  Ace Inhibitors  Eliquis [Apixaban]     Medications:    Allopurinol  Amlodipine   Aspirin 81 mg   Atorvastatin  Carvedilol  Hydralazine  Isosorbide mononitrate CR  Losartan  Xarelto  Torsemide     Past Medical History:    CHF  Arteriosclerotic cardiovascular disease  Afib  Carotid artery plaque   Gout  Hypertension  Hyperlipidemia  Psoriasis  V-tach  COPD  CKD, stage 3    Past Surgical History:    Cataracts x2  CV coronary angiogram  CV heart cath with possible intervention  CV left heart cath  CV left ventriculogram  CV PCI stent drug eluting  CV right heart cath measurements recorded  LOOP recorder implant  EP right ventricular recording     Family History:    Father - CAD    Social History:  Presents with son  PCP: Rudy Vernon     Physical Exam     Patient Vitals for the past 24 hrs:   BP Temp Temp src Pulse Resp SpO2 Height Weight   05/05/23 1420 -- -- -- 70 -- 97 % -- --   05/05/23 1415 (!) 159/95 -- -- -- -- -- -- --   05/05/23 1147 (!) 150/74 98  F (36.7  C) Oral 64 16 98 % 1.676 m (5' 6\") 90.7 kg (200 lb)        Physical Exam  General/Appearance: " appears stated age, well-groomed, appears comfortable  Eyes: EOMI, no scleral injection, no icterus  ENT: MMM  Neck: supple, nl ROM, no stiffness  Cardiovascular: irregularly irregular, nl S1S2, no m/r/g, 2+ pulses in all 4 extremities, cap refill <2sec  Respiratory: CTAB, good air movement throughout, no wheezes/rhonchi/rales, no increased WOB, no retractions  Back: no lesions  GI: abd soft, non-distended, nttp,  no HSM, no rebound, no guarding, nl BS  MSK: BONILLA, good tone, no bony abnormality  Skin: warm and well-perfused, no rash, 2+ pitting edema distal to knees bilaterally, scattered ecchymosis to L toes, nl turgor  Neuro: GCS 15, alert and oriented, no gross focal neuro deficits  Psych: interacts appropriately  Heme: no active bleeding        Emergency Department Course   ECG  ECG results from 05/05/23 @ 1407   EKG 12-lead, tracing only     Value    Systolic Blood Pressure     Diastolic Blood Pressure     Ventricular Rate 65    Atrial Rate     TN Interval     QRS Duration 88        QTc 416    P Axis     R AXIS -10    T Axis 42    Interpretation ECG      Atrial fibrillation  Possible Anterior infarct , age undetermined  Read by me @ 1421     Imaging:  US Lower Extremity Venous Duplex Bilateral   Final Result   IMPRESSION: No evidence of deep venous thrombosis.      ANALI BARKSDALE MD            SYSTEM ID:  [id number]         Report per radiology    Laboratory:  Labs Ordered and Resulted from Time of ED Arrival to Time of ED Departure   BASIC METABOLIC PANEL - Abnormal       Result Value    Sodium 136      Potassium 4.3      Chloride 102      Carbon Dioxide (CO2) 23      Anion Gap 11      Urea Nitrogen 16.9      Creatinine 1.39 (*)     Calcium 8.6 (*)     Glucose 105 (*)     GFR Estimate 52 (*)    TROPONIN T, HIGH SENSITIVITY - Abnormal    Troponin T, High Sensitivity 27 (*)    NT PROBNP INPATIENT - Abnormal    N terminal Pro BNP Inpatient 2,793 (*)    CBC WITH PLATELETS AND DIFFERENTIAL - Abnormal    WBC  Count 9.2      RBC Count 4.69      Hemoglobin 12.5 (*)     Hematocrit 40.9      MCV 87      MCH 26.7      MCHC 30.6 (*)     RDW 18.0 (*)     Platelet Count 285      % Neutrophils 80      % Lymphocytes 11      % Monocytes 8      % Eosinophils 0      % Basophils 1      % Immature Granulocytes 0      NRBCs per 100 WBC 0      Absolute Neutrophils 7.3      Absolute Lymphocytes 1.0      Absolute Monocytes 0.8      Absolute Eosinophils 0.0      Absolute Basophils 0.1      Absolute Immature Granulocytes 0.0      Absolute NRBCs 0.0     NT PROBNP INPATIENT - Abnormal    N terminal Pro BNP Inpatient 2,643 (*)    TROPONIN T, HIGH SENSITIVITY - Abnormal    Troponin T, High Sensitivity 23 (*)       Emergency Department Course & Assessments:     Assessments:  1352 I obtained history and examined the patient, as above.  1515 I rechecked the patient and updated them on findings. Amenable to discharge.     Independent Interpretation (X-rays, CTs, rhythm strip):  Not applicable    Consultations/Discussion of Management or Tests:  None     Social Determinants of Health affecting care:   None    Disposition:  The patient was discharged to home.     Impression & Plan    Medical Decision Making:  This patient is a pleasant 77-year-old male with a history of A-fib on Xarelto, CHF, COPD who presents with bilateral lower extremity edema.  He has had significant edema in the past and is on torsemide however what concerned him the most today was he had some ecchymosis scattered to his left foot, especially the toes.  There is no known trauma.  Ultrasound of the area was unremarkable and negative for DVT.  Labs are consistent with CHF as he has elevated BNP.  His troponin is just slightly above the normal male range however delta high-sensitivity troponin is not uptrending.  I suspect this is more from demand ischemia from his known CHF and not secondary to true cardiac ischemia that would benefit from cardiology intervention.  He is having no  chest pain or shortness of breath to increase my concern for more sinister cause.  I think it is reasonable for at this point for him to be discharged home on several days of more intense diuretic to help him get off the extra fluid but ultimately have him follow-up with his PCP.  He feels comfortable with this plan.    Diagnosis:    ICD-10-CM    1. Chronic congestive heart failure, unspecified heart failure type (H)  I50.9       2. Peripheral edema  R60.9            Discharge Medications:  New Prescriptions    TORSEMIDE (DEMADEX) 20 MG TABLET    Take 2 tablets (40 mg) by mouth daily for 5 days      Scribe Disclosure:  I, Yola Kellogg, am serving as a scribe at 1:52 PM on 5/5/2023 to document services personally performed by Debra Kauffman MD based on my observations and the provider's statements to me.        Debra Kauffman MD  05/05/23 0257

## 2023-05-05 NOTE — TELEPHONE ENCOUNTER
"Received a call from the call center to discuss leg swelling, and blue colored toes.  Spoke to wife Diane who says Mykel's left leg is painful and swollen, and his 3 middle toes are blue. He feels he may have a blood clot.  Advised it is recommended to go to ED for an evaluation.  She verbalized agreement and understanding. She says she is four days post op from a knee replacement, so she will make some calls to get him a ride to get to the ED.  Will send a message to Dr. Hidalgo's team for an update.  1. ONSET: \"When did the pain start?\"   2. LOCATION: \"Where is the pain located?\" left leg  3. PAIN: \"How bad is the pain?\" (Scale 1-10; or mild, moderate, severe) Moderate  - MILD (1-3): doesn't interfere with normal activities   - MODERATE (4-7): interferes with normal activities (e.g., work or school) or awakens from sleep, limping   - SEVERE (8-10): excruciating pain, unable to do any normal activities, unable to walk  4. WORK OR EXERCISE: \"Has there been any recent work or exercise that involved this part of the body?\"   5. CAUSE: \"What do you think is causing the leg pain?\" concern for a blood clot  6. OTHER SYMPTOMS: \"Do you have any other symptoms?\" (e.g., chest pain, back pain, breathing difficulty, swelling, rash, fever, numbness, weakness)      Reason for Disposition    Thigh or calf pain in only one leg and present > 1 hour    Thigh, calf, or ankle swelling in only one leg    Protocols used: LEG PAIN-A-OH      "

## 2023-05-05 NOTE — ED TRIAGE NOTES
Pt has had swelling in his legs and feet  for the past couple of days   Pt has on his left foot brusing rash    Pt has hx of stents

## 2023-05-08 ENCOUNTER — TELEPHONE (OUTPATIENT)
Dept: FAMILY MEDICINE | Facility: CLINIC | Age: 78
End: 2023-05-08

## 2023-05-08 DIAGNOSIS — I10 BENIGN ESSENTIAL HYPERTENSION: ICD-10-CM

## 2023-05-08 DIAGNOSIS — E78.5 HYPERLIPIDEMIA LDL GOAL <100: ICD-10-CM

## 2023-05-08 RX ORDER — TORSEMIDE 20 MG/1
TABLET ORAL
Qty: 135 TABLET | Refills: 1 | Status: SHIPPED | OUTPATIENT
Start: 2023-05-08 | End: 2023-06-16

## 2023-05-08 NOTE — TELEPHONE ENCOUNTER
Reason for Call:  Appointment Request    Patient requesting this type of appt:  Hospital/ED Follow-Up     Requested provider: Rudy Vernon    Reason patient unable to be scheduled: Not within requested timeframe    When does patient want to be seen/preferred time: 3-7 days    Comments: ER F/U for leg swelling and toes turning blue    Could we send this information to you in "[employer] or would you prefer to receive a phone call?:   Patient would prefer a phone call   Okay to leave a detailed message?: Yes at Home number on file [phone] (home)    Call taken on 5/8/2023 at 10:39 AM by Lesia Ayon

## 2023-05-09 NOTE — TELEPHONE ENCOUNTER
General Call    Contacts       Type Contact Phone/Fax    05/08/2023 10:38 AM CDT Phone (Incoming) Mykel Anderson (Self) [phone] (H)    05/09/2023 08:35 AM CDT Phone (Incoming) Mykel Anderson (Self) [phone] (H)        Reason for Call:  Appointment    What are your questions or concerns:  Pt calling about the status of his appointment.    Date of last appointment with provider: n/a    [facility] we [facility] this information to you [facility] or would you prefer to receive a phone call?:   Patient would prefer a phone call   Okay to leave a detailed message?: Yes at Cell number on file:    Telephone Information:   Mobile [phone]

## 2023-05-10 ENCOUNTER — MYC MEDICAL ADVICE (OUTPATIENT)
Dept: FAMILY MEDICINE | Facility: CLINIC | Age: 78
End: 2023-05-10
Payer: COMMERCIAL

## 2023-05-10 NOTE — TELEPHONE ENCOUNTER
Connected with Pt and stated that PCP only has virtual panda'ts available until June. Offered a telephone or video visit or Team visit. Pt yelled that it was unacceptable and did not want any of the available panda'ts. No panda't was made.

## 2023-05-11 DIAGNOSIS — I50.31 ACUTE DIASTOLIC CONGESTIVE HEART FAILURE (H): Primary | ICD-10-CM

## 2023-05-11 NOTE — TELEPHONE ENCOUNTER
If there is a team appointment tomorrow please use that.  If not please let me know.    Rudy Vernon M.D.

## 2023-05-11 NOTE — TELEPHONE ENCOUNTER
Please see mychart from patient and advise appropriate course of action.      Please see 5/8 telephone encounter, willing to work patient in?    Jan Sanchez RN  [facility] Triage Nurse

## 2023-05-12 ENCOUNTER — OFFICE VISIT (OUTPATIENT)
Dept: FAMILY MEDICINE | Facility: CLINIC | Age: 78
End: 2023-05-12
Payer: COMMERCIAL

## 2023-05-12 VITALS
BODY MASS INDEX: 34.01 KG/M2 | DIASTOLIC BLOOD PRESSURE: 78 MMHG | SYSTOLIC BLOOD PRESSURE: 144 MMHG | RESPIRATION RATE: 16 BRPM | HEART RATE: 74 BPM | OXYGEN SATURATION: 95 % | TEMPERATURE: 98.8 F | WEIGHT: 211.6 LBS | HEIGHT: 66 IN

## 2023-05-12 DIAGNOSIS — Z09 HOSPITAL DISCHARGE FOLLOW-UP: ICD-10-CM

## 2023-05-12 DIAGNOSIS — T14.8XXA HEMATOMA OF SKIN: ICD-10-CM

## 2023-05-12 DIAGNOSIS — R60.0 BILATERAL LOWER EXTREMITY EDEMA: ICD-10-CM

## 2023-05-12 DIAGNOSIS — L98.9 SKIN LESION: ICD-10-CM

## 2023-05-12 PROCEDURE — 99214 OFFICE O/P EST MOD 30 MIN: CPT | Performed by: PHYSICIAN ASSISTANT

## 2023-05-12 ASSESSMENT — PAIN SCALES - GENERAL: PAINLEVEL: NO PAIN (0)

## 2023-05-12 NOTE — PATIENT INSTRUCTIONS
Apply a warm compress to the area 2-3 times per day    Resume previous dose of torsemide    Elevate legs as much as possible    Avoid salt    Use compression stockings when up    Dermatology referral for facial lesion

## 2023-05-12 NOTE — PROGRESS NOTES
Assessment and Plan:     (Z09) Hospital discharge follow-up  Comment: Was seen in the ED on 5/5/2023, see below for further details presented with bilateral lower extremity swelling, bilat venous duplex ultrasound negative, his torsemide was increased for 5 days, overall swelling doing better, he has not had any significant weight gain, he denies chest pain, he feels his breathing is at baseline  Plan: Resume previous dose of torsemide, start wearing compression stockings, avoid salt, elevate legs is much as possible  Follow-up with cardiology as planned next week  Discussed when to be seen promptly    (R60.0) Bilateral lower extremity edema  Comment: Currently mostly in his ankles, no open or weeping sores, has improved since he was seen in the ED  Plan: See above    (T14.8XXA) Hematoma of skin  Comment: Left lower leg, no evidence of secondary infection, nontender  Plan: Discussed likely due to Xarelto, warm compress 2-3 times a day, monitor for secondary infection    (L98.9) Skin lesion  Comment: left cheek, raised, blue and purple in appearance, irregular  Plan: Adult Dermatology Referral              Daysi Tyler PA-C  30 minutes on the day of the encounter doing chart review, history and exam, documentation and further activities as noted above.        Song Hogue is a 77 year old, presenting for the following health issues:  No chief complaint on file.         View : No data to display.              [facility]     ED/ Followup:    Facility:   ED  Date of visit: 5-5-2023  Reason for visit: Leg Swelling, Chronic congestive heart failure, unspecified heart failure type  Current Status: leg Swelling improved, Cough continues    Mr. Anderson is here for emergency room follow up.  He was seen in the ED on 5/5/2023 when he presented with bilateral leg swelling.  At that time he noted a few days of bilateral leg and feet swelling. He denied any associated chest pain, shortness of breath, cough.  He had  "bilateral lower extremity venous duplex ultrasounds which were negative for DVT.  EKG showed rate controlled A-fib.      His troponin was slightly above the normal male range, however delta high sensitive troponin was not uptrending.  This was thought to be due more from demand ischemia and known CHF and not true cardiac ischemia.    He was discharged with plans to increase his diuretic from 20mg of torsemide M, W, F and 40mg all other days to 40mg daily x 5 days.     He notes that he has been doing fine and the swelling in his legs has gone down significantly.  He does have some baseline shortness of breath which he feels is unchanged.  He denies chest pain.    He has compression stockings at home but does not wear them.  He also has not been watching his salt intake    He also notes a bump on his left lower leg.   It is nontender, he cannot recall any trauma.    He sees his cardiologist next week    Review of Systems   See above      Objective    There were no vitals taken for this visit.  There is no height or weight on file to calculate BMI.     BP (!) 144/78 (BP Location: Left arm, Patient Position: Sitting, Cuff Size: Adult Large)   Pulse 74   Temp 98.8  F (37.1  C) (Tympanic)   Resp 16   Ht 1.676 m (5' 6\")   Wt 96 kg (211 lb 9.6 oz)   SpO2 95%   BMI 34.15 kg/m      Physical Exam     GENERAL: in NAD  RESP: lungs clear to auscultation - no rales, no rhonchi, no wheezes  CV: regular rates and rhythm, normal S1 S2, no S3 or S4 and no murmur, no click or rub   MS: extremities- no gross deformities noted, mild-mod pitting edema bilat ankles, no open sores or weeping,   Mass on left proximal, anterior lower leg, approximately the size of a quarter, nontender, a little warm to the touch, no surrounding induration, presentation most consistent with hematoma  SKIN: raised purple/blue lesion on middle left cheek, about 5mm in diameter and irregular     "

## 2023-05-13 ENCOUNTER — ANCILLARY PROCEDURE (OUTPATIENT)
Dept: CARDIOLOGY | Facility: CLINIC | Age: 78
End: 2023-05-13
Attending: INTERNAL MEDICINE
Payer: COMMERCIAL

## 2023-05-13 DIAGNOSIS — Z95.0 CARDIAC PACEMAKER IN SITU: ICD-10-CM

## 2023-05-13 PROCEDURE — G2066 INTER DEVC REMOTE 30D: HCPCS | Performed by: INTERNAL MEDICINE

## 2023-05-13 PROCEDURE — 93297 REM INTERROG DEV EVAL ICPMS: CPT | Performed by: INTERNAL MEDICINE

## 2023-05-16 ENCOUNTER — LAB (OUTPATIENT)
Dept: LAB | Facility: CLINIC | Age: 78
End: 2023-05-16
Payer: COMMERCIAL

## 2023-05-16 DIAGNOSIS — I50.31 ACUTE DIASTOLIC CONGESTIVE HEART FAILURE (H): ICD-10-CM

## 2023-05-16 LAB
ANION GAP SERPL CALCULATED.3IONS-SCNC: 15 MMOL/L (ref 7–15)
BUN SERPL-MCNC: 29.6 MG/DL (ref 8–23)
CALCIUM SERPL-MCNC: 9.1 MG/DL (ref 8.8–10.2)
CHLORIDE SERPL-SCNC: 104 MMOL/L (ref 98–107)
CREAT SERPL-MCNC: 1.53 MG/DL (ref 0.67–1.17)
DEPRECATED HCO3 PLAS-SCNC: 20 MMOL/L (ref 22–29)
GFR SERPL CREATININE-BSD FRML MDRD: 47 ML/MIN/1.73M2
GLUCOSE SERPL-MCNC: 120 MG/DL (ref 70–99)
NT-PROBNP SERPL-MCNC: 2581 PG/ML (ref 0–1800)
POTASSIUM SERPL-SCNC: 4 MMOL/L (ref 3.4–5.3)
SODIUM SERPL-SCNC: 139 MMOL/L (ref 136–145)
TSH SERPL DL<=0.005 MIU/L-ACNC: 3.69 UIU/ML (ref 0.3–4.2)

## 2023-05-16 PROCEDURE — 84443 ASSAY THYROID STIM HORMONE: CPT | Performed by: INTERNAL MEDICINE

## 2023-05-16 PROCEDURE — 36415 COLL VENOUS BLD VENIPUNCTURE: CPT | Performed by: INTERNAL MEDICINE

## 2023-05-16 PROCEDURE — 83880 ASSAY OF NATRIURETIC PEPTIDE: CPT | Performed by: INTERNAL MEDICINE

## 2023-05-16 PROCEDURE — 80048 BASIC METABOLIC PNL TOTAL CA: CPT | Performed by: INTERNAL MEDICINE

## 2023-05-17 ENCOUNTER — OFFICE VISIT (OUTPATIENT)
Dept: CARDIOLOGY | Facility: CLINIC | Age: 78
End: 2023-05-17
Payer: COMMERCIAL

## 2023-05-17 VITALS
HEIGHT: 67 IN | SYSTOLIC BLOOD PRESSURE: 129 MMHG | BODY MASS INDEX: 33.87 KG/M2 | DIASTOLIC BLOOD PRESSURE: 70 MMHG | HEART RATE: 63 BPM | WEIGHT: 215.8 LBS | OXYGEN SATURATION: 97 %

## 2023-05-17 DIAGNOSIS — E78.5 HYPERLIPIDEMIA LDL GOAL <100: ICD-10-CM

## 2023-05-17 DIAGNOSIS — I25.10 ASCVD (ARTERIOSCLEROTIC CARDIOVASCULAR DISEASE): ICD-10-CM

## 2023-05-17 DIAGNOSIS — I10 BENIGN ESSENTIAL HYPERTENSION: ICD-10-CM

## 2023-05-17 DIAGNOSIS — I50.31 ACUTE DIASTOLIC CONGESTIVE HEART FAILURE (H): ICD-10-CM

## 2023-05-17 DIAGNOSIS — I50.30 HEART FAILURE WITH PRESERVED EJECTION FRACTION, UNSPECIFIED HF CHRONICITY (H): ICD-10-CM

## 2023-05-17 PROCEDURE — 99215 OFFICE O/P EST HI 40 MIN: CPT | Performed by: INTERNAL MEDICINE

## 2023-05-17 NOTE — LETTER
5/17/2023    Rudy Vernon MD  [address]  [place] [address]    RE: Betito Leeon       Dear Colleague,     I had the pleasure of seeing Betito Anderson in the [facility].  CARDIOLOGY CLINIC CONSULTATION    PRIMARY CARE PHYSICIAN:  Rudy Vernon    Review of the result(s) of each unique test - Last BMP CBC echocardiogram ECG. Last ECG shows atrial fibrillation.     The level of medical decision making during this visit was of high complexity. Condition for which patient was treated that is considered severe and would require intensive monitoring or urgent treatment to facilitate care -acute on chronic heart failure with preserved ejection fraction    HISTORY OF PRESENT ILLNESS:  Mr. Anderson is a delightful 77-year-old gentleman who has been seeing me since summer of 2019 and has a past medical history significant for the following:     Chronic atrial fibrillation since 2018 for which he is on anticoagulation  In 07/2019 he presented to the hospital with acute hypertensive emergency and acute LV dysfunction. EF was down to 30%, but normalized to 55% with control of blood pressure.    Angiogram at that admission showed a tight lesion in the mid LAD spanning into the diagonal and also had other small-vessel disease and a subtotally occluded right coronary artery with robust collaterals from the LAD.  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  He underwent PCI of his LAD bifurcation into the diagonal. Subsequently, a nuclear stress test had shown mild basilar inferolateral ischemia without angina, and as such, this has been under conservative management.    Obesity, hypertension, dyslipidemia, chronic kidney disease and diabetes.    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  Subsequently, he  had a loop recorder implanted.  On the loop he has been having some 4 second pauses, but he has been asymptomatic since.   Remains in permanent atrial fibrillation  Mild aortic stenosis as of 2021     He is here for cardiology follow-up after recent ER visit. The patient recently went to the emergency department because of worsening lower extremity edema.  Lower extremity ultrasound was negative for DVT.  He used to be on 20 mg Monday Wednesday Friday of torsemide but his torsemide dose was then increased appropriately to 40 mg daily.  Since then the swelling is gone away and his breathing is improved.  Currently he is back to his 20 mg dosing.  He still has edema.  He is NYHA class III.  No angina, syncope or presyncope.      PAST MEDICAL HISTORY:  Past Medical History:   Diagnosis Date    Acute diastolic congestive heart failure (H) 06/2019    hosp fsd, then fu echo ef nl    ASCVD (arteriosclerotic cardiovascular disease) 1997    angio with 40% circ lesion; 6/19 hosp for chf, 3 vessel dz on angio but porcelin aorta so ptca done    Atrial fibrillation (H) 10/09/2018    found on routine physical    Carotid artery plaque 2005    seen on us, about 50% stenosis, fu 2009 us no significant stenosis    Elevated blood sugar     Gout     on allopurinol    HTN (hypertension)     Hypercholesteremia     Hyponatremia 2015    felt due to chlorthalidone    Low mean corpuscular volume (MCV)     Near syncope 05/2015    fu est echo low level but neg    Psoriasis     Dr. Marti    Renal mass 06/29/2019    seen on ct chest for sob, needs fu ct for that, fu us done 7/19 and no mass seen, should have fu ct in December, had fu us 3/22 and no fu needed    Screening 2012    abd us no aaa    Syncope 09/2019    hosp fsd, cause not clear, lowered coreg dose; seen by Dr. Lezama of ep and ep study neg, implanted event monitor    V-tach (H) 09/2019    seen on event monitor for fu syncope, then ep study and no inducible vtach        MEDICATIONS:  Current Outpatient Medications   Medication    allopurinol (ZYLOPRIM) 300 MG tablet    amLODIPine (NORVASC) 10 MG tablet    aspirin (ASA) 81 MG EC tablet    atorvastatin (LIPITOR) 80 MG tablet    carvedilol (COREG) 6.25 MG tablet    hydrALAZINE (APRESOLINE) 100 MG tablet    isosorbide mononitrate CR (IMDUR) 120 MG 24 HR ER tablet    losartan (COZAAR) 50 MG tablet    rivaroxaban ANTICOAGULANT (XARELTO) 20 MG TABS tablet    torsemide (DEMADEX) 20 MG tablet     No current facility-administered medications for this visit.       ALLERGIES:  Allergies   Allergen Reactions    Ace Inhibitors Anaphylaxis     Edema of ace    Eliquis [Apixaban] Other (See Comments)     Facial numbness       SOCIAL HISTORY:  I have reviewed this patient's social history and updated it with pertinent information if needed. Betito Anderson  reports that he quit smoking about 24 years ago. His smoking use included cigars and cigarettes. He has a 30.00 pack-year smoking history. He has never used smokeless tobacco. He reports current alcohol use. He reports that he does not use drugs.    FAMILY HISTORY:  I have reviewed this patient's family history and updated it with pertinent information if needed.   Family History   Problem Relation Age of Onset    Coronary Artery Disease Father     Medical History Unknown Mother     Medical History Unknown Maternal Grandfather        PHYSICAL EXAM:      BP: 129/70 Pulse: 63     SpO2: 97 %      Vital Signs with Ranges  Pulse:  [63] 63  BP: (129)/(70) 129/70  SpO2:  [97 %] 97 %  215 lbs 12.8 oz    Constitutional: alert, no distress  Respiratory: Good bilateral air entry  Cardiovascular: Irregular heart sound soft systolic murmur 1+ edema JVP elevated  GI: nondistended  Neuropsychiatric: appropriate affact    ASSESSMENT: Pertinent issues addressed/ reviewed during this cardiology visit  Acute on chronic heart failure with recovered ejection fraction  Hypertensive cardiomyopathy  Ischemic  cardiomyopathy  Ischemic heart disease with stenting as above  Permanent atrial fibrillation on chronic anticoagulation  Cardiorenal syndrome and chronic kidney disease    RECOMMENDATIONS:  The patient is volume overloaded on exam.  I do not think he needs IV diuretics at this time.  However have told him to increase his torsemide to 40 mg daily.  Once edema resolves from his extremities, I have told him to go down to 20 mg daily.  Currently he is taking it Monday Wednesday Friday.  I recommend getting an echocardiogram to assess EF PA pressures and aortic stenosis.  Aortic stenosis does not sound severe on exam.  Get labs in 2 weeks and very close follow-up to watch kidney function.  Appointment with RASHI within the next 2 to 4 weeks for close follow-up.  If worsening symptoms he needs to seek immediate medical attention and going to the hospital.  If creatinine worsens with diuresis or if edema persist despite that then given cardiorenal syndrome difficult physical exam I would recommend getting a right heart catheterization down the road in the coming months for objective hemodynamic assessment.  In regards to A-fib and CAD, please see prior notes.  Continue guideline directed medical therapy.  He is on aspirin and DOAC per prior discussion.  Denies bleeding problems.  Healthy diet try to get LDL down.  If not in the future add ezetimibe.  Permanent A-fib, pauses on loop, no symptoms.  Conservative management.    It was a pleasure seeing this patient in clinic today. Please do not hesitate to contact me with any future questions.     JOE Estevez, [facility]  Cardiology - [facility]  May 17, 2023    This note was completed in part using dictation via the Dragon voice recognition software. Some word and grammatical errors may occur and must be interpreted in the appropriate clinical context.  If there are any questions pertaining to this issue, please contact me for further clarification.      Thank you for allowing  me to participate in the care of your patient.      Sincerely,     Mendez Hidalgo MD     [facility]  cc:   Mendez Hidalgo MD  [address]  [place] [id number]

## 2023-05-17 NOTE — PATIENT INSTRUCTIONS
Get an echocardiogram  Get labs in 2 weeks  Follow-up in 1 month with the nurse practitioner  Increase your torsemide to 40 mg daily.  Once the swelling of the leg goes away, cut it down to 20 mg daily.

## 2023-05-17 NOTE — PROGRESS NOTES
CARDIOLOGY CLINIC CONSULTATION    PRIMARY CARE PHYSICIAN:  Rudy Vernon    Review of the result(s) of each unique test - Last BMP CBC echocardiogram ECG. Last ECG shows atrial fibrillation.     The level of medical decision making during this visit was of high complexity. Condition for which patient was treated that is considered severe and would require intensive monitoring or urgent treatment to facilitate care -acute on chronic heart failure with preserved ejection fraction    HISTORY OF PRESENT ILLNESS:  Mr. Anderson is a delightful 77-year-old gentleman who has been seeing me since summer of 2019 and has a past medical history significant for the followin. Chronic atrial fibrillation since 2018 for which he is on anticoagulation  2. In 2019 he presented to the hospital with acute hypertensive emergency and acute LV dysfunction. EF was down to 30%, but normalized to 55% with control of blood pressure.    3. Angiogram at that admission showed a tight lesion in the mid LAD spanning into the diagonal and also had other small-vessel disease and a subtotally occluded right coronary artery with robust collaterals from the LAD.  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  He underwent PCI of his LAD bifurcation into the diagonal. Subsequently, a nuclear stress test had shown mild basilar inferolateral ischemia without angina, and as such, this has been under conservative management.    4. Obesity, hypertension, dyslipidemia, chronic kidney disease and diabetes.    5. 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 Subsequently, in 2019, he had episodes of syncope, and a monitor had shown nonsustained VT, and then EP study was negative.  Subsequently, he had a loop recorder implanted.  On the loop he has been having some 4 second pauses, but he has been asymptomatic since.   Remains in permanent atrial fibrillation  7. Mild aortic stenosis as  of 2021     He is here for cardiology follow-up after recent ER visit. The patient recently went to the emergency department because of worsening lower extremity edema.  Lower extremity ultrasound was negative for DVT.  He used to be on 20 mg Monday Wednesday Friday of torsemide but his torsemide dose was then increased appropriately to 40 mg daily.  Since then the swelling is gone away and his breathing is improved.  Currently he is back to his 20 mg dosing.  He still has edema.  He is NYHA class III.  No angina, syncope or presyncope.      PAST MEDICAL HISTORY:  Past Medical History:   Diagnosis Date     Acute diastolic congestive heart failure (H) 06/2019    hosp fsd, then fu echo ef nl     ASCVD (arteriosclerotic cardiovascular disease) 1997    angio with 40% circ lesion; 6/19 hosp for chf, 3 vessel dz on angio but porcelin aorta so ptca done     Atrial fibrillation (H) 10/09/2018    found on routine physical     Carotid artery plaque 2005    seen on us, about 50% stenosis, fu 2009 us no significant stenosis     Elevated blood sugar      Gout     on allopurinol     HTN (hypertension)      Hypercholesteremia      Hyponatremia 2015    felt due to chlorthalidone     Low mean corpuscular volume (MCV)      Near syncope 05/2015    fu est echo low level but neg     Psoriasis     Dr. Marti     Renal mass 06/29/2019    seen on ct chest for sob, needs fu ct for that, fu us done 7/19 and no mass seen, should have fu ct in December, had fu us 3/22 and no fu needed     Screening 2012    abd us no aaa     Syncope 09/2019    hosp fsd, cause not clear, lowered coreg dose; seen by Dr. Lezama of ep and ep study neg, implanted event monitor     V-tach (H) 09/2019    seen on event monitor for fu syncope, then ep study and no inducible vtach       MEDICATIONS:  Current Outpatient Medications   Medication     allopurinol (ZYLOPRIM) 300 MG tablet     amLODIPine (NORVASC) 10 MG tablet     aspirin (ASA) 81 MG EC tablet     atorvastatin  (LIPITOR) 80 MG tablet     carvedilol (COREG) 6.25 MG tablet     hydrALAZINE (APRESOLINE) 100 MG tablet     isosorbide mononitrate CR (IMDUR) 120 MG 24 HR ER tablet     losartan (COZAAR) 50 MG tablet     rivaroxaban ANTICOAGULANT (XARELTO) 20 MG TABS tablet     torsemide (DEMADEX) 20 MG tablet     No current facility-administered medications for this visit.       ALLERGIES:  Allergies   Allergen Reactions     Ace Inhibitors Anaphylaxis     Edema of ace     Eliquis [Apixaban] Other (See Comments)     Facial numbness       SOCIAL HISTORY:  I have reviewed this patient's social history and updated it with pertinent information if needed. Betito Anderson  reports that he quit smoking about 24 years ago. His smoking use included cigars and cigarettes. He has a 30.00 pack-year smoking history. He has never used smokeless tobacco. He reports current alcohol use. He reports that he does not use drugs.    FAMILY HISTORY:  I have reviewed this patient's family history and updated it with pertinent information if needed.   Family History   Problem Relation Age of Onset     Coronary Artery Disease Father      Medical History Unknown Mother      Medical History Unknown Maternal Grandfather        PHYSICAL EXAM:      BP: 129/70 Pulse: 63     SpO2: 97 %      Vital Signs with Ranges  Pulse:  [63] 63  BP: (129)/(70) 129/70  SpO2:  [97 %] 97 %  215 lbs 12.8 oz    Constitutional: alert, no distress  Respiratory: Good bilateral air entry  Cardiovascular: Irregular heart sound soft systolic murmur 1+ edema JVP elevated  GI: nondistended  Neuropsychiatric: appropriate affact    ASSESSMENT: Pertinent issues addressed/ reviewed during this cardiology visit  Acute on chronic heart failure with recovered ejection fraction  Hypertensive cardiomyopathy  Ischemic cardiomyopathy  Ischemic heart disease with stenting as above  Permanent atrial fibrillation on chronic anticoagulation  Cardiorenal syndrome and chronic kidney  disease    RECOMMENDATIONS:  1. The patient is volume overloaded on exam.  I do not think he needs IV diuretics at this time.  However have told him to increase his torsemide to 40 mg daily.  Once edema resolves from his extremities, I have told him to go down to 20 mg daily.  Currently he is taking it Monday Wednesday Friday.  2. I recommend getting an echocardiogram to assess EF PA pressures and aortic stenosis.  Aortic stenosis does not sound severe on exam.  3. Get labs in 2 weeks and very close follow-up to watch kidney function.  Appointment with RASHI within the next 2 to 4 weeks for close follow-up.  4. If worsening symptoms he needs to seek immediate medical attention and going to the hospital.  5. If creatinine worsens with diuresis or if edema persist despite that then given cardiorenal syndrome difficult physical exam I would recommend getting a right heart catheterization down the road in the coming months for objective hemodynamic assessment.  6. In regards to A-fib and CAD, please see prior notes.  Continue guideline directed medical therapy.  He is on aspirin and DOAC per prior discussion.  Denies bleeding problems.  Healthy diet try to get LDL down.  If not in the future add ezetimibe.  7. Permanent A-fib, pauses on loop, no symptoms.  Conservative management.    It was a pleasure seeing this patient in clinic today. Please do not hesitate to contact me with any future questions.     JOE Estevez, [facility]  Cardiology - [facility]  May 17, 2023    This note was completed in part using dictation via the Dragon voice recognition software. Some word and grammatical errors may occur and must be interpreted in the appropriate clinical context.  If there are any questions pertaining to this issue, please contact me for further clarification.

## 2023-05-19 LAB
MDC_IDC_EPISODE_DTM: NORMAL
MDC_IDC_EPISODE_DURATION: 10 S
MDC_IDC_EPISODE_DURATION: 16 S
MDC_IDC_EPISODE_DURATION: 18 S
MDC_IDC_EPISODE_DURATION: 3.01 S
MDC_IDC_EPISODE_DURATION: 3.03 S
MDC_IDC_EPISODE_DURATION: 3.04 S
MDC_IDC_EPISODE_DURATION: 3.07 S
MDC_IDC_EPISODE_DURATION: 3.11 S
MDC_IDC_EPISODE_DURATION: 3.14 S
MDC_IDC_EPISODE_DURATION: 3.15 S
MDC_IDC_EPISODE_DURATION: 3.18 S
MDC_IDC_EPISODE_DURATION: 3.21 S
MDC_IDC_EPISODE_DURATION: 3.23 S
MDC_IDC_EPISODE_DURATION: 3.27 S
MDC_IDC_EPISODE_DURATION: 3.27 S
MDC_IDC_EPISODE_DURATION: 3.28 S
MDC_IDC_EPISODE_DURATION: 3.32 S
MDC_IDC_EPISODE_DURATION: 3.33 S
MDC_IDC_EPISODE_DURATION: 3.35 S
MDC_IDC_EPISODE_DURATION: 3.37 S
MDC_IDC_EPISODE_DURATION: 3.38 S
MDC_IDC_EPISODE_DURATION: 3.44 S
MDC_IDC_EPISODE_DURATION: 3.55 S
MDC_IDC_EPISODE_DURATION: 3.62 S
MDC_IDC_EPISODE_DURATION: 3.87 S
MDC_IDC_EPISODE_DURATION: 4.16 S
MDC_IDC_EPISODE_DURATION: 8 S
MDC_IDC_EPISODE_DURATION: 9 S
MDC_IDC_EPISODE_DURATION: NORMAL S
MDC_IDC_EPISODE_ID: 4376
MDC_IDC_EPISODE_ID: 4405
MDC_IDC_EPISODE_ID: 4409
MDC_IDC_EPISODE_ID: 4429
MDC_IDC_EPISODE_ID: 4474
MDC_IDC_EPISODE_ID: 4483
MDC_IDC_EPISODE_ID: 4484
MDC_IDC_EPISODE_ID: 4485
MDC_IDC_EPISODE_ID: 4486
MDC_IDC_EPISODE_ID: 4487
MDC_IDC_EPISODE_ID: 4488
MDC_IDC_EPISODE_ID: 4489
MDC_IDC_EPISODE_ID: 4490
MDC_IDC_EPISODE_ID: 4491
MDC_IDC_EPISODE_ID: 4492
MDC_IDC_EPISODE_ID: 4493
MDC_IDC_EPISODE_ID: 4494
MDC_IDC_EPISODE_ID: 4495
MDC_IDC_EPISODE_ID: 4496
MDC_IDC_EPISODE_ID: 4497
MDC_IDC_EPISODE_ID: 4498
MDC_IDC_EPISODE_ID: 4499
MDC_IDC_EPISODE_ID: 4500
MDC_IDC_EPISODE_ID: 4501
MDC_IDC_EPISODE_ID: 4502
MDC_IDC_EPISODE_ID: 4503
MDC_IDC_EPISODE_ID: 4504
MDC_IDC_EPISODE_ID: 4505
MDC_IDC_EPISODE_ID: 4506
MDC_IDC_EPISODE_ID: 4507
MDC_IDC_EPISODE_ID: 4508
MDC_IDC_EPISODE_ID: 4509
MDC_IDC_EPISODE_ID: 4510
MDC_IDC_EPISODE_ID: 4511
MDC_IDC_EPISODE_ID: 4512
MDC_IDC_EPISODE_ID: 4513
MDC_IDC_EPISODE_ID: 4514
MDC_IDC_EPISODE_ID: 4515
MDC_IDC_EPISODE_TYPE: NORMAL
MDC_IDC_MSMT_BATTERY_DTM: NORMAL
MDC_IDC_MSMT_BATTERY_STATUS: NORMAL
MDC_IDC_PG_IMPLANT_DTM: NORMAL
MDC_IDC_PG_MFG: NORMAL
MDC_IDC_PG_MODEL: NORMAL
MDC_IDC_PG_SERIAL: NORMAL
MDC_IDC_PG_TYPE: NORMAL
MDC_IDC_SESS_CLINIC_NAME: NORMAL
MDC_IDC_SESS_DTM: NORMAL
MDC_IDC_SESS_TYPE: NORMAL
MDC_IDC_SET_ZONE_DETECTION_INTERVAL: 2000 MS
MDC_IDC_SET_ZONE_DETECTION_INTERVAL: 3000 MS
MDC_IDC_SET_ZONE_DETECTION_INTERVAL: 380 MS
MDC_IDC_SET_ZONE_TYPE: NORMAL
MDC_IDC_STAT_AT_BURDEN_PERCENT: 99.2 %
MDC_IDC_STAT_AT_DTM_END: NORMAL
MDC_IDC_STAT_AT_DTM_START: NORMAL
MDC_IDC_STAT_EPISODE_RECENT_COUNT: 0
MDC_IDC_STAT_EPISODE_RECENT_COUNT: 3
MDC_IDC_STAT_EPISODE_RECENT_COUNT: 341
MDC_IDC_STAT_EPISODE_RECENT_COUNT: 9
MDC_IDC_STAT_EPISODE_RECENT_COUNT_DTM_END: NORMAL
MDC_IDC_STAT_EPISODE_RECENT_COUNT_DTM_START: NORMAL
MDC_IDC_STAT_EPISODE_TOTAL_COUNT: 0
MDC_IDC_STAT_EPISODE_TOTAL_COUNT: 0
MDC_IDC_STAT_EPISODE_TOTAL_COUNT: 32
MDC_IDC_STAT_EPISODE_TOTAL_COUNT: 37
MDC_IDC_STAT_EPISODE_TOTAL_COUNT: 4548
MDC_IDC_STAT_EPISODE_TOTAL_COUNT: 9
MDC_IDC_STAT_EPISODE_TOTAL_COUNT_DTM_END: NORMAL
MDC_IDC_STAT_EPISODE_TOTAL_COUNT_DTM_START: NORMAL
MDC_IDC_STAT_EPISODE_TYPE: NORMAL

## 2023-06-13 ENCOUNTER — TELEPHONE (OUTPATIENT)
Dept: CARDIOLOGY | Facility: CLINIC | Age: 78
End: 2023-06-13
Payer: COMMERCIAL

## 2023-06-13 NOTE — TELEPHONE ENCOUNTER
Pt is scheduled for a [facility] appt on 06/16/23    Pt with a history of diastolic heart failure..  Medication list reviewed and pt is not currently on Entresto, Jardiance, Farxiga and Ivokana.    Please initiate coverage check for the above medications.      Entresto $45 for 30 days supply   Jardiance $45   Farxiga $45     Invokana not covered and requires a prior auth.     Thank you for allowing me to help with your patient   Maranda Steven [facility]   Pharmacy Discharge Liaison [facility]

## 2023-06-15 ENCOUNTER — LAB (OUTPATIENT)
Dept: LAB | Facility: CLINIC | Age: 78
End: 2023-06-15
Payer: COMMERCIAL

## 2023-06-15 ENCOUNTER — HOSPITAL ENCOUNTER (OUTPATIENT)
Dept: CARDIOLOGY | Facility: CLINIC | Age: 78
Discharge: HOME | End: 2023-06-15
Attending: INTERNAL MEDICINE | Admitting: INTERNAL MEDICINE
Payer: COMMERCIAL

## 2023-06-15 DIAGNOSIS — I50.30 HEART FAILURE WITH PRESERVED EJECTION FRACTION, UNSPECIFIED HF CHRONICITY (H): ICD-10-CM

## 2023-06-15 DIAGNOSIS — I50.31 ACUTE DIASTOLIC CONGESTIVE HEART FAILURE (H): ICD-10-CM

## 2023-06-15 LAB
ANION GAP SERPL CALCULATED.3IONS-SCNC: 13 MMOL/L (ref 7–15)
BUN SERPL-MCNC: 31.2 MG/DL (ref 8–23)
CALCIUM SERPL-MCNC: 9.2 MG/DL (ref 8.8–10.2)
CHLORIDE SERPL-SCNC: 105 MMOL/L (ref 98–107)
CREAT SERPL-MCNC: 1.55 MG/DL (ref 0.67–1.17)
DEPRECATED HCO3 PLAS-SCNC: 22 MMOL/L (ref 22–29)
GFR SERPL CREATININE-BSD FRML MDRD: 46 ML/MIN/1.73M2
GLUCOSE SERPL-MCNC: 119 MG/DL (ref 70–99)
LVEF ECHO: NORMAL
POTASSIUM SERPL-SCNC: 4 MMOL/L (ref 3.4–5.3)
SODIUM SERPL-SCNC: 140 MMOL/L (ref 136–145)

## 2023-06-15 PROCEDURE — 93306 TTE W/DOPPLER COMPLETE: CPT

## 2023-06-15 PROCEDURE — 36415 COLL VENOUS BLD VENIPUNCTURE: CPT | Performed by: INTERNAL MEDICINE

## 2023-06-15 PROCEDURE — 80048 BASIC METABOLIC PNL TOTAL CA: CPT | Performed by: INTERNAL MEDICINE

## 2023-06-15 PROCEDURE — 93306 TTE W/DOPPLER COMPLETE: CPT | Mod: 26 | Performed by: INTERNAL MEDICINE

## 2023-06-16 ENCOUNTER — TELEPHONE (OUTPATIENT)
Dept: CARDIOLOGY | Facility: CLINIC | Age: 78
End: 2023-06-16

## 2023-06-16 ENCOUNTER — OFFICE VISIT (OUTPATIENT)
Dept: CARDIOLOGY | Facility: CLINIC | Age: 78
End: 2023-06-16
Attending: INTERNAL MEDICINE
Payer: COMMERCIAL

## 2023-06-16 VITALS
DIASTOLIC BLOOD PRESSURE: 68 MMHG | HEART RATE: 65 BPM | WEIGHT: 215.9 LBS | HEIGHT: 67 IN | OXYGEN SATURATION: 97 % | SYSTOLIC BLOOD PRESSURE: 129 MMHG | BODY MASS INDEX: 33.89 KG/M2

## 2023-06-16 DIAGNOSIS — I25.10 CORONARY ARTERY DISEASE INVOLVING NATIVE CORONARY ARTERY OF NATIVE HEART WITHOUT ANGINA PECTORIS: ICD-10-CM

## 2023-06-16 DIAGNOSIS — E78.5 HYPERLIPIDEMIA LDL GOAL <100: Primary | ICD-10-CM

## 2023-06-16 DIAGNOSIS — I10 BENIGN ESSENTIAL HYPERTENSION: ICD-10-CM

## 2023-06-16 DIAGNOSIS — E78.5 HYPERLIPIDEMIA LDL GOAL <100: ICD-10-CM

## 2023-06-16 DIAGNOSIS — I50.30 HEART FAILURE WITH PRESERVED EJECTION FRACTION, UNSPECIFIED HF CHRONICITY (H): ICD-10-CM

## 2023-06-16 DIAGNOSIS — I25.10 ASCVD (ARTERIOSCLEROTIC CARDIOVASCULAR DISEASE): ICD-10-CM

## 2023-06-16 PROCEDURE — 99215 OFFICE O/P EST HI 40 MIN: CPT | Performed by: PHYSICIAN ASSISTANT

## 2023-06-16 RX ORDER — ROSUVASTATIN CALCIUM 40 MG/1
40 TABLET, COATED ORAL AT BEDTIME
Qty: 90 TABLET | Refills: 3 | Status: SHIPPED | OUTPATIENT
Start: 2023-06-16 | End: 2024-01-29

## 2023-06-16 RX ORDER — TORSEMIDE 20 MG/1
TABLET ORAL
Qty: 135 TABLET | Refills: 1 | COMMUNITY
Start: 2023-06-16 | End: 2023-11-30

## 2023-06-16 NOTE — PATIENT INSTRUCTIONS
Call [facility] nurse for any questions or concerns Mon-Fri 8am-4pm:                                                #[phone]                                       For concerns after hours:                                               #[phone]     1: Medication changes:   Stop taking atorvastatin.   Start taking Crestor/ rosuvastatin 40 mg once a day at bedtime.    Start taking Jardiance 10 mg once a day.    Decrease Torsemide back to 20 mg everyday except M, W, F when you take 40 mg a day.      2: Plan from today: I'll see you back in early September, we'll do labs and a stress test before that visit.    Please see a sleep doctor to see if you have sleep apnea.      3: Lab results: Echocardiogram shows normal ejection fraction, no significant valve disease.  labs look stable.    Component      Latest Ref Rng 6/15/2023  10:46 AM   Sodium      136 - 145 mmol/L 140    Potassium      3.4 - 5.3 mmol/L 4.0    Chloride      98 - 107 mmol/L 105    Carbon Dioxide (CO2)      22 - 29 mmol/L 22    Anion Gap      7 - 15 mmol/L 13    Urea Nitrogen      8.0 - 23.0 mg/dL 31.2 (H)    Creatinine      0.67 - 1.17 mg/dL 1.55 (H)    Calcium      8.8 - 10.2 mg/dL 9.2    Glucose      70 - 99 mg/dL 119 (H)    GFR Estimate      >60 mL/min/1.73m2 46 (L)       Legend:  (H) High  (L) Low

## 2023-06-16 NOTE — PROGRESS NOTES
CARDIOLOGY CLINIC VISIT  Service Date: 2023     PRIMARY CARDIOLOGIST:  Dr. Hidalgo.    REASON FOR VISIT:  HFpEF, hypertension followup.    HISTORY OF PRESENT ILLNESS:  Mr. Anderson is a delightful 7-year-old gentleman with past medical history significant for the followin.  Severe 3-vessel coronary artery disease diagnosed in 2019 with a normal left main, 80% mid LAD, 70% circ, subtotally occluded RCA with left-to-right collaterals.  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  He then underwent drug-eluting stent to the LAD and D2.  He has ongoing 70% circumflex and 90% proximal RCA lesion with left-to-right collaterals.  2.  Hypertension.  3.  HFpEF with history of likely ischemic cardiomyopathy and EF as low as 35%, echo 6/15 showed EF >70%  4.  Mild aortic stenosis.  5.  Dyslipidemia.  6.  Chronic AFib, on Xarelto.  7.  History of nonsustained VT with negative EP study.  8.  History of syncope with the patient having asymptomatic bradycardia and pauses in about the 4-second range in the context of afib (where longer pauses are tolerated and less likely to cause syncope)  9.  Obesity.    Mykel was last seen by Dr. Hidalgo about a month ago and at that point he had severe peripheral edema and was instructed to increase his torsemide to 40 mg daily.  He comes in today for follow-up of those events and notes that his edema is better than it was but it still not resolved.  It is reasonably good in the morning and then progresses throughout the day.  He is urinating frequently on torsemide.  He denies chest pain, shortness of breath, orthopnea, or PND.  He is not getting any chest pain.  He is limited by knee pain and he is hoping for a knee replacement this fall.    SOCIAL HISTORY:  He works with a  once a week at home.  This helps with his edema and his strength.  He knows he is eating too much salt.  He is .  Former  smoker, quit in 1998, with a 30-pack-year history.  Daily alcohol.     PHYSICAL EXAMINATION:    GENERAL:  Well-developed, well-nourished gentleman in no acute distress.  HEENT:  Normocephalic, atraumatic.  HEART:  Irregularly irregular.  I do not appreciate murmur, rub or gallop.  LUNGS:  Clear, without wheezes, rales, or rhonchi.  EXTREMITIES: 2+ pitting edema to upper shin.  Neck veins are flat.  SKIN:  Warm and dry.    Studies Reviewed:  Loop 5/15/23 341 pause episdes of 3-4 sec in 100% afib.      Echo 6/15:  Hyperdynamic left ventricular function  The visual ejection fraction is >70%.  The right ventricular systolic function is mildly reduced.  There is severe biatrial enlargement.  There is mild (1+) mitral regurgitation.    ASSESSMENT AND PLAN:    1.  Heart failure with preserved EF, with hyperdynamic LV function.  Fortunately, this indicates that his coronary disease has not likely worsened.  He currently has class II to class III symptoms and is limited more by his knee pain than by breathing.  At this point we discussed risks and benefits of newer medications that are available and we elected to start him on Jardiance 10 mg a day knowing this reduces his risk of heart failure, heart attack, stroke and hospitalization.  Given he is going to be on this will decrease his torsemide back to his previous dosing which is 20 mg a day except for Monday Wednesday Friday when he will take 40 mg daily.  If need be we can cut that back even further.  He will remain on his other cardiac medications.    2.  Coronary artery disease with a drug-eluting stent to the LAD and D2 in 2019 with residual 70% circumflex lesion and 90% proximal RCA with left-to-right collaterals.  He is without anginal symptoms but cannot do 4 METS.  Is planning to have a knee replacement this fall and I suspect they are going to want a stress test before that so we will arrange a Lexiscan stress test prior to that surgery.  He is otherwise  appropriately on aspirin statin and beta-blocker.      3.  Dyslipidemia on Lipitor 80 daily still above goal with LDL 97 HDL 39 total cholesterol 183 type glycerides 234.  At this point we will need to discontinue Lipitor and switch him to Crestor 40 and repeat a lipid panel and ALT in 3 months.  If he is not at goal at that time we will consider adding Zetia versus PSK 9 inhibitor.    4.  Hypertension well-controlled    5.  History of syncope with loop recorder in place showing chronic A-fib and bradycardia with multiple pauses in 3 to 4-second range.  He has been completely asymptomatic and typically in A-fib we will allow for pauses up to 5 seconds before putting in an device for an asymptomatic patient.  That being said, it is suggestive of sleep apnea and the patient is agreeable to get a sleep study.  If he does have sleep apnea I reviewed this would help with blood pressure control edema and overall wellbeing.    6.  Chronic A-fib appropriately on Xarelto    7.  Mild aortic stenosis last echocardiogram done yesterday does not show significant stenosis.    8.  Arthritis of the knee, with patient planning on knee replacement later this fall, and get appropriate testing to see if we can be proactive with this given how difficult it is to get into clinic visits.    I will plan on seeing Mykel back in September with a lipid panel BMP BNP and Lexiscan stress test before that visit.  We do expect the stress test being mildly abnormal with potential inferior ischemia if it is outside of that we will consider additional work-up like angiogram.  Thank you for allowing me to participate in this delightful patient's care.    This patient is overall doing well and [facility].  He will follow up with Dr. Hidalgo in 6 months, sooner with concerns.    Melvi Montaño PA-C    This note was completed using Dragon voice recognition system.  There are inherent errors in the system and there may  be misspelled names or nonsensical words.    Orders this Visit:  No orders of the defined types were placed in this encounter.    No orders of the defined types were placed in this encounter.    There are no discontinued medications.      No diagnosis found.    CURRENT MEDICATIONS:  Current Outpatient Medications   Medication Sig Dispense Refill     allopurinol (ZYLOPRIM) 300 MG tablet TAKE 1 TABLET DAILY 90 tablet 3     amLODIPine (NORVASC) 10 MG tablet Take 1 tablet (10 mg) by mouth daily 90 tablet 3     aspirin (ASA) 81 MG EC tablet Take 1 tablet (81 mg) by mouth daily 90 tablet 3     atorvastatin (LIPITOR) 80 MG tablet Take 1 tablet (80 mg) by mouth daily 90 tablet 3     carvedilol (COREG) 6.25 MG tablet Take 1 tablet (6.25 mg) by mouth 2 times daily (with meals) 180 tablet 3     hydrALAZINE (APRESOLINE) 100 MG tablet Take 1 tablet (100 mg) by mouth 3 times daily 90 tablet 3     isosorbide mononitrate CR (IMDUR) 120 MG 24 HR ER tablet Take 1 tablet (120 mg) by mouth daily 90 tablet 3     losartan (COZAAR) 50 MG tablet Take 1 tablet (50 mg) by mouth daily 90 tablet 3     rivaroxaban ANTICOAGULANT (XARELTO) 20 MG TABS tablet Take 1 tablet (20 mg) by mouth daily (with dinner) 90 tablet 3     torsemide (DEMADEX) 20 MG tablet Take 1 tablet (20 mg) Monday, Wednesday, Friday and take 2 tablets (40 mg) other days by mouth (Patient taking differently: Take 1 tablet (20 mg) Monday, Wednesday, Friday) 135 tablet 1       ALLERGIES     Allergies   Allergen Reactions     Ace Inhibitors Anaphylaxis     Edema of ace     Eliquis [Apixaban] Other (See Comments)     Facial numbness       PAST MEDICAL HISTORY:  Past Medical History:   Diagnosis Date     Acute diastolic congestive heart failure (H) 06/2019    hosp fsd, then fu echo ef nl     ASCVD (arteriosclerotic cardiovascular disease) 1997    angio with 40% circ lesion; 6/19 hosp for chf, 3 vessel dz on angio but porcelin aorta so ptca done     Atrial fibrillation (H) 10/09/2018     found on routine physical     Carotid artery plaque 2005    seen on us, about 50% stenosis, fu 2009 us no significant stenosis     Elevated blood sugar      Gout     on allopurinol     HTN (hypertension)      Hypercholesteremia      Hyponatremia 2015    felt due to chlorthalidone     Low mean corpuscular volume (MCV)      Near syncope 05/2015    fu est echo low level but neg     Psoriasis     Dr. Marti     Renal mass 06/29/2019    seen on ct chest for sob, needs fu ct for that, fu us done 7/19 and no mass seen, should have fu ct in December, had fu us 3/22 and no fu needed     Screening 2012    abd us no aaa     Syncope 09/2019    hosp fsd, cause not clear, lowered coreg dose; seen by Dr. Lezama of ep and ep study neg, implanted event monitor     V-tach (H) 09/2019    seen on event monitor for fu syncope, then ep study and no inducible vtach       PAST SURGICAL HISTORY:  Past Surgical History:   Procedure Laterality Date     CATARACT EXTRACTION  03/2022     CATARACT EXTRACTION  2022     CV CORONARY ANGIOGRAM N/A 07/02/2019    Procedure: Coronary Angiogram;  Surgeon: Donaldo Loera MD;  Location:  HEART CARDIAC CATH LAB     CV HEART CATHETERIZATION WITH POSSIBLE INTERVENTION N/A 07/05/2019    Procedure: Heart Catheterization with Possible Intervention;  Surgeon: Manolo Hu MD;  Location:  HEART CARDIAC CATH LAB     CV LEFT HEART CATH N/A 07/02/2019    Procedure: Left Heart Cath;  Surgeon: Donaldo Loera MD;  Location:  HEART CARDIAC CATH LAB     CV LEFT VENTRICULOGRAM N/A 07/02/2019    Procedure: Left Ventriculogram;  Surgeon: Donaldo Loera MD;  Location:  HEART CARDIAC CATH LAB     CV PCI STENT DRUG ELUTING N/A 07/05/2019    Procedure: PCI Stent Drug Eluting;  Surgeon: Manolo Hu MD;  Location:  HEART CARDIAC CATH LAB     CV RIGHT HEART CATH MEASUREMENTS RECORDED N/A 07/02/2019    Procedure: Right Heart Cath;  Surgeon: Donaldo Loera MD;  Location:   HEART CARDIAC CATH LAB     EP LOOP RECORDER IMPLANT N/A 10/11/2019    Procedure: EP Loop Recorder Implant;  Surgeon: Fuad Lezama MD;  Location:  HEART CARDIAC CATH LAB     EP RIGHT VENTRICULAR RECORDING N/A 10/11/2019    Procedure: EP Ventricular Pacing;  Surgeon: Fuad Lezama MD;  Location:  HEART CARDIAC CATH LAB     ZZC ANESTH,DX ARTHROSCOPIC PROC KNEE JOINT  2009       FAMILY HISTORY:  Family History   Problem Relation Age of Onset     Coronary Artery Disease Father      Medical History Unknown Mother      Medical History Unknown Maternal Grandfather        SOCIAL HISTORY:  Social History     Socioeconomic History     Marital status:      Number of children: 2   Occupational History     Occupation: retired   Tobacco Use     Smoking status: Former     Packs/day: 1.00     Years: 30.00     Pack years: 30.00     Types: Cigars, Cigarettes     Quit date: 1998     Years since quittin.7     Smokeless tobacco: Never   Substance and Sexual Activity     Alcohol use: Yes     Comment: 2 drinks per week     Drug use: No     Sexual activity: Not Currently     Partners: Female       Review of Systems:  Skin:        Eyes:       ENT:       Respiratory:       Cardiovascular:       Gastroenterology:      Genitourinary:       Musculoskeletal:       Neurologic:       Psychiatric:       Heme/Lymph/Imm:       Endocrine:           Recent Lab Results: all reviewed today  CBC RESULTS:  Lab Results   Component Value Date    WBC 9.2 2023    WBC 7.4 10/11/2019    RBC 4.69 2023    RBC 4.52 10/11/2019    HGB 12.5 (L) 2023    HGB 11.9 (L) 2021    HCT 40.9 2023    HCT 38.7 (L) 10/11/2019    MCV 87 2023    MCV 86 10/11/2019    MCH 26.7 2023    MCH 27.2 10/11/2019    MCHC 30.6 (L) 2023    MCHC 31.8 10/11/2019    RDW 18.0 (H) 2023    RDW 16.6 (H) 10/11/2019     2023     10/11/2019       BMP RESULTS:  Lab Results   Component Value Date    NA  140 06/15/2023     05/28/2021    POTASSIUM 4.0 06/15/2023    POTASSIUM 3.6 07/08/2022    POTASSIUM 3.9 05/28/2021    CHLORIDE 105 06/15/2023    CHLORIDE 106 07/08/2022    CHLORIDE 106 05/28/2021    CO2 22 06/15/2023    CO2 26 07/08/2022    CO2 27 05/28/2021    ANIONGAP 13 06/15/2023    ANIONGAP 7 07/08/2022    ANIONGAP 7 05/28/2021     (H) 06/15/2023     (H) 07/08/2022     (H) 05/28/2021    BUN 31.2 (H) 06/15/2023    BUN 23 07/08/2022    BUN 44 (H) 05/28/2021    CR 1.55 (H) 06/15/2023    CR 1.55 (H) 05/28/2021    GFRESTIMATED 46 (L) 06/15/2023    GFRESTIMATED 43 (L) 05/28/2021    GFRESTBLACK 50 (L) 05/28/2021    GUNNER 9.2 06/15/2023    GUNNER 8.7 05/28/2021        LIPID RESULTS:  Lab Results   Component Value Date    CHOL 183 01/05/2023    CHOL 194 02/16/2021     Lab Results   Component Value Date    HDL 39 01/05/2023    HDL 41 02/16/2021     Lab Results   Component Value Date    LDL 97 01/05/2023     02/16/2021     Lab Results   Component Value Date    TRIG 234 01/05/2023    TRIG 193 02/16/2021     Lab Results   Component Value Date    CHOLHDLRATIO 2.8 02/26/2015        INR RESULTS:  Lab Results   Component Value Date    INR 1.18 (H) 09/14/2019    INR 2.33 (H) 07/18/2019           CC  Mendez Hidalgo MD  [id number] [place] [id number]  [place] [id number]

## 2023-06-16 NOTE — LETTER
2023    Rudy Vernon MD  [address]  [place] [address]    RE: Betito CASPER Justin       Dear Colleague,     I had the pleasure of seeing Betito Anderson in the [facility].  CARDIOLOGY CLINIC VISIT  Service Date: 2023     PRIMARY CARDIOLOGIST:  Dr. Hidalgo.    REASON FOR VISIT:  HFpEF, hypertension followup.    HISTORY OF PRESENT ILLNESS:  Mr. Anderson is a delightful 7-year-old gentleman with past medical history significant for the followin.  Severe 3-vessel coronary artery disease diagnosed in 2019 with a normal left main, 80% mid LAD, 70% circ, subtotally occluded RCA with left-to-right collaterals.  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  He then underwent drug-eluting stent to the LAD and D2.  He has ongoing 70% circumflex and 90% proximal RCA lesion with left-to-right collaterals.  2.  Hypertension.  3.  HFpEF with history of likely ischemic cardiomyopathy and EF as low as 35%, echo 6/15 showed EF >70%  4.  Mild aortic stenosis.  5.  Dyslipidemia.  6.  Chronic AFib, on Xarelto.  7.  History of nonsustained VT with negative EP study.  8.  History of syncope with the patient having asymptomatic bradycardia and pauses in about the 4-second range in the context of afib (where longer pauses are tolerated and less likely to cause syncope)  9.  Obesity.    Mykel was last seen by Dr. Hidalgo about a month ago and at that point he had severe peripheral edema and was instructed to increase his torsemide to 40 mg daily.  He comes in today for follow-up of those events and notes that his edema is better than it was but it still not resolved.  It is reasonably good in the morning and then progresses throughout the day.  He is urinating frequently on torsemide.  He denies chest pain, shortness of breath, orthopnea, or PND.  He is not getting any chest pain.  He is limited by knee pain and he is hoping for a  knee replacement this fall.    SOCIAL HISTORY:  He works with a  once a week at home.  This helps with his edema and his strength.  He knows he is eating too much salt.  He is .  Former smoker, quit in 1998, with a 30-pack-year history.  Daily alcohol.     PHYSICAL EXAMINATION:    GENERAL:  Well-developed, well-nourished gentleman in no acute distress.  HEENT:  Normocephalic, atraumatic.  HEART:  Irregularly irregular.  I do not appreciate murmur, rub or gallop.  LUNGS:  Clear, without wheezes, rales, or rhonchi.  EXTREMITIES: 2+ pitting edema to upper shin.  Neck veins are flat.  SKIN:  Warm and dry.    Studies Reviewed:  Loop 5/15/23 341 pause episdes of 3-4 sec in 100% afib.      Echo 6/15:  Hyperdynamic left ventricular function  The visual ejection fraction is >70%.  The right ventricular systolic function is mildly reduced.  There is severe biatrial enlargement.  There is mild (1+) mitral regurgitation.    ASSESSMENT AND PLAN:    1.  Heart failure with preserved EF, with hyperdynamic LV function.  Fortunately, this indicates that his coronary disease has not likely worsened.  He currently has class II to class III symptoms and is limited more by his knee pain than by breathing.  At this point we discussed risks and benefits of newer medications that are available and we elected to start him on Jardiance 10 mg a day knowing this reduces his risk of heart failure, heart attack, stroke and hospitalization.  Given he is going to be on this will decrease his torsemide back to his previous dosing which is 20 mg a day except for Monday Wednesday Friday when he will take 40 mg daily.  If need be we can cut that back even further.  He will remain on his other cardiac medications.    2.  Coronary artery disease with a drug-eluting stent to the LAD and D2 in 2019 with residual 70% circumflex lesion and 90% proximal RCA with left-to-right collaterals.  He is without anginal symptoms but cannot do 4  METS.  Is planning to have a knee replacement this fall and I suspect they are going to want a stress test before that so we will arrange a Lexiscan stress test prior to that surgery.  He is otherwise appropriately on aspirin statin and beta-blocker.      3.  Dyslipidemia on Lipitor 80 daily still above goal with LDL 97 HDL 39 total cholesterol 183 type glycerides 234.  At this point we will need to discontinue Lipitor and switch him to Crestor 40 and repeat a lipid panel and ALT in 3 months.  If he is not at goal at that time we will consider adding Zetia versus PSK 9 inhibitor.    4.  Hypertension well-controlled    5.  History of syncope with loop recorder in place showing chronic A-fib and bradycardia with multiple pauses in 3 to 4-second range.  He has been completely asymptomatic and typically in A-fib we will allow for pauses up to 5 seconds before putting in an device for an asymptomatic patient.  That being said, it is suggestive of sleep apnea and the patient is agreeable to get a sleep study.  If he does have sleep apnea I reviewed this would help with blood pressure control edema and overall wellbeing.    6.  Chronic A-fib appropriately on Xarelto    7.  Mild aortic stenosis last echocardiogram done yesterday does not show significant stenosis.    8.  Arthritis of the knee, with patient planning on knee replacement later this fall, and get appropriate testing to see if we can be proactive with this given how difficult it is to get into clinic visits.    I will plan on seeing Mykel back in September with a lipid panel BMP BNP and Lexiscan stress test before that visit.  We do expect the stress test being mildly abnormal with potential inferior ischemia if it is outside of that we will consider additional work-up like angiogram.  Thank you for allowing me to participate in this delightful patient's care.    This patient is overall doing well and we have [facility].  He will follow up  with Dr. Hidalgo in 6 months, sooner with concerns.    Melvi Montaño PA-C    This note was completed using Dragon voice recognition system.  There are inherent errors in the system and there may be misspelled names or nonsensical words.    Orders this Visit:  No orders of the defined types were placed in this encounter.    No orders of the defined types were placed in this encounter.    There are no discontinued medications.      No diagnosis found.    CURRENT MEDICATIONS:  Current Outpatient Medications   Medication Sig Dispense Refill    allopurinol (ZYLOPRIM) 300 MG tablet TAKE 1 TABLET DAILY 90 tablet 3    amLODIPine (NORVASC) 10 MG tablet Take 1 tablet (10 mg) by mouth daily 90 tablet 3    aspirin (ASA) 81 MG EC tablet Take 1 tablet (81 mg) by mouth daily 90 tablet 3    atorvastatin (LIPITOR) 80 MG tablet Take 1 tablet (80 mg) by mouth daily 90 tablet 3    carvedilol (COREG) 6.25 MG tablet Take 1 tablet (6.25 mg) by mouth 2 times daily (with meals) 180 tablet 3    hydrALAZINE (APRESOLINE) 100 MG tablet Take 1 tablet (100 mg) by mouth 3 times daily 90 tablet 3    isosorbide mononitrate CR (IMDUR) 120 MG 24 HR ER tablet Take 1 tablet (120 mg) by mouth daily 90 tablet 3    losartan (COZAAR) 50 MG tablet Take 1 tablet (50 mg) by mouth daily 90 tablet 3    rivaroxaban ANTICOAGULANT (XARELTO) 20 MG TABS tablet Take 1 tablet (20 mg) by mouth daily (with dinner) 90 tablet 3    torsemide (DEMADEX) 20 MG tablet Take 1 tablet (20 mg) Monday, Wednesday, Friday and take 2 tablets (40 mg) other days by mouth (Patient taking differently: Take 1 tablet (20 mg) Monday, Wednesday, Friday) 135 tablet 1       ALLERGIES     Allergies   Allergen Reactions    Ace Inhibitors Anaphylaxis     Edema of ace    Eliquis [Apixaban] Other (See Comments)     Facial numbness       PAST MEDICAL HISTORY:  Past Medical History:   Diagnosis Date    Acute diastolic congestive heart failure (H) 06/2019    hosp fsd, then fu echo ef nl    ASCVD  (arteriosclerotic cardiovascular disease) 1997    angio with 40% circ lesion; 6/19 hosp for chf, 3 vessel dz on angio but porcelin aorta so ptca done    Atrial fibrillation (H) 10/09/2018    found on routine physical    Carotid artery plaque 2005    seen on us, about 50% stenosis, fu 2009 us no significant stenosis    Elevated blood sugar     Gout     on allopurinol    HTN (hypertension)     Hypercholesteremia     Hyponatremia 2015    felt due to chlorthalidone    Low mean corpuscular volume (MCV)     Near syncope 05/2015    fu est echo low level but neg    Psoriasis     Dr. Marti    Renal mass 06/29/2019    seen on ct chest for sob, needs fu ct for that, fu us done 7/19 and no mass seen, should have fu ct in December, had fu us 3/22 and no fu needed    Screening 2012    abd us no aaa    Syncope 09/2019    hosp fsd, cause not clear, lowered coreg dose; seen by Dr. Lezama of ep and ep study neg, implanted event monitor    V-tach (H) 09/2019    seen on event monitor for fu syncope, then ep study and no inducible vtach       PAST SURGICAL HISTORY:  Past Surgical History:   Procedure Laterality Date    CATARACT EXTRACTION  03/2022    CATARACT EXTRACTION  2022    CV CORONARY ANGIOGRAM N/A 07/02/2019    Procedure: Coronary Angiogram;  Surgeon: Donaldo Loera MD;  Location:  HEART CARDIAC CATH LAB    CV HEART CATHETERIZATION WITH POSSIBLE INTERVENTION N/A 07/05/2019    Procedure: Heart Catheterization with Possible Intervention;  Surgeon: Manolo Hu MD;  Location:  HEART CARDIAC CATH LAB    CV LEFT HEART CATH N/A 07/02/2019    Procedure: Left Heart Cath;  Surgeon: Donaldo Loera MD;  Location:  HEART CARDIAC CATH LAB    CV LEFT VENTRICULOGRAM N/A 07/02/2019    Procedure: Left Ventriculogram;  Surgeon: Donaldo Loera MD;  Location:  HEART CARDIAC CATH LAB    CV PCI STENT DRUG ELUTING N/A 07/05/2019    Procedure: PCI Stent Drug Eluting;  Surgeon: Manolo Hu MD;  Location:   HEART CARDIAC CATH LAB    CV RIGHT HEART CATH MEASUREMENTS RECORDED N/A 2019    Procedure: Right Heart Cath;  Surgeon: Donaldo Loera MD;  Location:  HEART CARDIAC CATH LAB    EP LOOP RECORDER IMPLANT N/A 10/11/2019    Procedure: EP Loop Recorder Implant;  Surgeon: Fuad Lezama MD;  Location:  HEART CARDIAC CATH LAB    EP RIGHT VENTRICULAR RECORDING N/A 10/11/2019    Procedure: EP Ventricular Pacing;  Surgeon: Fuad Lezama MD;  Location:  HEART CARDIAC CATH LAB    ZZC ANESTH,DX ARTHROSCOPIC PROC KNEE JOINT  2009       FAMILY HISTORY:  Family History   Problem Relation Age of Onset    Coronary Artery Disease Father     Medical History Unknown Mother     Medical History Unknown Maternal Grandfather        SOCIAL HISTORY:  Social History     Socioeconomic History    Marital status:     Number of children: 2   Occupational History    Occupation: retired   Tobacco Use    Smoking status: Former     Packs/day: 1.00     Years: 30.00     Pack years: 30.00     Types: Cigars, Cigarettes     Quit date: 1998     Years since quittin.7    Smokeless tobacco: Never   Substance and Sexual Activity    Alcohol use: Yes     Comment: 2 drinks per week    Drug use: No    Sexual activity: Not Currently     Partners: Female       Review of Systems:  Skin:        Eyes:       ENT:       Respiratory:       Cardiovascular:       Gastroenterology:      Genitourinary:       Musculoskeletal:       Neurologic:       Psychiatric:       Heme/Lymph/Imm:       Endocrine:           Recent Lab Results: all reviewed today  CBC RESULTS:  Lab Results   Component Value Date    WBC 9.2 2023    WBC 7.4 10/11/2019    RBC 4.69 2023    RBC 4.52 10/11/2019    HGB 12.5 (L) 2023    HGB 11.9 (L) 2021    HCT 40.9 2023    HCT 38.7 (L) 10/11/2019    MCV 87 2023    MCV 86 10/11/2019    MCH 26.7 2023    MCH 27.2 10/11/2019    MCHC 30.6 (L) 2023    MCHC 31.8 10/11/2019    RDW  18.0 (H) 05/05/2023    RDW 16.6 (H) 10/11/2019     05/05/2023     10/11/2019       BMP RESULTS:  Lab Results   Component Value Date     06/15/2023     05/28/2021    POTASSIUM 4.0 06/15/2023    POTASSIUM 3.6 07/08/2022    POTASSIUM 3.9 05/28/2021    CHLORIDE 105 06/15/2023    CHLORIDE 106 07/08/2022    CHLORIDE 106 05/28/2021    CO2 22 06/15/2023    CO2 26 07/08/2022    CO2 27 05/28/2021    ANIONGAP 13 06/15/2023    ANIONGAP 7 07/08/2022    ANIONGAP 7 05/28/2021     (H) 06/15/2023     (H) 07/08/2022     (H) 05/28/2021    BUN 31.2 (H) 06/15/2023    BUN 23 07/08/2022    BUN 44 (H) 05/28/2021    CR 1.55 (H) 06/15/2023    CR 1.55 (H) 05/28/2021    GFRESTIMATED 46 (L) 06/15/2023    GFRESTIMATED 43 (L) 05/28/2021    GFRESTBLACK 50 (L) 05/28/2021    GUNNER 9.2 06/15/2023    GUNNER 8.7 05/28/2021        LIPID RESULTS:  Lab Results   Component Value Date    CHOL 183 01/05/2023    CHOL 194 02/16/2021     Lab Results   Component Value Date    HDL 39 01/05/2023    HDL 41 02/16/2021     Lab Results   Component Value Date    LDL 97 01/05/2023     02/16/2021     Lab Results   Component Value Date    TRIG 234 01/05/2023    TRIG 193 02/16/2021     Lab Results   Component Value Date    CHOLHDLRATIO 2.8 02/26/2015        INR RESULTS:  Lab Results   Component Value Date    INR 1.18 (H) 09/14/2019    INR 2.33 (H) 07/18/2019           CC  Mendez Hidalgo MD  [address] [facility] [address]  [place] [address]            Thank you for allowing me to participate in the care of your patient.      Sincerely,     GUS Flanagan [facility]

## 2023-07-26 ENCOUNTER — HOSPITAL ENCOUNTER (EMERGENCY)
Facility: CLINIC | Age: 78
Discharge: HOME | End: 2023-07-26
Attending: EMERGENCY MEDICINE | Admitting: EMERGENCY MEDICINE
Payer: COMMERCIAL

## 2023-07-26 ENCOUNTER — APPOINTMENT (OUTPATIENT)
Dept: GENERAL RADIOLOGY | Facility: CLINIC | Age: 78
End: 2023-07-26
Attending: EMERGENCY MEDICINE
Payer: COMMERCIAL

## 2023-07-26 VITALS
RESPIRATION RATE: 23 BRPM | TEMPERATURE: 98.1 F | OXYGEN SATURATION: 97 % | DIASTOLIC BLOOD PRESSURE: 98 MMHG | HEART RATE: 51 BPM | SYSTOLIC BLOOD PRESSURE: 199 MMHG

## 2023-07-26 DIAGNOSIS — M25.511 ACUTE PAIN OF RIGHT SHOULDER: ICD-10-CM

## 2023-07-26 DIAGNOSIS — I48.20 CHRONIC A-FIB (H): ICD-10-CM

## 2023-07-26 DIAGNOSIS — I10 HYPERTENSION, UNSPECIFIED TYPE: ICD-10-CM

## 2023-07-26 LAB
ALBUMIN SERPL BCG-MCNC: 4.4 G/DL (ref 3.5–5.2)
ALP SERPL-CCNC: 108 U/L (ref 40–129)
ALT SERPL W P-5'-P-CCNC: 10 U/L (ref 0–70)
ANION GAP SERPL CALCULATED.3IONS-SCNC: 14 MMOL/L (ref 7–15)
AST SERPL W P-5'-P-CCNC: 15 U/L (ref 0–45)
ATRIAL RATE - MUSE: NORMAL BPM
BASOPHILS # BLD AUTO: 0.1 10E3/UL (ref 0–0.2)
BASOPHILS NFR BLD AUTO: 1 %
BILIRUB SERPL-MCNC: 0.5 MG/DL
BUN SERPL-MCNC: 14.6 MG/DL (ref 8–23)
CALCIUM SERPL-MCNC: 9.5 MG/DL (ref 8.8–10.2)
CHLORIDE SERPL-SCNC: 103 MMOL/L (ref 98–107)
CREAT SERPL-MCNC: 1.23 MG/DL (ref 0.67–1.17)
DEPRECATED HCO3 PLAS-SCNC: 23 MMOL/L (ref 22–29)
DIASTOLIC BLOOD PRESSURE - MUSE: NORMAL MMHG
EOSINOPHIL # BLD AUTO: 0 10E3/UL (ref 0–0.7)
EOSINOPHIL NFR BLD AUTO: 0 %
ERYTHROCYTE [DISTWIDTH] IN BLOOD BY AUTOMATED COUNT: 17.9 % (ref 10–15)
GFR SERPL CREATININE-BSD FRML MDRD: 60 ML/MIN/1.73M2
GLUCOSE SERPL-MCNC: 103 MG/DL (ref 70–99)
HCT VFR BLD AUTO: 48.5 % (ref 40–53)
HGB BLD-MCNC: 15.2 G/DL (ref 13.3–17.7)
HOLD SPECIMEN: NORMAL
IMM GRANULOCYTES # BLD: 0 10E3/UL
IMM GRANULOCYTES NFR BLD: 1 %
INTERPRETATION ECG - MUSE: NORMAL
LYMPHOCYTES # BLD AUTO: 1.1 10E3/UL (ref 0.8–5.3)
LYMPHOCYTES NFR BLD AUTO: 17 %
MCH RBC QN AUTO: 27.6 PG (ref 26.5–33)
MCHC RBC AUTO-ENTMCNC: 31.3 G/DL (ref 31.5–36.5)
MCV RBC AUTO: 88 FL (ref 78–100)
MONOCYTES # BLD AUTO: 0.7 10E3/UL (ref 0–1.3)
MONOCYTES NFR BLD AUTO: 10 %
NEUTROPHILS # BLD AUTO: 4.6 10E3/UL (ref 1.6–8.3)
NEUTROPHILS NFR BLD AUTO: 71 %
NRBC # BLD AUTO: 0 10E3/UL
NRBC BLD AUTO-RTO: 0 /100
P AXIS - MUSE: NORMAL DEGREES
PLATELET # BLD AUTO: 291 10E3/UL (ref 150–450)
POTASSIUM SERPL-SCNC: 3.9 MMOL/L (ref 3.4–5.3)
PR INTERVAL - MUSE: NORMAL MS
PROT SERPL-MCNC: 7.6 G/DL (ref 6.4–8.3)
QRS DURATION - MUSE: 94 MS
QT - MUSE: 380 MS
QTC - MUSE: 404 MS
R AXIS - MUSE: 4 DEGREES
RBC # BLD AUTO: 5.5 10E6/UL (ref 4.4–5.9)
SODIUM SERPL-SCNC: 140 MMOL/L (ref 136–145)
SYSTOLIC BLOOD PRESSURE - MUSE: NORMAL MMHG
T AXIS - MUSE: 49 DEGREES
TROPONIN T SERPL HS-MCNC: 20 NG/L
TROPONIN T SERPL HS-MCNC: 21 NG/L
VENTRICULAR RATE- MUSE: 68 BPM
WBC # BLD AUTO: 6.5 10E3/UL (ref 4–11)

## 2023-07-26 PROCEDURE — 71046 X-RAY EXAM CHEST 2 VIEWS: CPT

## 2023-07-26 PROCEDURE — 36415 COLL VENOUS BLD VENIPUNCTURE: CPT | Performed by: EMERGENCY MEDICINE

## 2023-07-26 PROCEDURE — 73030 X-RAY EXAM OF SHOULDER: CPT | Mod: RT

## 2023-07-26 PROCEDURE — 80053 COMPREHEN METABOLIC PANEL: CPT | Performed by: EMERGENCY MEDICINE

## 2023-07-26 PROCEDURE — 93005 ELECTROCARDIOGRAM TRACING: CPT

## 2023-07-26 PROCEDURE — 250N000009 HC RX 250: Performed by: EMERGENCY MEDICINE

## 2023-07-26 PROCEDURE — 250N000013 HC RX MED GY IP 250 OP 250 PS 637: Performed by: EMERGENCY MEDICINE

## 2023-07-26 PROCEDURE — 85025 COMPLETE CBC W/AUTO DIFF WBC: CPT | Performed by: EMERGENCY MEDICINE

## 2023-07-26 PROCEDURE — 96374 THER/PROPH/DIAG INJ IV PUSH: CPT

## 2023-07-26 PROCEDURE — 99285 EMERGENCY DEPT VISIT HI MDM: CPT | Mod: 25

## 2023-07-26 PROCEDURE — 84484 ASSAY OF TROPONIN QUANT: CPT | Performed by: EMERGENCY MEDICINE

## 2023-07-26 RX ORDER — METOPROLOL TARTRATE 25 MG/1
50 TABLET, FILM COATED ORAL ONCE
Status: DISCONTINUED | OUTPATIENT
Start: 2023-07-26 | End: 2023-07-27 | Stop reason: HOSPADM

## 2023-07-26 RX ORDER — TORSEMIDE 20 MG/1
40 TABLET ORAL ONCE
Status: COMPLETED | OUTPATIENT
Start: 2023-07-26 | End: 2023-07-26

## 2023-07-26 RX ORDER — METOPROLOL TARTRATE 1 MG/ML
5 INJECTION, SOLUTION INTRAVENOUS ONCE
Status: COMPLETED | OUTPATIENT
Start: 2023-07-26 | End: 2023-07-26

## 2023-07-26 RX ADMIN — METOPROLOL TARTRATE 5 MG: 5 INJECTION INTRAVENOUS at 20:37

## 2023-07-26 RX ADMIN — TORSEMIDE 40 MG: 20 TABLET ORAL at 21:05

## 2023-07-26 ASSESSMENT — ACTIVITIES OF DAILY LIVING (ADL)
ADLS_ACUITY_SCORE: 35
ADLS_ACUITY_SCORE: 35

## 2023-07-26 NOTE — ED NOTES
Bed: ED15  Expected date:   Expected time:   Means of arrival:   Comments:  [address] 77 M shoulder pain afib cardiac hx eta [id number]

## 2023-07-26 NOTE — ED TRIAGE NOTES
Pt presents via EMS for eval of right shoulder pain. Pt reports he was at car dealership, grabbed for something and had right shoulder pain. Pain in shoulder lasted less than 10 seconds. Pt has hx of MI with stent placement in 2019.      Triage Assessment       Row Name 07/26/23 3298       Triage Assessment (Adult)    Airway WDL WDL       Peripheral/Neurovascular WDL    Peripheral Neurovascular WDL WDL       Cognitive/Neuro/Behavioral WDL    Cognitive/Neuro/Behavioral WDL WDL
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
## 2023-07-28 ENCOUNTER — PATIENT OUTREACH (OUTPATIENT)
Dept: CARE COORDINATION | Facility: CLINIC | Age: 78
End: 2023-07-28
Payer: COMMERCIAL

## 2023-07-28 NOTE — LETTER
Betito Anderson  [address] [facility] [address] [address]    Dear Betito Anderson,      I am a team member within the [facility] with [facility]. I recently tried to reach you to ensure you were doing well following a recent visit within our health system. I also wanted to take this chance to introduce Clinic Care Coordination.     Below is a description of Clinic Care Coordination and how this team can further assist you:       The Clinic Care Coordination team is made up of a Registered Nurse, , Financial Resource Worker, and a Community Health Worker who understand and can help navigate the health care system. The goal of clinic care coordination is to help you manage your health, improve access to care, and achieve optimal health outcomes. They work alongside your provider to assist you in determining your health and social needs, obtain health care and community resources, and provide you with necessary information and education. Clinic Care Coordination can work with you through any barriers and develop a care plan that helps coordinate and strengthen the relationship between you and your care team.    If you wish to connect with the Clinic Care Coordination Team, please let your [facility] Primary Care Provider or Clinic Care Team know and they can place a referral. The Clinic Care Coordination team will then reach out by phone to further support you.    We are focused on providing you with the highest-quality healthcare experience possible.    Sincerely,   Your care team with [facility]

## 2023-07-28 NOTE — PROGRESS NOTES
[facility] Contact  [facility]/Voicemail     Clinical Data: Care Coordination ED-sourced Outreach-     Outreach attempted x 2.  Left message on patient's voicemail, providing [facility]'s 24/7 scheduling and nurse triage phone number [phone] ([phone]) for questions/concerns and/or to schedule an appt with an [facility] provider.      Care Coordination introduction letter with explanation of Clinic Care Coordination services sent to patient via [employer]. Clinic Care Coordination services remain available via referral if needed.    Plan: [facility] will do no further outreaches at this time.       MONICA Puga  Connected Care Resource [place], [facility]    *Connected Care Resource Team does NOT follow patient ongoing. Referrals are identified based on internal discharge reports and the outreach is to ensure patient has an understanding of their discharge instructions.

## 2023-08-29 ENCOUNTER — OFFICE VISIT (OUTPATIENT)
Dept: FAMILY MEDICINE | Facility: CLINIC | Age: 78
End: 2023-08-29
Payer: COMMERCIAL

## 2023-08-29 VITALS
HEART RATE: 64 BPM | WEIGHT: 216 LBS | HEIGHT: 67 IN | RESPIRATION RATE: 16 BRPM | OXYGEN SATURATION: 96 % | SYSTOLIC BLOOD PRESSURE: 128 MMHG | DIASTOLIC BLOOD PRESSURE: 67 MMHG | TEMPERATURE: 97.7 F | BODY MASS INDEX: 33.9 KG/M2

## 2023-08-29 DIAGNOSIS — I48.11 LONGSTANDING PERSISTENT ATRIAL FIBRILLATION (H): ICD-10-CM

## 2023-08-29 DIAGNOSIS — M25.511 RIGHT SHOULDER PAIN, UNSPECIFIED CHRONICITY: Primary | ICD-10-CM

## 2023-08-29 DIAGNOSIS — I50.31 ACUTE DIASTOLIC CONGESTIVE HEART FAILURE (H): ICD-10-CM

## 2023-08-29 DIAGNOSIS — I10 BENIGN ESSENTIAL HYPERTENSION: ICD-10-CM

## 2023-08-29 DIAGNOSIS — I25.10 ASCVD (ARTERIOSCLEROTIC CARDIOVASCULAR DISEASE): ICD-10-CM

## 2023-08-29 PROBLEM — J44.9 COPD (CHRONIC OBSTRUCTIVE PULMONARY DISEASE) (H): Status: RESOLVED | Noted: 2019-07-25 | Resolved: 2023-08-29

## 2023-08-29 PROCEDURE — 99214 OFFICE O/P EST MOD 30 MIN: CPT | Performed by: INTERNAL MEDICINE

## 2023-08-29 RX ORDER — AMLODIPINE BESYLATE 5 MG/1
5 TABLET ORAL DAILY
Qty: 90 TABLET | Refills: 3 | Status: SHIPPED | OUTPATIENT
Start: 2023-08-29 | End: 2024-01-29

## 2023-08-29 RX ORDER — LOSARTAN POTASSIUM 100 MG/1
100 TABLET ORAL DAILY
Qty: 90 TABLET | Refills: 3 | Status: SHIPPED | OUTPATIENT
Start: 2023-08-29 | End: 2024-01-29

## 2023-08-29 ASSESSMENT — PAIN SCALES - GENERAL: PAINLEVEL: NO PAIN (0)

## 2023-08-29 NOTE — PATIENT INSTRUCTIONS
I believe in part the medicines are causing some leg swelling.  Due to this let's change the dose of your amlodipine to 5mg once a day and the dose of your losartan to 100mg once a day.    Rudy Vernon M.D.
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
## 2023-08-31 ENCOUNTER — TELEPHONE (OUTPATIENT)
Dept: PHARMACY | Facility: OTHER | Age: 78
End: 2023-08-31
Payer: COMMERCIAL

## 2023-08-31 NOTE — TELEPHONE ENCOUNTER
MTM referral from: Patient's insurance     MTM referral outreach attempt #1 on August 31, 2023      Outcome: declines MTM services, requests to be opted out      Yael Roberson PharmD   Medication Therapy Management

## 2023-09-13 NOTE — PROGRESS NOTES
[facility]    Medicine Progress Note - Hospitalist Service       Date of Admission:  6/29/2019  Assessment & Plan    Betito Anderson is a 73-year-old male with alcohol use disorder, hypertension, ASCVD, and chronic atrial fibrillation who presented with progressive dyspnea and lower extremity as well as new shortness of breath at rest and heart palpitations, found to be in atrial fibrillation with controlled rate and acute heart failure.     Acute systolic heart failure.   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  Also noted on that echo was moderate aortic valve stenosis.  Additionally, the patient is a moderate drinker (see below).  He appears to have uncontrolled hypertension.  Additionally, he has history of a 40% lesion, circumflex based off of angiogram in 1997.  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  Treated with Lasix 40 mg IV in the ED.  BNP 24,400.  EKG without overt ischemic changes, does show atrial fibrillation with controlled rate.  Chest x-ray shows right-sided pleural effusion with no overt pulmonary vascular congestion.   - TTE 6/30 EF 35-40%, mild to mod global hypokinesia  - telemetry, I/O, weights   - Trended serial troponin. 0.029--> 0.038 --> 0.040    - net -3,175L, current weight 197, 204 on admission  - Continue IV Lasix 40 mg now on T.I.D. (Kcl added)  - Continue PTA ARB, statin. PTA beta blocker held  - A1c 5.7%, LDL 88  - Appreciate Cardiology consultation (entresto and spirono considered)  - Angiography 7/2  -   POST CATH ORDERS per cardiology, I.e. Heparin gtt etc.     Chronic atrial fibrillation with controlled rate.   Appears diagnosed in fall 2018 and  "was recommended increased dose of metoprolol as well as apixaban.  He did not start the apixaban until approximately 3 months later when his insurance changed and then only used it for 2 weeks after developing facial numbness, \"like clockwork\" around noon.   - CHADS-VASc score is at least 3 for age, CHF and hypertension.  He is agreeable to trying a different anticoagulant agent.   - PTA metop stopped     ASCVD.   Noted to have 40% lesion in the circumflex on angiogram in 1997.  Again, TTE in 11/2018, showed hypokinesia of the basal inferior and inferolateral wall.  The patient went for stress testing, but failed it due to atrial fibrillation and then appears lost to followup.   - Screening labs as above and trending serial troponins as above.     Hypertension.   Uncontrolled.  The patient states mostly compliant, missing usually 1 day per week and he does state that he essentially stopped taking his amlodipine as he thinks that it exacerbates his lower extremity swelling.  SBPs are uncontrolled, 180s-220s systolic.   - Continue PTA losartan 100 mg, metoprolol succinate 50 mg daily.    - P.r.n. hydralazine is available for SBP greater than 160.   - Continue PTA simvastatin.     Concern of pneumonia.   Afebrile and no other URI symptoms aside from a productive cough times the last 2 days that he feels gets stuck in his throat and may very well be related to his heart failure.  Has leukocytosis of 16.6, which could be reactive in the setting above.  Chest x-ray showed a right pleural effusion with atelectasis versus infiltrate.   - Procalc 0.05 on admission, 0.30  - continues to be afebrile, WBC normal, continue to monitor (not on abx)  - recheck procalc this evening/ tomorrow AM  - low threshold to start Abx    Alcohol use disorder.   Consumes 3-4 alcoholic beverages daily.   - Discussed moderation of alcohol with recommendation for no more than 3 per sitting or 14 per week and explained it can contribute to atrial " fibrillation as well as heart failure.  Has no history of withdrawal and his last drink was on the evening prior to admission.   - Monitor CIWAs without benzos for time being.       Diet: Low Saturated Fat Na <2400 mg  NPO for Medical/Clinical Reasons Except for: Meds    DVT Prophylaxis: heparin gtt   Morales Catheter: not present  Code Status: Full Code      Disposition Plan   Expected discharge: pending cath results today.  Entered: Johan Colón MD 07/02/2019, 3:29 PM       The patient's care was discussed with the Bedside Nurse, Patient and Cardiology team.    Johan Colón MD  Hospitalist Service  [facility]    ______________________________________________________________________    Interval History   No new events reported overnight besides an asymptomatic 2.2sec pause on telemetry.    Data reviewed today: I reviewed all medications, new labs and imaging results over the last 24 hours. I personally reviewed no images or EKG's today.    Physical Exam   Vital Signs: Temp: 98.1  F (36.7  C) Temp src: Oral BP: 150/88 Pulse: 77 Heart Rate: 68 Resp: 18 SpO2: 96 % O2 Device: Nasal cannula Oxygen Delivery: 3 LPM  Weight: 197 lbs 12.8 oz    Gen: NAD, pleasant  HEENT: Normocephalic, EOMI, MMM  Resp: coarse bilaterally, scattered wheezes, clearer than prior days overall, no increased work of resp  CV: S1S2 heard, irreg irreg rhythm, reg rate, trace pitting pedal edema  Abdo: soft, nontender, nondistended, bowel sounds present  Ext: calves nontender, well perfused  Neuro: AAOx3, CN grossly intact, no facial asymmetry      Data   Recent Labs   Lab 07/02/19  0536 07/01/19  1051 07/01/19  0817 07/01/19  0534 06/30/19  1540 06/30/19  0640 06/30/19  0042 06/29/19  1859   WBC  --  6.3  --  7.3  --  13.6*  --  16.6*   HGB  --  15.3  --  14.3  --  14.9  --  14.8   MCV  --  86  --  85  --  85  --  86   PLT  --  222  --  213  --  262  --  304     --   --   --   --  135  --  138   POTASSIUM 3.4  --  3.5   --  4.1 3.2*  --  3.4   CHLORIDE 97  --   --   --   --  98  --  103   CO2 36*  --   --   --   --  30  --  27   BUN 24  --   --   --   --  19  --  20   CR 1.19  --   --   --   --  1.07  --  1.17   ANIONGAP 2*  --   --   --   --  7  --  8   GUNNER 8.2*  --   --   --   --  8.5  --  8.4*   GLC 98  --   --   --   --  110*  --  132*   ALBUMIN  --   --   --   --   --  3.3*  --   --    PROTTOTAL  --   --   --   --   --  7.5  --   --    BILITOTAL  --   --   --   --   --  1.3  --   --    ALKPHOS  --   --   --   --   --  96  --   --    ALT  --   --   --   --   --  22  --   --    AST  --   --   --   --   --  21  --   --    TROPI  --   --   --   --   --  0.040 0.038 0.029     No results found for this or any previous visit (from the past 24 hour(s)).   --

## 2023-10-09 ENCOUNTER — TRANSFERRED RECORDS (OUTPATIENT)
Dept: HEALTH INFORMATION MANAGEMENT | Facility: CLINIC | Age: 78
End: 2023-10-09
Payer: COMMERCIAL

## 2023-10-10 ENCOUNTER — TELEPHONE (OUTPATIENT)
Dept: CARDIOLOGY | Facility: CLINIC | Age: 78
End: 2023-10-10
Payer: COMMERCIAL

## 2023-10-10 DIAGNOSIS — I50.30 HEART FAILURE WITH PRESERVED EJECTION FRACTION, UNSPECIFIED HF CHRONICITY (H): ICD-10-CM

## 2023-10-10 NOTE — TELEPHONE ENCOUNTER
[facility] Call Center    Phone Message    May a detailed message be left on voicemail: yes     Reason for Call: Medication Refill Request    Has the patient contacted the pharmacy for the refill? Yes   Name of medication being requested: Zachariah 15 pills request   Provider who prescribed the medication: Gwen  Pharmacy:    [facility] [place]   Date medication is needed: [id number]       Action Taken: Other: cardio    Travel Screening: Not Applicable

## 2023-10-10 NOTE — TELEPHONE ENCOUNTER
REFILL REQUEST    [facility] Cardiology Refill Guideline reviewed.  Medication meets criteria for refill.    10/10/23  PM Call was routed to writer to address. Called to patient. Mykel noble normally gets refills through [facility] but will be out of town and needs 15 tablets to get him through the trip as [facility] will not be able to deliver the 90 day supply on time for his trip.    Patient reports testing is delayed to 30 days prior to knee surgery that was rescheduled to January or February.    Melvi MONTEJO filled for one year at visit 6/16/2023 - this will have to be prescription will need to be resent to [facility] once the local one has been filled. Messaged to team board to do this.  Leda Kaiser RN 10/10/23 12:27 PM

## 2023-10-10 NOTE — TELEPHONE ENCOUNTER
Unclear which pharmacy temporary refill needs to go to.  Called and spoke to Mykel, who clarified pharmacy.  Advised it looks like he is overdue for labs and a follow up visit from September ordered by Melvi MONTEJO.  He says he was supposed to have knee surgery in September, but cancelled it. He says Melvi ordered all of those tests to prepare for the surgery as far as he knows. Advised I would send a message to update her team.

## 2023-10-23 ENCOUNTER — TELEPHONE (OUTPATIENT)
Dept: CARDIOLOGY | Facility: CLINIC | Age: 78
End: 2023-10-23
Payer: COMMERCIAL

## 2023-10-23 DIAGNOSIS — I50.30 HEART FAILURE WITH PRESERVED EJECTION FRACTION, NYHA CLASS I (H): ICD-10-CM

## 2023-10-23 DIAGNOSIS — I50.31 ACUTE DIASTOLIC CONGESTIVE HEART FAILURE (H): Primary | ICD-10-CM

## 2023-10-23 DIAGNOSIS — E78.5 HYPERLIPIDEMIA LDL GOAL <100: ICD-10-CM

## 2023-10-23 DIAGNOSIS — I10 BENIGN ESSENTIAL HYPERTENSION: ICD-10-CM

## 2023-10-23 RX ORDER — TORSEMIDE 20 MG/1
TABLET ORAL
Qty: 45 TABLET | Refills: 0 | Status: SHIPPED | OUTPATIENT
Start: 2023-10-23 | End: 2023-11-28

## 2023-10-23 NOTE — TELEPHONE ENCOUNTER
REFILL REQUEST    [facility] Cardiology Refill Guideline reviewed.  Medication meets criteria for refill.    Leda Kaiser RN 10/23/23 1:40 PM

## 2023-10-23 NOTE — TELEPHONE ENCOUNTER
[facility] Call Center    Phone Message    May a detailed message be left on voicemail: yes     Reason for Call: Medication Refill Request    Has the patient contacted the pharmacy for the refill? Yes   Name of medication being requested: torsemide 30 day as he is unable to get it in a timely manner from [facility]   Provider who prescribed the medication: Gwen  Pharmacy:      [id number] [place] [address] [facility] [address]    Date medication is needed: [id number]       Action Taken: Other: cardio    Travel Screening: Not Applicable

## 2023-11-13 DIAGNOSIS — E78.5 HYPERLIPIDEMIA LDL GOAL <100: ICD-10-CM

## 2023-11-13 DIAGNOSIS — I10 BENIGN ESSENTIAL HYPERTENSION: ICD-10-CM

## 2023-11-13 RX ORDER — HYDRALAZINE HYDROCHLORIDE 100 MG/1
100 TABLET, FILM COATED ORAL 3 TIMES DAILY
Qty: 270 TABLET | Refills: 2 | Status: SHIPPED | OUTPATIENT
Start: 2023-11-13 | End: 2024-01-29

## 2023-11-28 DIAGNOSIS — I50.30 HEART FAILURE WITH PRESERVED EJECTION FRACTION, NYHA CLASS I (H): ICD-10-CM

## 2023-11-28 DIAGNOSIS — I10 BENIGN ESSENTIAL HYPERTENSION: ICD-10-CM

## 2023-11-28 RX ORDER — TORSEMIDE 20 MG/1
TABLET ORAL
Qty: 144 TABLET | Refills: 0 | Status: SHIPPED | OUTPATIENT
Start: 2023-11-28 | End: 2024-03-07

## 2023-11-30 ENCOUNTER — TELEPHONE (OUTPATIENT)
Dept: CARDIOLOGY | Facility: CLINIC | Age: 78
End: 2023-11-30
Payer: COMMERCIAL

## 2023-11-30 DIAGNOSIS — E78.5 HYPERLIPIDEMIA LDL GOAL <100: ICD-10-CM

## 2023-11-30 DIAGNOSIS — I10 BENIGN ESSENTIAL HYPERTENSION: ICD-10-CM

## 2023-11-30 RX ORDER — TORSEMIDE 20 MG/1
TABLET ORAL
Qty: 135 TABLET | Refills: 3 | Status: SHIPPED | OUTPATIENT
Start: 2023-11-30

## 2023-11-30 NOTE — TELEPHONE ENCOUNTER
[facility] Call Center    Phone Message    May a detailed message be left on voicemail: yes     Reason for Call: Medication Refill Request    Has the patient contacted the pharmacy for the refill? Yes   Name of medication being requested: torsemide  Provider who prescribed the medication: Gwen  Pharmacy:        [id number] [place] [address] [facility] [address]    Date medication is needed: asap       Action Taken: Other: cardio    Travel Screening: Not Applicable

## 2023-11-30 NOTE — TELEPHONE ENCOUNTER
"Received refill request for:  Torsemide  Last OV was: 6/16/23  Labs: 7/26/23  Future Appointments   Date Time Provider Department Center   12/5/2023  3:00 PM Janel Dodson MD [facility]   1/29/2024 10:00 AM Rudy Vernon MD [facility]     New script sent to: [facility] Cardiology Refill Guideline reviewed.  Medication meets criteria for refill.     -Pt is due for follow-up with Dr Hidalgo in December 2023.     -Echocardiogram on 6/15/23 shows EF of >70%.    Given above information, Betito meets criteria for general cardiology as patients EF is >40%.  Transitioning all future appts to be w/ general cardiology team as they no longer meet CORE criteria.  New orders have been placed this visit.     Per GUSTABO Marino's note    \"This patient is overall doing well and we have [facility].  He will follow up with Dr. Hidalgo in 6 months, sooner with concerns.     Melvi Montaño PA-C\"    -CORE flag removed, Snapshot updated.     Yancy Eaton RN BAN   12:10 PM 11/30/2023    CORE nurse line M-F 8a-4p: [phone]    "

## 2023-12-05 ENCOUNTER — OFFICE VISIT (OUTPATIENT)
Dept: FAMILY MEDICINE | Facility: CLINIC | Age: 78
End: 2023-12-05
Payer: COMMERCIAL

## 2023-12-05 DIAGNOSIS — L57.8 ACTINIC SKIN DAMAGE: ICD-10-CM

## 2023-12-05 DIAGNOSIS — D49.2 NEOPLASM OF SKIN: Primary | ICD-10-CM

## 2023-12-05 DIAGNOSIS — L21.9 SEBORRHEIC DERMATITIS: ICD-10-CM

## 2023-12-05 DIAGNOSIS — L98.9 SKIN LESION: ICD-10-CM

## 2023-12-05 PROCEDURE — 88305 TISSUE EXAM BY PATHOLOGIST: CPT | Performed by: DERMATOLOGY

## 2023-12-05 PROCEDURE — 11102 TANGNTL BX SKIN SINGLE LES: CPT | Performed by: DERMATOLOGY

## 2023-12-05 PROCEDURE — 99204 OFFICE O/P NEW MOD 45 MIN: CPT | Mod: 25 | Performed by: DERMATOLOGY

## 2023-12-05 RX ORDER — CALCIPOTRIENE 50 UG/G
CREAM TOPICAL
Qty: 60 G | Refills: 1 | Status: SHIPPED | OUTPATIENT
Start: 2023-12-05

## 2023-12-05 RX ORDER — FLUOROURACIL 50 MG/G
CREAM TOPICAL
Qty: 40 G | Refills: 0 | Status: SHIPPED | OUTPATIENT
Start: 2023-12-05

## 2023-12-05 RX ORDER — KETOCONAZOLE 20 MG/ML
SHAMPOO TOPICAL
Qty: 120 ML | Refills: 11 | Status: SHIPPED | OUTPATIENT
Start: 2023-12-05 | End: 2024-06-04

## 2023-12-05 ASSESSMENT — PAIN SCALES - GENERAL: PAINLEVEL: NO PAIN (0)

## 2023-12-05 NOTE — LETTER
12/5/2023         RE: Betito Anderson  [address]  [place] [address]        Dear Colleague,    Thank you for referring your patient, Betito Anderson, to the [facility]. Please see a copy of my visit note below.    [facility] Dermatology Note  Encounter Date: Dec 5, 2023  Office Visit     Dermatology Problem List:  0. NUB, left cheek, s/p bx 12/05/23  1. Psoriasis, scalp  - current tx: Ketoconazole 2% shampoo, H&S shampoo  2. Actinic damage  - 5FU/C planned 12/2023    ____________________________________________    Assessment & Plan:    # Neoplasm of unspecified behavior of the skin (D49.2) on the left cheek. The differential diagnosis includes BCC.   - Shave biopsy performed today (see procedure note(s) below).    # Actinic skin damage  - Start Efudex + calcipotriene BID x 4-10 days to forehead and right cheek or until skin gets red. Anticipated reaction discussed.    # Psoriasis, scalp, previously more widespread per report but now just on scalp, ddx seborrheic dermatitis.   - using H&S shampoo.  - Start ketoconazole 2% shampoo 3x weekly  - notify if would like topical steroid    Procedures Performed:   - Shave biopsy procedure note, location(s): see above. After discussion of benefits and risks including but not limited to bleeding, infection, scar, incomplete removal, recurrence, and non-diagnostic biopsy, written consent and photographs were obtained. The area was cleaned with isopropyl alcohol. 0.5mL of 1% lidocaine with epinephrine was injected to obtain adequate anesthesia of lesion(s). Shave biopsy at site(s) performed. Hemostasis was achieved with aluminium chloride. Petrolatum ointment and a sterile dressing were applied. The patient tolerated the procedure and no complications were noted. The patient was provided with verbal and written post care instructions.     Follow-up: 6 months, sooner if concerns.     Staff and Scribe:     Scribe  Disclosure:   I, BONNY PEÑA, am serving as a scribe; to document services personally performed by Janel Dodson MD -based on data collection and the provider's statements to me.    Provider Disclosure:   The documentation recorded by the scribe accurately reflects the services I personally performed and the decisions made by me.    Janel Dodson MD    Department of Dermatology  [facility]: Phone: [phone], Fax: [phone]  12/7/2023       ____________________________________________    CC: Derm Problem (C/O spot on left cheek)    HPI:  Mr. Betito Anderson is a(n) 78 year old male who presents today as a new patient for spot check.    The patient presents with a spot on his left cheek that has been present for a couple of years. He denies bleeding and pain.    Patient is otherwise feeling well, without additional skin concerns.    Labs Reviewed:  N/A    Physical Exam:  Vitals: There were no vitals taken for this visit.  SKIN: Focused examination of left cheek and scalp was performed.  - Left cheek, 1.2 cm pearly plaque.  - scaling on scalp  - diffuse grittiness on forehead and right cheek  - No other lesions of concern on areas examined.   - declined FBSE    Medications:  Current Outpatient Medications   Medication     allopurinol (ZYLOPRIM) 300 MG tablet     amLODIPine (NORVASC) 5 MG tablet     aspirin (ASA) 81 MG EC tablet     carvedilol (COREG) 6.25 MG tablet     empagliflozin (JARDIANCE) 10 MG TABS tablet     empagliflozin (JARDIANCE) 10 MG TABS tablet     hydrALAZINE (APRESOLINE) 100 MG tablet     isosorbide mononitrate CR (IMDUR) 120 MG 24 HR ER tablet     losartan (COZAAR) 100 MG tablet     rivaroxaban ANTICOAGULANT (XARELTO) 20 MG TABS tablet     rosuvastatin (CRESTOR) 40 MG tablet     torsemide (DEMADEX) 20 MG tablet     torsemide (DEMADEX) 20 MG tablet     No current facility-administered  medications for this visit.      Past Medical History:   Patient Active Problem List   Diagnosis     ASCVD (arteriosclerotic cardiovascular disease)     Benign essential hypertension     Hyperlipidemia LDL goal <100     Elevated blood sugar     Psoriasis     Non morbid obesity, unspecified obesity type     Gout, unspecified cause, unspecified chronicity, unspecified site     Carotid artery plaque     Hyponatremia     Low mean corpuscular volume (MCV)     Atrial fibrillation (H)     Syncope     V-tach (H)     Chronic kidney disease, stage 3 (H)     Acute diastolic congestive heart failure (H)     Past Medical History:   Diagnosis Date     Acute diastolic congestive heart failure (H) 06/2019    hosp fsd, then fu echo ef nl     ASCVD (arteriosclerotic cardiovascular disease) 1997    angio with 40% circ lesion; 6/19 hosp for chf, 3 vessel dz on angio but porcelin aorta so ptca done     Atrial fibrillation (H) 10/09/2018    found on routine physical     Carotid artery plaque 2005    seen on us, about 50% stenosis, fu 2009 us no significant stenosis     Elevated blood sugar      Gout     on allopurinol     HTN (hypertension)      Hypercholesteremia      Hyponatremia 2015    felt due to chlorthalidone     Low mean corpuscular volume (MCV)      Near syncope 05/2015    fu est echo low level but neg     Psoriasis     Dr. Marti     Renal mass 06/29/2019    seen on ct chest for sob, needs fu ct for that, fu us done 7/19 and no mass seen, should have fu ct in December, had fu us 3/22 and no fu needed     Screening 2012    abd us no aaa     Syncope 09/2019    hosp fsd, cause not clear, lowered coreg dose; seen by Dr. Lezama of ep and ep study neg, implanted event monitor     V-tach (H) 09/2019    seen on event monitor for fu syncope, then ep study and no inducible vtach        CC Daysi Tyler, PA-NIKKIE  [address]  [place] [address] on close of this encounter.      Again, thank you for allowing me to  participate in the care of your patient.        Sincerely,        Janel Dodson MD

## 2023-12-05 NOTE — PATIENT INSTRUCTIONS
For forehead and right cheek, these are low-risk precancers called actinic keratoses. We will treat them with an anti-cancer cream.  Please start Efudex and calcipotriene mixed equally together. Apply twice daily to above areas for 4-10 days or until skin gets red. Stop applying when skin gets red.  Skin will get red and crusty (like hamburger).  Please keep Efudex away from pets and children. Wash hands thoroughly after application.   See handout below for more information about Efudex.    If medications are more than ~$75, please contact me for a compounded version through [email or web address].  Alternatively can also use Efudex alone. If you want to use Efudex alone, apply twice daily for 3-4 weeks. Stop when significant irritation occurs.        Efudex Treatment  Today, you are being prescribed Fluorouracil (Efudex) a topical cream used for the treatment of Actinic Keratosis (AKs).  The medication is working to eliminate the unhealthy cells.  This treatment may be unattractive and somewhat uncomfortable.  You may experience some mild discomfort while being treated.  You will want to stop any other creams such as glycolic acid products, retin A, Tazorac, etc. to the area. You may use bland makeup/cover-up as long as it doesn't sting or cause you discomfort.  Apply the cream at night as your physician recommends. Use a cotton-tipped applicator, or use gloves if applying it with your fingertips. If applied with unprotected fingertips, it is important to wash your hands well after you apply this medicine.   Keep this medication away from pets.  We recommend avoiding excessive sun exposure to the treated area  You may use moisturizing creams over bothersome areas such as Vanicream or Cetaphil cream if the reaction becomes too bothersome. Please, call the clinic if this occurs.   Potential Side Effects  Your treated areas may be unsightly during therapy.  This will improve slowly following the discontinuation of therapy.    During the first week of application, mild inflammation may occur.   During the following weeks, redness, and swelling may occur with some crusting and burning.   Lesions resolve as the skin exfoliates.   Over 1 to 2 weeks, new skin grows into the treatment area.  Keep this medication away from pets  Specific side effects that usually do not require medical attention (report to your doctor or health care professional if they continue or are bothersome) include:  Red or dark-colored skin   Mild erosion (loss of upper layer of skin)   Mild eye irritation including burning, itching, sensitivity, stinging, or watering   Increased sensitivity of the skin to sun and ultraviolet light   Pain and burning of the affected area   Dryness, scaling or swelling of the affected area   Skin rash, itching of the affected area   Tenderness   If you have severe pain, please, call the clinic immediately and indicate that you have pain.  Ask for a call from the RN.     Who should I call with questions?  [facility]: [phone]  [facility]: [phone]  For urgent needs outside of business hours call the [facility] at [phone] and ask for the dermatology resident on call           Wound Care After a Biopsy    What is a skin biopsy?  A skin biopsy allows the doctor to examine a very small piece of tissue under the microscope to determine the diagnosis and the best treatment for the skin condition. A local anesthetic (numbing medicine) is injected with a very small needle into the skin area to be tested. A small piece of skin is taken from the area. Sometimes a suture (stitch) is used.     What are the risks of a skin biopsy?  I will experience scar, bleeding, swelling, pain, crusting and redness. I may experience incomplete removal or recurrence. Risks of this procedure are excessive bleeding, bruising, infection, nerve damage, numbness, thick (hypertrophic or  keloidal) scar and non-diagnostic biopsy.    How should I care for my wound for the first 24 hours?  Keep the wound dry and covered for 24 hours  If it bleeds, hold direct pressure on the area for 15 minutes. If bleeding does not stop, call us or go to the emergency room  Avoid strenuous exercise the first 1-2 days or as your doctor instructs you    How should I care for the wound after 24 hours?  After 24 hours, remove the bandage  You may bathe or shower as normal  If you had a scalp biopsy, you can shampoo as usual and can use shower water to clean the biopsy site daily  Clean the wound once a day with gentle soap and water  Do not scrub, be gentle  Apply white petroleum/Vaseline after cleaning the wound with a cotton swab or a clean finger, and keep the site covered with a Bandaid /bandage. Bandages are not necessary with a scalp biopsy  If you are unable to cover the site with a Bandaid /bandage, re-apply ointment 2-3 times a day to keep the site moist. Moisture will help with healing  Avoid strenuous activity for first 1-2 days  Avoid lakes, rivers, pools, and oceans until the stitches are removed or the site is healed    How do I clean my wound?  Wash hands thoroughly with soap or use hand  before all wound care  Clean the wound with gentle soap and water  Apply white petroleum/Vaseline  to wound after it is clean  Replace the Bandaid /bandage to keep the wound covered for the first few days or as instructed by your doctor  If you had a scalp biopsy, warm shower water to the area on a daily basis should suffice    What should I use to clean my wound?   Cotton-tipped applicators (Qtips )  White petroleum jelly (Vaseline ). Use a clean new container and use Q-tips to apply.  Bandaids  as needed  Gentle soap     How should I care for my wound long term?  Do not get your wound dirty  Keep up with wound care for one week or until the area is healed.  If you have stitches, stitches need to be removed in 14  days. You may return to our clinic for this or you may have it done locally at your doctor s office.  A small scab will form and fall off by itself when the area is completely healed. The area will be red and will become pink in color as it heals. Sun protection is very important for how your scar will turn out. Sunscreen with an SPF 30 or greater is recommended once the area is healed.  You should have some soreness but it should be mild and slowly go away over several days. Talk to your doctor about using tylenol for pain,    When should I call my doctor?  If you have increased:   Pain or swelling  Pus or drainage (clear or slightly yellow drainage is ok)  Temperature over 100F  Spreading redness or warmth around wound    When will I hear about my results?  The biopsy results can take 2 weeks to come back.  Your results will automatically release to [employer] before your provider has even reviewed them.  The clinic will call you with the results, send you a [employer] message, or have you schedule a follow-up clinic or phone time to discuss the results.  Contact our clinics if you do not hear from us in 2 weeks.    Who should I call with questions?  [facility]: [phone]  [facility]: [phone]  For urgent needs outside of business hours call the [facility] at [phone] and ask for the dermatology resident on call       Patient Education       Proper skin care from [facility]:    -Eliminate harsh soaps as they strip the natural oils from the skin, often resulting in dry itchy skin ( i.e. Dial, Zest, [place])  -Use mild soaps such as Cetaphil or Dove Sensitive Skin in the shower. You do not need to use soap on arms, legs, and trunk every time you shower unless visibly soiled.   -Avoid hot or cold showers.  -After showering, lightly dry off and apply moisturizing within 2-3 minutes. This will help trap moisture in the skin.    -Aggressive use of a moisturizer at least 1-2 times a day to the entire body (including -Vanicream, Cetaphil, Aquaphor or Cerave) and moisturize hands after every washing.  -We recommend using moisturizers that come in a tub that needs to be scooped out, not a pump. This has more of an oil base. It will hold moisture in your skin much better than a water base moisturizer. The above recommended are non-pore clogging.      Wear a sunscreen with at least SPF 30 on your face, ears, neck and V of the chest daily. Wear sunscreen on other areas of the body if those areas are exposed to the sun throughout the day. Sunscreens can contain physical and/or chemical blockers. Physical blockers are less likely to clog pores, these include zinc oxide and titanium dioxide. Reapply every two hour and after swimming.     Sunscreen examples: https://www.ewg.org/sunscreen/    UV radiation  UVA radiation remains constant throughout the day and throughout the year. It is a longer wavelength than UVB and therefore penetrates deeper into the skin leading to immediate and delayed tanning, photoaging, and skin cancer. 70-80% of UVA and UVB radiation occurs between the hours of 10am-2pm.  UVB radiation  UVB radiation causes the most harmful effects and is more significant during the summer months. However, snow and ice can reflect UVB radiation leading to skin damage during the winter months as well. UVB radiation is responsible for tanning, burning, inflammation, delayed erythema (pinkness), pigmentation (brown spots), and skin cancer.     I recommend self monthly full body exams and yearly full body exams with a dermatology provider. If you develop a new or changing lesion please follow up for examination. Most skin cancers are pink and scaly or pink and pearly. However, we do see blue/brown/black skin cancers.  Consider the ABCDEs of melanoma when giving yourself your monthly full body exam ( don't forget the groin, buttocks, feet, toes,  etc). A-asymmetry, B-borders, C-color, D-diameter, E-elevation or evolving. If you see any of these changes please follow up in clinic. If you cannot see your back I recommend purchasing a hand held mirror to use with a larger wall mirror.       Checking for Skin Cancer  You can find cancer early by checking your skin each month. There are 3 kinds of skin cancer. They are melanoma, basal cell carcinoma, and squamous cell carcinoma. Doing monthly skin checks is the best way to find new marks or skin changes. Follow the instructions below for checking your skin.   The ABCDEs of checking moles for melanoma   Check your moles or growths for signs of melanoma using ABCDE:   Asymmetry: the sides of the mole or growth don t match  Border: the edges are ragged, notched, or blurred  Color: the color within the mole or growth varies  Diameter: the mole or growth is larger than 6 mm (size of a pencil eraser)  Evolving: the size, shape, or color of the mole or growth is changing (evolving is not shown in the images below)    Checking for other types of skin cancer  Basal cell carcinoma or squamous cell carcinoma have symptoms such as:     A spot or mole that looks different from all other marks on your skin  Changes in how an area feels, such as itching, tenderness, or pain  Changes in the skin's surface, such as oozing, bleeding, or scaliness  A sore that does not heal  New swelling or redness beyond the border of a mole    Who s at risk?  Anyone can get skin cancer. But you are at greater risk if you have:   Fair skin, light-colored hair, or light-colored eyes  Many moles or abnormal moles on your skin  A history of sunburns from sunlight or tanning beds  A family history of skin cancer  A history of exposure to radiation or chemicals  A weakened immune system  If you have had skin cancer in the past, you are at risk for recurring skin cancer.   How to check your skin  Do your monthly skin checkups in front of a full-length  mirror. Check all parts of your body, including your:   Head (ears, face, neck, and scalp)  Torso (front, back, and sides)  Arms (tops, undersides, upper, and lower armpits)  Hands (palms, backs, and fingers, including under the nails)  Buttocks and genitals  Legs (front, back, and sides)  Feet (tops, soles, toes, including under the nails, and between toes)  If you have a lot of moles, take digital photos of them each month. Make sure to take photos both up close and from a distance. These can help you see if any moles change over time.   Most skin changes are not cancer. But if you see any changes in your skin, call your doctor right away. Only he or she can diagnose a problem. If you have skin cancer, seeing your doctor can be the first step toward getting the treatment that could save your life.   [employer] last reviewed this educational content on 4/1/2019 2000-2020 [employer]. [address] [place] [address]. All rights reserved. This information is not intended as a substitute for professional medical care. Always follow your healthcare professional's instructions.       When should I call my doctor?  If you are worsening or not improving, please, contact us or seek urgent care as noted below.     Who should I call with questions (adults)?  [facility] (adult and pediatric): [phone]  [facility] (adult): [phone]  [facility] ([facility] [place]) [phone]  For urgent needs outside of business hours call the [facility] at [phone] and ask for the dermatology resident on call to be paged  If this is a medical emergency and you are unable to reach an ER, Call [phone]      If you need a prescription refill, please contact your pharmacy. Refills are approved or denied by our Physicians during normal business hours, Monday through Fridays  Per office  policy, refills will not be granted if you have not been seen within the past year (or sooner depending on your child's condition)

## 2023-12-05 NOTE — PROGRESS NOTES
[facility] Dermatology Note  Encounter Date: Dec 5, 2023  Office Visit     Dermatology Problem List:  0. NUB, left cheek, s/p bx 12/05/23  1. Psoriasis, scalp  - current tx: Ketoconazole 2% shampoo, H&S shampoo  2. Actinic damage  - 5FU/C planned 12/2023    ____________________________________________    Assessment & Plan:    # Neoplasm of unspecified behavior of the skin (D49.2) on the left cheek. The differential diagnosis includes BCC.   - Shave biopsy performed today (see procedure note(s) below).    # Actinic skin damage  - Start Efudex + calcipotriene BID x 4-10 days to forehead and right cheek or until skin gets red. Anticipated reaction discussed.    # Psoriasis, scalp, previously more widespread per report but now just on scalp, ddx seborrheic dermatitis.   - using H&S shampoo.  - Start ketoconazole 2% shampoo 3x weekly  - notify if would like topical steroid    Procedures Performed:   - Shave biopsy procedure note, location(s): see above. After discussion of benefits and risks including but not limited to bleeding, infection, scar, incomplete removal, recurrence, and non-diagnostic biopsy, written consent and photographs were obtained. The area was cleaned with isopropyl alcohol. 0.5mL of 1% lidocaine with epinephrine was injected to obtain adequate anesthesia of lesion(s). Shave biopsy at site(s) performed. Hemostasis was achieved with aluminium chloride. Petrolatum ointment and a sterile dressing were applied. The patient tolerated the procedure and no complications were noted. The patient was provided with verbal and written post care instructions.     Follow-up: 6 months, sooner if concerns.     Staff and Scribe:     Scribe Disclosure:   BONNY YEBOAH, am serving as a scribe; to document services personally performed by Janel Dodson MD -based on data collection and the provider's statements to me.    Provider Disclosure:   The documentation recorded by the scribe  accurately reflects the services I personally performed and the decisions made by me.    Janel Dodson MD    Department of Dermatology  [facility]: Phone: [phone], Fax: [phone]  12/7/2023       ____________________________________________    CC: Derm Problem (C/O spot on left cheek)    HPI:  Mr. Betito Anderson is a(n) 78 year old male who presents today as a new patient for spot check.    The patient presents with a spot on his left cheek that has been present for a couple of years. He denies bleeding and pain.    Patient is otherwise feeling well, without additional skin concerns.    Labs Reviewed:  N/A    Physical Exam:  Vitals: There were no vitals taken for this visit.  SKIN: Focused examination of left cheek and scalp was performed.  - Left cheek, 1.2 cm pearly plaque.  - scaling on scalp  - diffuse grittiness on forehead and right cheek  - No other lesions of concern on areas examined.   - declined FBSE    Medications:  Current Outpatient Medications   Medication    allopurinol (ZYLOPRIM) 300 MG tablet    amLODIPine (NORVASC) 5 MG tablet    aspirin (ASA) 81 MG EC tablet    carvedilol (COREG) 6.25 MG tablet    empagliflozin (JARDIANCE) 10 MG TABS tablet    empagliflozin (JARDIANCE) 10 MG TABS tablet    hydrALAZINE (APRESOLINE) 100 MG tablet    isosorbide mononitrate CR (IMDUR) 120 MG 24 HR ER tablet    losartan (COZAAR) 100 MG tablet    rivaroxaban ANTICOAGULANT (XARELTO) 20 MG TABS tablet    rosuvastatin (CRESTOR) 40 MG tablet    torsemide (DEMADEX) 20 MG tablet    torsemide (DEMADEX) 20 MG tablet     No current facility-administered medications for this visit.      Past Medical History:   Patient Active Problem List   Diagnosis    ASCVD (arteriosclerotic cardiovascular disease)    Benign essential hypertension    Hyperlipidemia LDL goal <100    Elevated blood sugar    Psoriasis    Non morbid obesity,  unspecified obesity type    Gout, unspecified cause, unspecified chronicity, unspecified site    Carotid artery plaque    Hyponatremia    Low mean corpuscular volume (MCV)    Atrial fibrillation (H)    Syncope    V-tach (H)    Chronic kidney disease, stage 3 (H)    Acute diastolic congestive heart failure (H)     Past Medical History:   Diagnosis Date    Acute diastolic congestive heart failure (H) 06/2019    hosp fsd, then fu echo ef nl    ASCVD (arteriosclerotic cardiovascular disease) 1997    angio with 40% circ lesion; 6/19 hosp for chf, 3 vessel dz on angio but porcelin aorta so ptca done    Atrial fibrillation (H) 10/09/2018    found on routine physical    Carotid artery plaque 2005    seen on us, about 50% stenosis, fu 2009 us no significant stenosis    Elevated blood sugar     Gout     on allopurinol    HTN (hypertension)     Hypercholesteremia     Hyponatremia 2015    felt due to chlorthalidone    Low mean corpuscular volume (MCV)     Near syncope 05/2015    fu est echo low level but neg    Psoriasis     Dr. Marti    Renal mass 06/29/2019    seen on ct chest for sob, needs fu ct for that, fu us done 7/19 and no mass seen, should have fu ct in December, had fu us 3/22 and no fu needed    Screening 2012    abd us no aaa    Syncope 09/2019    hosp fsd, cause not clear, lowered coreg dose; seen by Dr. Lezama of ep and ep study neg, implanted event monitor    V-tach (H) 09/2019    seen on event monitor for fu syncope, then ep study and no inducible vtach        CC Daysi Tyler, PA-C  [address]  [place] [address] on close of this encounter.

## 2023-12-07 LAB
PATH REPORT.COMMENTS IMP SPEC: ABNORMAL
PATH REPORT.COMMENTS IMP SPEC: ABNORMAL
PATH REPORT.COMMENTS IMP SPEC: YES
PATH REPORT.FINAL DX SPEC: ABNORMAL
PATH REPORT.GROSS SPEC: ABNORMAL
PATH REPORT.MICROSCOPIC SPEC OTHER STN: ABNORMAL
PATH REPORT.RELEVANT HX SPEC: ABNORMAL

## 2023-12-08 ENCOUNTER — TELEPHONE (OUTPATIENT)
Dept: FAMILY MEDICINE | Facility: CLINIC | Age: 78
End: 2023-12-08
Payer: COMMERCIAL

## 2023-12-08 DIAGNOSIS — C44.310 BCC (BASAL CELL CARCINOMA), FACE: Primary | ICD-10-CM

## 2023-12-08 NOTE — LETTER
[facility]  [address]  [address]  [phone]        12/8/2023       Betito Anderson  [address] [facility] [address]      Dear Betito:    You are scheduled for Mohs Surgery on: Thursday, February 1, 2024 at 8:30 am.    Please check in at [facility], [address].     You don't need to arrive more than 5-10 minutes prior to your appointment time.     Be sure to eat a good breakfast and bathe and wash your hair prior to surgery.     If you are taking any anti-coagulants that are prescribed by your Doctor (such as Coumadin/Warfarin, Plavix, Aspirin, Ibuprofen), please continue taking them.     However, if you are taking anti-coagulants over the counter without a Doctor's order for a medical condition, please discontinue them 10 days prior to surgery.     Please wear loose comfortable clothing as it could possibly be 4-6 hours until your surgery is completed depending upon how many layers of tissue need to be removed.      Thank you,    CECIL Plascencia MD

## 2023-12-08 NOTE — TELEPHONE ENCOUNTER
Called patient:  Patient notified and educated on test results   Mohs procedure explained- all questions answered  appointment scheduled- mohs packet mailed.    Thank you,    Kelly BARAJASRN BSN  [facility]  [phone]

## 2023-12-08 NOTE — TELEPHONE ENCOUNTER
----- Message from Janel Dodson MD sent at 12/8/2023 12:54 PM CST -----  Please refer for Mohs.    Dear Mr. Anderson,     I am glad we checked your spot!     Your biopsy result returned as a low risk basal cell skin cancer as we suspected. The good news is that this is a very low risk skin cancer that usually only lives on the top of the skin and does not spread to other organs unless left untreated for years and years.     To treat this, I will refer you for Mohs surgery. As we discussed, Mohs surgery is a tissue-sparing surgery. During Mohs surgery, they cut out only as much as they think they need, have you wait with a band-aid on the skin cancer site, and check it in the lab immediately to make sure they get it all out. If they didn't get it out the first time, they will take a second layer and try to get it all out the second time, etc. The whole process can take about a half-day (morning or afternoon). With this technique, they only take out as much skin as they need to. It is also very effective and has very low rates of recurrence.     I will ask our scheduling team to call you to set this up.     Please let me know any questions or concerns you have.     Sincerely,  Janel Dodson MD    Department of Dermatology  [facility]: Phone: [phone], Fax: [phone]  12/8/2023   Written by Janel Dodson MD on 12/8/2023 12:54 PM CS

## 2024-01-10 DIAGNOSIS — M10.9 GOUT, UNSPECIFIED CAUSE, UNSPECIFIED CHRONICITY, UNSPECIFIED SITE: ICD-10-CM

## 2024-01-10 RX ORDER — ALLOPURINOL 300 MG/1
TABLET ORAL
Qty: 90 TABLET | Refills: 3 | Status: SHIPPED | OUTPATIENT
Start: 2024-01-10 | End: 2024-01-29

## 2024-01-22 DIAGNOSIS — I25.10 ASCVD (ARTERIOSCLEROTIC CARDIOVASCULAR DISEASE): ICD-10-CM

## 2024-01-22 DIAGNOSIS — I50.23 ACUTE ON CHRONIC SYSTOLIC CONGESTIVE HEART FAILURE (H): ICD-10-CM

## 2024-01-22 RX ORDER — CARVEDILOL 6.25 MG/1
6.25 TABLET ORAL 2 TIMES DAILY WITH MEALS
Qty: 180 TABLET | Refills: 1 | Status: SHIPPED | OUTPATIENT
Start: 2024-01-22 | End: 2024-01-29

## 2024-01-22 RX ORDER — RIVAROXABAN 20 MG/1
20 TABLET, FILM COATED ORAL
Qty: 90 TABLET | Refills: 1 | Status: SHIPPED | OUTPATIENT
Start: 2024-01-22 | End: 2024-01-29

## 2024-01-29 ENCOUNTER — OFFICE VISIT (OUTPATIENT)
Dept: FAMILY MEDICINE | Facility: CLINIC | Age: 79
End: 2024-01-29
Payer: COMMERCIAL

## 2024-01-29 VITALS
BODY MASS INDEX: 32.74 KG/M2 | HEART RATE: 76 BPM | SYSTOLIC BLOOD PRESSURE: 136 MMHG | OXYGEN SATURATION: 95 % | WEIGHT: 208.6 LBS | HEIGHT: 67 IN | RESPIRATION RATE: 18 BRPM | TEMPERATURE: 97.4 F | DIASTOLIC BLOOD PRESSURE: 75 MMHG

## 2024-01-29 DIAGNOSIS — I47.20 V-TACH (H): ICD-10-CM

## 2024-01-29 DIAGNOSIS — E78.5 HYPERLIPIDEMIA LDL GOAL <100: ICD-10-CM

## 2024-01-29 DIAGNOSIS — I10 BENIGN ESSENTIAL HYPERTENSION: ICD-10-CM

## 2024-01-29 DIAGNOSIS — R73.9 ELEVATED BLOOD SUGAR: ICD-10-CM

## 2024-01-29 DIAGNOSIS — I25.10 ASCVD (ARTERIOSCLEROTIC CARDIOVASCULAR DISEASE): ICD-10-CM

## 2024-01-29 DIAGNOSIS — Z00.00 ENCOUNTER FOR ANNUAL PHYSICAL EXAM: Primary | ICD-10-CM

## 2024-01-29 DIAGNOSIS — I48.11 LONGSTANDING PERSISTENT ATRIAL FIBRILLATION (H): ICD-10-CM

## 2024-01-29 DIAGNOSIS — E66.9 NON MORBID OBESITY, UNSPECIFIED OBESITY TYPE: ICD-10-CM

## 2024-01-29 DIAGNOSIS — M10.9 GOUT, UNSPECIFIED CAUSE, UNSPECIFIED CHRONICITY, UNSPECIFIED SITE: ICD-10-CM

## 2024-01-29 DIAGNOSIS — I50.23 ACUTE ON CHRONIC SYSTOLIC CONGESTIVE HEART FAILURE (H): ICD-10-CM

## 2024-01-29 DIAGNOSIS — N18.30 STAGE 3 CHRONIC KIDNEY DISEASE, UNSPECIFIED WHETHER STAGE 3A OR 3B CKD (H): ICD-10-CM

## 2024-01-29 DIAGNOSIS — E87.1 HYPONATREMIA: ICD-10-CM

## 2024-01-29 DIAGNOSIS — I50.32 CHRONIC HEART FAILURE WITH PRESERVED EJECTION FRACTION (H): ICD-10-CM

## 2024-01-29 DIAGNOSIS — R71.8 LOW MEAN CORPUSCULAR VOLUME (MCV): ICD-10-CM

## 2024-01-29 PROBLEM — I50.31 ACUTE DIASTOLIC CONGESTIVE HEART FAILURE (H): Status: RESOLVED | Noted: 2019-06-01 | Resolved: 2024-01-29

## 2024-01-29 LAB
ALBUMIN SERPL BCG-MCNC: 4.1 G/DL (ref 3.5–5.2)
ALP SERPL-CCNC: 91 U/L (ref 40–150)
ALT SERPL W P-5'-P-CCNC: <5 U/L (ref 0–70)
ANION GAP SERPL CALCULATED.3IONS-SCNC: 10 MMOL/L (ref 7–15)
AST SERPL W P-5'-P-CCNC: 12 U/L (ref 0–45)
BILIRUB SERPL-MCNC: 0.6 MG/DL
BUN SERPL-MCNC: 19.2 MG/DL (ref 8–23)
CALCIUM SERPL-MCNC: 9.3 MG/DL (ref 8.8–10.2)
CHLORIDE SERPL-SCNC: 104 MMOL/L (ref 98–107)
CHOLEST SERPL-MCNC: 154 MG/DL
CREAT SERPL-MCNC: 1.47 MG/DL (ref 0.67–1.17)
CREAT UR-MCNC: 56.5 MG/DL
DEPRECATED HCO3 PLAS-SCNC: 26 MMOL/L (ref 22–29)
EGFRCR SERPLBLD CKD-EPI 2021: 49 ML/MIN/1.73M2
ERYTHROCYTE [DISTWIDTH] IN BLOOD BY AUTOMATED COUNT: 15.9 % (ref 10–15)
FASTING STATUS PATIENT QL REPORTED: YES
GLUCOSE SERPL-MCNC: 102 MG/DL (ref 70–99)
HBA1C MFR BLD: 5.7 % (ref 0–5.6)
HCT VFR BLD AUTO: 44.9 % (ref 40–53)
HDLC SERPL-MCNC: 35 MG/DL
HGB BLD-MCNC: 13.7 G/DL (ref 13.3–17.7)
LDLC SERPL CALC-MCNC: 83 MG/DL
MCH RBC QN AUTO: 26.1 PG (ref 26.5–33)
MCHC RBC AUTO-ENTMCNC: 30.5 G/DL (ref 31.5–36.5)
MCV RBC AUTO: 86 FL (ref 78–100)
MICROALBUMIN UR-MCNC: 406 MG/L
MICROALBUMIN/CREAT UR: 718.58 MG/G CR (ref 0–17)
NONHDLC SERPL-MCNC: 119 MG/DL
PLATELET # BLD AUTO: 294 10E3/UL (ref 150–450)
POTASSIUM SERPL-SCNC: 4 MMOL/L (ref 3.4–5.3)
PROT SERPL-MCNC: 7.1 G/DL (ref 6.4–8.3)
RBC # BLD AUTO: 5.24 10E6/UL (ref 4.4–5.9)
SODIUM SERPL-SCNC: 140 MMOL/L (ref 135–145)
TRIGL SERPL-MCNC: 182 MG/DL
URATE SERPL-MCNC: 3.3 MG/DL (ref 3.4–7)
WBC # BLD AUTO: 8.9 10E3/UL (ref 4–11)

## 2024-01-29 PROCEDURE — 36415 COLL VENOUS BLD VENIPUNCTURE: CPT | Performed by: INTERNAL MEDICINE

## 2024-01-29 PROCEDURE — 83036 HEMOGLOBIN GLYCOSYLATED A1C: CPT | Performed by: INTERNAL MEDICINE

## 2024-01-29 PROCEDURE — 80053 COMPREHEN METABOLIC PANEL: CPT | Performed by: INTERNAL MEDICINE

## 2024-01-29 PROCEDURE — 84550 ASSAY OF BLOOD/URIC ACID: CPT | Performed by: INTERNAL MEDICINE

## 2024-01-29 PROCEDURE — 82043 UR ALBUMIN QUANTITATIVE: CPT | Performed by: INTERNAL MEDICINE

## 2024-01-29 PROCEDURE — 85027 COMPLETE CBC AUTOMATED: CPT | Performed by: INTERNAL MEDICINE

## 2024-01-29 PROCEDURE — 99214 OFFICE O/P EST MOD 30 MIN: CPT | Mod: 25 | Performed by: INTERNAL MEDICINE

## 2024-01-29 PROCEDURE — 82570 ASSAY OF URINE CREATININE: CPT | Performed by: INTERNAL MEDICINE

## 2024-01-29 PROCEDURE — G0439 PPPS, SUBSEQ VISIT: HCPCS | Performed by: INTERNAL MEDICINE

## 2024-01-29 PROCEDURE — 80061 LIPID PANEL: CPT | Performed by: INTERNAL MEDICINE

## 2024-01-29 RX ORDER — ISOSORBIDE MONONITRATE 120 MG/1
120 TABLET, EXTENDED RELEASE ORAL DAILY
Qty: 90 TABLET | Refills: 3 | Status: SHIPPED | OUTPATIENT
Start: 2024-01-29

## 2024-01-29 RX ORDER — LOSARTAN POTASSIUM 100 MG/1
100 TABLET ORAL DAILY
Qty: 90 TABLET | Refills: 3 | Status: SHIPPED | OUTPATIENT
Start: 2024-01-29

## 2024-01-29 RX ORDER — AMLODIPINE BESYLATE 5 MG/1
5 TABLET ORAL DAILY
Qty: 90 TABLET | Refills: 3 | Status: SHIPPED | OUTPATIENT
Start: 2024-01-29

## 2024-01-29 RX ORDER — ALLOPURINOL 300 MG/1
TABLET ORAL
Qty: 90 TABLET | Refills: 3 | Status: SHIPPED | OUTPATIENT
Start: 2024-01-29

## 2024-01-29 RX ORDER — CARVEDILOL 6.25 MG/1
6.25 TABLET ORAL 2 TIMES DAILY WITH MEALS
Qty: 180 TABLET | Refills: 3 | Status: SHIPPED | OUTPATIENT
Start: 2024-01-29

## 2024-01-29 RX ORDER — HYDRALAZINE HYDROCHLORIDE 100 MG/1
100 TABLET, FILM COATED ORAL 3 TIMES DAILY
Qty: 270 TABLET | Refills: 3 | Status: SHIPPED | OUTPATIENT
Start: 2024-01-29

## 2024-01-29 RX ORDER — ROSUVASTATIN CALCIUM 40 MG/1
40 TABLET, COATED ORAL AT BEDTIME
Qty: 90 TABLET | Refills: 3 | Status: SHIPPED | OUTPATIENT
Start: 2024-01-29

## 2024-01-29 ASSESSMENT — ENCOUNTER SYMPTOMS
DIARRHEA: 0
ABDOMINAL PAIN: 0
JOINT SWELLING: 1
FREQUENCY: 0
HEARTBURN: 0
PALPITATIONS: 0
DIZZINESS: 0
FEVER: 0
HEADACHES: 0
WEAKNESS: 0
MYALGIAS: 0
SHORTNESS OF BREATH: 0
NAUSEA: 0
SORE THROAT: 0
CHILLS: 0
EYE PAIN: 0
DYSURIA: 0
COUGH: 1
CONSTIPATION: 0
PARESTHESIAS: 0
HEMATURIA: 0
NERVOUS/ANXIOUS: 0
HEMATOCHEZIA: 0
ARTHRALGIAS: 1

## 2024-01-29 ASSESSMENT — PAIN SCALES - GENERAL: PAINLEVEL: NO PAIN (0)

## 2024-01-29 ASSESSMENT — ACTIVITIES OF DAILY LIVING (ADL): CURRENT_FUNCTION: NO ASSISTANCE NEEDED

## 2024-01-29 NOTE — PROGRESS NOTES
Preventive Care Visit  [facility] MUANG Vernon MD, Internal Medicine  Jan 29, 2024      SUBJECTIVE:   Mykel is a 78 year old, presenting for the following:    Overall he is doing well and he does exercise but his weight is a bit of an issue.  He has no chest pain or shortness of breath.  He otherwise feels quite well.  He did have an episode of gout recently which was the first in quite some time.               Past Medical History:      Past Medical History:   Diagnosis Date    Acute diastolic congestive heart failure (H) 06/2019    hosp fsd, then fu echo ef nl; echo 2023 nl ef    ASCVD (arteriosclerotic cardiovascular disease) 1997    angio with 40% circ lesion; 6/19 hosp for chf, 3 vessel dz on angio but porcelin aorta so ptca done    Atrial fibrillation (H) 10/09/2018    found on routine physical    Carotid artery plaque 2005    seen on us, about 50% stenosis, fu 2009 us no significant stenosis    Elevated blood sugar     Gout     on allopurinol    HTN (hypertension)     Hypercholesteremia     Hyponatremia 2015    felt due to chlorthalidone    Low mean corpuscular volume (MCV)     Near syncope 05/2015    fu est echo low level but neg    Psoriasis     Dr. Marti    Renal mass 06/29/2019    seen on ct chest for sob, needs fu ct for that, fu us done 7/19 and no mass seen, should have fu ct in December, had fu us 3/22 and no fu needed    Screening 2012    abd us no aaa    Syncope 09/2019    hosp fsd, cause not clear, lowered coreg dose; seen by Dr. Lezama of ep and ep study neg, implanted event monitor    V-tach (H) 09/2019    seen on event monitor for fu syncope, then ep study and no inducible vtach             Past Surgical History:      Past Surgical History:   Procedure Laterality Date    CATARACT EXTRACTION  03/2022    CATARACT EXTRACTION  2022    CV CORONARY ANGIOGRAM N/A 07/02/2019    Procedure: Coronary Angiogram;  Surgeon: Donaldo Loera MD;  Location:  HEART CARDIAC CATH LAB     CV HEART CATHETERIZATION WITH POSSIBLE INTERVENTION N/A 2019    Procedure: Heart Catheterization with Possible Intervention;  Surgeon: Manolo Hu MD;  Location:  HEART CARDIAC CATH LAB    CV LEFT HEART CATH N/A 2019    Procedure: Left Heart Cath;  Surgeon: Donaldo Loera MD;  Location:  HEART CARDIAC CATH LAB    CV LEFT VENTRICULOGRAM N/A 2019    Procedure: Left Ventriculogram;  Surgeon: Donaldo Loera MD;  Location:  HEART CARDIAC CATH LAB    CV PCI STENT DRUG ELUTING N/A 2019    Procedure: PCI Stent Drug Eluting;  Surgeon: Manolo Hu MD;  Location:  HEART CARDIAC CATH LAB    CV RIGHT HEART CATH MEASUREMENTS RECORDED N/A 2019    Procedure: Right Heart Cath;  Surgeon: Dnoaldo Loera MD;  Location:  HEART CARDIAC CATH LAB    EP LOOP RECORDER IMPLANT N/A 10/11/2019    Procedure: EP Loop Recorder Implant;  Surgeon: Fuad Lezama MD;  Location:  HEART CARDIAC CATH LAB    EP RIGHT VENTRICULAR RECORDING N/A 10/11/2019    Procedure: EP Ventricular Pacing;  Surgeon: Fuad Lezama MD;  Location:  HEART CARDIAC CATH LAB    ZZC ANESTH,DX ARTHROSCOPIC PROC KNEE JOINT  2009             Social History:     Social History     Socioeconomic History    Marital status:      Spouse name: Not on file    Number of children: 2    Years of education: Not on file    Highest education level: Not on file   Occupational History    Occupation: retired   Tobacco Use    Smoking status: Former     Packs/day: 1.00     Years: 30.00     Additional pack years: 0.00     Total pack years: 30.00     Types: Cigarettes     Quit date: 1998     Years since quittin.4    Smokeless tobacco: Never   Substance and Sexual Activity    Alcohol use: Not Currently     Comment: 2 drinks per week    Drug use: No    Sexual activity: Not Currently     Partners: Female   Other Topics Concern    Parent/sibling w/ CABG, MI or angioplasty before 65F 55M? Not  Asked   Social History Narrative    Not on file     Social Determinants of Health     Financial Resource Strain: Low Risk  (1/29/2024)    Financial Resource Strain     Within the past 12 months, have you or your family members you live with been unable to get utilities (heat, electricity) when it was really needed?: No   Food Insecurity: Low Risk  (1/29/2024)    Food Insecurity     Within the past 12 months, did you worry that your food would run out before you got money to buy more?: No     Within the past 12 months, did the food you bought just not last and you didn t have money to get more?: No   Transportation Needs: Low Risk  (1/29/2024)    Transportation Needs     Within the past 12 months, has lack of transportation kept you from medical appointments, getting your medicines, non-medical meetings or appointments, work, or from getting things that you need?: No   Physical Activity: Not on file   Stress: Not on file   Social Connections: Not on file   Interpersonal Safety: Not on file   Housing Stability: Low Risk  (1/29/2024)    Housing Stability     Do you have housing? : Yes     Are you worried about losing your housing?: No             Family History:   reviewed         Allergies:     Allergies   Allergen Reactions    Ace Inhibitors Anaphylaxis     Edema of ace    Eliquis [Apixaban] Other (See Comments)     Facial numbness             Medications:     Current Outpatient Medications   Medication Sig Dispense Refill    allopurinol (ZYLOPRIM) 300 MG tablet TAKE 1 TABLET DAILY 90 tablet 3    amLODIPine (NORVASC) 5 MG tablet Take 1 tablet (5 mg) by mouth daily 90 tablet 3    aspirin (ASA) 81 MG EC tablet Take 1 tablet (81 mg) by mouth daily 90 tablet 3    calcipotriene (DOVONOX) 0.005 % external cream Mix efudex + calcipotriene equally. Apply BID x 4-10 days. Stop when skin gets red. 60 g 1    carvedilol (COREG) 6.25 MG tablet Take 1 tablet (6.25 mg) by mouth 2 times daily (with meals) 180 tablet 3     "empagliflozin (JARDIANCE) 10 MG TABS tablet Take 1 tablet (10 mg) by mouth daily 90 tablet 3    fluorouracil (EFUDEX) 5 % external cream Mix equally with calcipotriene cream. Apply BID x 4-10 days. Stop when skin gets red. 40 g 0    hydrALAZINE (APRESOLINE) 100 MG tablet Take 1 tablet (100 mg) by mouth 3 times daily 270 tablet 3    isosorbide mononitrate CR (IMDUR) 120 MG 24 HR ER tablet Take 1 tablet (120 mg) by mouth daily 90 tablet 3    ketoconazole (NIZORAL) 2 % external shampoo Massage into wet scalp, let sit 3-5 min, then rinse. Do this 3x weekly. 120 mL 11    losartan (COZAAR) 100 MG tablet Take 1 tablet (100 mg) by mouth daily 90 tablet 3    rivaroxaban ANTICOAGULANT (XARELTO ANTICOAGULANT) 20 MG TABS tablet Take 1 tablet (20 mg) by mouth daily (with dinner) TAKE 1 TABLET DAILY WITH DINNER 90 tablet 1    rosuvastatin (CRESTOR) 40 MG tablet Take 1 tablet (40 mg) by mouth at bedtime 90 tablet 3    torsemide (DEMADEX) 20 MG tablet Take 1 tablet (20 mg) Monday, Wednesday, Friday and take 2 tablets (40 mg) other days by mouth 135 tablet 3    torsemide (DEMADEX) 20 MG tablet TAKE 1 TABLET BY MOUTH ON MONDAY, WEDNESDAY AND FRIDAY AND 2 TABLETS ON ALL OTHER DAYS 144 tablet 0               Review of Systems:     The 10 point Review of Systems is negative other than noted in the HPI           Physical Exam:   Blood pressure 136/75, pulse 76, temperature 97.4  F (36.3  C), temperature source Oral, resp. rate 18, height 1.702 m (5' 7\"), weight 94.6 kg (208 lb 9.6 oz), SpO2 95%.    Exam:  Constitutional: healthy appearing, alert and in no distress  Heent: Normocephalic. Head without obvious masses or lesions. PERRLDC, EOMI. Mouth exam within normal limits: tongue, mucous membranes, posterior pharynx all normal, no lesions or abnormalities seen.  Tm's and canals within normal limits bilaterally. Neck supple, no nuchal rigidity or masses. No supraclavicular, or cervical adenopathy. Thyroid symmetric, no " "masses.  Cardiovascular: Regular rate and rhythm, no murmer, rub or gallops.  JVP not elevated, no edema.  Carotids within normal limits bilaterally, no bruits.  Respiratory: Normal respiratory effort.  Lungs clear, normal flow, no wheezing or crackles.  Breasts: Normal bilaterally.  No masses or lesions.  Nipples within normal limites.  No axillary lesions or nodes.  Gastrointestinal: Normal active bowel sounds.   Soft, not tender, no masses, guarding or rebound.  No hepatosplenomegaly.   Genitourinary: Rectal not done  Musculoskeletal: extremities normal, no gross deformities noted.  Skin: no suspicious lesions or rashes   Neurologic: Mental status within normal limits.  Speech fluent.  No gross motor abnormalities and gait intact.  Psychiatric: mentation appears normal and affect normal.         Data:   Labs sent        Assessment:   Normal complete physical exam  Hfpef, doing well, on sglt2  agent  Ckd, follow up labs  Afib, on noac  Cad, med mgmt  Hypertension, controlled  Elevated cholesterol, on statin  Elevated sugar, follow up labs, weight loss  Obesity, weight loss  Low sodium, labs today  Low mcv, no issues  Vtach, no issues  Gout, cont allopurinol, check uric acid  hcm         Plan:   Covid at pharm  Exercies, diet, weight loss  Letter with labs      Rudy Vernon M.D.        Physical (Recent gout on left hand, recent cold/cough)        1/29/2024     9:36 AM   Additional Questions   Roomed by [email or web address]     Are you in the first 12 months of your Medicare coverage?  No    Healthy Habits:     In general, how would you rate your overall health?  Good    Frequency of exercise:  1 day/week    Duration of exercise:  15-30 minutes    Do you usually eat at least 4 servings of fruit and vegetables a day, include whole grains    & fiber and avoid regularly eating high fat or \"junk\" foods?  No    Taking medications regularly:  Yes    Medication side effects:  None    Ability to successfully perform activities of " daily living:  No assistance needed    Home Safety:  No safety concerns identified    Hearing Impairment:  Difficulty following a conversation in a noisy restaurant or crowded room    In the past 6 months, have you been bothered by leaking of urine?  No    In general, how would you rate your overall mental or emotional health?  Good      Today's PHQ-2 Score:       2024     8:56 AM   PHQ-2 (  [employer])   Q1: Little interest or pleasure in doing things 0   Q2: Feeling down, depressed or hopeless 0   PHQ-2 Score 0   Q1: Little interest or pleasure in doing things Not at all   Q2: Feeling down, depressed or hopeless Not at all   PHQ-2 Score 0           Have you ever done Advance Care Planning? (For example, a Health Directive, POLST, or a discussion with a medical provider or your loved ones about your wishes): No, advance care planning information given to patient to review.  Patient plans to discuss their wishes with loved ones or provider.         Fall risk  Fallen 2 or more times in the past year?: No  Any fall with injury in the past year?: No    Cognitive Screening   1) Repeat 3 items (Leader, Season, Table)    2) Clock draw: NORMAL  3) 3 item recall: Recalls 3 objects  Results: 3 items recalled: COGNITIVE IMPAIRMENT LESS LIKELY    Mini-CogTM Copyright S Karolina. Licensed by the author for use in [facility]; reprinted with permission (valentín@.[facility]). All rights reserved.      Do you have sleep apnea, excessive snoring or daytime drowsiness? : no    Reviewed and updated as needed this visit by clinical staff   Tobacco  Allergies  Meds              Reviewed and updated as needed this visit by Provider                  Social History     Tobacco Use    Smoking status: Former     Packs/day: 1.00     Years: 30.00     Additional pack years: 0.00     Total pack years: 30.00     Types: Cigarettes     Quit date: 1998     Years since quittin.4    Smokeless tobacco: Never   Substance Use Topics     Alcohol use: Not Currently     Comment: 2 drinks per week             1/29/2024     8:55 AM   Alcohol Use   Prescreen: >3 drinks/day or >7 drinks/week? No          No data to display              Do you have a current opioid prescription? No  Do you use any other controlled substances or medications that are not prescribed by a provider? None              Current providers sharing in care for this patient include:   Patient Care Team:  Rudy Vernon MD as PCP - General (Internal Medicine)  Rudy Vernon MD as Assigned PCP  Blanca Lee PA-C as Physician Assistant (Dermatology)  No Ref-Primary, Physician  Mendez Hidalgo MD as Assigned Heart and Vascular Provider  Janel Dodson MD as MD (Dermatology)  Janel Dodson MD as Assigned Surgical Provider    The following health maintenance items are reviewed in Epic and correct as of today:  Health Maintenance   Topic Date Due    HF ACTION PLAN  Never done    MICROALBUMIN  Never done    ANNUAL REVIEW OF HM ORDERS  Never done    COVID-19 Vaccine (6 - 2023-24 season) 09/01/2023    LIPID  01/05/2024    BMP  01/26/2024    MEDICARE ANNUAL WELLNESS VISIT  01/05/2024    ALT  07/26/2024    CBC  07/26/2024    HEMOGLOBIN  07/26/2024    FALL RISK ASSESSMENT  01/29/2025    ADVANCE CARE PLANNING  01/05/2028    DTAP/TDAP/TD IMMUNIZATION (3 - Td or Tdap) 01/05/2033    TSH W/FREE T4 REFLEX  Completed    HEPATITIS C SCREENING  Completed    PHQ-2 (once per calendar year)  Completed    INFLUENZA VACCINE  Completed    Pneumococcal Vaccine: 65+ Years  Completed    URINALYSIS  Completed    ZOSTER IMMUNIZATION  Completed    RSV VACCINE (Pregnancy & 60+)  Completed    IPV IMMUNIZATION  Aged Out    HPV IMMUNIZATION  Aged Out    MENINGITIS IMMUNIZATION  Aged Out    RSV MONOCLONAL ANTIBODY  Aged Out    COLORECTAL CANCER SCREENING  Discontinued             Review of Systems   Constitutional:  Negative for chills and fever.   HENT:  Positive for hearing loss. Negative  "for congestion, ear pain and sore throat.    Eyes:  Negative for pain and visual disturbance.   Respiratory:  Positive for cough. Negative for shortness of breath.    Cardiovascular:  Negative for chest pain and palpitations.   Gastrointestinal:  Negative for abdominal pain, constipation, diarrhea and nausea.   Genitourinary:  Negative for dysuria, frequency, genital sores, hematuria, penile discharge and urgency.   Musculoskeletal:  Positive for arthralgias and joint swelling. Negative for myalgias.   Skin:  Negative for rash.   Neurological:  Negative for dizziness, weakness and headaches.   Psychiatric/Behavioral:  The patient is not nervous/anxious.           OBJECTIVE:   /75   Pulse 76   Temp 97.4  F (36.3  C) (Oral)   Resp 18   Ht 1.702 m (5' 7\")   Wt 94.6 kg (208 lb 9.6 oz)   SpO2 95%   BMI 32.67 kg/m     Estimated body mass index is 32.67 kg/m  as calculated from the following:    Height as of this encounter: 1.702 m (5' 7\").    Weight as of this encounter: 94.6 kg (208 lb 9.6 oz).  Physical Exam          ASSESSMENT / PLAN:             Counseling  Reviewed preventive health counseling, as reflected in patient instructions       Regular exercise      BMI  Estimated body mass index is 32.67 kg/m  as calculated from the following:    Height as of this encounter: 1.702 m (5' 7\").    Weight as of this encounter: 94.6 kg (208 lb 9.6 oz).   Weight management plan: Discussed healthy diet and exercise guidelines      He reports that he quit smoking about 25 years ago. His smoking use included cigarettes. He has a 30 pack-year smoking history. He has never used smokeless tobacco.      Appropriate preventive services were discussed with this patient, including applicable screening as appropriate for fall prevention, nutrition, physical activity, Tobacco-use cessation, weight loss and cognition.  Checklist reviewing preventive services available has been given to the patient.    Reviewed patients plan of " care and provided an AVS. The Basic Care Plan (routine screening as documented in Health Maintenance) for Betito meets the Care Plan requirement. This Care Plan has been established and reviewed with the Patient.          Signed Electronically by: Rudy Vernon MD    Identified Health Risks  I have reviewed Opioid Use Disorder and Substance Use Disorder risk factors and made any needed referrals.

## 2024-01-31 NOTE — PROGRESS NOTES
Surgical Office Location:  [facility]  [address] [place] [id number]

## 2024-02-01 ENCOUNTER — OFFICE VISIT (OUTPATIENT)
Dept: DERMATOLOGY | Facility: CLINIC | Age: 79
End: 2024-02-01
Payer: COMMERCIAL

## 2024-02-01 DIAGNOSIS — D18.01 ANGIOMA OF SKIN: ICD-10-CM

## 2024-02-01 DIAGNOSIS — C44.319 BASAL CELL CARCINOMA (BCC) OF LEFT CHEEK: Primary | ICD-10-CM

## 2024-02-01 DIAGNOSIS — D23.9 DERMAL NEVUS: ICD-10-CM

## 2024-02-01 DIAGNOSIS — L81.4 LENTIGO: ICD-10-CM

## 2024-02-01 DIAGNOSIS — L82.1 SEBORRHEIC KERATOSES: ICD-10-CM

## 2024-02-01 PROCEDURE — 13132 CMPLX RPR F/C/C/M/N/AX/G/H/F: CPT | Performed by: DERMATOLOGY

## 2024-02-01 PROCEDURE — 99213 OFFICE O/P EST LOW 20 MIN: CPT | Mod: 25 | Performed by: DERMATOLOGY

## 2024-02-01 PROCEDURE — 17311 MOHS 1 STAGE H/N/HF/G: CPT | Performed by: DERMATOLOGY

## 2024-02-01 NOTE — LETTER
2/1/2024         RE: Betito Anderson  [address]  [place] [address]        Dear Colleague,    Thank you for referring your patient, Betito Anderson, to the [facility]. Please see a copy of my visit note below.    Surgical Office Location:  [facility]  [address] [place] [address]      Betito Anderson , a 78 year old year old male patient, I was asked to see by KEITH Cummins for basal cell carcinoma on left cheek.     Patient has no other skin complaints today.  Remainder of the HPI, Meds, PMH, Allergies, FH, and SH was reviewed in chart.      Past Medical History:   Diagnosis Date     Acute diastolic congestive heart failure (H) 06/2019    hosp fsd, then fu echo ef nl; echo 2023 nl ef     ASCVD (arteriosclerotic cardiovascular disease) 1997    angio with 40% circ lesion; 6/19 hosp for chf, 3 vessel dz on angio but porcelin aorta so ptca done     Atrial fibrillation (H) 10/09/2018    found on routine physical     Basal cell carcinoma      Carotid artery plaque 2005    seen on us, about 50% stenosis, fu 2009 us no significant stenosis     CKD (chronic kidney disease) stage 3, GFR 30-59 ml/min (H)      Elevated blood sugar      Gout     on allopurinol     HTN (hypertension)      Hypercholesteremia      Hyponatremia 2015    felt due to chlorthalidone     Low mean corpuscular volume (MCV)      Near syncope 05/2015    fu est echo low level but neg     Psoriasis     Dr. Marti     Renal mass 06/29/2019    seen on ct chest for sob, needs fu ct for that, fu us done 7/19 and no mass seen, should have fu ct in December, had fu us 3/22 and no fu needed     Screening 2012    abd us no aaa     Syncope 09/2019    hosp fsd, cause not clear, lowered coreg dose; seen by Dr. Lezama of ep and ep study neg, implanted event monitor     V-tach (H) 09/2019    seen on event monitor for fu syncope, then ep study and no inducible vtach       Past Surgical History:    Procedure Laterality Date     CATARACT EXTRACTION  03/2022     CATARACT EXTRACTION  2022     CV CORONARY ANGIOGRAM N/A 07/02/2019    Procedure: Coronary Angiogram;  Surgeon: Donaldo Loera MD;  Location:  HEART CARDIAC CATH LAB     CV HEART CATHETERIZATION WITH POSSIBLE INTERVENTION N/A 07/05/2019    Procedure: Heart Catheterization with Possible Intervention;  Surgeon: Manolo Hu MD;  Location:  HEART CARDIAC CATH LAB     CV LEFT HEART CATH N/A 07/02/2019    Procedure: Left Heart Cath;  Surgeon: Donaldo Loera MD;  Location:  HEART CARDIAC CATH LAB     CV LEFT VENTRICULOGRAM N/A 07/02/2019    Procedure: Left Ventriculogram;  Surgeon: Donaldo Loera MD;  Location:  HEART CARDIAC CATH LAB     CV PCI STENT DRUG ELUTING N/A 07/05/2019    Procedure: PCI Stent Drug Eluting;  Surgeon: Manolo Hu MD;  Location:  HEART CARDIAC CATH LAB     CV RIGHT HEART CATH MEASUREMENTS RECORDED N/A 07/02/2019    Procedure: Right Heart Cath;  Surgeon: Donaldo Loera MD;  Location:  HEART CARDIAC CATH LAB     EP LOOP RECORDER IMPLANT N/A 10/11/2019    Procedure: EP Loop Recorder Implant;  Surgeon: Fuad Lezama MD;  Location:  HEART CARDIAC CATH LAB     EP RIGHT VENTRICULAR RECORDING N/A 10/11/2019    Procedure: EP Ventricular Pacing;  Surgeon: Fuad Lezama MD;  Location:  HEART CARDIAC CATH LAB     ZZC ANESTH,DX ARTHROSCOPIC PROC KNEE JOINT  01/01/2009        Family History   Problem Relation Age of Onset     Coronary Artery Disease Father      Medical History Unknown Mother      Medical History Unknown Maternal Grandfather        Social History     Socioeconomic History     Marital status:      Spouse name: Not on file     Number of children: 2     Years of education: Not on file     Highest education level: Not on file   Occupational History     Occupation: retired   Tobacco Use     Smoking status: Former     Packs/day: 1.00     Years: 30.00     Additional  pack years: 0.00     Total pack years: 30.00     Types: Cigarettes     Quit date: 1998     Years since quittin.4     Smokeless tobacco: Never   Substance and Sexual Activity     Alcohol use: Not Currently     Comment: 2 drinks per week     Drug use: No     Sexual activity: Not Currently     Partners: Female   Other Topics Concern     Parent/sibling w/ CABG, MI or angioplasty before 65F 55M? Not Asked   Social History Narrative     Not on file     Social Determinants of Health     Financial Resource Strain: Low Risk  (2024)    Financial Resource Strain      Within the past 12 months, have you or your family members you live with been unable to get utilities (heat, electricity) when it was really needed?: No   Food Insecurity: Low Risk  (2024)    Food Insecurity      Within the past 12 months, did you worry that your food would run out before you got money to buy more?: No      Within the past 12 months, did the food you bought just not last and you didn t have money to get more?: No   Transportation Needs: Low Risk  (2024)    Transportation Needs      Within the past 12 months, has lack of transportation kept you from medical appointments, getting your medicines, non-medical meetings or appointments, work, or from getting things that you need?: No   Physical Activity: Not on file   Stress: Not on file   Social Connections: Not on file   Interpersonal Safety: Not on file   Housing Stability: Low Risk  (2024)    Housing Stability      Do you have housing? : Yes      Are you worried about losing your housing?: No       Outpatient Encounter Medications as of 2024   Medication Sig Dispense Refill     allopurinol (ZYLOPRIM) 300 MG tablet TAKE 1 TABLET DAILY 90 tablet 3     amLODIPine (NORVASC) 5 MG tablet Take 1 tablet (5 mg) by mouth daily 90 tablet 3     aspirin (ASA) 81 MG EC tablet Take 1 tablet (81 mg) by mouth daily 90 tablet 3     calcipotriene (DOVONOX) 0.005 % external cream Mix  efudex + calcipotriene equally. Apply BID x 4-10 days. Stop when skin gets red. 60 g 1     carvedilol (COREG) 6.25 MG tablet Take 1 tablet (6.25 mg) by mouth 2 times daily (with meals) 180 tablet 3     empagliflozin (JARDIANCE) 10 MG TABS tablet Take 1 tablet (10 mg) by mouth daily 90 tablet 3     fluorouracil (EFUDEX) 5 % external cream Mix equally with calcipotriene cream. Apply BID x 4-10 days. Stop when skin gets red. 40 g 0     hydrALAZINE (APRESOLINE) 100 MG tablet Take 1 tablet (100 mg) by mouth 3 times daily 270 tablet 3     isosorbide mononitrate CR (IMDUR) 120 MG 24 HR ER tablet Take 1 tablet (120 mg) by mouth daily 90 tablet 3     ketoconazole (NIZORAL) 2 % external shampoo Massage into wet scalp, let sit 3-5 min, then rinse. Do this 3x weekly. 120 mL 11     losartan (COZAAR) 100 MG tablet Take 1 tablet (100 mg) by mouth daily 90 tablet 3     rivaroxaban ANTICOAGULANT (XARELTO ANTICOAGULANT) 20 MG TABS tablet Take 1 tablet (20 mg) by mouth daily (with dinner) TAKE 1 TABLET DAILY WITH DINNER 90 tablet 1     rosuvastatin (CRESTOR) 40 MG tablet Take 1 tablet (40 mg) by mouth at bedtime 90 tablet 3     torsemide (DEMADEX) 20 MG tablet Take 1 tablet (20 mg) Monday, Wednesday, Friday and take 2 tablets (40 mg) other days by mouth 135 tablet 3     torsemide (DEMADEX) 20 MG tablet TAKE 1 TABLET BY MOUTH ON MONDAY, WEDNESDAY AND FRIDAY AND 2 TABLETS ON ALL OTHER DAYS 144 tablet 0     No facility-administered encounter medications on file as of 2/1/2024.             Review Of Systems  Skin: As above  Eyes: negative  Ears/Nose/Throat: negative  Respiratory: No shortness of breath, dyspnea on exertion, cough, or hemoptysis  Cardiovascular: negative  Gastrointestinal: negative  Genitourinary: negative  Musculoskeletal: negative  Neurologic: negative  Psychiatric: negative  Hematologic/Lymphatic/Immunologic: negative  Endocrine: negative      O:   NAD, WDWN, Alert & Oriented, Mood & Affect wnl, Vitals stable   General  appearance gretchen ii   Vitals stable   Alert, oriented and in no acute distress   L cheek 1.5cm pink pearly papule    Stuck on papules and brown macules on trunk and ext    Red papules on trunk   Flesh colored papules on trunk          Eyes: Conjunctivae/lids:Normal     ENT: Lips, buccal mucosa, tongue: normal    MSK:Normal    Cardiovascular: peripheral edema none    Pulm: Breathing Normal    Neuro/Psych: Orientation:Normal; Mood/Affect:Normal      A/P:  1. Seborrheic keratosis, lentigo, angioma, dermal nevus  2. L cheek basal cell carcinoma   It was a pleasure speaking to Betito Anderson today.  Previous clinic  notes and pertinent laboratory tests were reviewed prior to Betito Andesron's visit.  Signs and Symptoms of skin cancer discussed with patient.  Patient encouraged to perform monthly skin exams.  UV precautions reviewed with patient.  Risks of non-melanoma skin cancer discussed with patient   Return to clinic 6 months  PROCEDURE NOTE  L cheek basal cell carcinoma   MOHS:   Location    The rationale for Mohs surgery was discussed with the patient and consent was obtained.  The risks and benefits as well as alternatives to therapy were discussed, in detail.  Specifically, the risks of infection, scarring, bleeding, prolonged wound healing, incomplete removal, allergy to anesthesia, nerve injury and recurrence were addressed.  Indication for Mohs was Location. Prior to the procedure, the treatment site was clearly identified and, if available, confirmed with previous photos and confirmed by the patient   All components of the Universal Protocol/PAUSE rule were completed.  The Mohs surgeon operated in two distinct and integrated capacities as the surgeon and pathologist.      The area was prepped with Betasept.  A rim of normal appearing skin was marked circumferentially around the lesion.  The area was infiltrated with local anesthesia.  The tumor was first debulked to remove all clinically apparent tumor.   An incision following the standard Mohs approach was done and the specimen was oriented,mapped and placed in 1 block(s).  Each specimen was then chromacoded and processed in the Mohs laboratory using standard Mohs technique and submitted for frozen section histology.  Frozen section analysis showed no residual tumor but CLEAR MARGINS.      The tumor was excised using standard Mohs technique in 1 stages(s).  CLEAR MARGINS OBTAINED and Final defect size was 2.2 cm.     We discussed the options for wound management in full with the patient including risks/benefits/ possible outcomes.      REPAIR COMPLEX: Because of the tightness of the surrounding skin and Because of the size and full thickness nature of the defect, Because of the tightness of the surrounding skin, To maintain form and function, and In order to avoid distortion, a complex closure was planned. After LE anesthesia and prep, Burow's triangles were excised in the relaxed skin tension lines. The wound edges were widely undermined greater than width of the defect on both sides by dissection in the subcutaneous plane until adequate tissue mobility was obtained. Hemostasis was obtained. The wound edges were closed in a layered fashion using Vicryl and Fast Absorbing Plain Gut sutures. Postoperative length was 6 cm.   EBL minimal; complications none; wound care routine.  The patient was discharged in good condition and will return in one week for wound evaluation.      Again, thank you for allowing me to participate in the care of your patient.        Sincerely,        Donaldo Plascencia MD

## 2024-02-01 NOTE — PATIENT INSTRUCTIONS
Sutured Wound Care     [facility]: [phone]    [facility]: [phone]          No strenuous activity for 48 hours. Resume moderate activity in 48 hours. No heavy exercising until you are seen for follow up in one week.     Take Tylenol as needed for discomfort.                         Do not drink alcoholic beverages for 48 hours.     Keep the pressure bandage in place for 24 hours. If the bandage becomes blood tinged or loose, reinforce it with gauze and tape.        (Refer to the reverse side of this page for management of bleeding).    Remove pressure bandage in 24 hours     Leave the flat bandage in place until your follow up appointment.    Keep the bandage dry. Wash around it carefully.    If the tape becomes soiled or starts to come off, reinforce it with additional paper tape.    Do not smoke for 3 weeks; smoking is detrimental to wound healing.    It is normal to have swelling and bruising around the surgical site. The bruising will fade in approximately 10-14 days. Elevate the area to reduce swelling.    Numbness, itchiness and sensitivity to temperature changes can occur after surgery and may take up to 18 months to normalize.      POSSIBLE COMPLICATIONS    BLEEDING:    Leave the bandage in place.  Use tightly rolled up gauze or a cloth to apply direct pressure over the bandage for 20   minutes.  Reapply pressure for an additional 20 minutes if necessary  Call the office or go to the nearest emergency room if pressure fails to stop the bleeding.  Use additional gauze and tape to maintain pressure once the bleeding has stopped.        PAIN:    Post operative pain should slowly get better, never worse.  A severe increase in pain may indicate a problem. Call the office if this occurs.    In case of emergency phone:Dr Plascencia [phone]

## 2024-02-01 NOTE — RESULT ENCOUNTER NOTE
Mr. Anderson,    It was very nice to see you.  You should be able to view your test results.    Your CBC or blood count is normal with no signs of anemia or blood disorders.    Your chemistry panel shows a slightly high blood glucose.  A second diabetes test called the hemoglobin A1c is also just above the normal range at 5.7.  You do not have diabetes you are prone to it so please work hard on exercise and try to get your weight down.    Your blood salts, liver tests, and proteins are normal.  Your uric acid level is low so gout really should not be an issue.    Your creatinine or kidney test remains elevated which it has been for years.  Additionally, you have a slight amount of protein in the urine.  I am not worried about this as hypertension can do this.  However, I think to be proactive we should have you see a kidney specialist at some point.  There is no rush but I would like to do this within the next 6 months if that is okay with you.  They would sit down with you and review everything and give us recommendations as to anything else that needs to be done.    Your total cholesterol is good at 154.  Your HDL is just a bit low but not bad.  The LDL is 83.  Ideally I would like to see this less than 70.  As above, please be sure to exercise and try to get the weight down for this.    Overall the tests look fine.  Please let me know about referring you to the kidney specialist.    Rudy Vernon M.D.

## 2024-02-01 NOTE — PROGRESS NOTES
Betito Anderson , a 78 year old year old male patient, I was asked to see by KEITH Cummins for basal cell carcinoma on left cheek.     Patient has no other skin complaints today.  Remainder of the HPI, Meds, PMH, Allergies, FH, and SH was reviewed in chart.      Past Medical History:   Diagnosis Date    Acute diastolic congestive heart failure (H) 06/2019    hosp fsd, then fu echo ef nl; echo 2023 nl ef    ASCVD (arteriosclerotic cardiovascular disease) 1997    angio with 40% circ lesion; 6/19 hosp for chf, 3 vessel dz on angio but porcelin aorta so ptca done    Atrial fibrillation (H) 10/09/2018    found on routine physical    Basal cell carcinoma     Carotid artery plaque 2005    seen on us, about 50% stenosis, fu 2009 us no significant stenosis    CKD (chronic kidney disease) stage 3, GFR 30-59 ml/min (H)     Elevated blood sugar     Gout     on allopurinol    HTN (hypertension)     Hypercholesteremia     Hyponatremia 2015    felt due to chlorthalidone    Low mean corpuscular volume (MCV)     Near syncope 05/2015    fu est echo low level but neg    Psoriasis     Dr. Marti    Renal mass 06/29/2019    seen on ct chest for sob, needs fu ct for that, fu us done 7/19 and no mass seen, should have fu ct in December, had fu us 3/22 and no fu needed    Screening 2012    abd us no aaa    Syncope 09/2019    hosp fsd, cause not clear, lowered coreg dose; seen by Dr. Lezama of ep and ep study neg, implanted event monitor    V-tach (H) 09/2019    seen on event monitor for fu syncope, then ep study and no inducible vtach       Past Surgical History:   Procedure Laterality Date    CATARACT EXTRACTION  03/2022    CATARACT EXTRACTION  2022    CV CORONARY ANGIOGRAM N/A 07/02/2019    Procedure: Coronary Angiogram;  Surgeon: Donaldo Loera MD;  Location:  [facility]    CV HEART CATHETERIZATION WITH POSSIBLE INTERVENTION N/A 07/05/2019    Procedure: Heart Catheterization with Possible Intervention;  Surgeon: Fritz  Manolo Cutler MD;  Location:  [facility]    CV LEFT HEART CATH N/A 2019    Procedure: Left Heart Cath;  Surgeon: Donaldo Loera MD;  Location:  [facility]    CV LEFT VENTRICULOGRAM N/A 2019    Procedure: Left Ventriculogram;  Surgeon: Donaldo Loera MD;  Location:  [facility]    CV PCI STENT DRUG ELUTING N/A 2019    Procedure: PCI Stent Drug Eluting;  Surgeon: Manolo Hu MD;  Location:  [facility]    CV RIGHT HEART CATH MEASUREMENTS RECORDED N/A 2019    Procedure: Right Heart Cath;  Surgeon: Donaldo Loera MD;  Location:  [facility]    EP LOOP RECORDER IMPLANT N/A 10/11/2019    Procedure: EP Loop Recorder Implant;  Surgeon: Fuad Lezama MD;  Location:  [facility]    EP RIGHT VENTRICULAR RECORDING N/A 10/11/2019    Procedure: EP Ventricular Pacing;  Surgeon: Fuad Lezama MD;  Location:  [facility]    ZZC ANESTH,DX ARTHROSCOPIC PROC KNEE JOINT  2009        Family History   Problem Relation Age of Onset    Coronary Artery Disease Father     Medical History Unknown Mother     Medical History Unknown Maternal Grandfather        Social History     Socioeconomic History    Marital status:      Spouse name: Not on file    Number of children: 2    Years of education: Not on file    Highest education level: Not on file   Occupational History    Occupation: retired   Tobacco Use    Smoking status: Former     Packs/day: 1.00     Years: 30.00     Additional pack years: 0.00     Total pack years: 30.00     Types: Cigarettes     Quit date: 1998     Years since quittin.4    Smokeless tobacco: Never   Substance and Sexual Activity    Alcohol use: Not Currently     Comment: 2 drinks per week    Drug use: No    Sexual activity: Not Currently     Partners: Female   Other Topics Concern    Parent/sibling w/ CABG, MI or angioplasty before 65F 55M? Not  Asked   Social History Narrative    Not on file     Social Determinants of Health     Financial Resource Strain: Low Risk  (1/29/2024)    Financial Resource Strain     Within the past 12 months, have you or your family members you live with been unable to get utilities (heat, electricity) when it was really needed?: No   Food Insecurity: Low Risk  (1/29/2024)    Food Insecurity     Within the past 12 months, did you worry that your food would run out before you got money to buy more?: No     Within the past 12 months, did the food you bought just not last and you didn t have money to get more?: No   Transportation Needs: Low Risk  (1/29/2024)    Transportation Needs     Within the past 12 months, has lack of transportation kept you from medical appointments, getting your medicines, non-medical meetings or appointments, work, or from getting things that you need?: No   Physical Activity: Not on file   Stress: Not on file   Social Connections: Not on file   Interpersonal Safety: Not on file   Housing Stability: Low Risk  (1/29/2024)    Housing Stability     Do you have housing? : Yes     Are you worried about losing your housing?: No       Outpatient Encounter Medications as of 2/1/2024   Medication Sig Dispense Refill    allopurinol (ZYLOPRIM) 300 MG tablet TAKE 1 TABLET DAILY 90 tablet 3    amLODIPine (NORVASC) 5 MG tablet Take 1 tablet (5 mg) by mouth daily 90 tablet 3    aspirin (ASA) 81 MG EC tablet Take 1 tablet (81 mg) by mouth daily 90 tablet 3    calcipotriene (DOVONOX) 0.005 % external cream Mix efudex + calcipotriene equally. Apply BID x 4-10 days. Stop when skin gets red. 60 g 1    carvedilol (COREG) 6.25 MG tablet Take 1 tablet (6.25 mg) by mouth 2 times daily (with meals) 180 tablet 3    empagliflozin (JARDIANCE) 10 MG TABS tablet Take 1 tablet (10 mg) by mouth daily 90 tablet 3    fluorouracil (EFUDEX) 5 % external cream Mix equally with calcipotriene cream. Apply BID x 4-10 days. Stop when skin gets  red. 40 g 0    hydrALAZINE (APRESOLINE) 100 MG tablet Take 1 tablet (100 mg) by mouth 3 times daily 270 tablet 3    isosorbide mononitrate CR (IMDUR) 120 MG 24 HR ER tablet Take 1 tablet (120 mg) by mouth daily 90 tablet 3    ketoconazole (NIZORAL) 2 % external shampoo Massage into wet scalp, let sit 3-5 min, then rinse. Do this 3x weekly. 120 mL 11    losartan (COZAAR) 100 MG tablet Take 1 tablet (100 mg) by mouth daily 90 tablet 3    rivaroxaban ANTICOAGULANT (XARELTO ANTICOAGULANT) 20 MG TABS tablet Take 1 tablet (20 mg) by mouth daily (with dinner) TAKE 1 TABLET DAILY WITH DINNER 90 tablet 1    rosuvastatin (CRESTOR) 40 MG tablet Take 1 tablet (40 mg) by mouth at bedtime 90 tablet 3    torsemide (DEMADEX) 20 MG tablet Take 1 tablet (20 mg) Monday, Wednesday, Friday and take 2 tablets (40 mg) other days by mouth 135 tablet 3    torsemide (DEMADEX) 20 MG tablet TAKE 1 TABLET BY MOUTH ON MONDAY, WEDNESDAY AND FRIDAY AND 2 TABLETS ON ALL OTHER DAYS 144 tablet 0     No facility-administered encounter medications on file as of 2/1/2024.             Review Of Systems  Skin: As above  Eyes: negative  Ears/Nose/Throat: negative  Respiratory: No shortness of breath, dyspnea on exertion, cough, or hemoptysis  Cardiovascular: negative  Gastrointestinal: negative  Genitourinary: negative  Musculoskeletal: negative  Neurologic: negative  Psychiatric: negative  Hematologic/Lymphatic/Immunologic: negative  Endocrine: negative      O:   NAD, WDWN, Alert & Oriented, Mood & Affect wnl, Vitals stable   General appearance gretchen ii   Vitals stable   Alert, oriented and in no acute distress   L cheek 1.5cm pink pearly papule    Stuck on papules and brown macules on trunk and ext    Red papules on trunk   Flesh colored papules on trunk          Eyes: Conjunctivae/lids:Normal     ENT: Lips, buccal mucosa, tongue: normal    MSK:Normal    Cardiovascular: peripheral edema none    Pulm: Breathing Normal    Neuro/Psych: Orientation:Normal;  Mood/Affect:Normal      A/P:  1. Seborrheic keratosis, lentigo, angioma, dermal nevus  2. L cheek basal cell carcinoma   It was a pleasure speaking to Betito PENNIE Anderson today.  Previous clinic  notes and pertinent laboratory tests were reviewed prior to Betito Anderson's visit.  Signs and Symptoms of skin cancer discussed with patient.  Patient encouraged to perform monthly skin exams.  UV precautions reviewed with patient.  Risks of non-melanoma skin cancer discussed with patient   Return to clinic 6 months  PROCEDURE NOTE  L cheek basal cell carcinoma   MOHS:   Location    The rationale for Mohs surgery was discussed with the patient and consent was obtained.  The risks and benefits as well as alternatives to therapy were discussed, in detail.  Specifically, the risks of infection, scarring, bleeding, prolonged wound healing, incomplete removal, allergy to anesthesia, nerve injury and recurrence were addressed.  Indication for Mohs was Location. Prior to the procedure, the treatment site was clearly identified and, if available, confirmed with previous photos and confirmed by the patient   All components of the Universal Protocol/PAUSE rule were completed.  The Mohs surgeon operated in two distinct and integrated capacities as the surgeon and pathologist.      The area was prepped with Betasept.  A rim of normal appearing skin was marked circumferentially around the lesion.  The area was infiltrated with local anesthesia.  The tumor was first debulked to remove all clinically apparent tumor.  An incision following the standard Mohs approach was done and the specimen was oriented,mapped and placed in 1 block(s).  Each specimen was then chromacoded and processed in the Mohs laboratory using standard Mohs technique and submitted for frozen section histology.  Frozen section analysis showed no residual tumor but CLEAR MARGINS.      The tumor was excised using standard Mohs technique in 1 stages(s).  CLEAR MARGINS  OBTAINED and Final defect size was 2.2 cm.     We discussed the options for wound management in full with the patient including risks/benefits/ possible outcomes.      REPAIR COMPLEX: Because of the tightness of the surrounding skin and Because of the size and full thickness nature of the defect, Because of the tightness of the surrounding skin, To maintain form and function, and In order to avoid distortion, a complex closure was planned. After LE anesthesia and prep, Burow's triangles were excised in the relaxed skin tension lines. The wound edges were widely undermined greater than width of the defect on both sides by dissection in the subcutaneous plane until adequate tissue mobility was obtained. Hemostasis was obtained. The wound edges were closed in a layered fashion using Vicryl and Fast Absorbing Plain Gut sutures. Postoperative length was 6 cm.   EBL minimal; complications none; wound care routine.  The patient was discharged in good condition and will return in one week for wound evaluation.

## 2024-02-06 ENCOUNTER — TELEPHONE (OUTPATIENT)
Dept: FAMILY MEDICINE | Facility: CLINIC | Age: 79
End: 2024-02-06
Payer: COMMERCIAL

## 2024-02-06 NOTE — TELEPHONE ENCOUNTER
Spoke with pt, he state that he is going to review results first and he will send  a [employer] message with his answer.

## 2024-02-06 NOTE — TELEPHONE ENCOUNTER
Please call the patient.  He never reviewed his lab results with the message that I sent.  Please have them review that and let me know if it is okay to refer to nephrology.    Thank you    Rudy Vernon M.D.

## 2024-02-07 ENCOUNTER — ALLIED HEALTH/NURSE VISIT (OUTPATIENT)
Dept: DERMATOLOGY | Facility: CLINIC | Age: 79
End: 2024-02-07
Payer: COMMERCIAL

## 2024-02-07 DIAGNOSIS — Z48.01 DRESSING CHANGE OR REMOVAL, SURGICAL WOUND: Primary | ICD-10-CM

## 2024-02-07 PROCEDURE — 99207 PR NO CHARGE NURSE ONLY: CPT | Performed by: STUDENT IN AN ORGANIZED HEALTH CARE EDUCATION/TRAINING PROGRAM

## 2024-02-07 NOTE — PROGRESS NOTES
Betito Anderson comes into clinic today at the request of Dr. Plascencia Ordering Provider for Dressing Change   .    This service provided today was under the supervising provider of the day Dr. Simons, who was available if needed.    Please see providers note on 2/1/24 for orders  Patient returned to clinic for post surgery 1 week follow up bandage change. Patient has no complaints, denies pain.  Bandage removed from L cheek. Area cleansed with wound cleanser. Site is healing with no signs of infection, wound edges approximating well.   Reapplied new steri strips and paper tape.     Advised to watch for signs and symptons of infection; spreading redness, increasing pain, drainage, odor, fever.   Call or report promptly to clinic if any of these symptoms are present.    Wound care post op instructions given and discussed for 14 day bandage removal and continued incision/scar care.    Patient verbalized understanding.

## 2024-02-07 NOTE — PATIENT INSTRUCTIONS
WOUND CARE INSTRUCTIONS  for  ONE WEEK AFTER SURGERY          Leave flat bandage on your skin for one week after today s bandage change.  In one week when you remove the bandage, you may resume your regular skin care routine, including washing with mild soap and water, applying moisturizer, make-up and sunscreen.    If there are any open or bleeding areas at the incision/graft site you should begin to cover the area with a bandage daily as follows:    Clean and dry the area with plain tap water using a Q-tip or sterile gauze pad.  Apply Polysporin or Bacitracin ointment to the open area.  Cover the wound with a band-aid or a sterile non-stick gauze pad and micropore paper tape.         SIGNS OF INFECTION  - If you notice any of these signs of infection, call your doctor right away: expanding redness around the wound.  - Yellow or greenish-colored pus or cloudy wound drainage.    - Red streaking spreading from the wound.  - Increased swelling, tenderness, or pain around the wound.   - Fever.    Please remember that yellow and clear drainage from a wound can be normal and related to normal wound healing.  Isolated drainage from a wound without a combination of the above features does not indicate infection.       *Once the bandages are removed, the scar will be red and firm (especially in the lip/chin area). This is normal and will fade in time. It might take 6-12 months for this to happen.     *Massaging the area will help the scar soften and fade quicker. Begin to massage the area one month after the bandages have been removed. To massage apply pressure directly and firmly over the scar with the fingertips and move in a circular motion. Massage the area for a few minutes several times a day. Continue to massage the site for several months.    *Approximately 6-8 weeks after surgery it is not uncommon to see the formation of  tender pimple-like  bump along the scar. This is normal. As the scar continues to mature and  the stitches underneath the skin begin to dissolve, this might occur. Do not pick or squeeze, this will resolve on it s own. Should one break open producing a small amount of drainage, apply Polysporin or Bacitracin ointment a few times a day until the wound is completely healed.    *Numbness in the surgical area is expected. It might take 12-18 months for the feeling to return to normal. During this time sensations of itchiness, tingling and occasional sharp pains might be noted. These feelings are normal and will subside once the nerves have completely healed.         IN CASE OF EMERGENCY: Dr Plascencia [phone]     If you were seen in [place] call: [phone]    If you were seen in [place] call: [phone]

## 2024-02-09 ENCOUNTER — TELEPHONE (OUTPATIENT)
Dept: MULTI SPECIALTY CLINIC | Facility: CLINIC | Age: 79
End: 2024-02-09
Payer: COMMERCIAL

## 2024-02-09 NOTE — TELEPHONE ENCOUNTER
[facility] Call Center    Phone Message    May a detailed message be left on voicemail: no     Reason for Call: Other: Please place lab order for upcoming Nephrology appointment per Dr. Ramos. Thank you.     Action Taken: CS NEPHROLOGY    Travel Screening: Not Applicable

## 2024-02-22 ENCOUNTER — TELEPHONE (OUTPATIENT)
Dept: PHARMACY | Facility: OTHER | Age: 79
End: 2024-02-22
Payer: COMMERCIAL

## 2024-02-22 NOTE — TELEPHONE ENCOUNTER
MTM Recruitment: [facility]  insurance     Referral outreach attempt #1 on February 22, 2024      Outcome: patient opted out    Raf Velez PharmD  Medication Therapy Management Pharmacist  [facility]  Office phone: [phone]

## 2024-03-04 ENCOUNTER — OFFICE VISIT (OUTPATIENT)
Dept: CARDIOLOGY | Facility: CLINIC | Age: 79
End: 2024-03-04
Payer: COMMERCIAL

## 2024-03-04 VITALS
SYSTOLIC BLOOD PRESSURE: 143 MMHG | OXYGEN SATURATION: 95 % | HEIGHT: 67 IN | BODY MASS INDEX: 33.45 KG/M2 | DIASTOLIC BLOOD PRESSURE: 64 MMHG | HEART RATE: 64 BPM | WEIGHT: 213.1 LBS

## 2024-03-04 DIAGNOSIS — I10 BENIGN ESSENTIAL HYPERTENSION: ICD-10-CM

## 2024-03-04 PROCEDURE — 99214 OFFICE O/P EST MOD 30 MIN: CPT | Performed by: INTERNAL MEDICINE

## 2024-03-04 RX ORDER — EZETIMIBE 10 MG/1
10 TABLET ORAL DAILY
Qty: 90 TABLET | Refills: 3 | Status: SHIPPED | OUTPATIENT
Start: 2024-03-04

## 2024-03-04 NOTE — PROGRESS NOTES
CARDIOLOGY CLINIC CONSULTATION    PRIMARY CARE PHYSICIAN:  Rudy Vernon    HISTORY OF PRESENT ILLNESS:  Mr. Anderson is a delightful 78-year-old gentleman who has been seeing me since summer of 2019 and has a past medical history significant for the following:     Chronic atrial fibrillation since 2018 for which he is on anticoagulation  In 07/2019 he presented to the hospital with acute hypertensive emergency and acute LV dysfunction. EF was down to 30%, but normalized with control of blood pressure.    Angiogram that admission showed a tight lesion in the mid LAD spanning into the diagonal and also had other small-vessel disease and a subtotally occluded right coronary artery with robust collaterals from the LAD.  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  He underwent PCI of his LAD bifurcation into the diagonal. Subsequently, a nuclear stress test had shown mild basilar inferolateral ischemia without angina, and as such, this has been under conservative management.    Obesity, hypertension, dyslipidemia, chronic kidney disease and diabetes.    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  Subsequently, he had a loop recorder implanted.  On the loop he has been having some 4 second pauses, but he has been asymptomatic since.   Remains in permanent atrial fibrillation    Today is here for routine follow-up.  Denies any new cardiovascular symptoms.  No angina syncope presyncope.  No heart failure symptoms.  NYHA class II.  Denies edema.  Does not check his blood pressure at home.    PAST MEDICAL HISTORY:  Past Medical History:   Diagnosis Date    Acute diastolic congestive heart failure (H) 06/2019    hosp fsd, then fu echo ef nl; echo 2023 nl ef    ASCVD (arteriosclerotic cardiovascular disease) 1997    angio with 40% circ  lesion; 6/19 hosp for chf, 3 vessel dz on angio but porcelin aorta so ptca done    Atrial fibrillation (H) 10/09/2018    found on routine physical    Basal cell carcinoma     Carotid artery plaque 2005    seen on us, about 50% stenosis, fu 2009 us no significant stenosis    CKD (chronic kidney disease) stage 3, GFR 30-59 ml/min (H)     Elevated blood sugar     Gout     on allopurinol    HTN (hypertension)     Hypercholesteremia     Hyponatremia 2015    felt due to chlorthalidone    Low mean corpuscular volume (MCV)     Near syncope 05/2015    fu est echo low level but neg    Psoriasis     Dr. Marti    Renal mass 06/29/2019    seen on ct chest for sob, needs fu ct for that, fu us done 7/19 and no mass seen, should have fu ct in December, had fu us 3/22 and no fu needed    Screening 2012    abd us no aaa    Syncope 09/2019    hosp fsd, cause not clear, lowered coreg dose; seen by Dr. Lezama of ep and ep study neg, implanted event monitor    V-tach (H) 09/2019    seen on event monitor for fu syncope, then ep study and no inducible vtach       MEDICATIONS:  Current Outpatient Medications   Medication    allopurinol (ZYLOPRIM) 300 MG tablet    amLODIPine (NORVASC) 5 MG tablet    aspirin (ASA) 81 MG EC tablet    calcipotriene (DOVONOX) 0.005 % external cream    carvedilol (COREG) 6.25 MG tablet    empagliflozin (JARDIANCE) 10 MG TABS tablet    fluorouracil (EFUDEX) 5 % external cream    hydrALAZINE (APRESOLINE) 100 MG tablet    isosorbide mononitrate CR (IMDUR) 120 MG 24 HR ER tablet    ketoconazole (NIZORAL) 2 % external shampoo    losartan (COZAAR) 100 MG tablet    rivaroxaban ANTICOAGULANT (XARELTO ANTICOAGULANT) 20 MG TABS tablet    rosuvastatin (CRESTOR) 40 MG tablet    torsemide (DEMADEX) 20 MG tablet    torsemide (DEMADEX) 20 MG tablet     No current facility-administered medications for this visit.       SOCIAL HISTORY:  I have reviewed this patient's social history and updated it with pertinent information if  needed. Betito Anderson  reports that he quit smoking about 25 years ago. His smoking use included cigarettes. He has a 30 pack-year smoking history. He has never used smokeless tobacco. He reports that he does not currently use alcohol. He reports that he does not use drugs.    PHYSICAL EXAM:  Pulse:  [64] 64  BP: (143-148)/(64-71) 143/64  SpO2:  [95 %] 95 %  213 lbs 1.6 oz    Constitutional: alert, no distress  Respiratory: Good bilateral air entry  Cardiovascular: Normal regular heart sounds no edema  GI: nondistended  Neuropsychiatric: appropriate affact    ASSESSMENT: Pertinent issues addressed/ reviewed during this cardiology visit  Heart failure with recovered ejection fraction  Ischemic and hypertensive cardiomyopathy  Coronary artery disease status post stenting  Uncontrolled cardiovascular risk factors and hypertension  Atrial fibrillation and loop recorder  Chronic kidney disease    RECOMMENDATIONS:  Overall the patient is doing okay from a cardiac standpoint.  Denies any bleeding or cardiac symptoms.  Continue anticoagulation for atrial fibrillation.  He denies any bleeding problems.  Last echocardiogram in 2023 shows normal LV function.  Continue current medications.  I have told him to monitor blood pressure at home.  Continue 40 mg of Crestor.  I have told him to watch his diet.  LDL and triglyceride is still slightly above goal.  Please start ezetimibe 10 mg daily.  He is on both aspirin 81 and DOAC per prior discussions.  If bleeding this changes, I need to be immediately alerted  He is asymptomatic from an A-fib standpoint.  Tolerating beta-blockers okay despite some pauses during A-fib.  Follow-up with Melvi in about a year from now.  Sooner if anything changes clinically.    Blood pressure continues to remain uncontrolled, we will increase amlodipine to 10 mg daily.    It was a pleasure seeing this patient in clinic today. Please do not hesitate to contact me with any future questions.      JOE Estevez, [facility]  Cardiology - [facility] Heart  March 4, 2024    Review of the result(s) of each unique test - Last CBC BMP lipids echo     The level of medical decision making during this visit was of moderate complexity.    This note was completed in part using dictation via the Dragon voice recognition software. Some word and grammatical errors may occur and must be interpreted in the appropriate clinical context.  If there are any questions pertaining to this issue, please contact me for further clarification.

## 2024-03-04 NOTE — LETTER
3/4/2024    Rudy Vernon MD  [address]  [facility] [address]    RE: Betito Leeon       Dear Colleague,     I had the pleasure of seeing Betito Anderson in the [facility].  CARDIOLOGY CLINIC CONSULTATION    PRIMARY CARE PHYSICIAN:  Rudy Vernon    HISTORY OF PRESENT ILLNESS:  Mr. Anderson is a delightful 78-year-old gentleman who has been seeing me since summer of 2019 and has a past medical history significant for the following:     Chronic atrial fibrillation since 2018 for which he is on anticoagulation  In 07/2019 he presented to the hospital with acute hypertensive emergency and acute LV dysfunction. EF was down to 30%, but normalized with control of blood pressure.    Angiogram that admission showed a tight lesion in the mid LAD spanning into the diagonal and also had other small-vessel disease and a subtotally occluded right coronary artery with robust collaterals from the LAD.  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  He underwent PCI of his LAD bifurcation into the diagonal. Subsequently, a nuclear stress test had shown mild basilar inferolateral ischemia without angina, and as such, this has been under conservative management.    Obesity, hypertension, dyslipidemia, chronic kidney disease and diabetes.    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  Subsequently, he had a loop recorder implanted.  On the loop he has been having some 4 second pauses, but he has been asymptomatic since.   Remains in permanent atrial fibrillation    Today is here for routine follow-up.  Denies any new cardiovascular symptoms.  No angina syncope presyncope.  No heart failure symptoms.  NYHA class II.  Denies edema.  Does not check his blood pressure at home.    PAST MEDICAL HISTORY:  Past Medical  History:   Diagnosis Date    Acute diastolic congestive heart failure (H) 06/2019    hosp fsd, then fu echo ef nl; echo 2023 nl ef    ASCVD (arteriosclerotic cardiovascular disease) 1997    angio with 40% circ lesion; 6/19 hosp for chf, 3 vessel dz on angio but porcelin aorta so ptca done    Atrial fibrillation (H) 10/09/2018    found on routine physical    Basal cell carcinoma     Carotid artery plaque 2005    seen on us, about 50% stenosis, fu 2009 us no significant stenosis    CKD (chronic kidney disease) stage 3, GFR 30-59 ml/min (H)     Elevated blood sugar     Gout     on allopurinol    HTN (hypertension)     Hypercholesteremia     Hyponatremia 2015    felt due to chlorthalidone    Low mean corpuscular volume (MCV)     Near syncope 05/2015    fu est echo low level but neg    Psoriasis     Dr. Marti    Renal mass 06/29/2019    seen on ct chest for sob, needs fu ct for that, fu us done 7/19 and no mass seen, should have fu ct in December, had fu us 3/22 and no fu needed    Screening 2012    abd us no aaa    Syncope 09/2019    hosp fsd, cause not clear, lowered coreg dose; seen by Dr. Lezama of ep and ep study neg, implanted event monitor    V-tach (H) 09/2019    seen on event monitor for fu syncope, then ep study and no inducible vtach       MEDICATIONS:  Current Outpatient Medications   Medication    allopurinol (ZYLOPRIM) 300 MG tablet    amLODIPine (NORVASC) 5 MG tablet    aspirin (ASA) 81 MG EC tablet    calcipotriene (DOVONOX) 0.005 % external cream    carvedilol (COREG) 6.25 MG tablet    empagliflozin (JARDIANCE) 10 MG TABS tablet    fluorouracil (EFUDEX) 5 % external cream    hydrALAZINE (APRESOLINE) 100 MG tablet    isosorbide mononitrate CR (IMDUR) 120 MG 24 HR ER tablet    ketoconazole (NIZORAL) 2 % external shampoo    losartan (COZAAR) 100 MG tablet    rivaroxaban ANTICOAGULANT (XARELTO ANTICOAGULANT) 20 MG TABS tablet    rosuvastatin (CRESTOR) 40 MG tablet    torsemide (DEMADEX) 20 MG tablet     torsemide (DEMADEX) 20 MG tablet     No current facility-administered medications for this visit.       SOCIAL HISTORY:  I have reviewed this patient's social history and updated it with pertinent information if needed. Betito Anderson  reports that he quit smoking about 25 years ago. His smoking use included cigarettes. He has a 30 pack-year smoking history. He has never used smokeless tobacco. He reports that he does not currently use alcohol. He reports that he does not use drugs.    PHYSICAL EXAM:  Pulse:  [64] 64  BP: (143-148)/(64-71) 143/64  SpO2:  [95 %] 95 %  213 lbs 1.6 oz    Constitutional: alert, no distress  Respiratory: Good bilateral air entry  Cardiovascular: Normal regular heart sounds no edema  GI: nondistended  Neuropsychiatric: appropriate affact    ASSESSMENT: Pertinent issues addressed/ reviewed during this cardiology visit  Heart failure with recovered ejection fraction  Ischemic and hypertensive cardiomyopathy  Coronary artery disease status post stenting  Uncontrolled cardiovascular risk factors and hypertension  Atrial fibrillation and loop recorder  Chronic kidney disease    RECOMMENDATIONS:  Overall the patient is doing okay from a cardiac standpoint.  Denies any bleeding or cardiac symptoms.  Continue anticoagulation for atrial fibrillation.  He denies any bleeding problems.  Last echocardiogram in 2023 shows normal LV function.  Continue current medications.  I have told him to monitor blood pressure at home.  Continue 40 mg of Crestor.  I have told him to watch his diet.  LDL and triglyceride is still slightly above goal.  Please start ezetimibe 10 mg daily.  He is on both aspirin 81 and DOAC per prior discussions.  If bleeding this changes, I need to be immediately alerted  He is asymptomatic from an A-fib standpoint.  Tolerating beta-blockers okay despite some pauses during A-fib.  Follow-up with Melvi in about a year from now.  Sooner if anything changes clinically.    Blood  pressure continues to remain uncontrolled, we will increase amlodipine to 10 mg daily.    It was a pleasure seeing this patient in clinic today. Please do not hesitate to contact me with any future questions.     JOE Estevez, [facility]  Cardiology - [facility]  March 4, 2024    Review of the result(s) of each unique test - Last CBC BMP lipids echo     The level of medical decision making during this visit was of moderate complexity.    This note was completed in part using dictation via the Dragon voice recognition software. Some word and grammatical errors may occur and must be interpreted in the appropriate clinical context.  If there are any questions pertaining to this issue, please contact me for further clarification.      Thank you for allowing me to participate in the care of your patient.      Sincerely,     Mendez Hidalgo MD     [facility]  cc:   Mendez Hidalgo MD  [address]  [place] [address]

## 2024-03-07 DIAGNOSIS — I50.30 HEART FAILURE WITH PRESERVED EJECTION FRACTION, NYHA CLASS I (H): ICD-10-CM

## 2024-03-07 DIAGNOSIS — I10 BENIGN ESSENTIAL HYPERTENSION: ICD-10-CM

## 2024-03-07 RX ORDER — TORSEMIDE 20 MG/1
TABLET ORAL
Qty: 144 TABLET | Refills: 0 | Status: SHIPPED | OUTPATIENT
Start: 2024-03-07

## 2024-05-04 ENCOUNTER — HOSPITAL ENCOUNTER (EMERGENCY)
Facility: CLINIC | Age: 79
End: 2024-05-04
Payer: COMMERCIAL

## 2024-05-07 DIAGNOSIS — N18.30 STAGE 3 CHRONIC KIDNEY DISEASE, UNSPECIFIED WHETHER STAGE 3A OR 3B CKD (H): ICD-10-CM

## 2024-05-07 DIAGNOSIS — E55.9 VITAMIN D DEFICIENCY: Primary | ICD-10-CM

## 2024-05-15 ENCOUNTER — LAB (OUTPATIENT)
Dept: LAB | Facility: CLINIC | Age: 79
End: 2024-05-15
Payer: COMMERCIAL

## 2024-05-15 DIAGNOSIS — N18.30 STAGE 3 CHRONIC KIDNEY DISEASE, UNSPECIFIED WHETHER STAGE 3A OR 3B CKD (H): ICD-10-CM

## 2024-05-15 DIAGNOSIS — E55.9 VITAMIN D DEFICIENCY: ICD-10-CM

## 2024-05-15 LAB
ALBUMIN UR-MCNC: 100 MG/DL
APPEARANCE UR: CLEAR
BACTERIA #/AREA URNS HPF: ABNORMAL /HPF
BILIRUB UR QL STRIP: NEGATIVE
COLOR UR AUTO: YELLOW
ERYTHROCYTE [DISTWIDTH] IN BLOOD BY AUTOMATED COUNT: 17 % (ref 10–15)
GLUCOSE UR STRIP-MCNC: 250 MG/DL
HCT VFR BLD AUTO: 45.9 % (ref 40–53)
HGB BLD-MCNC: 13.8 G/DL (ref 13.3–17.7)
HGB UR QL STRIP: NEGATIVE
KETONES UR STRIP-MCNC: NEGATIVE MG/DL
LEUKOCYTE ESTERASE UR QL STRIP: NEGATIVE
MCH RBC QN AUTO: 25.5 PG (ref 26.5–33)
MCHC RBC AUTO-ENTMCNC: 30.1 G/DL (ref 31.5–36.5)
MCV RBC AUTO: 85 FL (ref 78–100)
NITRATE UR QL: NEGATIVE
PH UR STRIP: 6 [PH] (ref 5–7)
PLATELET # BLD AUTO: 248 10E3/UL (ref 150–450)
RBC # BLD AUTO: 5.41 10E6/UL (ref 4.4–5.9)
RBC #/AREA URNS AUTO: ABNORMAL /HPF
SP GR UR STRIP: 1.01 (ref 1–1.03)
SQUAMOUS #/AREA URNS AUTO: ABNORMAL /LPF
UROBILINOGEN UR STRIP-ACNC: 0.2 E.U./DL
WBC # BLD AUTO: 7.2 10E3/UL (ref 4–11)
WBC #/AREA URNS AUTO: ABNORMAL /HPF

## 2024-05-15 PROCEDURE — 81001 URINALYSIS AUTO W/SCOPE: CPT

## 2024-05-15 PROCEDURE — 83970 ASSAY OF PARATHORMONE: CPT

## 2024-05-15 PROCEDURE — 82570 ASSAY OF URINE CREATININE: CPT

## 2024-05-15 PROCEDURE — 84156 ASSAY OF PROTEIN URINE: CPT

## 2024-05-15 PROCEDURE — 80069 RENAL FUNCTION PANEL: CPT

## 2024-05-15 PROCEDURE — 85027 COMPLETE CBC AUTOMATED: CPT

## 2024-05-15 PROCEDURE — 82306 VITAMIN D 25 HYDROXY: CPT

## 2024-05-15 PROCEDURE — 82043 UR ALBUMIN QUANTITATIVE: CPT

## 2024-05-15 PROCEDURE — 36415 COLL VENOUS BLD VENIPUNCTURE: CPT

## 2024-05-16 LAB
ALBUMIN MFR UR ELPH: 55.1 MG/DL
ALBUMIN SERPL BCG-MCNC: 4.1 G/DL (ref 3.5–5.2)
ANION GAP SERPL CALCULATED.3IONS-SCNC: 13 MMOL/L (ref 7–15)
BUN SERPL-MCNC: 26 MG/DL (ref 8–23)
CALCIUM SERPL-MCNC: 8.7 MG/DL (ref 8.8–10.2)
CHLORIDE SERPL-SCNC: 101 MMOL/L (ref 98–107)
CREAT SERPL-MCNC: 1.58 MG/DL (ref 0.67–1.17)
CREAT UR-MCNC: 42.8 MG/DL
CREAT UR-MCNC: 42.8 MG/DL
DEPRECATED HCO3 PLAS-SCNC: 23 MMOL/L (ref 22–29)
EGFRCR SERPLBLD CKD-EPI 2021: 44 ML/MIN/1.73M2
GLUCOSE SERPL-MCNC: 97 MG/DL (ref 70–99)
MICROALBUMIN UR-MCNC: 350 MG/L
MICROALBUMIN/CREAT UR: 817.76 MG/G CR (ref 0–17)
PHOSPHATE SERPL-MCNC: 3.5 MG/DL (ref 2.5–4.5)
POTASSIUM SERPL-SCNC: 4 MMOL/L (ref 3.4–5.3)
PROT/CREAT 24H UR: 1.29 MG/MG CR (ref 0–0.2)
PTH-INTACT SERPL-MCNC: 188 PG/ML (ref 15–65)
SODIUM SERPL-SCNC: 137 MMOL/L (ref 135–145)
VIT D+METAB SERPL-MCNC: 11 NG/ML (ref 20–50)

## 2024-05-22 ENCOUNTER — OFFICE VISIT (OUTPATIENT)
Dept: NEPHROLOGY | Facility: CLINIC | Age: 79
End: 2024-05-22
Attending: INTERNAL MEDICINE
Payer: COMMERCIAL

## 2024-05-22 VITALS
OXYGEN SATURATION: 95 % | WEIGHT: 204.6 LBS | SYSTOLIC BLOOD PRESSURE: 145 MMHG | DIASTOLIC BLOOD PRESSURE: 73 MMHG | HEART RATE: 68 BPM | BODY MASS INDEX: 32.04 KG/M2

## 2024-05-22 DIAGNOSIS — N18.30 STAGE 3 CHRONIC KIDNEY DISEASE, UNSPECIFIED WHETHER STAGE 3A OR 3B CKD (H): ICD-10-CM

## 2024-05-22 DIAGNOSIS — R80.9 PROTEINURIA, UNSPECIFIED TYPE: ICD-10-CM

## 2024-05-22 PROCEDURE — 99204 OFFICE O/P NEW MOD 45 MIN: CPT | Performed by: INTERNAL MEDICINE

## 2024-05-22 ASSESSMENT — PAIN SCALES - GENERAL: PAINLEVEL: NO PAIN (0)

## 2024-05-22 NOTE — PATIENT INSTRUCTIONS
Send me blood pressure readings on mychart    Chronic Kidney Disease    The role of the kidneys is to remove waste products and extra water from the blood. When the kidneys don't work as they should, waste products start to build up in the blood. This is called chronic kidney disease (CKD). CKD means that you have kidney damage or a decrease in kidney function lasting at least 3 months. CKD allows extra water, waste, and toxins to build up in the body. This can eventually become life-threatening. You might need dialysis or a kidney transplant to stay alive. This most severe form is called end stage renal disease. Kidney disease may lead to anemia, bone disease and other complications.    Diabetes is the leading causes of chronic renal failure. Other causes include high blood pressure, hardening of the arteries (atherosclerosis), lupus, inflammation of the blood vessels (vasculitis), and past viral or bacterial infections. Certain over-the-counter pain medicines can cause renal failure when taken often over a long period of time. These include naproxen, Celebrex, ibuprofen, Advil, Aleve, Excedrin, Motrin and other medications in the NSAIDs (nonsteroidal anti-inflammatory drugs class). Tylenol or acetaminophen is safe to use in kidney disease. Controlling your diabetes, high blood pressure and other chronic illness such as heart failure will help slow down progression or worsening of your kidney function.    At Home:    The following guidelines will help you care for yourself at home:      If you have diabetes, talk with your healthcare provider about keeping your blood sugar under control. Ask if you need to make and changes to your diet, lifestyle, or medicines.      If you have high blood pressure:    o Take prescribed medicine to lower your blood pressure to the recommended goal of less than 130/80 or what your provider recommends as your personal goal.    o Start a regular exercise program that you enjoy. Check  with your healthcare provider to be sure your planned exercise program is right for you.    o Eat less salt (sodium). Your healthcare provider can tell you how much salt per day is safe for you.      If you are overweight, talk with your healthcare provider about a weight loss plan.      If you smoke, you must quit. Smoking makes kidney disease worse and puts you at risk for developing other serious illnesses. Talk with your healthcare provider about ways to help you quit. For more information, visit the following links:    o www.smokefree.gov    o www.cancer.org/healthy/stayawayfromtobacco/guidetoquittingsmoking/      You may need to follow a special diet. Be sure you understand yours. In general, you may need to limit protein intake (always check with your provider), potassium, and phosphorus. You may need to limit how much fluid you drink if you are retaining fluid or have congestive heart failure (CHF).      CKD is a risk factor for heart disease. Talk with your healthcare provider about any other risk factors you might have and what you can do to lessen them.      Talk with your healthcare provider about any medicines you are taking to find out if they need to be reduced or stopped due to lower kidney function.      For your own safety, check with your doctor before taking any medicines or supplements. Don't use the following over-the-counter medicines. Or consult your healthcare provider before using them:    o Aspirin and NSAIDs such as ibuprofen or naproxen. Using acetaminophen for fever or pain is OK.    o Laxatives and antacids containing magnesium or aluminum    o Fleet or phospho soda enemas containing phosphorus    o Certain stomach acid-blocking medicine such as cimetidine or ranitidine    o Decongestants containing pseudoephedrine    o Herbal or protein supplements    Understanding Your Blood Pressure    People who develop chronic kidney disease may have some or all of the following tests. This sheet  is to help you understand the results:    Blood Pressure: Achieving the blood pressure goals identified by your kidney doctor is very important in slowing the progression of your kidney disease. High blood pressure causes more damage to your kidneys. Always take blood pressure medications as directed. Other steps to follow may include cutting down on the amount of salt in your diet, losing excess weight and following a regular exercise program.    Chronic Kidney Disease Stages:    Stage 1: GFR >90    Stage 2: GFR 60-89    Stage 3: GFR 30-59 (divided into stage 3a and 3b)    Stage 4: GFR 15-29    Stage 5: <15    People start dialysis around GFR 10-12.    Some people get a kidney transplant before needing dialysis with careful monitoring and planning.    Understanding Your Labs    Serum Creatinine: Creatinine is a waste product in your blood that is made in the muscle as a byproduct. It is normally filtered and removed from your blood by your kidneys, but when kidney function slows down, the creatinine level rises and thus diagnoses kidney disease. (Lab Normal Range: Male: 0.5--1.3, Female: 0.5--1.1)    Glomerular Filtration Rate (GFR): Your GFR tells how much kidney function you have. It is calculated from your blood level of creatinine. It is like a percentage of kidney function (Lab Normal Range: Equal to or greater than 60)    BUN (Blood Urea Nitrogen): BUN is a waste product in your blood that is normally removed from your body by the kidneys. When your kidney function slows down or if you become dehydrated, the BUN levels rise. (Lab Normal Range: 7--24)    Potassium: Potassium is a mineral in your blood that helps your heart and muscles work properly, too much or too little potassium can be harmful to your heart and other muscles in your body. Potassium levels can be controlled by careful dietary restrictions, if you are instructed to do this by your care team. We can also have you see our dietician and give you  more information. Certain medications can increase and lower potassium. High potassium can cause heart rhythm issues and even cardiac arrest in severe cases. (Lab Normal Range: 3.4--5.3)    Phosphorus: Failing kidneys do not remove phosphorus efficiently. A high phosphorus level can lead to high parathyroid gland activity and long-term lead to weak bones. If your level is too high, your kidney doctor may ask you to reduce your intake of foods that are high in phosphorus and take medications called phosphorus binders with your meals and snacks. There is high phosphorus in dairy and meat, and many processed foods. (Lab Normal Range: 2.5--4.5)    Calcium: Calcium is a mineral that is important for strong bones. To help balance the amount of calcium in your blood, your kidney doctor may ask you to take calcium supplements and Vitamin D. Vitamin D helps your body absorb calcium. Take only the supplements and medications recommended by your kidney doctor. Too much vitamin D or calcium supplementation may be harmful. (Lab Normal Range: 8.5--10.1) Parathyroid Hormone (PTH): This hormone is a marker for your bone health. The parathyroid gland helps to maintain calcium and phosphorus levels. High levels may result from a imbalance of calcium and phosphorus in your body that, over time, can cause bone disease. Your kidney doctor may order a special prescription form of Vitamin D to help lower your PTH (Lab Normal Range: 18-80)    Hemoglobin: Hemoglobin is the part of red blood cells that carries oxygen from your lungs to all parts of your body. A low hemoglobin level indicates too few red blood cells, which is called anemia. Anemia    can make you feel tired or have a low energy level. If you have anemia, you may need treatment with iron supplements and/ or a hormone called erythropoietin (EPO) which is an injection under the skin. Your hemoglobin is monitored carefully to ensure the correct doses of iron and EPO is  prescribed. (Lab Normal Range: Males: 12-17, Females: 11.5-16)    TSAT and Serum Ferritin: Your TSAT (% iron saturation) and serum ferritin are measures of iron in your body. Abnormal values may indicate iron deficiency. Your kidney doctor may recommend iron supplements when needed. (Lab Normal Range: TSAT: 15--46; Serum Ferritin: 26--388)    Cholesterol tests: Patients with kidney disease have an increased risk for cardiovascular disease (heart disease), therefore it is important that your cholesterol is well controlled. Dietary changes, exercise, and cholesterol lowering medication can lower cholesterol levels and decrease cardiovascular risk.    -Total Cholesterol: Cholesterol is a fat-like substance found in your blood. A high cholesterol level may increase your chance of having heart and circulation problems. For many patients, the target level is less than 200. -HDL Cholesterol: HDL is a type of 'good' cholesterol that protects your heart. For many patients, the target level for HDL is above 40. -LDL Cholesterol: LDL is a type of 'bad' cholesterol. A high LDL level may increase your chance of having heart and circulation problems. For many patients, a good level for LDL cholesterol is below 100, but some patients may have an even lower goal. -Triglyceride: Triglyceride is a type of fat found in your body. A high triglyceride level along with high levels of total and LDL cholesterol may increase your chance of heart and circulation problems. For many patients, the target level is less than 150.    HgbA1c: The HgbA1c is a marker of diabetes control for the past two to three months. Adequate diabetes control has been proven to slow the progression of kidney failure. The goal for most is 7 or less, as determined by your physician. (Lab Normal Range: Less than or equal to 5.6 if average glucose is less than or equal to 114.)    Chronic Kidney Disease Stage 3 Information    General Precautions:    No NSAIDS - Do not  take non-steroidal anti-inflammatory medications (NSAIDS) such as Ibuprofen (Advil, Excedrin Motrin, etc), Naproxen (Aleve, etc.), Diclofenac (Celebrex) or Ketoprofen. These common arthritis medications can cause permanent kidney damage or worsen your kidney damage. For mild occasional pain, Acetaminophen (Tylenol, etc) is safe for your kidneys. No iodinated contrast - You should avoid IV contrast dye used in CT scans, Angiograms or other tests when possible because this type of dye can injure your kidneys. Sometimes using that type of contrast dye is necessary, but let your nephrologist and team know so that precautions can be taken to protect your kidneys before the test is done. Good hydration can help lower the risk of worsening kidney function. Oral contrast is fine as it does not affect kidney function    MRI contrast is gadolinium which is a different agent and does not cause direct kidney injury, however, often is avoided in kidney function less than 30%    Bowel preps: Go Lytely is safe for bowel prep in kidney disease. Avoid Fleet enema or phosphorus containing laxatives.    - Try to stay hydrated during this time    Preserving your veins for future dialysis access (fistula or graft) by avoid blood pressure checks, IV draws and PICC lines (usually in non dominant arm).    Nutrition    Low Sodium Diet    What is sodium?    Sodium is a mineral found naturally in foods and added during the processing of foods. Table salt is a combination of sodium and chloride. A level teaspoon of salt contains 2300 milligrams (mg) of sodium. Why should I limit sodium?    High sodium intakes cause your body to retain fluid. This causes your blood pressure to rise. Although medications can control your blood pressure, limiting sodium will help them work better. How much sodium should my diet contain?    Aim for less than 2400 milligrams of sodium per day. How do I limit sodium to this amount?    Limiting or avoiding table salt  is the first step. But what most people don't realize is that up to 75% of the sodium in our diets comes from processed foods we buy and from foods we eat in restaurants. Only 15% of our sodium intake is from table salt. The remaining 10% of the sodium we consume is naturally present in foods. Preparing foods, yourself using fresh, unprocessed ingredients will help you greatly in controlling your sodium intake. Can I use salt substitutes?    Avoid any salt substitutes that contain potassium chloride, as they may raise your potassium intake too much. Many people with kidney disease need to limit potassium and it is best not to add any extra to your diet. However, you can use a variety of salt-free herbs and spices to flavor your food. Try seasoning mixes, such as Mrs. Dash  or use your own herbs and seasonings. Do check the labels to make sure you are using lower salt alternatives. For example, use garlic powder, not garlic salt, and be aware that some seasonings, such as chili powder, may have salt added to them, even though it isn't obvious from the name of the product.    What about products marked low sodium or sodium-free?    Some of these products will be good choices, but always check labels for the ingredients. Some foods, such as soups, have potassium chloride added to them to give a salty taste. This may raise your potassium intake too much. Read food labels for sodium content.    First read the product name or description. The following terms will alert you to the presence of sodium. Salt is a preservative in foods and thus works like: pickled, brine, cured, smoked, corned, seasoned, breaded, barbecued likely means a lot of salt. Then look at the serving size listed on the package. Often the serving size is smaller than the amount you would eat at one time. The sodium content will be listed for the serving size on the package. Remember, if you eat twice the serving size, then you need to multiply the  amount of sodium by two. Sodium is listed as the number of milligrams and as a percent of Daily Value (DV). Look for foods with no more than 6-10% of DV for sodium.  Avoid foods with more than 20% of the Daily Value for sodium (480 mg).  Avoid snack foods with over 6% of the Daily Value for sodium (140 mg).  Avoid frozen meals that have over 600 mg of sodium per serving. It all tastes so blah How can I get used to a low sodium diet?    Your taste buds will adapt in a few weeks so that you will start tasting the flavor of the food more readily. Use herbs and spices to flavor your meals. (Some ideas are included in the Guide that follows.) Soon you will find high sodium foods unpleasantly salty. It takes time to make changes and create new diet habits. Work on making changes a little at a time over several weeks. How can I limit sodium when I eat out?    Ask for your food to be made without salt and order any sauces, salad dressings or gravies on the side so you can limit how much you use. If you eat fast food, limit it to once a week at the most, and learn which choices are lower in sodium. Splitting orders with someone else is one way to decrease the sodium you get from the meal. You can check out the sodium content of many restaurant foods at their own websites or at www.Shutl. Where can I find recipes for cooking low sodium?    Heart healthy cookbooks usually have recipes low in sodium. However, you need to think about the other ingredients as well if you are limiting phosphorus or potassium. For example, recipes using tomato sauce will be high in potassium, and recipes with navy beans or other dried beans and peas will be high in phosphorus and potassium. Choose the recipes carefully and change them as needed to fit your diet for kidney disease. Some cookbooks with low salt recipes:    The No-Salt Cookbook by Manolo Erazo and Donald Erazo. [employer]. 2001.    The Complete Idiot's Guide  to Low-Sodium Meals by Cindy Looney and Solange Mathur R.D.    [employer]. 2006.    The No-Salt Lowest-Sodium Cookbook by Jose Crump.    Donald Bautista books. 2001.    Sodium Guide Food Items high in sodium    Bread: Bread contains a surprisingly high amount of salt- check the labels!    Avoid Meat, Poultry, and Fish that is processed and cured meats such as sausage, bowie, hot dogs, corned beef, ham, bologna, liverwurst, salami, deli-prepared lunchmeats    Choose instead: Fresh or frozen unseasoned beef, pork, chicken, turkey, fish, seafood, wild game; Make sandwiches using fresh meat you have cooked, plain fish filets, poultry, meat (no breading)    Avoid Canned meats, canned fish (tuna, sardines, salmon)    - There are low sodium canned fish or meat choices--or rinse off regular Beef jerky    Fruit & Vegetables    Canned vegetables should be avoided unless they are low sodium    All fruit is low in sodium; fresh and frozen vegetables without sauces are low sodium. -Vegetable drink (V8 ); tomato juice- Look for low sodium tomato or vegetable juice option and low sodium canned vegetables    Avoid Pickled Items Pickles, prepared pickle relish, olives, Sauerkraut, pickled herring    Instead: Garnish food with fresh vegetables such as lettuce, onions, cucumbers, bell peppers    Avoid Canned Foods and Canned soup Stew, chili, ravioli/pasta meals    Instead, make homemade soups; choose some low sodium soups (check labels for potassium chloride), homemade stew, chili, etc., with allowed lower sodium ingredients    Avoid salted Snacks Salted tortilla chips; potato chips; salted nuts; salted popcorn; salted pretzels; Crackers with salt on top    Choose Unsalted chips, popcorn, pretzels, nuts and crackers Food Items high in sodium: Avoid or Limit Avoid Convenience Foods Frozen (TV) dinners and entrees; potpies; pizza; burritos; dry soup mixes; Ramen noodles; prepared meats (pre-marinated,  "pre-seasoned);    Choose instead: Home-made foods, using low salt and salt-free ingredients    Frozen entrees with less than 600 mg sodium per entrée are better    Avoid : Boxed dinners (such as Hamburger [place] , macaroni and cheese); rice, noodle and potato dishes with seasoning packets Instead, prepare plain fresh rice, pasta, potatoes    Avoid: Salad dressings, Condiments, Sauces. If you use regular condiments, keep portions small. Minimize and avoid commercial salad dressings; salsa; barbecue sauce; soy sauce; teriyaki sauce; steak sauce; Worcestershire sauce; ketchup; mustard; mixes for gravies; packets of slow cooker seasonings    Instead: Salt-free homemade salad dressing; vinegar and oil. Look for low salt versions of condiments    -Use recipes from low sodium cookbooks to make seasonings for various dishes, using herbs and spices and low sodium ingredients -Make your own seasoning mixes leaving out the salt.    Avoid: table salt; \"Lite\" salt; sea salt; garlic salt; onion salt; meat tenderizer    Instead: Fresh or dried herbs; spices; fresh garlic, fresh onion; garlic powder; onion powder; Avoid Seasoning mixes that contain salt (check labels) - Use Seasoning mixes low in salt, such as MrsAshley Beth     Avoid ready-to-eat dry cereals    Instead: Cereals with less than 200 mg sodium per serving , Instant oatmeal, Quick or regular cooked cereals (such as cream of wheat, cream of rice, Maltomeal ) Seasoning Ideas: Try these herbs and spices Chicken: Rosemary, garlic, oregano, tarragon, ash, nora, marjoram, allspice Beef: Garlic, chili powder, oregano, pepper, thyme, marjoram, parsley, rosemary, clove, allspice Pork: Nora, garlic, onion, ash, coriander Fish: Lemongrass, basil, oregano, dill, parsley, marjoram, rosemary, anise    "

## 2024-05-22 NOTE — NURSING NOTE
Chief Complaint   Patient presents with    New Patient     Stage 3 chronic kidney disease, unspecified whether stage 3a or 3b CKD   Proteinuria, unspecified type       Vitals:    05/22/24 1344   BP: (!) 151/70   BP Location: Left arm   Patient Position: Sitting   Cuff Size: Adult Regular   Pulse: 68   SpO2: 95%   Weight: 92.8 kg (204 lb 9.6 oz)       Body mass index is 32.04 kg/m .      Jamey Martines MA

## 2024-05-24 RX ORDER — FAMOTIDINE 20 MG
25 TABLET ORAL DAILY
Qty: 90 CAPSULE | Refills: 3 | Status: SHIPPED | OUTPATIENT
Start: 2024-05-24

## 2024-05-24 NOTE — PROGRESS NOTES
Assessment and Plan:  78 year old male with history of CAD s/p stent to LAD (deemed not a surgical candidate), gout, arthritis s/p knee replacement  significant hypertension since  years ago, who presents for evaluation of worsening renal function. His creatinine was normal in 2004 but has progressively worsened in the last few years with proteinuria.  # CKD stage 3a/b  A3- Scr 1.4--1.6 eGR 44-48 ml/min with albuinuria of 800mg/g cr:  his Scr was near 1-1.2 until 2019 when he had some elevations and fluctuation and since then with Scr 1.4-1.7 range.  He has been on ACE/ ARB and multiple blood pressure medications for many years, and was taking NSAIDs until early 2024 when he was told not to (though not on a regular basis).     He has protein on his UA dating back many years, but it was first quantified only recently and noted to be elevated near 1g/g cr   - Underlying systemic or autoimmune disease possible, though he has not symptoms, and his renal function seems to have declined correlating to his cardiac events in 2019 and now seems relatively stable.  - will check some basic serologies  - albuminuria near 800mg/g cr and protein/creat of 1.2 g/g cr  - UA without hematuria    # Hypertension: has significant hypertension, on torsemide, hydralazine, carvedilol, amlodipine and losartan  - check renin/ sue  - consider further secondary workup  - likely the cause of his proteinuria.    # Anemia in chronic renal disease:   - Hgb: mildly low, check immunofixaton  - Iron studies: Not checked recently    # Electrolytes:   - Potassium; level: Normal  - Bicarbonate; level: Normal    # Mineral Bone Disorder:   - Vitamin D; level is: Low at 11 - start 1000mg daily  - Calcium; level is:  Low normal  - PTH - 188 - secondary hyperparathyoridism- start viatmin D 1000mg daily     Assessment and plan was discussed with patient and he voiced his understanding and agreement.    Consult:  Betito Anderson was seen in  consultation at the request of Dr. Garcia for CKD management.    Reason for Visit:  Betito Anderson is a 78 year old male with CKD from hypertension and cardiovascular diseaes, who presents for evaluaiton.    HPI:  He is a pleasant male with history of hypertension for 20+ years, gout, CAD s/p LAD stent in 2019, s/p pacemaker in 2019, knee surgeries, who presents for evaluation of progressive renal disease.     In 2019, he had multiple cardiovascular hospitalizations. He was noted with acute systolic congestive heart failure secondary to new onset cardiomyopathy with EF of 35 to 40% Angiography showed three-vessel coronary artery disease status post PCI to LAD, not a candidate for CABG due to calcifications/ lack of targets.  He then was hospitalized for syncope and his coreg was lowered, then was admitted again and underwent pacemaker implantation.  His creatinine around then was 1.4-1.5 and has remained in that range since then.  He does not monitor his blood pressure regularly but will try to do so.  He did this back in 2019   We discussed his labs, his lifestyle, encouraged diet and exercise, and we will followup with him in a few months.  I will check some serologies but have lower suspicion for autoimmune/ glomerular disease, though concerning that he is proteinuric despite ARB and SGLT2 inhibitor.    He was noted with a renal lesion but on subsequent imaging noted to be likely a cyst with no followup needed.  Renal History:   Kidney Disease and Medical Hx:  h/o HTN: Yes      ROS:   A comprehensive review of systems was obtained and negative, except as noted in the HPI or PMH.    Active Medical Problems:  Patient Active Problem List   Diagnosis    ASCVD (arteriosclerotic cardiovascular disease)    Benign essential hypertension    Hyperlipidemia LDL goal <100    Elevated blood sugar    Psoriasis    Non morbid obesity, unspecified obesity type    Gout, unspecified cause, unspecified chronicity, unspecified  site    Hyponatremia    Low mean corpuscular volume (MCV)    Atrial fibrillation (H)    Syncope    V-tach (H)    Chronic kidney disease, stage 3 (H)    Chronic heart failure with preserved ejection fraction (H)     PMH:   Medical record was reviewed and PMH was discussed with patient and noted below.  Past Medical History:   Diagnosis Date    Acute diastolic congestive heart failure (H) 06/2019    hosp fsd, then fu echo ef nl; echo 2023 nl ef    ASCVD (arteriosclerotic cardiovascular disease) 1997    angio with 40% circ lesion; 6/19 hosp for chf, 3 vessel dz on angio but porcelin aorta so ptca done    Atrial fibrillation (H) 10/09/2018    found on routine physical    Basal cell carcinoma     Carotid artery plaque 2005    seen on us, about 50% stenosis, fu 2009 us no significant stenosis    CKD (chronic kidney disease) stage 3, GFR 30-59 ml/min (H)     Elevated blood sugar     Gout     on allopurinol    HTN (hypertension)     Hypercholesteremia     Hyponatremia 2015    felt due to chlorthalidone    Low mean corpuscular volume (MCV)     Near syncope 05/2015    fu est echo low level but neg    Psoriasis     Dr. Marti    Renal mass 06/29/2019    seen on ct chest for sob, needs fu ct for that, fu us done 7/19 and no mass seen, should have fu ct in December, had fu us 3/22 and no fu needed    Screening 2012    abd us no aaa    Syncope 09/2019    hosp fsd, cause not clear, lowered coreg dose; seen by Dr. Lezama of ep and ep study neg, implanted event monitor    V-tach (H) 09/2019    seen on event monitor for fu syncope, then ep study and no inducible vtach     PSH:   Past Surgical History:   Procedure Laterality Date    CATARACT EXTRACTION  03/2022    CATARACT EXTRACTION  2022    CV CORONARY ANGIOGRAM N/A 07/02/2019    Procedure: Coronary Angiogram;  Surgeon: Donaldo Loera MD;  Location:  [facility]    CV HEART CATHETERIZATION WITH POSSIBLE INTERVENTION N/A 07/05/2019    Procedure: Heart  Catheterization with Possible Intervention;  Surgeon: Manolo Hu MD;  Location:  [facility]    CV LEFT HEART CATH N/A 2019    Procedure: Left Heart Cath;  Surgeon: Donaldo Loera MD;  Location:  [facility]    CV LEFT VENTRICULOGRAM N/A 2019    Procedure: Left Ventriculogram;  Surgeon: Donaldo Loera MD;  Location:  [facility]    CV PCI STENT DRUG ELUTING N/A 2019    Procedure: PCI Stent Drug Eluting;  Surgeon: Manolo Hu MD;  Location:  [facility]    CV RIGHT HEART CATH MEASUREMENTS RECORDED N/A 2019    Procedure: Right Heart Cath;  Surgeon: Donaldo Loera MD;  Location:  [facility]    EP LOOP RECORDER IMPLANT N/A 10/11/2019    Procedure: EP Loop Recorder Implant;  Surgeon: Fuad Lezama MD;  Location:  [facility]    EP RIGHT VENTRICULAR RECORDING N/A 10/11/2019    Procedure: EP Ventricular Pacing;  Surgeon: Fuad Lezama MD;  Location:  [facility]    ZZC ANESTH,DX ARTHROSCOPIC PROC KNEE JOINT  2009       Family Hx:   Family History   Problem Relation Age of Onset    Coronary Artery Disease Father     Medical History Unknown Mother     Medical History Unknown Maternal Grandfather      Personal Hx:   Social History     Tobacco Use    Smoking status: Former     Current packs/day: 0.00     Average packs/day: 1 pack/day for 30.0 years (30.0 ttl pk-yrs)     Types: Cigarettes     Start date: 1968     Quit date: 1998     Years since quittin.7    Smokeless tobacco: Never   Substance Use Topics    Alcohol use: Yes     Comment: 3-4 drinks per week       Allergies:  Allergies   Allergen Reactions    Ace Inhibitors Anaphylaxis     Edema of ace    Eliquis [Apixaban] Other (See Comments)     Facial numbness       Medications:  Current Outpatient Medications   Medication Sig Dispense Refill    allopurinol (ZYLOPRIM) 300 MG tablet TAKE 1  TABLET DAILY 90 tablet 3    amLODIPine (NORVASC) 5 MG tablet Take 1 tablet (5 mg) by mouth daily 90 tablet 3    carvedilol (COREG) 6.25 MG tablet Take 1 tablet (6.25 mg) by mouth 2 times daily (with meals) 180 tablet 3    empagliflozin (JARDIANCE) 10 MG TABS tablet Take 1 tablet (10 mg) by mouth daily 90 tablet 3    ezetimibe (ZETIA) 10 MG tablet Take 1 tablet (10 mg) by mouth daily 90 tablet 3    hydrALAZINE (APRESOLINE) 100 MG tablet Take 1 tablet (100 mg) by mouth 3 times daily 270 tablet 3    isosorbide mononitrate CR (IMDUR) 120 MG 24 HR ER tablet Take 1 tablet (120 mg) by mouth daily 90 tablet 3    losartan (COZAAR) 100 MG tablet Take 1 tablet (100 mg) by mouth daily 90 tablet 3    rivaroxaban ANTICOAGULANT (XARELTO ANTICOAGULANT) 20 MG TABS tablet Take 1 tablet (20 mg) by mouth daily (with dinner) TAKE 1 TABLET DAILY WITH DINNER 90 tablet 1    rosuvastatin (CRESTOR) 40 MG tablet Take 1 tablet (40 mg) by mouth at bedtime 90 tablet 3    torsemide (DEMADEX) 20 MG tablet Take 1 tablet (20 mg) Monday, Wednesday, Friday and take 2 tablets (40 mg) other days by mouth 135 tablet 3    aspirin (ASA) 81 MG EC tablet Take 1 tablet (81 mg) by mouth daily (Patient not taking: Reported on 5/22/2024) 90 tablet 3    calcipotriene (DOVONOX) 0.005 % external cream Mix efudex + calcipotriene equally. Apply BID x 4-10 days. Stop when skin gets red. (Patient not taking: Reported on 5/22/2024) 60 g 1    fluorouracil (EFUDEX) 5 % external cream Mix equally with calcipotriene cream. Apply BID x 4-10 days. Stop when skin gets red. (Patient not taking: Reported on 5/22/2024) 40 g 0    ketoconazole (NIZORAL) 2 % external shampoo Massage into wet scalp, let sit 3-5 min, then rinse. Do this 3x weekly. (Patient not taking: Reported on 5/22/2024) 120 mL 11    torsemide (DEMADEX) 20 MG tablet TAKE 1 TABLET BY MOUTH MONDAY,WEDNESDAY AND FRIDAY AND 2 TABLETS ON ALL OTHER DAYS. 144 tablet 0     No current facility-administered medications for  this visit.      Vitals:  BP (!) 145/73 (BP Location: Left arm, Patient Position: Sitting, Cuff Size: Adult Regular)   Pulse 68   Wt 92.8 kg (204 lb 9.6 oz)   SpO2 95%   BMI 32.04 kg/m      Exam:  GENERAL APPEARANCE: alert and no distress  HENT: mouth without ulcers or lesions  RESP: lungs clear to auscultation - no rales, rhonchi or wheezes  CV: regular rhythm, normal rate, no rub, no murmur  EDEMA: no LE edema bilaterally  ABDOMEN: soft, nondistended, nontender, bowel sounds normal  MS: extremities normal - no gross deformities noted, no evidence of inflammation in joints, no muscle tenderness  SKIN: no rash    LABS:   CMP  Recent Labs   Lab Test 05/15/24  1327 01/29/24  1014 07/26/23  1831 06/15/23  1046 08/09/21  0945 05/28/21  1245 04/19/21  1219 04/05/21  1205 03/01/21  1433    140 140 140   < > 140 139 136 141   POTASSIUM 4.0 4.0 3.9 4.0   < > 3.9 3.9 4.3 4.3   CHLORIDE 101 104 103 105   < > 106 104 102 108   CO2 23 26 23 22   < > 27 31 29 27   ANIONGAP 13 10 14 13   < > 7 4 5 6   GLC 97 102* 103* 119*   < > 105* 118* 102* 102*   BUN 26.0* 19.2 14.6 31.2*   < > 44* 40* 40* 35*   CR 1.58* 1.47* 1.23* 1.55*   < > 1.55* 1.62* 1.65* 1.38*   GFRESTIMATED 44* 49* 60* 46*   < > 43* 41* 40* 50*   GFRESTBLACK  --   --   --   --   --  50* 47* 46* 57*   GUNNER 8.7* 9.3 9.5 9.2   < > 8.7 9.1 8.8 9.0    < > = values in this interval not displayed.     Recent Labs   Lab Test 01/29/24  1014 07/26/23  1831 01/05/23  0802 02/16/21  0821   BILITOTAL 0.6 0.5 0.7 0.6   ALKPHOS 91 108 92 98   ALT <5 10 10 23   AST 12 15 21 14     CBC  Recent Labs   Lab Test 05/15/24  1327 01/29/24  1014 07/26/23  1831 05/05/23  1200   HGB 13.8 13.7 15.2 12.5*   WBC 7.2 8.9 6.5 9.2   RBC 5.41 5.24 5.50 4.69   HCT 45.9 44.9 48.5 40.9   MCV 85 86 88 87   MCH 25.5* 26.1* 27.6 26.7   MCHC 30.1* 30.5* 31.3* 30.6*   RDW 17.0* 15.9* 17.9* 18.0*    294 291 285     URINE STUDIES  Recent Labs   Lab Test 05/15/24  1343 07/03/19  0300   COLOR  Yellow Yellow   APPEARANCE Clear Clear   URINEGLC 250* Negative   URINEBILI Negative Negative   URINEKETONE Negative Negative   SG 1.015 1.005   UBLD Negative Negative   URINEPH 6.0 7.0   PROTEIN 100* 100*   UROBILINOGEN 0.2  --    NITRITE Negative Negative   LEUKEST Negative Negative   RBCU 0-2 0   WBCU 0-5 <1     No lab results found.  PTH  Recent Labs   Lab Test 05/15/24  1327   PTHI 188*     IRON STUDIES  Recent Labs   Lab Test 10/09/18  1039 09/08/16  0937   IRON 91 56    342   IRONSAT 32 16   RASHEED 284 264         Ashlie Charles Ramos MD

## 2024-06-04 ENCOUNTER — OFFICE VISIT (OUTPATIENT)
Dept: FAMILY MEDICINE | Facility: CLINIC | Age: 79
End: 2024-06-04
Payer: COMMERCIAL

## 2024-06-04 DIAGNOSIS — L57.0 ACTINIC KERATOSIS: ICD-10-CM

## 2024-06-04 DIAGNOSIS — D22.9 MULTIPLE BENIGN NEVI: Primary | ICD-10-CM

## 2024-06-04 DIAGNOSIS — D49.2 NEOPLASM OF UNSPECIFIED BEHAVIOR OF BONE, SOFT TISSUE, AND SKIN: ICD-10-CM

## 2024-06-04 DIAGNOSIS — L81.4 SOLAR LENTIGO: ICD-10-CM

## 2024-06-04 DIAGNOSIS — D18.01 CHERRY ANGIOMA: ICD-10-CM

## 2024-06-04 DIAGNOSIS — L40.9 PSORIASIS: ICD-10-CM

## 2024-06-04 DIAGNOSIS — L82.1 SEBORRHEIC KERATOSIS: ICD-10-CM

## 2024-06-04 DIAGNOSIS — Z85.828 HISTORY OF NONMELANOMA SKIN CANCER: ICD-10-CM

## 2024-06-04 PROCEDURE — 99214 OFFICE O/P EST MOD 30 MIN: CPT | Mod: 25 | Performed by: DERMATOLOGY

## 2024-06-04 PROCEDURE — 11102 TANGNTL BX SKIN SINGLE LES: CPT | Performed by: DERMATOLOGY

## 2024-06-04 PROCEDURE — 11103 TANGNTL BX SKIN EA SEP/ADDL: CPT | Mod: XU | Performed by: DERMATOLOGY

## 2024-06-04 PROCEDURE — 88305 TISSUE EXAM BY PATHOLOGIST: CPT | Performed by: DERMATOLOGY

## 2024-06-04 RX ORDER — TRIAMCINOLONE ACETONIDE 1 MG/G
CREAM TOPICAL 2 TIMES DAILY
Qty: 454 G | Refills: 1 | Status: SHIPPED | OUTPATIENT
Start: 2024-06-04

## 2024-06-04 RX ORDER — CLOBETASOL PROPIONATE 0.5 MG/G
CREAM TOPICAL 2 TIMES DAILY
Qty: 60 G | Refills: 3 | Status: SHIPPED | OUTPATIENT
Start: 2024-06-04

## 2024-06-04 RX ORDER — KETOCONAZOLE 20 MG/ML
SHAMPOO TOPICAL
Qty: 120 ML | Refills: 11 | Status: SHIPPED | OUTPATIENT
Start: 2024-06-04

## 2024-06-04 NOTE — PROGRESS NOTES
[facility] Dermatology Note  Encounter Date: Jun 4, 2024  Office Visit     Dermatology Problem List:  3 Hx NMSC  - BCC nodular type, left cheek, s/p bx 12/05/23, s/p Mohs 2/1/24  # Psoriasis,   - current tx: Ketoconazole 2% shampoo, H&S shampoo, TMC 0.1% cream, clobetasol cream  # Actinic damage  - 5FU/C planned 6/4/24    0. NUB  - Central upper back, s/p shave bx 6/4/24  - L paraspinal upper back, s/p shave bx 6/4/24  ____________________________________________    Assessment & Plan:    # NUB, central upper back  # NUB, L perispinal upper back  - shave biopsy today, see procedure note below.     # Actinic skin damage  - didn't get a chance yet to do field therapy  - Start Efudex + calcipotriene BID x 4-10 days to forehead and cheeks or until skin gets red. Anticipated reaction discussed.     # Psoriasis, chronic, active.  - using H&S shampoo.  - Refilled ketoconazole 2% shampoo 3x weekly  - Start triamcinolone 0.1% cream,  Apply topically 2 times daily To psoriasis on arms and body as needed   - Start clobetasol, Apply topically 2 times daily To feet as needed     # Benign lesions - SKs, cherry angiomas, lentigenes.  - No treatment required    # Multiple benign nevi.   - Monitor for ABCDEs of melanoma   - Continue sun protection - recommend SPF 30 or higher with frequent application   - Return sooner if noticing changing or symptomatic lesions;    # History of NMSC. No evidence of recurrent disease.  - Continue photoprotection - recommend SPF 30 or higher with frequent reapplication  - Continue yearly skin exams  - Advised to monitor for changing, non-healing, bleeding, painful, changing, or otherwise symptomatic lesions    Procedures Performed:   - Shave biopsy procedure note, location(s): see above. After discussion of benefits and risks including but not limited to bleeding, infection, scar, incomplete removal, recurrence, and non-diagnostic biopsy, verbal consent and photographs were  obtained. The area was cleaned with isopropyl alcohol. 0.5mL of 1% lidocaine with epinephrine was injected to obtain adequate anesthesia of lesion(s). Shave biopsy at site(s) performed. Hemostasis was achieved with aluminium chloride. Petrolatum ointment and a sterile dressing were applied. The patient tolerated the procedure and no complications were noted. The patient was provided with verbal and written post care instructions.       Follow-up: 6 month(s) in-person, or earlier for new or changing lesions    Staff and Scribe:     Scribe Disclosure:   IMAHENDRA, am serving as a scribe; to document services personally performed by Janel Dodson MD-based on data collection and the provider's statements to me.      Provider Disclosure:   The documentation recorded by the scribe accurately reflects the services I personally performed and the decisions made by me.    Janel Dodson MD    Department of Dermatology  [facility]: Phone: [phone], Fax: [phone]  6/10/2024     ____________________________________________    CC: Skin Check (1.  6 month follow up/2.  Would like new Rx for ketaconazole shampoo)    HPI:  Mr. Betito Anderson is a(n) 78 year old male who presents today as a return patient for skin check     Today, the patient mentions he postponed starting the efudex field therapy because of the holidays and then it slipped his mind. He did apply hydrocortisone 10, but that made the area flare.    Patient is otherwise feeling well, without additional skin concerns.    Labs Reviewed:  Reviewed from 6/4/24  Final Diagnosis  A(1). Left cheek:  - Basal cell carcinoma, nodular type    Physical Exam:  Vitals: There were no vitals taken for this visit.  SKIN: Full body skin exam excluding the genitals was performed including face, scalp, neck, ears, chest, back, bilateral arms, hands, bilateral legs, feet,  and buttocks.   - Central upper back and  L paraspinal upper back, 2 pink tiny macules with pigment clods.  - There are dome shaped bright red papules on the trunk and extremities.   - Multiple regular brown pigmented macules and papules are identified on the trunk and extremities.  - Scattered brown macules on sun exposed areas.  - Waxy stuck on papules and plaques on trunk and extremities.   - Pink macules on L anticubital fossa and R lower abdomen.  - L dorsal foot, excoriated pink macules.  - There are erythematous macules with overyling adherent scale on the  forehead, R and L cheek.   - No other lesions of concern on areas examined.     Medications:  Current Outpatient Medications   Medication Sig Dispense Refill    allopurinol (ZYLOPRIM) 300 MG tablet TAKE 1 TABLET DAILY 90 tablet 3    amLODIPine (NORVASC) 5 MG tablet Take 1 tablet (5 mg) by mouth daily 90 tablet 3    aspirin (ASA) 81 MG EC tablet Take 1 tablet (81 mg) by mouth daily 90 tablet 3    calcipotriene (DOVONOX) 0.005 % external cream Mix efudex + calcipotriene equally. Apply BID x 4-10 days. Stop when skin gets red. 60 g 1    carvedilol (COREG) 6.25 MG tablet Take 1 tablet (6.25 mg) by mouth 2 times daily (with meals) 180 tablet 3    clobetasol propionate (TEMOVATE) 0.05 % external cream Apply topically 2 times daily To feet as needed 60 g 3    empagliflozin (JARDIANCE) 10 MG TABS tablet Take 1 tablet (10 mg) by mouth daily 90 tablet 3    ezetimibe (ZETIA) 10 MG tablet Take 1 tablet (10 mg) by mouth daily 90 tablet 3    fluorouracil (EFUDEX) 5 % external cream Mix equally with calcipotriene cream. Apply BID x 4-10 days. Stop when skin gets red. 40 g 0    hydrALAZINE (APRESOLINE) 100 MG tablet Take 1 tablet (100 mg) by mouth 3 times daily 270 tablet 3    isosorbide mononitrate CR (IMDUR) 120 MG 24 HR ER tablet Take 1 tablet (120 mg) by mouth daily 90 tablet 3    ketoconazole (NIZORAL) 2 % external shampoo Massage into wet scalp, let sit 3-5 min,  then rinse. Do this 3x weekly. 120 mL 11    losartan (COZAAR) 100 MG tablet Take 1 tablet (100 mg) by mouth daily 90 tablet 3    rivaroxaban ANTICOAGULANT (XARELTO ANTICOAGULANT) 20 MG TABS tablet Take 1 tablet (20 mg) by mouth daily (with dinner) TAKE 1 TABLET DAILY WITH DINNER 90 tablet 1    rosuvastatin (CRESTOR) 40 MG tablet Take 1 tablet (40 mg) by mouth at bedtime 90 tablet 3    torsemide (DEMADEX) 20 MG tablet TAKE 1 TABLET BY MOUTH MONDAY,WEDNESDAY AND FRIDAY AND 2 TABLETS ON ALL OTHER DAYS. 144 tablet 0    torsemide (DEMADEX) 20 MG tablet Take 1 tablet (20 mg) Monday, Wednesday, Friday and take 2 tablets (40 mg) other days by mouth 135 tablet 3    triamcinolone (KENALOG) 0.1 % external cream Apply topically 2 times daily To psoriasis on arms and body as needed 454 g 1    Vitamin D, Cholecalciferol, 25 MCG (1000 UT) CAPS Take 25 mcg by mouth daily 90 capsule 3     No current facility-administered medications for this visit.      Past Medical History:   Patient Active Problem List   Diagnosis    ASCVD (arteriosclerotic cardiovascular disease)    Benign essential hypertension    Hyperlipidemia LDL goal <100    Elevated blood sugar    Psoriasis    Non morbid obesity, unspecified obesity type    Gout, unspecified cause, unspecified chronicity, unspecified site    Hyponatremia    Low mean corpuscular volume (MCV)    Atrial fibrillation (H)    Syncope    V-tach (H)    Chronic kidney disease, stage 3 (H)    Chronic heart failure with preserved ejection fraction (H)     Past Medical History:   Diagnosis Date    Acute diastolic congestive heart failure (H) 06/2019    hosp fsd, then fu echo ef nl; echo 2023 nl ef    ASCVD (arteriosclerotic cardiovascular disease) 1997    angio with 40% circ lesion; 6/19 hosp for chf, 3 vessel dz on angio but porcelin aorta so ptca done    Atrial fibrillation (H) 10/09/2018    found on routine physical    Basal cell carcinoma     Carotid artery plaque 2005    seen on us, about 50%  stenosis, fu 2009 us no significant stenosis    CKD (chronic kidney disease) stage 3, GFR 30-59 ml/min (H)     Elevated blood sugar     Gout     on allopurinol    HTN (hypertension)     Hypercholesteremia     Hyponatremia 2015    felt due to chlorthalidone    Low mean corpuscular volume (MCV)     Near syncope 05/2015    fu est echo low level but neg    Psoriasis     Dr. Marti    Renal mass 06/29/2019    seen on ct chest for sob, needs fu ct for that, fu us done 7/19 and no mass seen, should have fu ct in December, had fu us 3/22 and no fu needed    Screening 2012    abd us no aaa    Syncope 09/2019    hosp fsd, cause not clear, lowered coreg dose; seen by Dr. Lezama of ep and ep study neg, implanted event monitor    V-tach (H) 09/2019    seen on event monitor for fu syncope, then ep study and no inducible vtach        CC Referred Self, MD  No address on file on close of this encounter.

## 2024-06-04 NOTE — PATIENT INSTRUCTIONS
For forehead and cheeks, these are low-risk precancers called actinic keratoses. We will treat them with an anti-cancer cream.  Please start Efudex and calcipotriene mixed equally together. Apply twice daily to above areas for 4-10 days or until skin gets red. Stop applying when skin gets red.  Skin will get red and crusty (like hamburger).  Please keep Efudex away from pets and children. Wash hands thoroughly after application.   See handout below for more information about Efudex.    If medications are more than ~$75, please contact me for a compounded version through [facility].  Alternatively can also use Efudex alone. If you want to use Efudex alone, apply twice daily for 3-4 weeks. Stop when significant irritation occurs.        Efudex Treatment  Today, you are being prescribed Fluorouracil (Efudex) a topical cream used for the treatment of Actinic Keratosis (AKs).  The medication is working to eliminate the unhealthy cells.  This treatment may be unattractive and somewhat uncomfortable.  You may experience some mild discomfort while being treated.  You will want to stop any other creams such as glycolic acid products, retin A, Tazorac, etc. to the area. You may use bland makeup/cover-up as long as it doesn't sting or cause you discomfort.  Apply the cream at night as your physician recommends. Use a cotton-tipped applicator, or use gloves if applying it with your fingertips. If applied with unprotected fingertips, it is important to wash your hands well after you apply this medicine.   Keep this medication away from pets.  We recommend avoiding excessive sun exposure to the treated area  You may use moisturizing creams over bothersome areas such as Vanicream or Cetaphil cream if the reaction becomes too bothersome. Please, call the clinic if this occurs.   Potential Side Effects  Your treated areas may be unsightly during therapy.  This will improve slowly following the discontinuation of therapy.    During the first week of application, mild inflammation may occur.   During the following weeks, redness, and swelling may occur with some crusting and burning.   Lesions resolve as the skin exfoliates.   Over 1 to 2 weeks, new skin grows into the treatment area.  Keep this medication away from pets  Specific side effects that usually do not require medical attention (report to your doctor or health care professional if they continue or are bothersome) include:  Red or dark-colored skin   Mild erosion (loss of upper layer of skin)   Mild eye irritation including burning, itching, sensitivity, stinging, or watering   Increased sensitivity of the skin to sun and ultraviolet light   Pain and burning of the affected area   Dryness, scaling or swelling of the affected area   Skin rash, itching of the affected area   Tenderness   If you have severe pain, please, call the clinic immediately and indicate that you have pain.  Ask for a call from the RN.     Who should I call with questions?  [facility]: [phone]  [facility]: [phone]  For urgent needs outside of business hours call the [facility] at [phone] and ask for the dermatology resident on call           Care After a Biopsy    What is a skin biopsy?  A skin biopsy allows the doctor to examine a very small piece of tissue under the microscope to determine the diagnosis and the best treatment for the skin condition. A local anesthetic (numbing medicine) is injected with a very small needle into the skin area to be tested. A small piece of skin is taken from the area. Sometimes a suture (stitch) is used.     What are the risks of a skin biopsy?  I will experience scar, bleeding, swelling, pain, crusting and redness. I may experience incomplete removal or recurrence. Risks of this procedure are excessive bleeding, bruising, infection, nerve damage, numbness, thick (hypertrophic or  keloidal) scar and non-diagnostic biopsy.    How should I care for my wound for the first 24 hours?  Keep the wound dry and covered for 24 hours  If it bleeds, hold direct pressure on the area for 15 minutes. If bleeding does not stop, call us or go to the emergency room  Avoid strenuous exercise the first 1-2 days or as your doctor instructs you    How should I care for the wound after 24 hours?  After 24 hours, remove the bandage  You may bathe or shower as normal  If you had a scalp biopsy, you can shampoo as usual and can use shower water to clean the biopsy site daily  Clean the wound once a day with gentle soap and water  Do not scrub, be gentle  Apply white petroleum/Vaseline after cleaning the wound with a cotton swab or a clean finger, and keep the site covered with a Bandaid /bandage. Bandages are not necessary with a scalp biopsy  If you are unable to cover the site with a Bandaid /bandage, re-apply ointment 2-3 times a day to keep the site moist. Moisture will help with healing  Avoid strenuous activity for first 1-2 days  Avoid lakes, rivers, pools, and oceans until the stitches are removed or the site is healed    How do I clean my wound?  Wash hands thoroughly with soap or use hand  before all wound care  Clean the wound with gentle soap and water  Apply white petroleum/Vaseline  to wound after it is clean  Replace the Bandaid /bandage to keep the wound covered for the first few days or as instructed by your doctor  If you had a scalp biopsy, warm shower water to the area on a daily basis should suffice    What should I use to clean my wound?   Cotton-tipped applicators (Qtips )  White petroleum jelly (Vaseline ). Use a clean new container and use Q-tips to apply.  Bandaids  as needed  Gentle soap     How should I care for my wound long term?  Do not get your wound dirty  Keep up with wound care for one week or until the area is healed.   You may return to our clinic for this or you may have  it done locally at your doctor s office.  A small scab will form and fall off by itself when the area is completely healed. The area will be red and will become pink in color as it heals. Sun protection is very important for how your scar will turn out. Sunscreen with an SPF 30 or greater is recommended once the area is healed.  You should have some soreness but it should be mild and slowly go away over several days. Talk to your doctor about using tylenol for pain,    When should I call my doctor?  If you have increased:   Pain or swelling  Pus or drainage (clear or slightly yellow drainage is ok)  Temperature over 100F  Spreading redness or warmth around wound    When will I hear about my results?  The biopsy results can take 2 weeks to come back.  Your results will automatically release to [employer] before your provider has even reviewed them.  The clinic will call you with the results, send you a [employer] message, or have you schedule a follow-up clinic or phone time to discuss the results.  Contact our clinics if you do not hear from us in 2 weeks.    Who should I call with questions?  [facility]: [phone]  [facility]: [phone]  For urgent needs outside of business hours call the [facility] at [phone] and ask for the dermatology resident on call Patient Education       Proper skin care from [facility]:    -Eliminate harsh soaps as they strip the natural oils from the skin, often resulting in dry itchy skin ( i.e. Dial, Zest, [place]la [place])  -Use mild soaps such as Cetaphil or Dove Sensitive Skin in the shower. You do not need to use soap on arms, legs, and trunk every time you shower unless visibly soiled.   -Avoid hot or cold showers.  -After showering, lightly dry off and apply moisturizing within 2-3 minutes. This will help trap moisture in the skin.   -Aggressive use of a moisturizer at least 1-2 times a day to the  entire body (including -Vanicream, Cetaphil, Aquaphor or Cerave) and moisturize hands after every washing.  -We recommend using moisturizers that come in a tub that needs to be scooped out, not a pump. This has more of an oil base. It will hold moisture in your skin much better than a water base moisturizer. The above recommended are non-pore clogging.      Wear a sunscreen with at least SPF 30 on your face, ears, neck and V of the chest daily. Wear sunscreen on other areas of the body if those areas are exposed to the sun throughout the day. Sunscreens can contain physical and/or chemical blockers. Physical blockers are less likely to clog pores, these include zinc oxide and titanium dioxide. Reapply every two hour and after swimming.     Sunscreen examples: https://www.ewg.org/sunscreen/    UV radiation  UVA radiation remains constant throughout the day and throughout the year. It is a longer wavelength than UVB and therefore penetrates deeper into the skin leading to immediate and delayed tanning, photoaging, and skin cancer. 70-80% of UVA and UVB radiation occurs between the hours of 10am-2pm.  UVB radiation  UVB radiation causes the most harmful effects and is more significant during the summer months. However, snow and ice can reflect UVB radiation leading to skin damage during the winter months as well. UVB radiation is responsible for tanning, burning, inflammation, delayed erythema (pinkness), pigmentation (brown spots), and skin cancer.     I recommend self monthly full body exams and yearly full body exams with a dermatology provider. If you develop a new or changing lesion please follow up for examination. Most skin cancers are pink and scaly or pink and pearly. However, we do see blue/brown/black skin cancers.  Consider the ABCDEs of melanoma when giving yourself your monthly full body exam ( don't forget the groin, buttocks, feet, toes, etc). A-asymmetry, B-borders, C-color, D-diameter, E-elevation or  evolving. If you see any of these changes please follow up in clinic. If you cannot see your back I recommend purchasing a hand held mirror to use with a larger wall mirror.       Checking for Skin Cancer  You can find cancer early by checking your skin each month. There are 3 kinds of skin cancer. They are melanoma, basal cell carcinoma, and squamous cell carcinoma. Doing monthly skin checks is the best way to find new marks or skin changes. Follow the instructions below for checking your skin.   The ABCDEs of checking moles for melanoma   Check your moles or growths for signs of melanoma using ABCDE:   Asymmetry: the sides of the mole or growth don t match  Border: the edges are ragged, notched, or blurred  Color: the color within the mole or growth varies  Diameter: the mole or growth is larger than 6 mm (size of a pencil eraser)  Evolving: the size, shape, or color of the mole or growth is changing (evolving is not shown in the images below)    Checking for other types of skin cancer  Basal cell carcinoma or squamous cell carcinoma have symptoms such as:     A spot or mole that looks different from all other marks on your skin  Changes in how an area feels, such as itching, tenderness, or pain  Changes in the skin's surface, such as oozing, bleeding, or scaliness  A sore that does not heal  New swelling or redness beyond the border of a mole    Who s at risk?  Anyone can get skin cancer. But you are at greater risk if you have:   Fair skin, light-colored hair, or light-colored eyes  Many moles or abnormal moles on your skin  A history of sunburns from sunlight or tanning beds  A family history of skin cancer  A history of exposure to radiation or chemicals  A weakened immune system  If you have had skin cancer in the past, you are at risk for recurring skin cancer.   How to check your skin  Do your monthly skin checkups in front of a full-length mirror. Check all parts of your body, including your:   Head (ears,  face, neck, and scalp)  Torso (front, back, and sides)  Arms (tops, undersides, upper, and lower armpits)  Hands (palms, backs, and fingers, including under the nails)  Buttocks and genitals  Legs (front, back, and sides)  Feet (tops, soles, toes, including under the nails, and between toes)  If you have a lot of moles, take digital photos of them each month. Make sure to take photos both up close and from a distance. These can help you see if any moles change over time.   Most skin changes are not cancer. But if you see any changes in your skin, call your doctor right away. Only he or she can diagnose a problem. If you have skin cancer, seeing your doctor can be the first step toward getting the treatment that could save your life.   Olivia last reviewed this educational content on 4/1/2019 2000-2020 [employer]. [address] [place] [address]. All rights reserved. This information is not intended as a substitute for professional medical care. Always follow your healthcare professional's instructions.       When should I call my doctor?  If you are worsening or not improving, please, contact us or seek urgent care as noted below.     Who should I call with questions (adults)?  [facility] (adult and pediatric): [phone]  [facility] (adult): [phone]  [facility] ([facility] and [place]) [phone]  For urgent needs outside of business hours call the [facility] at [phone] and ask for the dermatology resident on call to be paged  If this is a medical emergency and you are unable to reach an ER, Call [phone]      If you need a prescription refill, please contact your pharmacy. Refills are approved or denied by our Physicians during normal business hours, Monday through Fridays  Per office policy, refills will not be granted if you have not been seen within the  past year (or sooner depending on your child's condition)

## 2024-06-04 NOTE — LETTER
6/4/2024      Betito Anderson  [address]  [place] [address]      Dear Colleague,    Thank you for referring your patient, Betito Anderson, to the [facility]. Please see a copy of my visit note below.    [facility] Dermatology Note  Encounter Date: Jun 4, 2024  Office Visit     Dermatology Problem List:  3 Hx NMSC  - BCC nodular type, left cheek, s/p bx 12/05/23, s/p Mohs 2/1/24  # Psoriasis,   - current tx: Ketoconazole 2% shampoo, H&S shampoo, TMC 0.1% cream, clobetasol cream  # Actinic damage  - 5FU/C planned 6/4/24    0. NUB  - Central upper back, s/p shave bx 6/4/24  - L paraspinal upper back, s/p shave bx 6/4/24  ____________________________________________    Assessment & Plan:    # NUB, central upper back  # NUB, L perispinal upper back  - shave biopsy today, see procedure note below.     # Actinic skin damage  - didn't get a chance yet to do field therapy  - Start Efudex + calcipotriene BID x 4-10 days to forehead and cheeks or until skin gets red. Anticipated reaction discussed.     # Psoriasis, chronic, active.  - using H&S shampoo.  - Refilled ketoconazole 2% shampoo 3x weekly  - Start triamcinolone 0.1% cream,  Apply topically 2 times daily To psoriasis on arms and body as needed   - Start clobetasol, Apply topically 2 times daily To feet as needed     # Benign lesions - SKs, cherry angiomas, lentigenes.  - No treatment required    # Multiple benign nevi.   - Monitor for ABCDEs of melanoma   - Continue sun protection - recommend SPF 30 or higher with frequent application   - Return sooner if noticing changing or symptomatic lesions;    # History of NMSC. No evidence of recurrent disease.  - Continue photoprotection - recommend SPF 30 or higher with frequent reapplication  - Continue yearly skin exams  - Advised to monitor for changing, non-healing, bleeding, painful, changing, or otherwise symptomatic lesions    Procedures Performed:   -  Shave biopsy procedure note, location(s): see above. After discussion of benefits and risks including but not limited to bleeding, infection, scar, incomplete removal, recurrence, and non-diagnostic biopsy, verbal consent and photographs were obtained. The area was cleaned with isopropyl alcohol. 0.5mL of 1% lidocaine with epinephrine was injected to obtain adequate anesthesia of lesion(s). Shave biopsy at site(s) performed. Hemostasis was achieved with aluminium chloride. Petrolatum ointment and a sterile dressing were applied. The patient tolerated the procedure and no complications were noted. The patient was provided with verbal and written post care instructions.       Follow-up: 6 month(s) in-person, or earlier for new or changing lesions    Staff and Scribe:     Scribe Disclosure:   I, MAHENDRA CHANDLER, am serving as a scribe; to document services personally performed by Janel Dodson MD-based on data collection and the provider's statements to me.      Provider Disclosure:   The documentation recorded by the scribe accurately reflects the services I personally performed and the decisions made by me.    Janel Dodson MD    Department of Dermatology  [facility]: Phone: [phone], Fax: [phone]  6/10/2024     ____________________________________________    CC: Skin Check (1.  6 month follow up/2.  Would like new Rx for ketaconazole shampoo)    HPI:  Mr. Betito Anderson is a(n) 78 year old male who presents today as a return patient for skin check     Today, the patient mentions he postponed starting the efudex field therapy because of the holidays and then it slipped his mind. He did apply hydrocortisone 10, but that made the area flare.    Patient is otherwise feeling well, without additional skin concerns.    Labs Reviewed:  Reviewed from 6/4/24  Final Diagnosis  A(1). Left cheek:  - Basal cell  carcinoma, nodular type    Physical Exam:  Vitals: There were no vitals taken for this visit.  SKIN: Full body skin exam excluding the genitals was performed including face, scalp, neck, ears, chest, back, bilateral arms, hands, bilateral legs, feet, and buttocks.   - Central upper back and  L paraspinal upper back, 2 pink tiny macules with pigment clods.  - There are dome shaped bright red papules on the trunk and extremities.   - Multiple regular brown pigmented macules and papules are identified on the trunk and extremities.  - Scattered brown macules on sun exposed areas.  - Waxy stuck on papules and plaques on trunk and extremities.   - Pink macules on L anticubital fossa and R lower abdomen.  - L dorsal foot, excoriated pink macules.  - There are erythematous macules with overyling adherent scale on the  forehead, R and L cheek.   - No other lesions of concern on areas examined.     Medications:  Current Outpatient Medications   Medication Sig Dispense Refill     allopurinol (ZYLOPRIM) 300 MG tablet TAKE 1 TABLET DAILY 90 tablet 3     amLODIPine (NORVASC) 5 MG tablet Take 1 tablet (5 mg) by mouth daily 90 tablet 3     aspirin (ASA) 81 MG EC tablet Take 1 tablet (81 mg) by mouth daily 90 tablet 3     calcipotriene (DOVONOX) 0.005 % external cream Mix efudex + calcipotriene equally. Apply BID x 4-10 days. Stop when skin gets red. 60 g 1     carvedilol (COREG) 6.25 MG tablet Take 1 tablet (6.25 mg) by mouth 2 times daily (with meals) 180 tablet 3     clobetasol propionate (TEMOVATE) 0.05 % external cream Apply topically 2 times daily To feet as needed 60 g 3     empagliflozin (JARDIANCE) 10 MG TABS tablet Take 1 tablet (10 mg) by mouth daily 90 tablet 3     ezetimibe (ZETIA) 10 MG tablet Take 1 tablet (10 mg) by mouth daily 90 tablet 3     fluorouracil (EFUDEX) 5 % external cream Mix equally with calcipotriene cream. Apply BID x 4-10 days. Stop when skin gets red. 40 g 0     hydrALAZINE (APRESOLINE) 100 MG tablet  Take 1 tablet (100 mg) by mouth 3 times daily 270 tablet 3     isosorbide mononitrate CR (IMDUR) 120 MG 24 HR ER tablet Take 1 tablet (120 mg) by mouth daily 90 tablet 3     ketoconazole (NIZORAL) 2 % external shampoo Massage into wet scalp, let sit 3-5 min, then rinse. Do this 3x weekly. 120 mL 11     losartan (COZAAR) 100 MG tablet Take 1 tablet (100 mg) by mouth daily 90 tablet 3     rivaroxaban ANTICOAGULANT (XARELTO ANTICOAGULANT) 20 MG TABS tablet Take 1 tablet (20 mg) by mouth daily (with dinner) TAKE 1 TABLET DAILY WITH DINNER 90 tablet 1     rosuvastatin (CRESTOR) 40 MG tablet Take 1 tablet (40 mg) by mouth at bedtime 90 tablet 3     torsemide (DEMADEX) 20 MG tablet TAKE 1 TABLET BY MOUTH MONDAY,WEDNESDAY AND FRIDAY AND 2 TABLETS ON ALL OTHER DAYS. 144 tablet 0     torsemide (DEMADEX) 20 MG tablet Take 1 tablet (20 mg) Monday, Wednesday, Friday and take 2 tablets (40 mg) other days by mouth 135 tablet 3     triamcinolone (KENALOG) 0.1 % external cream Apply topically 2 times daily To psoriasis on arms and body as needed 454 g 1     Vitamin D, Cholecalciferol, 25 MCG (1000 UT) CAPS Take 25 mcg by mouth daily 90 capsule 3     No current facility-administered medications for this visit.      Past Medical History:   Patient Active Problem List   Diagnosis     ASCVD (arteriosclerotic cardiovascular disease)     Benign essential hypertension     Hyperlipidemia LDL goal <100     Elevated blood sugar     Psoriasis     Non morbid obesity, unspecified obesity type     Gout, unspecified cause, unspecified chronicity, unspecified site     Hyponatremia     Low mean corpuscular volume (MCV)     Atrial fibrillation (H)     Syncope     V-tach (H)     Chronic kidney disease, stage 3 (H)     Chronic heart failure with preserved ejection fraction (H)     Past Medical History:   Diagnosis Date     Acute diastolic congestive heart failure (H) 06/2019    hosp fsd, then fu echo ef nl; echo 2023 nl ef     ASCVD (arteriosclerotic  cardiovascular disease) 1997    angio with 40% circ lesion; 6/19 hosp for chf, 3 vessel dz on angio but porcelin aorta so ptca done     Atrial fibrillation (H) 10/09/2018    found on routine physical     Basal cell carcinoma      Carotid artery plaque 2005    seen on us, about 50% stenosis, fu 2009 us no significant stenosis     CKD (chronic kidney disease) stage 3, GFR 30-59 ml/min (H)      Elevated blood sugar      Gout     on allopurinol     HTN (hypertension)      Hypercholesteremia      Hyponatremia 2015    felt due to chlorthalidone     Low mean corpuscular volume (MCV)      Near syncope 05/2015    fu est echo low level but neg     Psoriasis     Dr. Marti     Renal mass 06/29/2019    seen on ct chest for sob, needs fu ct for that, fu us done 7/19 and no mass seen, should have fu ct in December, had fu us 3/22 and no fu needed     Screening 2012    abd us no aaa     Syncope 09/2019    hosp fsd, cause not clear, lowered coreg dose; seen by Dr. Lezama of ep and ep study neg, implanted event monitor     V-tach (H) 09/2019    seen on event monitor for fu syncope, then ep study and no inducible vtach        CC Referred Self, MD  No address on file on close of this encounter.      Again, thank you for allowing me to participate in the care of your patient.        Sincerely,        Janel Dodson MD

## 2024-06-10 ENCOUNTER — TELEPHONE (OUTPATIENT)
Dept: MULTI SPECIALTY CLINIC | Facility: CLINIC | Age: 79
End: 2024-06-10
Payer: COMMERCIAL

## 2024-06-10 NOTE — TELEPHONE ENCOUNTER
GEOVANNYM to reschedule Dr. Ramos appt on 07/24 to 09/20 or another date that works best for pt. Amber to schedule per ZM

## 2024-06-12 ENCOUNTER — TELEPHONE (OUTPATIENT)
Dept: FAMILY MEDICINE | Facility: CLINIC | Age: 79
End: 2024-06-12
Payer: COMMERCIAL

## 2024-06-12 DIAGNOSIS — C44.519 BASAL CELL CARCINOMA OF BACK: Primary | ICD-10-CM

## 2024-06-12 NOTE — TELEPHONE ENCOUNTER
Pt called back and went over Dr. Dodson's message below about biopsy results.  Explained and answered all questions about excision procedure.  Pt would like both sites done at the same time and would like to go to . Nurse advised  will call to schedule the appt.    Referral in chart.    Eliana JIN RN  [facility] [place]  [phone]

## 2024-06-12 NOTE — TELEPHONE ENCOUNTER
Patient Contact    Attempt # 1    Was call answered?  No.  Left message on voicemail with information to call nurse back at [phone].     Eliana JIN RN  [facility] [place]  [phone]

## 2024-06-12 NOTE — TELEPHONE ENCOUNTER
----- Message from Janel Dodson MD sent at 6/10/2024  9:05 PM CDT -----  Please refer for excision x2    A. Central upper back:  - Basal cell carcinoma, nodular type - (see description)      B. Left paraspinal upper back:  - Basal cell carcinoma, nodular type    Your biopsy result both returned as a low risk basal cell skin cancer as we suspected.  To treat this, I will refer you for an excision with our surgery team. I will ask our scheduling team to call you to set this up.     Please let me know any questions or concerns you have.     Sincerely,  Janel Dodson MD

## 2024-06-13 ENCOUNTER — TELEPHONE (OUTPATIENT)
Dept: DERMATOLOGY | Facility: CLINIC | Age: 79
End: 2024-06-13
Payer: COMMERCIAL

## 2024-06-13 NOTE — TELEPHONE ENCOUNTER
Left patient a voicemail to schedule an excision for     BCC central back and BCC left paraspinal upper back    with Dr. Villarreal in . Provided my direct phone number.    Mckenzie Collazo on 6/13/2024 at 3:33 PM

## 2024-06-13 NOTE — LETTER
July 12, 2024      Betito Anderson  [address]  [place] [address]          Dear Betito,     We have made multiple attempts to reach you by phone to schedule your skin cancer surgery appointment. You can give us a call back at [phone]. The Basal Cell Carcinona  is still present on your back and will continue to grow if left untreated. Please contact us at your earliest convenience.    Sincerely,    Chapito Villarreal MD

## 2024-06-25 NOTE — TELEPHONE ENCOUNTER
Left patient a voicemail to schedule an excision for     BCC central back and BCC left paraspinal upper back    with Dr. Villarreal in . Provided my direct phone number.     Mckenzie Collazo, Complex  6/25/2024 2:38 PM

## 2024-07-08 NOTE — TELEPHONE ENCOUNTER
Left patient a voicemail to schedule an excision for     BCC central back and BCC left paraspinal upper back    with Dr. Villarreal in . Provided my direct phone number.    Mckenzie Collazo, Complex  7/8/2024 1:41 PM

## 2024-07-11 NOTE — TELEPHONE ENCOUNTER
I have been unable to reach this patient for scheduling their appt with Derm Surg.     I am canceling the referral from my WQ because I have hit the maximum number of attempts to contact them. I will reinstate the order and help them schedule if they return my messages.     Thank you,   Mckenzie Collazo, Complex  7/11/2024 2:24 PM

## 2024-07-12 NOTE — TELEPHONE ENCOUNTER
Letter written, printed, and given to TC to mail certified.    Eliana JIN RN  [facility] [place]  [phone]

## 2024-07-20 NOTE — ED PROVIDER NOTES
History     Chief Complaint:  Shortness of Breath      HPI   Betito Anderson is an anticoagulated 73 year old male on Eliquis, with a complex medical history, including history of atrial fibrillation, carotid artery plaque, gout, hypertension, and hypercholesteremia, among others, who presents with shortness of breath for the past couple of months, with worsening of his shortness of breath for the past day, prompting him to visit the ED. The patient was seen by SEGUNDO Hdez on 6/27/19 for his symptoms and was diagnosed with dyspnea on exertion. He also notes that he is overdue to have an echocardiogram. Here, the patient reports developing a cold over the past 36 hours and was coughing throughout the night. His wife endorses that he had difficulty breathing when walking 10 steps for the past few days. She also notes that they recently had their floors redone 3 days ago and is unsure if this may be contributing to his breathing difficulties as well. He reports previous history of wheezing problems. He also endorses having swollen legs over the past 2 months. He reports that he has been placed on a diuretic in the past. He denies a previous history of myocardial infarction.     Allergies:  Ace inhibitors     Medications:    Zyloprim  Norvasc  Eliquis  Lasix  Aspirin   Cozaar  Toprol  Zocor     Past Medical History:    ASCVD  Atrial fibrillation   Carotid artery plaque   Gout  HTN  Hypercholesteremia  Hyponatremia   Low MCV  Psoriasis     Past Surgical History:    Arthroscopic knee procedure    Family History:    CAD    Social History:  Smoking status: former  Alcohol use: yes  Drug use: no  PCP: Rudy Vernon  Presents to the ED with his wife and son.  Marital Status:        Review of Systems   Respiratory: Positive for shortness of breath.    Cardiovascular: Positive for leg swelling.   10 point review of systems performed and is negative except as above and in HPI.      Physical Exam     Patient Vitals    Problem: Adult Inpatient Plan of Care  Goal: Plan of Care Review  Outcome: Ongoing, Progressing  Flowsheets (Taken 7/18/2024 1807)  Progress: no change  Plan of Care Reviewed With: patient  Outcome Evaluation: Vitals stable. IVF and IV antibiotics continued. Up to chair with staff. +BM. Cardiology consulted. Possible discharge to SNF tomorrow. Daughter visited. Plan of care ongoing.                Problem: Malnutrition  Goal: Improved Nutritional Intake  Outcome: Ongoing, Progressing   Goal Outcome Evaluation:      RD to follow                                         "Goal Outcome Evaluation:    Problem: Adult Inpatient Plan of Care  Goal: Plan of Care Review  Outcome: Ongoing, Progressing  Flowsheets (Taken 7/19/2024 3969)  Progress: improving  Plan of Care Reviewed With: patient  Outcome Evaluation: Vitals stable. Worked with PT - pt felt \"shaky\" when standing, see previous note for BP. +BM. Tolerating diet. IVF continued. Possible discharge to SNF tomorrow. Plan of care ongoing.                   " Goal Outcome Evaluation:  Plan of Care Reviewed With: patient        Progress: improving  Outcome Evaluation: Arrived at 4E at 1919. Very [place], per daughter, [place] even with hearing aids on, so pt decided to keep hearing aids at home. Wound consulted for non-blanchable pink coccyx. Pt able to turn self without using side rails. Confused and sundowner, asked for daughter to take her home last night, so this RN called pt's daughter, she came and stayed with pt. VSS, call light within reach.                                Goal Outcome Evaluation:  Plan of Care Reviewed With: patient        Progress: improving  Outcome Evaluation: No c/o pain or soa. Up to chair. Orthostatics still +, but not dropping as low. Plan on rehab at discharge, possible d/c Friday. Echo completed this morning. Daughter at bedside this morning.                                Goal Outcome Evaluation:  Plan of Care Reviewed With: patient        Progress: improving  Outcome Evaluation: Orthostatic BP checked, still positive. Dropped on both 1min standing and 3 mins standing, lying back to bed safely with assist x2, walker and gait belt used. Daughter left at bedtime, pt became a little bit agitated and tried to get up. Did ortho static at bedside, pt then went back to sleep after 3mins' standing. Seroquel helped as well. VSS, stable on room air. Frequently rounded and bed alarm on.                                Goal Outcome Evaluation:  Plan of Care Reviewed With: patient        Progress: improving  Outcome Evaluation: Pt seen for PT this PM with improved tolerance for activity. Pt continues to be positive for orthostatic hypotension, supine: 180/82, standing for 4 min: 100/67, reclined: 138/75. pt was less symptomatic today compared to previous session - was able to take a few steps to reach recliner for change in position. she continues to benefit from skileld PT to address mobility deficits. Recommend home with assist vs SNU      Anticipated Discharge Disposition (PT): home with 24/7 care, home with home health, skilled nursing facility (pending progress)                         Goal Outcome Evaluation:  Plan of Care Reviewed With: patient        Progress: improving  Outcome Evaluation: Vital signs stable. No c/o pain. No c/o nausea. Tolerating diet. IVF continued. Safety maintained. Pt rested and slept between care tonight. Needs met at this time. SNF at discharge. Plan of care ongoing.                                Goal Outcome Evaluation:  Plan of Care Reviewed With: patient        Progress: no change  Outcome Evaluation: Pt c/o neck pain this morning, resolved with PRN tylenol. +orthostatics when up with PT/OT. IVF bolus given. Norvasc discontinued. Daughter at bedside this morning. Started on IV rocephin. Carotid doppler completed, echo still needs to be completed.                                Goal Outcome Evaluation:  Plan of Care Reviewed With: patient        Progress: no change  Outcome Evaluation: VSS afebrile, complained of cramping to BLQ, BS active and offered bedpan, pt states she is unable to go. Continuing IVF, tylenol given for pain and bisocodal for BM. Daughter at bedside until bedtime, fluids offered. Plan of care ongoing.                                Goal Outcome Evaluation:  Plan of Care Reviewed With: patient        Progress: no change  Outcome Evaluation: VSS afebrile, no complaints but unable to sleep and confused. Appears worried about being sent to a nursing home. Called daughter to see if she could calm patient, no answer, message left. Plan of care ongoing.                                Goal Outcome Evaluation:  Plan of Care Reviewed With: patient, family      Pt is 89 y/o F admitted to [facility] on 7/15/24 with syncope. Pt with hx of dementia. At baseline pt is indep with rwx - lives with adult son, 2 NORMA. Pt currently demos min A for bed mobility, min/mod A of 2 for transfers and standing balance. Did assess orthostatics. Supine: 128/75, standing 80/39 with increased assist to stand noted and pt less responsive. Return to supine: 138/84. MD present in room at the time. Pt demos mobility below baseline and therefore would benefit from acute skilled PT to address functional mobility deficits. Anticipate d/c home with assist and home health as needed.              Anticipated Discharge Disposition (PT): home with 24/7 care, home with home health                         The pt was admitted to [facility] 2/2 to stop walking and slumped down at the eye doctor. Pmhx significant for diverticulitis, hypertension, irregular heartbeat, syncope, dementia. The pt is very [place]. The pt lives with her son and is independent at baseline using a walker. Today, the pt completed bed mobility with Min A. Orthostatics completed.  Supine: 128/75  Standing >3 min: 80/39  Supine post stand: 138/84.   When standing the pt required Mod/Max A for standing balance due to her drop in pressure. At this time she requires assist for all ADL's and transfers due to weakness.   SNF recommended pending progress.    for the past 24 hrs:   BP Temp Temp src Pulse Heart Rate Resp SpO2 Weight   06/29/19 1930 (!) 183/103 -- -- 98 84 20 96 % --   06/29/19 1841 (!) 226/116 98.8  F (37.1  C) Oral -- 91 18 95 % 92.5 kg (204 lb)       Physical Exam  General: Resting on the gurney, appears somewhat uncomfortable  Head:  The scalp, face, and head appear normal  Mouth/Throat: Mucus membranes are moist  CV:  Regular rate    Normal S1 and S2  No pathological murmur   Resp:      Non-labored, no retractions or accessory muscle use    No coarseness    Wheezes heard in all fields. Breath sounds diminished in the bilateral bases.   GI:  Abdomen is soft, no rigidity    No tenderness to palpation  MS:  Normal motor assessment of all extremities.    Good capillary refill noted.    Bilateral pitting edema to the thighs.   Skin:   No rash or lesions noted.  Neuro:   Speech is normal and fluent. No apparent deficit.  Psych: Awake. Alert.  Normal affect.      Appropriate interactions.    Emergency Department Course   ECG:  EKG  Indication: shortness of breath   Time: 1843  Rate 94 bpm. SC interval *. QRS duration 100. QT/QTc 364/455.   P-R-T * -22 35. Atrial fibrillation. Nonspecific ST abnormality. Abnormal ECG.     Imaging:  Radiographic findings were communicated with the patient who voiced understanding of the findings.    XR Chest 2 Views  Slightly improved right pleural effusion and associated  atelectasis and/or infiltrate.  As read by Radiology.    Laboratory:  BNP: 24,400 (H)  BMP: Glucose 123 (H), calcium 8.4 (L), o/w WNL (Creatinine: 1.17)  CBC: WBC 16.6 (H), o/w WNL  (HGB 14.8, )  Troponin 1 (1859): 0.029    Interventions:  1910: Duoneb 3 ml nebulization   1943: Lasix 40 mg IV    Emergency Department Course:  1852: Nursing notes and vitals reviewed. I performed an exam of the patient as documented above.     IV inserted. Medicine administered as documented above. Blood drawn. This was sent to the lab for further testing, results  above.    The patient was sent for a chest xray while in the emergency department, findings above.     1944: I rechecked the patient and discussed the results of his workup thus far.     1945: I consulted with Dr. Aparicio of the hospitalist services. They are in agreement to accept the patient for admission.    Findings and plan explained to the Patient who consents to admission. Discussed the patient with Dr. Aparicio, who will admit the patient to a medical bed for further monitoring, evaluation, and treatment.       Impression & Plan      Medical Decision Making:  Betito Anderson is a 73 year old male with a PMH of atrial fibrillation, hyperlipidemia, and gout, among others, who presents for evaluation of shortness of breath.  I considered a broad differential of their dyspnea including CHF exacerbation, PE, dissection, hemothorax, pleural effusion, pneumonia, acute coronary syndrome, reactive airway disease, bronchitis, upper airway obstruction, foreign body, etc.  The patient complains of increased shortness of breath when laying down or walking, lower extremity edema.  With his pulmonary exam CHF is the most likely diagnosis.  Troponin obtained and is 0.029.     Given the history and exam plus laboratory findings I feel congestive heart failure is the most likely etiology and would not do extensive workup for other causes as mentioned above at this time.  The patient received IV diuretics in ED. Will admit at this time for further cares.      I would rule them out for ACS in the hospital given their presentation but this seems classic CHF.        Critical Care time:  none    Diagnosis:    ICD-10-CM    1. Acute on chronic congestive heart failure, unspecified heart failure type (H) I50.9        Disposition:  Admitted to Dr. Aparicio     I, Luz Miranda, am serving as a scribe at 6:52 PM on 6/29/2019 to document services personally performed by Ilene Young MD based on my observations and the provider's  statements to me.       Luz Miranda  6/29/2019    EMERGENCY DEPARTMENT       Ilene Young MD  06/29/19 2261     no

## 2024-07-24 NOTE — TELEPHONE ENCOUNTER
Patient Contact    Attempt # 1    Was call answered?  No.  Left message on voicemail to call [phone] to speak about scheduling skin cancer removal surgery.  Advised if has had the skin cancer removed by a clinic outside of [facility] to let us know this also.    Eliana JIN RN  [facility] [place]  [phone]

## 2024-07-31 NOTE — TELEPHONE ENCOUNTER
Patient Contact    Attempt # 2    Was call answered?  No.  Left message on voicemail with information to call nurse back at [phone].    Eliana JIN RN  [facility] [place]  [phone]

## 2024-09-03 ENCOUNTER — TELEPHONE (OUTPATIENT)
Dept: NEPHROLOGY | Facility: CLINIC | Age: 79
End: 2024-09-03
Payer: COMMERCIAL

## 2024-09-03 NOTE — TELEPHONE ENCOUNTER
LVM for PT to call [phone] to move the Dr Ramos appt up to 9:00, 9:30 or to 12:00, keep this on 9/20.

## 2024-09-12 ENCOUNTER — CARE COORDINATION (OUTPATIENT)
Dept: CARDIOLOGY | Facility: CLINIC | Age: 79
End: 2024-09-12
Payer: COMMERCIAL

## 2024-09-12 NOTE — PROGRESS NOTES
Received a tiering exception denial from [facility]. Will send an update to issa Mayorga RN September 12, 2024, 4:35 PM

## 2024-09-13 ENCOUNTER — LAB (OUTPATIENT)
Dept: LAB | Facility: CLINIC | Age: 79
End: 2024-09-13
Payer: COMMERCIAL

## 2024-09-13 DIAGNOSIS — R80.9 PROTEINURIA, UNSPECIFIED TYPE: ICD-10-CM

## 2024-09-13 DIAGNOSIS — N18.30 STAGE 3 CHRONIC KIDNEY DISEASE, UNSPECIFIED WHETHER STAGE 3A OR 3B CKD (H): ICD-10-CM

## 2024-09-13 LAB
ALBUMIN SERPL BCG-MCNC: 4.1 G/DL (ref 3.5–5.2)
ANION GAP SERPL CALCULATED.3IONS-SCNC: 12 MMOL/L (ref 7–15)
BUN SERPL-MCNC: 36.6 MG/DL (ref 8–23)
CALCIUM SERPL-MCNC: 9.2 MG/DL (ref 8.8–10.4)
CHLORIDE SERPL-SCNC: 103 MMOL/L (ref 98–107)
CREAT SERPL-MCNC: 1.79 MG/DL (ref 0.67–1.17)
EGFRCR SERPLBLD CKD-EPI 2021: 38 ML/MIN/1.73M2
FERRITIN SERPL-MCNC: 120 NG/ML (ref 31–409)
GLUCOSE SERPL-MCNC: 109 MG/DL (ref 70–99)
HCO3 SERPL-SCNC: 23 MMOL/L (ref 22–29)
IRON BINDING CAPACITY (ROCHE): 264 UG/DL (ref 240–430)
IRON SATN MFR SERPL: 18 % (ref 15–46)
IRON SERPL-MCNC: 47 UG/DL (ref 61–157)
PHOSPHATE SERPL-MCNC: 4.3 MG/DL (ref 2.5–4.5)
POTASSIUM SERPL-SCNC: 4 MMOL/L (ref 3.4–5.3)
SODIUM SERPL-SCNC: 138 MMOL/L (ref 135–145)
URATE SERPL-MCNC: 3.4 MG/DL (ref 3.4–7)

## 2024-09-13 PROCEDURE — 84244 ASSAY OF RENIN: CPT | Mod: 90

## 2024-09-13 PROCEDURE — 83550 IRON BINDING TEST: CPT

## 2024-09-13 PROCEDURE — 86334 IMMUNOFIX E-PHORESIS SERUM: CPT | Performed by: STUDENT IN AN ORGANIZED HEALTH CARE EDUCATION/TRAINING PROGRAM

## 2024-09-13 PROCEDURE — 83520 IMMUNOASSAY QUANT NOS NONAB: CPT | Mod: 90

## 2024-09-13 PROCEDURE — 86335 IMMUNFIX E-PHORSIS/URINE/CSF: CPT | Performed by: STUDENT IN AN ORGANIZED HEALTH CARE EDUCATION/TRAINING PROGRAM

## 2024-09-13 PROCEDURE — 99000 SPECIMEN HANDLING OFFICE-LAB: CPT

## 2024-09-13 PROCEDURE — 86036 ANCA SCREEN EACH ANTIBODY: CPT

## 2024-09-13 PROCEDURE — 80069 RENAL FUNCTION PANEL: CPT

## 2024-09-13 PROCEDURE — 82728 ASSAY OF FERRITIN: CPT

## 2024-09-13 PROCEDURE — 82088 ASSAY OF ALDOSTERONE: CPT

## 2024-09-13 PROCEDURE — 83540 ASSAY OF IRON: CPT

## 2024-09-13 PROCEDURE — 86038 ANTINUCLEAR ANTIBODIES: CPT

## 2024-09-13 PROCEDURE — 84550 ASSAY OF BLOOD/URIC ACID: CPT

## 2024-09-13 PROCEDURE — 36415 COLL VENOUS BLD VENIPUNCTURE: CPT

## 2024-09-16 LAB
ANA SER QL IF: NEGATIVE
ANCA AB PATTERN SER IF-IMP: NORMAL
C-ANCA TITR SER IF: NORMAL {TITER}
PLA2R IGG SER IA-ACNC: <2 RU/ML
RENIN PLAS-CCNC: 9.1 NG/ML/HR

## 2024-09-16 NOTE — TELEPHONE ENCOUNTER
"Patient has Medicare D through [facility].  Patient's drug costs have exceeded $5,030, and as such will now pay a 25% coinsurance on all covered drugs.  (Also called the \"coverage gap\" or \"donut hole.\")      Jardiance/Farxiga  --$149/mo.  --If total out-of-pocket costs exceed $8000, patient will pay $0 for the remainder of 2024.    If this is cost-prohibitive, please let me know and I can pursue a [employer] dexter to cover cardiomyopathy medications.    Jami Powell  Pharmacy Technician/Liaison, Discharge Pharmacy   [phone] (voice or text)  [email or web address]    "

## 2024-09-17 LAB
ALDOST SERPL-MCNC: 6.4 NG/DL (ref 0–31)
ALDOST/RENIN PLAS-RTO: 0.7 {RATIO} (ref 0–25)
LOCATION OF TASK: NORMAL
LOCATION OF TASK: NORMAL
PROT ELPH PNL UR ELPH: NORMAL
PROT PATTERN SERPL IFE-IMP: NORMAL

## 2024-09-20 ENCOUNTER — OFFICE VISIT (OUTPATIENT)
Dept: NEPHROLOGY | Facility: CLINIC | Age: 79
End: 2024-09-20
Payer: COMMERCIAL

## 2024-09-20 VITALS
BODY MASS INDEX: 32.11 KG/M2 | SYSTOLIC BLOOD PRESSURE: 149 MMHG | DIASTOLIC BLOOD PRESSURE: 73 MMHG | WEIGHT: 205 LBS | OXYGEN SATURATION: 96 % | HEART RATE: 62 BPM

## 2024-09-20 DIAGNOSIS — R79.89 HIGH SERUM RENIN: ICD-10-CM

## 2024-09-20 DIAGNOSIS — R80.1 PERSISTENT PROTEINURIA: ICD-10-CM

## 2024-09-20 DIAGNOSIS — I15.1 HYPERTENSION SECONDARY TO OTHER RENAL DISORDERS: ICD-10-CM

## 2024-09-20 DIAGNOSIS — N18.32 STAGE 3B CHRONIC KIDNEY DISEASE (H): Primary | ICD-10-CM

## 2024-09-20 PROCEDURE — G2211 COMPLEX E/M VISIT ADD ON: HCPCS | Performed by: INTERNAL MEDICINE

## 2024-09-20 PROCEDURE — 99214 OFFICE O/P EST MOD 30 MIN: CPT | Performed by: INTERNAL MEDICINE

## 2024-09-20 ASSESSMENT — PAIN SCALES - GENERAL: PAINLEVEL: NO PAIN (0)

## 2024-09-20 NOTE — PATIENT INSTRUCTIONS
Start vitamin D (buy it over the counter)  Send us blood pressures with date or day of week to see if higher dose torsemide is better

## 2024-09-20 NOTE — PROGRESS NOTES
Assessment and Plan:  78 year old male with history of CAD s/p stent to LAD (deemed not a surgical candidate), gout, arthritis s/p knee replacement  significant hypertension since 15-20 years ago, who presents for evaluation of worsening renal function. His creatinine was normal in 2004 but has progressively worsened in the last few years with proteinuria.  # CKD stage 3a/b  A3- Scr 1.4--1.6 eGR 44-48 ml/min, up to 1.79 eGFR 38ml/min today, with albuminuria of 800mg/g cr:  his Scr was near 1-1.2 until 2019 when he had some elevations and fluctuation and since then with Scr 1.4-1.7 range.  He has been on ACE/ ARB and multiple blood pressure medications for many years, and was taking NSAIDs until early 2024 when he was told not to (though not on a regular basis).     He has protein on his UA dating back many years, but it was first quantified only recently and noted to be elevated near 1g/g cr   - Underlying systemic or autoimmune disease possible, though he has not symptoms, and his renal function seems to have declined correlating to his cardiac events in 2019 and now seems relatively stable.  - checked some basic serologies- all negative  - albuminuria near 800mg/g cr and protein/creat of 1.2 g/g cr  - UA without hematuria  - on jardiance 10mg - it is 500$ for 3 months (in [place])- consider [facility]  # Hypertension: has significant hypertension, on torsemide, hydralazine, carvedilol, amlodipine and losartan  - check renin/ sue- renin fairly high- he also has left kidney that is smaller- thus will order duplex US to check for LADY  - likely the cause of his proteinuria.  - might need to increase torsemide to 40mg daily, his BP is high today- if BP is higher on 20mg torsemide days, we should increase to 40mg and aim for BP <140/90  # Anemia in chronic renal disease:   - Hgb: mildly low, immunofixaton normal, hgb up to 13.8  - Iron studies: Not checked recently    # Electrolytes:   - Potassium; level:  Normal  - Bicarbonate; level: Normal    # Mineral Bone Disorder:   - Vitamin D; level is: Low at 11 - started 1000mg daily (has to buy OTC)  - Calcium; level is:  Low normal  - PTH - 188 - secondary hyperparathyoridism- start viatmin D 1000mg daily     Assessment and plan was discussed with patient and he voiced his understanding and agreement.    Reason for Visit:  Betito Anderson is a 78 year old male with CKD from hypertension and cardiovascular diseaes, who presents for followup    HPI:  He is a pleasant male with history of hypertension for 20+ years, gout, CAD s/p LAD stent in 2019, s/p pacemaker in 2019, knee surgeries, who presents for evaluation of progressive renal disease.     In 2019, he had multiple cardiovascular hospitalizations. He was noted with acute systolic congestive heart failure secondary to new onset cardiomyopathy with EF of 35 to 40% Angiography showed three-vessel coronary artery disease status post PCI to LAD, not a candidate for CABG due to calcifications/ lack of targets.  He then was hospitalized for syncope and his coreg was lowered, then was admitted again and underwent pacemaker implantation.  His creatinine around then was 1.4-1.5 and has remained in that range since then.  He does not monitor his blood pressure regularly but will try to do so.  He did this back in 2019   We discussed his labs, his lifestyle, encouraged diet and exercise, and we will followup with him in a few months.  I will check some serologies but have lower suspicion for autoimmune/ glomerular disease, though concerning that he is proteinuric despite ARB and SGLT2 inhibitor.    He was noted with a renal lesion but on subsequent imaging noted to be likely a cyst with no followup needed.  We discussed his lab results today and the high renin level. He also has smaller left kidney.  We will do a duplex US to evaluate for LADY.  His creatinine is a little higher but he feels well.    He is going to [facility]  this winter with his family.      Renal History:   Kidney Disease and Medical Hx:  h/o HTN: Yes      ROS:   A comprehensive review of systems was obtained and negative, except as noted in the HPI or PMH.    Active Medical Problems:  Patient Active Problem List   Diagnosis    ASCVD (arteriosclerotic cardiovascular disease)    Benign essential hypertension    Hyperlipidemia LDL goal <100    Elevated blood sugar    Psoriasis    Non morbid obesity, unspecified obesity type    Gout, unspecified cause, unspecified chronicity, unspecified site    Hyponatremia    Low mean corpuscular volume (MCV)    Atrial fibrillation (H)    Syncope    V-tach (H)    Chronic kidney disease, stage 3 (H)    Chronic heart failure with preserved ejection fraction (H)     PMH:   Medical record was reviewed and PMH was discussed with patient and noted below.  Past Medical History:   Diagnosis Date    Acute diastolic congestive heart failure (H) 06/2019    hosp fsd, then fu echo ef nl; echo 2023 nl ef    ASCVD (arteriosclerotic cardiovascular disease) 1997    angio with 40% circ lesion; 6/19 hosp for chf, 3 vessel dz on angio but porcelin aorta so ptca done    Atrial fibrillation (H) 10/09/2018    found on routine physical    Basal cell carcinoma     Carotid artery plaque 2005    seen on us, about 50% stenosis, fu 2009 us no significant stenosis    CKD (chronic kidney disease) stage 3, GFR 30-59 ml/min (H)     Elevated blood sugar     Gout     on allopurinol    HTN (hypertension)     Hypercholesteremia     Hyponatremia 2015    felt due to chlorthalidone    Low mean corpuscular volume (MCV)     Near syncope 05/2015    fu est echo low level but neg    Psoriasis     Dr. Marti    Renal mass 06/29/2019    seen on ct chest for sob, needs fu ct for that, fu us done 7/19 and no mass seen, should have fu ct in December, had fu us 3/22 and no fu needed    Screening 2012    abd us no aaa    Syncope 09/2019    hosp fsd, cause not clear, lowered coreg dose;  seen by Dr. Lezama of ep and ep study neg, implanted event monitor    V-tach (H) 09/2019    seen on event monitor for fu syncope, then ep study and no inducible vtach     PSH:   Past Surgical History:   Procedure Laterality Date    CATARACT EXTRACTION  03/2022    CATARACT EXTRACTION  2022    CV CORONARY ANGIOGRAM N/A 07/02/2019    Procedure: Coronary Angiogram;  Surgeon: Donaldo Loear MD;  Location:  [facility]    CV HEART CATHETERIZATION WITH POSSIBLE INTERVENTION N/A 07/05/2019    Procedure: Heart Catheterization with Possible Intervention;  Surgeon: Manolo Hu MD;  Location:  [facility]    CV LEFT HEART CATH N/A 07/02/2019    Procedure: Left Heart Cath;  Surgeon: Donaldo Loera MD;  Location:  [facility]    CV LEFT VENTRICULOGRAM N/A 07/02/2019    Procedure: Left Ventriculogram;  Surgeon: Donaldo Loera MD;  Location:  [facility]    CV PCI STENT DRUG ELUTING N/A 07/05/2019    Procedure: PCI Stent Drug Eluting;  Surgeon: Manolo Hu MD;  Location:  [facility]    CV RIGHT HEART CATH MEASUREMENTS RECORDED N/A 07/02/2019    Procedure: Right Heart Cath;  Surgeon: Donaldo Loera MD;  Location:  [facility]    EP LOOP RECORDER IMPLANT N/A 10/11/2019    Procedure: EP Loop Recorder Implant;  Surgeon: Fuad Lezama MD;  Location:  [facility]    EP RIGHT VENTRICULAR RECORDING N/A 10/11/2019    Procedure: EP Ventricular Pacing;  Surgeon: Fuad Lezama MD;  Location:  [facility]    ZZC ANESTH,DX ARTHROSCOPIC PROC KNEE JOINT  01/01/2009       Family Hx:   Family History   Problem Relation Age of Onset    Coronary Artery Disease Father     Medical History Unknown Mother     Medical History Unknown Maternal Grandfather      Personal Hx:   Social History     Tobacco Use    Smoking status: Former     Current packs/day: 0.00     Average packs/day: 1 pack/day for 30.0  years (30.0 ttl pk-yrs)     Types: Cigarettes     Start date: 1968     Quit date: 1998     Years since quittin.0    Smokeless tobacco: Never   Substance Use Topics    Alcohol use: Yes     Comment: 3-4 drinks per week       Allergies:  Allergies   Allergen Reactions    Ace Inhibitors Anaphylaxis     Edema of ace    Eliquis [Apixaban] Other (See Comments)     Facial numbness       Medications:  Current Outpatient Medications   Medication Sig Dispense Refill    allopurinol (ZYLOPRIM) 300 MG tablet TAKE 1 TABLET DAILY 90 tablet 3    amLODIPine (NORVASC) 5 MG tablet Take 1 tablet (5 mg) by mouth daily 90 tablet 3    aspirin (ASA) 81 MG EC tablet Take 1 tablet (81 mg) by mouth daily 90 tablet 3    calcipotriene (DOVONOX) 0.005 % external cream Mix efudex + calcipotriene equally. Apply BID x 4-10 days. Stop when skin gets red. 60 g 1    carvedilol (COREG) 6.25 MG tablet Take 1 tablet (6.25 mg) by mouth 2 times daily (with meals) 180 tablet 3    clobetasol propionate (TEMOVATE) 0.05 % external cream Apply topically 2 times daily To feet as needed 60 g 3    empagliflozin (JARDIANCE) 10 MG TABS tablet Take 1 tablet (10 mg) by mouth daily 90 tablet 3    ezetimibe (ZETIA) 10 MG tablet Take 1 tablet (10 mg) by mouth daily 90 tablet 3    fluorouracil (EFUDEX) 5 % external cream Mix equally with calcipotriene cream. Apply BID x 4-10 days. Stop when skin gets red. 40 g 0    hydrALAZINE (APRESOLINE) 100 MG tablet Take 1 tablet (100 mg) by mouth 3 times daily 270 tablet 3    isosorbide mononitrate CR (IMDUR) 120 MG 24 HR ER tablet Take 1 tablet (120 mg) by mouth daily 90 tablet 3    ketoconazole (NIZORAL) 2 % external shampoo Massage into wet scalp, let sit 3-5 min, then rinse. Do this 3x weekly. 120 mL 11    losartan (COZAAR) 100 MG tablet Take 1 tablet (100 mg) by mouth daily 90 tablet 3    rivaroxaban ANTICOAGULANT (XARELTO ANTICOAGULANT) 20 MG TABS tablet Take 1 tablet (20 mg) by mouth daily (with dinner) TAKE 1  TABLET DAILY WITH DINNER 90 tablet 1    rosuvastatin (CRESTOR) 40 MG tablet Take 1 tablet (40 mg) by mouth at bedtime 90 tablet 3    torsemide (DEMADEX) 20 MG tablet TAKE 1 TABLET BY MOUTH MONDAY,WEDNESDAY AND FRIDAY AND 2 TABLETS ON ALL OTHER DAYS. 144 tablet 0    torsemide (DEMADEX) 20 MG tablet Take 1 tablet (20 mg) Monday, Wednesday, Friday and take 2 tablets (40 mg) other days by mouth 135 tablet 3    triamcinolone (KENALOG) 0.1 % external cream Apply topically 2 times daily To psoriasis on arms and body as needed 454 g 1    Vitamin D, Cholecalciferol, 25 MCG (1000 UT) CAPS Take 25 mcg by mouth daily 90 capsule 3     No current facility-administered medications for this visit.      Vitals:  Wt 93 kg (205 lb)   BMI 32.11 kg/m      Exam:  GENERAL APPEARANCE: alert and no distress  HENT: mouth without ulcers or lesions  RESP: lungs clear to auscultation - no rales, rhonchi or wheezes  CV: regular rhythm, normal rate, no rub, no murmur  EDEMA: no LE edema bilaterally  ABDOMEN: soft, nondistended, nontender, bowel sounds normal  MS: extremities normal - no gross deformities noted, no evidence of inflammation in joints, no muscle tenderness  SKIN: no rash    LABS:   CMP  Recent Labs   Lab Test 09/13/24  0944 05/15/24  1327 01/29/24  1014 07/26/23  1831 08/09/21  0945 05/28/21  1245 04/19/21  1219 04/05/21  1205 03/01/21  1433    137 140 140   < > 140 139 136 141   POTASSIUM 4.0 4.0 4.0 3.9   < > 3.9 3.9 4.3 4.3   CHLORIDE 103 101 104 103   < > 106 104 102 108   CO2 23 23 26 23   < > 27 31 29 27   ANIONGAP 12 13 10 14   < > 7 4 5 6   * 97 102* 103*   < > 105* 118* 102* 102*   BUN 36.6* 26.0* 19.2 14.6   < > 44* 40* 40* 35*   CR 1.79* 1.58* 1.47* 1.23*   < > 1.55* 1.62* 1.65* 1.38*   GFRESTIMATED 38* 44* 49* 60*   < > 43* 41* 40* 50*   GFRESTBLACK  --   --   --   --   --  50* 47* 46* 57*   GUNNER 9.2 8.7* 9.3 9.5   < > 8.7 9.1 8.8 9.0    < > = values in this interval not displayed.     Recent Labs   Lab Test  01/29/24  1014 07/26/23  1831 01/05/23  0802 02/16/21  0821   BILITOTAL 0.6 0.5 0.7 0.6   ALKPHOS 91 108 92 98   ALT <5 10 10 23   AST 12 15 21 14     CBC  Recent Labs   Lab Test 05/15/24  1327 01/29/24  1014 07/26/23  1831 05/05/23  1200   HGB 13.8 13.7 15.2 12.5*   WBC 7.2 8.9 6.5 9.2   RBC 5.41 5.24 5.50 4.69   HCT 45.9 44.9 48.5 40.9   MCV 85 86 88 87   MCH 25.5* 26.1* 27.6 26.7   MCHC 30.1* 30.5* 31.3* 30.6*   RDW 17.0* 15.9* 17.9* 18.0*    294 291 285     URINE STUDIES  Recent Labs   Lab Test 05/15/24  1343 07/03/19  0300   COLOR Yellow Yellow   APPEARANCE Clear Clear   URINEGLC 250* Negative   URINEBILI Negative Negative   URINEKETONE Negative Negative   SG 1.015 1.005   UBLD Negative Negative   URINEPH 6.0 7.0   PROTEIN 100* 100*   UROBILINOGEN 0.2  --    NITRITE Negative Negative   LEUKEST Negative Negative   RBCU 0-2 0   WBCU 0-5 <1     No lab results found.  PTH  Recent Labs   Lab Test 05/15/24  1327   PTHI 188*     IRON STUDIES  Recent Labs   Lab Test 09/13/24  0944 10/09/18  1039 09/08/16  0937   IRON 47* 91 56    282 342   IRONSAT 18 32 16   RASHEED 120 284 264         Ashlie Cahrles Ramos MD

## 2024-09-27 ENCOUNTER — HOSPITAL ENCOUNTER (OUTPATIENT)
Dept: ULTRASOUND IMAGING | Facility: CLINIC | Age: 79
Discharge: HOME | End: 2024-09-27
Attending: INTERNAL MEDICINE | Admitting: INTERNAL MEDICINE
Payer: COMMERCIAL

## 2024-09-27 DIAGNOSIS — I15.1 HYPERTENSION SECONDARY TO OTHER RENAL DISORDERS: ICD-10-CM

## 2024-09-27 DIAGNOSIS — N18.32 STAGE 3B CHRONIC KIDNEY DISEASE (H): ICD-10-CM

## 2024-09-27 PROCEDURE — 93975 VASCULAR STUDY: CPT

## 2024-10-08 ENCOUNTER — HOSPITAL ENCOUNTER (INPATIENT)
Facility: CLINIC | Age: 79
LOS: 4 days | Discharge: HOME | DRG: 280 | End: 2024-10-12
Attending: EMERGENCY MEDICINE | Admitting: INTERNAL MEDICINE
Payer: COMMERCIAL

## 2024-10-08 ENCOUNTER — APPOINTMENT (OUTPATIENT)
Dept: GENERAL RADIOLOGY | Facility: CLINIC | Age: 79
DRG: 280 | End: 2024-10-08
Attending: EMERGENCY MEDICINE
Payer: COMMERCIAL

## 2024-10-08 DIAGNOSIS — R06.09 EXERTIONAL DYSPNEA: ICD-10-CM

## 2024-10-08 DIAGNOSIS — Z79.01 ANTICOAGULATED: ICD-10-CM

## 2024-10-08 DIAGNOSIS — R79.89 ELEVATED TROPONIN: ICD-10-CM

## 2024-10-08 DIAGNOSIS — E78.5 HYPERLIPIDEMIA LDL GOAL <100: ICD-10-CM

## 2024-10-08 DIAGNOSIS — I48.20 CHRONIC ATRIAL FIBRILLATION (H): ICD-10-CM

## 2024-10-08 DIAGNOSIS — N28.9 RENAL INSUFFICIENCY: ICD-10-CM

## 2024-10-08 DIAGNOSIS — I10 BENIGN ESSENTIAL HYPERTENSION: ICD-10-CM

## 2024-10-08 DIAGNOSIS — I50.32 CHRONIC HEART FAILURE WITH PRESERVED EJECTION FRACTION (H): ICD-10-CM

## 2024-10-08 DIAGNOSIS — I50.30 HEART FAILURE WITH PRESERVED EJECTION FRACTION, UNSPECIFIED HF CHRONICITY (H): ICD-10-CM

## 2024-10-08 DIAGNOSIS — I50.9 ACUTE ON CHRONIC CONGESTIVE HEART FAILURE, UNSPECIFIED HEART FAILURE TYPE (H): Primary | ICD-10-CM

## 2024-10-08 LAB
ALBUMIN SERPL BCG-MCNC: 4 G/DL (ref 3.5–5.2)
ALP SERPL-CCNC: 95 U/L (ref 40–150)
ALT SERPL W P-5'-P-CCNC: 21 U/L (ref 0–70)
ANION GAP SERPL CALCULATED.3IONS-SCNC: 14 MMOL/L (ref 7–15)
AST SERPL W P-5'-P-CCNC: 21 U/L (ref 0–45)
ATRIAL RATE - MUSE: 288 BPM
BASOPHILS # BLD AUTO: 0.1 10E3/UL (ref 0–0.2)
BASOPHILS NFR BLD AUTO: 1 %
BILIRUB SERPL-MCNC: 0.8 MG/DL
BUN SERPL-MCNC: 34 MG/DL (ref 8–23)
CALCIUM SERPL-MCNC: 9 MG/DL (ref 8.8–10.4)
CHLORIDE SERPL-SCNC: 105 MMOL/L (ref 98–107)
CREAT SERPL-MCNC: 2.12 MG/DL (ref 0.67–1.17)
DIASTOLIC BLOOD PRESSURE - MUSE: NORMAL MMHG
EGFRCR SERPLBLD CKD-EPI 2021: 31 ML/MIN/1.73M2
EOSINOPHIL # BLD AUTO: 0 10E3/UL (ref 0–0.7)
EOSINOPHIL NFR BLD AUTO: 0 %
ERYTHROCYTE [DISTWIDTH] IN BLOOD BY AUTOMATED COUNT: 16.3 % (ref 10–15)
GLUCOSE BLDC GLUCOMTR-MCNC: 107 MG/DL (ref 70–99)
GLUCOSE SERPL-MCNC: 107 MG/DL (ref 70–99)
HCO3 SERPL-SCNC: 24 MMOL/L (ref 22–29)
HCT VFR BLD AUTO: 34.4 % (ref 40–53)
HGB BLD-MCNC: 10.8 G/DL (ref 13.3–17.7)
HOLD SPECIMEN: 0
IMM GRANULOCYTES # BLD: 0 10E3/UL
IMM GRANULOCYTES NFR BLD: 0 %
INTERPRETATION ECG - MUSE: NORMAL
LYMPHOCYTES # BLD AUTO: 0.8 10E3/UL (ref 0.8–5.3)
LYMPHOCYTES NFR BLD AUTO: 10 %
MAGNESIUM SERPL-MCNC: 1.8 MG/DL (ref 1.7–2.3)
MCH RBC QN AUTO: 26.9 PG (ref 26.5–33)
MCHC RBC AUTO-ENTMCNC: 31.4 G/DL (ref 31.5–36.5)
MCV RBC AUTO: 86 FL (ref 78–100)
MONOCYTES # BLD AUTO: 0.6 10E3/UL (ref 0–1.3)
MONOCYTES NFR BLD AUTO: 7 %
NEUTROPHILS # BLD AUTO: 6.5 10E3/UL (ref 1.6–8.3)
NEUTROPHILS NFR BLD AUTO: 81 %
NRBC # BLD AUTO: 0 10E3/UL
NRBC BLD AUTO-RTO: 0 /100
NT-PROBNP SERPL-MCNC: 6636 PG/ML (ref 0–1800)
P AXIS - MUSE: NORMAL DEGREES
PLATELET # BLD AUTO: 286 10E3/UL (ref 150–450)
POTASSIUM SERPL-SCNC: 3.8 MMOL/L (ref 3.4–5.3)
PR INTERVAL - MUSE: NORMAL MS
PROT SERPL-MCNC: 6.8 G/DL (ref 6.4–8.3)
QRS DURATION - MUSE: 96 MS
QT - MUSE: 448 MS
QTC - MUSE: 454 MS
R AXIS - MUSE: 97 DEGREES
RBC # BLD AUTO: 4.01 10E6/UL (ref 4.4–5.9)
SODIUM SERPL-SCNC: 143 MMOL/L (ref 135–145)
SYSTOLIC BLOOD PRESSURE - MUSE: NORMAL MMHG
T AXIS - MUSE: 49 DEGREES
TROPONIN T SERPL HS-MCNC: 35 NG/L
TROPONIN T SERPL HS-MCNC: 36 NG/L
VENTRICULAR RATE- MUSE: 62 BPM
WBC # BLD AUTO: 8 10E3/UL (ref 4–11)

## 2024-10-08 PROCEDURE — 99285 EMERGENCY DEPT VISIT HI MDM: CPT | Mod: 25

## 2024-10-08 PROCEDURE — 83880 ASSAY OF NATRIURETIC PEPTIDE: CPT | Performed by: EMERGENCY MEDICINE

## 2024-10-08 PROCEDURE — 250N000011 HC RX IP 250 OP 636

## 2024-10-08 PROCEDURE — 36415 COLL VENOUS BLD VENIPUNCTURE: CPT

## 2024-10-08 PROCEDURE — 84484 ASSAY OF TROPONIN QUANT: CPT

## 2024-10-08 PROCEDURE — 82040 ASSAY OF SERUM ALBUMIN: CPT | Performed by: EMERGENCY MEDICINE

## 2024-10-08 PROCEDURE — 250N000009 HC RX 250: Performed by: EMERGENCY MEDICINE

## 2024-10-08 PROCEDURE — 94640 AIRWAY INHALATION TREATMENT: CPT

## 2024-10-08 PROCEDURE — 250N000013 HC RX MED GY IP 250 OP 250 PS 637

## 2024-10-08 PROCEDURE — 99223 1ST HOSP IP/OBS HIGH 75: CPT

## 2024-10-08 PROCEDURE — 210N000002 HC R&B HEART CARE

## 2024-10-08 PROCEDURE — 71046 X-RAY EXAM CHEST 2 VIEWS: CPT

## 2024-10-08 PROCEDURE — 250N000011 HC RX IP 250 OP 636: Performed by: EMERGENCY MEDICINE

## 2024-10-08 PROCEDURE — 36415 COLL VENOUS BLD VENIPUNCTURE: CPT | Performed by: EMERGENCY MEDICINE

## 2024-10-08 PROCEDURE — 84484 ASSAY OF TROPONIN QUANT: CPT | Performed by: EMERGENCY MEDICINE

## 2024-10-08 PROCEDURE — 93005 ELECTROCARDIOGRAM TRACING: CPT

## 2024-10-08 PROCEDURE — 96374 THER/PROPH/DIAG INJ IV PUSH: CPT

## 2024-10-08 PROCEDURE — 83735 ASSAY OF MAGNESIUM: CPT | Performed by: INTERNAL MEDICINE

## 2024-10-08 PROCEDURE — 85025 COMPLETE CBC W/AUTO DIFF WBC: CPT | Performed by: EMERGENCY MEDICINE

## 2024-10-08 RX ORDER — CARVEDILOL 6.25 MG/1
6.25 TABLET ORAL 2 TIMES DAILY WITH MEALS
Status: DISCONTINUED | OUTPATIENT
Start: 2024-10-08 | End: 2024-10-09

## 2024-10-08 RX ORDER — IPRATROPIUM BROMIDE AND ALBUTEROL SULFATE 2.5; .5 MG/3ML; MG/3ML
3 SOLUTION RESPIRATORY (INHALATION) EVERY 4 HOURS PRN
Status: DISCONTINUED | OUTPATIENT
Start: 2024-10-08 | End: 2024-10-12 | Stop reason: HOSPADM

## 2024-10-08 RX ORDER — ACETAMINOPHEN 325 MG/1
650 TABLET ORAL EVERY 4 HOURS PRN
Status: DISCONTINUED | OUTPATIENT
Start: 2024-10-08 | End: 2024-10-12 | Stop reason: HOSPADM

## 2024-10-08 RX ORDER — ISOSORBIDE MONONITRATE 60 MG/1
120 TABLET, EXTENDED RELEASE ORAL DAILY
Status: DISCONTINUED | OUTPATIENT
Start: 2024-10-09 | End: 2024-10-12 | Stop reason: HOSPADM

## 2024-10-08 RX ORDER — ALLOPURINOL 300 MG/1
300 TABLET ORAL DAILY
Status: DISCONTINUED | OUTPATIENT
Start: 2024-10-09 | End: 2024-10-12 | Stop reason: HOSPADM

## 2024-10-08 RX ORDER — ONDANSETRON 2 MG/ML
4 INJECTION INTRAMUSCULAR; INTRAVENOUS EVERY 6 HOURS PRN
Status: DISCONTINUED | OUTPATIENT
Start: 2024-10-08 | End: 2024-10-12 | Stop reason: HOSPADM

## 2024-10-08 RX ORDER — ACETAMINOPHEN 650 MG/1
650 SUPPOSITORY RECTAL EVERY 4 HOURS PRN
Status: DISCONTINUED | OUTPATIENT
Start: 2024-10-08 | End: 2024-10-12 | Stop reason: HOSPADM

## 2024-10-08 RX ORDER — AMLODIPINE BESYLATE 5 MG/1
5 TABLET ORAL DAILY
Status: DISCONTINUED | OUTPATIENT
Start: 2024-10-09 | End: 2024-10-12 | Stop reason: HOSPADM

## 2024-10-08 RX ORDER — HYDRALAZINE HYDROCHLORIDE 10 MG/1
10 TABLET, FILM COATED ORAL EVERY 4 HOURS PRN
Status: DISCONTINUED | OUTPATIENT
Start: 2024-10-08 | End: 2024-10-12 | Stop reason: HOSPADM

## 2024-10-08 RX ORDER — ROSUVASTATIN CALCIUM 20 MG/1
40 TABLET, COATED ORAL AT BEDTIME
Status: DISCONTINUED | OUTPATIENT
Start: 2024-10-08 | End: 2024-10-09

## 2024-10-08 RX ORDER — AMOXICILLIN 250 MG
2 CAPSULE ORAL 2 TIMES DAILY PRN
Status: DISCONTINUED | OUTPATIENT
Start: 2024-10-08 | End: 2024-10-12 | Stop reason: HOSPADM

## 2024-10-08 RX ORDER — IPRATROPIUM BROMIDE AND ALBUTEROL SULFATE 2.5; .5 MG/3ML; MG/3ML
3 SOLUTION RESPIRATORY (INHALATION) ONCE
Status: COMPLETED | OUTPATIENT
Start: 2024-10-08 | End: 2024-10-08

## 2024-10-08 RX ORDER — FUROSEMIDE 10 MG/ML
40 INJECTION INTRAMUSCULAR; INTRAVENOUS
Status: DISCONTINUED | OUTPATIENT
Start: 2024-10-08 | End: 2024-10-10

## 2024-10-08 RX ORDER — ASPIRIN 81 MG/1
81 TABLET ORAL DAILY
Status: DISCONTINUED | OUTPATIENT
Start: 2024-10-09 | End: 2024-10-12 | Stop reason: HOSPADM

## 2024-10-08 RX ORDER — AMOXICILLIN 250 MG
1 CAPSULE ORAL 2 TIMES DAILY PRN
Status: DISCONTINUED | OUTPATIENT
Start: 2024-10-08 | End: 2024-10-12 | Stop reason: HOSPADM

## 2024-10-08 RX ORDER — EZETIMIBE 10 MG/1
10 TABLET ORAL DAILY
Status: DISCONTINUED | OUTPATIENT
Start: 2024-10-09 | End: 2024-10-12 | Stop reason: HOSPADM

## 2024-10-08 RX ORDER — LIDOCAINE 40 MG/G
CREAM TOPICAL
Status: DISCONTINUED | OUTPATIENT
Start: 2024-10-08 | End: 2024-10-12 | Stop reason: HOSPADM

## 2024-10-08 RX ORDER — ONDANSETRON 4 MG/1
4 TABLET, ORALLY DISINTEGRATING ORAL EVERY 6 HOURS PRN
Status: DISCONTINUED | OUTPATIENT
Start: 2024-10-08 | End: 2024-10-12 | Stop reason: HOSPADM

## 2024-10-08 RX ORDER — HYDRALAZINE HYDROCHLORIDE 20 MG/ML
10 INJECTION INTRAMUSCULAR; INTRAVENOUS EVERY 4 HOURS PRN
Status: DISCONTINUED | OUTPATIENT
Start: 2024-10-08 | End: 2024-10-12 | Stop reason: HOSPADM

## 2024-10-08 RX ORDER — FUROSEMIDE 10 MG/ML
60 INJECTION INTRAMUSCULAR; INTRAVENOUS ONCE
Status: COMPLETED | OUTPATIENT
Start: 2024-10-08 | End: 2024-10-08

## 2024-10-08 RX ORDER — HYDRALAZINE HYDROCHLORIDE 50 MG/1
100 TABLET, FILM COATED ORAL 3 TIMES DAILY
Status: DISCONTINUED | OUTPATIENT
Start: 2024-10-08 | End: 2024-10-12 | Stop reason: HOSPADM

## 2024-10-08 RX ADMIN — FUROSEMIDE 60 MG: 10 INJECTION, SOLUTION INTRAMUSCULAR; INTRAVENOUS at 13:40

## 2024-10-08 RX ADMIN — FUROSEMIDE 40 MG: 10 INJECTION, SOLUTION INTRAMUSCULAR; INTRAVENOUS at 19:41

## 2024-10-08 RX ADMIN — RIVAROXABAN 15 MG: 15 TABLET, FILM COATED ORAL at 18:51

## 2024-10-08 RX ADMIN — IPRATROPIUM BROMIDE AND ALBUTEROL SULFATE 3 ML: .5; 3 SOLUTION RESPIRATORY (INHALATION) at 12:53

## 2024-10-08 RX ADMIN — HYDRALAZINE HYDROCHLORIDE 100 MG: 50 TABLET ORAL at 21:33

## 2024-10-08 RX ADMIN — HYDRALAZINE HYDROCHLORIDE 100 MG: 50 TABLET ORAL at 18:51

## 2024-10-08 RX ADMIN — CARVEDILOL 6.25 MG: 6.25 TABLET, FILM COATED ORAL at 18:51

## 2024-10-08 RX ADMIN — ROSUVASTATIN CALCIUM 40 MG: 20 TABLET, FILM COATED ORAL at 21:33

## 2024-10-08 ASSESSMENT — ACTIVITIES OF DAILY LIVING (ADL)
ADLS_ACUITY_SCORE: 35
ADLS_ACUITY_SCORE: 20
ADLS_ACUITY_SCORE: 35
ADLS_ACUITY_SCORE: 20
ADLS_ACUITY_SCORE: 20
ADLS_ACUITY_SCORE: 35
ADLS_ACUITY_SCORE: 35
ADLS_ACUITY_SCORE: 20

## 2024-10-08 NOTE — PROGRESS NOTES
[id number]      Pt alert and oriented, pleasant, well appearing, calls appropriately. VSS on RA. Tele a-fib. Denies SOB or [place]. Ind. In the room. Strict I/O's, FR 2L, pt educated and aware. BLE 2+ edema. No complaints. Wife at bedside.     Plan: complete echo. CTM diuresis.       Hand off given to oncoming RN.     Betty Rader RN .............................................  October 8, 2024 7:49 PM

## 2024-10-08 NOTE — PROGRESS NOTES
RECEIVING UNIT ED HANDOFF REVIEW    ED Nurse Handoff Report was reviewed by: Betty Rader RN on October 8, 2024 at 3:02 PM

## 2024-10-08 NOTE — PHARMACY-ADMISSION MEDICATION HISTORY
Pharmacist Admission Medication History    Admission medication history is complete. The information provided in this note is only as accurate as the sources available at the time of the update.    Information Source(s): Patient and CareEverywhere/SureScripts via in-person    Pertinent Information: xarelto, rosuvastatin, hydralazine last filed 01/2024 per [employer]s but patient states still taking    Changes made to PTA medication list:  Added: None  Deleted: duplicate torsemide  Changed: None    Allergies reviewed with patient and updates made in EHR: yes    Medication History Completed By: Bonita Rodriguez [facility] 10/8/2024 4:09 PM    PTA Med List   Medication Sig Last Dose    allopurinol (ZYLOPRIM) 300 MG tablet TAKE 1 TABLET DAILY 10/8/2024 at am    amLODIPine (NORVASC) 5 MG tablet Take 1 tablet (5 mg) by mouth daily 10/8/2024 at am    carvedilol (COREG) 6.25 MG tablet Take 1 tablet (6.25 mg) by mouth 2 times daily (with meals) 10/8/2024 at am    empagliflozin (JARDIANCE) 10 MG TABS tablet Take 1 tablet (10 mg) by mouth daily 10/8/2024 at am    ezetimibe (ZETIA) 10 MG tablet Take 1 tablet (10 mg) by mouth daily 10/8/2024 at am    hydrALAZINE (APRESOLINE) 100 MG tablet Take 1 tablet (100 mg) by mouth 3 times daily 10/8/2024 at am X1    isosorbide mononitrate CR (IMDUR) 120 MG 24 HR ER tablet Take 1 tablet (120 mg) by mouth daily 10/8/2024 at am    losartan (COZAAR) 100 MG tablet Take 1 tablet (100 mg) by mouth daily 10/8/2024 at am    rivaroxaban ANTICOAGULANT (XARELTO ANTICOAGULANT) 20 MG TABS tablet Take 1 tablet (20 mg) by mouth daily (with dinner) TAKE 1 TABLET DAILY WITH DINNER 10/7/2024    rosuvastatin (CRESTOR) 40 MG tablet Take 1 tablet (40 mg) by mouth at bedtime 10/7/2024    torsemide (DEMADEX) 20 MG tablet Take 1 tablet (20 mg) Monday, Wednesday, Friday and take 2 tablets (40 mg) other days by mouth 10/8/2024 at am

## 2024-10-08 NOTE — H&P
"[facility]    History and Physical - Hospitalist Service       Date of Admission:  10/8/2024    Assessment & Plan      Betito Anderson is a 78 year old male admitted on 10/8/2024 with acute worsening shortness of breath. He has a past medical history of acute diastolic heart failure, ASCVD, CKD stage 3, HTN, HLD,     Acute CHF exacerbation  Chronic diastolic congestive heart failure HFpEF  Mr. Anderson presents on 10/8/2024 for acute shortness of breath. He lives in a 2 story town home and states that on 10/7/24, he was walking up a flight of stairs and suddenly became \"very short of breath.\" He had to lean on the kitchen counter to rest and catch his breath. Since then, his symptoms have been getting progressively worse. In addition to the shortness of breath also reports associated leg swelling. He does have a known history of HFpEF, but has not experienced symptoms like this before. Of note, his last echocardiogram was in June 2023. At that time, his EF was noted to be 70%.  *BNP 6636  *Troponin: 36  *Echocardiogram 6/15/2023: EF 70%   *Chest x-ray: Increased diffuse interstitial opacities in the lungs,  likely due to mild pulmonary edema. Trace bilateral pleural effusions. No focal infiltrate or pneumothorax. Stable cardiomegaly. Stable calcified granuloma in the left lung apex. Left chest wall implanted pacemaker  -40 mg IV lasix BID  -Strict intake and output monitoring  -Daily weights  -Restricted (2 gm) sodium diet  -Resume PTA IMDUR 120 MG 24 HR ER tablet daily  -Hold PTA Torsemide 20 mg M,W,F and 40 mg on all other days while receiving IV diuresis.  -Resume PTA empagliflozin 10 mg daily  -Resume PTA carvedilol 6.25 mg BID  -Echocardiogram ordered    KATELIN  Chronic kidney disease-stage 3  *Baseline Creatinine: 1.23-1.79  *Creatinine 10/8/2024: 2.12  -Avoid nephrotoxic agents  -Strict intake and output monitoring  -Recheck BMP in AM on 10/9/24    Coronary artery disease   Status " post OLESYA to the LAD and D2 in 2019  In July 2019, he was admitted to the hospital with acute hypertensive emergency and found to have acute LV dysfunction and EF reduction to 30%. This normalized with control of blood pressure. However, he had an angiogram at the time which showed a lesion in mid LAD with other extensive small vessel disease. 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 Therefore, he underwent a PCI of his LAD bifurcation into the diagonal.   *Troponin: 36  -Resume PTA Aspirin 81 mg daily  -Trend troponin q2h until flat    Chronic atrial fibrillation since 2018  *Anticoagulated with Xarelto  *CHADS-VASC score: 5: Stroke risk 7.2% per year  -Continuous telemetry monitoring  -Resume PTA Carvedilol 6.25 mg BID  -Resume PTA Xarelto 20 mg daily    Hypertension  *SBP in -158  -Resume PTA amlodipine 5 mg daily  -Resume PTA carvedilol 6.25 mg BID  -Resume PTA hydralazine 100 mg TID  -Hold PTA losartan due to KATELIN    Hyperlipidemia  *Total cholesterol 1/29/24: 154  *Non-HDL cholesterol 1/29/24: 119  -Resume PTA rosuvastatin 40 mg daily in the evening  -Resume PTA ezetimibe 10 mg daily    Acute normocytic anemia   *Hgb 10/8/2024: 10.8  *Hgb normal at baseline  *No signs or symptoms of bleeding. This is likely dilutional in the setting of fluid overload from CHF exacerbation.  -Recheck CBC 10/9/24 in AM    Diet:  Low salt  DVT Prophylaxis: DOAC and Pneumatic Compression Devices  Morales Catheter: Not present  Lines: None     Cardiac Monitoring: None  Code Status:  Full Code    Clinically Significant Risk Factors Present on Admission               # Drug Induced Coagulation Defect: home medication list includes an anticoagulant medication  # Drug Induced Platelet Defect: home medication list includes an antiplatelet medication   # Hypertension: Noted on problem list    # Anemia: based on hgb <11       # Obesity: Estimated body mass index is 32.28 kg/m  as calculated from  "the following:    Height as of this encounter: 1.676 m (5' 6\").    Weight as of this encounter: 90.7 kg (200 lb).            Disposition Plan     Medically Ready for Discharge: Anticipated in 2-4 Days     The patient's care was discussed with the Attending Physician, Dr. Robles .    Beto Bhagat NP  Hospitalist Service  [facility]  Securely message with [employer] (more info)  Text page via [facility] Paging/Directory   ______________________________________________________________________    Chief Complaint   Acute worsening shortness of breath.    History is obtained from the patient    History of Present Illness   Betito Anderson is a 78 year old male who presents to the ED on 10/8/2024 with shortness of breath and lower extremity swelling. He reports that his symptoms first started on 10/7. He lives in a 2 story town home and states that he spends most of his time downstairs watching TV. However, on 10/7, he went up one flight of stairs to get to the kitchen which he is normally able to do with minimal effort. On this occasion however, he became acutely short of breath and had to \"lean on the counter catch his breath.\" In addition to feeling short of breath, he also endorses associated leg swelling, which is new for him. This has apparently been getting progressively worse since it started on 10/7/24 . Of note, he does take torsemide, and reports compliance with his medication regimen. While he denies having symptoms like this before, he does report that he had some shortness of breath in 2019 when he was hospitalized for a hypertensive emergency. At that time, he was found to have extensive multi-vessel disease and had a stent placed to the LAD and D2.  Early in the day on 10/8/24, he was scheduled to have home physical therapy. However, he was unable to complete most of the prescribed exercises due to this new onset shortness of breath. This is what ultimately prompted him to come " to the ED for evaluation.    In the ED, he was noted to be dyspneic with mild exertion, but not hypoxic at rest. BNP is acutely elevated at 6636 and chest x-ray suggestive of pulmonary vascular congestion. He received a one time dose of IV lasix 60 mg, after which he reports improvement in his breathing.   No recent fever, chills or sick contacts. At present, he denies headache, dizziness, chest pain, abdominal pain, nausea, vomiting, diarrhea or constipation.  Plan to admit for carefully monitored diuresis. Will also obtain echocardiogram.    Past Medical History    Past Medical History:   Diagnosis Date    Acute diastolic congestive heart failure (H) 06/2019    hosp fsd, then fu echo ef nl; echo 2023 nl ef    ASCVD (arteriosclerotic cardiovascular disease) 1997    angio with 40% circ lesion; 6/19 hosp for chf, 3 vessel dz on angio but porcelin aorta so ptca done    Atrial fibrillation (H) 10/09/2018    found on routine physical    Basal cell carcinoma     Carotid artery plaque 2005    seen on us, about 50% stenosis, fu 2009 us no significant stenosis    CKD (chronic kidney disease) stage 3, GFR 30-59 ml/min (H)     Elevated blood sugar     Gout     on allopurinol    HTN (hypertension)     Hypercholesteremia     Hyponatremia 2015    felt due to chlorthalidone    Low mean corpuscular volume (MCV)     Near syncope 05/2015    fu est echo low level but neg    Psoriasis     Dr. Marti    Renal mass 06/29/2019    seen on ct chest for sob, needs fu ct for that, fu us done 7/19 and no mass seen, should have fu ct in December, had fu us 3/22 and no fu needed    Screening 2012    abd us no aaa    Syncope 09/2019    hosp fsd, cause not clear, lowered coreg dose; seen by Dr. Lezama of ep and ep study neg, implanted event monitor    V-tach (H) 09/2019    seen on event monitor for fu syncope, then ep study and no inducible vtach     Past Surgical History   Past Surgical History:   Procedure Laterality Date    CATARACT EXTRACTION   03/2022    CATARACT EXTRACTION  2022    CV CORONARY ANGIOGRAM N/A 07/02/2019    Procedure: Coronary Angiogram;  Surgeon: Donaldo Loera MD;  Location:  HEART CARDIAC CATH LAB    CV HEART CATHETERIZATION WITH POSSIBLE INTERVENTION N/A 07/05/2019    Procedure: Heart Catheterization with Possible Intervention;  Surgeon: Manolo Hu MD;  Location:  HEART CARDIAC CATH LAB    CV LEFT HEART CATH N/A 07/02/2019    Procedure: Left Heart Cath;  Surgeon: Donaldo Loera MD;  Location:  HEART CARDIAC CATH LAB    CV LEFT VENTRICULOGRAM N/A 07/02/2019    Procedure: Left Ventriculogram;  Surgeon: Donaldo Loera MD;  Location:  HEART CARDIAC CATH LAB    CV PCI STENT DRUG ELUTING N/A 07/05/2019    Procedure: PCI Stent Drug Eluting;  Surgeon: Manolo Hu MD;  Location:  HEART CARDIAC CATH LAB    CV RIGHT HEART CATH MEASUREMENTS RECORDED N/A 07/02/2019    Procedure: Right Heart Cath;  Surgeon: Donaldo Loera MD;  Location:  HEART CARDIAC CATH LAB    EP LOOP RECORDER IMPLANT N/A 10/11/2019    Procedure: EP Loop Recorder Implant;  Surgeon: Fuad Lezama MD;  Location:  HEART CARDIAC CATH LAB    EP RIGHT VENTRICULAR RECORDING N/A 10/11/2019    Procedure: EP Ventricular Pacing;  Surgeon: Fuad Lezama MD;  Location:  HEART CARDIAC CATH LAB    ZZC ANESTH,DX ARTHROSCOPIC PROC KNEE JOINT  01/01/2009     Prior to Admission Medications   Prior to Admission Medications   Prescriptions Last Dose Informant Patient Reported? Taking?   Vitamin D, Cholecalciferol, 25 MCG (1000 UT) CAPS   No No   Sig: Take 25 mcg by mouth daily   Patient not taking: Reported on 9/20/2024   allopurinol (ZYLOPRIM) 300 MG tablet   No No   Sig: TAKE 1 TABLET DAILY   amLODIPine (NORVASC) 5 MG tablet   No No   Sig: Take 1 tablet (5 mg) by mouth daily   aspirin (ASA) 81 MG EC tablet   No No   Sig: Take 1 tablet (81 mg) by mouth daily   calcipotriene (DOVONOX) 0.005 % external cream   No No   Sig: Mix  efudex + calcipotriene equally. Apply BID x 4-10 days. Stop when skin gets red.   carvedilol (COREG) 6.25 MG tablet   No No   Sig: Take 1 tablet (6.25 mg) by mouth 2 times daily (with meals)   clobetasol propionate (TEMOVATE) 0.05 % external cream   No No   Sig: Apply topically 2 times daily To feet as needed   empagliflozin (JARDIANCE) 10 MG TABS tablet   No No   Sig: Take 1 tablet (10 mg) by mouth daily   ezetimibe (ZETIA) 10 MG tablet   No No   Sig: Take 1 tablet (10 mg) by mouth daily   fluorouracil (EFUDEX) 5 % external cream   No No   Sig: Mix equally with calcipotriene cream. Apply BID x 4-10 days. Stop when skin gets red.   hydrALAZINE (APRESOLINE) 100 MG tablet   No No   Sig: Take 1 tablet (100 mg) by mouth 3 times daily   isosorbide mononitrate CR (IMDUR) 120 MG 24 HR ER tablet   No No   Sig: Take 1 tablet (120 mg) by mouth daily   ketoconazole (NIZORAL) 2 % external shampoo   No No   Sig: Massage into wet scalp, let sit 3-5 min, then rinse. Do this 3x weekly.   losartan (COZAAR) 100 MG tablet   No No   Sig: Take 1 tablet (100 mg) by mouth daily   rivaroxaban ANTICOAGULANT (XARELTO ANTICOAGULANT) 20 MG TABS tablet   No No   Sig: Take 1 tablet (20 mg) by mouth daily (with dinner) TAKE 1 TABLET DAILY WITH DINNER   rosuvastatin (CRESTOR) 40 MG tablet   No No   Sig: Take 1 tablet (40 mg) by mouth at bedtime   torsemide (DEMADEX) 20 MG tablet   No No   Sig: Take 1 tablet (20 mg) Monday, Wednesday, Friday and take 2 tablets (40 mg) other days by mouth   torsemide (DEMADEX) 20 MG tablet   No No   Sig: TAKE 1 TABLET BY MOUTH MONDAY,WEDNESDAY AND FRIDAY AND 2 TABLETS ON ALL OTHER DAYS.   triamcinolone (KENALOG) 0.1 % external cream   No No   Sig: Apply topically 2 times daily To psoriasis on arms and body as needed      Facility-Administered Medications: None      Physical Exam   Vital Signs: Temp: 97.9  F (36.6  C) Temp src: Oral BP: (!) 158/75 Pulse: 57   Resp: 21 SpO2: 93 % O2 Device: None (Room air)    Weight:  200 lbs 0 oz  Physical Exam  Vitals reviewed.   Constitutional:       General: He is awake.   Cardiovascular:      Rate and Rhythm: Normal rate. Rhythm irregular.      Pulses: Normal pulses.      Heart sounds: Normal heart sounds.   Pulmonary:      Effort: Pulmonary effort is normal.      Breath sounds: Normal air entry. Examination of the right-lower field reveals rales. Examination of the left-lower field reveals rales. Rales present.   Abdominal:      General: Abdomen is protuberant.      Palpations: Abdomen is soft.      Tenderness: There is no abdominal tenderness.   Musculoskeletal:      Right lower leg: No tenderness. 3+ Edema present.      Left lower leg: No tenderness. 3+ Edema present.      Right foot: Swelling present.      Left foot: Swelling present.   Skin:     General: Skin is warm and dry.      Capillary Refill: Capillary refill takes less than 2 seconds.   Neurological:      General: No focal deficit present.      Mental Status: He is alert.   Psychiatric:         Attention and Perception: Attention normal.         Mood and Affect: Mood normal.         Speech: Speech normal.         Behavior: Behavior is cooperative.         Thought Content: Thought content normal.         Cognition and Memory: Cognition normal.         Judgment: Judgment normal.     Medical Decision Making   75 MINUTES SPENT BY ME on the date of service doing chart review, history, exam, documentation & further activities per the note.      Data     I have personally reviewed the following data over the past 24 hrs:    8.0  \   10.8 (L)   / 286     143 105 34.0 (H) /  107 (H)   3.8 24 2.12 (H) \     ALT: 21 AST: 21 AP: 95 TBILI: 0.8   ALB: 4.0 TOT PROTEIN: 6.8 LIPASE: N/A     Trop: 36 (H) BNP: 6,636 (H)       Imaging results reviewed over the past 24 hrs:   Recent Results (from the past 24 hour(s))   XR Chest 2 Views    Narrative    XR CHEST 2 VIEWS 10/8/2024 12:44 PM    HISTORY: exertional dyspnea x 2 days, leg swelling, no  fever/cough    COMPARISON: 7/26/2023      Impression    IMPRESSION: Increased diffuse interstitial opacities in the lungs,  likely due to mild pulmonary edema. Trace bilateral pleural effusions.  No focal infiltrate or pneumothorax. Stable cardiomegaly. Stable  calcified granuloma in the left lung apex. Left chest wall implanted  pacemaker.    MARTHA HERMOSILLO MD         SYSTEM ID:  [id number]

## 2024-10-08 NOTE — ED NOTES
"[facility]  ED Nurse Handoff Report    ED Chief complaint: Shortness of Breath      ED Diagnosis:   Final diagnoses:   Exertional dyspnea   Acute on chronic congestive heart failure, unspecified heart failure type (H)   Renal insufficiency   Anticoagulated   Chronic atrial fibrillation (H)   Elevated troponin       Code Status: Full Code    Allergies:   Allergies   Allergen Reactions    Ace Inhibitors Anaphylaxis     Edema of ace    Eliquis [Apixaban] Other (See Comments)     Facial numbness       Patient Story: SOB over a few days, worsened today, R leg swelling  Focused Assessment:  SOB at rest, BLE edema, R>L    Treatments and/or interventions provided: Lasix, duoneb  Patient's response to treatments and/or interventions: Improved    To be done/followed up on inpatient unit:  TBD    Does this patient have any cognitive concerns?:  NA    Activity level - Baseline/Home:  Independent  Activity Level - Current:   Independent    Patient's Preferred language: English   Needed?: No    Isolation: None  Infection: Not Applicable  Patient tested for COVID 19 prior to admission: NO  Bariatric?: No    Vital Signs:   Vitals:    10/08/24 1123 10/08/24 1200 10/08/24 1250 10/08/24 1300   BP: (!) 149/46 (!) 141/57 (!) 158/75    Pulse: 61 72 57    Resp: 18 28 21    Temp: 97.9  F (36.6  C)      TempSrc: Oral      SpO2: 93% 94% 93%    Weight:    90.7 kg (200 lb)   Height:    1.676 m (5' 6\")       Cardiac Rhythm:  Afib/beth    Was the PSS-3 completed:   Yes  What interventions are required if any?               Family Comments: Wife at bedside  OBS brochure/video discussed/provided to patient/family: N/A    For the majority of the shift this patient's behavior was Green.   Behavioral interventions performed were NA.    ED NURSE PHONE NUMBER: [phone]        "

## 2024-10-08 NOTE — ED PROVIDER NOTES
Emergency Department Note      History of Present Illness     Chief Complaint:  SOB    HPI   Betito Anderson is a 78 year old male who presents with his wife for evaluation of shortness of breath that is very severe with even modest exertion like walking or climbing 1 flight of stairs.  He reports swelling to both legs over the past few weeks, slightly more on the right side.  He reports compliance with his Xarelto, last dose this morning, as well as his torsemide, last dose this morning.  No fevers or cough.  He reports some discomfort in both of his shoulders worse with movement.  Symptoms were present yesterday but more pronounced today.  Former tobacco smoker, quit decades ago.  No abdominal pain.  No nausea, vomiting, or sweating.    Independent Historian: Wife at bedside, who states that his breathing and leg swelling are not normal for him.    Review of External Notes: I personally reviewed prior records including echocardiogram from June of last year showing a hyperdynamic ejection fraction and severe biatrial enlargement.  I also reviewed nephrology note from September of this year, and cardiology note from March.    Past Medical History     Medical History and Problem List   Past Medical History:   Diagnosis Date    Acute diastolic congestive heart failure (H) 06/2019    ASCVD (arteriosclerotic cardiovascular disease) 1997    Atrial fibrillation (H) 10/09/2018    Basal cell carcinoma     Carotid artery plaque 2005    CKD (chronic kidney disease) stage 3, GFR 30-59 ml/min (H)     Elevated blood sugar     Gout     HTN (hypertension)     Hypercholesteremia     Hyponatremia 2015    Low mean corpuscular volume (MCV)     Near syncope 05/2015    Psoriasis     Renal mass 06/29/2019    Screening 2012    Syncope 09/2019    V-tach (H) 09/2019       Medications   allopurinol (ZYLOPRIM) 300 MG tablet  amLODIPine (NORVASC) 5 MG tablet  aspirin (ASA) 81 MG EC tablet  calcipotriene (DOVONOX) 0.005 % external  cream  carvedilol (COREG) 6.25 MG tablet  clobetasol propionate (TEMOVATE) 0.05 % external cream  empagliflozin (JARDIANCE) 10 MG TABS tablet  ezetimibe (ZETIA) 10 MG tablet  fluorouracil (EFUDEX) 5 % external cream  hydrALAZINE (APRESOLINE) 100 MG tablet  isosorbide mononitrate CR (IMDUR) 120 MG 24 HR ER tablet  ketoconazole (NIZORAL) 2 % external shampoo  losartan (COZAAR) 100 MG tablet  rivaroxaban ANTICOAGULANT (XARELTO ANTICOAGULANT) 20 MG TABS tablet  rosuvastatin (CRESTOR) 40 MG tablet  torsemide (DEMADEX) 20 MG tablet  torsemide (DEMADEX) 20 MG tablet  triamcinolone (KENALOG) 0.1 % external cream  Vitamin D, Cholecalciferol, 25 MCG (1000 UT) CAPS      Surgical History   Past Surgical History:   Procedure Laterality Date    CATARACT EXTRACTION  03/2022    CATARACT EXTRACTION  2022    CV CORONARY ANGIOGRAM N/A 07/02/2019    Procedure: Coronary Angiogram;  Surgeon: Donaldo Loera MD;  Location:  HEART CARDI[facility] CATH LAB    CV HEART CATHETERIZATION WITH POSSIBLE INTERVENTION N/A 07/05/2019    Procedure: Heart Catheterization with Possible Intervention;  Surgeon: Manolo Hu MD;  Location: [facility] CARDIAC CATH LAB    CV LEFT HEART CATH N/A 07/02/2019    Procedure: Left Heart Cath;  Surgeon: Donaldo Loera MD;  Location: [facility] CARDIAC CATH LAB    CV LEFT VENTRICULOGRAM N/A 07/02/2019    Procedure: Left Ventriculogram;  Surgeon: Donaldo Loera MD;  Location:  HEART CARDIAC CATH LAB    CV PCI STENT DRUG ELUTING N/A 07/05/2019    Procedure: PCI Stent Drug Eluting;  Surgeon: Manolo Hu MD;  Location:  HEART CARDIAC CATH LAB    CV RIGHT HEART CATH MEASUREMENTS RECORDED N/A 07/02/2019    Procedure: Right Heart Cath;  Surgeon: Donaldo Loera MD;  Location:  HEART CARDIAC CATH LAB    EP LOOP RECORDER IMPLANT N/A 10/11/2019    Procedure: EP Loop Recorder Implant;  Surgeon: Fuad Lezama MD;  Location: [facility] CARDIAC CATH LAB    EP RIGHT VENTRICULAR RECORDING  "N/A 10/11/2019    Procedure: EP Ventricular Pacing;  Surgeon: Fuad Lezama MD;  Location:  HEART CARDIAC CATH LAB    C ANESTH,DX ARTHROSCOPIC PROC KNEE JOINT  01/01/2009     Physical Exam     Patient Vitals for the past 24 hrs:   BP Temp Temp src Pulse Resp SpO2 Height Weight   10/08/24 1527 (!) 163/65 97.5  F (36.4  C) Oral 102 16 -- -- 94.6 kg (208 lb 8 oz)   10/08/24 1515 -- -- -- 65 27 93 % -- --   10/08/24 1345 127/85 -- -- 62 16 92 % -- --   10/08/24 1300 -- -- -- -- -- -- 1.676 m (5' 6\") 90.7 kg (200 lb)   10/08/24 1250 (!) 158/75 -- -- 57 21 93 % -- --   10/08/24 1200 (!) 141/57 -- -- 72 28 94 % -- --   10/08/24 1123 (!) 149/46 97.9  F (36.6  C) Oral 61 18 93 % -- --     Physical Exam  General: Male sitting upright in room 27, wife at bedside  HENT: mucous membranes moist  CV: rate as above, regular rhythm, moderate lower extremity edema slightly more on the right than left  Resp: Decreased air movement bilaterally, slight expiratory wheezing, no stridor, no accessory muscle use  GI: abdomen soft and nontender, no guarding  MSK: no bony tenderness  Skin: appropriately warm and dry  Neuro: alert, clear speech, oriented  Psych: cooperative    Diagnostics   Electrocardiogram  ECG taken at 1119, ECG interpreted at 1227 by DAVIN Burt MD  Likely atrial fibrillation, no ST elevation  Rate 62 bpm. OH interval n/a. QRS duration 96. QTc 454      Lab Results   Labs Ordered and Resulted from Time of ED Arrival to Time of ED Departure   COMPREHENSIVE METABOLIC PANEL - Abnormal       Result Value    Sodium 143      Potassium 3.8      Carbon Dioxide (CO2) 24      Anion Gap 14      Urea Nitrogen 34.0 (*)     Creatinine 2.12 (*)     GFR Estimate 31 (*)     Calcium 9.0      Chloride 105      Glucose 107 (*)     Alkaline Phosphatase 95      AST 21      ALT 21      Protein Total 6.8      Albumin 4.0      Bilirubin Total 0.8     NT PROBNP INPATIENT - Abnormal    N terminal Pro BNP Inpatient 6,636 (*)    TROPONIN T, HIGH " SENSITIVITY - Abnormal    Troponin T, High Sensitivity 36 (*)    CBC WITH PLATELETS AND DIFFERENTIAL - Abnormal    WBC Count 8.0      RBC Count 4.01 (*)     Hemoglobin 10.8 (*)     Hematocrit 34.4 (*)     MCV 86      MCH 26.9      MCHC 31.4 (*)     RDW 16.3 (*)     Platelet Count 286      % Neutrophils 81      % Lymphocytes 10      % Monocytes 7      % Eosinophils 0      % Basophils 1      % Immature Granulocytes 0      NRBCs per 100 WBC 0      Absolute Neutrophils 6.5      Absolute Lymphocytes 0.8      Absolute Monocytes 0.6      Absolute Eosinophils 0.0      Absolute Basophils 0.1      Absolute Immature Granulocytes 0.0      Absolute NRBCs 0.0       Imaging   XR Chest 2 Views   Final Result   IMPRESSION: Increased diffuse interstitial opacities in the lungs,   likely due to mild pulmonary edema. Trace bilateral pleural effusions.   No focal infiltrate or pneumothorax. Stable cardiomegaly. Stable   calcified granuloma in the left lung apex. Left chest wall implanted   pacemaker.      MARTHA HERMOSILLO MD      Independent Interpretation:   I personally reviewed CXR images, I see pulmonary edema.    ED Course      Medications Administered   Medications   lidocaine 1 % 0.1-1 mL (has no administration in time range)   lidocaine (LMX4) cream (has no administration in time range)   sodium chloride (PF) 0.9% PF flush 3 mL (has no administration in time range)   sodium chloride (PF) 0.9% PF flush 3 mL (has no administration in time range)   senna-docusate (SENOKOT-S/PERICOLACE) 8.6-50 MG per tablet 1 tablet (has no administration in time range)     Or   senna-docusate (SENOKOT-S/PERICOLACE) 8.6-50 MG per tablet 2 tablet (has no administration in time range)   hydrALAZINE (APRESOLINE) tablet 10 mg (has no administration in time range)     Or   hydrALAZINE (APRESOLINE) injection 10 mg (has no administration in time range)   acetaminophen (TYLENOL) tablet 650 mg (has no administration in time range)     Or   acetaminophen  (TYLENOL) Suppository 650 mg (has no administration in time range)   ondansetron (ZOFRAN ODT) ODT tab 4 mg (has no administration in time range)     Or   ondansetron (ZOFRAN) injection 4 mg (has no administration in time range)   furosemide (LASIX) injection 40 mg (has no administration in time range)   Continuing ACE inhibitor/ARB/ARNI from home medication list OR ACE inhibitor/ARB/ARNI order already placed during this visit (has no administration in time range)   Continuing beta blocker from home medication list OR beta blocker order already placed during this visit (has no administration in time range)   ipratropium - albuterol 0.5 mg/2.5 mg/3 mL (DUONEB) neb solution 3 mL (has no administration in time range)   rosuvastatin (CRESTOR) tablet 40 mg (has no administration in time range)   rivaroxaban ANTICOAGULANT (XARELTO) tablet 15 mg (has no administration in time range)   allopurinol (ZYLOPRIM) tablet 300 mg (has no administration in time range)   amLODIPine (NORVASC) tablet 5 mg (has no administration in time range)   aspirin EC tablet 81 mg (has no administration in time range)   carvedilol (COREG) tablet 6.25 mg (has no administration in time range)   empagliflozin (JARDIANCE) tablet 10 mg (has no administration in time range)   ezetimibe (ZETIA) tablet 10 mg (has no administration in time range)   hydrALAZINE (APRESOLINE) tablet 100 mg (has no administration in time range)   isosorbide mononitrate (IMDUR) 24 hr tablet 120 mg (has no administration in time range)   ipratropium - albuterol 0.5 mg/2.5 mg/3 mL (DUONEB) neb solution 3 mL (3 mLs Nebulization $Given 10/8/24 1253)   furosemide (LASIX) injection 60 mg (60 mg Intravenous $Given 10/8/24 1340)     ED Course and Discussion of Management   ED Course as of 10/08/24 1839   Tue Oct 08, 2024   1318 I rechecked patient, discussed test results.   1342 I spoke with REYMUNDO Bhagat, Hospitalist, who accepts care.       Additional Documentation  None    Medical  Decision Making / Diagnosis   Medical Decision Making:  I think the patient's exertional dyspnea can be explained by an acute exacerbation of CHF for which IV Lasix was initiated.  He is not hypoxic at rest but did experience some orthopnea here in the emergency department.  He is not in such distress that he requires BiPAP though he is at risk for deterioration.  Note made of renal insufficiency as well.  Anticoagulated state makes pulmonary embolism sufficiently unlikely that I think CT of the chest can be safely deferred at this time.  Infection including pneumonia also considered but not suspected so antibiotics were deferred.  He requires hospitalization for further expedited workup and aggressive treatment which I have arranged for after speaking with the hospitalist service.  Elevated troponin but no chest pain at this time, no EKG findings of ST elevation so no indication for immediate Cath Lab activation or emergent cardiology consultation though serial labs will be helpful.    Disposition   Admit    Diagnosis     ICD-10-CM    1. Acute on chronic congestive heart failure, unspecified heart failure type (H)  I50.9       2. Exertional dyspnea  R06.09       3. Renal insufficiency  N28.9       4. Anticoagulated  Z79.01       5. Chronic atrial fibrillation (H)  I48.20       6. Elevated troponin  R79.89            10/8/2024   MD Carmel Landin Jeffrey Alan, MD  10/08/24 5879

## 2024-10-09 ENCOUNTER — APPOINTMENT (OUTPATIENT)
Dept: OCCUPATIONAL THERAPY | Facility: CLINIC | Age: 79
DRG: 280 | End: 2024-10-09
Payer: COMMERCIAL

## 2024-10-09 ENCOUNTER — APPOINTMENT (OUTPATIENT)
Dept: ULTRASOUND IMAGING | Facility: CLINIC | Age: 79
DRG: 280 | End: 2024-10-09
Attending: INTERNAL MEDICINE
Payer: COMMERCIAL

## 2024-10-09 ENCOUNTER — APPOINTMENT (OUTPATIENT)
Dept: CARDIOLOGY | Facility: CLINIC | Age: 79
DRG: 280 | End: 2024-10-09
Payer: COMMERCIAL

## 2024-10-09 LAB
ANION GAP SERPL CALCULATED.3IONS-SCNC: 14 MMOL/L (ref 7–15)
BUN SERPL-MCNC: 36.2 MG/DL (ref 8–23)
CALCIUM SERPL-MCNC: 9.4 MG/DL (ref 8.8–10.4)
CHLORIDE SERPL-SCNC: 100 MMOL/L (ref 98–107)
CREAT SERPL-MCNC: 2.27 MG/DL (ref 0.67–1.17)
EGFRCR SERPLBLD CKD-EPI 2021: 29 ML/MIN/1.73M2
ERYTHROCYTE [DISTWIDTH] IN BLOOD BY AUTOMATED COUNT: 16.3 % (ref 10–15)
GLUCOSE SERPL-MCNC: 97 MG/DL (ref 70–99)
HCO3 SERPL-SCNC: 25 MMOL/L (ref 22–29)
HCT VFR BLD AUTO: 34.6 % (ref 40–53)
HGB BLD-MCNC: 10.8 G/DL (ref 13.3–17.7)
LVEF ECHO: NORMAL
MAGNESIUM SERPL-MCNC: 1.8 MG/DL (ref 1.7–2.3)
MCH RBC QN AUTO: 26.5 PG (ref 26.5–33)
MCHC RBC AUTO-ENTMCNC: 31.2 G/DL (ref 31.5–36.5)
MCV RBC AUTO: 85 FL (ref 78–100)
PLATELET # BLD AUTO: 290 10E3/UL (ref 150–450)
POTASSIUM SERPL-SCNC: 3.3 MMOL/L (ref 3.4–5.3)
POTASSIUM SERPL-SCNC: 3.4 MMOL/L (ref 3.4–5.3)
RBC # BLD AUTO: 4.08 10E6/UL (ref 4.4–5.9)
SODIUM SERPL-SCNC: 139 MMOL/L (ref 135–145)
TROPONIN T SERPL HS-MCNC: 36 NG/L
WBC # BLD AUTO: 7.4 10E3/UL (ref 4–11)

## 2024-10-09 PROCEDURE — 36415 COLL VENOUS BLD VENIPUNCTURE: CPT | Performed by: INTERNAL MEDICINE

## 2024-10-09 PROCEDURE — 85027 COMPLETE CBC AUTOMATED: CPT

## 2024-10-09 PROCEDURE — 97110 THERAPEUTIC EXERCISES: CPT | Mod: GO | Performed by: OCCUPATIONAL THERAPIST

## 2024-10-09 PROCEDURE — 250N000013 HC RX MED GY IP 250 OP 250 PS 637: Performed by: INTERNAL MEDICINE

## 2024-10-09 PROCEDURE — 97165 OT EVAL LOW COMPLEX 30 MIN: CPT | Mod: GO | Performed by: OCCUPATIONAL THERAPIST

## 2024-10-09 PROCEDURE — 255N000002 HC RX 255 OP 636

## 2024-10-09 PROCEDURE — 93306 TTE W/DOPPLER COMPLETE: CPT | Mod: 26 | Performed by: INTERNAL MEDICINE

## 2024-10-09 PROCEDURE — 80048 BASIC METABOLIC PNL TOTAL CA: CPT

## 2024-10-09 PROCEDURE — 250N000011 HC RX IP 250 OP 636

## 2024-10-09 PROCEDURE — 99223 1ST HOSP IP/OBS HIGH 75: CPT | Performed by: INTERNAL MEDICINE

## 2024-10-09 PROCEDURE — 210N000002 HC R&B HEART CARE

## 2024-10-09 PROCEDURE — 250N000013 HC RX MED GY IP 250 OP 250 PS 637

## 2024-10-09 PROCEDURE — 97535 SELF CARE MNGMENT TRAINING: CPT | Mod: GO | Performed by: OCCUPATIONAL THERAPIST

## 2024-10-09 PROCEDURE — 84132 ASSAY OF SERUM POTASSIUM: CPT | Performed by: INTERNAL MEDICINE

## 2024-10-09 PROCEDURE — 36415 COLL VENOUS BLD VENIPUNCTURE: CPT

## 2024-10-09 PROCEDURE — 83735 ASSAY OF MAGNESIUM: CPT | Performed by: INTERNAL MEDICINE

## 2024-10-09 PROCEDURE — 93971 EXTREMITY STUDY: CPT | Mod: RT

## 2024-10-09 PROCEDURE — 99233 SBSQ HOSP IP/OBS HIGH 50: CPT | Performed by: INTERNAL MEDICINE

## 2024-10-09 RX ORDER — POTASSIUM CHLORIDE 1500 MG/1
20 TABLET, EXTENDED RELEASE ORAL ONCE
Status: COMPLETED | OUTPATIENT
Start: 2024-10-09 | End: 2024-10-09

## 2024-10-09 RX ORDER — MAGNESIUM OXIDE 400 MG/1
400 TABLET ORAL EVERY 4 HOURS
Status: DISCONTINUED | OUTPATIENT
Start: 2024-10-09 | End: 2024-10-09

## 2024-10-09 RX ORDER — MAGNESIUM OXIDE 400 MG/1
400 TABLET ORAL EVERY 4 HOURS
Status: COMPLETED | OUTPATIENT
Start: 2024-10-09 | End: 2024-10-09

## 2024-10-09 RX ORDER — ROSUVASTATIN CALCIUM 10 MG/1
10 TABLET, COATED ORAL AT BEDTIME
Status: DISCONTINUED | OUTPATIENT
Start: 2024-10-09 | End: 2024-10-12 | Stop reason: HOSPADM

## 2024-10-09 RX ADMIN — FUROSEMIDE 40 MG: 10 INJECTION, SOLUTION INTRAMUSCULAR; INTRAVENOUS at 14:07

## 2024-10-09 RX ADMIN — ISOSORBIDE MONONITRATE 120 MG: 60 TABLET, EXTENDED RELEASE ORAL at 09:04

## 2024-10-09 RX ADMIN — PERFLUTREN 3 ML: 6.52 INJECTION, SUSPENSION INTRAVENOUS at 08:44

## 2024-10-09 RX ADMIN — FUROSEMIDE 40 MG: 10 INJECTION, SOLUTION INTRAMUSCULAR; INTRAVENOUS at 09:04

## 2024-10-09 RX ADMIN — RIVAROXABAN 15 MG: 15 TABLET, FILM COATED ORAL at 17:43

## 2024-10-09 RX ADMIN — ALLOPURINOL 300 MG: 300 TABLET ORAL at 09:05

## 2024-10-09 RX ADMIN — HYDRALAZINE HYDROCHLORIDE 100 MG: 50 TABLET ORAL at 21:14

## 2024-10-09 RX ADMIN — EZETIMIBE 10 MG: 10 TABLET ORAL at 09:05

## 2024-10-09 RX ADMIN — EMPAGLIFLOZIN 10 MG: 10 TABLET, FILM COATED ORAL at 09:08

## 2024-10-09 RX ADMIN — Medication 400 MG: at 12:57

## 2024-10-09 RX ADMIN — ASPIRIN 81 MG: 81 TABLET, COATED ORAL at 09:05

## 2024-10-09 RX ADMIN — HYDRALAZINE HYDROCHLORIDE 100 MG: 50 TABLET ORAL at 09:04

## 2024-10-09 RX ADMIN — POTASSIUM CHLORIDE 20 MEQ: 1500 TABLET, EXTENDED RELEASE ORAL at 09:05

## 2024-10-09 RX ADMIN — POTASSIUM CHLORIDE 20 MEQ: 1500 TABLET, EXTENDED RELEASE ORAL at 19:44

## 2024-10-09 RX ADMIN — ROSUVASTATIN CALCIUM 10 MG: 10 TABLET, FILM COATED ORAL at 21:14

## 2024-10-09 RX ADMIN — Medication 400 MG: at 09:05

## 2024-10-09 RX ADMIN — AMLODIPINE BESYLATE 5 MG: 5 TABLET ORAL at 09:05

## 2024-10-09 ASSESSMENT — ACTIVITIES OF DAILY LIVING (ADL)
ADLS_ACUITY_SCORE: 20

## 2024-10-09 NOTE — PLAN OF CARE
Goal Outcome Evaluation:    Monitor shows Atrial fib with CVR, having pauses up to 3.6 seconds, asymptomatic. Coreg stopped. Pt. Denies pain. Pt. Has bilateral lower extremity edema, right greater than left. Right LE Ultrasound done, negative for DVT. Potassium replacemetn for Potassium 3.4. Recheck pending. Continue plan of care.

## 2024-10-09 NOTE — PLAN OF CARE
Heart Failure Care Map  GOALS TO BE MET BEFORE DISCHARGE:    1. Decrease congestion and/or edema with diuretic therapy to achieve near optimal volume status.     Dyspnea improved: Yes, satisfactory for discharge.   Edema improved: Yes, satisfactory for discharge.        Last 24 hour I/O:   Intake/Output Summary (Last 24 hours) at 10/9/2024 0729  Last data filed at 10/9/2024 0400  Gross per 24 hour   Intake 1705 ml   Output 1750 ml   Net -45 ml           Net I/O and Weights since admission:   09/09 1500 - 10/09 1459  In: 1705 [P.O.:1705]  Out: 1750 [Urine:1750]  Net: -45     Vitals:    10/08/24 1300 10/08/24 1527 10/09/24 0615   Weight: 90.7 kg (200 lb) 94.6 kg (208 lb 8 oz) 93.2 kg (205 lb 6.4 oz)       2.  O2 sats > 90% on room air, or at prior home O2 therapy level.      Able to wean O2 this shift to keep sats above 90%?: Yes, satisfactory for discharge.   Does patient use Home O2? No          Current oxygenation status:   SpO2: 94 %     O2 Device: None (Room air),      3.  Tolerates ambulation and mobility near baseline.     Ambulation: Yes, satisfactory for discharge.   Times patient ambulated with staff this shift: 3    Please review the Heart Failure Care Map for additional HF goal outcomes.    Cara Bobby RN  10/9/2024        Orientation: A&Ox4  Vitals/Pain: VSS on RA. Pt reporting denies pain.  Tele: A-fib SVR; intermittent episodes of bradycardia in the 40s - 50s with intermittent 2-3 sec pauses NOC. Pt was asymptomatic and stated this is his baseline.  Lines/Drains: RPIV, SL; flushed and capped.  LS: LLL, inspiratory wheeze heard.  GI/: BS +, x0 BM this shift. Pt having adequate urine output throughout shift.   Labs: CBC and BMP check this AM.  Ambulation/Assist: Independent in room.   Skin/Wounds: Dry; with scattered scabbing on left foot.  Plan: CTM diuresis. Pt to have echo in AM.

## 2024-10-09 NOTE — PROVIDER NOTIFICATION
MD Notification    Notified Person: MD    Notified Person Name: Dr. Winn    Notification Date/Time: 10/09/24 01:17    Notification Interaction: [employer] Messaging    Purpose of Notification:  Hi Dr. Winn. Pt admitted for CHF/KATELIN. FYI pt is having frequent pauses of 2-3 seconds. Asymptomatic, sinus beth in 40s-50s. Pt taking PTA coreg.     Orders Received:  Nothing to do overnight if asymptomatic. Follow hold parameters on carvedilol as ordered if persists in the morning. Let me know if change in rhythm or develops symptoms.     Comments:

## 2024-10-09 NOTE — PROGRESS NOTES
10/09/24 1000   Appointment Info   Signing Clinician's Name / Credentials (OT) Pao Host, OTR/L   Living Environment   People in Home spouse   [facility]   Home Accessibility stairs to enter home;stairs within home   Number of Stairs, Main Entrance 3   Stair Railings, Main Entrance railings safe and in good condition   Number of Stairs, Within Home, Primary greater than 10 stairs  (14)   Stair Railings, Within Home, Primary railings safe and in good condition   Transportation Anticipated car, drives self;family or friend will provide   Self-Care   Usual Activity Tolerance moderate   Current Activity Tolerance fair   Regular Exercise Yes   Activity/Exercise Type strength training   Exercise Amount/Frequency 2 times/wk   Equipment Currently Used at Home none   Fall history within last six months no   Activity/Exercise/Self-Care Comment Independent w/ all ADLs/IADLs, no AD. Pt reports not ambulating long distances d/t shortness of breath, hip pain at baseline, needs frequent rest periods if ambulating a longer distance.   General Information   Onset of Illness/Injury or Date of Surgery 10/08/24   Referring Physician Beto Bhagat NP   Patient/Family Therapy Goal Statement (OT) return home   Additional Occupational Profile Info/Pertinent History of Current Problem Betito Anderson is a 78 year old male admitted on 10/8/2024 with acute worsening shortness of breath. He has a past medical history of acute diastolic heart failure, ASCVD, CKD stage 3, HTN, HLD   Existing Precautions/Restrictions cardiac   Limitations/Impairments hearing   Cognitive Status Examination   Orientation Status orientation to person, place and time   Visual Perception   Visual Impairment/Limitations corrective lenses for reading   Pain Assessment   Patient Currently in Pain No   Transfers   Transfers sit-stand transfer   Sit-Stand Transfer   Sit-Stand Ellis (Transfers) supervision    Balance   Balance Comments CGA for 10 ft in room ambulation, no AD   Clinical Impression   Criteria for Skilled Therapeutic Interventions Met (OT) Yes, treatment indicated   OT Diagnosis decreased activity macho for ADLs/IADLs   OT Problem List-Impairments impacting ADL problems related to;activity tolerance impaired;mobility   Assessment of Occupational Performance 1-3 Performance Deficits   Identified Performance Deficits activity macho, functional mobility   Planned Therapy Interventions (OT) ADL retraining;progressive activity/exercise;home program guidelines;transfer training;risk factor education   Clinical Decision Making Complexity (OT) problem focused assessment/low complexity   Risk & Benefits of therapy have been explained patient;spouse/significant other   OT Total Evaluation Time   OT Eval, Low Complexity Minutes (94517) 29   OT Goals   Therapy Frequency (OT) Daily   OT Predicted Duration/Target Date for Goal Attainment 10/16/24   OT Goals Cardiac Phase 1   OT: Understanding of cardiac education to maximize quality of life, condition management, and health outcomes Patient;Verbalize   OT: Perform aerobic activity with stable cardiovascular response intermittent;10 minutes;NuStep;ambulation   OT: Functional/aerobic ambulation tolerance with stable cardiovascular response in order to return to home and community environment Independent   OT: Navigation of stairs simulating home set up with stable cardiovascular response in order to return to home and community environment Independent;Greater than 10 stairs   Self-Care/Home Management   Self-Care/Home Mgmt/ADL, Compensatory, Meal Prep Minutes (00720) 01   Treatment Detail/Skilled Intervention Initiated HF ed w/ pt. See details below. Pt able to recall a few items from previous CR in 2019 after stent placement. Handout packet provided to increase carryover of learned techniques. Pt receptive and verbalized understanding.   Therapeutic Procedures/Exercise    Therapeutic Procedure: strength, endurance, ROM, flexibillity minutes (84343) 10   Symptoms Noted During/After Treatment fatigue;shortness of breath   Treatment Detail/Skilled Intervention see below   Treatment Time Includes (CR Only) See specific exercise details intervention group(s);Monitoring of vital signs (see vital signs flowsheet for details)   Ambulation   Workload 280 ft   Effort Scale 6   Symptoms Dyspnea;Joint pain;Weakness   Cardiovascular Response Hypertensive response to exercise   Exercise Details SBA, no AD. Pt needed x3 standing rest periods d/t shortness of breath and hip soreness/pain. Coached through PLB technique.   Vital Signs Details see VS flowsheet   Cardiac Education   Education Provided Daily weights;Diet;Signs and symptoms;Stop light tool;OMNI Scale;Energy conservation   Education Packet Given to Patient Yes   All Patient Education Handouts Reviewed with Patient and/or Family No   OT Discharge Planning   OT Plan timed amb w/ self pacing strategies, stairs, finish HF ed   OT Discharge Recommendation (DC Rec) home with assist   OT Rationale for DC Rec Pt below baseline. Limited by decreased activity macho, shortness of breath, weakness. Anticipate w/ IP therapy and medical management, pt will be able to d/c home w/ assist as needed. Will continue update recs as appropriate.   OT Brief overview of current status Goals of therapy will be to address safe mobility and make recs for d/c to next level of care. Pt and RN will continue to follow all falls risk precautions as documented by RN staff while hospitalized   Total Session Time   Timed Code Treatment Minutes 20   Total Session Time (sum of timed and untimed services) 30

## 2024-10-09 NOTE — PROGRESS NOTES
[facility]    Hospitalist Progress Note    Brief Summary:  This is a 78-year-old male with history of heart failure with reduced ejection fraction recovered EF, chronic persistent A-fib with history of pauses up to 4 seconds, coronary artery disease, CKD 3, gout, hypertension, hyperlipidemia, psoriasis, history of syncope with history of nonsustained V. tach, now come to the ER with complaint of worsening shortness of breath lower extremity edema found to be in acute exacerbation of CHF and subsequently get admitted.    Assessment & Plan       Acute on chronic heart failure with preserved ejection fraction  Heart failure with recovered EF  NSTEMI type II secondary to CHF    This is a 78-year-old male with multiple comorbidities as above, presented with worsening shortness of breath, lower extremity edema.  Has bilateral crackles, lower extremity edema on exam, chest x-ray shows increased diffuse interstitial opacity in the lungs likely due to pulmonary edema, trace bilateral pleural effusion.  BNP elevated to 6636.  Echocardiogram done today, shows EF of 60 to 65% mildly decreased right ventricular systolic function.  Mild mitral regurgitation and mild mitral stenosis  Patient responded to the IV Lasix 40 mg twice daily will continue with that  He is on torsemide at home and is compliant with his medication, but not compliant with salt intake.  Mildly elevated troponin most likely because of CHF, troponin remain flat 36 and 36    At this time we will continue IV Lasix 40 mg twice daily  Strict intake and output charting  Daily weight  Low-sodium diet/2 g sodium diet  He is overall improved with IV diuresis we will continue with that  Holding Coreg as having pauses this morning.  Continue with Jardiance 10 mg daily, hydralazine 100 mg 3 times a day, Imdur 120 mg daily  -Hold PTA Torsemide 20 mg M,W,F and 40 mg on all other days while receiving IV diuresis.  -Holding losartan as creatinine  slightly above baseline, to give room for diuresis.    Chronic atrial fibrillation since 2018  Significant pauses up to 3.7 seconds    Patient has chronic atrial fibrillation, he is on Coreg 6.25 mg twice daily  He is anticoagulated with Xarelto we will continue that.  Patient history of loop recorder in the past, and has pauses 3 to 4 seconds in the past.  Cardiology is following closely, treating conservatively as asymptomatic.  Consult cardiology to reevaluate the patient, may need to decrease the dose of Coreg or held the Coreg.  Because of the pauses overnight, Coreg was held this morning.  Await for cardiology evaluation and their recommendations.       KATELIN  Chronic kidney disease-stage 3  Patient baseline creatinine is between 1.4-1.79  Creatinine on admission 2.12, slightly increased to 2.27 with diuresis.  At this time I will continue with IV diuresis 40 mg twice daily  Hold losartan 100 mg daily at this time.     Coronary artery disease   Status post OLESYA to the LAD and D2 in 2019  In July 2019, he was admitted to the hospital with acute hypertensive emergency and found to have acute LV dysfunction and EF reduction to 30%. This normalized with control of blood pressure. However, he had an angiogram at the time which showed a lesion in mid LAD with other extensive small vessel disease. 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 Therefore, he underwent a PCI of his LAD bifurcation into the diagonal.   *Troponin: 36, serial troponin remain flat.  -Resume PTA Aspirin 81 mg daily  -Trend troponin q2h until flat       Hypertension  *SBP in -158  -Resume PTA amlodipine 5 mg daily  -Resume PTA carvedilol 6.25 mg BID  -Resume PTA hydralazine 100 mg TID  -Hold PTA losartan due to KATELIN     Hyperlipidemia  *Total cholesterol 1/29/24: 154  *Non-HDL cholesterol 1/29/24: 119  -Resume PTA rosuvastatin 40 mg daily in the evening  -Resume PTA ezetimibe 10 mg daily     Acute  "normocytic anemia   *Hgb 10/8/2024: 10.8  *Hgb normal at baseline  *No signs or symptoms of bleeding. This is likely dilutional in the setting of fluid overload from CHF exacerbation.  -Recheck CBC 10/9/24 in AM    Right leg swelling more than left  Patient has bilateral leg swelling, but right leg to swell more than left.  Will do the ultrasound the right lower extremity rule out any DVT     Diet:  Low salt      Clinically Significant Risk Factors Present on Admission                # Drug Induced Coagulation Defect: home medication list includes an anticoagulant medication  # Drug Induced Platelet Defect: home medication list includes an antiplatelet medication   # Hypertension: Noted on problem list  # Acute heart failure with preserved ejection fraction: heart failure noted on problem list, last echo with EF >50%, and receiving IV diuretics   # Anemia: based on hgb <11       # Obesity: Estimated body mass index is 33.15 kg/m  as calculated from the following:    Height as of this encounter: 1.676 m (5' 6\").    Weight as of this encounter: 93.2 kg (205 lb 6.4 oz).                DVT Prophylaxis: Xarelto  Code Status: Full Code    Disposition: Expected discharge in 2 days once stable.    Medically Ready for Discharge: Anticipated in 2-4 Days, need IV diuresis at this time.        Hipolito Singh MD, MD  Text Page  (7am - 6pm)    Interval History   Patient seen and evaluated this morning, feeling somewhat better as compared to yesterday but still short of breath on exertion.  Denies any fever chills chest pain headache dizziness or lightheadedness.    No other significant event overnight    -Data reviewed today: I reviewed all new labs and imaging results over the last 24 hours. I personally reviewed no images or EKG's today.    Physical Exam   Temp: 98.3  F (36.8  C) Temp src: Oral BP: (!) 163/68 Pulse: 62   Resp: 20 SpO2: 94 % O2 Device: None (Room air)    Vitals:    10/08/24 1300 10/08/24 1527 10/09/24 0615 "   Weight: 90.7 kg (200 lb) 94.6 kg (208 lb 8 oz) 93.2 kg (205 lb 6.4 oz)     Vital Signs with Ranges  Temp:  [97.5  F (36.4  C)-98.3  F (36.8  C)] 98.3  F (36.8  C)  Pulse:  [] 62  Resp:  [16-27] 20  BP: (127-163)/(65-85) 163/68  SpO2:  [92 %-94 %] 94 %  I/O last 3 completed shifts:  In: 1705 [P.O.:1705]  Out: 1750 [Urine:1750]    Constitutional: awake, alert, cooperative, no apparent distress, and appears stated age  Eyes: Lids and lashes normal, pupils equal, round and reactive to light, extra ocular muscles intact, sclera clear, conjunctiva normal  Respiratory: Diminished air entry bilaterally, occasional crackles at the bases   Cardiovascular: Normal apical impulse, regular rate and rhythm, normal S1 and S2, no S3 or S4, and no murmur noted  GI: No scars, normal bowel sounds, soft, non-distended, non-tender, no masses palpated, no hepatosplenomegally  Musculoskeletal: Right lower extremity swelling more than left, bilateral lower extremity pitting edema present  Neurologic: No focal deficit    Medications   Current Facility-Administered Medications   Medication Dose Route Frequency Provider Last Rate Last Admin    Continuing ACE inhibitor/ARB/ARNI from home medication list OR ACE inhibitor/ARB/ARNI order already placed during this visit   Does not apply DOES NOT GO TO Beto Deng NP        Continuing beta blocker from home medication list OR beta blocker order already placed during this visit   Does not apply DOES NOT GO TO Beto Deng NP         Current Facility-Administered Medications   Medication Dose Route Frequency Provider Last Rate Last Admin    allopurinol (ZYLOPRIM) tablet 300 mg  300 mg Oral Daily Beto Bhagat NP   300 mg at 10/09/24 0905    amLODIPine (NORVASC) tablet 5 mg  5 mg Oral Daily Beto Bhagat NP   5 mg at 10/09/24 0905    aspirin EC tablet 81 mg  81 mg Oral Daily Beto Bhagat NP   81 mg at  10/09/24 0905    carvedilol (COREG) tablet 6.25 mg  6.25 mg Oral BID w/meals Beto Bhagat NP   6.25 mg at 10/08/24 1851    empagliflozin (JARDIANCE) tablet 10 mg  10 mg Oral Daily Beto Bhagat NP   10 mg at 10/09/24 0908    ezetimibe (ZETIA) tablet 10 mg  10 mg Oral Daily Beto Bhagat NP   10 mg at 10/09/24 0905    furosemide (LASIX) injection 40 mg  40 mg Intravenous bid 08 & 14 Beto Bhagat NP   40 mg at 10/09/24 0904    hydrALAZINE (APRESOLINE) tablet 100 mg  100 mg Oral TID Beto Bhagat NP   100 mg at 10/09/24 0904    isosorbide mononitrate (IMDUR) 24 hr tablet 120 mg  120 mg Oral Daily Beto Bhagat NP   120 mg at 10/09/24 0904    magnesium oxide (MAG-OX) tablet 400 mg  400 mg Oral Q4H Hipolito Singh MD   400 mg at 10/09/24 0905    rivaroxaban ANTICOAGULANT (XARELTO) tablet 15 mg  15 mg Oral Daily with supper Beto Bhagat NP   15 mg at 10/08/24 1851    rosuvastatin (CRESTOR) tablet 10 mg  10 mg Oral At Bedtime Danielle Robles,         sodium chloride (PF) 0.9% PF flush 3 mL  3 mL Intracatheter Q8H Beto Bhagat NP   3 mL at 10/09/24 0904       Data   Recent Labs   Lab 10/09/24  0600 10/09/24  0559 10/08/24  1529 10/08/24  1203   WBC 7.4  --   --  8.0   HGB 10.8*  --   --  10.8*   MCV 85  --   --  86     --   --  286   NA  --  139  --  143   POTASSIUM  --  3.4  --  3.8   CHLORIDE  --  100  --  105   CO2  --  25  --  24   BUN  --  36.2*  --  34.0*   CR  --  2.27*  --  2.12*   ANIONGAP  --  14  --  14   GUNNER  --  9.4  --  9.0   GLC  --  97 107* 107*   ALBUMIN  --   --   --  4.0   PROTTOTAL  --   --   --  6.8   BILITOTAL  --   --   --  0.8   ALKPHOS  --   --   --  95   ALT  --   --   --  21   AST  --   --   --  21       Recent Results (from the past 24 hour(s))   XR Chest 2 Views    Narrative    XR CHEST 2 VIEWS 10/8/2024 12:44 PM    HISTORY: exertional dyspnea x 2  days, leg swelling, no fever/cough    COMPARISON: 2023      Impression    IMPRESSION: Increased diffuse interstitial opacities in the lungs,  likely due to mild pulmonary edema. Trace bilateral pleural effusions.  No focal infiltrate or pneumothorax. Stable cardiomegaly. Stable  calcified granuloma in the left lung apex. Left chest wall implanted  pacemaker.    MARTHA HERMOSILLO MD         SYSTEM ID:  [id number]   Echocardiogram Complete   Result Value    LVEF  60-65%    Narrative    [id number]  [facility]  [id number]  [id number]^LUIS A^MIGDALIA^EZIO     [facility]  Echocardiography Laboratory  [address] [place] [address]     Name: MARCELA TINAJERO  MRN: [id number]  : 1945  Study Date: 10/09/2024 07:58 AM  Age: 78 yrs  Gender: Male  Patient Location: [facility]  Reason For Study: Heart Failure  Ordering Physician: MIGDALIA GUNN  Referring Physician: Rudy Vernon MD  Performed By: Mckayla Velez     BSA: 2.0 m2  Height: 66 in  Weight: 205 lb  HR: 59  BP: 139/70 mmHg  ______________________________________________________________________________  Procedure  Complete Echo Adult. Definity (NDC #04020-803) given intravenously.  ______________________________________________________________________________  Interpretation Summary     Left ventricular systolic function is normal.The visual ejection fraction is  60-65%.Diastolic Doppler findings (E/E' ratio and/or other parameters) suggest  left ventricular filling pressures are increased.  Mildly decreased right ventricular systolic function.  Severe biatrial enlargement.  There is mild (1+) mitral regurgitation.There is mild mitral stenosis.  ______________________________________________________________________________  Left Ventricle  The left ventricle is normal in size. There is normal left ventricular wall  thickness. Diastolic Doppler findings (E/E' ratio and/or other parameters)  suggest left ventricular  filling pressures are increased. Left ventricular  systolic function is normal. The visual ejection fraction is 60-65%. No  regional wall motion abnormalities noted.     Right Ventricle  The right ventricle is normal size. Mildly decreased right ventricular  systolic function.     Atria  The left atrium is severely dilated. The right atrium is severely dilated.  Intact atrial septum.     Mitral Valve  There is mild (1+) mitral regurgitation. There is mild mitral stenosis. The  mean mitral valve gradient is 5.3 mmHg.     Tricuspid Valve  There is trace tricuspid regurgitation. Right ventricular systolic pressure  could not be approximated due to inadequate tricuspid regurgitation.     Aortic Valve  The aortic valve is not well visualized. No aortic regurgitation is present.  No aortic stenosis is present.     Pulmonic Valve  There is trace pulmonic valvular regurgitation. There is no pulmonic valvular  stenosis.     Vessels  The aortic root is normal size. Normal size ascending aorta. Normal size IVC.     Pericardium  There is no pericardial effusion.     ______________________________________________________________________________  MMode/2D Measurements & Calculations  IVSd: 1.1 cm  LVIDd: 5.2 cm  LVIDs: 3.9 cm  LVPWd: 1.1 cm  FS: 24.6 %  LV mass(C)d: 204.0 grams  LV mass(C)dI: 100.9 grams/m2     Ao root diam: 3.1 cm  LA dimension: 5.2 cm  asc Aorta Diam: 3.5 cm  LA/Ao: 1.7  LVOT diam: 2.2 cm  LVOT area: 3.9 cm2  Ao root diam index Ht(cm/m): 1.8  Ao root diam index BSA (cm/m2): 1.5  Asc Ao diam index BSA (cm/m2): 1.7  Asc Ao diam index Ht(cm/m): 2.1  LA Volume (BP): 118.0 ml  LA Volume Index (BP): 58.4 ml/m2  RV Base: 3.4 cm     RWT: 0.41  TAPSE: 1.6 cm     Doppler Measurements & Calculations  MV E max karen: 151.0 cm/sec  MV max PG: 10.7 mmHg  MV mean P.3 mmHg  MV V2 VTI: 24.3 cm  MVA(VTI): 2.9 cm2  MV dec slope: 1053 cm/sec2  MV dec time: 0.15 sec  Ao V2 max: 158.3 cm/sec  Ao max PG: 10.0 mmHg  Ao V2 mean:  106.7 cm/sec  Ao mean P.3 mmHg  Ao V2 VTI: 33.1 cm  ISAC(I,D): 2.1 cm2  ISAC(V,D): 1.9 cm2  LV V1 max P.3 mmHg  LV V1 max: 75.6 cm/sec  LV V1 VTI: 18.1 cm  SV(LVOT): 70.9 ml  SI(LVOT): 35.1 ml/m2  PA acc time: 0.08 sec  AV Greyson Ratio (DI): 0.48  ISAC Index (cm2/m2): 1.1  E/E' av.6  Lateral E/e': 15.4  Medial E/e': 17.9     RV S Greyson: 12.1 cm/sec     ______________________________________________________________________________  Report approved by: Bettye Davis 10/09/2024 10:17 AM

## 2024-10-09 NOTE — CONSULTS
Sw:  D: Consult acknowledged for discharge planning. Confirmed with  care management not needing to assess patient. Patient follows with an active PCP, has insurance and therapy is recommending home with assist.     At this time, no discharge needs are identified. Please consult again if specific concerns arise.    FERCHO Orozco, [facility] HospitalSW  Social Work- Inpatient Care Coordination  [facility]

## 2024-10-09 NOTE — CONSULTS
[facility]    Cardiology Consultation     Date of Admission:  10/8/2024    Assessment & Plan   Betito Anderson is a 78 year old male who was admitted on 10/8/2024.    Acute on chronic congestive heart failure with preserved ejection fraction  Acute on chronic renal insufficiency  Persistent atrial fibrillation with intermittent slow ventricular rate especially at nighttime, on Coreg 6.25 mg twice daily.  Patient had a 3.6-second pause this morning at 9:12 AM.  Coronary artery disease with previous LAD and diagonal stent    Discussion  At this time, patient presented with worsening shortness of breath weight gain and leg edema consistent with congestive heart failure.  Agree with IV diuretics and he has been responding well.  He is -1 L overnight.  Weight has now decreased by 3 pounds.  His creatinine slightly worsening left to keep a close eye on it.  Home losartan dose has been held.    Due to pauses greater than 3 seconds even at waking hours, will hold Coreg for now    At today's visit, I reviewed the previous cardiology notes as an outpatient, the echo images chest x-ray and EKGs were reviewed by myself, labs were reviewed.  Today's medical decision making was of high complexity.          Raul Corley MD  Primary Care Physician   Rudy Vernon    Reason for Consult   Reason for consult: I was asked by hospitalist to evaluate this patient for CHF.    History of Present Illness   Betito Anderson is a 78 year old male with past medical history of chronic atrial fibrillation, severe cardiomyopathy in the past, hypertension, previous coronary artery disease with PCI to the LAD and diagonal in the past.  His EF has since improved.  He has had syncope in the past and nonsustained VT.  He had a loop recorder which had shown some 4-second pauses but was treated conservatively because he was asymptomatic.      He presents with increasing shortness of breath and lower extremity swelling  and has some sinus pauses up to 3 seconds and therefore we are consulted.  He lives in a 2 story town home and states that he spends most of his time downstairs watching TV. However, on 10/7, he went up one flight of stairs to get to the kitchen which he is normally able to do with minimal effort.  This has been ongoing for last 2 to 3 days.  On the day of admission he became acutely short of breath and therefore came to the emergency room.  He was noted to have increased weight of 208 pounds and increased leg edema.  Usual weight is around 200 pounds.      In the ED, he was noted to be dyspneic with mild exertion, but not hypoxic at rest. BNP is acutely elevated at 6636 and chest x-ray suggestive of pulmonary vascular congestion. He received a one time dose of IV lasix 60 mg, after which he reports improvement in his breathing.  N-terminal proBNP is 6636.  He has trace bilateral pleural effusions on chest x-ray with pulmonary congestion which I reviewed.  Echo revealed normal ejection fraction with mild decreased right ventricle systolic function mild mitral regurgitation and mitral stenosis.  Troponins are flat at 36.  Admission creatinine is 2.12 and then increased to 2.27.  In May, his creatinine was 1.58.  Hemoglobin is 10.8    Overnight, patient has had pauses and has been taking Coreg at 6.25 mg twice daily.  EKG on admission reviewed by me revealed persistent atrial fibrillation with slow ventricular rate, no significant pauses.         Past Medical History   Past Medical History:   Diagnosis Date    Acute diastolic congestive heart failure (H) 06/2019    hosp fsd, then fu echo ef nl; echo 2023 nl ef    ASCVD (arteriosclerotic cardiovascular disease) 1997    angio with 40% circ lesion; 6/19 hosp for chf, 3 vessel dz on angio but porcelin aorta so ptca done    Atrial fibrillation (H) 10/09/2018    found on routine physical    Basal cell carcinoma     Carotid artery plaque 2005    seen on us, about 50%  stenosis, fu 2009 us no significant stenosis    CKD (chronic kidney disease) stage 3, GFR 30-59 ml/min (H)     Elevated blood sugar     Gout     on allopurinol    HTN (hypertension)     Hypercholesteremia     Hyponatremia 2015    felt due to chlorthalidone    Low mean corpuscular volume (MCV)     Near syncope 05/2015    fu est echo low level but neg    Psoriasis     Dr. Marti    Renal mass 06/29/2019    seen on ct chest for sob, needs fu ct for that, fu us done 7/19 and no mass seen, should have fu ct in December, had fu us 3/22 and no fu needed    Screening 2012    abd us no aaa    Syncope 09/2019    hosp fsd, cause not clear, lowered coreg dose; seen by Dr. Lezama of ep and ep study neg, implanted event monitor    V-tach (H) 09/2019    seen on event monitor for fu syncope, then ep study and no inducible vtach         Past Surgical History   Past Surgical History:   Procedure Laterality Date    CATARACT EXTRACTION  03/2022    CATARACT EXTRACTION  2022    CV CORONARY ANGIOGRAM N/A 07/02/2019    Procedure: Coronary Angiogram;  Surgeon: Donaldo Loera MD;  Location: [facility] CARDIAC CATH LAB    CV HEART CATHETERIZATION WITH POSSIBLE INTERVENTION N/A 07/05/2019    Procedure: Heart Catheterization with Possible Intervention;  Surgeon: Manolo Hu MD;  Location: [facility] CARDIAC CATH LAB    CV LEFT HEART CATH N/A 07/02/2019    Procedure: Left Heart Cath;  Surgeon: Donaldo Loera MD;  Location: [facility] CARDIAC CATH LAB    CV LEFT VENTRICULOGRAM N/A 07/02/2019    Procedure: Left Ventriculogram;  Surgeon: Donaldo Loera MD;  Location:  HEART CARDIAC CATH LAB    CV PCI STENT DRUG ELUTING N/A 07/05/2019    Procedure: PCI Stent Drug Eluting;  Surgeon: Manolo Hu MD;  Location: [facility] CARDIAC CATH LAB    CV RIGHT HEART CATH MEASUREMENTS RECORDED N/A 07/02/2019    Procedure: Right Heart Cath;  Surgeon: Donaldo Loera MD;  Location:  HEART CARDIAC CATH LAB    EP LOOP  RECORDER IMPLANT N/A 10/11/2019    Procedure: EP Loop Recorder Implant;  Surgeon: Fuad Lezama MD;  Location:  HEART CARDIAC CATH LAB    EP RIGHT VENTRICULAR RECORDING N/A 10/11/2019    Procedure: EP Ventricular Pacing;  Surgeon: Fuad Lezama MD;  Location:  HEART CARDIAC CATH LAB    ZZC ANESTH,DX ARTHROSCOPIC PROC KNEE JOINT  01/01/2009         Prior to Admission Medications   Prior to Admission Medications   Prescriptions Last Dose Informant Patient Reported? Taking?   allopurinol (ZYLOPRIM) 300 MG tablet 10/8/2024 at am  No Yes   Sig: TAKE 1 TABLET DAILY   amLODIPine (NORVASC) 5 MG tablet 10/8/2024 at am  No Yes   Sig: Take 1 tablet (5 mg) by mouth daily   aspirin (ASA) 81 MG EC tablet 10/8/2024 at am  No Yes   Sig: Take 1 tablet (81 mg) by mouth daily   calcipotriene (DOVONOX) 0.005 % external cream PRN  No No   Sig: Mix efudex + calcipotriene equally. Apply BID x 4-10 days. Stop when skin gets red.   carvedilol (COREG) 6.25 MG tablet 10/8/2024 at am  No Yes   Sig: Take 1 tablet (6.25 mg) by mouth 2 times daily (with meals)   clobetasol propionate (TEMOVATE) 0.05 % external cream PRN  No No   Sig: Apply topically 2 times daily To feet as needed   empagliflozin (JARDIANCE) 10 MG TABS tablet 10/8/2024 at am  No Yes   Sig: Take 1 tablet (10 mg) by mouth daily   ezetimibe (ZETIA) 10 MG tablet 10/8/2024 at am  No Yes   Sig: Take 1 tablet (10 mg) by mouth daily   fluorouracil (EFUDEX) 5 % external cream PRN  No No   Sig: Mix equally with calcipotriene cream. Apply BID x 4-10 days. Stop when skin gets red.   hydrALAZINE (APRESOLINE) 100 MG tablet 10/8/2024 at am X1  No Yes   Sig: Take 1 tablet (100 mg) by mouth 3 times daily   isosorbide mononitrate CR (IMDUR) 120 MG 24 HR ER tablet 10/8/2024 at am  No Yes   Sig: Take 1 tablet (120 mg) by mouth daily   ketoconazole (NIZORAL) 2 % external shampoo PRN  No No   Sig: Massage into wet scalp, let sit 3-5 min, then rinse. Do this 3x weekly.   losartan (COZAAR) 100  MG tablet 10/8/2024 at am  No Yes   Sig: Take 1 tablet (100 mg) by mouth daily   rivaroxaban ANTICOAGULANT (XARELTO ANTICOAGULANT) 20 MG TABS tablet 10/7/2024  No Yes   Sig: Take 1 tablet (20 mg) by mouth daily (with dinner) TAKE 1 TABLET DAILY WITH DINNER   rosuvastatin (CRESTOR) 40 MG tablet 10/7/2024  No Yes   Sig: Take 1 tablet (40 mg) by mouth at bedtime   torsemide (DEMADEX) 20 MG tablet 10/8/2024 at am  No Yes   Sig: Take 1 tablet (20 mg) Monday, Wednesday, Friday and take 2 tablets (40 mg) other days by mouth   triamcinolone (KENALOG) 0.1 % external cream PRN  No No   Sig: Apply topically 2 times daily To psoriasis on arms and body as needed      Facility-Administered Medications: None     Current Facility-Administered Medications   Medication Dose Route Frequency Provider Last Rate Last Admin    acetaminophen (TYLENOL) tablet 650 mg  650 mg Oral Q4H PRN Beto Bhagat NP        Or    acetaminophen (TYLENOL) Suppository 650 mg  650 mg Rectal Q4H PRN Beto Bhagat NP        allopurinol (ZYLOPRIM) tablet 300 mg  300 mg Oral Daily Beto Bhagat NP   300 mg at 10/09/24 0905    amLODIPine (NORVASC) tablet 5 mg  5 mg Oral Daily Beto Bhagat NP   5 mg at 10/09/24 0905    aspirin EC tablet 81 mg  81 mg Oral Daily Beto Bhagat NP   81 mg at 10/09/24 0905    carvedilol (COREG) tablet 6.25 mg  6.25 mg Oral BID w/meals Beto Bhagat NP   6.25 mg at 10/08/24 1851    Continuing ACE inhibitor/ARB/ARNI from home medication list OR ACE inhibitor/ARB/ARNI order already placed during this visit   Does not apply DOES NOT GO TO Beto Deng NP        Continuing beta blocker from home medication list OR beta blocker order already placed during this visit   Does not apply DOES NOT GO TO Beto Deng NP        empagliflozin (JARDIANCE) tablet 10 mg  10 mg Oral Daily Beto Bhagat, REYMUNDO   10  mg at 10/09/24 0908    ezetimibe (ZETIA) tablet 10 mg  10 mg Oral Daily Beto Bhagat NP   10 mg at 10/09/24 0905    furosemide (LASIX) injection 40 mg  40 mg Intravenous bid 08 & 14 Beto Bhagat NP   40 mg at 10/09/24 0904    hydrALAZINE (APRESOLINE) tablet 10 mg  10 mg Oral Q4H PRN Beto Bhagat NP        Or    hydrALAZINE (APRESOLINE) injection 10 mg  10 mg Intravenous Q4H PRN Beto Bhagat NP        hydrALAZINE (APRESOLINE) tablet 100 mg  100 mg Oral TID Beto Bhagat NP   100 mg at 10/09/24 0904    ipratropium - albuterol 0.5 mg/2.5 mg/3 mL (DUONEB) neb solution 3 mL  3 mL Nebulization Q4H PRN Beto Bhagat NP        isosorbide mononitrate (IMDUR) 24 hr tablet 120 mg  120 mg Oral Daily Beto Bhagat NP   120 mg at 10/09/24 0904    lidocaine (LMX4) cream   Topical Q1H PRN Beto Bhagat NP        lidocaine 1 % 0.1-1 mL  0.1-1 mL Other Q1H PRN Beto Bhagat NP        magnesium oxide (MAG-OX) tablet 400 mg  400 mg Oral Q4H Hipolito Singh MD   400 mg at 10/09/24 0905    ondansetron (ZOFRAN ODT) ODT tab 4 mg  4 mg Oral Q6H PRN Beto Bhagat NP        Or    ondansetron (ZOFRAN) injection 4 mg  4 mg Intravenous Q6H PRN Beto Bhagat NP        rivaroxaban ANTICOAGULANT (XARELTO) tablet 15 mg  15 mg Oral Daily with supper Beto Bhagat NP   15 mg at 10/08/24 1851    rosuvastatin (CRESTOR) tablet 10 mg  10 mg Oral At Bedtime Danielle Robles DO        senna-docusate (SENOKOT-S/PERICOLACE) 8.6-50 MG per tablet 1 tablet  1 tablet Oral BID PRN Beto Bhagat NP        Or    senna-docusate (SENOKOT-S/PERICOLACE) 8.6-50 MG per tablet 2 tablet  2 tablet Oral BID PRN Beto Bhagat NP        sodium chloride (PF) 0.9% PF flush 3 mL  3 mL Intracatheter Q8H Beto Bhagat NP   3 mL at 10/09/24 0904     sodium chloride (PF) 0.9% PF flush 3 mL  3 mL Intracatheter q1 min prn Beto Bhagat NP         Current Facility-Administered Medications   Medication Dose Route Frequency Provider Last Rate Last Admin    acetaminophen (TYLENOL) tablet 650 mg  650 mg Oral Q4H PRN Beto Bhagat NP        Or    acetaminophen (TYLENOL) Suppository 650 mg  650 mg Rectal Q4H PRN Beto Bhagat NP        allopurinol (ZYLOPRIM) tablet 300 mg  300 mg Oral Daily Beto Bhagat NP   300 mg at 10/09/24 0905    amLODIPine (NORVASC) tablet 5 mg  5 mg Oral Daily Beto Bhagat NP   5 mg at 10/09/24 0905    aspirin EC tablet 81 mg  81 mg Oral Daily Beto Bhagat NP   81 mg at 10/09/24 0905    carvedilol (COREG) tablet 6.25 mg  6.25 mg Oral BID w/meals Beto Bhagat NP   6.25 mg at 10/08/24 1851    Continuing ACE inhibitor/ARB/ARNI from home medication list OR ACE inhibitor/ARB/ARNI order already placed during this visit   Does not apply DOES NOT GO TO Beto Deng NP        Continuing beta blocker from home medication list OR beta blocker order already placed during this visit   Does not apply DOES NOT GO TO Beto Deng NP        empagliflozin (JARDIANCE) tablet 10 mg  10 mg Oral Daily Beto Bhagat NP   10 mg at 10/09/24 0908    ezetimibe (ZETIA) tablet 10 mg  10 mg Oral Daily Beto Bhagat NP   10 mg at 10/09/24 0905    furosemide (LASIX) injection 40 mg  40 mg Intravenous bid 08 & 14 Beto Bhagat NP   40 mg at 10/09/24 0904    hydrALAZINE (APRESOLINE) tablet 10 mg  10 mg Oral Q4H PRN Beto Bhagat NP        Or    hydrALAZINE (APRESOLINE) injection 10 mg  10 mg Intravenous Q4H PRN Beto Bhagat NP        hydrALAZINE (APRESOLINE) tablet 100 mg  100 mg Oral TID Beto Bhagat NP   100 mg at 10/09/24 0904     ipratropium - albuterol 0.5 mg/2.5 mg/3 mL (DUONEB) neb solution 3 mL  3 mL Nebulization Q4H PRN Beto Bhagat NP        isosorbide mononitrate (IMDUR) 24 hr tablet 120 mg  120 mg Oral Daily Beto Bhagat NP   120 mg at 10/09/24 0904    lidocaine (LMX4) cream   Topical Q1H PRN Beto Bhagat NP        lidocaine 1 % 0.1-1 mL  0.1-1 mL Other Q1H PRN Beto Bhagat NP        magnesium oxide (MAG-OX) tablet 400 mg  400 mg Oral Q4H Hipolito Singh MD   400 mg at 10/09/24 0905    ondansetron (ZOFRAN ODT) ODT tab 4 mg  4 mg Oral Q6H PRN Beto Bhagat NP        Or    ondansetron (ZOFRAN) injection 4 mg  4 mg Intravenous Q6H PRN Beto Bhagat NP        rivaroxaban ANTICOAGULANT (XARELTO) tablet 15 mg  15 mg Oral Daily with supper Beto Bhagat NP   15 mg at 10/08/24 1851    rosuvastatin (CRESTOR) tablet 10 mg  10 mg Oral At Bedtime Danielle Robles DO        senna-docusate (SENOKOT-S/PERICOLACE) 8.6-50 MG per tablet 1 tablet  1 tablet Oral BID PRN Beto Bhagat NP        Or    senna-docusate (SENOKOT-S/PERICOLACE) 8.6-50 MG per tablet 2 tablet  2 tablet Oral BID PRN Beto Bhagat NP        sodium chloride (PF) 0.9% PF flush 3 mL  3 mL Intracatheter Q8H Beto Bhagat NP   3 mL at 10/09/24 0904    sodium chloride (PF) 0.9% PF flush 3 mL  3 mL Intracatheter q1 min prn Beto Bhagat NP         Allergies   Allergies   Allergen Reactions    Ace Inhibitors Anaphylaxis     Edema of ace    Eliquis [Apixaban] Other (See Comments)     Facial numbness       Social History    reports that he quit smoking about 26 years ago. His smoking use included cigarettes. He started smoking about 56 years ago. He has a 30 pack-year smoking history. He has never used smokeless tobacco. He reports current alcohol use. He reports that he does not use drugs.      Family History  "  I have reviewed this patient's family history and updated it with pertinent information if needed.  Family History   Problem Relation Age of Onset    Coronary Artery Disease Father     Medical History Unknown Mother     Medical History Unknown Maternal Grandfather           Review of Systems   A comprehensive review of system was performed and is negative other than that noted in the HPI or here.     Physical Exam   Vital Signs with Ranges  Temp:  [97.5  F (36.4  C)-98.3  F (36.8  C)] 98.3  F (36.8  C)  Pulse:  [] 62  Resp:  [16-28] 20  BP: (127-163)/(57-85) 163/68  SpO2:  [92 %-94 %] 94 %  Wt Readings from Last 4 Encounters:   10/09/24 93.2 kg (205 lb 6.4 oz)   09/20/24 93 kg (205 lb)   05/22/24 92.8 kg (204 lb 9.6 oz)   03/04/24 96.7 kg (213 lb 1.6 oz)     I/O last 3 completed shifts:  In: 1705 [P.O.:1705]  Out: 1750 [Urine:1750]      Vitals: BP (!) 163/68   Pulse 62   Temp 98.3  F (36.8  C) (Oral)   Resp 20   Ht 1.676 m (5' 6\")   Wt 93.2 kg (205 lb 6.4 oz)   SpO2 94%   BMI 33.15 kg/m      Physical Exam:   General - Alert and oriented to time place and person in no acute distress  Eyes - No scleral icterus  HEENT - Neck supple, moist mucous membranes  Cardiovascular -irregular S1-S2.  Increased JVP  Extremities - There is 1+ edema  Respiratory -decreased air entry at the bases  Skin - No pallor or cyanosis  Gastrointestinal - Non tender and non distended without rebound or guarding  Psych - Appropriate affect   Neurological - No gross motor neurological focal deficits    No lab results found in last 7 days.    Invalid input(s): \"TROPONINIES\"    Recent Labs   Lab 10/09/24  0600 10/09/24  0559 10/08/24  1529 10/08/24  1203   WBC 7.4  --   --  8.0   HGB 10.8*  --   --  10.8*   MCV 85  --   --  86     --   --  286   NA  --  139  --  143   POTASSIUM  --  3.4  --  3.8   CHLORIDE  --  100  --  105   CO2  --  25  --  24   BUN  --  36.2*  --  34.0*   CR  --  2.27*  --  2.12*   GFRESTIMATED  --  29*  " "--  31*   ANIONGAP  --  14  --  14   GUNNER  --  9.4  --  9.0   GLC  --  97 107* 107*   ALBUMIN  --   --   --  4.0   PROTTOTAL  --   --   --  6.8   BILITOTAL  --   --   --  0.8   ALKPHOS  --   --   --  95   ALT  --   --   --  21   AST  --   --   --  21     Recent Labs   Lab Test 01/29/24  1014 01/05/23  0802   CHOL 154 183   HDL 35* 39*   LDL 83 97   TRIG 182* 234*     Recent Labs   Lab 10/09/24  0600 10/08/24  1203   WBC 7.4 8.0   HGB 10.8* 10.8*   HCT 34.6* 34.4*   MCV 85 86    286     No results for input(s): \"PH\", \"PHV\", \"PO2\", \"PO2V\", \"SAT\", \"PCO2\", \"PCO2V\", \"HCO3\", \"HCO3V\" in the last 168 hours.  Recent Labs   Lab 10/08/24  1203   NTBNPI 6,636*     No results for input(s): \"DD\" in the last 168 hours.  No results for input(s): \"SED\", \"CRP\" in the last 168 hours.  Recent Labs   Lab 10/09/24  0600 10/08/24  1203    286     No results for input(s): \"TSH\" in the last 168 hours.  No results for input(s): \"COLOR\", \"APPEARANCE\", \"URINEGLC\", \"URINEBILI\", \"URINEKETONE\", \"SG\", \"UBLD\", \"URINEPH\", \"PROTEIN\", \"UROBILINOGEN\", \"NITRITE\", \"LEUKEST\", \"RBCU\", \"WBCU\" in the last 168 hours.    Imaging:  Recent Results (from the past 48 hour(s))   XR Chest 2 Views    Narrative    XR CHEST 2 VIEWS 10/8/2024 12:44 PM    HISTORY: exertional dyspnea x 2 days, leg swelling, no fever/cough    COMPARISON: 7/26/2023      Impression    IMPRESSION: Increased diffuse interstitial opacities in the lungs,  likely due to mild pulmonary edema. Trace bilateral pleural effusions.  No focal infiltrate or pneumothorax. Stable cardiomegaly. Stable  calcified granuloma in the left lung apex. Left chest wall implanted  pacemaker.    MARTHA HERMOSILLO MD         SYSTEM ID:  [id number]   Echocardiogram Complete   Result Value    LVEF  60-65%    Narrative    [id number]  [id number]  [id number]  [id number]^LUIS A^MIGDALIA^[facility]  Echocardiography Laboratory  [address] [place] [address]     Name: MARCELA TINAJERO " L  MRN: [id number]  : 1945  Study Date: 10/09/2024 07:58 AM  Age: 78 yrs  Gender: Male  Patient Location: [facility]  Reason For Study: Heart Failure  Ordering Physician: MIGDALIA GUNN  Referring Physician: Rudy Vernon MD  Performed By: Mckayla Velez     BSA: 2.0 m2  Height: 66 in  Weight: 205 lb  HR: 59  BP: 139/70 mmHg  ______________________________________________________________________________  Procedure  Complete Echo Adult. Definity (NDC #[id number]) given intravenously.  ______________________________________________________________________________  Interpretation Summary     Left ventricular systolic function is normal.The visual ejection fraction is  60-65%.Diastolic Doppler findings (E/E' ratio and/or other parameters) suggest  left ventricular filling pressures are increased.  Mildly decreased right ventricular systolic function.  Severe biatrial enlargement.  There is mild (1+) mitral regurgitation.There is mild mitral stenosis.  ______________________________________________________________________________  Left Ventricle  The left ventricle is normal in size. There is normal left ventricular wall  thickness. Diastolic Doppler findings (E/E' ratio and/or other parameters)  suggest left ventricular filling pressures are increased. Left ventricular  systolic function is normal. The visual ejection fraction is 60-65%. No  regional wall motion abnormalities noted.     Right Ventricle  The right ventricle is normal size. Mildly decreased right ventricular  systolic function.     Atria  The left atrium is severely dilated. The right atrium is severely dilated.  Intact atrial septum.     Mitral Valve  There is mild (1+) mitral regurgitation. There is mild mitral stenosis. The  mean mitral valve gradient is 5.3 mmHg.     Tricuspid Valve  There is trace tricuspid regurgitation. Right ventricular systolic pressure  could not be approximated due to inadequate tricuspid regurgitation.      Aortic Valve  The aortic valve is not well visualized. No aortic regurgitation is present.  No aortic stenosis is present.     Pulmonic Valve  There is trace pulmonic valvular regurgitation. There is no pulmonic valvular  stenosis.     Vessels  The aortic root is normal size. Normal size ascending aorta. Normal size IVC.     Pericardium  There is no pericardial effusion.     ______________________________________________________________________________  MMode/2D Measurements & Calculations  IVSd: 1.1 cm  LVIDd: 5.2 cm  LVIDs: 3.9 cm  LVPWd: 1.1 cm  FS: 24.6 %  LV mass(C)d: 204.0 grams  LV mass(C)dI: 100.9 grams/m2     Ao root diam: 3.1 cm  LA dimension: 5.2 cm  asc Aorta Diam: 3.5 cm  LA/Ao: 1.7  LVOT diam: 2.2 cm  LVOT area: 3.9 cm2  Ao root diam index Ht(cm/m): 1.8  Ao root diam index BSA (cm/m2): 1.5  Asc Ao diam index BSA (cm/m2): 1.7  Asc Ao diam index Ht(cm/m): 2.1  LA Volume (BP): 118.0 ml  LA Volume Index (BP): 58.4 ml/m2  RV Base: 3.4 cm     RWT: 0.41  TAPSE: 1.6 cm     Doppler Measurements & Calculations  MV E max greyson: 151.0 cm/sec  MV max PG: 10.7 mmHg  MV mean P.3 mmHg  MV V2 VTI: 24.3 cm  MVA(VTI): 2.9 cm2  MV dec slope: 1053 cm/sec2  MV dec time: 0.15 sec  Ao V2 max: 158.3 cm/sec  Ao max PG: 10.0 mmHg  Ao V2 mean: 106.7 cm/sec  Ao mean P.3 mmHg  Ao V2 VTI: 33.1 cm  ISAC(I,D): 2.1 cm2  ISAC(V,D): 1.9 cm2  LV V1 max P.3 mmHg  LV V1 max: 75.6 cm/sec  LV V1 VTI: 18.1 cm  SV(LVOT): 70.9 ml  SI(LVOT): 35.1 ml/m2  PA acc time: 0.08 sec  AV Greyson Ratio (DI): 0.48  ISAC Index (cm2/m2): 1.1  E/E' av.6  Lateral E/e': 15.4  Medial E/e': 17.9     RV S Greyson: 12.1 cm/sec     ______________________________________________________________________________  Report approved by: Bettye Davis 10/09/2024 10:17 AM             Echo:  No results found for this or any previous visit (from the past 4320 hour(s)).    Clinically Significant Risk Factors Present on Admission                # Drug Induced  "Coagulation Defect: home medication list includes an anticoagulant medication  # Drug Induced Platelet Defect: home medication list includes an antiplatelet medication   # Hypertension: Noted on problem list  # Acute heart failure with preserved ejection fraction: heart failure noted on problem list, last echo with EF >50%, and receiving IV diuretics   # Anemia: based on hgb <11       # Obesity: Estimated body mass index is 33.15 kg/m  as calculated from the following:    Height as of this encounter: 1.676 m (5' 6\").    Weight as of this encounter: 93.2 kg (205 lb 6.4 oz).             Cardiac Arrhythmia: Atrial fibrillation: Persistent  Non-Rheumatic Valve Disease: Mitral valve insufficiency    CKD POA List: Stage 3b (GFR 30-44)                              "

## 2024-10-10 ENCOUNTER — APPOINTMENT (OUTPATIENT)
Dept: OCCUPATIONAL THERAPY | Facility: CLINIC | Age: 79
DRG: 280 | End: 2024-10-10
Payer: COMMERCIAL

## 2024-10-10 LAB
ALBUMIN SERPL BCG-MCNC: 3.8 G/DL (ref 3.5–5.2)
ANION GAP SERPL CALCULATED.3IONS-SCNC: 14 MMOL/L (ref 7–15)
BASOPHILS # BLD AUTO: 0.1 10E3/UL (ref 0–0.2)
BASOPHILS NFR BLD AUTO: 1 %
BUN SERPL-MCNC: 40.7 MG/DL (ref 8–23)
CALCIUM SERPL-MCNC: 9.3 MG/DL (ref 8.8–10.4)
CHLORIDE SERPL-SCNC: 99 MMOL/L (ref 98–107)
CREAT SERPL-MCNC: 2.46 MG/DL (ref 0.67–1.17)
EGFRCR SERPLBLD CKD-EPI 2021: 26 ML/MIN/1.73M2
EOSINOPHIL # BLD AUTO: 0.1 10E3/UL (ref 0–0.7)
EOSINOPHIL NFR BLD AUTO: 2 %
ERYTHROCYTE [DISTWIDTH] IN BLOOD BY AUTOMATED COUNT: 16.3 % (ref 10–15)
GLUCOSE SERPL-MCNC: 88 MG/DL (ref 70–99)
HCO3 SERPL-SCNC: 26 MMOL/L (ref 22–29)
HCT VFR BLD AUTO: 37.5 % (ref 40–53)
HGB BLD-MCNC: 11.4 G/DL (ref 13.3–17.7)
IMM GRANULOCYTES # BLD: 0 10E3/UL
IMM GRANULOCYTES NFR BLD: 0 %
LYMPHOCYTES # BLD AUTO: 1 10E3/UL (ref 0.8–5.3)
LYMPHOCYTES NFR BLD AUTO: 15 %
MAGNESIUM SERPL-MCNC: 2 MG/DL (ref 1.7–2.3)
MCH RBC QN AUTO: 26 PG (ref 26.5–33)
MCHC RBC AUTO-ENTMCNC: 30.4 G/DL (ref 31.5–36.5)
MCV RBC AUTO: 85 FL (ref 78–100)
MONOCYTES # BLD AUTO: 0.7 10E3/UL (ref 0–1.3)
MONOCYTES NFR BLD AUTO: 11 %
NEUTROPHILS # BLD AUTO: 4.8 10E3/UL (ref 1.6–8.3)
NEUTROPHILS NFR BLD AUTO: 72 %
NRBC # BLD AUTO: 0 10E3/UL
NRBC BLD AUTO-RTO: 0 /100
PHOSPHATE SERPL-MCNC: 3.4 MG/DL (ref 2.5–4.5)
PLATELET # BLD AUTO: 285 10E3/UL (ref 150–450)
POTASSIUM SERPL-SCNC: 3.7 MMOL/L (ref 3.4–5.3)
POTASSIUM SERPL-SCNC: 3.8 MMOL/L (ref 3.4–5.3)
RBC # BLD AUTO: 4.39 10E6/UL (ref 4.4–5.9)
SODIUM SERPL-SCNC: 139 MMOL/L (ref 135–145)
WBC # BLD AUTO: 6.7 10E3/UL (ref 4–11)

## 2024-10-10 PROCEDURE — 82310 ASSAY OF CALCIUM: CPT | Performed by: INTERNAL MEDICINE

## 2024-10-10 PROCEDURE — 97535 SELF CARE MNGMENT TRAINING: CPT | Mod: GO

## 2024-10-10 PROCEDURE — 250N000013 HC RX MED GY IP 250 OP 250 PS 637: Performed by: NURSE PRACTITIONER

## 2024-10-10 PROCEDURE — 99232 SBSQ HOSP IP/OBS MODERATE 35: CPT | Mod: FS | Performed by: NURSE PRACTITIONER

## 2024-10-10 PROCEDURE — 250N000013 HC RX MED GY IP 250 OP 250 PS 637

## 2024-10-10 PROCEDURE — 80051 ELECTROLYTE PANEL: CPT | Performed by: INTERNAL MEDICINE

## 2024-10-10 PROCEDURE — 97110 THERAPEUTIC EXERCISES: CPT | Mod: GO

## 2024-10-10 PROCEDURE — 83735 ASSAY OF MAGNESIUM: CPT | Performed by: INTERNAL MEDICINE

## 2024-10-10 PROCEDURE — 99222 1ST HOSP IP/OBS MODERATE 55: CPT | Performed by: INTERNAL MEDICINE

## 2024-10-10 PROCEDURE — 250N000013 HC RX MED GY IP 250 OP 250 PS 637: Performed by: INTERNAL MEDICINE

## 2024-10-10 PROCEDURE — 36415 COLL VENOUS BLD VENIPUNCTURE: CPT | Performed by: INTERNAL MEDICINE

## 2024-10-10 PROCEDURE — 99233 SBSQ HOSP IP/OBS HIGH 50: CPT | Performed by: INTERNAL MEDICINE

## 2024-10-10 PROCEDURE — 85025 COMPLETE CBC W/AUTO DIFF WBC: CPT | Performed by: INTERNAL MEDICINE

## 2024-10-10 PROCEDURE — 210N000002 HC R&B HEART CARE

## 2024-10-10 RX ORDER — TORSEMIDE 20 MG/1
20 TABLET ORAL DAILY
Status: DISCONTINUED | OUTPATIENT
Start: 2024-10-10 | End: 2024-10-12 | Stop reason: HOSPADM

## 2024-10-10 RX ORDER — MAGNESIUM OXIDE 400 MG/1
400 TABLET ORAL EVERY 4 HOURS
Status: COMPLETED | OUTPATIENT
Start: 2024-10-10 | End: 2024-10-10

## 2024-10-10 RX ADMIN — ALLOPURINOL 300 MG: 300 TABLET ORAL at 09:42

## 2024-10-10 RX ADMIN — HYDRALAZINE HYDROCHLORIDE 100 MG: 50 TABLET ORAL at 16:30

## 2024-10-10 RX ADMIN — TORSEMIDE 20 MG: 20 TABLET ORAL at 09:42

## 2024-10-10 RX ADMIN — HYDRALAZINE HYDROCHLORIDE 100 MG: 50 TABLET ORAL at 09:46

## 2024-10-10 RX ADMIN — ISOSORBIDE MONONITRATE 120 MG: 60 TABLET, EXTENDED RELEASE ORAL at 09:46

## 2024-10-10 RX ADMIN — Medication 400 MG: at 18:03

## 2024-10-10 RX ADMIN — HYDRALAZINE HYDROCHLORIDE 100 MG: 50 TABLET ORAL at 21:43

## 2024-10-10 RX ADMIN — Medication 400 MG: at 14:34

## 2024-10-10 RX ADMIN — EZETIMIBE 10 MG: 10 TABLET ORAL at 09:42

## 2024-10-10 RX ADMIN — RIVAROXABAN 15 MG: 15 TABLET, FILM COATED ORAL at 17:44

## 2024-10-10 RX ADMIN — ROSUVASTATIN CALCIUM 10 MG: 10 TABLET, FILM COATED ORAL at 21:43

## 2024-10-10 RX ADMIN — AMLODIPINE BESYLATE 5 MG: 5 TABLET ORAL at 09:42

## 2024-10-10 RX ADMIN — ASPIRIN 81 MG: 81 TABLET, COATED ORAL at 09:41

## 2024-10-10 RX ADMIN — EMPAGLIFLOZIN 10 MG: 10 TABLET, FILM COATED ORAL at 09:42

## 2024-10-10 ASSESSMENT — ACTIVITIES OF DAILY LIVING (ADL)
ADLS_ACUITY_SCORE: 20

## 2024-10-10 NOTE — PLAN OF CARE
A&Ox4, denies pain or SOB. Tele AFIB SVR 50's-60's this shift, VSS on RA. Stopped IV Lasix, started torsemide. Magnesium replaced, recheck in am. Compression socks placed. Ambulating in hallway independently. Plan for discharge next 1-2 days.

## 2024-10-10 NOTE — CONSULTS
[facility]    Cardiac Electrophysiology Consultation     Date of Admission:  10/8/2024  Date of Consult (When I saw the patient): 10/10/24    Assessment & Plan   Betito Anderson is a 78 year old male who was admitted on 10/8/2024. I was asked to see the patient for AF with SVR.  Delightful pt with permanent AF and CAD, s/p PCI and recurrent syncopal episodes without much warnings who was noted to have long run of rapid monomorphic nsvt on Event monitor. He has pEF. Patient had an EP study which was negative for inducible VT. An  ILR was implanted in 2019.    No syncope since but pt has been observed to have nocturnal pauses up to 4 secs while sleeping. ILR out of battery since 2022. Pt presents with increasing sob c/w CHFpEF. Feeling better with meds adjustment and diurectics. Noted to have 3.6s pause during waking hours without symptoms. Most of the time rate in the 60's in waking hours.     It is a matter of time that pt will need a pacemaker due to intrinsic AV conduction disease but now is not the time kendy given nationwide shortage of IV fluids. Recommending pt to wear a watch with HR monitor. Should he noted hr consistently <50 with activities a/w fatigue or sob then a ppm is warranted at that time. Unclear of benefits of having a ppm now so that beta-blocker can be started.    Fuad Rausch MD    Code Status    Full Code    Primary Care Physician   Rudy Vernon    History is obtained from the patient    Past Medical History   I have reviewed this patient's medical history and updated it with pertinent information if needed.   Past Medical History:   Diagnosis Date    Acute diastolic congestive heart failure (H) 06/2019    hosp fsd, then fu echo ef nl; echo 2023 nl ef    ASCVD (arteriosclerotic cardiovascular disease) 1997    angio with 40% circ lesion; 6/19 hosp for chf, 3 vessel dz on angio but porcelin aorta so ptca done    Atrial fibrillation (H) 10/09/2018    found on routine  physical    Basal cell carcinoma     Carotid artery plaque 2005    seen on us, about 50% stenosis, fu 2009 us no significant stenosis    CKD (chronic kidney disease) stage 3, GFR 30-59 ml/min (H)     Elevated blood sugar     Gout     on allopurinol    HTN (hypertension)     Hypercholesteremia     Hyponatremia 2015    felt due to chlorthalidone    Low mean corpuscular volume (MCV)     Near syncope 05/2015    fu est echo low level but neg    Psoriasis     Dr. Marti    Renal mass 06/29/2019    seen on ct chest for sob, needs fu ct for that, fu us done 7/19 and no mass seen, should have fu ct in December, had fu us 3/22 and no fu needed    Screening 2012    abd us no aaa    Syncope 09/2019    hosp fsd, cause not clear, lowered coreg dose; seen by Dr. Lezama of ep and ep study neg, implanted event monitor    V-tach (H) 09/2019    seen on event monitor for fu syncope, then ep study and no inducible vtach       Past Surgical History   I have reviewed this patient's surgical history and updated it with pertinent information if needed.  Past Surgical History:   Procedure Laterality Date    CATARACT EXTRACTION  03/2022    CATARACT EXTRACTION  2022    CV CORONARY ANGIOGRAM N/A 07/02/2019    Procedure: Coronary Angiogram;  Surgeon: Donaldo Loera MD;  Location: [facility] CARDIAC CATH LAB    CV HEART CATHETERIZATION WITH POSSIBLE INTERVENTION N/A 07/05/2019    Procedure: Heart Catheterization with Possible Intervention;  Surgeon: Manolo Hu MD;  Location:  HEART CARDIAC CATH LAB    CV LEFT HEART CATH N/A 07/02/2019    Procedure: Left Heart Cath;  Surgeon: Donaldo Loera MD;  Location:  HEART CARDIAC CATH LAB    CV LEFT VENTRICULOGRAM N/A 07/02/2019    Procedure: Left Ventriculogram;  Surgeon: Donaldo Loera MD;  Location: [facility] CARDIAC CATH LAB    CV PCI STENT DRUG ELUTING N/A 07/05/2019    Procedure: PCI Stent Drug Eluting;  Surgeon: Manolo Hu MD;  Location: [facility] CARDIAC  CATH LAB    CV RIGHT HEART CATH MEASUREMENTS RECORDED N/A 07/02/2019    Procedure: Right Heart Cath;  Surgeon: Donaldo Loera MD;  Location:  HEART CARDIAC CATH LAB    EP LOOP RECORDER IMPLANT N/A 10/11/2019    Procedure: EP Loop Recorder Implant;  Surgeon: Fuad Lezama MD;  Location:  HEART CARDIAC CATH LAB    EP RIGHT VENTRICULAR RECORDING N/A 10/11/2019    Procedure: EP Ventricular Pacing;  Surgeon: Fuad Lezama MD;  Location:  HEART CARDIAC CATH LAB    ZZC ANESTH,DX ARTHROSCOPIC PROC KNEE JOINT  01/01/2009       Prior to Admission Medications   Prior to Admission Medications   Prescriptions Last Dose Informant Patient Reported? Taking?   allopurinol (ZYLOPRIM) 300 MG tablet 10/8/2024 at am  No Yes   Sig: TAKE 1 TABLET DAILY   amLODIPine (NORVASC) 5 MG tablet 10/8/2024 at am  No Yes   Sig: Take 1 tablet (5 mg) by mouth daily   aspirin (ASA) 81 MG EC tablet 10/8/2024 at am  No Yes   Sig: Take 1 tablet (81 mg) by mouth daily   calcipotriene (DOVONOX) 0.005 % external cream PRN  No No   Sig: Mix efudex + calcipotriene equally. Apply BID x 4-10 days. Stop when skin gets red.   carvedilol (COREG) 6.25 MG tablet 10/8/2024 at am  No Yes   Sig: Take 1 tablet (6.25 mg) by mouth 2 times daily (with meals)   clobetasol propionate (TEMOVATE) 0.05 % external cream PRN  No No   Sig: Apply topically 2 times daily To feet as needed   empagliflozin (JARDIANCE) 10 MG TABS tablet 10/8/2024 at am  No Yes   Sig: Take 1 tablet (10 mg) by mouth daily   ezetimibe (ZETIA) 10 MG tablet 10/8/2024 at am  No Yes   Sig: Take 1 tablet (10 mg) by mouth daily   fluorouracil (EFUDEX) 5 % external cream PRN  No No   Sig: Mix equally with calcipotriene cream. Apply BID x 4-10 days. Stop when skin gets red.   hydrALAZINE (APRESOLINE) 100 MG tablet 10/8/2024 at am X1  No Yes   Sig: Take 1 tablet (100 mg) by mouth 3 times daily   isosorbide mononitrate CR (IMDUR) 120 MG 24 HR ER tablet 10/8/2024 at am  No Yes   Sig: Take 1 tablet  (120 mg) by mouth daily   ketoconazole (NIZORAL) 2 % external shampoo PRN  No No   Sig: Massage into wet scalp, let sit 3-5 min, then rinse. Do this 3x weekly.   losartan (COZAAR) 100 MG tablet 10/8/2024 at am  No Yes   Sig: Take 1 tablet (100 mg) by mouth daily   rivaroxaban ANTICOAGULANT (XARELTO ANTICOAGULANT) 20 MG TABS tablet 10/7/2024  No Yes   Sig: Take 1 tablet (20 mg) by mouth daily (with dinner) TAKE 1 TABLET DAILY WITH DINNER   rosuvastatin (CRESTOR) 40 MG tablet 10/7/2024  No Yes   Sig: Take 1 tablet (40 mg) by mouth at bedtime   torsemide (DEMADEX) 20 MG tablet 10/8/2024 at am  No Yes   Sig: Take 1 tablet (20 mg) Monday, Wednesday, Friday and take 2 tablets (40 mg) other days by mouth   triamcinolone (KENALOG) 0.1 % external cream PRN  No No   Sig: Apply topically 2 times daily To psoriasis on arms and body as needed      Facility-Administered Medications: None     Allergies   Allergies   Allergen Reactions    Ace Inhibitors Anaphylaxis     Edema of ace    Eliquis [Apixaban] Other (See Comments)     Facial numbness       Social History   I have reviewed this patient's social history and updated it with pertinent information if needed. Betito Anderson  reports that he quit smoking about 26 years ago. His smoking use included cigarettes. He started smoking about 56 years ago. He has a 30 pack-year smoking history. He has never used smokeless tobacco. He reports current alcohol use. He reports that he does not use drugs.    Family History   I have reviewed this patient's family history and updated it with pertinent information if needed.   Family History   Problem Relation Age of Onset    Coronary Artery Disease Father     Medical History Unknown Mother     Medical History Unknown Maternal Grandfather        Review of Systems   Comprehensive review of systems was performed with pertinent positives and negatives listed in assessment and plan section.    Physical Exam   Temp: 97.5  F (36.4  C) Temp src:  Oral BP: 111/76 Pulse: 60   Resp: 18 SpO2: 97 % O2 Device: None (Room air)    Vital Signs with Ranges  Temp:  [97.5  F (36.4  C)-98.1  F (36.7  C)] 97.5  F (36.4  C)  Pulse:  [] 60  Resp:  [16-18] 18  BP: ()/(56-76) 111/76  SpO2:  [94 %-97 %] 97 %  202 lbs 4.8 oz    Constitutional: awake, alert, cooperative, no apparent distress, and appears stated age  Eyes: Lids and lashes normal, pupils equal, round and reactive to light, extra ocular muscles intact, sclera clear, conjunctiva normal  ENT: Normocephalic, without obvious abnormality, atraumatic, sinuses nontender on palpation, external ears without lesions, oral pharynx with moist mucous membranes, tonsils without erythema or exudates, gums normal and good dentition.  Hematologic / Lymphatic: no cervical lymphadenopathy  Respiratory: No increased work of breathing, good air exchange, clear to auscultation bilaterally, no crackles or wheezing  Cardiovascular: irregularly irregular rhythm  GI: No scars, normal bowel sounds, soft, non-distended, non-tender, no masses palpated, no hepatosplenomegally  Skin: no bruising or bleeding  Musculoskeletal: There is no redness, warmth, or swelling of the joints.  Full range of motion noted  Neurologic: Awake, alert,   Neuropsychiatric: General: normal, calm, and normal eye contact    Data   I personally reviewed all recent ECGs and images.  Results for orders placed or performed during the hospital encounter of 10/08/24 (from the past 24 hour(s))   US Lower Extremity Venous Duplex Right    Narrative    VENOUS ULTRASOUND RIGHT LEG  10/9/2024 12:25 PM     HISTORY: Right leg swelling more than left.    COMPARISON: None.    FINDINGS:  Examination of the deep veins with graded compression and  color flow Doppler with spectral wave form analysis was performed.      Impression    IMPRESSION: No evidence of deep venous thrombosis.    ANALI BARKSDALE MD         SYSTEM ID:  [id number]   Potassium   Result Value Ref Range     Potassium 3.3 (L) 3.4 - 5.3 mmol/L   Potassium   Result Value Ref Range    Potassium 3.8 3.4 - 5.3 mmol/L   CBC with Platelets & Differential    Narrative    The following orders were created for panel order CBC with Platelets & Differential.  Procedure                               Abnormality         Status                     ---------                               -----------         ------                     CBC with platelets and d...[[id number]]  Abnormal            Final result                 Please view results for these tests on the individual orders.   Magnesium   Result Value Ref Range    Magnesium 2.0 1.7 - 2.3 mg/dL   Renal panel   Result Value Ref Range    Sodium 139 135 - 145 mmol/L    Potassium 3.7 3.4 - 5.3 mmol/L    Chloride 99 98 - 107 mmol/L    Carbon Dioxide (CO2) 26 22 - 29 mmol/L    Anion Gap 14 7 - 15 mmol/L    Glucose 88 70 - 99 mg/dL    Urea Nitrogen 40.7 (H) 8.0 - 23.0 mg/dL    Creatinine 2.46 (H) 0.67 - 1.17 mg/dL    GFR Estimate 26 (L) >60 mL/min/1.73m2    Calcium 9.3 8.8 - 10.4 mg/dL    Albumin 3.8 3.5 - 5.2 g/dL    Phosphorus 3.4 2.5 - 4.5 mg/dL   CBC with platelets and differential   Result Value Ref Range    WBC Count 6.7 4.0 - 11.0 10e3/uL    RBC Count 4.39 (L) 4.40 - 5.90 10e6/uL    Hemoglobin 11.4 (L) 13.3 - 17.7 g/dL    Hematocrit 37.5 (L) 40.0 - 53.0 %    MCV 85 78 - 100 fL    MCH 26.0 (L) 26.5 - 33.0 pg    MCHC 30.4 (L) 31.5 - 36.5 g/dL    RDW 16.3 (H) 10.0 - 15.0 %    Platelet Count 285 150 - 450 10e3/uL    % Neutrophils 72 %    % Lymphocytes 15 %    % Monocytes 11 %    % Eosinophils 2 %    % Basophils 1 %    % Immature Granulocytes 0 %    NRBCs per 100 WBC 0 <1 /100    Absolute Neutrophils 4.8 1.6 - 8.3 10e3/uL    Absolute Lymphocytes 1.0 0.8 - 5.3 10e3/uL    Absolute Monocytes 0.7 0.0 - 1.3 10e3/uL    Absolute Eosinophils 0.1 0.0 - 0.7 10e3/uL    Absolute Basophils 0.1 0.0 - 0.2 10e3/uL    Absolute Immature Granulocytes 0.0 <=0.4 10e3/uL    Absolute NRBCs 0.0 10e3/uL  "      Clinically Significant Risk Factors        # Hypokalemia: Lowest K = 3.3 mmol/L in last 2 days, will replace as needed            # Hypertension: Noted on problem list  # Acute heart failure with preserved ejection fraction: heart failure noted on problem list, last echo with EF >50%, and receiving IV diuretics          # Obesity: Estimated body mass index is 32.65 kg/m  as calculated from the following:    Height as of this encounter: 1.676 m (5' 6\").    Weight as of this encounter: 91.8 kg (202 lb 4.8 oz)., PRESENT ON ADMISSION                 "

## 2024-10-10 NOTE — PLAN OF CARE
Heart Failure Care Map  GOALS TO BE MET BEFORE DISCHARGE:    1. Decrease congestion and/or edema with diuretic therapy to achieve near optimal volume status.     Dyspnea improved: Yes, satisfactory for discharge.   Edema improved: Yes, satisfactory for discharge.        Last 24 hour I/O:   Intake/Output Summary (Last 24 hours) at 10/10/2024 0626  Last data filed at 10/10/2024 0400  Gross per 24 hour   Intake 1305 ml   Output 1950 ml   Net -645 ml           Net I/O and Weights since admission:   09/10 0700 - 10/10 0659  In: 3010 [P.O.:3010]  Out: 3700 [Urine:3700]  Net: -690     Vitals:    10/08/24 1300 10/08/24 1527 10/09/24 0615   Weight: 90.7 kg (200 lb) 94.6 kg (208 lb 8 oz) 93.2 kg (205 lb 6.4 oz)       2.  O2 sats > 90% on room air, or at prior home O2 therapy level.      Able to wean O2 this shift to keep sats above 90%?: Yes, satisfactory for discharge.   Does patient use Home O2? No          Current oxygenation status:   SpO2: 96 %     O2 Device: None (Room air),      3.  Tolerates ambulation and mobility near baseline.     Ambulation: Yes, satisfactory for discharge.   Times patient ambulated with staff this shift: 2    Please review the Heart Failure Care Map for additional HF goal outcomes.    Cara Bobby RN  10/10/2024      Orientation: A&Ox4  Vitals/Pain: VSS on RA. Pt denies pain.  Tele: A-fib SVR; Pt has been having asymptomatic bradycardia in the 40s-50s with frequent 2-3 second pauses.   Lines/Drains: RPIV, SL  LS: clear and diminished  GI/: BS +, x0 BM this shift. Pt having adequate urine output throughout shift.   Labs: CBC, Mag, and Renal panel check.   Ambulation/Assist: Independent in room. Steady on feet.  Skin/Wounds: Scattered bruising; scabbing on top of left foot.  Plan: RLE US completed, negative for DVT. Continue diuresis.

## 2024-10-10 NOTE — PROGRESS NOTES
[facility]    Cardiology Progress Note    Primary Cardiologist: Dr. Hidalgo     Date of Admission: 10/8/2024  Service Date: 10/10/24    Summary:  Mr. Betito Anderson is a very pleasant 78 year old male with a past medical history of chronic atrial fibrillation, severe cardiomyopathy in the past with recovered eEF, hypertension, previous coronary artery disease with PCI to the LAD and diagonal  who was admitted on 10/8/2024 for acute shortness of breath. Cardiology was consulted for heart failure.    Interval History   Patient had several pauses last night during hours of sleep, longest 3.9 seconds, most in the 2-3.5 seconds range.  EP consultation placed.     Weight down 6 lbs since admission. Down to 202# today. Output of 3.7 liters. Notes his breathing and edema is significantly improved since admission. Denies chest pain, dizziness, or lightheadedness.     Telemetry: Atrial fibrillation, rates 60-70's     Assessment & Plan   1. Acute on chronic HFpEF  -PTA jardiance 10mg daily and torsemide 20mg 3 days per week, alternating with 40mg daily the other 4 days   -Chest x-ray suggestive of pulmonary vascular congestion   -NT proBNP on admission 6636   -Dry weight unclear, however was 204 in May 2024 at outpatient visit  -Echo yesterday with LVEF 60-65% and mild mitral regurgitation and stenosis     2. Acute on chronic renal insufficiency, follows with nephrology outpatient  - Creatinine on admission 2.12, previous baseline 1.2-1.6  -Today up to 2.46 following IV diuresis, suspect possible over-diuresis    3. Persistent atrial fibrillation with intermittent slow ventricular rate and pauses   - EP evaluated and recommend patient continue to observe his heart rates at home and if consistently less than 50 with activity with fatigue and shortness of breath then will plan for pacemaker placed  -PTA anticoagulated with xarelto, dose adjusted while inpatient to 15mg daily due to creatinine  clearance    4. Coronary artery disease s/p OLESYA to LAD in 2019  -2019 Cor angio: 30% mid to distal left main stenosis, serial lesions in the LAD of proximal 45%, proximal to mid 90%, as well as a second diagonal branch with an ostial 75% stenosis, circumflex with 30 to narrowing followed by 70% terminal marginal branch with a very large epicardial collateral vessel filling from the distal RCA, RCA with an eccentric 90% proximal narrowing and a second 95% subtotal narrowing in the proximal segment, slow antegrade flow in the distal right coronary with competitive filling from the collateral vessels left system, RPDA filled by collaterals from the distal LAD, PCI of the mid to distal LAD lesion was done with stent straddling the second diagonal and 10% residual stenosis post intervention  -Maintained on imdur 120mg daily and aspirin  -4/2024 LDL 83, PTA was on rosuvastatin 40mg daily, however dose adjusted inpatient due to renal function     5. Hx of syncope with NSVT and pauses  -NSVT noted on event monitor and recurrent episodes of syncope, underwent an EP study which was negative for inducible VT and subsequently had ILR placed   - Has implanted loop recorder, however reached HEMANT in 2022     Plan:   1. STOP IV furosemide   2. START oral torsemide 20mg daily, repeat BMP tomorrow   3. Recommend daily compression stockings, elevation of legs and 2gm NA diet   4. Continue amlodipine 5mg daily for now, however suspect this may contribute to LE edema and ultimately patient may benefit from alternative atni-hypertensive, possibly spironolactone, will have to consider renal function  5. Continue aspirin, imdur, zetia, and rosuvastatin for   6. Continue xarelto 15mg daily   7. Avoid AV kristi blocking agents  8. Hold jardiance with KATELIN  9. Cardiology will continue to follow, anticipate discharge in the next 2 days once renal function has improved     Thank you for the opportunity to participate in this pleasant patient's  care.     ALFRED Perera, CNP   Nurse Practitioner  [facility]  (8am - 5pm, M-F)    Patient Active Problem List   Diagnosis    ASCVD (arteriosclerotic cardiovascular disease)    Benign essential hypertension    Hyperlipidemia LDL goal <100    Elevated blood sugar    Psoriasis    Non morbid obesity, unspecified obesity type    Gout, unspecified cause, unspecified chronicity, unspecified site    Hyponatremia    Low mean corpuscular volume (MCV)    Atrial fibrillation (H)    Syncope    V-tach (H)    Chronic kidney disease, stage 3 (H)    Chronic heart failure with preserved ejection fraction (H)    Renal insufficiency    Exertional dyspnea    Chronic atrial fibrillation (H)    Elevated troponin    Anticoagulated    Acute on chronic congestive heart failure, unspecified heart failure type (H)       Physical Exam   Temp: 97.5  F (36.4  C) Temp src: Oral BP: 111/76 Pulse: 60   Resp: 18 SpO2: 97 % O2 Device: None (Room air)    Vitals:    10/08/24 1527 10/09/24 0615 10/10/24 0629   Weight: 94.6 kg (208 lb 8 oz) 93.2 kg (205 lb 6.4 oz) 91.8 kg (202 lb 4.8 oz)     Vital Signs with Ranges  Temp:  [97.5  F (36.4  C)-98.3  F (36.8  C)] 97.5  F (36.4  C)  Pulse:  [] 60  Resp:  [16-20] 18  BP: ()/(56-76) 111/76  SpO2:  [94 %-97 %] 97 %  I/O last 3 completed shifts:  In: 1305 [P.O.:1305]  Out: 1950 [Urine:1950]    Constitutional:  Appears his stated age, well nourished, and in no acute distress.  Eyes: Pupils equal, round. Sclerae anicteric.   HEENT: Normocephalic, atraumatic.   Neck: Supple. No JVD appreciated.  Respiratory: Breathing non-labored. Lungs clear to auscultation bilaterally. No crackles, wheezes, rhonchi, or rales.  Cardiovascular: irregularly irregular rate and rhythm, normal S1 and S2. No murmur, rub, or gallop.  GI: Soft, non-distended, non-tender, bowel sounds present in all four quadrants.  Skin: Warm, dry.   Musculoskeletal/Extremities: Moves all extremities well and  symmetrically. LLE edema, 2+ in right 1+ in left.  Neurologic: No gross focal deficits. Alert, awake, and oriented to person, place and time.  Psychiatric: Affect appropriate. Mentation normal.    Medications   Current Facility-Administered Medications   Medication Dose Route Frequency Provider Last Rate Last Admin    Continuing ACE inhibitor/ARB/ARNI from home medication list OR ACE inhibitor/ARB/ARNI order already placed during this visit   Does not apply DOES NOT GO TO Beto Deng NP        Continuing beta blocker from home medication list OR beta blocker order already placed during this visit   Does not apply DOES NOT GO TO Beto Deng NP         Current Facility-Administered Medications   Medication Dose Route Frequency Provider Last Rate Last Admin    allopurinol (ZYLOPRIM) tablet 300 mg  300 mg Oral Daily Beto Bhagat NP   300 mg at 10/09/24 0905    amLODIPine (NORVASC) tablet 5 mg  5 mg Oral Daily Beto Bhagat NP   5 mg at 10/09/24 0905    aspirin EC tablet 81 mg  81 mg Oral Daily Beto Bhgaat NP   81 mg at 10/09/24 0905    empagliflozin (JARDIANCE) tablet 10 mg  10 mg Oral Daily Beto Bhagat NP   10 mg at 10/09/24 0908    ezetimibe (ZETIA) tablet 10 mg  10 mg Oral Daily Beto Bhagat NP   10 mg at 10/09/24 0905    furosemide (LASIX) injection 40 mg  40 mg Intravenous bid 08 & 14 Beto Bhagat NP   40 mg at 10/09/24 1407    hydrALAZINE (APRESOLINE) tablet 100 mg  100 mg Oral TID Beto Bhagat NP   100 mg at 10/09/24 2114    isosorbide mononitrate (IMDUR) 24 hr tablet 120 mg  120 mg Oral Daily Beto Bhagat NP   120 mg at 10/09/24 0904    rivaroxaban ANTICOAGULANT (XARELTO) tablet 15 mg  15 mg Oral Daily with supper Beto Bhagat NP   15 mg at 10/09/24 1743    rosuvastatin (CRESTOR) tablet 10 mg  10 mg Oral At Bedtime Margaret  Danielle Torres,    10 mg at 10/09/24 2114    sodium chloride (PF) 0.9% PF flush 3 mL  3 mL Intracatheter Q8H Beto Bhagat NP   3 mL at 10/10/24 0026       Data   Recent Results (from the past 24 hour(s))   US Lower Extremity Venous Duplex Right    Narrative    VENOUS ULTRASOUND RIGHT LEG  10/9/2024 12:25 PM     HISTORY: Right leg swelling more than left.    COMPARISON: None.    FINDINGS:  Examination of the deep veins with graded compression and  color flow Doppler with spectral wave form analysis was performed.      Impression    IMPRESSION: No evidence of deep venous thrombosis.    ANALI BARKSDALE MD         SYSTEM ID:  [id number]       Recent Labs   Lab 10/10/24  0622 10/09/24  0600 10/08/24  1203   WBC 6.7 7.4 8.0   HGB 11.4* 10.8* 10.8*   HCT 37.5* 34.6* 34.4*   MCV 85 85 86    290 286     Recent Labs   Lab 10/10/24  0622 10/10/24  0009 10/09/24  1504 10/09/24  0559 10/08/24  1529 10/08/24  1203     --   --  139  --  143   POTASSIUM 3.7 3.8 3.3* 3.4  --  3.8   CHLORIDE 99  --   --  100  --  105   CO2 26  --   --  25  --  24   ANIONGAP 14  --   --  14  --  14   GLC 88  --   --  97 107* 107*   BUN 40.7*  --   --  36.2*  --  34.0*   CR 2.46*  --   --  2.27*  --  2.12*   GFRESTIMATED 26*  --   --  29*  --  31*   GUNNER 9.3  --   --  9.4  --  9.0        This note was completed in part using Dragon voice recognition software. Although reviewed after completion, some word and grammatical errors may occur.

## 2024-10-10 NOTE — PROGRESS NOTES
[facility]    Hospitalist Progress Note    Brief Summary:  This is a 78-year-old male with history of heart failure with reduced ejection fraction recovered EF, chronic persistent A-fib with history of pauses up to 4 seconds, coronary artery disease, CKD 3, gout, hypertension, hyperlipidemia, psoriasis, history of syncope with history of nonsustained V. tach, now come to the ER with complaint of worsening shortness of breath lower extremity edema found to be in acute exacerbation of CHF and subsequently get admitted.    Assessment & Plan       Acute on chronic heart failure with preserved ejection fraction: Improved  Heart failure with recovered EF  NSTEMI type II secondary to CHF    This is a 78-year-old male with multiple comorbidities as above, presented with worsening shortness of breath, lower extremity edema.  Has bilateral crackles, lower extremity edema on exam, chest x-ray shows increased diffuse interstitial opacity in the lungs likely due to pulmonary edema, trace bilateral pleural effusion.  BNP elevated to 6636.  Echocardiogram done today, shows EF of 60 to 65% mildly decreased right ventricular systolic function.  Mild mitral regurgitation and mild mitral stenosis  Patient responded to the IV Lasix 40 mg twice daily will continue with that  He is on torsemide at home and is compliant with his medication, but not compliant with salt intake.  Mildly elevated troponin most likely because of CHF, troponin remain flat 36 and 36    He is on IV Lasix 40 mg twice daily, his weight down 8 pounds since admission,  Strict intake and output charting  Daily weight, current weight 202 on admission weight 208 pounds  Low-sodium diet/2 g sodium diet  He is overall improved with IV diuresis, creatinine is trending up.  Agree with stopping IV Lasix, started on oral torsemide, will recommend 40 mg but is started on 20 mg giving worsening kidney functions okay with that for now.  But eventually  patient will need 40 mg daily at home.  Discontinue Coreg giving significant pauses  Continue with Jardiance 10 mg daily, hydralazine 100 mg 3 times a day, Imdur 120 mg daily  Jardiance put on hold on 10/10/2024 for worsening renal function.  -Hold PTA Torsemide 20 mg M,W,F and 40 mg on all other days while receiving IV diuresis.  -Holding losartan as creatinine slightly above baseline, to give room for diuresis.    Overall improved at this time, CHF improved.    Chronic atrial fibrillation since 2018  Significant pauses up to 3.7-3.9 seconds    Patient has chronic atrial fibrillation, he is on Coreg 6.25 mg twice daily  He is anticoagulated with Xarelto we will continue that.  Patient history of loop recorder in the past, and has pauses 3 to 4 seconds in the past.  Cardiology is following closely, treating conservatively as asymptomatic.  Consult cardiology to reevaluate the patient, I agree with discontinuing the Coreg at this time  Overnight he has a 3.9-second pause, and during the day he is getting 3.3 to 3.5-second pauses  EP is consulted, does not recommending pacemaker at this time but most likely will need in the future.       KATELIN  Chronic kidney disease-stage 3  Patient baseline creatinine is between 1.4-1.79  Creatinine on admission 2.12, slightly increased to 2.27 with diuresis.  At this time I will continue with IV diuresis 40 mg twice daily  Hold losartan 100 mg daily at this time.     Coronary artery disease   Status post OLESYA to the LAD and D2 in 2019  In July 2019, he was admitted to the hospital with acute hypertensive emergency and found to have acute LV dysfunction and EF reduction to 30%. This normalized with control of blood pressure. However, he had an angiogram at the time which showed a lesion in mid LAD with other extensive small vessel disease. 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 Therefore, he underwent a PCI of his LAD bifurcation into the  "diagonal.   *Troponin: 36, serial troponin remain flat.  -Resume PTA Aspirin 81 mg daily  -Trend troponin q2h until flat       Hypertension  *SBP in -158  -Resume PTA amlodipine 5 mg daily  -Discontinued carvedilol 6.25 mg BID giving significant positive  -Resume PTA hydralazine 100 mg TID  -Hold PTA losartan due to KATELIN     Hyperlipidemia  *Total cholesterol 1/29/24: 154  *Non-HDL cholesterol 1/29/24: 119  -Resume PTA rosuvastatin 40 mg daily in the evening  -Resume PTA ezetimibe 10 mg daily     Acute normocytic anemia   *Hgb 10/8/2024: 10.8  *Hgb normal at baseline  *No signs or symptoms of bleeding. This is likely dilutional in the setting of fluid overload from CHF exacerbation.  -Recheck CBC 10/9/24 in AM    Right leg swelling more than left  Patient has bilateral leg swelling, but right leg to swell more than left.  Ultrasound negative for DVT.     Diet:  Low salt      Clinically Significant Risk Factors        # Hypokalemia: Lowest K = 3.3 mmol/L in last 2 days, will replace as needed            # Hypertension: Noted on problem list  # Acute heart failure with preserved ejection fraction: heart failure noted on problem list, last echo with EF >50%, and receiving IV diuretics          # Obesity: Estimated body mass index is 32.65 kg/m  as calculated from the following:    Height as of this encounter: 1.676 m (5' 6\").    Weight as of this encounter: 91.8 kg (202 lb 4.8 oz)., PRESENT ON ADMISSION              DVT Prophylaxis: Xarelto  Code Status: Full Code    Disposition: Expected discharge in 2 days once stable.    Medically Ready for Discharge: Anticipated in 2-4 Days, to see trend of the kidney function.        Hipolito Singh MD, MD  Text Page  (7am - 6pm)    Interval History   Patient feeling much better this morning, his breathing is back to his baseline almost, able to walk in the hallway only have to stop 1 time, his lower extremity swelling has been improving.  Able to sleep on the bed last night.  " Denies any chest pain fever chills nausea vomiting dizziness or lightheadedness.    No other significant event overnight    -Data reviewed today: I reviewed all new labs and imaging results over the last 24 hours. I personally reviewed no images or EKG's today.    Physical Exam   Temp: 97.5  F (36.4  C) Temp src: Oral BP: (!) 143/74 Pulse: 97   Resp: 18 SpO2: 94 % O2 Device: None (Room air)    Vitals:    10/08/24 1527 10/09/24 0615 10/10/24 0629   Weight: 94.6 kg (208 lb 8 oz) 93.2 kg (205 lb 6.4 oz) 91.8 kg (202 lb 4.8 oz)     Vital Signs with Ranges  Temp:  [97.5  F (36.4  C)-98.1  F (36.7  C)] 97.5  F (36.4  C)  Pulse:  [] 97  Resp:  [16-18] 18  BP: ()/(56-76) 143/74  SpO2:  [94 %-97 %] 94 %  I/O last 3 completed shifts:  In: 1305 [P.O.:1305]  Out: 1950 [Urine:1950]    Constitutional: awake, alert, cooperative, no apparent distress, and appears stated age  Eyes: Lids and lashes normal, pupils equal, round and reactive to light, extra ocular muscles intact, sclera clear, conjunctiva normal  Respiratory: Improved air entry bilaterally, no crackles today.  Cardiovascular: Normal apical impulse, regular rate and rhythm, normal S1 and S2, no S3 or S4, and no murmur noted  GI: No scars, normal bowel sounds, soft, non-distended, non-tender, no masses palpated, no hepatosplenomegally  Musculoskeletal: 1-2+ swelling in the right lower extremity, left lower extremity trace edema.  Neurologic: No focal deficit    Medications   Current Facility-Administered Medications   Medication Dose Route Frequency Provider Last Rate Last Admin    Continuing ACE inhibitor/ARB/ARNI from home medication list OR ACE inhibitor/ARB/ARNI order already placed during this visit   Does not apply DOES NOT GO TO Beto Deng NP        Continuing beta blocker from home medication list OR beta blocker order already placed during this visit   Does not apply DOES NOT GO TO Beto Deng NP          Current Facility-Administered Medications   Medication Dose Route Frequency Provider Last Rate Last Admin    allopurinol (ZYLOPRIM) tablet 300 mg  300 mg Oral Daily Beto Bhagat NP   300 mg at 10/10/24 0942    amLODIPine (NORVASC) tablet 5 mg  5 mg Oral Daily Beto Bhagat NP   5 mg at 10/10/24 0942    aspirin EC tablet 81 mg  81 mg Oral Daily Beto Bhagat NP   81 mg at 10/10/24 0941    [Held by provider] empagliflozin (JARDIANCE) tablet 10 mg  10 mg Oral Daily Beto Bhagat NP   10 mg at 10/10/24 0942    ezetimibe (ZETIA) tablet 10 mg  10 mg Oral Daily Beto Bhagat NP   10 mg at 10/10/24 0942    hydrALAZINE (APRESOLINE) tablet 100 mg  100 mg Oral TID Beto Bhagat NP   100 mg at 10/10/24 0946    isosorbide mononitrate (IMDUR) 24 hr tablet 120 mg  120 mg Oral Daily Beto Bhagat NP   120 mg at 10/10/24 0946    magnesium oxide (MAG-OX) tablet 400 mg  400 mg Oral Q4H Hipolito Singh MD        rivaroxaban ANTICOAGULANT (XARELTO) tablet 15 mg  15 mg Oral Daily with supper Beto Bhagat NP   15 mg at 10/09/24 1743    rosuvastatin (CRESTOR) tablet 10 mg  10 mg Oral At Bedtime Danielle Robles DO   10 mg at 10/09/24 2114    sodium chloride (PF) 0.9% PF flush 3 mL  3 mL Intracatheter Q8H Beto Bhagat NP   3 mL at 10/10/24 0949    torsemide (DEMADEX) tablet 20 mg  20 mg Oral Daily Melissa Mares NP   20 mg at 10/10/24 0942       Data   Recent Labs   Lab 10/10/24  0622 10/10/24  0009 10/09/24  1504 10/09/24  0600 10/09/24  0559 10/08/24  1529 10/08/24  1203   WBC 6.7  --   --  7.4  --   --  8.0   HGB 11.4*  --   --  10.8*  --   --  10.8*   MCV 85  --   --  85  --   --  86     --   --  290  --   --  286     --   --   --  139  --  143   POTASSIUM 3.7 3.8 3.3*  --  3.4  --  3.8   CHLORIDE 99  --   --   --  100  --  105   CO2 26  --   --   --  25  --  24   BUN  40.7*  --   --   --  36.2*  --  34.0*   CR 2.46*  --   --   --  2.27*  --  2.12*   ANIONGAP 14  --   --   --  14  --  14   GUNNER 9.3  --   --   --  9.4  --  9.0   GLC 88  --   --   --  97 107* 107*   ALBUMIN 3.8  --   --   --   --   --  4.0   PROTTOTAL  --   --   --   --   --   --  6.8   BILITOTAL  --   --   --   --   --   --  0.8   ALKPHOS  --   --   --   --   --   --  95   ALT  --   --   --   --   --   --  21   AST  --   --   --   --   --   --  21       No results found for this or any previous visit (from the past 24 hour(s)).

## 2024-10-11 LAB
ANION GAP SERPL CALCULATED.3IONS-SCNC: 13 MMOL/L (ref 7–15)
BUN SERPL-MCNC: 38.7 MG/DL (ref 8–23)
CALCIUM SERPL-MCNC: 8.8 MG/DL (ref 8.8–10.4)
CHLORIDE SERPL-SCNC: 101 MMOL/L (ref 98–107)
CREAT SERPL-MCNC: 2.42 MG/DL (ref 0.67–1.17)
EGFRCR SERPLBLD CKD-EPI 2021: 27 ML/MIN/1.73M2
GLUCOSE SERPL-MCNC: 94 MG/DL (ref 70–99)
HCO3 SERPL-SCNC: 27 MMOL/L (ref 22–29)
MAGNESIUM SERPL-MCNC: 2.1 MG/DL (ref 1.7–2.3)
POTASSIUM SERPL-SCNC: 3.6 MMOL/L (ref 3.4–5.3)
SODIUM SERPL-SCNC: 141 MMOL/L (ref 135–145)

## 2024-10-11 PROCEDURE — 99233 SBSQ HOSP IP/OBS HIGH 50: CPT | Performed by: INTERNAL MEDICINE

## 2024-10-11 PROCEDURE — 36415 COLL VENOUS BLD VENIPUNCTURE: CPT | Performed by: INTERNAL MEDICINE

## 2024-10-11 PROCEDURE — 250N000013 HC RX MED GY IP 250 OP 250 PS 637: Performed by: NURSE PRACTITIONER

## 2024-10-11 PROCEDURE — 83735 ASSAY OF MAGNESIUM: CPT | Performed by: INTERNAL MEDICINE

## 2024-10-11 PROCEDURE — 250N000013 HC RX MED GY IP 250 OP 250 PS 637: Performed by: INTERNAL MEDICINE

## 2024-10-11 PROCEDURE — 80048 BASIC METABOLIC PNL TOTAL CA: CPT | Performed by: INTERNAL MEDICINE

## 2024-10-11 PROCEDURE — 99232 SBSQ HOSP IP/OBS MODERATE 35: CPT | Mod: FS | Performed by: NURSE PRACTITIONER

## 2024-10-11 PROCEDURE — 210N000002 HC R&B HEART CARE

## 2024-10-11 PROCEDURE — 250N000013 HC RX MED GY IP 250 OP 250 PS 637

## 2024-10-11 RX ADMIN — ISOSORBIDE MONONITRATE 120 MG: 60 TABLET, EXTENDED RELEASE ORAL at 10:22

## 2024-10-11 RX ADMIN — ACETAMINOPHEN 650 MG: 325 TABLET ORAL at 16:04

## 2024-10-11 RX ADMIN — EZETIMIBE 10 MG: 10 TABLET ORAL at 10:22

## 2024-10-11 RX ADMIN — HYDRALAZINE HYDROCHLORIDE 100 MG: 50 TABLET ORAL at 10:22

## 2024-10-11 RX ADMIN — RIVAROXABAN 15 MG: 15 TABLET, FILM COATED ORAL at 16:04

## 2024-10-11 RX ADMIN — ASPIRIN 81 MG: 81 TABLET, COATED ORAL at 10:22

## 2024-10-11 RX ADMIN — HYDRALAZINE HYDROCHLORIDE 100 MG: 50 TABLET ORAL at 21:36

## 2024-10-11 RX ADMIN — ALLOPURINOL 300 MG: 300 TABLET ORAL at 10:21

## 2024-10-11 RX ADMIN — TORSEMIDE 20 MG: 20 TABLET ORAL at 10:22

## 2024-10-11 RX ADMIN — ACETAMINOPHEN 650 MG: 325 TABLET ORAL at 20:32

## 2024-10-11 RX ADMIN — AMLODIPINE BESYLATE 5 MG: 5 TABLET ORAL at 10:21

## 2024-10-11 RX ADMIN — HYDRALAZINE HYDROCHLORIDE 100 MG: 50 TABLET ORAL at 16:04

## 2024-10-11 RX ADMIN — ROSUVASTATIN CALCIUM 10 MG: 10 TABLET, FILM COATED ORAL at 21:37

## 2024-10-11 ASSESSMENT — ACTIVITIES OF DAILY LIVING (ADL)
ADLS_ACUITY_SCORE: 20

## 2024-10-11 NOTE — PLAN OF CARE
Goal Outcome Evaluation:     VSS. Monitor shows Atrial fib with CVR. Pt. Is alert and oriented. Denies pain. Pt. Up independently. Continue plan of care. Pt. Is hopeful for discharge to home tomorrow.

## 2024-10-11 NOTE — PLAN OF CARE
Goal Outcome Evaluation:         Neuro: A&Ox4  Tele/Cardiac: Afib SVR beth on the mid 40s to 50's up to 3.3 sec pauses. Longest pause was 3.7 sec pt asymptomatic   Activity: IND  Pain: Denies pain   Drips/IV: PIV:S/L  GI/: Continent   Skin: WDL  Diet: Cardiac w/ 2000ml fluid restriction   Test/Procedures: Am labs   Plan: monitoring creat levels, started p.o torsemide  10/10

## 2024-10-11 NOTE — PROGRESS NOTES
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 tach, now come to the ER with complaint of worsening shortness of breath lower extremity edema found to be in acute exacerbation of CHF and subsequently get admitted.    Assessment & Plan       Acute on chronic heart failure with preserved ejection fraction: Improved  Heart failure with recovered EF  NSTEMI type II secondary to CHF    This is a 78-year-old male with multiple comorbidities as above, presented with worsening shortness of breath, lower extremity edema.  Has bilateral crackles, lower extremity edema on exam, chest x-ray shows increased diffuse interstitial opacity in the lungs likely due to pulmonary edema, trace bilateral pleural effusion.  BNP elevated to 6636.  Echocardiogram done today, shows EF of 60 to 65% mildly decreased right ventricular systolic function.  Mild mitral regurgitation and mild mitral stenosis  Patient responded to the IV Lasix 40 mg twice daily will continue with that  He is on torsemide at home and is compliant with his medication, but not compliant with salt intake.  Mildly elevated troponin most likely because of CHF, troponin remain flat 36 and 36    He is on IV Lasix 40 mg twice daily, his weight down 8 pounds since admission,  Strict intake and output charting  Daily weight, current weight 202 on admission weight 208 pounds  Low-sodium diet/2 g sodium diet  He is overall improved with IV diuresis, creatinine is trending up.  Agree with stopping IV Lasix, started on oral torsemide, will recommend 40 mg but is started on 20 mg giving worsening kidney functions okay with that for now.  But eventually  patient will need 40 mg daily at home.  Discontinue Coreg giving significant pauses  Holding Jardiance 10 mg daily because of renal failure, continue hydralazine 100 mg 3 times a day, Imdur 120 mg daily  Jardiance put on hold on 10/10/2024 for worsening renal function.  -PTA Torsemide 20 mg M,W,F and 40 mg on all other days while receiving IV diuresis.  -Holding losartan as creatinine slightly above baseline, to give room for diuresis.    Overall doing well, continue torsemide, hydralazine 100 mg 3 times a day, Imdur 120 mg daily,    CHF is resolved at this time.    Chronic atrial fibrillation since 2018  Significant pauses up to 3.7-3.9 seconds    Patient has chronic atrial fibrillation, he is on Coreg 6.25 mg twice daily  He is anticoagulated with Xarelto we will continue that.  Patient history of loop recorder in the past, and has pauses 3 to 4 seconds in the past.  Cardiology is following closely, treating conservatively as asymptomatic.  Consult cardiology to reevaluate the patient, I agree with discontinuing the Coreg at this time  Overnight he has a 3.9-second pause, and during the day he is getting 3.3 to 3.5-second pauses  EP is consulted, does not recommending pacemaker at this time but most likely will need in the future.       KATELIN  Chronic kidney disease-stage 3  Patient baseline creatinine is between 1.4-1.79  Creatinine on admission 2.12, slightly increased to 2.46 with diuresis.  IV Lasix discontinued, on oral torsemide, Jardiance on hold  Creatinine is trending down improved to 2.42 today  Continue hold losartan and Jardiance at this time.     Coronary artery disease   Status post OLESYA to the LAD and D2 in 2019  In July 2019, he was admitted to the hospital with acute hypertensive emergency and found to have acute LV dysfunction and EF reduction to 30%. This normalized with control of blood pressure. However, he had an angiogram at the time which showed a lesion in mid LAD with other extensive small  "vessel disease. 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 Therefore, he underwent a PCI of his LAD bifurcation into the diagonal.   *Troponin: 36, serial troponin remain flat.  -Resume PTA Aspirin 81 mg daily  -Trend troponin q2h until flat       Hypertension: Well-controlled  *SBP in -158  -Resume PTA amlodipine 5 mg daily  -Discontinued carvedilol 6.25 mg BID giving significant positive  -Resume PTA hydralazine 100 mg TID  -Hold PTA losartan due to KATELIN     Hyperlipidemia  *Total cholesterol 1/29/24: 154  *Non-HDL cholesterol 1/29/24: 119  -Resume PTA rosuvastatin 40 mg daily in the evening  -Resume PTA ezetimibe 10 mg daily     Acute normocytic anemia   *Hgb 10/8/2024: 10.8  *Hgb normal at baseline  *No signs or symptoms of bleeding. This is likely dilutional in the setting of fluid overload from CHF exacerbation.  -Recheck CBC 10/9/24 in AM    Right leg swelling more than left  Patient has bilateral leg swelling, but right leg to swell more than left.  Ultrasound negative for DVT.     Diet:  Low salt      Clinically Significant Risk Factors        # Hypokalemia: Lowest K = 3.3 mmol/L in last 2 days, will replace as needed            # Hypertension: Noted on problem list  # Acute heart failure with preserved ejection fraction: heart failure noted on problem list, last echo with EF >50%, and receiving IV diuretics          # Obesity: Estimated body mass index is 32.64 kg/m  as calculated from the following:    Height as of this encounter: 1.676 m (5' 6\").    Weight as of this encounter: 91.7 kg (202 lb 3.2 oz)., PRESENT ON ADMISSION              DVT Prophylaxis: Xarelto  Code Status: Full Code    Disposition: Expected discharge in 2 days once stable.    Medically Ready for Discharge: Anticipated Tomorrow, if kidney function remains stable or improved most likely discharge tomorrow with outpatient follow-up with nephrology.        Hipolito Singh MD, MD  Text Page  (7am - " 6pm)    Interval History   Patient seen and evaluated this morning was doing well, denies any chest pain shortness orthopnea PND palpitation headache dizziness lightheadedness.  He is walking in the hallway 3-4 times a day.  Lower extremity swelling is significantly improved especially after wearing the compression stockings.  Overall feeling well.    Continue to have pauses more than 3 seconds.    No other significant event overnight    -Data reviewed today: I reviewed all new labs and imaging results over the last 24 hours. I personally reviewed no images or EKG's today.    Physical Exam   Temp: 98.1  F (36.7  C) Temp src: Oral BP: 116/57 Pulse: 59   Resp: 17 SpO2: 96 % O2 Device: None (Room air)    Vitals:    10/09/24 0615 10/10/24 0629 10/11/24 0500   Weight: 93.2 kg (205 lb 6.4 oz) 91.8 kg (202 lb 4.8 oz) 91.7 kg (202 lb 3.2 oz)     Vital Signs with Ranges  Temp:  [97.8  F (36.6  C)-98.2  F (36.8  C)] 98.1  F (36.7  C)  Pulse:  [58-62] 59  Resp:  [15-18] 17  BP: (116-132)/(57-69) 116/57  SpO2:  [95 %-97 %] 96 %  I/O last 3 completed shifts:  In: 1970 [P.O.:1970]  Out: 1250 [Urine:1250]    Constitutional: awake, alert, cooperative, no apparent distress, and appears stated age  Eyes: Lids and lashes normal, pupils equal, round and reactive to light, extra ocular muscles intact, sclera clear, conjunctiva normal  Respiratory: Improved air entry bilaterally, no crackles today.  Cardiovascular: Normal apical impulse, regular rate and rhythm, normal S1 and S2, no S3 or S4, and no murmur noted  GI: No scars, normal bowel sounds, soft, non-distended, non-tender, no masses palpated, no hepatosplenomegally  Musculoskeletal: Trace to 1+ swelling in the right lower extremity, left lower extremity trace edema.  Neurologic: No focal deficit    Medications   Current Facility-Administered Medications   Medication Dose Route Frequency Provider Last Rate Last Admin    Continuing ACE inhibitor/ARB/ARNI from home medication list OR  ACE inhibitor/ARB/ARNI order already placed during this visit   Does not apply DOES NOT GO TO Beto Deng NP        Continuing beta blocker from home medication list OR beta blocker order already placed during this visit   Does not apply DOES NOT GO TO Beto Deng NP         Current Facility-Administered Medications   Medication Dose Route Frequency Provider Last Rate Last Admin    allopurinol (ZYLOPRIM) tablet 300 mg  300 mg Oral Daily Beto Bhagat NP   300 mg at 10/11/24 1021    amLODIPine (NORVASC) tablet 5 mg  5 mg Oral Daily Beto Bhagat NP   5 mg at 10/11/24 1021    aspirin EC tablet 81 mg  81 mg Oral Daily Beto Bhagat NP   81 mg at 10/11/24 1022    [Held by provider] empagliflozin (JARDIANCE) tablet 10 mg  10 mg Oral Daily Beto Bhagat NP   10 mg at 10/10/24 0942    ezetimibe (ZETIA) tablet 10 mg  10 mg Oral Daily Beto Bhagat NP   10 mg at 10/11/24 1022    hydrALAZINE (APRESOLINE) tablet 100 mg  100 mg Oral TID Beto Bhagat NP   100 mg at 10/11/24 1022    isosorbide mononitrate (IMDUR) 24 hr tablet 120 mg  120 mg Oral Daily Beto Bhagat NP   120 mg at 10/11/24 1022    rivaroxaban ANTICOAGULANT (XARELTO) tablet 15 mg  15 mg Oral Daily with supper Beto Bhagat NP   15 mg at 10/10/24 1744    rosuvastatin (CRESTOR) tablet 10 mg  10 mg Oral At Bedtime Danielle Robles DO   10 mg at 10/10/24 2143    sodium chloride (PF) 0.9% PF flush 3 mL  3 mL Intracatheter Q8H Beto Bhagat NP   3 mL at 10/11/24 1026    torsemide (DEMADEX) tablet 20 mg  20 mg Oral Daily Melissa Mares NP   20 mg at 10/11/24 1022       Data   Recent Labs   Lab 10/11/24  0547 10/10/24  0622 10/10/24  0009 10/09/24  1504 10/09/24  0600 10/09/24  0559 10/08/24  1529 10/08/24  1203   WBC  --  6.7  --   --  7.4  --   --  8.0   HGB  --  11.4*  --   --  10.8*   --   --  10.8*   MCV  --  85  --   --  85  --   --  86   PLT  --  285  --   --  290  --   --  286    139  --   --   --  139  --  143   POTASSIUM 3.6 3.7 3.8   < >  --  3.4  --  3.8   CHLORIDE 101 99  --   --   --  100  --  105   CO2 27 26  --   --   --  25  --  24   BUN 38.7* 40.7*  --   --   --  36.2*  --  34.0*   CR 2.42* 2.46*  --   --   --  2.27*  --  2.12*   ANIONGAP 13 14  --   --   --  14  --  14   GUNNER 8.8 9.3  --   --   --  9.4  --  9.0   GLC 94 88  --   --   --  97   < > 107*   ALBUMIN  --  3.8  --   --   --   --   --  4.0   PROTTOTAL  --   --   --   --   --   --   --  6.8   BILITOTAL  --   --   --   --   --   --   --  0.8   ALKPHOS  --   --   --   --   --   --   --  95   ALT  --   --   --   --   --   --   --  21   AST  --   --   --   --   --   --   --  21    < > = values in this interval not displayed.       No results found for this or any previous visit (from the past 24 hour(s)).

## 2024-10-11 NOTE — PROGRESS NOTES
[facility]    Cardiology Progress Note    Primary Cardiologist: Dr. Hidalgo     Date of Admission: 10/8/2024  Service Date: 10/11/24    Summary:  Mr. Betito Anderson is a very pleasant 78 year old male with a past medical history of chronic atrial fibrillation, severe cardiomyopathy in the past with recovered eEF, hypertension, previous coronary artery disease with PCI to the LAD and diagonal  who was admitted on 10/8/2024 for acute shortness of breath. Cardiology was consulted for heart failure.    Interval History   Patient continued to have several pauses last night during hours of sleep, longest 3.98 seconds, most in the 2-3.5 seconds range. Heart rates during the day occasionally dipping into the 40's, primarily 60's. Asymptomatic. Blood pressure     Weight stable today at 202#. Edema significantly improved today following application of compression stockings and his legs feel better. Denies chest pain, dizziness, or lightheadedness.     Telemetry: Atrial fibrillation, rates 60-70's     Assessment & Plan   1. Acute on chronic HFpEF  -PTA jardiance 10mg daily and torsemide 20mg 3 days per week, alternating with 40mg daily the other 4 days, holding jardiance 2/2 to #2  -Chest x-ray suggestive of pulmonary vascular congestion   -NT proBNP on admission 6636   -Dry weight unclear, however was 204 in May 2024 at outpatient visit  -Echo with LVEF 60-65% and mild mitral regurgitation and stenosis     2. Acute on chronic renal insufficiency, follows with nephrology outpatient  - Creatinine on admission 2.12, previous baseline 1.2-1.6  -Today continues to be elevated at 2.42   -Outpatient renal arterial ultrasound showing elevated velocities at the renal artery origin suggesting renal artery stenosis     3. Persistent atrial fibrillation with intermittent slow ventricular rate and pauses   - EP evaluated and recommend patient continue to observe his heart rates at home and if consistently less  than 50 with activity with fatigue and shortness of breath then will plan for pacemaker placed  -PTA anticoagulated with xarelto, dose adjusted while inpatient to 15mg daily due to creatinine clearance    4. Coronary artery disease s/p OLESYA to LAD in 2019  -2019 Cor angio: 30% mid to distal left main stenosis, serial lesions in the LAD of proximal 45%, proximal to mid 90%, as well as a second diagonal branch with an ostial 75% stenosis, circumflex with 30 to narrowing followed by 70% terminal marginal branch with a very large epicardial collateral vessel filling from the distal RCA, RCA with an eccentric 90% proximal narrowing and a second 95% subtotal narrowing in the proximal segment, slow antegrade flow in the distal right coronary with competitive filling from the collateral vessels left system, RPDA filled by collaterals from the distal LAD, PCI of the mid to distal LAD lesion was done with stent straddling the second diagonal and 10% residual stenosis post intervention  -Maintained on imdur 120mg daily and aspirin  -4/2024 LDL 83, PTA was on rosuvastatin 40mg daily, however dose adjusted inpatient due to renal function     5. Hx of syncope with NSVT and pauses  -NSVT noted on event monitor and recurrent episodes of syncope, underwent an EP study which was negative for inducible VT and subsequently had ILR placed   - Has implanted loop recorder, however reached HEMANT in 2022     Plan:   1. Continue holding jardiance  2. Continue oral torsemide 20mg daily, repeat BMP tomorrow   3. Recommend daily compression stockings, elevation of legs and 2gm NA diet   4. Continue amlodipine 5mg daily for now, however suspect this may contribute to LE edema and ultimately patient may benefit from alternative atni-hypertensive, possibly spironolactone, will have to consider renal function  5. Continue aspirin, imdur, zetia, and rosuvastatin for CAD  6. Continue xarelto 15mg daily   7. Avoid AV kristi blocking agents  8. Recommend  nephrology consultation with ongoing KATELIN  9. Cardiology will continue to follow, anticipate discharge in the next 2 days once renal function has improved     Thank you for the opportunity to participate in this pleasant patient's care.     ALFRED Perera, CNP   Nurse Practitioner  [facility]  (8am - 5pm, M-F)    Patient Active Problem List   Diagnosis    ASCVD (arteriosclerotic cardiovascular disease)    Benign essential hypertension    Hyperlipidemia LDL goal <100    Elevated blood sugar    Psoriasis    Non morbid obesity, unspecified obesity type    Gout, unspecified cause, unspecified chronicity, unspecified site    Hyponatremia    Low mean corpuscular volume (MCV)    Atrial fibrillation (H)    Syncope    V-tach (H)    Chronic kidney disease, stage 3 (H)    Chronic heart failure with preserved ejection fraction (H)    Renal insufficiency    Exertional dyspnea    Chronic atrial fibrillation (H)    Elevated troponin    Anticoagulated    Acute on chronic congestive heart failure, unspecified heart failure type (H)       Physical Exam   Temp: 98.2  F (36.8  C) Temp src: Oral BP: 132/69 Pulse: 62   Resp: 15 SpO2: 95 % O2 Device: None (Room air)    Vitals:    10/09/24 0615 10/10/24 0629 10/11/24 0500   Weight: 93.2 kg (205 lb 6.4 oz) 91.8 kg (202 lb 4.8 oz) 91.7 kg (202 lb 3.2 oz)     Vital Signs with Ranges  Temp:  [97.8  F (36.6  C)-98.2  F (36.8  C)] 98.2  F (36.8  C)  Pulse:  [61-97] 62  Resp:  [15-18] 15  BP: (121-143)/(60-74) 132/69  SpO2:  [94 %-97 %] 95 %  I/O last 3 completed shifts:  In: 1970 [P.O.:1970]  Out: 1250 [Urine:1250]    Constitutional:  Appears his stated age, well nourished, and in no acute distress.  Eyes: Pupils equal, round. Sclerae anicteric.   HEENT: Normocephalic, atraumatic.   Neck: Supple. No JVD appreciated.  Respiratory: Breathing non-labored. Lungs clear to auscultation bilaterally. No crackles, wheezes, rhonchi, or rales.  Cardiovascular: irregularly  irregular rate and rhythm, normal S1 and S2. No murmur, rub, or gallop.  GI: Soft  Skin: Warm, dry.   Musculoskeletal/Extremities: Moves all extremities well and symmetrically. LLE edema, trace bilaterally   Neurologic: No gross focal deficits. Alert, awake, and oriented to person, place and time.  Psychiatric: Affect appropriate. Mentation normal.    Medications   Current Facility-Administered Medications   Medication Dose Route Frequency Provider Last Rate Last Admin    Continuing ACE inhibitor/ARB/ARNI from home medication list OR ACE inhibitor/ARB/ARNI order already placed during this visit   Does not apply DOES NOT GO TO Beto Deng NP        Continuing beta blocker from home medication list OR beta blocker order already placed during this visit   Does not apply DOES NOT GO TO Beto Deng NP         Current Facility-Administered Medications   Medication Dose Route Frequency Provider Last Rate Last Admin    allopurinol (ZYLOPRIM) tablet 300 mg  300 mg Oral Daily Beto Bhagat NP   300 mg at 10/10/24 0942    amLODIPine (NORVASC) tablet 5 mg  5 mg Oral Daily Beto Bhagat NP   5 mg at 10/10/24 0942    aspirin EC tablet 81 mg  81 mg Oral Daily Beto Bhagat NP   81 mg at 10/10/24 0941    [Held by provider] empagliflozin (JARDIANCE) tablet 10 mg  10 mg Oral Daily Beto Bhagat NP   10 mg at 10/10/24 0942    ezetimibe (ZETIA) tablet 10 mg  10 mg Oral Daily Beto Bhagat NP   10 mg at 10/10/24 0942    hydrALAZINE (APRESOLINE) tablet 100 mg  100 mg Oral TID Beto Bhagat NP   100 mg at 10/10/24 2143    isosorbide mononitrate (IMDUR) 24 hr tablet 120 mg  120 mg Oral Daily Beto Bhagat NP   120 mg at 10/10/24 0946    rivaroxaban ANTICOAGULANT (XARELTO) tablet 15 mg  15 mg Oral Daily with supper Beto Bhagat NP   15 mg at 10/10/24 1744    rosuvastatin (CRESTOR)  tablet 10 mg  10 mg Oral At Bedtime Danielle Robles DO   10 mg at 10/10/24 2143    sodium chloride (PF) 0.9% PF flush 3 mL  3 mL Intracatheter Q8H Beto Bhagat NP   3 mL at 10/10/24 1803    [Held by provider] torsemide (DEMADEX) tablet 20 mg  20 mg Oral Daily Melissa Mares NP   20 mg at 10/10/24 0942       Data   No results found for this or any previous visit (from the past 24 hour(s)).      Recent Labs   Lab 10/10/24  0622 10/09/24  0600 10/08/24  1203   WBC 6.7 7.4 8.0   HGB 11.4* 10.8* 10.8*   HCT 37.5* 34.6* 34.4*   MCV 85 85 86    290 286     Recent Labs   Lab 10/11/24  0547 10/10/24  0622 10/10/24  0009 10/09/24  1504 10/09/24  0559    139  --   --  139   POTASSIUM 3.6 3.7 3.8   < > 3.4   CHLORIDE 101 99  --   --  100   CO2 27 26  --   --  25   ANIONGAP 13 14  --   --  14   GLC 94 88  --   --  97   BUN 38.7* 40.7*  --   --  36.2*   CR 2.42* 2.46*  --   --  2.27*   GFRESTIMATED 27* 26*  --   --  29*   GUNNER 8.8 9.3  --   --  9.4    < > = values in this interval not displayed.        This note was completed in part using Dragon voice recognition software. Although reviewed after completion, some word and grammatical errors may occur.

## 2024-10-11 NOTE — PROVIDER NOTIFICATION
MD Notification    Notified Person: MD    Notified Person Name:    Notification Date/Time:10/11/24 0204    Notification Interaction:Vocnatali    Purpose of Notification:FYI pt has been having up to 3.3 sec pauses, pt is currently sleeping. Tele:Afib SVR HR in 40s to mid 50s    Orders Received:Known issue, EP is following, page if pt becomes symptomatic     Comments:

## 2024-10-12 ENCOUNTER — APPOINTMENT (OUTPATIENT)
Dept: OCCUPATIONAL THERAPY | Facility: CLINIC | Age: 79
DRG: 280 | End: 2024-10-12
Payer: COMMERCIAL

## 2024-10-12 VITALS
BODY MASS INDEX: 32.71 KG/M2 | OXYGEN SATURATION: 97 % | RESPIRATION RATE: 18 BRPM | DIASTOLIC BLOOD PRESSURE: 59 MMHG | TEMPERATURE: 98.1 F | HEIGHT: 66 IN | HEART RATE: 92 BPM | WEIGHT: 203.56 LBS | SYSTOLIC BLOOD PRESSURE: 129 MMHG

## 2024-10-12 LAB
ANION GAP SERPL CALCULATED.3IONS-SCNC: 13 MMOL/L (ref 7–15)
BUN SERPL-MCNC: 43.3 MG/DL (ref 8–23)
CALCIUM SERPL-MCNC: 8.5 MG/DL (ref 8.8–10.4)
CHLORIDE SERPL-SCNC: 101 MMOL/L (ref 98–107)
CREAT SERPL-MCNC: 2.5 MG/DL (ref 0.67–1.17)
EGFRCR SERPLBLD CKD-EPI 2021: 26 ML/MIN/1.73M2
GLUCOSE SERPL-MCNC: 97 MG/DL (ref 70–99)
HCO3 SERPL-SCNC: 26 MMOL/L (ref 22–29)
MAGNESIUM SERPL-MCNC: 2.2 MG/DL (ref 1.7–2.3)
POTASSIUM SERPL-SCNC: 3.6 MMOL/L (ref 3.4–5.3)
SODIUM SERPL-SCNC: 140 MMOL/L (ref 135–145)

## 2024-10-12 PROCEDURE — 80048 BASIC METABOLIC PNL TOTAL CA: CPT | Performed by: INTERNAL MEDICINE

## 2024-10-12 PROCEDURE — 99222 1ST HOSP IP/OBS MODERATE 55: CPT | Performed by: INTERNAL MEDICINE

## 2024-10-12 PROCEDURE — 250N000013 HC RX MED GY IP 250 OP 250 PS 637

## 2024-10-12 PROCEDURE — 36415 COLL VENOUS BLD VENIPUNCTURE: CPT | Performed by: INTERNAL MEDICINE

## 2024-10-12 PROCEDURE — 99239 HOSP IP/OBS DSCHRG MGMT >30: CPT | Performed by: INTERNAL MEDICINE

## 2024-10-12 PROCEDURE — 250N000013 HC RX MED GY IP 250 OP 250 PS 637: Performed by: STUDENT IN AN ORGANIZED HEALTH CARE EDUCATION/TRAINING PROGRAM

## 2024-10-12 PROCEDURE — 83735 ASSAY OF MAGNESIUM: CPT | Performed by: INTERNAL MEDICINE

## 2024-10-12 PROCEDURE — 97530 THERAPEUTIC ACTIVITIES: CPT | Mod: GO

## 2024-10-12 RX ORDER — TORSEMIDE 20 MG/1
20 TABLET ORAL DAILY
Qty: 30 TABLET | Refills: 0 | Status: SHIPPED | OUTPATIENT
Start: 2024-10-14

## 2024-10-12 RX ORDER — ROSUVASTATIN CALCIUM 10 MG/1
10 TABLET, COATED ORAL AT BEDTIME
Qty: 30 TABLET | Refills: 0 | Status: SHIPPED | OUTPATIENT
Start: 2024-10-12

## 2024-10-12 RX ADMIN — EZETIMIBE 10 MG: 10 TABLET ORAL at 10:38

## 2024-10-12 RX ADMIN — DICLOFENAC SODIUM 2 G: 10 GEL TOPICAL at 13:56

## 2024-10-12 RX ADMIN — ISOSORBIDE MONONITRATE 120 MG: 60 TABLET, EXTENDED RELEASE ORAL at 10:37

## 2024-10-12 RX ADMIN — ASPIRIN 81 MG: 81 TABLET, COATED ORAL at 10:37

## 2024-10-12 RX ADMIN — AMLODIPINE BESYLATE 5 MG: 5 TABLET ORAL at 10:37

## 2024-10-12 RX ADMIN — HYDRALAZINE HYDROCHLORIDE 100 MG: 50 TABLET ORAL at 10:38

## 2024-10-12 RX ADMIN — ALLOPURINOL 300 MG: 300 TABLET ORAL at 10:37

## 2024-10-12 ASSESSMENT — ACTIVITIES OF DAILY LIVING (ADL)
ADLS_ACUITY_SCORE: 20

## 2024-10-12 NOTE — CONSULTS
RENAL CONSULTATION NOTE        REFERRING MD:  Francisco    REASON FOR CONSULTATION:  CKD      A/P:     1.  CKD   -baseline creatinine 1.8 to 2.0 mg/dl range   -eGFR 30 to 40 ml/min   -stage 3B   -follows with [facility]    -Dr. Ramos    -seen 09.20.24   -chronic progressive disease   2.  Proteinuria   ACR = 800 mg/g  3.  HTN   -multiple meds  4.  ? LADY   -doppler US reviewed   -no additional intervention at this time  5.  Acute Kidney Injury   -volume dependent      Creatinine stable range   Restart torsemide 20 mg daily beginning 10.14.24  Continue to hold ARB/SGLT2i until renal follow-up    OK to discharge  BMP next week    Renal follow-up [facility] 2 weeks       HPI:     Patient with longstanding underlying CKD.  Abnormal creatinines dating back to at least 2016.  Currently follows at the [facility] nephrology with Dr. Ramos    Was last evaluated there in September    Presented to the ED for shortness of breath  Hospital course centered around diuresis    I's and O show him to be even but his weight is up.  Clinically he is improved  Diuretics have recently been intermittently held secondary to elevated creatinine    It is likely that his baseline fluctuates on the basis of his volume status    Initial management with IV furosemide  That was changed to oral torsemide subsequently increased creatinine  That medication has been held today subsequent to a creatinine increasing from 2.42 to 2.50 mg/dL    Patient is clinically stable and feels well    Denies chest pain or shortness of breath  Intermittently uses nonsteroidals for gout  We reviewed that he should not be taking NSAIDs  Does monitor his sodium intake    I did discuss this in detail with the patient    I reviewed the case with Dr. Wang        ROS:  A complete 10 point review of systems was performed and is negative except as noted above.    PMH:    Past Medical History:   Diagnosis Date    Acute diastolic congestive heart failure (H)  06/2019    hosp fsd, then fu echo ef nl; echo 2023 nl ef    ASCVD (arteriosclerotic cardiovascular disease) 1997    angio with 40% circ lesion; 6/19 hosp for chf, 3 vessel dz on angio but porcelin aorta so ptca done    Atrial fibrillation (H) 10/09/2018    found on routine physical    Basal cell carcinoma     Carotid artery plaque 2005    seen on us, about 50% stenosis, fu 2009 us no significant stenosis    CKD (chronic kidney disease) stage 3, GFR 30-59 ml/min (H)     Elevated blood sugar     Gout     on allopurinol    HTN (hypertension)     Hypercholesteremia     Hyponatremia 2015    felt due to chlorthalidone    Low mean corpuscular volume (MCV)     Near syncope 05/2015    fu est echo low level but neg    Psoriasis     Dr. Marti    Renal mass 06/29/2019    seen on ct chest for sob, needs fu ct for that, fu us done 7/19 and no mass seen, should have fu ct in December, had fu us 3/22 and no fu needed    Screening 2012    abd us no aaa    Syncope 09/2019    hosp fsd, cause not clear, lowered coreg dose; seen by Dr. Lezama of ep and ep study neg, implanted event monitor    V-tach (H) 09/2019    seen on event monitor for fu syncope, then ep study and no inducible vtach       PSH:    Past Surgical History:   Procedure Laterality Date    CATARACT EXTRACTION  03/2022    CATARACT EXTRACTION  2022    CV CORONARY ANGIOGRAM N/A 07/02/2019    Procedure: Coronary Angiogram;  Surgeon: Donaldo Loera MD;  Location: [facility] CARDIAC CATH LAB    CV HEART CATHETERIZATION WITH POSSIBLE INTERVENTION N/A 07/05/2019    Procedure: Heart Catheterization with Possible Intervention;  Surgeon: Manolo Hu MD;  Location: [facility] CARDIAC CATH LAB    CV LEFT HEART CATH N/A 07/02/2019    Procedure: Left Heart Cath;  Surgeon: Donaldo Loera MD;  Location: [facility] CARDIAC CATH LAB    CV LEFT VENTRICULOGRAM N/A 07/02/2019    Procedure: Left Ventriculogram;  Surgeon: Donaldo Loera MD;  Location: [facility] CARDIAC  CATH LAB    CV PCI STENT DRUG ELUTING N/A 07/05/2019    Procedure: PCI Stent Drug Eluting;  Surgeon: Manolo Hu MD;  Location:  HEART CARDIAC CATH LAB    CV RIGHT HEART CATH MEASUREMENTS RECORDED N/A 07/02/2019    Procedure: Right Heart Cath;  Surgeon: Donaldo Loera MD;  Location:  HEART CARDIAC CATH LAB    EP LOOP RECORDER IMPLANT N/A 10/11/2019    Procedure: EP Loop Recorder Implant;  Surgeon: Fuad Lezama MD;  Location:  HEART CARDIAC CATH LAB    EP RIGHT VENTRICULAR RECORDING N/A 10/11/2019    Procedure: EP Ventricular Pacing;  Surgeon: Fuad Lezama MD;  Location:  HEART CARDIAC CATH LAB    ZZC ANESTH,DX ARTHROSCOPIC PROC KNEE JOINT  01/01/2009       MEDICATIONS:    Current Outpatient Medications   Medication Sig Dispense Refill    [START ON 10/14/2024] empagliflozin (JARDIANCE) 10 MG TABS tablet Take 1 tablet (10 mg) by mouth daily.      rivaroxaban ANTICOAGULANT (XARELTO) 15 MG TABS tablet Take 1 tablet (15 mg) by mouth daily (with dinner). 30 tablet 0    [START ON 10/14/2024] torsemide (DEMADEX) 20 MG tablet Take 1 tablet (20 mg) by mouth daily. 30 tablet 0       ALLERGIES:    Allergies as of 10/08/2024 - Reviewed 10/08/2024   Allergen Reaction Noted    Ace inhibitors Anaphylaxis 09/13/2012    Eliquis [apixaban] Other (See Comments) 10/11/2019       FH:    Family History   Problem Relation Age of Onset    Coronary Artery Disease Father     Medical History Unknown Mother     Medical History Unknown Maternal Grandfather        SH:    Social History     Socioeconomic History    Marital status:      Spouse name: Not on file    Number of children: 2    Years of education: Not on file    Highest education level: Not on file   Occupational History    Occupation: retired   Tobacco Use    Smoking status: Former     Current packs/day: 0.00     Average packs/day: 1 pack/day for 30.0 years (30.0 ttl pk-yrs)     Types: Cigarettes     Start date: 9/11/1968     Quit date: 9/11/1998      Years since quittin.1    Smokeless tobacco: Never   Substance and Sexual Activity    Alcohol use: Yes     Comment: 3-4 drinks per week    Drug use: No    Sexual activity: Not Currently     Partners: Female   Other Topics Concern    Parent/sibling w/ CABG, MI or angioplasty before 65F 55M? Not Asked   Social History Narrative    Not on file     Social Determinants of Health     Financial Resource Strain: Low Risk  (2024)    Financial Resource Strain     Within the past 12 months, have you or your family members you live with been unable to get utilities (heat, electricity) when it was really needed?: No   Food Insecurity: Low Risk  (2024)    Food Insecurity     Within the past 12 months, did you worry that your food would run out before you got money to buy more?: No     Within the past 12 months, did the food you bought just not last and you didn t have money to get more?: No   Transportation Needs: Low Risk  (2024)    Transportation Needs     Within the past 12 months, has lack of transportation kept you from medical appointments, getting your medicines, non-medical meetings or appointments, work, or from getting things that you need?: No   Physical Activity: Not on file   Stress: Not on file   Social Connections: Unknown (2023)    Received from [facility], [facility]    Social Connections     Frequency of Communication with Friends and Family: Not on file   Interpersonal Safety: Low Risk  (10/8/2024)    Interpersonal Safety     Do you feel physically and emotionally safe where you currently live?: Yes     Within the past 12 months, have you been hit, slapped, kicked or otherwise physically hurt by someone?: No     Within the past 12 months, have you been humiliated or emotionally abused in other ways by your partner or ex-partner?: No   Housing Stability: Low Risk  (2024)    Housing Stability     Do you have  housing? : Yes     Are you worried about losing your housing?: No       PHYSICAL EXAM:      Vitals were reviewed  Patient Vitals for the past 8 hrs:   BP Temp Temp src Pulse Resp SpO2 Weight   10/12/24 1159 129/59 98.1  F (36.7  C) Oral 56 18 97 % --   10/12/24 0900 132/50 -- -- 51 -- 98 % --   10/12/24 0855 132/50 97.8  F (36.6  C) Oral 63 18 97 % --   10/12/24 0642 (!) 149/92 97.9  F (36.6  C) Oral 59 18 94 % 92.3 kg (203 lb 9 oz)     I/O last 3 completed shifts:  In: 480 [P.O.:480]  Out: 450 [Urine:450]      Vitals:    10/08/24 1527 10/09/24 0615 10/10/24 0629 10/11/24 0500   Weight: 94.6 kg (208 lb 8 oz) 93.2 kg (205 lb 6.4 oz) 91.8 kg (202 lb 4.8 oz) 91.7 kg (202 lb 3.2 oz)    10/12/24 0642   Weight: 92.3 kg (203 lb 9 oz)         GENERAL: awake, alert, follows  HEENT: NC/AT, PERRLA, EOMI, non icteric, pharynx moist without lesion  RESP:  clear anteriorly  CV: RRR, normal S1 S2  ABDOMEN: soft, nontender, no HSM or masses and bowel sounds normal  MS: no clubbing, cyanosis   SKIN: clear without significant rashes or lesions  EXT: warm, no edema      LABS:        Recent Labs   Lab 10/12/24  0536      POTASSIUM 3.6   CHLORIDE 101   CO2 26   ANIONGAP 13   GLC 97   BUN 43.3*   CR 2.50*   GFRESTIMATED 26*   GUNNER 8.5*     Recent Labs   Lab 10/12/24  0536 10/11/24  0547 10/10/24  0622 10/09/24  0559 10/08/24  1203   CR 2.50* 2.42* 2.46* 2.27* 2.12*     Recent Labs   Lab 10/12/24  0536 10/11/24  0547 10/10/24  0622 10/09/24  0559 10/08/24  1203    141 139 139 143     Recent Labs   Lab 10/10/24  0622 10/08/24  1203   ALBUMIN 3.8 4.0       DIAGNOSTICS:  Reviewed      ANITA Hammond    [facility] consultants  [phone]

## 2024-10-12 NOTE — PLAN OF CARE
Occupational Therapy Discharge Summary    Reason for therapy discharge:    All goals and outcomes met, no further needs identified.    Progress towards therapy goal(s). See goals on Care Plan in [facility] electronic health record for goal details.  Goals met    Therapy recommendation(s):    No further therapy is recommended.Pt functioning near baseline level. Anticipate pt will discharge home with independence in self-cares/IADLs and self management with returning activity tolerance to baseline level

## 2024-10-12 NOTE — PLAN OF CARE
A&O x 4. VSS. RA. Tele: Afib SVR. Denies pain/CP/SOB. Up independent. Pt discharging to home. Discharge instructions and medications given to pt, questions were answered, no further concerns reported. Pt escorted to door six via wheelchair and hospital staff.

## 2024-10-12 NOTE — PLAN OF CARE
A&O x 4. VSS. RA. Tele: Afib SVR. Denies pain/SOB/CP. Up independent in room. Plan: Possible discharge tomorrow pending creatinine. Will continue w/plan of care.

## 2024-10-12 NOTE — PROGRESS NOTES
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 Cardiology consulted for shortness of breath and weight gain.    The patient has a longstanding cardiac history that is well summarized in the cardiology clinic note on 7/27/20. The patient presents this admission for SOB with going up 1 flight of stairs and LE swelling. Admit troponin-HS was 36 and NT-proBNP was 6636. He was started on lasix IV bid and cardiology was consulted. This diuretic regimen was continued though with careful monitoring of Cr. Importantly, it was noted that the patient was having 3 second pauses on monitor, so carvedilol was held. It appears EP was contacted for consideration of PPM though wanted to hold off at this time and opted for outpatient monitor instead.    Today the patient states that he is feeling well and has no acute cardiac complaints. He has no chest pain/pressure, cough/SOB, or palpitations/lightheadedness. He did develop some gout of his left foot. He would like to go home    Cardiac Diagnostics:  TTE - 10/8/24  Interpretation Summary  Left ventricular systolic function is normal.The visual ejection fraction is  60-65%.Diastolic Doppler findings (E/E' ratio and/or other parameters) suggest  left ventricular filling pressures are increased.  Mildly decreased right ventricular systolic function.  Severe biatrial enlargement.  There is mild (1+) mitral regurgitation. There is mild mitral stenosis.    TTE - 6/15/23  Interpretation Summary  Hyperdynamic left ventricular function  The visual  ejection fraction is >70%.  The right ventricular systolic function is mildly reduced.  There is severe biatrial enlargement.  There is mild (1+) mitral regurgitation.  Compared to prior study, there is no significant change.    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 Cardiology consulted for shortness of breath and weight gain.    #Persistent atrial fibrillation with pauses, longest 3.9 sec  #HFrecEF  #CAD  #NSVT    From a cardiac perspective, the patient does appear to have made some improvement with diuresis, though his objective data is mixed. His weight on admit was 94.6 kg and today is 92.3 kg although his net fluid balance is positive 60ml. I suspect not all of his UOP is being documented. He states this morning that he actually feels quite a bit better, so he is heading in the right direction. His vitals have been stable, and we are carefully monitoring his Cr for which the overall trend has been steadily increasing (BMP pending this morning). We do not have much room from a renal standpoint to add GDMT (I think MRA and SGLT2i would be great options here), and with his pauses, I would be hesitant to resume his beta-blocker. (Of note, EP has opted to monitor the patient for now and hold on PPM placement).    Otherwise, I think conservative measures for now like reducing his overall salt and fluid intake and using lower extremity wraps, elevating the foot of the bed, and working with PT and walking as able will be useful here.    I had initially thought he may be able to discharge today though with his elevated creatinine, it may be reasonable to give him a diuretic holiday and watch for one more day. I did explain that I would like for him to keep his feet elevated  and go for walks around the hallway which he has been doing though developed some gout of his left food which has made this more difficult so we can give some voltaren cream.    Plan:  - daily weights, I/Os, pulse ox (goal SpO2> 90%)     - fluid goal: at least net negative 2 L/d  - limit Na intake to < 1.5 g/day and fluid to < 1.5 L/day  - begin ACE wraps, elevated the foot of the bed to above the waist (can use wedge), and PT as able  - hold torsemide 20mg every day for today     - BMP, Mg qd        - K > 4, Mg > 2  - continue hydralazine 100mg tid  - continue ISMN 120mg every day  - begin voltaren cream for left foot  - hold carvedilol for now     - discharge with Mj Serrato MD    Primary Care Physician   Rudy Vernon    Patient Active Problem List   Diagnosis    ASCVD (arteriosclerotic cardiovascular disease)    Benign essential hypertension    Hyperlipidemia LDL goal <100    Elevated blood sugar    Psoriasis    Non morbid obesity, unspecified obesity type    Gout, unspecified cause, unspecified chronicity, unspecified site    Hyponatremia    Low mean corpuscular volume (MCV)    Atrial fibrillation (H)    Syncope    V-tach (H)    Chronic kidney disease, stage 3 (H)    Chronic heart failure with preserved ejection fraction (H)    Renal insufficiency    Exertional dyspnea    Chronic atrial fibrillation (H)    Elevated troponin    Anticoagulated    Acute on chronic congestive heart failure, unspecified heart failure type (H)       Past Medical History   I have reviewed this patient's medical history and updated it with pertinent information if needed.   Past Medical History:   Diagnosis Date    Acute diastolic congestive heart failure (H) 06/2019    hosp fsd, then fu echo ef nl; echo 2023 nl ef    ASCVD (arteriosclerotic cardiovascular disease) 1997    angio with 40% circ lesion; 6/19 hosp for chf, 3 vessel dz on angio but porcelin aorta so ptca done    Atrial fibrillation (H) 10/09/2018     found on routine physical    Basal cell carcinoma     Carotid artery plaque 2005    seen on us, about 50% stenosis, fu 2009 us no significant stenosis    CKD (chronic kidney disease) stage 3, GFR 30-59 ml/min (H)     Elevated blood sugar     Gout     on allopurinol    HTN (hypertension)     Hypercholesteremia     Hyponatremia 2015    felt due to chlorthalidone    Low mean corpuscular volume (MCV)     Near syncope 05/2015    fu est echo low level but neg    Psoriasis     Dr. Marti    Renal mass 06/29/2019    seen on ct chest for sob, needs fu ct for that, fu us done 7/19 and no mass seen, should have fu ct in December, had fu us 3/22 and no fu needed    Screening 2012    abd us no aaa    Syncope 09/2019    hosp fsd, cause not clear, lowered coreg dose; seen by Dr. Lezama of ep and ep study neg, implanted event monitor    V-tach (H) 09/2019    seen on event monitor for fu syncope, then ep study and no inducible vtach       Past Surgical History   I have reviewed this patient's surgical history and updated it with pertinent information if needed.  Past Surgical History:   Procedure Laterality Date    CATARACT EXTRACTION  03/2022    CATARACT EXTRACTION  2022    CV CORONARY ANGIOGRAM N/A 07/02/2019    Procedure: Coronary Angiogram;  Surgeon: Donaldo Loera MD;  Location:  HE[facility] CARD[facility] CATH [facility]    CV HEART CATHETERIZATION WITH POSSIBLE INTERVENTION N/A 07/05/2019    Procedure: Heart Catheterization with Possible Intervention;  Surgeon: Manolo Hu MD;  Location:  [facility]ARD[facility] CATH [facility]    CV LEFT HEART CATH N/A 07/02/2019    Procedure: Left Heart Cath;  Surgeon: Donaldo Loera MD;  Location:  [facility]ARD[facility] CATH [facility]    CV LEFT VENTRICULOGRAM N/A 07/02/2019    Procedure: Left Ventriculogram;  Surgeon: Donaldo Loera MD;  Location:  [facility]    CV PCI STENT DRUG ELUTING N/A 07/05/2019    Procedure: PCI Stent Drug Eluting;  Surgeon: Manolo Hu MD;  Location:   [facility] CATH [facility]    CV RIGHT HEART CATH MEASUREMENTS RECORDED N/A 07/02/2019    Procedure: Right Heart Cath;  Surgeon: Donaldo Loera MD;  Location:  [facility]    EP LOOP RECORDER IMPLANT N/A 10/11/2019    Procedure: EP Loop Recorder Implant;  Surgeon: Fuad Lezama MD;  Location:  [facility]ATH [facility]    EP RIGHT VENTRICULAR RECORDING N/A 10/11/2019    Procedure: EP Ventricular Pacing;  Surgeon: Fuad Lezama MD;  Location:  HE[facility] CARD[facility] CATH LAB    ZZC ANESTH,DX ARTHROSCOPIC PROC KNEE JOINT  01/01/2009       Prior to Admission Medications   Prior to Admission Medications   Prescriptions Last Dose Informant Patient Reported? Taking?   allopurinol (ZYLOPRIM) 300 MG tablet 10/8/2024 at am  No Yes   Sig: TAKE 1 TABLET DAILY   amLODIPine (NORVASC) 5 MG tablet 10/8/2024 at am  No Yes   Sig: Take 1 tablet (5 mg) by mouth daily   aspirin (ASA) 81 MG EC tablet 10/8/2024 at am  No Yes   Sig: Take 1 tablet (81 mg) by mouth daily   calcipotriene (DOVONOX) 0.005 % external cream PRN  No No   Sig: Mix efudex + calcipotriene equally. Apply BID x 4-10 days. Stop when skin gets red.   carvedilol (COREG) 6.25 MG tablet 10/8/2024 at am  No Yes   Sig: Take 1 tablet (6.25 mg) by mouth 2 times daily (with meals)   clobetasol propionate (TEMOVATE) 0.05 % external cream PRN  No No   Sig: Apply topically 2 times daily To feet as needed   empagliflozin (JARDIANCE) 10 MG TABS tablet 10/8/2024 at am  No Yes   Sig: Take 1 tablet (10 mg) by mouth daily   ezetimibe (ZETIA) 10 MG tablet 10/8/2024 at am  No Yes   Sig: Take 1 tablet (10 mg) by mouth daily   fluorouracil (EFUDEX) 5 % external cream PRN  No No   Sig: Mix equally with calcipotriene cream. Apply BID x 4-10 days. Stop when skin gets red.   hydrALAZINE (APRESOLINE) 100 MG tablet 10/8/2024 at am X1  No Yes   Sig: Take 1 tablet (100 mg) by mouth 3 times daily   isosorbide mononitrate CR (IMDUR) 120 MG 24 HR ER tablet 10/8/2024 at am  No Yes   Sig:  Take 1 tablet (120 mg) by mouth daily   ketoconazole (NIZORAL) 2 % external shampoo PRN  No No   Sig: Massage into wet scalp, let sit 3-5 min, then rinse. Do this 3x weekly.   losartan (COZAAR) 100 MG tablet 10/8/2024 at am  No Yes   Sig: Take 1 tablet (100 mg) by mouth daily   rivaroxaban ANTICOAGULANT (XARELTO ANTICOAGULANT) 20 MG TABS tablet 10/7/2024  No Yes   Sig: Take 1 tablet (20 mg) by mouth daily (with dinner) TAKE 1 TABLET DAILY WITH DINNER   rosuvastatin (CRESTOR) 40 MG tablet 10/7/2024  No Yes   Sig: Take 1 tablet (40 mg) by mouth at bedtime   torsemide (DEMADEX) 20 MG tablet 10/8/2024 at am  No Yes   Sig: Take 1 tablet (20 mg) Monday, Wednesday, Friday and take 2 tablets (40 mg) other days by mouth   triamcinolone (KENALOG) 0.1 % external cream PRN  No No   Sig: Apply topically 2 times daily To psoriasis on arms and body as needed      Facility-Administered Medications: None     Current Facility-Administered Medications   Medication Dose Route Frequency Provider Last Rate Last Admin    acetaminophen (TYLENOL) tablet 650 mg  650 mg Oral Q4H PRN Beto Bhagat NP   650 mg at 10/11/24 2032    Or    acetaminophen (TYLENOL) Suppository 650 mg  650 mg Rectal Q4H PRN Beto Bhagat NP        allopurinol (ZYLOPRIM) tablet 300 mg  300 mg Oral Daily Beto Bhagat NP   300 mg at 10/11/24 1021    amLODIPine (NORVASC) tablet 5 mg  5 mg Oral Daily Beto Bhagat NP   5 mg at 10/11/24 1021    aspirin EC tablet 81 mg  81 mg Oral Daily Beto Bhagat NP   81 mg at 10/11/24 1022    Continuing ACE inhibitor/ARB/ARNI from home medication list OR ACE inhibitor/ARB/ARNI order already placed during this visit   Does not apply DOES NOT GO TO Beto Deng NP        Continuing beta blocker from home medication list OR beta blocker order already placed during this visit   Does not apply DOES NOT GO TO Beto Deng NP         [Held by provider] empagliflozin (JARDIANCE) tablet 10 mg  10 mg Oral Daily Beto Bhagat NP   10 mg at 10/10/24 0942    ezetimibe (ZETIA) tablet 10 mg  10 mg Oral Daily Beto Bhagat NP   10 mg at 10/11/24 1022    hydrALAZINE (APRESOLINE) tablet 10 mg  10 mg Oral Q4H PRN Beto Bhagat NP        Or    hydrALAZINE (APRESOLINE) injection 10 mg  10 mg Intravenous Q4H PRN Beto Bhagat NP        hydrALAZINE (APRESOLINE) tablet 100 mg  100 mg Oral TID Beto Bhagat NP   100 mg at 10/11/24 2136    ipratropium - albuterol 0.5 mg/2.5 mg/3 mL (DUONEB) neb solution 3 mL  3 mL Nebulization Q4H PRN Beto Bhagat NP        isosorbide mononitrate (IMDUR) 24 hr tablet 120 mg  120 mg Oral Daily Beto Bhagat NP   120 mg at 10/11/24 1022    lidocaine (LMX4) cream   Topical Q1H PRN Beto Bhagat NP        lidocaine 1 % 0.1-1 mL  0.1-1 mL Other Q1H PRN Beto Bhagat NP        ondansetron (ZOFRAN ODT) ODT tab 4 mg  4 mg Oral Q6H PRN Beto Bhagat NP        Or    ondansetron (ZOFRAN) injection 4 mg  4 mg Intravenous Q6H PRN Beto Bhagat NP        rivaroxaban ANTICOAGULANT (XARELTO) tablet 15 mg  15 mg Oral Daily with supper Beto Bhagat NP   15 mg at 10/11/24 1604    rosuvastatin (CRESTOR) tablet 10 mg  10 mg Oral At Bedtime Danielle Robles DO   10 mg at 10/11/24 2137    senna-docusate (SENOKOT-S/PERICOLACE) 8.6-50 MG per tablet 1 tablet  1 tablet Oral BID PRN Beto Bhagat NP        Or    senna-docusate (SENOKOT-S/PERICOLACE) 8.6-50 MG per tablet 2 tablet  2 tablet Oral BID PRN Beto Bhagat NP        sodium chloride (PF) 0.9% PF flush 3 mL  3 mL Intracatheter Q8H Beto Bhagat NP   3 mL at 10/11/24 1847    sodium chloride (PF) 0.9% PF flush 3 mL  3 mL Intracatheter q1 min prn Beto Bhagat  REYMUNDO Corona   3 mL at 10/11/24 2032    torsemide (DEMADEX) tablet 20 mg  20 mg Oral Daily Melissa Mares NP   20 mg at 10/11/24 1022     Current Facility-Administered Medications   Medication Dose Route Frequency Provider Last Rate Last Admin    acetaminophen (TYLENOL) tablet 650 mg  650 mg Oral Q4H PRN Beto Bhagat NP   650 mg at 10/11/24 2032    Or    acetaminophen (TYLENOL) Suppository 650 mg  650 mg Rectal Q4H PRN Beto Bhagat NP        allopurinol (ZYLOPRIM) tablet 300 mg  300 mg Oral Daily Beto Bhagat NP   300 mg at 10/11/24 1021    amLODIPine (NORVASC) tablet 5 mg  5 mg Oral Daily Beto Bhagat NP   5 mg at 10/11/24 1021    aspirin EC tablet 81 mg  81 mg Oral Daily Beto Bhagat NP   81 mg at 10/11/24 1022    Continuing ACE inhibitor/ARB/ARNI from home medication list OR ACE inhibitor/ARB/ARNI order already placed during this visit   Does not apply DOES NOT GO TO Beto Deng NP        Continuing beta blocker from home medication list OR beta blocker order already placed during this visit   Does not apply DOES NOT GO TO Beto Deng NP        [Held by provider] empagliflozin (JARDIANCE) tablet 10 mg  10 mg Oral Daily Beto Bhagat NP   10 mg at 10/10/24 0942    ezetimibe (ZETIA) tablet 10 mg  10 mg Oral Daily Beto Bhagat NP   10 mg at 10/11/24 1022    hydrALAZINE (APRESOLINE) tablet 10 mg  10 mg Oral Q4H PRN Beto Bhagat NP        Or    hydrALAZINE (APRESOLINE) injection 10 mg  10 mg Intravenous Q4H PRN Beto Bhagat NP        hydrALAZINE (APRESOLINE) tablet 100 mg  100 mg Oral TID Beto Bhagat NP   100 mg at 10/11/24 2136    ipratropium - albuterol 0.5 mg/2.5 mg/3 mL (DUONEB) neb solution 3 mL  3 mL Nebulization Q4H PRN Beto Bhagat, NP        isosorbide mononitrate (IMDUR) 24 hr tablet 120 mg   120 mg Oral Daily Beto Bhagat NP   120 mg at 10/11/24 1022    lidocaine (LMX4) cream   Topical Q1H PRN Beto Bhagat NP        lidocaine 1 % 0.1-1 mL  0.1-1 mL Other Q1H PRN Beto Bhagat NP        ondansetron (ZOFRAN ODT) ODT tab 4 mg  4 mg Oral Q6H PRN Beto Bhagat NP        Or    ondansetron (ZOFRAN) injection 4 mg  4 mg Intravenous Q6H PRN Beto Bhagat NP        rivaroxaban ANTICOAGULANT (XARELTO) tablet 15 mg  15 mg Oral Daily with supper Beto Bhagat NP   15 mg at 10/11/24 1604    rosuvastatin (CRESTOR) tablet 10 mg  10 mg Oral At Bedtime Danielle Robles DO   10 mg at 10/11/24 2137    senna-docusate (SENOKOT-S/PERICOLACE) 8.6-50 MG per tablet 1 tablet  1 tablet Oral BID PRN Beto Bhagat NP        Or    senna-docusate (SENOKOT-S/PERICOLACE) 8.6-50 MG per tablet 2 tablet  2 tablet Oral BID PRN Beto Bhagat NP        sodium chloride (PF) 0.9% PF flush 3 mL  3 mL Intracatheter Q8H Beto Bhagat NP   3 mL at 10/11/24 1847    sodium chloride (PF) 0.9% PF flush 3 mL  3 mL Intracatheter q1 min prn Beto Bhagat NP   3 mL at 10/11/24 2032    torsemide (DEMADEX) tablet 20 mg  20 mg Oral Daily Melissa Mares NP   20 mg at 10/11/24 1022     Allergies   Allergies   Allergen Reactions    Ace Inhibitors Anaphylaxis     Edema of ace    Eliquis [Apixaban] Other (See Comments)     Facial numbness       Social History    reports that he quit smoking about 26 years ago. His smoking use included cigarettes. He started smoking about 56 years ago. He has a 30 pack-year smoking history. He has never used smokeless tobacco. He reports current alcohol use. He reports that he does not use drugs.    Family History   Family History   Problem Relation Age of Onset    Coronary Artery Disease Father     Medical History Unknown Mother     Medical History Unknown Maternal  "Grandfather        Review of Systems   The comprehensive Review of Systems is negative other than noted in the HPI or here.     Physical Exam   Vital Signs with Ranges  Temp:  [97.7  F (36.5  C)-98.1  F (36.7  C)] 97.7  F (36.5  C)  Pulse:  [50-59] 54  Resp:  [17-18] 18  BP: (114-132)/(57-67) 122/65  SpO2:  [95 %-97 %] 97 %  Wt Readings from Last 4 Encounters:   10/11/24 91.7 kg (202 lb 3.2 oz)   09/20/24 93 kg (205 lb)   05/22/24 92.8 kg (204 lb 9.6 oz)   03/04/24 96.7 kg (213 lb 1.6 oz)     I/O last 3 completed shifts:  In: 480 [P.O.:480]  Out: 550 [Urine:550]      Vitals: /65 (BP Location: Right arm)   Pulse 54   Temp 97.7  F (36.5  C) (Oral)   Resp 18   Ht 1.676 m (5' 6\")   Wt 91.7 kg (202 lb 3.2 oz)   SpO2 97%   BMI 32.64 kg/m      General: obese; Alert, oriented, in no acute distress  HEENT: PERRLA, mucous membranes moist  Neck: Normal JVP  CV: irregular rhythm, borderline bradycadia; no m/r/g; hypervolemic, warm/well perfused  Respiratory: Clear to auscultation bilaterally  GI: Normoactive bowel sounds; soft, non-tender abdomen  Neuro: No focal deficits appreciated  Extremities: 1+ pitting to mid shin b/l    Recent Labs   Lab 10/11/24  0547 10/10/24  0622 10/10/24  0009 10/09/24  1504 10/09/24  0600 10/09/24  0559 10/08/24  1529 10/08/24  1203   WBC  --  6.7  --   --  7.4  --   --  8.0   HGB  --  11.4*  --   --  10.8*  --   --  10.8*   MCV  --  85  --   --  85  --   --  86   PLT  --  285  --   --  290  --   --  286    139  --   --   --  139  --  143   POTASSIUM 3.6 3.7 3.8   < >  --  3.4  --  3.8   CHLORIDE 101 99  --   --   --  100  --  105   CO2 27 26  --   --   --  25  --  24   BUN 38.7* 40.7*  --   --   --  36.2*  --  34.0*   CR 2.42* 2.46*  --   --   --  2.27*  --  2.12*   GFRESTIMATED 27* 26*  --   --   --  29*  --  31*   ANIONGAP 13 14  --   --   --  14  --  14   GUNNER 8.8 9.3  --   --   --  9.4  --  9.0   GLC 94 88  --   --   --  97   < > 107*   ALBUMIN  --  3.8  --   --   --   --   " "--  4.0   PROTTOTAL  --   --   --   --   --   --   --  6.8   BILITOTAL  --   --   --   --   --   --   --  0.8   ALKPHOS  --   --   --   --   --   --   --  95   ALT  --   --   --   --   --   --   --  21   AST  --   --   --   --   --   --   --  21    < > = values in this interval not displayed.     Recent Labs   Lab Test 01/29/24  1014 01/05/23  0802   CHOL 154 183   HDL 35* 39*   LDL 83 97   TRIG 182* 234*     Recent Labs   Lab 10/10/24  0622 10/09/24  0600 10/08/24  1203   WBC 6.7 7.4 8.0   HGB 11.4* 10.8* 10.8*   HCT 37.5* 34.6* 34.4*   MCV 85 85 86    290 286     No results for input(s): \"PH\", \"PHV\", \"PO2\", \"PO2V\", \"SAT\", \"PCO2\", \"PCO2V\", \"HCO3\", \"HCO3V\" in the last 168 hours.  Recent Labs   Lab 10/08/24  1203   NTBNPI 6,636*     No results for input(s): \"DD\" in the last 168 hours.  No results for input(s): \"SED\", \"CRP\" in the last 168 hours.  Recent Labs   Lab 10/10/24  0622 10/09/24  0600 10/08/24  1203    290 286     No results for input(s): \"TSH\" in the last 168 hours.  No results for input(s): \"COLOR\", \"APPEARANCE\", \"URINEGLC\", \"URINEBILI\", \"URINEKETONE\", \"SG\", \"UBLD\", \"URINEPH\", \"PROTEIN\", \"UROBILINOGEN\", \"NITRITE\", \"LEUKEST\", \"RBCU\", \"WBCU\" in the last 168 hours.    Imaging:  No results found for this or any previous visit (from the past 48 hour(s)).      Medical Decision Making                 "

## 2024-10-12 NOTE — PLAN OF CARE
Goal Outcome Evaluation:    1930-0700    COGNITION/MENTATION: A/O x 4   NEURO/CMS: BLE +3 edema   CARDIAC/TELE: A-fib SVR   RESPIRATORY: LS clear, on RA.   GI: BS+   : AUO per pt.   PAIN: c/o moderate L foot pain, prn tylenol, effective.   SKIN: scattered bruising   DRAINS/LINES: PIV sl   ACTIVITY: Up ad curtis, indep   DIET: Cardiac, 2000 fluid restriction.

## 2024-10-12 NOTE — DISCHARGE SUMMARY
[facility]    Discharge Summary  Hospitalist    Date of Admission:  10/8/2024  Date of Discharge:  10/12/2024  Discharging Provider: Hipolito Singh MD, MD  Date of Service (when I saw the patient): 10/12/24    Discharge Diagnoses   Please refer below    History of Present Illness   Betito Anderson is an 78 year old male who presented with shortness of breath    Hospital Course   This is a 78-year-old male with history of heart failure with reduced ejection fraction recovered EF, chronic persistent A-fib with history of pauses up to 4 seconds, coronary artery disease, CKD 3, gout, hypertension, hyperlipidemia, psoriasis, history of syncope with history of nonsustained V. tach, now come to the ER with complaint of worsening shortness of breath lower extremity edema found to be in acute exacerbation of CHF and subsequently get admitted.     Final discharge diagnosis Hospital course     Acute on chronic heart failure with preserved ejection fraction: Resolved  Heart failure with recovered EF  NSTEMI type II secondary to CHF     This is a 78-year-old male with multiple comorbidities as above, presented with worsening shortness of breath, lower extremity edema.  Has bilateral crackles, lower extremity edema on exam, chest x-ray shows increased diffuse interstitial opacity in the lungs likely due to pulmonary edema, trace bilateral pleural effusion.  BNP elevated to 6636.  Echocardiogram done , shows EF of 60 to 65% mildly decreased right ventricular systolic function.  Mild mitral regurgitation and mild mitral stenosis  Patient responded to the IV Lasix 40 mg twice daily and symptoms resolved  He is on torsemide at home and is compliant with his medication, but not compliant with salt intake.  Mildly elevated troponin most likely because of CHF, troponin remain flat 36 and 36     He was on IV Lasix 40 mg twice daily, his weight down 8 pounds since admission,  Strict intake and output  charting  Daily weight, current weight 202 on admission weight 208 pounds  Low-sodium diet/2 g sodium diet  He is overall improved with IV diuresis, creatinine is trending up.  Agree with stopping IV Lasix, started on oral torsemide, started on 20 mg giving worsening kidney functions okay with that for now.    Discontinue Coreg giving significant pauses  Holding Jardiance 10 mg daily because of renal failure, continue hydralazine 100 mg 3 times a day, Imdur 120 mg daily  Jardiance put on hold on 10/10/2024 for worsening renal function.  -PTA Torsemide 20 mg M,W,F and 40 mg on all other days while receiving IV diuresis.  -Discontinue losartan given worsening renal failure at this time.    At this time the patient is euvolemic, doing well denies any chest pain shortness of breath orthopnea PND palpitation, lower extremity edema improved.  Given worsening renal function, will hold his torsemide for couple of days, and resume his torsemide from 10/14/2024.  He will follow-up with his primary care physician as well as his nephrologist as outpatient.  Discontinue losartan, and hold Jardiance till he sees nephrologist.     Continue hydralazine 100 mg 3 times a day, Imdur 120 mg daily,     CHF is resolved at this time.  Patient is discharged home in stable improved condition.  As cleared by the nephrology.     Chronic atrial fibrillation since 2018  Significant pauses up to 3.7-3.9 seconds     Patient has chronic atrial fibrillation, he is on Coreg 6.25 mg twice daily  He is anticoagulated with Xarelto we will continue that.  Patient history of loop recorder in the past, and has pauses 3 to 4 seconds in the past.  Cardiology is following closely, treating conservatively as asymptomatic.  Consult cardiology to reevaluate the patient, I agree with discontinuing the Coreg at this time  Overnight he has a 3.9-second pause, and during the day he is getting 3.3 to 3.5-second pauses  EP is consulted, does not recommending  pacemaker at this time but most likely will need in the future.  On discharge discontinue Coreg        KATELIN  Chronic kidney disease-stage 3  Patient baseline creatinine is between 1.4-1.79  Creatinine on admission 2.12, slightly increased to 2.46 with diuresis.  IV Lasix discontinued, on oral torsemide, Jardiance on hold  Creatinine remained between 2.4-2.5 at this time.  Discontinue losartan, hold Bumex for couple more days.  Hold Jardiance daily see his nephrology next week     Coronary artery disease   Status post OLESYA to the LAD and D2 in 2019  In July 2019, he was admitted to the hospital with acute hypertensive emergency and found to have acute LV dysfunction and EF reduction to 30%. This normalized with control of blood pressure. However, he had an angiogram at the time which showed a lesion in mid LAD with other extensive small vessel disease. 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 Therefore, he underwent a PCI of his LAD bifurcation into the diagonal.   *Troponin: 36, serial troponin remain flat.  -Resume PTA Aspirin 81 mg daily  -Trend troponin q2h until flat        Hypertension: Well-controlled  *SBP in -158  -Resume PTA amlodipine 5 mg daily  -Discontinued carvedilol 6.25 mg BID giving significant positive  -Resume PTA hydralazine 100 mg TID  -Hold/discontinue PTA losartan due to KATELIN     Hyperlipidemia  *Total cholesterol 1/29/24: 154  *Non-HDL cholesterol 1/29/24: 119  -Resume PTA rosuvastatin 40 mg daily in the evening  -Resume PTA ezetimibe 10 mg daily     Acute normocytic anemia   *Hgb 10/8/2024: 10.8  *Hgb normal at baseline  *No signs or symptoms of bleeding. This is likely dilutional in the setting of fluid overload from CHF exacerbation.  -Recheck CBC 10/9/24 in AM     Right leg swelling more than left  Patient has bilateral leg swelling, but right leg to swell more than left.  Ultrasound negative for DVT.  Leg swelling improved after diuresis.    "  Clinically Significant Risk Factors                   # Hypertension: Noted on problem list  # Acute heart failure with preserved ejection fraction: heart failure noted on problem list, last echo with EF >50%, and receiving IV diuretics          # Obesity: Estimated body mass index is 32.86 kg/m  as calculated from the following:    Height as of this encounter: 1.676 m (5' 6\").    Weight as of this encounter: 92.3 kg (203 lb 9 oz)., PRESENT ON ADMISSION                Hipolito Singh MD, MD    Significant Results and Procedures       Pending Results   These results will be followed up by PCP  Unresulted Labs Ordered in the Past 30 Days of this Admission       No orders found from 9/8/2024 to 10/9/2024.            Code Status   Full Code       Primary Care Physician   Rudy Venron    Physical Exam   Temp: 98.1  F (36.7  C) Temp src: Oral BP: 129/59 Pulse: 56   Resp: 18 SpO2: 97 % O2 Device: None (Room air)    Vitals:    10/10/24 0629 10/11/24 0500 10/12/24 0642   Weight: 91.8 kg (202 lb 4.8 oz) 91.7 kg (202 lb 3.2 oz) 92.3 kg (203 lb 9 oz)     Vital Signs with Ranges  Temp:  [97.7  F (36.5  C)-98.1  F (36.7  C)] 98.1  F (36.7  C)  Pulse:  [50-63] 56  Resp:  [18] 18  BP: (114-149)/(50-92) 129/59  SpO2:  [94 %-98 %] 97 %  I/O last 3 completed shifts:  In: 480 [P.O.:480]  Out: 450 [Urine:450]    Constitutional: awake, alert, cooperative, no apparent distress, and appears stated age  Eyes: Lids and lashes normal, pupils equal, round and reactive to light, extra ocular muscles intact, sclera clear, conjunctiva normal  Respiratory: No increased work of breathing, good air exchange, clear to auscultation bilaterally, no crackles or wheezing  Cardiovascular: Normal apical impulse, regular rate and rhythm, normal S1 and S2, no S3 or S4, and no murmur noted  GI: No scars, normal bowel sounds, soft, non-distended, non-tender, no masses palpated, no hepatosplenomegally  Musculoskeletal: Trace to 1 lower extremity pitting edema " present  Neurologic: No focal deficit    Discharge Disposition   Discharged to home  Condition at discharge: Stable    Consultations This Hospital Stay   OCCUPATIONAL THERAPY ADULT IP CONSULT  CARE MANAGEMENT / SOCIAL WORK IP CONSULT  CARDIOLOGY IP CONSULT  CARDIOLOGY IP CONSULT  ELECTROPHYSIOLOGY IP CONSULT  NEPHROLOGY IP CONSULT    Time Spent on this Encounter   Hipolito YEBOAH MD, personally saw the patient today and spent greater than 30 minutes discharging this patient.    Discharge Orders      Primary Care - Care Coordination Referral      Reason for your hospital stay    Acute on chronic heart failure with preserved ejection fraction: Improved  Heart failure with recovered EF  NSTEMI type II secondary to CHF     Follow-up and recommended labs and tests     Follow up with primary care provider, Rudy Vernon, within 7 days for hospital follow- up.  The following labs/tests are recommended: cbc,bmp.     Activity    Your activity upon discharge: activity as tolerated     Diet    Follow this diet upon discharge: Current Diet:Orders Placed This Encounter      Fluid restriction 1500 ML FLUID      Combination Diet 2 gm NA Diet; Low Saturated Fat Na <2400mg Diet     Discharge Medications   Current Discharge Medication List        CONTINUE these medications which have CHANGED    Details   empagliflozin (JARDIANCE) 10 MG TABS tablet Take 1 tablet (10 mg) by mouth daily.    Associated Diagnoses: Heart failure with preserved ejection fraction, unspecified HF chronicity (H)      rivaroxaban ANTICOAGULANT (XARELTO) 15 MG TABS tablet Take 1 tablet (15 mg) by mouth daily (with dinner).  Qty: 30 tablet, Refills: 0    Associated Diagnoses: Chronic atrial fibrillation (H)      torsemide (DEMADEX) 20 MG tablet Take 1 tablet (20 mg) by mouth daily.  Qty: 30 tablet, Refills: 0    Associated Diagnoses: Chronic heart failure with preserved ejection fraction (H)           CONTINUE these medications which have NOT CHANGED    Details    allopurinol (ZYLOPRIM) 300 MG tablet TAKE 1 TABLET DAILY  Qty: 90 tablet, Refills: 3    Associated Diagnoses: Gout, unspecified cause, unspecified chronicity, unspecified site      amLODIPine (NORVASC) 5 MG tablet Take 1 tablet (5 mg) by mouth daily  Qty: 90 tablet, Refills: 3    Associated Diagnoses: Benign essential hypertension      aspirin (ASA) 81 MG EC tablet Take 1 tablet (81 mg) by mouth daily  Qty: 90 tablet, Refills: 3    Associated Diagnoses: Acute on chronic systolic congestive heart failure (H); Chronic atrial fibrillation (H); Benign essential hypertension      ezetimibe (ZETIA) 10 MG tablet Take 1 tablet (10 mg) by mouth daily  Qty: 90 tablet, Refills: 3    Associated Diagnoses: Benign essential hypertension      hydrALAZINE (APRESOLINE) 100 MG tablet Take 1 tablet (100 mg) by mouth 3 times daily  Qty: 270 tablet, Refills: 3    Associated Diagnoses: Benign essential hypertension; Hyperlipidemia LDL goal <100      isosorbide mononitrate CR (IMDUR) 120 MG 24 HR ER tablet Take 1 tablet (120 mg) by mouth daily  Qty: 90 tablet, Refills: 3    Associated Diagnoses: Acute on chronic systolic congestive heart failure (H)      rosuvastatin (CRESTOR) 40 MG tablet Take 1 tablet (40 mg) by mouth at bedtime  Qty: 90 tablet, Refills: 3    Associated Diagnoses: Hyperlipidemia LDL goal <100      calcipotriene (DOVONOX) 0.005 % external cream Mix efudex + calcipotriene equally. Apply BID x 4-10 days. Stop when skin gets red.  Qty: 60 g, Refills: 1    Associated Diagnoses: Actinic skin damage      clobetasol propionate (TEMOVATE) 0.05 % external cream Apply topically 2 times daily To feet as needed  Qty: 60 g, Refills: 3    Comments: Can substitute augmented betamethasone if better with insurance  Associated Diagnoses: Psoriasis      fluorouracil (EFUDEX) 5 % external cream Mix equally with calcipotriene cream. Apply BID x 4-10 days. Stop when skin gets red.  Qty: 40 g, Refills: 0    Associated Diagnoses: Actinic skin  damage      ketoconazole (NIZORAL) 2 % external shampoo Massage into wet scalp, let sit 3-5 min, then rinse. Do this 3x weekly.  Qty: 120 mL, Refills: 11      triamcinolone (KENALOG) 0.1 % external cream Apply topically 2 times daily To psoriasis on arms and body as needed  Qty: 454 g, Refills: 1    Associated Diagnoses: Psoriasis           STOP taking these medications       carvedilol (COREG) 6.25 MG tablet Comments:   Reason for Stopping:         losartan (COZAAR) 100 MG tablet Comments:   Reason for Stopping:             Allergies   Allergies   Allergen Reactions    Ace Inhibitors Anaphylaxis     Edema of ace    Eliquis [Apixaban] Other (See Comments)     Facial numbness     Data   Most Recent 3 CBC's:  Recent Labs   Lab Test 10/10/24  0622 10/09/24  0600 10/08/24  1203   WBC 6.7 7.4 8.0   HGB 11.4* 10.8* 10.8*   MCV 85 85 86    290 286      Most Recent 3 BMP's:  Recent Labs   Lab Test 10/12/24  0536 10/11/24  0547 10/10/24  0622    141 139   POTASSIUM 3.6 3.6 3.7   CHLORIDE 101 101 99   CO2 26 27 26   BUN 43.3* 38.7* 40.7*   CR 2.50* 2.42* 2.46*   ANIONGAP 13 13 14   GUNNER 8.5* 8.8 9.3   GLC 97 94 88     Most Recent 2 LFT's:  Recent Labs   Lab Test 10/08/24  1203 01/29/24  1014   AST 21 12   ALT 21 <5   ALKPHOS 95 91   BILITOTAL 0.8 0.6     Most Recent INR's and Anticoagulation Dosing History:  Anticoagulation Dose History          Latest Ref Rng & Units 7/18/2019 9/14/2019   Recent Dosing and Labs   INR 0.86 - 1.14 2.33  1.18       Details                 Most Recent 3 Troponin's:  Recent Labs   Lab Test 09/15/19  0558 09/15/19  0043 09/14/19  1742   TROPI <0.015 <0.015 <0.015     Most Recent Cholesterol Panel:  Recent Labs   Lab Test 01/29/24  1014   CHOL 154   LDL 83   HDL 35*   TRIG 182*     Most Recent 6 Bacteria Isolates From Any Culture (See EPIC Reports for Culture Details):No lab results found.  Most Recent TSH, T4 and A1c Labs:  Recent Labs   Lab Test 01/29/24  1014 05/16/23  1043   TSH  --   3.69   A1C 5.7*  --      Results for orders placed or performed during the hospital encounter of 10/08/24   XR Chest 2 Views    Narrative    XR CHEST 2 VIEWS 10/8/2024 12:44 PM    HISTORY: exertional dyspnea x 2 days, leg swelling, no fever/cough    COMPARISON: 2023      Impression    IMPRESSION: Increased diffuse interstitial opacities in the lungs,  likely due to mild pulmonary edema. Trace bilateral pleural effusions.  No focal infiltrate or pneumothorax. Stable cardiomegaly. Stable  calcified granuloma in the left lung apex. Left chest wall implanted  pacemaker.    MARTHA HERMOSILLO MD         SYSTEM ID:  [id number]    Lower Extremity Venous Duplex Right    Narrative    VENOUS ULTRASOUND RIGHT LEG  10/9/2024 12:25 PM     HISTORY: Right leg swelling more than left.    COMPARISON: None.    FINDINGS:  Examination of the deep veins with graded compression and  color flow Doppler with spectral wave form analysis was performed.      Impression    IMPRESSION: No evidence of deep venous thrombosis.    ANALI BARKSDALE MD         SYSTEM ID:  [id number]   Echocardiogram Complete     Value    LVEF  60-65%    Narrative    [id number]  [id number]  [id number]^LUIS A^MIGDALIA^EZIO     [facility]  Echocardiography Laboratory  [address] [place] [address]     Name: MARCELA TINAJERO  MRN: [id number]  : 1945  Study Date: 10/09/2024 07:58 AM  Age: 78 yrs  Gender: Male  Patient Location: [facility]  Reason For Study: Heart Failure  Ordering Physician: MIGDALIA GUNN  Referring Physician: Rudy Vernon MD  Performed By: Mckayla Velez     BSA: 2.0 m2  Height: 66 in  Weight: 205 lb  HR: 59  BP: 139/70 mmHg  ______________________________________________________________________________  Procedure  Complete Echo Adult. Definity (NDC #53232-471) given intravenously.  ______________________________________________________________________________  Interpretation Summary      Left ventricular systolic function is normal.The visual ejection fraction is  60-65%.Diastolic Doppler findings (E/E' ratio and/or other parameters) suggest  left ventricular filling pressures are increased.  Mildly decreased right ventricular systolic function.  Severe biatrial enlargement.  There is mild (1+) mitral regurgitation.There is mild mitral stenosis.  ______________________________________________________________________________  Left Ventricle  The left ventricle is normal in size. There is normal left ventricular wall  thickness. Diastolic Doppler findings (E/E' ratio and/or other parameters)  suggest left ventricular filling pressures are increased. Left ventricular  systolic function is normal. The visual ejection fraction is 60-65%. No  regional wall motion abnormalities noted.     Right Ventricle  The right ventricle is normal size. Mildly decreased right ventricular  systolic function.     Atria  The left atrium is severely dilated. The right atrium is severely dilated.  Intact atrial septum.     Mitral Valve  There is mild (1+) mitral regurgitation. There is mild mitral stenosis. The  mean mitral valve gradient is 5.3 mmHg.     Tricuspid Valve  There is trace tricuspid regurgitation. Right ventricular systolic pressure  could not be approximated due to inadequate tricuspid regurgitation.     Aortic Valve  The aortic valve is not well visualized. No aortic regurgitation is present.  No aortic stenosis is present.     Pulmonic Valve  There is trace pulmonic valvular regurgitation. There is no pulmonic valvular  stenosis.     Vessels  The aortic root is normal size. Normal size ascending aorta. Normal size IVC.     Pericardium  There is no pericardial effusion.     ______________________________________________________________________________  MMode/2D Measurements & Calculations  IVSd: 1.1 cm  LVIDd: 5.2 cm  LVIDs: 3.9 cm  LVPWd: 1.1 cm  FS: 24.6 %  LV mass(C)d: 204.0 grams  LV mass(C)dI: 100.9  grams/m2     Ao root diam: 3.1 cm  LA dimension: 5.2 cm  asc Aorta Diam: 3.5 cm  LA/Ao: 1.7  LVOT diam: 2.2 cm  LVOT area: 3.9 cm2  Ao root diam index Ht(cm/m): 1.8  Ao root diam index BSA (cm/m2): 1.5  Asc Ao diam index BSA (cm/m2): 1.7  Asc Ao diam index Ht(cm/m): 2.1  LA Volume (BP): 118.0 ml  LA Volume Index (BP): 58.4 ml/m2  RV Base: 3.4 cm     RWT: 0.41  TAPSE: 1.6 cm     Doppler Measurements & Calculations  MV E max greyson: 151.0 cm/sec  MV max PG: 10.7 mmHg  MV mean P.3 mmHg  MV V2 VTI: 24.3 cm  MVA(VTI): 2.9 cm2  MV dec slope: 1053 cm/sec2  MV dec time: 0.15 sec  Ao V2 max: 158.3 cm/sec  Ao max PG: 10.0 mmHg  Ao V2 mean: 106.7 cm/sec  Ao mean P.3 mmHg  Ao V2 VTI: 33.1 cm  ISAC(I,D): 2.1 cm2  ISAC(V,D): 1.9 cm2  LV V1 max P.3 mmHg  LV V1 max: 75.6 cm/sec  LV V1 VTI: 18.1 cm  SV(LVOT): 70.9 ml  SI(LVOT): 35.1 ml/m2  PA acc time: 0.08 sec  AV Greyson Ratio (DI): 0.48  ISAC Index (cm2/m2): 1.1  E/E' av.6  Lateral E/e': 15.4  Medial E/e': 17.9     RV S Greyson: 12.1 cm/sec     ______________________________________________________________________________  Report approved by: Bettye Davis 10/09/2024 10:17 AM           Most Recent 3 CBC's:  Recent Labs   Lab Test 10/10/24  0622 10/09/24  0600 10/08/24  1203   WBC 6.7 7.4 8.0   HGB 11.4* 10.8* 10.8*   MCV 85 85 86    290 286     Most Recent 3 BMP's:  Recent Labs   Lab Test 10/12/24  0536 10/11/24  0547 10/10/24  0622    141 139   POTASSIUM 3.6 3.6 3.7   CHLORIDE 101 101 99   CO2 26 27 26   BUN 43.3* 38.7* 40.7*   CR 2.50* 2.42* 2.46*   ANIONGAP 13 13 14   GUNNER 8.5* 8.8 9.3   GLC 97 94 88     Most Recent 2 LFT's:  Recent Labs   Lab Test 10/08/24  1203 24  1014   AST 21 12   ALT 21 <5   ALKPHOS 95 91   BILITOTAL 0.8 0.6

## 2024-10-14 ENCOUNTER — PATIENT OUTREACH (OUTPATIENT)
Dept: CARE COORDINATION | Facility: CLINIC | Age: 79
End: 2024-10-14
Payer: COMMERCIAL

## 2024-10-14 NOTE — PROGRESS NOTES
Clinic Care Coordination Contact  Transitions of Care Outreach  Chief Complaint   Patient presents with    Clinic Care Coordination - Post Hospital       Most Recent Admission Date: 10/8/2024   Most Recent Admission Diagnosis: Renal insufficiency - N28.9  Exertional dyspnea - R06.09  Chronic atrial fibrillation (H) - I48.20  Elevated troponin - R79.89  Anticoagulated - Z79.01  Acute on chronic congestive heart failure, unspecified heart failure type (H) - I50.9     Most Recent Discharge Date: 10/12/2024   Most Recent Discharge Diagnosis: Exertional dyspnea - R06.09  Acute on chronic congestive heart failure, unspecified heart failure type (H) - I50.9  Renal insufficiency - N28.9  Anticoagulated - Z79.01  Chronic atrial fibrillation (H) - I48.20  Elevated troponin - R79.89  Benign essential hypertension - I10  Hyperlipidemia LDL goal <100 - E78.5  Heart failure with preserved ejection fraction, unspecified HF chronicity (H) - I50.30  Chronic heart failure with preserved ejection fraction (H) - I50.32  Hyperlipidemia LDL goal <100 - E78.5     Transitions of Care Assessment    Discharge Assessment  How are you doing now that you are home?: Doing good.  How are your symptoms? (Red Flag symptoms escalate to triage hotline per guidelines): Improved  Do you know how to contact your clinic care team if you have future questions or changes to your health status? : Yes  Does the patient have their discharge instructions? : Yes  Does the patient have questions regarding their discharge instructions? : No  Were you started on any new medications or were there changes to any of your previous medications? : Yes  Does the patient have all of their medications?: Yes  Do you have questions regarding any of your medications? : No  Do you have all of your needed medical supplies or equipment (DME)?  (i.e. oxygen tank, CPAP, cane, etc.): Yes         Post-op (Clinicians Only)  Did the patient have surgery or a procedure: No  Fever:  No  Chills: No  Eating & Drinking: eating and drinking without complaints/concerns  PO Intake: low fat diet (low salt diet, 1500mL fluid restriction.)  Additional Symptoms:  (Denies)  Bowel Function: normal  Urinary Status: voiding without complaint/concerns    Care Management   None    Follow up Plan     Patient declined Care Coordination services at this time.   Patient is aware of how to initiate Care Coordination services with the clinic in the future.     Patient declined to review medication list.     Discharge Follow-Up  Discharge follow up appointment scheduled in alignment with recommended follow up timeframe or Transitions of Risk Category? (Low = within 30 days; Moderate= within 14 days; High= within 7 days): Yes  Discharge Follow Up Appointment Date: 10/15/24  Discharge Follow Up Appointment Scheduled with?: Primary Care Provider    Future Appointments   Date Time Provider Department Center   10/15/2024 10:30 AM Nia Leiva PA-C [facility]    12/3/2024  1:00 PM Janel Dodson MD [facility]   1/31/2025 10:00 AM [facility]   2/26/2025 11:00 AM Ashlie Ramos MD [facility]       Outpatient Plan as outlined on AVS reviewed with patient.    For any urgent concerns, please contact our 24 hour nurse triage line: [phone] ([phone])       Maranda Salinas RN

## 2024-10-15 ENCOUNTER — TELEPHONE (OUTPATIENT)
Dept: CARDIOLOGY | Facility: CLINIC | Age: 79
End: 2024-10-15

## 2024-10-15 ENCOUNTER — OFFICE VISIT (OUTPATIENT)
Dept: FAMILY MEDICINE | Facility: CLINIC | Age: 79
End: 2024-10-15
Payer: COMMERCIAL

## 2024-10-15 VITALS
OXYGEN SATURATION: 97 % | TEMPERATURE: 97.7 F | DIASTOLIC BLOOD PRESSURE: 72 MMHG | HEART RATE: 66 BPM | WEIGHT: 204 LBS | HEIGHT: 66 IN | BODY MASS INDEX: 32.78 KG/M2 | RESPIRATION RATE: 18 BRPM | SYSTOLIC BLOOD PRESSURE: 170 MMHG

## 2024-10-15 DIAGNOSIS — I50.9 ACUTE ON CHRONIC CONGESTIVE HEART FAILURE, UNSPECIFIED HEART FAILURE TYPE (H): ICD-10-CM

## 2024-10-15 DIAGNOSIS — N18.32 STAGE 3B CHRONIC KIDNEY DISEASE (H): ICD-10-CM

## 2024-10-15 DIAGNOSIS — D64.9 NORMOCYTIC ANEMIA: ICD-10-CM

## 2024-10-15 DIAGNOSIS — I10 BENIGN ESSENTIAL HYPERTENSION: ICD-10-CM

## 2024-10-15 DIAGNOSIS — I50.9 HEART FAILURE (H): Primary | ICD-10-CM

## 2024-10-15 DIAGNOSIS — Z79.899 MEDICATION MANAGEMENT: ICD-10-CM

## 2024-10-15 DIAGNOSIS — I48.20 CHRONIC ATRIAL FIBRILLATION (H): ICD-10-CM

## 2024-10-15 DIAGNOSIS — Z09 HOSPITAL DISCHARGE FOLLOW-UP: Primary | ICD-10-CM

## 2024-10-15 LAB
ALBUMIN MFR UR ELPH: 40.7 MG/DL
ALBUMIN UR-MCNC: 100 MG/DL
APPEARANCE UR: CLEAR
BILIRUB UR QL STRIP: NEGATIVE
COLOR UR AUTO: YELLOW
CREAT UR-MCNC: 25 MG/DL
ERYTHROCYTE [DISTWIDTH] IN BLOOD BY AUTOMATED COUNT: 16.6 % (ref 10–15)
GLUCOSE UR STRIP-MCNC: 500 MG/DL
HCT VFR BLD AUTO: 37.9 % (ref 40–53)
HGB BLD-MCNC: 11.4 G/DL (ref 13.3–17.7)
HGB UR QL STRIP: NEGATIVE
KETONES UR STRIP-MCNC: NEGATIVE MG/DL
LEUKOCYTE ESTERASE UR QL STRIP: NEGATIVE
MCH RBC QN AUTO: 26 PG (ref 26.5–33)
MCHC RBC AUTO-ENTMCNC: 30.1 G/DL (ref 31.5–36.5)
MCV RBC AUTO: 86 FL (ref 78–100)
NITRATE UR QL: NEGATIVE
PH UR STRIP: 7 [PH] (ref 5–7)
PLATELET # BLD AUTO: 391 10E3/UL (ref 150–450)
PROT/CREAT 24H UR: 1.63 MG/MG CR (ref 0–0.2)
RBC # BLD AUTO: 4.39 10E6/UL (ref 4.4–5.9)
RBC #/AREA URNS AUTO: NORMAL /HPF
SP GR UR STRIP: 1.01 (ref 1–1.03)
UROBILINOGEN UR STRIP-ACNC: 0.2 E.U./DL
WBC # BLD AUTO: 11.4 10E3/UL (ref 4–11)
WBC #/AREA URNS AUTO: NORMAL /HPF

## 2024-10-15 PROCEDURE — 99495 TRANSJ CARE MGMT MOD F2F 14D: CPT | Performed by: PHYSICIAN ASSISTANT

## 2024-10-15 PROCEDURE — 80048 BASIC METABOLIC PNL TOTAL CA: CPT | Performed by: PHYSICIAN ASSISTANT

## 2024-10-15 PROCEDURE — 36415 COLL VENOUS BLD VENIPUNCTURE: CPT | Performed by: PHYSICIAN ASSISTANT

## 2024-10-15 PROCEDURE — 81001 URINALYSIS AUTO W/SCOPE: CPT | Performed by: PHYSICIAN ASSISTANT

## 2024-10-15 PROCEDURE — 84156 ASSAY OF PROTEIN URINE: CPT | Performed by: PHYSICIAN ASSISTANT

## 2024-10-15 PROCEDURE — 85027 COMPLETE CBC AUTOMATED: CPT | Performed by: PHYSICIAN ASSISTANT

## 2024-10-15 ASSESSMENT — PAIN SCALES - GENERAL: PAINLEVEL: NO PAIN (0)

## 2024-10-15 NOTE — PROGRESS NOTES
Song Hogue is a 78 year old, presenting for the following health issues:  Hospital F/U    Hospital Course (discharge summary dated 10/12/24)  This is a 78-year-old male with history of heart failure with reduced ejection fraction recovered EF, chronic persistent A-fib with history of pauses up to 4 seconds, coronary artery disease, CKD 3, gout, hypertension, hyperlipidemia, psoriasis, history of syncope with history of nonsustained V. tach, now come to the ER with complaint of worsening shortness of breath lower extremity edema found to be in acute exacerbation of CHF and subsequently get admitted.     Final discharge diagnosis Hospital course     Acute on chronic heart failure with preserved ejection fraction: Resolved  Heart failure with recovered EF  NSTEMI type II secondary to CHF     This is a 78-year-old male with multiple comorbidities as above, presented with worsening shortness of breath, lower extremity edema.  Has bilateral crackles, lower extremity edema on exam, chest x-ray shows increased diffuse interstitial opacity in the lungs likely due to pulmonary edema, trace bilateral pleural effusion.  BNP elevated to 6636.  Echocardiogram done , shows EF of 60 to 65% mildly decreased right ventricular systolic function.  Mild mitral regurgitation and mild mitral stenosis  Patient responded to the IV Lasix 40 mg twice daily and symptoms resolved  He is on torsemide at home and is compliant with his medication, but not compliant with salt intake.  Mildly elevated troponin most likely because of CHF, troponin remain flat 36 and 36     He was on IV Lasix 40 mg twice daily, his weight down 8 pounds since admission,  Strict intake and output charting  Daily weight, current weight 202 on admission weight 208 pounds  Low-sodium diet/2 g sodium diet  He is overall improved with IV diuresis, creatinine is trending up.  Agree with stopping IV Lasix, started on oral torsemide, started on 20 mg giving  worsening kidney functions okay with that for now.    Discontinue Coreg giving significant pauses  Holding Jardiance 10 mg daily because of renal failure, continue hydralazine 100 mg 3 times a day, Imdur 120 mg daily  Jardiance put on hold on 10/10/2024 for worsening renal function.  -PTA Torsemide 20 mg M,W,F and 40 mg on all other days while receiving IV diuresis.  -Discontinue losartan given worsening renal failure at this time.     At this time the patient is euvolemic, doing well denies any chest pain shortness of breath orthopnea PND palpitation, lower extremity edema improved.  Given worsening renal function, will hold his torsemide for couple of days, and resume his torsemide from 10/14/2024.  He will follow-up with his primary care physician as well as his nephrologist as outpatient.  Discontinue losartan, and hold Jardiance till he sees nephrologist.     Continue hydralazine 100 mg 3 times a day, Imdur 120 mg daily,     CHF is resolved at this time.  Patient is discharged home in stable improved condition.  As cleared by the nephrology.     Chronic atrial fibrillation since 2018  Significant pauses up to 3.7-3.9 seconds     Patient has chronic atrial fibrillation, he is on Coreg 6.25 mg twice daily  He is anticoagulated with Xarelto we will continue that.  Patient history of loop recorder in the past, and has pauses 3 to 4 seconds in the past.  Cardiology is following closely, treating conservatively as asymptomatic.  Consult cardiology to reevaluate the patient, I agree with discontinuing the Coreg at this time  Overnight he has a 3.9-second pause, and during the day he is getting 3.3 to 3.5-second pauses  EP is consulted, does not recommending pacemaker at this time but most likely will need in the future.  On discharge discontinue Coreg        KATELIN  Chronic kidney disease-stage 3  Patient baseline creatinine is between 1.4-1.79  Creatinine on admission 2.12, slightly increased to 2.46 with diuresis.  IV  "Lasix discontinued, on oral torsemide, Jardiance on hold  Creatinine remained between 2.4-2.5 at this time.  Discontinue losartan, hold Bumex for couple more days.  Hold Jardiance daily see his nephrology next week     Coronary artery disease   Status post OLESYA to the LAD and D2 in 2019  In July 2019, he was admitted to the hospital with acute hypertensive emergency and found to have acute LV dysfunction and EF reduction to 30%. This normalized with control of blood pressure. However, he had an angiogram at the time which showed a lesion in mid LAD with other extensive small vessel disease. 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 Therefore, he underwent a PCI of his LAD bifurcation into the diagonal.   *Troponin: 36, serial troponin remain flat.  -Resume PTA Aspirin 81 mg daily  -Trend troponin q2h until flat        Hypertension: Well-controlled  *SBP in -158  -Resume PTA amlodipine 5 mg daily  -Discontinued carvedilol 6.25 mg BID giving significant positive  -Resume PTA hydralazine 100 mg TID  -Hold/discontinue PTA losartan due to KATELIN     Hyperlipidemia  *Total cholesterol 1/29/24: 154  *Non-HDL cholesterol 1/29/24: 119  -Resume PTA rosuvastatin 40 mg daily in the evening  -Resume PTA ezetimibe 10 mg daily     Acute normocytic anemia   *Hgb 10/8/2024: 10.8  *Hgb normal at baseline  *No signs or symptoms of bleeding. This is likely dilutional in the setting of fluid overload from CHF exacerbation.  -Recheck CBC 10/9/24 in AM     Right leg swelling more than left  Patient has bilateral leg swelling, but right leg to swell more than left.  Ultrasound negative for DVT.  Leg swelling improved after diuresis.    HPI     Pt plans to switch nephrology to Dr. Hammond and has appt end of this month.  He evaluated pt in hosp and note reviewed with pt  Max wt 208 lbs in hosp and now wt 199.5 lbs at home (today and yesterday)  Denies chest pain or sob but feels \"tired\" today with " walking in from parking lot  Today ankles more swollen than yesterday despite stable weight  He had meatballs from [employer] yesterday (?high sodium)  Losartan and jardiance held due to KATELIN until he can follow up with nephrology  He wasn't aware that he should hold Jardiance so has restarted that now that back home  Coreg was stopped due to sig pauses in the past  Current torsemide dose 20mg  Current BPs at home 136/67, 150/71, 161/69, 154/70    He is avoiding nsaids; he did take a tylenol yesterday    Pt plans to travel to [place] to see his grandson play football in 2 days and wonders if safe to travel             Hospital Follow-up Visit:    Hospital/Nursing Home/IP Rehab Facility: [facility]  Date of Admission: 10/08/2024  Date of Discharge: 10/12/2024  Reason(s) for Admission:     Renal insufficiency  Exertional dyspnea  Chronic atrial fibrillation (H)  Elevated troponin  Anticoagulated  Acute on chronic congestive heart failure, unspecified heart failure type (H)      Was the patient in the ICU or did the patient experience delirium during hospitalization?  No  Do you have any other stressors you would like to discuss with your provider? No    Problems taking medications regularly:  None  Medication changes since discharge: see discharge summary  Problems adhering to non-medication therapy:  None    Summary of hospitalization:  [facility] discharge summary reviewed  Diagnostic Tests/Treatments reviewed.  Follow up needed: nephrology,cardiology  Other Healthcare Providers Involved in Patient s Care:         Specialist appointment - Dr. Ravi end of this month    Past Medical History:   Diagnosis Date    Acute diastolic congestive heart failure (H) 06/2019    hosp fsd, then fu echo ef nl; echo 2023 nl ef    ASCVD (arteriosclerotic cardiovascular disease) 1997    angio with 40% circ lesion; 6/19 hosp for chf, 3 vessel dz on angio but porcelin aorta so ptca done    Atrial  fibrillation (H) 10/09/2018    found on routine physical    Basal cell carcinoma     Carotid artery plaque 2005    seen on us, about 50% stenosis, fu 2009 us no significant stenosis    CKD (chronic kidney disease) stage 3, GFR 30-59 ml/min (H)     Elevated blood sugar     Gout     on allopurinol    HTN (hypertension)     Hypercholesteremia     Hyponatremia 2015    felt due to chlorthalidone    Low mean corpuscular volume (MCV)     Near syncope 05/2015    fu est echo low level but neg    Psoriasis     Dr. Marti    Renal mass 06/29/2019    seen on ct chest for sob, needs fu ct for that, fu us done 7/19 and no mass seen, should have fu ct in December, had fu us 3/22 and no fu needed    Screening 2012    abd us no aaa    Syncope 09/2019    hosp fsd, cause not clear, lowered coreg dose; seen by Dr. Lezama of ep and ep study neg, implanted event monitor    V-tach (H) 09/2019    seen on event monitor for fu syncope, then ep study and no inducible vtach     Past Surgical History:   Procedure Laterality Date    CATARACT EXTRACTION  03/2022    CATARACT EXTRACTION  2022    CV CORONARY ANGIOGRAM N/A 07/02/2019    Procedure: Coronary Angiogram;  Surgeon: Donaldo Loera MD;  Location:  HEART CARDIAC CATH LAB    CV HEART CATHETERIZATION WITH POSSIBLE INTERVENTION N/A 07/05/2019    Procedure: Heart Catheterization with Possible Intervention;  Surgeon: Manolo Hu MD;  Location:  HEART CARDIAC CATH LAB    CV LEFT HEART CATH N/A 07/02/2019    Procedure: Left Heart Cath;  Surgeon: Donaldo Loera MD;  Location:  HEART CARDIAC CATH LAB    CV LEFT VENTRICULOGRAM N/A 07/02/2019    Procedure: Left Ventriculogram;  Surgeon: Donaldo Loera MD;  Location:  HEART CARDIAC CATH LAB    CV PCI STENT DRUG ELUTING N/A 07/05/2019    Procedure: PCI Stent Drug Eluting;  Surgeon: Manolo Hu MD;  Location:  HEART CARDIAC CATH LAB    CV RIGHT HEART CATH MEASUREMENTS RECORDED N/A 07/02/2019    Procedure:  Right Heart Cath;  Surgeon: Donaldo Loera MD;  Location:  HEART CARDIAC CATH LAB    EP LOOP RECORDER IMPLANT N/A 10/11/2019    Procedure: EP Loop Recorder Implant;  Surgeon: Fuad Lezama MD;  Location:  HEART CARDIAC CATH LAB    EP RIGHT VENTRICULAR RECORDING N/A 10/11/2019    Procedure: EP Ventricular Pacing;  Surgeon: Fuad Lezama MD;  Location:  HEART CARDIAC CATH LAB    ZZC ANESTH,DX ARTHROSCOPIC PROC KNEE JOINT  2009     Social History     Tobacco Use    Smoking status: Former     Current packs/day: 0.00     Average packs/day: 1 pack/day for 30.0 years (30.0 ttl pk-yrs)     Types: Cigarettes     Start date: 1968     Quit date: 1998     Years since quittin.1    Smokeless tobacco: Never   Substance Use Topics    Alcohol use: Yes     Comment: 3-4 drinks per week     Current Outpatient Medications   Medication Sig Dispense Refill    allopurinol (ZYLOPRIM) 300 MG tablet TAKE 1 TABLET DAILY 90 tablet 3    amLODIPine (NORVASC) 5 MG tablet Take 1 tablet (5 mg) by mouth daily 90 tablet 3    aspirin (ASA) 81 MG EC tablet Take 1 tablet (81 mg) by mouth daily 90 tablet 3    calcipotriene (DOVONOX) 0.005 % external cream Mix efudex + calcipotriene equally. Apply BID x 4-10 days. Stop when skin gets red. 60 g 1    clobetasol propionate (TEMOVATE) 0.05 % external cream Apply topically 2 times daily To feet as needed 60 g 3    empagliflozin (JARDIANCE) 10 MG TABS tablet Take 1 tablet (10 mg) by mouth daily.      ezetimibe (ZETIA) 10 MG tablet Take 1 tablet (10 mg) by mouth daily 90 tablet 3    fluorouracil (EFUDEX) 5 % external cream Mix equally with calcipotriene cream. Apply BID x 4-10 days. Stop when skin gets red. 40 g 0    hydrALAZINE (APRESOLINE) 100 MG tablet Take 1 tablet (100 mg) by mouth 3 times daily 270 tablet 3    isosorbide mononitrate CR (IMDUR) 120 MG 24 HR ER tablet Take 1 tablet (120 mg) by mouth daily 90 tablet 3    ketoconazole (NIZORAL) 2 % external shampoo Massage  "into wet scalp, let sit 3-5 min, then rinse. Do this 3x weekly. 120 mL 11    rivaroxaban ANTICOAGULANT (XARELTO) 15 MG TABS tablet Take 1 tablet (15 mg) by mouth daily (with dinner). 30 tablet 0    rosuvastatin (CRESTOR) 10 MG tablet Take 1 tablet (10 mg) by mouth at bedtime. 30 tablet 0    torsemide (DEMADEX) 20 MG tablet Take 1 tablet (20 mg) by mouth daily. 30 tablet 0    triamcinolone (KENALOG) 0.1 % external cream Apply topically 2 times daily To psoriasis on arms and body as needed 454 g 1     Allergies   Allergen Reactions    Ace Inhibitors Anaphylaxis     Edema of ace    Eliquis [Apixaban] Other (See Comments)     Facial numbness     PHYSICAL EXAM:    BP (!) 170/72   Pulse 66   Temp 97.7  F (36.5  C) (Oral)   Resp 18   Ht 1.676 m (5' 6\")   Wt 92.5 kg (204 lb)   SpO2 97%   BMI 32.93 kg/m      Patient appears non toxic  Rechecked BP manually 166/70  Lungs: CTA bilat  Heart: RRR  Extr: bilat ankle edema, no weeping, no erythema of skin      Results for orders placed or performed in visit on 10/15/24   CBC with platelets     Status: Abnormal   Result Value Ref Range    WBC Count 11.4 (H) 4.0 - 11.0 10e3/uL    RBC Count 4.39 (L) 4.40 - 5.90 10e6/uL    Hemoglobin 11.4 (L) 13.3 - 17.7 g/dL    Hematocrit 37.9 (L) 40.0 - 53.0 %    MCV 86 78 - 100 fL    MCH 26.0 (L) 26.5 - 33.0 pg    MCHC 30.1 (L) 31.5 - 36.5 g/dL    RDW 16.6 (H) 10.0 - 15.0 %    Platelet Count 391 150 - 450 10e3/uL   Basic metabolic panel  (Ca, Cl, CO2, Creat, Gluc, K, Na, BUN)     Status: Abnormal   Result Value Ref Range    Sodium 141 135 - 145 mmol/L    Potassium 4.0 3.4 - 5.3 mmol/L    Chloride 101 98 - 107 mmol/L    Carbon Dioxide (CO2) 24 22 - 29 mmol/L    Anion Gap 16 (H) 7 - 15 mmol/L    Urea Nitrogen 44.4 (H) 8.0 - 23.0 mg/dL    Creatinine 2.18 (H) 0.67 - 1.17 mg/dL    GFR Estimate 30 (L) >60 mL/min/1.73m2    Calcium 9.5 8.8 - 10.4 mg/dL    Glucose 104 (H) 70 - 99 mg/dL   Protein  random urine     Status: Abnormal   Result Value Ref " Range    Total Protein Urine mg/dL 40.7   mg/dL    Total Protein Urine mg/mg Creat 1.63 (H) 0.00 - 0.20 mg/mg Cr    Creatinine Urine mg/dL 25.0 mg/dL   UA with Microscopic     Status: Abnormal   Result Value Ref Range    Color Urine Yellow Colorless, Straw, Light Yellow, Yellow    Appearance Urine Clear Clear    Glucose Urine 500 (A) Negative mg/dL    Bilirubin Urine Negative Negative    Ketones Urine Negative Negative mg/dL    Specific Gravity Urine 1.015 1.003 - 1.035    Blood Urine Negative Negative    pH Urine 7.0 5.0 - 7.0    Protein Albumin Urine 100 (A) Negative mg/dL    Urobilinogen Urine 0.2 0.2, 1.0 E.U./dL    Nitrite Urine Negative Negative    Leukocyte Esterase Urine Negative Negative   Urine Microscopic Exam     Status: Normal   Result Value Ref Range    RBC Urine None Seen 0-2 /HPF /HPF    WBC Urine None Seen 0-5 /HPF /HPF     Assessment and Plan:     (Z09) Hospital discharge follow-up  (primary encounter diagnosis)  Comment: denies chest pain or sob today but admits he is feeling very tired  Plan: reviewed notes from hospitalization with pt during visit. He was not aware that he should be holding jardiance until his follow up with nephrology which is scheduled for the end of the month with Dr. Hammond. ADDENDUM: I did call pt on 10/16 and he notes he is feeling a lot better fortunately.   today and wt stable.    (I50.9) Acute on chronic congestive heart failure, unspecified heart failure type (H)  Comment: weight is stable. He notes ankle edema worse today than yesterday but did have meatballs which may have been high in sodium.   Plan: Med Therapy Management Referral            (N18.32) Stage 3b chronic kidney disease (H)  Comment:   Plan: Urine Microscopic Exam            (I10) Benign essential hypertension  Comment: Readings fluctuating according to his records from 130s-160s syst. Cont to monitor. Bring in readings to follow up visit next week with PCP. Losartan currently held.  Plan: Basic  metabolic panel  (Ca, Cl, CO2, Creat,         Gluc, K, Na, BUN)            (I48.20) Chronic atrial fibrillation (H)  Comment:   Plan: coreg discontinued during hospitalization due to pauses. Pt to follow up with cardiology as likely need pacemaker. Cont anticoagulation.    (D64.9) Normocytic anemia  Comment:   Plan: stable    (Z79.899) Medication management  Comment:   Plan:       Nia Leiva PA-C

## 2024-10-15 NOTE — TELEPHONE ENCOUNTER
Patient was admitted to [facility] on 10/8/24 with acute shortness of breath. Cardiology was consulted for heart failure.     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 Tach.    10/9/24: Echo showed EF of 60-65%. Diastolic Doppler findings (E/E' ratio and/or other parameters) suggest left ventricular filling pressures are increased.  Mildly decreased right ventricular systolic function. Severe biatrial enlargement. There is mild (1+) mitral regurgitation.There is mild mitral stenosis.    Pt had episode of 3.9-second pause overnight, and during the day he is getting 3.3 to 3.5-second pauses. EP is consulted, does not recommending pacemaker at this time but most likely will need in the future.    IV Lasix diuresed 8 lbs.    PTA Jardiance placed on hold due to elevated kidney function tests. PTA Xarelto, Demadex continued. PTA Losartan and Coreg were discontinued at time of discharge.    Called patient to discuss any post hospital d/c questions, review discharge medication, and confirm f/u appts. Patient denied any questions regarding new or changes to PTA medications.     Patient denied any SOB, chest pain or light headedness. States he was seen by a PA at his primary clinic today. States his ankles were swelling more today, but LS were CTA. HTN with /60's. Pt has been elevated legs in recliner. Daily wt has been stable at 199 lbs. Scheduled for OV with his PMD next Thursday. No medication changes made.    RN confirmed with patient that he Pt needs to be scheduled for a cardiology OV as recommended by Dr. Corley during hospitalization. Order placed. Scheduling and Dr. Hidalgo's Team RN phone number provided.    Pt instructed to continue daily wt measurements.    Patient advised to call clinic with  any cardiac related questions or concerns prior to his appt. Patient verbalized understanding and agreed with plan. SUNNY No RN.

## 2024-10-15 NOTE — NURSING NOTE
" BP:  BP (!) 170/72   Pulse 66   Temp 97.7  F (36.5  C) (Oral)   Resp 18   Ht 1.676 m (5' 6\")   Wt 92.5 kg (204 lb)   SpO2 97%   BMI 32.93 kg/m       66  Disposition: provider notified while patient in the clinic   "

## 2024-10-16 LAB
ANION GAP SERPL CALCULATED.3IONS-SCNC: 16 MMOL/L (ref 7–15)
BUN SERPL-MCNC: 44.4 MG/DL (ref 8–23)
CALCIUM SERPL-MCNC: 9.5 MG/DL (ref 8.8–10.4)
CHLORIDE SERPL-SCNC: 101 MMOL/L (ref 98–107)
CREAT SERPL-MCNC: 2.18 MG/DL (ref 0.67–1.17)
EGFRCR SERPLBLD CKD-EPI 2021: 30 ML/MIN/1.73M2
GLUCOSE SERPL-MCNC: 104 MG/DL (ref 70–99)
HCO3 SERPL-SCNC: 24 MMOL/L (ref 22–29)
POTASSIUM SERPL-SCNC: 4 MMOL/L (ref 3.4–5.3)
SODIUM SERPL-SCNC: 141 MMOL/L (ref 135–145)

## 2024-10-16 NOTE — RESULT ENCOUNTER NOTE
Mykel,    Your kidney function (creatinine) has improved from 2.50 to 2.18 so happy to see that go in the right direction. Your electrolytes are normal. Your hemoglobin is stable at 11.4.  Your white blood cell count is slightly elevated so Dr. Vernon should recheck that at your follow up next week.  Let me know how you are feeling today and what your weight and blood pressure are running once you are up for the day.  We will have to let your symptoms guide us in terms of your trip to [place].    Nia Leiva PA-C

## 2024-10-24 ENCOUNTER — OFFICE VISIT (OUTPATIENT)
Dept: FAMILY MEDICINE | Facility: CLINIC | Age: 79
End: 2024-10-24
Payer: COMMERCIAL

## 2024-10-24 VITALS
SYSTOLIC BLOOD PRESSURE: 131 MMHG | RESPIRATION RATE: 16 BRPM | OXYGEN SATURATION: 95 % | TEMPERATURE: 97.7 F | HEIGHT: 66 IN | WEIGHT: 201 LBS | HEART RATE: 63 BPM | BODY MASS INDEX: 32.3 KG/M2 | DIASTOLIC BLOOD PRESSURE: 70 MMHG

## 2024-10-24 DIAGNOSIS — I10 BENIGN ESSENTIAL HYPERTENSION: ICD-10-CM

## 2024-10-24 DIAGNOSIS — I50.32 CHRONIC HEART FAILURE WITH PRESERVED EJECTION FRACTION (H): ICD-10-CM

## 2024-10-24 DIAGNOSIS — I25.10 ASCVD (ARTERIOSCLEROTIC CARDIOVASCULAR DISEASE): ICD-10-CM

## 2024-10-24 DIAGNOSIS — I48.20 CHRONIC ATRIAL FIBRILLATION (H): ICD-10-CM

## 2024-10-24 DIAGNOSIS — N17.9 AKI (ACUTE KIDNEY INJURY) (H): ICD-10-CM

## 2024-10-24 DIAGNOSIS — N18.32 STAGE 3B CHRONIC KIDNEY DISEASE (H): Primary | ICD-10-CM

## 2024-10-24 PROBLEM — I48.91 ATRIAL FIBRILLATION (H): Status: RESOLVED | Noted: 2018-10-09 | Resolved: 2024-10-24

## 2024-10-24 LAB
ANION GAP SERPL CALCULATED.3IONS-SCNC: 15 MMOL/L (ref 7–15)
BUN SERPL-MCNC: 26.4 MG/DL (ref 8–23)
CALCIUM SERPL-MCNC: 9.8 MG/DL (ref 8.8–10.4)
CHLORIDE SERPL-SCNC: 101 MMOL/L (ref 98–107)
CREAT SERPL-MCNC: 1.74 MG/DL (ref 0.67–1.17)
EGFRCR SERPLBLD CKD-EPI 2021: 40 ML/MIN/1.73M2
ERYTHROCYTE [DISTWIDTH] IN BLOOD BY AUTOMATED COUNT: 16.6 % (ref 10–15)
GLUCOSE SERPL-MCNC: 115 MG/DL (ref 70–99)
HCO3 SERPL-SCNC: 23 MMOL/L (ref 22–29)
HCT VFR BLD AUTO: 34.6 % (ref 40–53)
HGB BLD-MCNC: 10.6 G/DL (ref 13.3–17.7)
MCH RBC QN AUTO: 26.2 PG (ref 26.5–33)
MCHC RBC AUTO-ENTMCNC: 30.6 G/DL (ref 31.5–36.5)
MCV RBC AUTO: 85 FL (ref 78–100)
PLATELET # BLD AUTO: 421 10E3/UL (ref 150–450)
POTASSIUM SERPL-SCNC: 4 MMOL/L (ref 3.4–5.3)
RBC # BLD AUTO: 4.05 10E6/UL (ref 4.4–5.9)
SODIUM SERPL-SCNC: 139 MMOL/L (ref 135–145)
WBC # BLD AUTO: 8.8 10E3/UL (ref 4–11)

## 2024-10-24 PROCEDURE — 80048 BASIC METABOLIC PNL TOTAL CA: CPT | Performed by: INTERNAL MEDICINE

## 2024-10-24 PROCEDURE — 99214 OFFICE O/P EST MOD 30 MIN: CPT | Performed by: INTERNAL MEDICINE

## 2024-10-24 PROCEDURE — 85027 COMPLETE CBC AUTOMATED: CPT | Performed by: INTERNAL MEDICINE

## 2024-10-24 PROCEDURE — 36415 COLL VENOUS BLD VENIPUNCTURE: CPT | Performed by: INTERNAL MEDICINE

## 2024-10-24 ASSESSMENT — PAIN SCALES - GENERAL: PAINLEVEL_OUTOF10: NO PAIN (0)

## 2024-10-24 NOTE — PATIENT INSTRUCTIONS
Get the covid and flu shots at your pharmacy.    I will get back to you with the labs.    Call if you start getting shortness of breath or your weight starts going up.    See me in 6 weeks    Rudy Vernon M.D.

## 2024-10-24 NOTE — PROGRESS NOTES
This is a 78-year-old with multiple medical problems who I am seeing for follow-up.  As noted and reviewed he was hospitalized from October 8 through October 12 at [facility] who presented the ER with worsening shortness of breath with lower extremity edema and found to be in acute congestive heart failure and was admitted.  He has a history of systolic heart failure in 2019 but no issues since then and no history of diastolic heart failure.  Cardiac echo during that admission showed a normal EF with mildly decreased RV systolic function and mild mitral regurgitation with mild mitral stenosis.  His BNP was elevated at 6636.  He was treated with IV Lasix twice a day and his symptoms resolved.  He was felt to have an elevated troponin due to type II coronary disease.  He had acute renal failure during his hospital stay on top of acute renal insufficiency.  He was encouraged to have a low-sodium diet was placed on torsemide on discharge.  He has a history of A-fib with pauses up to 4 seconds so his Coreg was discontinued.  His Jardiance was held because of acute renal failure and hydralazine and Imdur were continued.  His losartan was held due to worsening renal function.  It was recommended he follow-up with nephrology on discharge.  He was discharged home in stable condition.  During his hospital stay he did have a 3.9-second pause with 3.3 to 3.5-second pauses during the day.  EP saw the patient and recommended discontinuation of Coreg at this time.    The patient has a history of coronary disease with drug-eluting stents in 2019.    Patient has hypertension which was well-controlled.  As noted carvedilol and losartan were stopped    Patient has hyperlipidemia and is on atorvastatin and ezetimibe for this.    Patient was found to have acute normocytic anemia.  It was felt to be delusional.  Patient had right leg swelling more than the left and an ultrasound was negative for DVT, this improved with  diuresis.    Patient was seen in follow-up by Nia as noted on October 15 at which time his creatinine had improved down to 2.18.  Hemoglobin 11.4.    As noted the patient is off losartan, carvedilol and Jardiance.  He has follow-up with renal and cardiology.    He is really doing well.  He brings in his blood pressure and weights and his weights are down.  His blood pressure is not always under great control.  He feels fine.  Minimal edema.  No chest pain or shortness of breath or GI symptoms.    Past Medical History:   Diagnosis Date    Acute diastolic congestive heart failure (H) 06/2019    hosp fsd, then fu echo ef nl; echo 2023 nl ef    ASCVD (arteriosclerotic cardiovascular disease) 1997    angio with 40% circ lesion; 6/19 hosp for chf, 3 vessel dz on angio but porcelin aorta so ptca done    Atrial fibrillation (H) 10/09/2018    found on routine physical    Basal cell carcinoma     Carotid artery plaque 2005    seen on us, about 50% stenosis, fu 2009 us no significant stenosis    CKD (chronic kidney disease) stage 3, GFR 30-59 ml/min (H)     Elevated blood sugar     Gout     on allopurinol    Heart failure with preserved ejection fraction, NYHA class IV (H) 10/2024    hosp fsd    HTN (hypertension)     Hypercholesteremia     Hyponatremia 2015    felt due to chlorthalidone    Low mean corpuscular volume (MCV)     Near syncope 05/2015    fu est echo low level but neg    Psoriasis     Dr. Marti    Renal mass 06/29/2019    seen on ct chest for sob, needs fu ct for that, fu us done 7/19 and no mass seen, should have fu ct in December, had fu us 3/22 and no fu needed    Screening 2012    abd us no aaa    Syncope 09/2019    hosp fsd, cause not clear, lowered coreg dose; seen by Dr. Lezama of ep and ep study neg, implanted event monitor    V-tach (H) 09/2019    seen on event monitor for fu syncope, then ep study and no inducible vtach     Past Surgical History:   Procedure Laterality Date    CATARACT EXTRACTION   2022    CATARACT EXTRACTION      CV CORONARY ANGIOGRAM N/A 2019    Procedure: Coronary Angiogram;  Surgeon: Donaldo Loera MD;  Location:  HEART CARDIAC CATH LAB    CV HEART CATHETERIZATION WITH POSSIBLE INTERVENTION N/A 2019    Procedure: Heart Catheterization with Possible Intervention;  Surgeon: Manolo Hu MD;  Location:  HEART CARDIAC CATH LAB    CV LEFT HEART CATH N/A 2019    Procedure: Left Heart Cath;  Surgeon: Donaldo Loera MD;  Location:  HEART CARDIAC CATH LAB    CV LEFT VENTRICULOGRAM N/A 2019    Procedure: Left Ventriculogram;  Surgeon: Donaldo Loera MD;  Location:  HEART CARDIAC CATH LAB    CV PCI STENT DRUG ELUTING N/A 2019    Procedure: PCI Stent Drug Eluting;  Surgeon: Manolo Hu MD;  Location:  HEART CARDIAC CATH LAB    CV RIGHT HEART CATH MEASUREMENTS RECORDED N/A 2019    Procedure: Right Heart Cath;  Surgeon: Donaldo Loera MD;  Location:  HEART CARDIAC CATH LAB    EP LOOP RECORDER IMPLANT N/A 10/11/2019    Procedure: EP Loop Recorder Implant;  Surgeon: Fuad Lezama MD;  Location:  HEART CARDIAC CATH LAB    EP RIGHT VENTRICULAR RECORDING N/A 10/11/2019    Procedure: EP Ventricular Pacing;  Surgeon: Fuad Lezama MD;  Location:  HEART CARDIAC CATH LAB    ZZC ANESTH,DX ARTHROSCOPIC PROC KNEE JOINT  2009     Social History     Socioeconomic History    Marital status:      Spouse name: Not on file    Number of children: 2    Years of education: Not on file    Highest education level: Not on file   Occupational History    Occupation: retired   Tobacco Use    Smoking status: Former     Current packs/day: 0.00     Average packs/day: 1 pack/day for 30.0 years (30.0 ttl pk-yrs)     Types: Cigarettes     Start date: 1968     Quit date: 1998     Years since quittin.1    Smokeless tobacco: Never   Vaping Use    Vaping status: Never Used   Substance and Sexual  Activity    Alcohol use: Yes     Comment: 3-4 drinks per week    Drug use: No    Sexual activity: Not Currently     Partners: Female   Other Topics Concern    Parent/sibling w/ CABG, MI or angioplasty before 65F 55M? Not Asked   Social History Narrative    Not on file     Social Drivers of Health     Financial Resource Strain: Low Risk  (1/29/2024)    Financial Resource Strain     Within the past 12 months, have you or your family members you live with been unable to get utilities (heat, electricity) when it was really needed?: No   Food Insecurity: Low Risk  (1/29/2024)    Food Insecurity     Within the past 12 months, did you worry that your food would run out before you got money to buy more?: No     Within the past 12 months, did the food you bought just not last and you didn t have money to get more?: No   Transportation Needs: Low Risk  (1/29/2024)    Transportation Needs     Within the past 12 months, has lack of transportation kept you from medical appointments, getting your medicines, non-medical meetings or appointments, work, or from getting things that you need?: No   Physical Activity: Not on file   Stress: Not on file   Social Connections: Unknown (2/16/2023)    Received [facility], [facility]    Social Connections     Frequency of Communication with Friends and Family: Not on file   Interpersonal Safety: Low Risk  (10/24/2024)    Interpersonal Safety     Do you feel physically and emotionally safe where you currently live?: Yes     Within the past 12 months, have you been hit, slapped, kicked or otherwise physically hurt by someone?: No     Within the past 12 months, have you been humiliated or emotionally abused in other ways by your partner or ex-partner?: No   Housing Stability: Low Risk  (1/29/2024)    Housing Stability     Do you have housing? : Yes     Are you worried about losing your housing?: No     Current Outpatient  "Medications   Medication Sig Dispense Refill    allopurinol (ZYLOPRIM) 300 MG tablet TAKE 1 TABLET DAILY 90 tablet 3    amLODIPine (NORVASC) 5 MG tablet Take 1 tablet (5 mg) by mouth daily 90 tablet 3    aspirin (ASA) 81 MG EC tablet Take 1 tablet (81 mg) by mouth daily 90 tablet 3    calcipotriene (DOVONOX) 0.005 % external cream Mix efudex + calcipotriene equally. Apply BID x 4-10 days. Stop when skin gets red. 60 g 1    clobetasol propionate (TEMOVATE) 0.05 % external cream Apply topically 2 times daily To feet as needed 60 g 3    empagliflozin (JARDIANCE) 10 MG TABS tablet Take 1 tablet (10 mg) by mouth daily.      ezetimibe (ZETIA) 10 MG tablet Take 1 tablet (10 mg) by mouth daily 90 tablet 3    fluorouracil (EFUDEX) 5 % external cream Mix equally with calcipotriene cream. Apply BID x 4-10 days. Stop when skin gets red. 40 g 0    hydrALAZINE (APRESOLINE) 100 MG tablet Take 1 tablet (100 mg) by mouth 3 times daily 270 tablet 3    isosorbide mononitrate CR (IMDUR) 120 MG 24 HR ER tablet Take 1 tablet (120 mg) by mouth daily 90 tablet 3    ketoconazole (NIZORAL) 2 % external shampoo Massage into wet scalp, let sit 3-5 min, then rinse. Do this 3x weekly. 120 mL 11    rivaroxaban ANTICOAGULANT (XARELTO) 15 MG TABS tablet Take 1 tablet (15 mg) by mouth daily (with dinner). 30 tablet 0    rosuvastatin (CRESTOR) 10 MG tablet Take 1 tablet (10 mg) by mouth at bedtime. 30 tablet 0    torsemide (DEMADEX) 20 MG tablet Take 1 tablet (20 mg) by mouth daily. 30 tablet 0    triamcinolone (KENALOG) 0.1 % external cream Apply topically 2 times daily To psoriasis on arms and body as needed 454 g 1     Allergies   Allergen Reactions    Ace Inhibitors Anaphylaxis     Edema of ace    Eliquis [Apixaban] Other (See Comments)     Facial numbness     FAMILY HISTORY NOTED AND REVIEWED    REVIEW OF SYSTEMS: above    PHYSICAL EXAM    /70   Pulse 63   Temp 97.7  F (36.5  C) (Temporal)   Resp 16   Ht 1.676 m (5' 6\")   Wt 91.2 kg " (201 lb)   SpO2 95%   BMI 32.44 kg/m      Patient appears non toxic  Lungs clear  Cardiovascular irregularly irregular no murmur rub or gallop no JVP, trace ankle edema  Abdomen nontender    Labs sent    ASSESSMENT:  Acute diastolic heart failure, resolved, off Jardiance due to worsening renal function, off losartan due to worsening renal function, will compensated at this time  Hypertension, control not great, on amlodipine and hydralazine but off carvedilol and losartan as noted  Pauses, follow-up cardiology  Chronic A-fib, on NOAC, rate controlled  Anemia, follow-up today  Acute on top of chronic renal failure, labs today  Healthcare maintenance    PLAN:  Vaccines at pharmacy  Labs today  Will consider resumption of losartan and/or Jardiance  See me in 6 weeks  Call if weight starts going up or increase shortness of breath  Follow-up specialist    Rudy Vernon M.D.

## 2024-10-25 NOTE — RESULT ENCOUNTER NOTE
Mr. Anderson,    It was very nice seeing you.  Your creatinine or kidney test has improved.  I would like you to resume just the losartan.  After doing that I did like you to do a follow-up nonfasting lab in 2 weeks to check the creatinine again.  Please be sure to do this.  I will see you in 6 weeks.    Rudy Vernon M.D.

## 2024-11-05 ENCOUNTER — OFFICE VISIT (OUTPATIENT)
Dept: SLEEP MEDICINE | Facility: CLINIC | Age: 79
End: 2024-11-05
Attending: INTERNAL MEDICINE
Payer: COMMERCIAL

## 2024-11-05 VITALS
HEIGHT: 66 IN | SYSTOLIC BLOOD PRESSURE: 146 MMHG | HEART RATE: 71 BPM | WEIGHT: 203 LBS | OXYGEN SATURATION: 96 % | DIASTOLIC BLOOD PRESSURE: 77 MMHG | BODY MASS INDEX: 32.62 KG/M2

## 2024-11-05 DIAGNOSIS — G47.9 SLEEP DISORDER, UNSPECIFIED: Primary | ICD-10-CM

## 2024-11-05 DIAGNOSIS — I50.32 CHRONIC HEART FAILURE WITH PRESERVED EJECTION FRACTION (H): ICD-10-CM

## 2024-11-05 PROCEDURE — 99204 OFFICE O/P NEW MOD 45 MIN: CPT | Performed by: INTERNAL MEDICINE

## 2024-11-05 RX ORDER — LOSARTAN POTASSIUM 25 MG/1
25 TABLET ORAL DAILY
COMMUNITY
Start: 2024-10-31 | End: 2024-11-30

## 2024-11-05 ASSESSMENT — SLEEP AND FATIGUE QUESTIONNAIRES
HOW LIKELY ARE YOU TO NOD OFF OR FALL ASLEEP WHEN YOU ARE A PASSENGER IN A CAR FOR AN HOUR WITHOUT A BREAK: WOULD NEVER DOZE
HOW LIKELY ARE YOU TO NOD OFF OR FALL ASLEEP WHILE LYING DOWN TO REST IN THE AFTERNOON WHEN CIRCUMSTANCES PERMIT: SLIGHT CHANCE OF DOZING
HOW LIKELY ARE YOU TO NOD OFF OR FALL ASLEEP WHILE SITTING INACTIVE IN A PUBLIC PLACE: WOULD NEVER DOZE
HOW LIKELY ARE YOU TO NOD OFF OR FALL ASLEEP WHILE SITTING QUIETLY AFTER LUNCH WITHOUT ALCOHOL: WOULD NEVER DOZE
HOW LIKELY ARE YOU TO NOD OFF OR FALL ASLEEP WHILE SITTING AND TALKING TO SOMEONE: WOULD NEVER DOZE
HOW LIKELY ARE YOU TO NOD OFF OR FALL ASLEEP IN A CAR, WHILE STOPPED FOR A FEW MINUTES IN TRAFFIC: WOULD NEVER DOZE
HOW LIKELY ARE YOU TO NOD OFF OR FALL ASLEEP WHILE WATCHING TV: SLIGHT CHANCE OF DOZING
HOW LIKELY ARE YOU TO NOD OFF OR FALL ASLEEP WHILE SITTING AND READING: SLIGHT CHANCE OF DOZING

## 2024-11-05 NOTE — PROGRESS NOTES
Sleep Consultation:    Date on this visit: 11/5/2024    Betito Anderson  is referred by Rudy Vernon for a sleep consultation.     Primary Physician: Rudy Vernon     Betito Anderson is a 78 years old male with medical history significant for hypertension, coronary artery disease, chronic kidney disease, diabetes, chronic atrial fibrillation, history of nonsustained V. tach in the past, who was recently hospitalized with acute on chronic heart failure.     Last Echo from 10/8/24 showed normal left ventricular systolic function  with ejection fraction is60-65%. Mildly decreased right ventricular systolic function.Severe biatrial enlargement.There is mild (1+) mitral regurgitation.There is mild mitral stenosis.    Betito portillo snot think that he snores. Patient's wife does not complain of snoring. He does not have witnessed apneas.They never sleep separately.  Patient currently sleeps in a recliner. He denies morning dry mouth, morning headaches or restless legs.     Betito goes to sleep at 11:00 PM. He wakes up at 8:00 AM. He falls asleep in 15 minutes.  He wakes up 2-3 times a night for 10 minutes before falling back to sleep.  Betito wakes up to go to the bathroom.      Betito denies any sleep walking, sleep talking, dream enactment, sleep paralysis, cataplexy, and hypnogogic/hypnopompic hallucinations.    Betito denies difficulty breathing through his nose.      Patient's [place] Sleepiness score 3/24 consistent with no daytime sleepiness.      Betito naps 1-2 times per week for 30-60 minutes, feels refreshed after naps. He takes some inadvertant naps.  He denies closing eyes, dozing, and falling asleep while driving. Patient was counseled on the importance of driving while alert, to pull over if drowsy, or nap before getting into the vehicle if sleepy.      He uses 2 cups/day of coffee. Last caffeine intake is usually before 10 AM.    Problem List:  Patient Active Problem List    Diagnosis Date  Noted    Renal insufficiency 10/08/2024     Priority: Medium    Exertional dyspnea 10/08/2024     Priority: Medium    Chronic atrial fibrillation (H) 10/08/2024     Priority: Medium    Elevated troponin 10/08/2024     Priority: Medium    Anticoagulated 10/08/2024     Priority: Medium    Acute on chronic congestive heart failure, unspecified heart failure type (H) 10/08/2024     Priority: Medium    Chronic heart failure with preserved ejection fraction (H) 01/29/2024     Priority: Medium    Chronic kidney disease, stage 3 (H) 06/01/2021     Priority: Medium    Syncope 09/14/2019     Priority: Medium    V-tach (H) 09/01/2019     Priority: Medium     seen on event monitor for fu syncope, then ep study and no inducible vtach      Non morbid obesity, unspecified obesity type 09/08/2016     Priority: Medium    Gout, unspecified cause, unspecified chronicity, unspecified site 09/08/2016     Priority: Medium    Hyponatremia      Priority: Medium     felt due to chlorthalidone      Low mean corpuscular volume (MCV)      Priority: Medium    Psoriasis      Priority: Medium     Dr. Marti      Hyperlipidemia LDL goal <100 09/11/2012     Priority: Medium    ASCVD (arteriosclerotic cardiovascular disease)      Priority: Medium     angio with 40% circ lesion      Benign essential hypertension      Priority: Medium    Elevated blood sugar      Priority: Medium        Past Medical/Surgical History:  Past Medical History:   Diagnosis Date    Acute diastolic congestive heart failure (H) 06/2019    hosp fsd, then fu echo ef nl; echo 2023 nl ef    ASCVD (arteriosclerotic cardiovascular disease) 1997    angio with 40% circ lesion; 6/19 hosp for chf, 3 vessel dz on angio but porcelin aorta so ptca done    Atrial fibrillation (H) 10/09/2018    found on routine physical    Basal cell carcinoma     Carotid artery plaque 2005    seen on us, about 50% stenosis, fu 2009 us no significant stenosis    CKD (chronic kidney disease) stage 3, GFR  "30-59 ml/min (H)     Elevated blood sugar     Gout     on allopurinol    Heart failure with preserved ejection fraction, NYHA class IV (H) 10/2024    hosp fsd    HTN (hypertension)     Hypercholesteremia     Hyponatremia 2015    felt due to chlorthalidone    Low mean corpuscular volume (MCV)     Near syncope 05/2015    fu est echo low level but neg    Psoriasis     Dr. Marti    Renal mass 06/29/2019    seen on ct chest for sob, needs fu ct for that, fu us done 7/19 and no mass seen, should have fu ct in December, had fu us 3/22 and no fu needed    Screening 2012    abd us no aaa    Syncope 09/2019    hosp fsd, cause not clear, lowered coreg dose; seen by Dr. Lezama of ep and ep study neg, implanted event monitor    V-tach (H) 09/2019    seen on event monitor for fu syncope, then ep study and no inducible vtach     Physical Examination:  Vitals: BP (!) 146/77   Pulse 71   Ht 1.676 m (5' 6\")   Wt 92.1 kg (203 lb)   SpO2 96%   BMI 32.77 kg/m    BMI= Body mass index is 32.77 kg/m .  GENERAL APPEARANCE: healthy, alert, and no distress  HENT: oropharynx crowded  NEURO: mentation intact and speech normal  PSYCH: mentation appears normal and affect normal/bright  Mallampati Class: IV.  Tonsillar Stage: 1  hidden by pillars.    Impression/Plan:    To rule out sleep apnea  Chronic atrial fibrillation   Coronary artery disease   Hypertension     Patient is a 78 years old male with medical history significant for hypertension, coronary artery disease, chronic kidney disease, diabetes, chronic atrial fibrillation, history of nonsustained V. tach in the past, who was recently hospitalized with acute on chronic heart failure. He is sent for an evaluation of sleep disordered breathing. He does not thinks that he snores and denies knowledge of having witnessed apneas. Oropharynx is Mallampati class IV on examination. In the context of his medical comorbidity and demographic risk factors for sleep apnea, a PSG is recommended for " assessment of sleep disordered breathing.     Plan:     PSG for assessment of sleep apnea     Polysomnography reviewed.  Obstructive sleep apnea reviewed.  Complications of untreated sleep apnea were reviewed.      Electronically signed by Dr. Angel Self, Diplomate, Sleep Medicine, ABPN         CC: Rudy Vernon

## 2024-11-05 NOTE — NURSING NOTE
"Chief Complaint   Patient presents with    Sleep Problem     Referred from Primary Care       Initial BP (!) 146/77   Pulse 71   Ht 1.676 m (5' 6\")   Wt 92.1 kg (203 lb)   SpO2 96%   BMI 32.77 kg/m   Estimated body mass index is 32.77 kg/m  as calculated from the following:    Height as of this encounter: 1.676 m (5' 6\").    Weight as of this encounter: 92.1 kg (203 lb).    Medication Reconciliation: complete  ESS 3  Neck circumference: 45 centimeters.  Brynn Cohen MA   "

## 2024-11-15 ENCOUNTER — MYC MEDICAL ADVICE (OUTPATIENT)
Dept: FAMILY MEDICINE | Facility: CLINIC | Age: 79
End: 2024-11-15
Payer: COMMERCIAL

## 2024-11-15 DIAGNOSIS — I48.20 CHRONIC ATRIAL FIBRILLATION (H): ICD-10-CM

## 2024-11-25 ENCOUNTER — HOSPITAL ENCOUNTER (INPATIENT)
Facility: CLINIC | Age: 79
LOS: 3 days | Discharge: HOME | End: 2024-11-28
Attending: STUDENT IN AN ORGANIZED HEALTH CARE EDUCATION/TRAINING PROGRAM | Admitting: INTERNAL MEDICINE
Payer: COMMERCIAL

## 2024-11-25 ENCOUNTER — APPOINTMENT (OUTPATIENT)
Dept: GENERAL RADIOLOGY | Facility: CLINIC | Age: 79
DRG: 291 | End: 2024-11-25
Attending: STUDENT IN AN ORGANIZED HEALTH CARE EDUCATION/TRAINING PROGRAM
Payer: COMMERCIAL

## 2024-11-25 DIAGNOSIS — I50.9 ACUTE ON CHRONIC CONGESTIVE HEART FAILURE, UNSPECIFIED HEART FAILURE TYPE (H): ICD-10-CM

## 2024-11-25 DIAGNOSIS — E78.5 HYPERLIPIDEMIA LDL GOAL <100: ICD-10-CM

## 2024-11-25 DIAGNOSIS — R52 PAIN: Primary | ICD-10-CM

## 2024-11-25 LAB
ALBUMIN SERPL BCG-MCNC: 4.2 G/DL (ref 3.5–5.2)
ALP SERPL-CCNC: 101 U/L (ref 40–150)
ALT SERPL W P-5'-P-CCNC: 13 U/L (ref 0–70)
ANION GAP SERPL CALCULATED.3IONS-SCNC: 13 MMOL/L (ref 7–15)
AST SERPL W P-5'-P-CCNC: 22 U/L (ref 0–45)
ATRIAL RATE - MUSE: NORMAL BPM
ATRIAL RATE - MUSE: NORMAL BPM
BASOPHILS # BLD AUTO: 0.1 10E3/UL (ref 0–0.2)
BASOPHILS NFR BLD AUTO: 1 %
BILIRUB SERPL-MCNC: 0.8 MG/DL
BUN SERPL-MCNC: 32.8 MG/DL (ref 8–23)
CALCIUM SERPL-MCNC: 9.4 MG/DL (ref 8.8–10.4)
CHLORIDE SERPL-SCNC: 104 MMOL/L (ref 98–107)
CREAT SERPL-MCNC: 1.44 MG/DL (ref 0.67–1.17)
DIASTOLIC BLOOD PRESSURE - MUSE: NORMAL MMHG
DIASTOLIC BLOOD PRESSURE - MUSE: NORMAL MMHG
EGFRCR SERPLBLD CKD-EPI 2021: 49 ML/MIN/1.73M2
EOSINOPHIL # BLD AUTO: 0 10E3/UL (ref 0–0.7)
EOSINOPHIL NFR BLD AUTO: 0 %
ERYTHROCYTE [DISTWIDTH] IN BLOOD BY AUTOMATED COUNT: 18.2 % (ref 10–15)
GLUCOSE SERPL-MCNC: 105 MG/DL (ref 70–99)
HCO3 SERPL-SCNC: 24 MMOL/L (ref 22–29)
HCT VFR BLD AUTO: 30.9 % (ref 40–53)
HGB BLD-MCNC: 9.8 G/DL (ref 13.3–17.7)
HOLD SPECIMEN: NORMAL
HOLD SPECIMEN: NORMAL
IMM GRANULOCYTES # BLD: 0 10E3/UL
IMM GRANULOCYTES NFR BLD: 0 %
INTERPRETATION ECG - MUSE: NORMAL
INTERPRETATION ECG - MUSE: NORMAL
LYMPHOCYTES # BLD AUTO: 0.8 10E3/UL (ref 0.8–5.3)
LYMPHOCYTES NFR BLD AUTO: 9 %
MCH RBC QN AUTO: 27.3 PG (ref 26.5–33)
MCHC RBC AUTO-ENTMCNC: 31.7 G/DL (ref 31.5–36.5)
MCV RBC AUTO: 86 FL (ref 78–100)
MONOCYTES # BLD AUTO: 0.8 10E3/UL (ref 0–1.3)
MONOCYTES NFR BLD AUTO: 8 %
NEUTROPHILS # BLD AUTO: 8.1 10E3/UL (ref 1.6–8.3)
NEUTROPHILS NFR BLD AUTO: 82 %
NRBC # BLD AUTO: 0 10E3/UL
NRBC BLD AUTO-RTO: 0 /100
NT-PROBNP SERPL-MCNC: 5147 PG/ML (ref 0–1800)
P AXIS - MUSE: NORMAL DEGREES
P AXIS - MUSE: NORMAL DEGREES
PLATELET # BLD AUTO: 340 10E3/UL (ref 150–450)
POTASSIUM SERPL-SCNC: 4.2 MMOL/L (ref 3.4–5.3)
PR INTERVAL - MUSE: NORMAL MS
PR INTERVAL - MUSE: NORMAL MS
PROT SERPL-MCNC: 7.6 G/DL (ref 6.4–8.3)
QRS DURATION - MUSE: 94 MS
QRS DURATION - MUSE: 94 MS
QT - MUSE: 374 MS
QT - MUSE: 380 MS
QTC - MUSE: 427 MS
QTC - MUSE: 450 MS
R AXIS - MUSE: -16 DEGREES
R AXIS - MUSE: 34 DEGREES
RBC # BLD AUTO: 3.59 10E6/UL (ref 4.4–5.9)
SODIUM SERPL-SCNC: 141 MMOL/L (ref 135–145)
SYSTOLIC BLOOD PRESSURE - MUSE: NORMAL MMHG
SYSTOLIC BLOOD PRESSURE - MUSE: NORMAL MMHG
T AXIS - MUSE: 46 DEGREES
T AXIS - MUSE: 81 DEGREES
TROPONIN T SERPL HS-MCNC: 53 NG/L
TROPONIN T SERPL HS-MCNC: 54 NG/L
VENTRICULAR RATE- MUSE: 76 BPM
VENTRICULAR RATE- MUSE: 87 BPM
WBC # BLD AUTO: 9.9 10E3/UL (ref 4–11)

## 2024-11-25 PROCEDURE — 250N000013 HC RX MED GY IP 250 OP 250 PS 637: Performed by: STUDENT IN AN ORGANIZED HEALTH CARE EDUCATION/TRAINING PROGRAM

## 2024-11-25 PROCEDURE — 99223 1ST HOSP IP/OBS HIGH 75: CPT | Performed by: INTERNAL MEDICINE

## 2024-11-25 PROCEDURE — 84460 ALANINE AMINO (ALT) (SGPT): CPT | Performed by: STUDENT IN AN ORGANIZED HEALTH CARE EDUCATION/TRAINING PROGRAM

## 2024-11-25 PROCEDURE — 85004 AUTOMATED DIFF WBC COUNT: CPT | Performed by: STUDENT IN AN ORGANIZED HEALTH CARE EDUCATION/TRAINING PROGRAM

## 2024-11-25 PROCEDURE — 84155 ASSAY OF PROTEIN SERUM: CPT | Performed by: STUDENT IN AN ORGANIZED HEALTH CARE EDUCATION/TRAINING PROGRAM

## 2024-11-25 PROCEDURE — 99285 EMERGENCY DEPT VISIT HI MDM: CPT | Mod: 25

## 2024-11-25 PROCEDURE — 84484 ASSAY OF TROPONIN QUANT: CPT | Performed by: STUDENT IN AN ORGANIZED HEALTH CARE EDUCATION/TRAINING PROGRAM

## 2024-11-25 PROCEDURE — 71046 X-RAY EXAM CHEST 2 VIEWS: CPT

## 2024-11-25 PROCEDURE — 250N000013 HC RX MED GY IP 250 OP 250 PS 637: Performed by: INTERNAL MEDICINE

## 2024-11-25 PROCEDURE — 93005 ELECTROCARDIOGRAM TRACING: CPT

## 2024-11-25 PROCEDURE — 96374 THER/PROPH/DIAG INJ IV PUSH: CPT

## 2024-11-25 PROCEDURE — 83880 ASSAY OF NATRIURETIC PEPTIDE: CPT | Performed by: STUDENT IN AN ORGANIZED HEALTH CARE EDUCATION/TRAINING PROGRAM

## 2024-11-25 PROCEDURE — 85049 AUTOMATED PLATELET COUNT: CPT | Performed by: STUDENT IN AN ORGANIZED HEALTH CARE EDUCATION/TRAINING PROGRAM

## 2024-11-25 PROCEDURE — 120N000001 HC R&B MED SURG/OB

## 2024-11-25 PROCEDURE — 36415 COLL VENOUS BLD VENIPUNCTURE: CPT | Performed by: STUDENT IN AN ORGANIZED HEALTH CARE EDUCATION/TRAINING PROGRAM

## 2024-11-25 PROCEDURE — 250N000011 HC RX IP 250 OP 636: Performed by: STUDENT IN AN ORGANIZED HEALTH CARE EDUCATION/TRAINING PROGRAM

## 2024-11-25 RX ORDER — ALLOPURINOL 300 MG/1
300 TABLET ORAL DAILY
Status: DISCONTINUED | OUTPATIENT
Start: 2024-11-26 | End: 2024-11-28 | Stop reason: HOSPADM

## 2024-11-25 RX ORDER — HYDRALAZINE HYDROCHLORIDE 50 MG/1
100 TABLET, FILM COATED ORAL 3 TIMES DAILY
Status: DISCONTINUED | OUTPATIENT
Start: 2024-11-25 | End: 2024-11-27

## 2024-11-25 RX ORDER — EZETIMIBE 10 MG/1
10 TABLET ORAL DAILY
Status: DISCONTINUED | OUTPATIENT
Start: 2024-11-26 | End: 2024-11-28 | Stop reason: HOSPADM

## 2024-11-25 RX ORDER — LOSARTAN POTASSIUM 25 MG/1
25 TABLET ORAL DAILY
Status: ON HOLD | COMMUNITY
End: 2024-11-28

## 2024-11-25 RX ORDER — ROSUVASTATIN CALCIUM 10 MG/1
10 TABLET, COATED ORAL AT BEDTIME
Status: DISCONTINUED | OUTPATIENT
Start: 2024-11-25 | End: 2024-11-28 | Stop reason: HOSPADM

## 2024-11-25 RX ORDER — FUROSEMIDE 10 MG/ML
60 INJECTION INTRAMUSCULAR; INTRAVENOUS
Status: DISCONTINUED | OUTPATIENT
Start: 2024-11-26 | End: 2024-11-26

## 2024-11-25 RX ORDER — AMOXICILLIN 250 MG
1 CAPSULE ORAL 2 TIMES DAILY PRN
Status: DISCONTINUED | OUTPATIENT
Start: 2024-11-25 | End: 2024-11-28 | Stop reason: HOSPADM

## 2024-11-25 RX ORDER — LIDOCAINE 40 MG/G
CREAM TOPICAL
Status: DISCONTINUED | OUTPATIENT
Start: 2024-11-25 | End: 2024-11-28 | Stop reason: HOSPADM

## 2024-11-25 RX ORDER — AMLODIPINE BESYLATE 5 MG/1
5 TABLET ORAL DAILY
Status: DISCONTINUED | OUTPATIENT
Start: 2024-11-26 | End: 2024-11-26

## 2024-11-25 RX ORDER — ONDANSETRON 2 MG/ML
4 INJECTION INTRAMUSCULAR; INTRAVENOUS EVERY 6 HOURS PRN
Status: DISCONTINUED | OUTPATIENT
Start: 2024-11-25 | End: 2024-11-28 | Stop reason: HOSPADM

## 2024-11-25 RX ORDER — ASPIRIN 81 MG/1
81 TABLET ORAL DAILY
Status: DISCONTINUED | OUTPATIENT
Start: 2024-11-26 | End: 2024-11-26

## 2024-11-25 RX ORDER — FUROSEMIDE 10 MG/ML
60 INJECTION INTRAMUSCULAR; INTRAVENOUS ONCE
Status: COMPLETED | OUTPATIENT
Start: 2024-11-25 | End: 2024-11-25

## 2024-11-25 RX ORDER — CALCIUM CARBONATE 500 MG/1
1000 TABLET, CHEWABLE ORAL 4 TIMES DAILY PRN
Status: DISCONTINUED | OUTPATIENT
Start: 2024-11-25 | End: 2024-11-28 | Stop reason: HOSPADM

## 2024-11-25 RX ORDER — AMOXICILLIN 250 MG
2 CAPSULE ORAL 2 TIMES DAILY PRN
Status: DISCONTINUED | OUTPATIENT
Start: 2024-11-25 | End: 2024-11-28 | Stop reason: HOSPADM

## 2024-11-25 RX ORDER — ONDANSETRON 4 MG/1
4 TABLET, ORALLY DISINTEGRATING ORAL EVERY 6 HOURS PRN
Status: DISCONTINUED | OUTPATIENT
Start: 2024-11-25 | End: 2024-11-28 | Stop reason: HOSPADM

## 2024-11-25 RX ORDER — ISOSORBIDE MONONITRATE 60 MG/1
120 TABLET, EXTENDED RELEASE ORAL DAILY
Status: DISCONTINUED | OUTPATIENT
Start: 2024-11-26 | End: 2024-11-28 | Stop reason: HOSPADM

## 2024-11-25 RX ORDER — ASPIRIN 325 MG
325 TABLET ORAL ONCE
Status: COMPLETED | OUTPATIENT
Start: 2024-11-25 | End: 2024-11-25

## 2024-11-25 RX ADMIN — FUROSEMIDE 60 MG: 10 INJECTION, SOLUTION INTRAMUSCULAR; INTRAVENOUS at 14:09

## 2024-11-25 RX ADMIN — HYDRALAZINE HYDROCHLORIDE 100 MG: 50 TABLET ORAL at 22:16

## 2024-11-25 RX ADMIN — ASPIRIN 325 MG ORAL TABLET 325 MG: 325 PILL ORAL at 16:41

## 2024-11-25 RX ADMIN — RIVAROXABAN 15 MG: 15 TABLET, FILM COATED ORAL at 22:16

## 2024-11-25 RX ADMIN — ROSUVASTATIN CALCIUM 10 MG: 10 TABLET, FILM COATED ORAL at 22:16

## 2024-11-25 ASSESSMENT — ACTIVITIES OF DAILY LIVING (ADL)
ADLS_ACUITY_SCORE: 52

## 2024-11-25 ASSESSMENT — COLUMBIA-SUICIDE SEVERITY RATING SCALE - C-SSRS
1. IN THE PAST MONTH, HAVE YOU WISHED YOU WERE DEAD OR WISHED YOU COULD GO TO SLEEP AND NOT WAKE UP?: NO
6. HAVE YOU EVER DONE ANYTHING, STARTED TO DO ANYTHING, OR PREPARED TO DO ANYTHING TO END YOUR LIFE?: NO
2. HAVE YOU ACTUALLY HAD ANY THOUGHTS OF KILLING YOURSELF IN THE PAST MONTH?: NO

## 2024-11-25 NOTE — ED PROVIDER NOTES
Emergency Department Note      History of Present Illness     Chief Complaint   Chest Pain      HPI   Betito Anderson is a 79 year old male with history of hypertension, CHF, CKD stage 3, hyperlipidemia, and arteriosclerotic vascular disease who presents for chest pain for the past 3-4 days. Episodes last a few minutes and worsen with exertion. Patient has been coughing with phlegm. He also reports recent leg swelling and weight gain. No history of heart attack, blood clots, or lung disease. Patient is not a smoker. He was given Lasix during last admission but not prescribed any. He is on Xarelto and took his dose today.     Independent Historian   None    Review of External Notes   Discharge summary October 12, 2024 for CHF exacerbation.    Past Medical History     Medical History and Problem List   Past Medical History:   Diagnosis Date    Acute diastolic congestive heart failure (H) 06/2019    ASCVD (arteriosclerotic cardiovascular disease) 1997    Atrial fibrillation (H) 10/09/2018    Basal cell carcinoma     Carotid artery plaque 2005    CKD (chronic kidney disease) stage 3, GFR 30-59 ml/min (H)     Elevated blood sugar     Gout     Heart failure with preserved ejection fraction, NYHA class IV (H) 10/2024    HTN (hypertension)     Hypercholesteremia     Hyponatremia 2015    Low mean corpuscular volume (MCV)     Near syncope 05/2015    Psoriasis     Renal mass 06/29/2019    Screening 2012    Syncope 09/2019    V-tach (H) 09/2019       Medications   No current outpatient medications on file.      Surgical History   Past Surgical History:   Procedure Laterality Date    CATARACT EXTRACTION  03/2022    CATARACT EXTRACTION  2022    CV CORONARY ANGIOGRAM N/A 07/02/2019    Procedure: Coronary Angiogram;  Surgeon: Donaldo Loera MD;  Location:  [facility]    CV HEART CATHETERIZATION WITH POSSIBLE INTERVENTION N/A 07/05/2019    Procedure: Heart Catheterization with Possible Intervention;   "Surgeon: Manolo Hu MD;  Location:  [facility]    CV LEFT HEART CATH N/A 07/02/2019    Procedure: Left Heart Cath;  Surgeon: Donaldo Loera MD;  Location:  [facility]    CV LEFT VENTRICULOGRAM N/A 07/02/2019    Procedure: Left Ventriculogram;  Surgeon: Donaldo Loera MD;  Location:  [facility]    CV PCI STENT DRUG ELUTING N/A 07/05/2019    Procedure: PCI Stent Drug Eluting;  Surgeon: Manolo Hu MD;  Location:  [facility]    CV RIGHT HEART CATH MEASUREMENTS RECORDED N/A 07/02/2019    Procedure: Right Heart Cath;  Surgeon: Donaldo Loera MD;  Location:  [facility]    EP LOOP RECORDER IMPLANT N/A 10/11/2019    Procedure: EP Loop Recorder Implant;  Surgeon: Fuad Lezama MD;  Location:  [facility]    EP RIGHT VENTRICULAR RECORDING N/A 10/11/2019    Procedure: EP Ventricular Pacing;  Surgeon: Fuad Lezama MD;  Location:  [facility]    ZZC ANESTH,DX ARTHROSCOPIC PROC KNEE JOINT  01/01/2009       Physical Exam     Patient Vitals for the past 24 hrs:   BP Temp Temp src Pulse Resp SpO2 Height Weight   11/25/24 1823 (!) 141/63 98.2  F (36.8  C) Oral 79 22 92 % -- --   11/25/24 1800 -- -- -- 72 15 -- -- --   11/25/24 1255 (!) 172/78 97.5  F (36.4  C) Temporal 72 22 92 % 1.702 m (5' 7\") 90.7 kg (200 lb)     Physical Exam  GENERAL: Patient well-appearing  HEAD: Atraumatic.  NECK: No rigidity  CV: RRR, no murmurs, rubs or gallops  PULM: Mild coarseness in the lung bases.  ABD: Soft, nontender, nondistended, no guarding  DERM: No rash. Skin warm and dry  EXTREMITY: Moving all extremities without difficulty.  3+ pitting edema bilateral lower extremities.  VASCULAR: Symmetric pulses bilaterally      Diagnostics     Lab Results   Labs Ordered and Resulted from Time of ED Arrival to Time of ED Departure   COMPREHENSIVE METABOLIC PANEL - Abnormal       Result Value    Sodium 141      " Potassium 4.2      Carbon Dioxide (CO2) 24      Anion Gap 13      Urea Nitrogen 32.8 (*)     Creatinine 1.44 (*)     GFR Estimate 49 (*)     Calcium 9.4      Chloride 104      Glucose 105 (*)     Alkaline Phosphatase 101      AST 22      ALT 13      Protein Total 7.6      Albumin 4.2      Bilirubin Total 0.8     TROPONIN T, HIGH SENSITIVITY - Abnormal    Troponin T, High Sensitivity 53 (*)    NT PROBNP INPATIENT - Abnormal    N terminal Pro BNP Inpatient 5,147 (*)    CBC WITH PLATELETS AND DIFFERENTIAL - Abnormal    WBC Count 9.9      RBC Count 3.59 (*)     Hemoglobin 9.8 (*)     Hematocrit 30.9 (*)     MCV 86      MCH 27.3      MCHC 31.7      RDW 18.2 (*)     Platelet Count 340      % Neutrophils 82      % Lymphocytes 9      % Monocytes 8      % Eosinophils 0      % Basophils 1      % Immature Granulocytes 0      NRBCs per 100 WBC 0      Absolute Neutrophils 8.1      Absolute Lymphocytes 0.8      Absolute Monocytes 0.8      Absolute Eosinophils 0.0      Absolute Basophils 0.1      Absolute Immature Granulocytes 0.0      Absolute NRBCs 0.0     TROPONIN T, HIGH SENSITIVITY - Abnormal    Troponin T, High Sensitivity 54 (*)        Imaging   XR Chest 2 Views   Final Result   IMPRESSION: No significant interval change. Stable cardiac   enlargement. Mild bilateral pulmonary venous congestion. Perihilar   interstitial prominence is again suspicious for pulmonary edema.   Calcified granuloma near the left lung apex. Small amount of pleural   fluid or thickening at the right lung base laterally. Loop recorder   device projected over the heart left of midline.      BECKIE BEJARANO MD            SYSTEM ID:  [id number]          EKG   ECG taken at 1252, ECG read at 1300  Atrial fibrillation rate 76.  Nonspecific ST depressions in multiple leads.  No STEMI.  No significant change as compared to prior, dated 10/08/2024.  Rate 76 bpm. SC interval * ms. QRS duration 94 ms. QT/QTc 380/427 ms. P-R-T axes * -16 81.    ECG interpreted  by me.  Time 1714  Atrial fibrillation rate 87.  Nonspecific ST depressions in multiple leads.  No STEMI.  QRS 94.  QTc 450.    Independent Interpretation   Chest x-ray: Increased pulmonary vascular congestion.    ED Course      Medications Administered   Medications   furosemide (LASIX) injection 60 mg (has no administration in time range)   Continuing ACE inhibitor/ARB/ARNI from home medication list OR ACE inhibitor/ARB/ARNI order already placed during this visit (has no administration in time range)   Continuing beta blocker from home medication list OR beta blocker order already placed during this visit (has no administration in time range)   lidocaine 1 % 0.1-1 mL (has no administration in time range)   lidocaine (LMX4) cream (has no administration in time range)   sodium chloride (PF) 0.9% PF flush 3 mL ( Intracatheter Canceled Entry 11/25/24 1840)   sodium chloride (PF) 0.9% PF flush 3 mL (has no administration in time range)   senna-docusate (SENOKOT-S/PERICOLACE) 8.6-50 MG per tablet 1 tablet (has no administration in time range)     Or   senna-docusate (SENOKOT-S/PERICOLACE) 8.6-50 MG per tablet 2 tablet (has no administration in time range)   calcium carbonate (TUMS) chewable tablet 1,000 mg (has no administration in time range)   ondansetron (ZOFRAN ODT) ODT tab 4 mg (has no administration in time range)     Or   ondansetron (ZOFRAN) injection 4 mg (has no administration in time range)   furosemide (LASIX) injection 60 mg (60 mg Intravenous $Given 11/25/24 1409)   aspirin (ASA) tablet 325 mg (325 mg Oral $Given 11/25/24 1641)       Procedures   Procedures     Discussion of Management   I discussed admission and the plan of care with the Hospitalist Dr. Ochoa.       ED Course   ED Course as of 11/25/24 1852   Mon Nov 25, 2024   1343 I obtained history and examined the patient as noted above.         Additional Documentation  None    Medical Decision Making / Diagnosis     CMS Diagnoses: None    MIPS        None    [facility]   Betito Anderson is a 79 year old male   Symptoms consistent with acute CHF exacerbation.     Chronic conditions complicating - CHF    DDx considered ACS, PE, PNA, however evaluation not consistent with these etiologies.    CXR independently interpreted - evidence of fluid overload     ECG independently interpreted -atrial fibrillation with nonspecific ST depressions.  Repeat grossly unchanged.  Has had prior ST depressions on old ECGs as well.    Labs: elevated BNP.     -No electrolyte disturbance.     Troponin elevated 53 with repeat 54.  Last admission was in the 30s.  When he walks he does get chest discomfort but it resolves when he stops walking.  Did give aspirin.  I rechecked the patient and he is not no acute pain at rest.-60 mg.    Provided IV lasix    Not requiring BiPAP.  Patient diuresing.    Discussed with hospitalist who evaluated patient and will admit.      Disposition   The patient was admitted to the hospital.     Diagnosis     ICD-10-CM    1. Acute on chronic congestive heart failure, unspecified heart failure type (H)  I50.9            Discharge Medications   Current Discharge Medication List            Scribe Disclosure:  Jossie YEOBAH, am serving as a scribe at 1:33 PM on 11/25/2024 to document services personally performed by Corey Reilly MD based on my observations and the provider's statements to me.        Corey Reilly MD  11/25/24 5035

## 2024-11-25 NOTE — ED TRIAGE NOTES
Pt says for last 3-4 days he has been having increased SOB, weakness and chest pain in middle of his chest, has woke him up from sleep. In afib at times, on xarelto. Hx cardiac stent. Lower legs more swollen      Triage Assessment (Adult)       Row Name 11/25/24 1300          Triage Assessment    Airway WDL WDL        Respiratory WDL    Respiratory WDL X;rhythm/pattern     Rhythm/Pattern, Respiratory shortness of breath;dyspnea upon exertion        Skin Circulation/Temperature WDL    Skin Circulation/Temperature WDL WDL        Cardiac WDL    Cardiac WDL X;chest pain;rhythm     Pulse Rate & Regularity apical pulse irregular     Cardiac Rhythm Atrial fibrillation        Chest Pain Assessment    Chest Pain Location midsternal     Chest Pain Intervention 12-lead ECG obtained        Peripheral/Neurovascular WDL    Peripheral Neurovascular WDL X        Cognitive/Neuro/Behavioral WDL    Cognitive/Neuro/Behavioral WDL WDL

## 2024-11-25 NOTE — PROGRESS NOTES
RECEIVING UNIT ED HANDOFF REVIEW    ED Nurse Handoff Report was reviewed by: Cherry Yuan RN on November 25, 2024 at 4:42 PM

## 2024-11-25 NOTE — PHARMACY-ADMISSION MEDICATION HISTORY
Pharmacist Admission Medication History    Admission medication history is complete. The information provided in this note is only as accurate as the sources available at the time of the update.    Information Source(s): Patient and CareEverywhere/[employer]Scripts via in-person    Pertinent Information: Patient reported dose of rosuvastatin is 40 mg, not 10 mg. Last filled 1/22 for 90 tabs according to [employer]. Hydralazine last filled 1/29 for 270 tabs/90 day supply as well, but patient reported he is taking TID.   Patient confirmed he just takes 1 torsemide tab daily (prescribed as 1.5/d).  Patient states his nephrologist told him to hold Jardiance for now.     Changes made to PTA medication list:  Added: None  Deleted: losartan  Changed: rosuvastatin 10 to 40 mg    Allergies reviewed with patient and updates made in EHR: yes    Medication History Completed By: Ruth Ge [facility] 11/25/2024 5:04 PM    PTA Med List   Medication Sig Last Dose/Taking    allopurinol (ZYLOPRIM) 300 MG tablet TAKE 1 TABLET DAILY 11/25/2024 Morning    amLODIPine (NORVASC) 5 MG tablet Take 1 tablet (5 mg) by mouth daily 11/25/2024 Morning    aspirin (ASA) 81 MG EC tablet Take 1 tablet (81 mg) by mouth daily 11/25/2024    clobetasol propionate (TEMOVATE) 0.05 % external cream Apply topically 2 times daily To feet as needed Taking    ezetimibe (ZETIA) 10 MG tablet Take 1 tablet (10 mg) by mouth daily 11/25/2024    hydrALAZINE (APRESOLINE) 100 MG tablet Take 1 tablet (100 mg) by mouth 3 times daily 11/25/2024 Morning    isosorbide mononitrate CR (IMDUR) 120 MG 24 HR ER tablet Take 1 tablet (120 mg) by mouth daily 11/25/2024    ketoconazole (NIZORAL) 2 % external shampoo Massage into wet scalp, let sit 3-5 min, then rinse. Do this 3x weekly. Past Week    rivaroxaban ANTICOAGULANT (XARELTO) 15 MG TABS tablet Take 1 tablet (15 mg) by mouth daily (with dinner). 11/24/2024    rosuvastatin (CRESTOR) 10 MG tablet Take 1 tablet (10 mg) by mouth at bedtime.  (Patient taking differently: Take 40 mg by mouth at bedtime.) 11/24/2024 Evening    torsemide (DEMADEX) 20 MG tablet Take 1 tablet (20 mg) by mouth daily. 11/25/2024    triamcinolone (KENALOG) 0.1 % external cream Apply topically 2 times daily To psoriasis on arms and body as needed Taking

## 2024-11-25 NOTE — H&P
[facility]    History and Physical  Hospitalist       Date of Admission:  11/25/2024    Assessment & Plan   Betito Anderson is a 79 year old male with history of heart failure with preserved EF, CKD stage IIIb, hypertension, coronary artery disease, hyperlipidemia, chronic A-fib who presents to the emergency room with worsening dyspnea and chest tightness.    Acute exacerbation of heart failure with preserved EF.  Chest pain.  History of coronary artery disease post PCI in 2019  Elevated troponin  Chronic atrial fibrillation on Xarelto  Hyperlipidemia  Essential hypertension   -Patient presents with exertional chest pain, dyspnea, weight gain and worsening lower extremity edema  -Last echo was just done on 10/9/2024 which showed EF of 60-65% with increased LV filling pressures.  He also had mildly decreased RV systolic function and severe biatrial enlargement.    -Chest x-ray consistent with pulmonary edema, BNP elevated at more than 5000  -EKG shows A-fib with no overt evidence of ischemia  -Troponin elevated at 53, check delta  -Will not repeat echo since it was done just about a month ago  -hold prior to admission oral torsemide  -Lasix 60 mg IV twice a day  -Continue prior to admission aspirin 81 mg daily, Norvasc 5 mg daily, Jardiance 10 mg daily, hydralazine 100 mg 3 times daily, isosorbide mononitrate 120 mg daily and losartan 25 mg daily  -Continue Crestor and Zetia  -Resume prior to admission Xarelto  -Cardiology consult    Chronic anemia  -Recent hemoglobin has been between 10-11, hemoglobin today is marginally lower at 9.8  -Patient denies bleeding from any source, monitor daily    CKD stage IIIb  -Creatinine is 1.44 which is actually slightly better than his recent baseline of around 2  -Monitor  renal function closely while on IV diuretics    Other chronic medical problems:  Carotid artery disease  Chronic gout  Psoriasis      Clinically Significant Risk Factors Present on  "Admission                # Drug Induced Coagulation Defect: home medication list includes an anticoagulant medication  # Drug Induced Platelet Defect: home medication list includes an antiplatelet medication   # Hypertension: Noted on problem list  # Acute heart failure with preserved ejection fraction: heart failure noted on problem list, last echo with EF >50%, and receiving IV diuretics     # Anemia: based on hgb <11       # Obesity: Estimated body mass index is 31.32 kg/m  as calculated from the following:    Height as of this encounter: 1.702 m (5' 7\").    Weight as of this encounter: 90.7 kg (200 lb).                Medical Decision Making       **CLEAR ALL SELECTIONS**        DVT Prophylaxis: DOAC  Code Status: Full Code    Disposition: Expected discharge in 2+ days    Chart documentation was completed, in part, with [employer] voice-recognition software. Even though reviewed, some grammatical, spelling, and word errors may remain.    Baljinder Ochoa MD, MD    Primary Care Physician   Rudy Vernon    Chief Complaint   Dyspnea, chest tightness    History is obtained from the patient    History of Present Illness   Betito Anderson is a 79 year old male with history of diastolic congestive heart failure, coronary artery disease, essential hypertension, CKD stage III who presents to the emergency room with shortness of breath and chest tightness/pain which has been going on for the past 3 to 4 days.  He also endorses worsening bilateral lower extremity swelling and weight gain.  Patient mentions that for the past 3 to 4 days he has been having retrosternal chest pain which is about 3-4/10 in intensity associated with exertion.  He also endorses dyspnea which has been getting worse at the same time.  He has also gained about 6 pounds of weight and has had worsening lower extremity edema and abdominal girth at the same time.  Denies orthopnea or PND although he has been sleeping on a recliner to keep his leg " elevated.  Endorses cough with yellow sputum production.  No fever or chills.  No hematochezia or melena.  In the emergency room he was found to have elevated BNP of 5147 and chest x-ray consistent with pulmonary edema.  He was started on IV Lasix for CHF exacerbation    Past Medical History    I have reviewed this patient's medical history and updated it with pertinent information if needed.   Past Medical History:   Diagnosis Date    Acute diastolic congestive heart failure (H) 06/2019    hosp fsd, then fu echo ef nl; echo 2023 nl ef    ASCVD (arteriosclerotic cardiovascular disease) 1997    angio with 40% circ lesion; 6/19 hosp for chf, 3 vessel dz on angio but porcelin aorta so ptca done    Atrial fibrillation (H) 10/09/2018    found on routine physical    Basal cell carcinoma     Carotid artery plaque 2005    seen on us, about 50% stenosis, fu 2009 us no significant stenosis    CKD (chronic kidney disease) stage 3, GFR 30-59 ml/min (H)     Elevated blood sugar     Gout     on allopurinol    Heart failure with preserved ejection fraction, NYHA class IV (H) 10/2024    hosp fsd    HTN (hypertension)     Hypercholesteremia     Hyponatremia 2015    felt due to chlorthalidone    Low mean corpuscular volume (MCV)     Near syncope 05/2015    fu est echo low level but neg    Psoriasis     Dr. Marti    Renal mass 06/29/2019    seen on ct chest for sob, needs fu ct for that, fu us done 7/19 and no mass seen, should have fu ct in December, had fu us 3/22 and no fu needed    Screening 2012    abd us no aaa    Syncope 09/2019    hosp fsd, cause not clear, lowered coreg dose; seen by Dr. Lezama of ep and ep study neg, implanted event monitor    V-tach (H) 09/2019    seen on event monitor for fu syncope, then ep study and no inducible vtach       Past Surgical History   I have reviewed this patient's surgical history and updated it with pertinent information if needed.  Past Surgical History:   Procedure Laterality Date     CATARACT EXTRACTION  03/2022    CATARACT EXTRACTION  2022    CV CORONARY ANGIOGRAM N/A 07/02/2019    Procedure: Coronary Angiogram;  Surgeon: Donaldo Loera MD;  Location:  HEART CARDIAC CATH LAB    CV HEART CATHETERIZATION WITH POSSIBLE INTERVENTION N/A 07/05/2019    Procedure: Heart Catheterization with Possible Intervention;  Surgeon: Manolo Hu MD;  Location:  HEART CARDIAC CATH LAB    CV LEFT HEART CATH N/A 07/02/2019    Procedure: Left Heart Cath;  Surgeon: Donaldo Loera MD;  Location:  HEART CARDIAC CATH LAB    CV LEFT VENTRICULOGRAM N/A 07/02/2019    Procedure: Left Ventriculogram;  Surgeon: Donaldo Loera MD;  Location:  HEART CARDIAC CATH LAB    CV PCI STENT DRUG ELUTING N/A 07/05/2019    Procedure: PCI Stent Drug Eluting;  Surgeon: Manolo Hu MD;  Location:  HEART CARDIAC CATH LAB    CV RIGHT HEART CATH MEASUREMENTS RECORDED N/A 07/02/2019    Procedure: Right Heart Cath;  Surgeon: Donaldo Loera MD;  Location:  HEART CARDIAC CATH LAB    EP LOOP RECORDER IMPLANT N/A 10/11/2019    Procedure: EP Loop Recorder Implant;  Surgeon: Fuad Lezama MD;  Location:  HEART CARDIAC CATH LAB    EP RIGHT VENTRICULAR RECORDING N/A 10/11/2019    Procedure: EP Ventricular Pacing;  Surgeon: Fuad Lezama MD;  Location:  HEART CARDIAC CATH LAB    ZZC ANESTH,DX ARTHROSCOPIC PROC KNEE JOINT  01/01/2009       Prior to Admission Medications   Prior to Admission Medications   Prescriptions Last Dose Informant Patient Reported? Taking?   allopurinol (ZYLOPRIM) 300 MG tablet   No No   Sig: TAKE 1 TABLET DAILY   amLODIPine (NORVASC) 5 MG tablet   No No   Sig: Take 1 tablet (5 mg) by mouth daily   aspirin (ASA) 81 MG EC tablet   No No   Sig: Take 1 tablet (81 mg) by mouth daily   calcipotriene (DOVONOX) 0.005 % external cream   No No   Sig: Mix efudex + calcipotriene equally. Apply BID x 4-10 days. Stop when skin gets red.   clobetasol propionate (TEMOVATE) 0.05  % external cream   No No   Sig: Apply topically 2 times daily To feet as needed   empagliflozin (JARDIANCE) 10 MG TABS tablet   No No   Sig: Take 1 tablet (10 mg) by mouth daily.   ezetimibe (ZETIA) 10 MG tablet   No No   Sig: Take 1 tablet (10 mg) by mouth daily   fluorouracil (EFUDEX) 5 % external cream   No No   Sig: Mix equally with calcipotriene cream. Apply BID x 4-10 days. Stop when skin gets red.   hydrALAZINE (APRESOLINE) 100 MG tablet   No No   Sig: Take 1 tablet (100 mg) by mouth 3 times daily   isosorbide mononitrate CR (IMDUR) 120 MG 24 HR ER tablet   No No   Sig: Take 1 tablet (120 mg) by mouth daily   ketoconazole (NIZORAL) 2 % external shampoo   No No   Sig: Massage into wet scalp, let sit 3-5 min, then rinse. Do this 3x weekly.   losartan (COZAAR) 25 MG tablet   Yes No   Sig: Take 25 mg by mouth daily.   Patient not taking: Reported on 11/5/2024   rivaroxaban ANTICOAGULANT (XARELTO) 15 MG TABS tablet   No No   Sig: Take 1 tablet (15 mg) by mouth daily (with dinner).   rosuvastatin (CRESTOR) 10 MG tablet   No No   Sig: Take 1 tablet (10 mg) by mouth at bedtime.   torsemide (DEMADEX) 20 MG tablet   No No   Sig: Take 1 tablet (20 mg) by mouth daily.   triamcinolone (KENALOG) 0.1 % external cream   No No   Sig: Apply topically 2 times daily To psoriasis on arms and body as needed      Facility-Administered Medications: None     Allergies   Allergies   Allergen Reactions    Ace Inhibitors Anaphylaxis     Edema of ace    Eliquis [Apixaban] Other (See Comments)     Facial numbness       Social History   I have reviewed this patient's social history and updated it with pertinent information if needed. Betito Anderson  reports that he quit smoking about 26 years ago. His smoking use included cigarettes. He started smoking about 56 years ago. He has a 30 pack-year smoking history. He has never used smokeless tobacco. He reports current alcohol use. He reports that he does not use drugs.    Family History    I have reviewed this patient's family history and updated it with pertinent information if needed.   Family History   Problem Relation Age of Onset    Coronary Artery Disease Father     Medical History Unknown Mother     Medical History Unknown Maternal Grandfather        Review of Systems   The 10 point Review of Systems is negative other than noted in the HPI or here.     Physical Exam   Temp: 97.5  F (36.4  C) Temp src: Temporal BP: (!) 172/78 Pulse: 72   Resp: 22 SpO2: 92 % O2 Device: None (Room air)    Vital Signs with Ranges  Temp:  [97.5  F (36.4  C)] 97.5  F (36.4  C)  Pulse:  [72] 72  Resp:  [22] 22  BP: (172)/(78) 172/78  SpO2:  [92 %] 92 %  200 lbs 0 oz    Gen: AAOX3, NAD, comfortable  HEENT: Moist oral mucosa, no pallor or icterus  Neck: Supple neck, good ROM, no stridor  Resp: Few bibasilar crackles with scattered expiratory wheeze, normal effort of breathing  CVS- RRR, no murmur, no edema  Abd/GI: Soft, distended BS- normoactive  Skin- Warm, dry, no rashes  MSK: 3-4+ pedal edema  Neuro- CN- intact. Moving X 4; No focal deficits.     Data   Data reviewed today:  I personally reviewed the EKG tracing showing A-fib, no overt ischemic changes .        Recent Labs   Lab 11/25/24  1406   WBC 9.9   HGB 9.8*   MCV 86         POTASSIUM 4.2   CHLORIDE 104   CO2 24   BUN 32.8*   CR 1.44*   ANIONGAP 13   GUNNER 9.4   *   ALBUMIN 4.2   PROTTOTAL 7.6   BILITOTAL 0.8   ALKPHOS 101   ALT 13   AST 22       Recent Results (from the past 24 hours)   XR Chest 2 Views    Narrative    CHEST TWO VIEWS  11/25/2024 2:36 PM     HISTORY:  Shortness of breath.    COMPARISON: 10/8/2024.       Impression    IMPRESSION: No significant interval change. Stable cardiac  enlargement. Mild bilateral pulmonary venous congestion. Perihilar  interstitial prominence is again suspicious for pulmonary edema.  Calcified granuloma near the left lung apex. Small amount of pleural  fluid or thickening at the right lung base  laterally. Loop recorder  device projected over the heart left of midline.    BECKIE BEJARANO MD         SYSTEM ID:  [id number]

## 2024-11-25 NOTE — ED NOTES
[facility]  ED Nurse Handoff Report    ED Chief complaint: Chest Pain      ED Diagnosis:   Final diagnoses:   Acute on chronic congestive heart failure, unspecified heart failure type (H)       Code Status: Per admitting    Allergies:   Allergies   Allergen Reactions    Ace Inhibitors Anaphylaxis     Edema of ace    Eliquis [Apixaban] Other (See Comments)     Facial numbness       Patient Story:   Pt presents to the ED with shortness of breath and chest pain which has worsened over the past 3-4 days. Pt had sharp chest pain that woke him up last night. Hx of afib, anticoagulated on Xarelto.  Troponin 53 initial, BNP elevated 5,147.    Focused Assessment:    Neuro: Alert, oriented x 4  Respiratory:Clear lung sounds, on room air , SOB  Cardiology:  afib, CP   Gastrointestinal: soft, non tender, non distended   Genitourinary/Renal:  Creatinine elevated, decreased GFR  Musculoskeletal: moves all extremities   Skin: Intact skin   Lines: 18G RAC    Labs Ordered and Resulted from Time of ED Arrival to Time of ED Departure   COMPREHENSIVE METABOLIC PANEL - Abnormal       Result Value    Sodium 141      Potassium 4.2      Carbon Dioxide (CO2) 24      Anion Gap 13      Urea Nitrogen 32.8 (*)     Creatinine 1.44 (*)     GFR Estimate 49 (*)     Calcium 9.4      Chloride 104      Glucose 105 (*)     Alkaline Phosphatase 101      AST 22      ALT 13      Protein Total 7.6      Albumin 4.2      Bilirubin Total 0.8     TROPONIN T, HIGH SENSITIVITY - Abnormal    Troponin T, High Sensitivity 53 (*)    NT PROBNP INPATIENT - Abnormal    N terminal Pro BNP Inpatient 5,147 (*)    CBC WITH PLATELETS AND DIFFERENTIAL - Abnormal    WBC Count 9.9      RBC Count 3.59 (*)     Hemoglobin 9.8 (*)     Hematocrit 30.9 (*)     MCV 86      MCH 27.3      MCHC 31.7      RDW 18.2 (*)     Platelet Count 340      % Neutrophils 82      % Lymphocytes 9      % Monocytes 8      % Eosinophils 0      % Basophils 1      % Immature Granulocytes 0  "     NRBCs per 100 WBC 0      Absolute Neutrophils 8.1      Absolute Lymphocytes 0.8      Absolute Monocytes 0.8      Absolute Eosinophils 0.0      Absolute Basophils 0.1      Absolute Immature Granulocytes 0.0      Absolute NRBCs 0.0     TROPONIN T, HIGH SENSITIVITY        XR Chest 2 Views   Final Result   IMPRESSION: No significant interval change. Stable cardiac   enlargement. Mild bilateral pulmonary venous congestion. Perihilar   interstitial prominence is again suspicious for pulmonary edema.   Calcified granuloma near the left lung apex. Small amount of pleural   fluid or thickening at the right lung base laterally. Loop recorder   device projected over the heart left of midline.      BECKIE BEJARANO MD            SYSTEM ID:  [id number]            Treatments and/or interventions provided:      Medications   aspirin (ASA) tablet 325 mg (has no administration in time range)   furosemide (LASIX) injection 60 mg (60 mg Intravenous $Given 11/25/24 1409)        Patient's response to treatments and/or interventions:   Resting comfortably    To be done/followed up on inpatient unit:    See any in-patient orders    Does this patient have any cognitive concerns?:  none    Activity level - Baseline/Home:    Independent    Activity Level - Current:     Unknown    Patient's Preferred language: English     Needed?: No    Isolation: None  Infection: Not Applicable  Patient tested for COVID 19 prior to admission: NO    Bariatric?: No    Vital Signs:   Vitals:    11/25/24 1255   BP: (!) 172/78   Pulse: 72   Resp: 22   Temp: 97.5  F (36.4  C)   TempSrc: Temporal   SpO2: 92%   Weight: 90.7 kg (200 lb)   Height: 1.702 m (5' 7\")       Cardiac Rhythm:Cardiac Rhythm: Atrial fibrillation    Was the PSS-3 completed:   Yes    Family Comments: none present    For the majority of the shift this patient's behavior was Green.   Behavioral interventions performed were none    ED NURSE PHONE NUMBER: *[id number]          "

## 2024-11-26 ENCOUNTER — APPOINTMENT (OUTPATIENT)
Dept: OCCUPATIONAL THERAPY | Facility: CLINIC | Age: 79
DRG: 291 | End: 2024-11-26
Attending: INTERNAL MEDICINE
Payer: COMMERCIAL

## 2024-11-26 LAB
ANION GAP SERPL CALCULATED.3IONS-SCNC: 12 MMOL/L (ref 7–15)
BUN SERPL-MCNC: 32.5 MG/DL (ref 8–23)
CALCIUM SERPL-MCNC: 9.2 MG/DL (ref 8.8–10.4)
CHLORIDE SERPL-SCNC: 104 MMOL/L (ref 98–107)
CREAT SERPL-MCNC: 1.48 MG/DL (ref 0.67–1.17)
EGFRCR SERPLBLD CKD-EPI 2021: 48 ML/MIN/1.73M2
GLUCOSE SERPL-MCNC: 101 MG/DL (ref 70–99)
HCO3 SERPL-SCNC: 24 MMOL/L (ref 22–29)
HOLD SPECIMEN: NORMAL
MAGNESIUM SERPL-MCNC: 1.7 MG/DL (ref 1.7–2.3)
POTASSIUM SERPL-SCNC: 3.8 MMOL/L (ref 3.4–5.3)
POTASSIUM SERPL-SCNC: 3.9 MMOL/L (ref 3.4–5.3)
SODIUM SERPL-SCNC: 140 MMOL/L (ref 135–145)

## 2024-11-26 PROCEDURE — 250N000009 HC RX 250: Performed by: INTERNAL MEDICINE

## 2024-11-26 PROCEDURE — 97165 OT EVAL LOW COMPLEX 30 MIN: CPT | Mod: GO

## 2024-11-26 PROCEDURE — 120N000001 HC R&B MED SURG/OB

## 2024-11-26 PROCEDURE — 250N000013 HC RX MED GY IP 250 OP 250 PS 637: Performed by: INTERNAL MEDICINE

## 2024-11-26 PROCEDURE — 83735 ASSAY OF MAGNESIUM: CPT | Performed by: INTERNAL MEDICINE

## 2024-11-26 PROCEDURE — 80048 BASIC METABOLIC PNL TOTAL CA: CPT | Performed by: INTERNAL MEDICINE

## 2024-11-26 PROCEDURE — 36415 COLL VENOUS BLD VENIPUNCTURE: CPT | Performed by: INTERNAL MEDICINE

## 2024-11-26 PROCEDURE — 99233 SBSQ HOSP IP/OBS HIGH 50: CPT | Performed by: INTERNAL MEDICINE

## 2024-11-26 PROCEDURE — 99223 1ST HOSP IP/OBS HIGH 75: CPT | Performed by: INTERNAL MEDICINE

## 2024-11-26 PROCEDURE — 84132 ASSAY OF SERUM POTASSIUM: CPT | Performed by: INTERNAL MEDICINE

## 2024-11-26 PROCEDURE — 97535 SELF CARE MNGMENT TRAINING: CPT | Mod: GO

## 2024-11-26 PROCEDURE — 97530 THERAPEUTIC ACTIVITIES: CPT | Mod: GO

## 2024-11-26 PROCEDURE — 97140 MANUAL THERAPY 1/> REGIONS: CPT | Mod: GO

## 2024-11-26 PROCEDURE — 250N000011 HC RX IP 250 OP 636: Performed by: INTERNAL MEDICINE

## 2024-11-26 RX ORDER — VALSARTAN 40 MG/1
40 TABLET ORAL DAILY
Status: DISCONTINUED | OUTPATIENT
Start: 2024-11-26 | End: 2024-11-27

## 2024-11-26 RX ADMIN — ROSUVASTATIN CALCIUM 10 MG: 10 TABLET, FILM COATED ORAL at 21:11

## 2024-11-26 RX ADMIN — ASPIRIN 81 MG: 81 TABLET, COATED ORAL at 08:10

## 2024-11-26 RX ADMIN — ALLOPURINOL 300 MG: 300 TABLET ORAL at 08:10

## 2024-11-26 RX ADMIN — RIVAROXABAN 15 MG: 15 TABLET, FILM COATED ORAL at 17:35

## 2024-11-26 RX ADMIN — BUMETANIDE 0.25 MG/HR: 0.25 INJECTION INTRAMUSCULAR; INTRAVENOUS at 15:48

## 2024-11-26 RX ADMIN — HYDRALAZINE HYDROCHLORIDE 100 MG: 50 TABLET ORAL at 21:11

## 2024-11-26 RX ADMIN — HYDRALAZINE HYDROCHLORIDE 100 MG: 50 TABLET ORAL at 15:49

## 2024-11-26 RX ADMIN — VALSARTAN 40 MG: 40 TABLET, FILM COATED ORAL at 15:49

## 2024-11-26 RX ADMIN — HYDRALAZINE HYDROCHLORIDE 100 MG: 50 TABLET ORAL at 08:10

## 2024-11-26 RX ADMIN — FUROSEMIDE 60 MG: 10 INJECTION, SOLUTION INTRAMUSCULAR; INTRAVENOUS at 08:13

## 2024-11-26 RX ADMIN — ISOSORBIDE MONONITRATE 120 MG: 60 TABLET, EXTENDED RELEASE ORAL at 08:11

## 2024-11-26 RX ADMIN — AMLODIPINE BESYLATE 5 MG: 5 TABLET ORAL at 08:10

## 2024-11-26 RX ADMIN — EZETIMIBE 10 MG: 10 TABLET ORAL at 08:10

## 2024-11-26 ASSESSMENT — ACTIVITIES OF DAILY LIVING (ADL)
ADLS_ACUITY_SCORE: 52

## 2024-11-26 NOTE — PROGRESS NOTES
Pt settled into room 812 from ED. VSS on RA. SOB endorsed by pt. Denies any palpitations. Ind in room. Wife, Cathy, present.  Tele is afib CVR. Plan pending.

## 2024-11-26 NOTE — PROGRESS NOTES
[facility]    Medicine Progress Note - Hospitalist Service    Date of Admission:  11/25/2024    Interval History   Patient seen in rounding.  Attempted to go over various reasons as to why he could be having acute CHF.  He denies a change in his diet.  He does not really follow his weight on a regular basis.  Denies eating canned or processed foods although states that his wife does not cook very much  He states that he is having more chest discomfort with walking.  He feels when he sits down it is better.  This is but notable over the last 2 weeks.       Assessment & Plan   Betito Anderson is a 79 year old male with history of heart failure with preserved EF, CKD stage IIIb, hypertension, coronary artery disease, hyperlipidemia, chronic A-fib who presents to the emergency room with worsening dyspnea and chest tightness.     Acute exacerbation of heart failure with preserved EF.  Chest pain.  History of coronary artery disease post PCI in 2019  Elevated troponin  Chronic atrial fibrillation on Xarelto  Hyperlipidemia  Essential hypertension   -Patient presents with exertional chest pain, dyspnea, weight gain and worsening lower extremity edema  -Last echo was just done on 10/9/2024 which showed EF of 60-65% with increased LV filling pressures.  He also had mildly decreased RV systolic function and severe biatrial enlargement.    -Chest x-ray consistent with pulmonary edema, BNP elevated at more than 5000  -EKG shows A-fib with no overt evidence of ischemia  -Troponin elevated at 53, - 54 with no change.   -Lasix 60 mg IV twice a day  -Continue prior to admission aspirin 81 mg daily, Norvasc 5 mg daily, Jardiance 10 mg daily, hydralazine 100 mg 3 times daily, isosorbide mononitrate 120 mg daily and losartan 25 mg daily  -Continue Crestor and Zetia  -Resume prior to admission Xarelto  -11/26 cardiology consult: Noted potential contributing factors reduced dose of oral diuretic and stopping  of angiotensin receptor blocker due to acute kidney injury on last hospital stay.  -Continue with IV diuresis.  Started on a Bumex infusion.  -Blood pressure not controlled.  -Uncertain of his dietary compliance given history today.  -Stopping of Norvasc at 5 mg daily  -Restarted on valsartan with potential reswitch to Entresto if his renal function allows  -Patient reported chest pain on admission however troponins are negative making acute coronary syndrome less likely.  -Continue to titrate meds.  -Ensure patient is enrolled in CORE      Chronic anemia  -Recent hemoglobin has been between 10-11, hemoglobin today is marginally lower at 9.8  -Patient denies bleeding from any source, monitor daily     CKD stage IIIb  -Creatinine is 1.44 which is actually slightly better than his recent baseline of around 2 (creatinine of 2.5) on last hospital admission  - Followed by nephrology outpatient.   -Ultrasound LADY on 9/27/2020 for possible left LADY.  History of proteinuria on ARB.  - Last visit 10/31/2024 Had restarted his losartan prior to visit with nephrology and dose reduced to 25 mg po daily, held jardiance .   - Started on bumex drip     Chronic atrial fibrillation on Xarelto (no bblocker)   Bradycardia: (10/15/2024) in hospital   - noted last hospital stay   - stopping of coreg for 2-3 second pauses.   - on xarelto     History of chronic coronary artery disease  -Status post OLESYA to the LAD and D2 in 2019  -Admitted with an acute hypertensive emergency with LV dysfunction and EF of 30% (improved with BP control)      Other chronic medical problems:  Carotid artery disease  Chronic gout  Psoriasis          Diet: Combination Diet 2 gm NA Diet; No Caffeine Diet (and additional linked orders)  Fluid restriction 2000 ML FLUID (and additional linked orders)    DVT Prophylaxis: DOAC  Morales Catheter: Not present  Lines: None     Cardiac Monitoring: ACTIVE order. Indication: Acute decompensated heart failure (48 hours)  Code  "Status: Full Code      Clinically Significant Risk Factors Present on Admission                # Drug Induced Coagulation Defect: home medication list includes an anticoagulant medication  # Drug Induced Platelet Defect: home medication list includes an antiplatelet medication   # Hypertension: Noted on problem list  # Acute heart failure with preserved ejection fraction: heart failure noted on problem list, last echo with EF >50%, and receiving IV diuretics     # Anemia: based on hgb <11       # Obesity: Estimated body mass index is 32.62 kg/m  as calculated from the following:    Height as of this encounter: 1.702 m (5' 7\").    Weight as of this encounter: 94.5 kg (208 lb 4.8 oz).                     Disposition Plan     Medically Ready for Discharge: Anticipated in 2-4 Days             ELMER REESE MD  Hospitalist Service  [facility]  Securely message with [employer] (more info)  Text page via [employer] Paging/Directory   ______________________________________________________________________    Physical Exam   Vital Signs: Temp: 97.7  F (36.5  C) Temp src: Oral BP: (!) 144/68 Pulse: 68   Resp: 18 SpO2: 96 % O2 Device: None (Room air)    Weight: 208 lbs 4.8 oz    General Appearance: Patient is awake and alert  Respiratory: Clear to auscultation bilaterally  Cardiovascular: Regular rate and rhythm without gallop rub normal S1-S2  GI: Positive bowel sounds soft rebound guarding tenderness  Skin: 1-2+ edema bilaterally    Medical Decision Making       45 MINUTES SPENT BY ME on the date of service doing chart review, history, exam, documentation & further activities per the note.      Data     I have personally reviewed the following data over the past 24 hrs:    9.9  \   9.8 (L)   / 340     140 104 32.5 (H) /  101 (H)   3.9 24 1.48 (H) \     ALT: 13 AST: 22 AP: 101 TBILI: 0.8   ALB: 4.2 TOT PROTEIN: 7.6 LIPASE: N/A     Trop: 54 (H) BNP: 5,147 (H)       Imaging results reviewed over the past 24 " hrs:   Recent Results (from the past 24 hours)   XR Chest 2 Views    Narrative    CHEST TWO VIEWS  11/25/2024 2:36 PM     HISTORY:  Shortness of breath.    COMPARISON: 10/8/2024.       Impression    IMPRESSION: No significant interval change. Stable cardiac  enlargement. Mild bilateral pulmonary venous congestion. Perihilar  interstitial prominence is again suspicious for pulmonary edema.  Calcified granuloma near the left lung apex. Small amount of pleural  fluid or thickening at the right lung base laterally. Loop recorder  device projected over the heart left of midline.    BECKIE BEJARANO MD         SYSTEM ID:  [id number]

## 2024-11-26 NOTE — PROGRESS NOTES
[id number]    Slept well overnight. Denies pain. Dyspnea with exertion but pt states he feels better compared to when he came in. VSS on room air. Cardiology consulted. 3+ pitting edema to lower extremities. A&Ox4. Voiding in bathroom, continent. Tele afib. Ambulating independently.

## 2024-11-26 NOTE — PLAN OF CARE
Goal Outcome Evaluation: Progressing    Reason for Admission: CHF exacerbation    Evaluation of Goal:   Patient is A&Ox 4. Vitals are stable on RA with some [place] when up ambulating. Tele Afib CVR. Patient is up with independent, ambulated in the room. Patient denies pain. IV lasix given with adequate urine output following lasix. Patient scoring green on the Aggression Stoplight Tool. Pt calm and cooperative with cares, able to make needs known, call light within reach, bed alarm on for safety. Discharge disposition is pending.     Debo Ruiz RN on 11/26/2024 at 2:55 PM

## 2024-11-26 NOTE — CONSULTS
Inpatient Cardiology Consultation.   [facility]  Date of Admission: 11/25/2024  Date of Consult: November 26, 2024      PRIMARY CARDIOLOGY TEAM:  Dr. Hidalgo.       DIAGNOSES/ASSESSMENT:    Acute on chronic heart failure with preserved ejection fraction, LVEF 65%, in the context of recently reduced dose of oral diuretic therapy and withdrawal of angiotensin receptor blocker (due to acute kidney injury on last hospitalization) and excessive dietary sodium intake.  IV diuresis and gradual re-initiation of valsartan.  Uncontrolled hypertension.  Allow permissive hypertension over the next 24 hours while we diurese him and optimize GDMT.  Permanent atrial fibrillation, heart rate controlled and on been anticoag patient.  Note that carvedilol was discontinued recently due to bradycardia and pauses.  Seen by EP at last hospitalization, pacemaker deemed not indicated at this time.  Stage IIIa chronic kidney disease.  Three-vessel coronary artery disease status post previous stenting.  BMI 33.    PLAN:    IV bumetanide infusion at 0.25 mg/h.  Patient aware that may cause temporary renal dysfunction.  Discontinue amlodipine 5 mg daily to allow blood pressure room for heart failure GDMT.  Start valsartan 40 mg daily.  Eventually, plan to switch to Entresto, if renal function allows.  Patient is on rivaroxaban.  Discontinue aspirin to decrease bleeding risk.  Continue other cardiac medications.  I suspect his chest pain was angina in the context of known coronary artery disease and acute heart failure.  Troponins are reassuring and not indicative of marked injury.  Additional ischemia testing is not needed at this time.      Luba Colon MD, MD FACC  Cardiology      Total time today 83 minutes.        CODE STATUS:  Full Code      REASON FOR CONSULT:  Acute on chronic HFpEF.    HISTORY OF PRESENT ILLNESS:  Betito Anderson is a 79 year old male with complex cardiovascular history who follows  with my partner Dr. Shaffer and RASHI Montaño.  He has been hospitalized with acute HFpEF, with recent hospitalization for the same in early October 2024.    History significant for severe 3-vessel coronary artery disease diagnosed in 07/2019 with a normal left main, 80% mid LAD, 70% circ, subtotally occluded RCA with left-to-right collaterals. 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 He then underwent drug-eluting stent to the LAD and D2. He has medically managed residual 70% circumflex and 90% proximal RCA lesion with left-to-right collaterals.  Other comorbidities are HFpEF with LVEF of 65%, stage IIIA CKD with baseline creatinine of 1.4, chronic anemia, mild aortic valve stenosis, hypertension, permanent atrial fibrillation (on Xarelto), history of nonsustained ventricular tachycardia with negative EP study, history of syncope with asymptomatic 4-second pauses for which device therapy was not deemed necessary (see EP consult by Dr. Lezama on 10/10/2024), dyslipidemia, obesity with BMI of 33.    He has been admitted with exertional chest pain, productive cough, exertional dyspnea, orthopnea leg swelling and fluid weight gain.  Admission weight 208 pounds (recent discharge weight 193 pounds).  His cardiac medications are torsemide 20 mg daily (reduced dose due to acute kidney injury at last hospitalization, previously was on 40 mg daily) amlodipine 5 mg daily, hydralazine 100 mg 3 times daily, Imdur extended release 120 mg daily.  His carvedilol 6.25 mg twice daily and losartan 100 mg daily were stopped at his last hospitalization.  Patient's wife endorses excessive dietary sodium intake (patient enjoys pizzas and burgers) and 2 vodka tonics daily.    On presentation /78, sats 92% on room air weight 208 pounds, A-fib rate controlled in the 70s, NT proBNP 5100, high-sensitivity troponin T 53, 54, hemoglobin 9.8, no leukocytosis, creatinine 1.4  (his baseline), sodium 141, potassium 4.2.    ECG shows rate controlled atrial fibrillation of 87 bpm, no ischemia or infarct, QTc 450 ms.    Last thoracic echocardiogram dated 10/9/2024 shows normal LVEF of 65%, mildly decreased right ventricular systolic function, severe biatrial enlargement, mild mitral valve regurgitation, trace tricuspid valve regurgitation.    REVIEW OF SYSTEMS:  A comprehensive 10 point review of systems was completed and the pertinent positives are documented in history of present illness.    FAMILY HISTORY:  Family History   Problem Relation Age of Onset    Coronary Artery Disease Father     Medical History Unknown Mother     Medical History Unknown Maternal Grandfather        MEDICATIONS:  Prior to Admission Medications   Prescriptions Last Dose Informant Patient Reported? Taking?   allopurinol (ZYLOPRIM) 300 MG tablet 11/25/2024 Morning  No Yes   Sig: TAKE 1 TABLET DAILY   amLODIPine (NORVASC) 5 MG tablet 11/25/2024 Morning  No Yes   Sig: Take 1 tablet (5 mg) by mouth daily   aspirin (ASA) 81 MG EC tablet 11/25/2024  No Yes   Sig: Take 1 tablet (81 mg) by mouth daily   calcipotriene (DOVONOX) 0.005 % external cream   No No   Sig: Mix efudex + calcipotriene equally. Apply BID x 4-10 days. Stop when skin gets red.   clobetasol propionate (TEMOVATE) 0.05 % external cream   No Yes   Sig: Apply topically 2 times daily To feet as needed   empagliflozin (JARDIANCE) 10 MG TABS tablet Not Taking  No No   Sig: Take 1 tablet (10 mg) by mouth daily.   Patient not taking: Reported on 11/25/2024   ezetimibe (ZETIA) 10 MG tablet 11/25/2024  No Yes   Sig: Take 1 tablet (10 mg) by mouth daily   fluorouracil (EFUDEX) 5 % external cream   No No   Sig: Mix equally with calcipotriene cream. Apply BID x 4-10 days. Stop when skin gets red.   hydrALAZINE (APRESOLINE) 100 MG tablet 11/25/2024 Morning  No Yes   Sig: Take 1 tablet (100 mg) by mouth 3 times daily   isosorbide mononitrate CR (IMDUR) 120 MG 24 HR ER  tablet 11/25/2024  No Yes   Sig: Take 1 tablet (120 mg) by mouth daily   ketoconazole (NIZORAL) 2 % external shampoo Past Week  No Yes   Sig: Massage into wet scalp, let sit 3-5 min, then rinse. Do this 3x weekly.   losartan (COZAAR) 25 MG tablet 11/25/2024 Morning  Yes Yes   Sig: Take 25 mg by mouth daily.   rivaroxaban ANTICOAGULANT (XARELTO) 15 MG TABS tablet 11/24/2024  No Yes   Sig: Take 1 tablet (15 mg) by mouth daily (with dinner).   rosuvastatin (CRESTOR) 10 MG tablet 11/24/2024 Evening  No Yes   Sig: Take 1 tablet (10 mg) by mouth at bedtime.   Patient taking differently: Take 40 mg by mouth at bedtime.   torsemide (DEMADEX) 20 MG tablet 11/25/2024  No Yes   Sig: Take 1 tablet (20 mg) by mouth daily.   triamcinolone (KENALOG) 0.1 % external cream   No Yes   Sig: Apply topically 2 times daily To psoriasis on arms and body as needed      Facility-Administered Medications: None       HOME MEDICATIONS:  Prior to Admission Medications   Prescriptions Last Dose Informant Patient Reported? Taking?   allopurinol (ZYLOPRIM) 300 MG tablet 11/25/2024 Morning  No Yes   Sig: TAKE 1 TABLET DAILY   amLODIPine (NORVASC) 5 MG tablet 11/25/2024 Morning  No Yes   Sig: Take 1 tablet (5 mg) by mouth daily   aspirin (ASA) 81 MG EC tablet 11/25/2024  No Yes   Sig: Take 1 tablet (81 mg) by mouth daily   calcipotriene (DOVONOX) 0.005 % external cream   No No   Sig: Mix efudex + calcipotriene equally. Apply BID x 4-10 days. Stop when skin gets red.   clobetasol propionate (TEMOVATE) 0.05 % external cream   No Yes   Sig: Apply topically 2 times daily To feet as needed   empagliflozin (JARDIANCE) 10 MG TABS tablet Not Taking  No No   Sig: Take 1 tablet (10 mg) by mouth daily.   Patient not taking: Reported on 11/25/2024   ezetimibe (ZETIA) 10 MG tablet 11/25/2024  No Yes   Sig: Take 1 tablet (10 mg) by mouth daily   fluorouracil (EFUDEX) 5 % external cream   No No   Sig: Mix equally with calcipotriene cream. Apply BID x 4-10 days.  Stop when skin gets red.   hydrALAZINE (APRESOLINE) 100 MG tablet 11/25/2024 Morning  No Yes   Sig: Take 1 tablet (100 mg) by mouth 3 times daily   isosorbide mononitrate CR (IMDUR) 120 MG 24 HR ER tablet 11/25/2024  No Yes   Sig: Take 1 tablet (120 mg) by mouth daily   ketoconazole (NIZORAL) 2 % external shampoo Past Week  No Yes   Sig: Massage into wet scalp, let sit 3-5 min, then rinse. Do this 3x weekly.   losartan (COZAAR) 25 MG tablet 11/25/2024 Morning  Yes Yes   Sig: Take 25 mg by mouth daily.   rivaroxaban ANTICOAGULANT (XARELTO) 15 MG TABS tablet 11/24/2024  No Yes   Sig: Take 1 tablet (15 mg) by mouth daily (with dinner).   rosuvastatin (CRESTOR) 10 MG tablet 11/24/2024 Evening  No Yes   Sig: Take 1 tablet (10 mg) by mouth at bedtime.   Patient taking differently: Take 40 mg by mouth at bedtime.   torsemide (DEMADEX) 20 MG tablet 11/25/2024  No Yes   Sig: Take 1 tablet (20 mg) by mouth daily.   triamcinolone (KENALOG) 0.1 % external cream   No Yes   Sig: Apply topically 2 times daily To psoriasis on arms and body as needed      Facility-Administered Medications: None       ALLERGIES:  Allergies   Allergen Reactions    Ace Inhibitors Anaphylaxis     Edema of ace    Eliquis [Apixaban] Other (See Comments)     Facial numbness       PAST MEDICAL HISTORY:  Past Medical History:   Diagnosis Date    Acute diastolic congestive heart failure (H) 06/2019    hosp fsd, then fu echo ef nl; echo 2023 nl ef    ASCVD (arteriosclerotic cardiovascular disease) 1997    angio with 40% circ lesion; 6/19 hosp for chf, 3 vessel dz on angio but porcelin aorta so ptca done    Atrial fibrillation (H) 10/09/2018    found on routine physical    Basal cell carcinoma     Carotid artery plaque 2005    seen on us, about 50% stenosis, fu 2009 us no significant stenosis    CKD (chronic kidney disease) stage 3, GFR 30-59 ml/min (H)     Elevated blood sugar     Gout     on allopurinol    Heart failure with preserved ejection fraction, NYHA  class IV (H) 10/2024    hosp fsd    HTN (hypertension)     Hypercholesteremia     Hyponatremia 2015    felt due to chlorthalidone    Low mean corpuscular volume (MCV)     Near syncope 05/2015    fu est echo low level but neg    Psoriasis     Dr. Marti    Renal mass 06/29/2019    seen on ct chest for sob, needs fu ct for that, fu us done 7/19 and no mass seen, should have fu ct in December, had fu us 3/22 and no fu needed    Screening 2012    abd us no aaa    Syncope 09/2019    hosp fsd, cause not clear, lowered coreg dose; seen by Dr. Lezama of ep and ep study neg, implanted event monitor    V-tach (H) 09/2019    seen on event monitor for fu syncope, then ep study and no inducible vtach       PAST SURGICAL HISTORY:  Past Surgical History:   Procedure Laterality Date    CATARACT EXTRACTION  03/2022    CATARACT EXTRACTION  2022    CV CORONARY ANGIOGRAM N/A 07/02/2019    Procedure: Coronary Angiogram;  Surgeon: Donaldo Loera MD;  Location:  HEART CARDIAC CATH LAB    CV HEART CATHETERIZATION WITH POSSIBLE INTERVENTION N/A 07/05/2019    Procedure: Heart Catheterization with Possible Intervention;  Surgeon: Manolo Hu MD;  Location: [facility] CARDIAC CATH LAB    CV LEFT HEART CATH N/A 07/02/2019    Procedure: Left Heart Cath;  Surgeon: Donaldo Loera MD;  Location:  HEART CARDIAC CATH LAB    CV LEFT VENTRICULOGRAM N/A 07/02/2019    Procedure: Left Ventriculogram;  Surgeon: Donaldo Loera MD;  Location:  HEART CARDIAC CATH LAB    CV PCI STENT DRUG ELUTING N/A 07/05/2019    Procedure: PCI Stent Drug Eluting;  Surgeon: Manolo Hu MD;  Location:  HEART CARDIAC CATH LAB    CV RIGHT HEART CATH MEASUREMENTS RECORDED N/A 07/02/2019    Procedure: Right Heart Cath;  Surgeon: Donaldo Loera MD;  Location:  HEART CARDIAC CATH LAB    EP LOOP RECORDER IMPLANT N/A 10/11/2019    Procedure: EP Loop Recorder Implant;  Surgeon: Fuad Lezama MD;  Location:  HEART CARDIAC CATH  "LAB    EP RIGHT VENTRICULAR RECORDING N/A 10/11/2019    Procedure: EP Ventricular Pacing;  Surgeon: Fuad Lezama MD;  Location:  HEART CARDIAC CATH LAB    ZZC ANESTH,DX ARTHROSCOPIC PROC KNEE JOINT  01/01/2009       SOCIAL HISTORY:   Betito Anderson  reports that he quit smoking about 26 years ago. His smoking use included cigarettes. He started smoking about 56 years ago. He has a 30 pack-year smoking history. He has never used smokeless tobacco. He reports current alcohol use. He reports that he does not use drugs.      PHYSICAL EXAMINATION:  Temp: 97.7  F (36.5  C) Temp src: Oral BP: (!) 144/68 Pulse: 68   Resp: 18 SpO2: 96 % O2 Device: None (Room air)    11/21 0700 - 11/26 0659  In: 300 [P.O.:300]  Out: -   Net: 300  Vitals:    11/25/24 1255 11/26/24 0333   Weight: 90.7 kg (200 lb) 94.5 kg (208 lb 4.8 oz)       Clinically Significant Risk Factors Present on Admission                # Drug Induced Coagulation Defect: home medication list includes an anticoagulant medication  # Drug Induced Platelet Defect: home medication list includes an antiplatelet medication   # Hypertension: Noted on problem list  # Acute heart failure with preserved ejection fraction: heart failure noted on problem list, last echo with EF >50%, and receiving IV diuretics     # Anemia: based on hgb <11       # Obesity: Estimated body mass index is 32.62 kg/m  as calculated from the following:    Height as of this encounter: 1.702 m (5' 7\").    Weight as of this encounter: 94.5 kg (208 lb 4.8 oz).             Cardiac Arrhythmia: Atrial fibrillation: Permanent  Diastolic acute  CKD POA List: Stage 3a (GFR 45-59)      Luba Colon MD, MD    This note was completed in part using dictation via the Dragon voice recognition software. Some word and grammatical errors may occur and must be interpreted in the appropriate clinical context. If there are any questions pertaining to this issue, please contact me for further clarification. "

## 2024-11-26 NOTE — PROGRESS NOTES
"   11/26/24 1500   Appointment Info   Signing Clinician's Name / Credentials (OT) Bernadine Hughes OTR/L   Rehab Comments (OT) Edema and OT for CHF   Quick Adds   Quick Adds Edema   Living Environment   People in Home spouse   Current Living Arrangements   ([facility])   Transportation Anticipated car, drives self;family or friend will provide   Living Environment Comments Pt lives w/ spouse in [facility], has stairs to basement however does not need to go down there, he has not been going down there for about the last month.   Self-Care   Usual Activity Tolerance good   Current Activity Tolerance moderate   Regular Exercise   (has a  every tuesday but has very little carry over outside of that 1 day a week)   Equipment Currently Used at Home none   Fall history within last six months no   Activity/Exercise/Self-Care Comment IND at baseline w/ ADLs   Instrumental Activities of Daily Living (IADL)   IADL Comments IND w/ driving, has limited his outings becasue of poor activity tolerance   General Information   Onset of Illness/Injury or Date of Surgery 11/25/24   Referring Physician Baljinder Ochoa MD   Additional Occupational Profile Info/Pertinent History of Current Problem Per chart review: \"Betito Anderson is a 79 year old male with history of heart failure with preserved EF, CKD stage IIIb, hypertension, coronary artery disease, hyperlipidemia, chronic A-fib who presents to the emergency room with worsening dyspnea and chest tightness.  \"   Cognitive Status Examination   Orientation Status orientation to person, place and time   Cognitive Status Comments [place] and needs repetition at times   Visual Perception   Visual Impairment/Limitations corrective lenses for reading   Edema General Information   Onset of Edema 11/25/24   Affected Body Part(s) Left LE;Right LE   Edema Etiology Chronic Venous Insfficiency   Edema Precautions Cardiac Edema/CHF   Edema Examination/Assessment   Skin Condition Pitting "   Stemmer Sign Negative   Pitting Assessment 3+ pitting in BLE   Edema Assessment Comments Pt w/ pitting edema in BLE, long toe nails and difficulty caring for feet, some mild neuropathy   Range of Motion Comprehensive   Comment, General Range of Motion BUE WFL   Strength Comprehensive (MMT)   Comment, General Manual Muscle Testing (MMT) Assessment deconditioning but intact, SOB w/ activity   Coordination   Coordination Comments no coordination deficits   Bed Mobility   Comment (Bed Mobility) IND   Transfers   Transfer Comments IND in room, SBA trs   Balance   Balance Comments decreased activity tolerance, couple standing breaks but no overt LOB   Activities of Daily Living   BADL Assessment/Intervention lower body dressing;toileting   Lower Body Dressing Assessment/Training   Comment, (Lower Body Dressing) Mod A this date especially for edema wear but able to manage normal socks and slippers   Toileting   Comment, (Toileting) SBA-IND   Clinical Impression   Criteria for Skilled Therapeutic Interventions Met (OT) Yes, treatment indicated   OT Diagnosis impaired ADL/IADL, impaired act macho, new CHF dx   Edema: Patient Presentation Edema   OT Problem List-Impairments impacting ADL problems related to;activity tolerance impaired;mobility   Assessment of Occupational Performance 1-3 Performance Deficits   Identified Performance Deficits activity tolerance, home mgmt   Planned Therapy Interventions (OT) ADL retraining;progressive activity/exercise;home program guidelines;transfer training;risk factor education   Edema: Planned Interventions Edema exercises;Education;ADL training   Clinical Decision Making Complexity (OT) problem focused assessment/low complexity   Risk & Benefits of therapy have been explained patient;spouse/significant other   OT Total Evaluation Time   OT Eval, Low Complexity Minutes (52646) 10   OT Goals   Therapy Frequency (OT) 5 times/week   OT Predicted Duration/Target Date for Goal Attainment  12/03/24   OT Goals Edema;Cardiac Phase 1   OT: Edema education to increase ability to manage edema after discharge from the hospital Patient;Caregiver;Demonstrate;[place];wear schedule;signs/symptoms of intolerance;garrnet/bandage care  (edema wear)   OT: Management of edema bandages Patient;Caregiver;[place]   OT: Functional edema exercise program to reduce limb volume, increase activity tolerance and improve independence with ADL Patient;Demonstrates;[place]   OT: Understanding of cardiac education to maximize quality of life, condition management, and health outcomes Patient;Caregiver;Demonstrate   OT: Perform aerobic activity with stable cardiovascular response continuous;5 minutes   OT: Functional/aerobic ambulation tolerance with stable cardiovascular response in order to return to home and community environment Supervision/SBA;Greater than 300 feet   OT: Navigation of stairs simulating home set up with stable cardiovascular response in order to return to home and community environment 10 stairs;Supervision/SBA   Interventions   Interventions Quick Adds Self-Care/Home Management;Therapeutic Activity;Therapeutic Procedures/Exercise;Cardiac Rehab;Manual Therapy   Self-Care/Home Management   Self-Care/Home Mgmt/ADL, Compensatory, Meal Prep Minutes (71638) 15   Treatment Detail/Skilled Intervention OT: Pt sitting EOB, wife present legnthy time spent reviewing CHF packet and education in addition to outside resources such as cooking seasonings, how to read labels, cook books for CHF and emphasizing the importance of the stop light tool. Upon reviewing stop light tool pt had > 2 areas in the red region. Pt asking appropraite questions throughout session and wife also answering. At times pt frustrated w/ wife as she appears to be giving more blunt or truthful response to therapist and MD (Cardiology) who were in room for things such as salt intake, diet, alcohol consumption, actual activity etc.    Manual Therapy   Manual Therapy: Mobilization, MFR, MLD, friction massage minutes (72121) 12   Treatment Detail/Skilled Intervention OT: Pt w/ 3+ pitting edema on BLE this date, trial edema wear before wraps especially since medical team is going to increase lasik dose to IV. appropraitely sized and donned size M edema wear from toes to just below knee crease, education on monitoring and watching for rolling below the knee. Pt and wife both verbalize understanding and importance. Also provided x2 hand outs on LE edema and conservative management such as ambulation and elevation, pt had LEs down at time of arrival, pt was able to teach back edu at end of session   Therapeutic Activities   Therapeutic Activity Minutes (94142) 10   Treatment Detail/Skilled Intervention OT: Pt engaged in functional mobility for activity tolerance related to CHF exaccerbation, x2 standing breaks, no AD, no chest pain, BP slightly elevated but within 5 SBP from baseline in hospital, O2 sats stable after activity but certainly increased breathing rate.   Ambulation   Workload 250' SBA 2 standing breaks   Cardiac Education   Education Provided Daily weights;Diagnosis;Home exercise program;Resuming home activities;Signs and symptoms;Stop light tool   Education Packet Given to Patient Yes  (provided pt and wife w/ edema handouts and all CHF education packet)   All Patient Education Handouts Reviewed with Patient and/or Family No   OT Discharge Planning   OT Plan timed ambulation, monitor LE edema wear (size M donned on 11/26), stairs, reinforce home exercise program, discuss [facility]   OT Discharge Recommendation (DC Rec) home with outpatient cardiac rehab  (OP appts to [facility] follow up (likely won't qualify for OP CR))   OT Rationale for DC Rec Pt functioning below baseline, demonstrating decreased activity tolerance, SOB on exertion, LE swelling likely related to CHF exaccerbation. Pt would benefit from continued IP OT for  activity tolerance and edu on CHF edu in addition to OP followup in [facility] for cardiology education. Wife present and able to help w/ IADLs as neede.d   Total Session Time   Timed Code Treatment Minutes 37   Total Session Time (sum of timed and untimed services) 47

## 2024-11-27 ENCOUNTER — APPOINTMENT (OUTPATIENT)
Dept: OCCUPATIONAL THERAPY | Facility: CLINIC | Age: 79
DRG: 291 | End: 2024-11-27
Payer: COMMERCIAL

## 2024-11-27 LAB
ANION GAP SERPL CALCULATED.3IONS-SCNC: 15 MMOL/L (ref 7–15)
BUN SERPL-MCNC: 34.8 MG/DL (ref 8–23)
CALCIUM SERPL-MCNC: 9.8 MG/DL (ref 8.8–10.4)
CHLORIDE SERPL-SCNC: 101 MMOL/L (ref 98–107)
CREAT SERPL-MCNC: 1.57 MG/DL (ref 0.67–1.17)
EGFRCR SERPLBLD CKD-EPI 2021: 45 ML/MIN/1.73M2
ERYTHROCYTE [DISTWIDTH] IN BLOOD BY AUTOMATED COUNT: 18.4 % (ref 10–15)
GLUCOSE SERPL-MCNC: 118 MG/DL (ref 70–99)
HCO3 SERPL-SCNC: 26 MMOL/L (ref 22–29)
HCT VFR BLD AUTO: 32 % (ref 40–53)
HGB BLD-MCNC: 10.3 G/DL (ref 13.3–17.7)
MCH RBC QN AUTO: 28.3 PG (ref 26.5–33)
MCHC RBC AUTO-ENTMCNC: 32.2 G/DL (ref 31.5–36.5)
MCV RBC AUTO: 88 FL (ref 78–100)
PLATELET # BLD AUTO: 367 10E3/UL (ref 150–450)
POTASSIUM SERPL-SCNC: 3.7 MMOL/L (ref 3.4–5.3)
RBC # BLD AUTO: 3.64 10E6/UL (ref 4.4–5.9)
SODIUM SERPL-SCNC: 142 MMOL/L (ref 135–145)
WBC # BLD AUTO: 8.9 10E3/UL (ref 4–11)

## 2024-11-27 PROCEDURE — 99233 SBSQ HOSP IP/OBS HIGH 50: CPT | Mod: FS | Performed by: PHYSICIAN ASSISTANT

## 2024-11-27 PROCEDURE — 97530 THERAPEUTIC ACTIVITIES: CPT | Mod: GO

## 2024-11-27 PROCEDURE — 99232 SBSQ HOSP IP/OBS MODERATE 35: CPT | Performed by: INTERNAL MEDICINE

## 2024-11-27 PROCEDURE — 250N000009 HC RX 250: Performed by: INTERNAL MEDICINE

## 2024-11-27 PROCEDURE — 120N000001 HC R&B MED SURG/OB

## 2024-11-27 PROCEDURE — 36415 COLL VENOUS BLD VENIPUNCTURE: CPT | Performed by: PHYSICIAN ASSISTANT

## 2024-11-27 PROCEDURE — 250N000013 HC RX MED GY IP 250 OP 250 PS 637: Performed by: PHYSICIAN ASSISTANT

## 2024-11-27 PROCEDURE — 80048 BASIC METABOLIC PNL TOTAL CA: CPT | Performed by: PHYSICIAN ASSISTANT

## 2024-11-27 PROCEDURE — 250N000011 HC RX IP 250 OP 636: Performed by: INTERNAL MEDICINE

## 2024-11-27 PROCEDURE — 250N000013 HC RX MED GY IP 250 OP 250 PS 637: Performed by: INTERNAL MEDICINE

## 2024-11-27 PROCEDURE — 85027 COMPLETE CBC AUTOMATED: CPT | Performed by: PHYSICIAN ASSISTANT

## 2024-11-27 RX ORDER — POTASSIUM CHLORIDE 750 MG/1
10 TABLET, EXTENDED RELEASE ORAL ONCE
Status: COMPLETED | OUTPATIENT
Start: 2024-11-27 | End: 2024-11-27

## 2024-11-27 RX ORDER — ACETAMINOPHEN 325 MG/1
650 TABLET ORAL EVERY 6 HOURS PRN
Status: DISCONTINUED | OUTPATIENT
Start: 2024-11-27 | End: 2024-11-28 | Stop reason: HOSPADM

## 2024-11-27 RX ORDER — IBUPROFEN 600 MG/1
600 TABLET, FILM COATED ORAL ONCE
Status: COMPLETED | OUTPATIENT
Start: 2024-11-27 | End: 2024-11-27

## 2024-11-27 RX ORDER — NALOXONE HYDROCHLORIDE 0.4 MG/ML
0.2 INJECTION, SOLUTION INTRAMUSCULAR; INTRAVENOUS; SUBCUTANEOUS
Status: DISCONTINUED | OUTPATIENT
Start: 2024-11-27 | End: 2024-11-28 | Stop reason: HOSPADM

## 2024-11-27 RX ORDER — ACETAMINOPHEN 325 MG/1
650 TABLET ORAL ONCE
Status: COMPLETED | OUTPATIENT
Start: 2024-11-27 | End: 2024-11-27

## 2024-11-27 RX ORDER — NALOXONE HYDROCHLORIDE 0.4 MG/ML
0.4 INJECTION, SOLUTION INTRAMUSCULAR; INTRAVENOUS; SUBCUTANEOUS
Status: DISCONTINUED | OUTPATIENT
Start: 2024-11-27 | End: 2024-11-28 | Stop reason: HOSPADM

## 2024-11-27 RX ORDER — VALSARTAN 40 MG/1
40 TABLET ORAL ONCE
Status: COMPLETED | OUTPATIENT
Start: 2024-11-27 | End: 2024-11-27

## 2024-11-27 RX ORDER — VALSARTAN 80 MG/1
80 TABLET ORAL DAILY
Status: DISCONTINUED | OUTPATIENT
Start: 2024-11-28 | End: 2024-11-28 | Stop reason: HOSPADM

## 2024-11-27 RX ORDER — FENTANYL CITRATE 50 UG/ML
25 INJECTION, SOLUTION INTRAMUSCULAR; INTRAVENOUS ONCE
Status: COMPLETED | OUTPATIENT
Start: 2024-11-27 | End: 2024-11-27

## 2024-11-27 RX ORDER — HYDRALAZINE HYDROCHLORIDE 50 MG/1
50 TABLET, FILM COATED ORAL 3 TIMES DAILY
Status: DISCONTINUED | OUTPATIENT
Start: 2024-11-27 | End: 2024-11-28

## 2024-11-27 RX ADMIN — RIVAROXABAN 15 MG: 15 TABLET, FILM COATED ORAL at 16:42

## 2024-11-27 RX ADMIN — BUMETANIDE 0.25 MG/HR: 0.25 INJECTION INTRAMUSCULAR; INTRAVENOUS at 13:21

## 2024-11-27 RX ADMIN — EZETIMIBE 10 MG: 10 TABLET ORAL at 08:19

## 2024-11-27 RX ADMIN — ACETAMINOPHEN 650 MG: 325 TABLET, FILM COATED ORAL at 13:45

## 2024-11-27 RX ADMIN — VALSARTAN 40 MG: 40 TABLET, FILM COATED ORAL at 13:46

## 2024-11-27 RX ADMIN — ROSUVASTATIN CALCIUM 10 MG: 10 TABLET, FILM COATED ORAL at 22:27

## 2024-11-27 RX ADMIN — IBUPROFEN 600 MG: 600 TABLET ORAL at 13:45

## 2024-11-27 RX ADMIN — HYDRALAZINE HYDROCHLORIDE 100 MG: 50 TABLET ORAL at 08:19

## 2024-11-27 RX ADMIN — FENTANYL CITRATE 25 MCG: 50 INJECTION, SOLUTION INTRAMUSCULAR; INTRAVENOUS at 13:44

## 2024-11-27 RX ADMIN — HYDRALAZINE HYDROCHLORIDE 100 MG: 50 TABLET ORAL at 16:42

## 2024-11-27 RX ADMIN — VALSARTAN 40 MG: 40 TABLET, FILM COATED ORAL at 08:20

## 2024-11-27 RX ADMIN — ALLOPURINOL 300 MG: 300 TABLET ORAL at 08:19

## 2024-11-27 RX ADMIN — ISOSORBIDE MONONITRATE 120 MG: 60 TABLET, EXTENDED RELEASE ORAL at 08:20

## 2024-11-27 RX ADMIN — POTASSIUM CHLORIDE 10 MEQ: 750 TABLET, EXTENDED RELEASE ORAL at 13:45

## 2024-11-27 ASSESSMENT — ACTIVITIES OF DAILY LIVING (ADL)
ADLS_ACUITY_SCORE: 52

## 2024-11-27 NOTE — PROGRESS NOTES
[facility]  Cardiology Progress Note  Date of Service: 11/27/2024  Primary Cardiologist: Dr Hidalgo    Assessment & Plan    Betito Anderson is a 79 year old male with past medical history significant for heart failure with improved ejection fraction, atrial fibrillation, permanent, coronary artery disease, obesity admitted on 11/25/2024 with acute decompensated heart failure    Assessment and Plan:  Acute on chronic heart failure with preserved ejection fraction  CKD ash III  Acute decompensation likely 2/2 reduced oral diuretic therpay recently and discontinuation of ARB due to KATELIN during last hospitalization, as well as excessive dietary sodium intake.  He has good response to diuretics and is improving, but continues to appear hypervolemic.  Renal function stable, potassium low normal.  Hypertension, blood pressure poorly controlled  on current regimen. Reports medication compliance.  PTA regimen includes amlodipine 5 mg daily, hydralazine 100 mg 3 times daily, Imdur 120 mg daily and losartan 25 mg daily  Atrial Fibrillation, chronic since 2018.  Maintained on anticoagulation.  No palpitations or racing heart this admission..   Coronary Artery Disease, Three-vessel coronary artery disease status post previous stenting to LAD bifurcation into the diagonal in 2019.  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  He is not having any symptoms of angina at this admission  Mitral regurgitation, mild      Plan:  Continue Bumex drip through this evening  KCl 10 mill equivalents x 1  Basic metabolic panel in the morning  Increase valsartan 40 mg daily to 80 mg daily, if renal function stable, increased to 80 mg twice daily  Pharmacy liaison check for coverage of Entresto  Continue to hold PTA amlodipine 5 mg daily to make room for more afterload reduction with valsartan  Consider reducing hydralazine as valsartan dose is increased  Continue rivaroxaban 15 mg  daily  Continue rosuvastatin 10 mg daily      30 minutes spent by me on the date of service doing chart review, history, exam, documentation, coordination and discussion with other care providers & further activities per the note. MD time separate.   Lesia Christian PA-C  [facility]  Pager: through Vocera    Interval History   No acute events overnight.  Patient reports improvement has feeling but still has significant lower extremity edema and abdominal swelling.  Breathing normal on room air.  In the recliner, as he did at home.  No blood in stool or urine.      Data   Last 24 hours diagnostics reviewed:  EKG: (reviewed personally)     Intake/Output Summary (Last 24 hours) at 11/27/2024 1017  Last data filed at 11/27/2024 1014  Gross per 24 hour   Intake 824.7 ml   Output 3700 ml   Net -2875.3 ml       100/2024 Echocardiogram :  Left ventricular systolic function is normal.The visual ejection fraction is  60-65%.Diastolic Doppler findings (E/E' ratio and/or other parameters) suggest  left ventricular filling pressures are increased.  Mildly decreased right ventricular systolic function.  Severe biatrial enlargement.  There is mild (1+) mitral regurgitation.There is mild mitral stenosis.    May 2023 echocardiogram:  Hyperdynamic left ventricular function  The visual ejection fraction is >70%.  The right ventricular systolic function is mildly reduced.  There is severe biatrial enlargement.  There is mild (1+) mitral regurgitation.  Compared to prior study, there is no significant change.      Physical Exam   Temp: 97.6  F (36.4  C) Temp src: Oral BP: (!) 153/73 Pulse: 77   Resp: 18 SpO2: 95 % O2 Device: None (Room air)    Vitals:    11/25/24 1255 11/26/24 0333 11/27/24 1034   Weight: 90.7 kg (200 lb) 94.5 kg (208 lb 4.8 oz) 91.2 kg (201 lb)       GEN:, Alert, sitting in chair  HEART: Irregular; JVP elevated to mandible  LUNGS: Diminished breath sounds at bases  EXT: Pitting edema to midshin    Medications   Current  Facility-Administered Medications   Medication Dose Route Frequency Provider Last Rate Last Admin    bumetanide (BUMEX) 0.25 mg/mL infusion  0.25 mg/hr Intravenous Continuous Luba Colon MD        Continuing ACE inhibitor/ARB/ARNI from home medication list OR ACE inhibitor/ARB/ARNI order already placed during this visit   Does not apply DOES NOT GO TO Baljinder Waters MD        Continuing beta blocker from home medication list OR beta blocker order already placed during this visit   Does not apply DOES NOT GO TO Baljinder Waters MD         Current Facility-Administered Medications   Medication Dose Route Frequency Provider Last Rate Last Admin    acetaminophen (TYLENOL) tablet 650 mg  650 mg Oral Once Luba Colon MD        allopurinol (ZYLOPRIM) tablet 300 mg  300 mg Oral Daily Baljinder Ochoa MD   300 mg at 11/27/24 0819    empagliflozin (JARDIANCE) tablet 10 mg  10 mg Oral Daily Luba Colon MD        ezetimibe (ZETIA) tablet 10 mg  10 mg Oral Daily Baljinder Ochoa MD   10 mg at 11/27/24 0819    fentaNYL (PF) (SUBLIMAZE) injection 25 mcg  25 mcg Intravenous Once Luba Colon MD        hydrALAZINE (APRESOLINE) tablet 100 mg  100 mg Oral TID Baljinder Ochoa MD   100 mg at 11/27/24 0819    ibuprofen (ADVIL/MOTRIN) tablet 600 mg  600 mg Oral Once Luba Colon MD        isosorbide mononitrate (IMDUR) 24 hr tablet 120 mg  120 mg Oral Daily Baljinder Ohcoa MD   120 mg at 11/27/24 0820    potassium chloride deepa ER (KLOR-CON M10) CR tablet 10 mEq  10 mEq Oral Once Lesia Christian PA-C        rivaroxaban ANTICOAGULANT (XARELTO) tablet 15 mg  15 mg Oral Daily with supper Baljinder Ochoa MD   15 mg at 11/26/24 1735    rosuvastatin (CRESTOR) tablet 10 mg  10 mg Oral At Bedtime Baljinder Ochoa MD   10 mg at 11/26/24 2111    sodium chloride (PF) 0.9% PF flush 3 mL  3 mL Intracatheter Q8H Baljinder Ochoa MD   3 mL at 11/26/24 0332    valsartan  (DIOVAN) tablet 40 mg  40 mg Oral Once Luba Colon MD        [START ON 11/28/2024] valsartan (DIOVAN) tablet 80 mg  80 mg Oral Daily Luba Colon MD

## 2024-11-27 NOTE — PROVIDER NOTIFICATION
MD Notification    Notified Person: MD    Notified Person Name: Dr. Rios    Notification Date/Time: 1014 11/27/24    Notification Interaction: [facility] webpage    Purpose of Notification: pt requesting tylenol for gout pain    Orders Received:    Comments:

## 2024-11-27 NOTE — PLAN OF CARE
Goal Outcome Evaluation:  Orientation: AnOx4, pleasant  Aggression Stop Light: Green   Mobility: Ind in room. Steady gait.   Pain Management: Denies pain   Tele: A-fib   Diet: 2gm NA diet, No caffeine, 2000 FR.   Bowel/Bladder: Continent B/B. Using urinals in bathroom for I/Os tracking.   Abnormal Lab/Assessments: VSS on RA ex HTN. Dyspnea upon exertion.   Drain/Device/Wound: Skin intact. R. PIV CDI infusing bumex at 1ml/hr.  Consults: OT  D/C Day/Goals/Place: Pending   Other

## 2024-11-27 NOTE — PLAN OF CARE
Goal Outcome Evaluation:  Orientation: AnOx4.   Aggression Stop Light: Green   Mobility: Ind in room. Steady gait.   Pain Management: Denies pain   Tele: A-fib   Diet: 2gm NA diet, No caffeine, 2000 FR. Good intake this shift.   Bowel/Bladder: Continent B/B. Using urinals in bathroom for I/Os tracking.   Abnormal Lab/Assessments: VSS on RA ex HTN. Dyspnea upon exertion.   Drain/Device/Wound: Skin intact. R. PIV CDI infusing bumex at 1ml/hr.  Consults: OT  D/C Day/Goals/Place: Pending

## 2024-11-28 VITALS
BODY MASS INDEX: 31.6 KG/M2 | HEIGHT: 67 IN | SYSTOLIC BLOOD PRESSURE: 135 MMHG | TEMPERATURE: 97.6 F | HEART RATE: 65 BPM | RESPIRATION RATE: 14 BRPM | OXYGEN SATURATION: 94 % | WEIGHT: 201.3 LBS | DIASTOLIC BLOOD PRESSURE: 69 MMHG

## 2024-11-28 LAB
ANION GAP SERPL CALCULATED.3IONS-SCNC: 13 MMOL/L (ref 7–15)
BUN SERPL-MCNC: 42.8 MG/DL (ref 8–23)
CALCIUM SERPL-MCNC: 9.2 MG/DL (ref 8.8–10.4)
CHLORIDE SERPL-SCNC: 98 MMOL/L (ref 98–107)
CREAT SERPL-MCNC: 1.82 MG/DL (ref 0.67–1.17)
EGFRCR SERPLBLD CKD-EPI 2021: 37 ML/MIN/1.73M2
GLUCOSE SERPL-MCNC: 120 MG/DL (ref 70–99)
HCO3 SERPL-SCNC: 29 MMOL/L (ref 22–29)
POTASSIUM SERPL-SCNC: 3.7 MMOL/L (ref 3.4–5.3)
SODIUM SERPL-SCNC: 140 MMOL/L (ref 135–145)

## 2024-11-28 PROCEDURE — 84295 ASSAY OF SERUM SODIUM: CPT | Performed by: PHYSICIAN ASSISTANT

## 2024-11-28 PROCEDURE — 250N000013 HC RX MED GY IP 250 OP 250 PS 637: Performed by: INTERNAL MEDICINE

## 2024-11-28 PROCEDURE — 99232 SBSQ HOSP IP/OBS MODERATE 35: CPT | Performed by: PHYSICIAN ASSISTANT

## 2024-11-28 PROCEDURE — 36415 COLL VENOUS BLD VENIPUNCTURE: CPT | Performed by: PHYSICIAN ASSISTANT

## 2024-11-28 PROCEDURE — 80048 BASIC METABOLIC PNL TOTAL CA: CPT | Performed by: PHYSICIAN ASSISTANT

## 2024-11-28 PROCEDURE — 99239 HOSP IP/OBS DSCHRG MGMT >30: CPT | Performed by: INTERNAL MEDICINE

## 2024-11-28 RX ORDER — ACETAMINOPHEN 325 MG/1
650 TABLET ORAL EVERY 6 HOURS PRN
Status: SHIPPED
Start: 2024-11-28

## 2024-11-28 RX ORDER — VALSARTAN 80 MG/1
80 TABLET ORAL DAILY
Qty: 30 TABLET | Refills: 0 | Status: SHIPPED | OUTPATIENT
Start: 2024-11-29 | End: 2024-12-02

## 2024-11-28 RX ADMIN — VALSARTAN 80 MG: 80 TABLET, FILM COATED ORAL at 08:02

## 2024-11-28 RX ADMIN — EZETIMIBE 10 MG: 10 TABLET ORAL at 08:02

## 2024-11-28 RX ADMIN — ALLOPURINOL 300 MG: 300 TABLET ORAL at 08:02

## 2024-11-28 RX ADMIN — ISOSORBIDE MONONITRATE 120 MG: 60 TABLET, EXTENDED RELEASE ORAL at 08:02

## 2024-11-28 ASSESSMENT — ACTIVITIES OF DAILY LIVING (ADL)
ADLS_ACUITY_SCORE: 54
ADLS_ACUITY_SCORE: 54
ADLS_ACUITY_SCORE: 52
ADLS_ACUITY_SCORE: 54
ADLS_ACUITY_SCORE: 52
ADLS_ACUITY_SCORE: 54
ADLS_ACUITY_SCORE: 54
ADLS_ACUITY_SCORE: 52
ADLS_ACUITY_SCORE: 54

## 2024-11-28 NOTE — PROGRESS NOTES
Independent in the room, Bumex IV stopped 2100, voiding and stooling fine, B/L edema extremities.

## 2024-11-28 NOTE — PROGRESS NOTES
[facility]    Medicine Progress Note - Hospitalist Service    Date of Admission:  11/25/2024    Interval History   Patient sitting on the edge of the bed with bumex drip. Had complaints of joint pain with history of gout.   He is on allopurinol. Nursing received meds for pain with relief. He had pain in both wrists. Bumex drip until evening.   Anticipate discharge in next 1-2 days.   Diuresis with 3700 output today alone.       Assessment & Plan   Betito Anderson is a 79 year old male with history of heart failure with preserved EF, CKD stage IIIb, hypertension, coronary artery disease, hyperlipidemia, chronic A-fib who presents to the emergency room with worsening dyspnea and chest tightness.     Acute exacerbation of heart failure with preserved EF. (HFpEF)   Chest pain.  History of coronary artery disease post PCI in 2019  Elevated troponin  Chronic atrial fibrillation on Xarelto  Hyperlipidemia  Essential hypertension   -Patient presents with exertional chest pain, dyspnea, weight gain and worsening lower extremity edema  -Last echo was just done on 10/9/2024 which showed EF of 60-65% with increased LV filling pressures.  He also had mildly decreased RV systolic function and severe biatrial enlargement.    -Chest x-ray consistent with pulmonary edema, BNP elevated at more than 5000  -EKG shows A-fib with no overt evidence of ischemia  -Troponin elevated at 53, - 54 with no change.   - Seen by cardology   --Resume prior to admission Xarelto  -11/26 cardiology consult: Noted potential contributing factors reduced dose of oral diuretic and stopping of angiotensin receptor blocker due to acute kidney injury on last hospital stay.  -Continue with IV diuresis.  Started on a Bumex infusion 11/26 - 11/27   -Blood pressure not controlled.  -Uncertain of his dietary compliance given history (suspect high salt intake)   -Stopping of Norvasc at 5 mg daily  -Restarted on valsartan with potential  reswitch to Entresto if his renal function allows (can titrate valsartan up to 80 mg po daily ) then BID   -Patient reported chest pain on admission however troponins are negative making acute coronary syndrome less likely.  -Continue to titrate meds.  -Ensure patient is enrolled in CORE   - Pharmacy evaluation of entresto   Rosuvastatin 10 mg po daily      Chronic anemia  -Recent hemoglobin has been between 10-11, hemoglobin today is marginally lower at 9.8  -Patient denies bleeding from any source, monitor daily  -Hemoglobin stable 10.3     CKD stage IIIb  -Creatinine is 1.44 which is actually slightly better than his recent baseline of around 2 (creatinine of 2.5) on last hospital admission  - Followed by nephrology outpatient.   -Ultrasound LADY on 9/27/2020 for possible left LADY.  History of proteinuria on ARB.  - Last visit 10/31/2024 Had restarted his losartan prior to visit with nephrology and dose reduced to 25 mg po daily, held jardiance .   - Started on bumex drip >> 11/26 to 11/27  -BMP stable with creatinine now at 1.57    Chronic atrial fibrillation on Xarelto (no bblocker)   Bradycardia: (10/15/2024) in hospital   - noted last hospital stay   - stopping of coreg for 2-3 second pauses.   - on xarelto     History of chronic coronary artery disease  -Status post OLESYA to the LAD and D2 in 2019  -Admitted with an acute hypertensive emergency with LV dysfunction and EF of 30% (improved with BP control) 2019   - troponin 53>>54    GOUT:   - increase in pain in wrists today   - on allopurinol   - potential gout flair with high dose diuretics   - if still quite problematic tomorrow will add low dose prednisone      Other chronic medical problems:  Carotid artery disease  Psoriasis          Diet: Combination Diet 2 gm NA Diet; No Caffeine Diet (and additional linked orders)  Fluid restriction 2000 ML FLUID (and additional linked orders)    DVT Prophylaxis: DOAC  Morales Catheter: Not present  Lines: None    "  Cardiac Monitoring: ACTIVE order. Indication: Acute decompensated heart failure (48 hours)  Code Status: Full Code      Clinically Significant Risk Factors                   # Hypertension: Noted on problem list  # Acute heart failure with preserved ejection fraction: heart failure noted on problem list, last echo with EF >50%, and receiving IV diuretics           # Obesity: Estimated body mass index is 31.48 kg/m  as calculated from the following:    Height as of this encounter: 1.702 m (5' 7\").    Weight as of this encounter: 91.2 kg (201 lb)., PRESENT ON ADMISSION              Disposition Plan     Medically Ready for Discharge: Anticipated in 2-4 Days           ELMER REESE MD  Hospitalist Service  [facility]  Securely message with Verified Person (more info)  Text page via [facility] Paging/Directory   ______________________________________________________________________    Physical Exam   Vital Signs: Temp: 98.2  F (36.8  C) Temp src: Oral BP: 110/59 Pulse: 74   Resp: 12 SpO2: 94 % O2 Device: None (Room air)    Weight: 201 lbs 0 oz    General Appearance: Patient is awake and alert  Respiratory: Clear to auscultation bilaterally  Cardiovascular: Regular rate and rhythm without gallop rub normal S1-S2  GI: Positive bowel sounds soft rebound guarding tenderness  Skin: 1-2+ edema bilaterally    Medical Decision Making       45 MINUTES SPENT BY ME on the date of service doing chart review, history, exam, documentation & further activities per the note.      Data     I have personally reviewed the following data over the past 24 hrs:    8.9  \   10.3 (L)   / 367     142 101 34.8 (H) /  118 (H)   3.7 26 1.57 (H) \       Imaging results reviewed over the past 24 hrs:   No results found for this or any previous visit (from the past 24 hours).    "

## 2024-11-28 NOTE — PROGRESS NOTES
[facility]  Cardiology Progress Note  Date of Service: 11/28/2024  Primary Cardiologist: Dr Hidalgo    Assessment & Plan    Betito Anderson is a 79 year old male with past medical history significant for heart failure with improved ejection fraction, atrial fibrillation, permanent, coronary artery disease, obesity admitted on 11/25/2024 with acute decompensated heart failure    Assessment and Plan:  Acute on chronic heart failure with preserved ejection fraction  CKD ash III  Acute decompensation likely 2/2 reduced oral diuretic therpay recently and discontinuation of ARB due to KATELIN during last hospitalization, as well as excessive dietary sodium intake.  He has good response to diuretics and seems to be at his baseline.  Has diuresed from 100 208 pounds on admission to 201 pounds, which is nearly his baseline.  Hypertension, blood pressure fairly well-controlled on current regimen, but could be better.. Reports medication compliance.  PTA regimen includes amlodipine 5 mg daily, hydralazine 100 mg 3 times daily, Imdur 120 mg daily and losartan 25 mg daily  Atrial Fibrillation, chronic since 2018.  Maintained on anticoagulation.  No palpitations or racing heart this admission..  Heart rate well-controlled  Coronary Artery Disease, Three-vessel coronary artery disease status post previous stenting to LAD bifurcation into the diagonal in 2019.  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  Remains free from symptoms of angina at this admission  Mitral regurgitation, mild      Plan:  Patient is safe to discharge from a cardiovascular standpoint  Recommend discharge on 20 mg of torsemide daily, take additional 20 mg for weight gain more than 3 pounds in one day. Patient education to weigh himself daily and call cardiology if he gains more than 2 pounds in 1 day or 5 pounds in 1 week  Discharge on valsartan 80 mg daily, will look to uptitrate this in the outpatient  setting and possibly transition to Entresto  Continue Imdur 120 mg daily  Continue Jardiance 10 mg daily  Follow-up with cardiology in 1 to 2 weeks, we will arrange this tomorrow and call the patient  Do not resume PTA amlodipine or hydralazine, will work to uptitrate guideline directed medical therapy as an outpatient      30 minutes spent by me on the date of service doing chart review, history, exam, documentation, coordination and discussion with other care providers & further activities per the note.  I personally reviewed lab work, vital signs, notes by general medicine and spoke directly with the nurse regarding his care.  Moderate complexity.  Lesia Christian PA-C  [facility]  Pager: through Vocera    Interval History   Feeling better.  Weight at baseline.  Orthopnea has resolved.  No shortness of breath with moving around the room.  No dizziness or lightheadedness.  No palpitations or racing heart.      Data   Last 24 hours diagnostics reviewed:  EKG: (reviewed personally)     Intake/Output Summary (Last 24 hours) at 11/28/2024 1002  Last data filed at 11/27/2024 1813  Gross per 24 hour   Intake 720 ml   Output 650 ml   Net 70 ml     Last Comprehensive Metabolic Panel:  Lab Results   Component Value Date     11/27/2024    POTASSIUM 3.7 11/27/2024    CHLORIDE 101 11/27/2024    CO2 26 11/27/2024    ANIONGAP 15 11/27/2024     (H) 11/27/2024    BUN 34.8 (H) 11/27/2024    CR 1.57 (H) 11/27/2024    GFRESTIMATED 45 (L) 11/27/2024    GUNNER 9.8 11/27/2024         100/2024 Echocardiogram :  Left ventricular systolic function is normal.The visual ejection fraction is  60-65%.Diastolic Doppler findings (E/E' ratio and/or other parameters) suggest  left ventricular filling pressures are increased.  Mildly decreased right ventricular systolic function.  Severe biatrial enlargement.  There is mild (1+) mitral regurgitation.There is mild mitral stenosis.    May 2023 echocardiogram:  Hyperdynamic left ventricular  function  The visual ejection fraction is >70%.  The right ventricular systolic function is mildly reduced.  There is severe biatrial enlargement.  There is mild (1+) mitral regurgitation.  Compared to prior study, there is no significant change.      Physical Exam   Temp: 97.6  F (36.4  C) Temp src: Oral BP: 135/69 Pulse: 65   Resp: 14 SpO2: 94 % O2 Device: None (Room air)    Vitals:    11/26/24 0333 11/27/24 1034 11/28/24 0625   Weight: 94.5 kg (208 lb 4.8 oz) 91.2 kg (201 lb) 91.3 kg (201 lb 4.8 oz)       GEN:, Alert, sitting in chair  HEART: Irregular; JVP improved  LUNGS: To auscultation bilaterally  EXT: Edema of feet, his baseline    Medications   Current Facility-Administered Medications   Medication Dose Route Frequency Provider Last Rate Last Admin    Continuing ACE inhibitor/ARB/ARNI from home medication list OR ACE inhibitor/ARB/ARNI order already placed during this visit   Does not apply DOES NOT GO TO Baljinder Waters MD        Continuing beta blocker from home medication list OR beta blocker order already placed during this visit   Does not apply DOES NOT GO TO Baljinder Waters MD         Current Facility-Administered Medications   Medication Dose Route Frequency Provider Last Rate Last Admin    allopurinol (ZYLOPRIM) tablet 300 mg  300 mg Oral Daily Baljinder Ochoa MD   300 mg at 11/28/24 0802    empagliflozin (JARDIANCE) tablet 10 mg  10 mg Oral Daily uLba Colon MD        ezetimibe (ZETIA) tablet 10 mg  10 mg Oral Daily Baljinder Ochoa MD   10 mg at 11/28/24 0802    [Held by provider] hydrALAZINE (APRESOLINE) tablet 50 mg  50 mg Oral TID Luba Colon MD        isosorbide mononitrate (IMDUR) 24 hr tablet 120 mg  120 mg Oral Daily Baljinder Ochoa MD   120 mg at 11/28/24 0802    rivaroxaban ANTICOAGULANT (XARELTO) tablet 15 mg  15 mg Oral Daily with supper Baljinder Ochoa MD   15 mg at 11/27/24 1642    rosuvastatin (CRESTOR) tablet 10 mg  10 mg Oral At  Bedtime Baljinder Ochoa MD   10 mg at 11/27/24 2227    sodium chloride (PF) 0.9% PF flush 3 mL  3 mL Intracatheter Q8H Baljinder Ochoa MD   3 mL at 11/28/24 0802    valsartan (DIOVAN) tablet 80 mg  80 mg Oral Daily Luba Colon MD   80 mg at 11/28/24 0802

## 2024-11-28 NOTE — PLAN OF CARE
Patient discharged at 250 PM to home.  IV was discontinued. Pain at time of discharge was 0/10. Belongings returned to patient.  Discharge instructions and medications reviewed with patient. Patient verbalized understanding and all questions were answered.  Prescriptions given to patient.  At time of discharge, patient condition was stable and left the unit via wheelchair escorted by transport staff. Son picked up patient at [address]. .

## 2024-11-28 NOTE — PLAN OF CARE
Goal Outcome Evaluation:      Plan of Care Reviewed With: patient    Overall Patient Progress: improvingOverall Patient Progress: improving     [id number]    Orientation: AOx4, pleasant  Aggression Stop Light: Green   Mobility: Ind in room. Steady gait.   Pain Management: 9/10 joint pain from gout r/t bumex. One time dose Fentanyl, Ibuprofen & Tylenol given  Tele: A-fib   Diet: 2gm NA diet, No caffeine, 2000 FR.   Bowel/Bladder: Continent B/B. Using urinals in bathroom for I/Os tracking.   Abnormal Lab/Assessments: VSS on RA Dyspnea upon exertion.   Drain/Device/Wound: Skin intact. R. PIV CDI infusing bumex at 1ml/hr.  Consults: OT  D/C Day/Goals/Place: Pending   Other  - Stop Bumex at 2000  - PRN's available for joint pain  - Educated/reviewed w/ Pt & spouse about CORE principals, monitoring daily weight/blood pressure, medication compliance and diet restrictions.

## 2024-11-29 NOTE — PLAN OF CARE
Occupational Therapy Discharge Summary    Reason for therapy discharge:    Discharged to home.    Progress towards therapy goal(s). See goals on Care Plan in [facility] electronic health record for goal details.  Goals partially met.  Barriers to achieving goals:   discharge from facility.    Therapy recommendation(s):    Pt functioning below baseline, demonstrating decreased activity tolerance, SOB on exertion, LE swelling likely related to CHF exacerbation. Pt would benefit from continued IP OT for activity tolerance and edu on CHF edu in addition to OP followup in [facility] clinic for cardiology education. Wife able to help w/ IADLs as needed and manage edemawear.

## 2024-11-29 NOTE — DISCHARGE SUMMARY
"[facility] [place]  Hospitalist Discharge Summary      Date of Admission:  11/25/2024  Date of Discharge:  11/28/2024  2:54 PM  Discharging Provider: ELMER REESE MD  Discharge Service: Hospitalist Service    Discharge Diagnoses   Acute exacerbation of heart failure with preserved EF (HFpEF)  Coronary artery disease post PCI in 2019   Chronic atrial fibrillation on xarelto  Hyperlipidemia   Anemia   CKD Stage IIIb  History of bradycardia (off bblockers)   Gout history with flair   Holding jardiance     Clinically Significant Risk Factors     # Obesity: Estimated body mass index is 31.53 kg/m  as calculated from the following:    Height as of this encounter: 1.702 m (5' 7\").    Weight as of this encounter: 91.3 kg (201 lb 4.8 oz).       Follow-ups Needed After Discharge   Follow-up Appointments       Follow-up and recommended labs and tests       Follow up with primary care provider, Rudy Vernon, within 7 days for hospital follow- up.  The following labs/tests are recommended: basic metabolic profile .                Unresulted Labs Ordered in the Past 30 Days of this Admission       No orders found from 10/26/2024 to 11/26/2024.        These results will be followed up by primary     Discharge Disposition   Discharged to home  Condition at discharge: Stable    Hospital Course   Betito Anderson is a 79 year old male with history of heart failure with preserved EF, CKD stage IIIb, hypertension, coronary artery disease, hyperlipidemia, chronic A-fib on xarelto who presents to the emergency room with worsening dyspnea and chest tightness.     Acute exacerbation of heart failure with preserved EF. (HFpEF)   Chest pain.  History of coronary artery disease post PCI in 2019  Elevated troponin  Chronic atrial fibrillation on Xarelto  Hyperlipidemia  Essential hypertension   Patient presented with exertional chest pain, dyspnea, weight gain and worsening lower extremity edema. Last echo was just done on " 10/9/2024 which showed EF of 60-65% with increased LV filling pressures.  He also had mildly decreased RV systolic function and severe biatrial enlargement. Chest x-ray consistent with pulmonary edema, BNP elevated at more than 5000. EKG shows A-fib with no overt evidence of ischemia. Troponin flat 53-54  Seen by cardology. Resumed prior to admission Xarelto. 11/26 cardiology consult: Noted potential contributing factors reduced dose of oral diuretic and stopping of angiotensin receptor blocker due to acute kidney injury on last hospital stay. Started on IV diuretics with bumex drip. BP not controlled and question dietary compliance.  (suspect high salt intake) Stopped norvasc. Started on valsartan with titration to 80 mg po daily (Stop cozaar) . Imdur 120 mg po daily Re switch to entresto if renal function allowed. Patient reported chest pain on admission however troponins are negative making acute coronary syndrome less likely. Changed to oral demadex on discharge.   Hydralazine reduced to 50 mg po TID (updated after discharge) and valsartan 80 mg po daily, demadex 20 mg po daily, Enroll in CORE after discharge. Pharmacy evaluation of entresto   Rosuvastatin 10 mg po daily. Patient comfortable and ready for discharge. Weight of 201 on day of discharge.   Follow up labs on tue with primary in the clinic. Enroll in CORE and follow up cardiology clinic.      Chronic anemia  Recent hemoglobin has been between 10-11, hemoglobin today is marginally lower at 9.8  Patient denies bleeding from any source, monitor daily  Hemoglobin stable 10.3     CKD stage IIIb  Creatinine is 1.44 which is actually slightly better than his recent baseline of around 2 (creatinine of 2.5) on last hospital admission  Followed by nephrology outpatient. Ultrasound LADY on 9/27/2020 for possible left LADY.  History of proteinuria on ARB.  Last visit 10/31/2024 Had restarted his losartan prior to visit with nephrology and dose reduced to 25 mg po  daily, held jardiance .   Started on bumex drip >> 11/26 to 11/27: Demadex 20 mg po daily on discharge.   Jardiance held on discharge with creatinine 1.82. Started on valsartan 80 mg po daily. (Stop cozaar)   Close follow up nephrology and cardiology.      Chronic atrial fibrillation on Xarelto (no bblocker)   Bradycardia: (10/15/2024) in hospital   noted last hospital stay   - stopping of coreg for 2-3 second pauses.   - on xarelto     History of chronic coronary artery disease  Status post OLESYA to the LAD and D2 in 2019  Admitted with an acute hypertensive emergency with LV dysfunction and EF of 30% (improved with BP control) 2019    troponin 53>>54  Follow up cardiology     GOUT:   increase in pain in wrists   on allopurinol   potential gout flair with high dose diuretics   Better after a day.      Other chronic medical problems:  Carotid artery disease  Psoriasis    Consultations This Hospital Stay   OCCUPATIONAL THERAPY ADULT IP CONSULT  CARDIOLOGY IP CONSULT  CORE CLINIC EVALUATION IP CONSULT    Code Status   Prior    Time Spent on this Encounter   I, ELMER REESE MD, personally saw the patient today and spent greater than 30 minutes discharging this patient.       ELMER REESE MD  [place] [address] [facility]  [address] [place], [place] [address], [address]  [facility] [address]  Phone: [phone]  ______________________________________________________________________    Physical Exam   Vital Signs:                    Weight: 201 lbs 4.8 oz  General Appearance: Patient is awake and alert   Respiratory: Clear to auscultation bilaterally with decreased BS bases   Cardiovascular: Regular rate with no gallop or rub  GI: + BS, soft, non tender  Skin: 1+ Bilateral edema          Primary Care Physician   Rudy Vernon    Discharge Orders      Reason for your hospital stay    Heart failure and fluid overload     Follow-up and recommended labs and tests     Follow up with primary care provider, Rudy Vernon, within 7 days  for hospital follow- up.  The following labs/tests are recommended: basic metabolic profile .     Activity    Your activity upon discharge: activity as tolerated     Brief Discharge Instructions    Cardiology recommend that you stop ASA     Brief Discharge Instructions    Recommend that you weigh yourself every morning     Brief Discharge Instructions    You have a follow up appointment this week with your provider on 12/3 from your hospital stay.  Please check labs during that visit     Brief Discharge Instructions    Per cardiology take additional 20 mg (1 demadex tablet)  for weight gain more than 3 pounds in one day.  notify cardiology of weight gain.  Patient education to weigh himself daily and call cardiology if he gains more than 2 pounds in 1 day or 5 pounds in 1 week  1. Follow-up with cardiology in 1 to 2 weeks, we will arrange this tomorrow and call the patient  2. Do not resume PTA amlodipine or hydralazine, will work to uptitrate guideline directed medical therapy as an outpatient     Diet    Follow this diet upon discharge: Current Diet:Orders Placed This Encounter      Combination Diet 2 gm NA Diet; No Caffeine Diet       Significant Results and Procedures   Most Recent 3 CBC's:  Recent Labs   Lab Test 11/27/24  1142 11/25/24  1406 10/24/24  1502   WBC 8.9 9.9 8.8   HGB 10.3* 9.8* 10.6*   MCV 88 86 85    340 421     Most Recent 3 BMP's:  Recent Labs   Lab Test 11/28/24  1048 11/27/24  1142 11/26/24  2140 11/26/24  0710    142  --  140   POTASSIUM 3.7 3.7 3.8 3.9   CHLORIDE 98 101  --  104   CO2 29 26 --  24   BUN 42.8* 34.8*  --  32.5*   CR 1.82* 1.57*  --  1.48*   ANIONGAP 13 15  --  12   GUNNER 9.2 9.8  --  9.2   * 118*  --  101*     Most Recent 2 LFT's:  Recent Labs   Lab Test 11/25/24  1406 10/08/24  1203   AST 22 21   ALT 13 21   ALKPHOS 101 95   BILITOTAL 0.8 0.8     Most Recent 3 INR's:  Recent Labs   Lab Test 09/14/19  1256 07/18/19  2317   INR 1.18* 2.33*     Most Recent  INR's and Anticoagulation Dosing History:  Anticoagulation Dose History          Latest Ref Rng & Units 7/18/2019 9/14/2019   Recent Dosing and Labs   INR 0.86 - 1.14 2.33  1.18       Details                 Most Recent 3 Creatinines:  Recent Labs   Lab Test 11/28/24  1048 11/27/24  1142 11/26/24  0710   CR 1.82* 1.57* 1.48*     Most Recent 3 Hemoglobins:  Recent Labs   Lab Test 11/27/24  1142 11/25/24  1406 10/24/24  1502   HGB 10.3* 9.8* 10.6*     Most Recent 3 Troponin's:  Recent Labs   Lab Test 09/15/19  0558 09/15/19  0043 09/14/19  1742   TROPI <0.015 <0.015 <0.015     Most Recent 3 BNP's:  Recent Labs   Lab Test 11/25/24  1406 10/08/24  1203 05/16/23  1043 05/05/23  1417 05/05/23  1200 04/05/21  1205 09/09/19  0745   NTBNPI 5,147* 6,636*  --  2,643*   < >  --   --    NTBNP  --   --  2,581*  --   --  2,076* 1,667*    < > = values in this interval not displayed.     Most Recent D-dimer:No lab results found.  Most Recent Cholesterol Panel:  Recent Labs   Lab Test 01/29/24  1014   CHOL 154   LDL 83   HDL 35*   TRIG 182*     7-Day Micro Results       No results found for the last 168 hours.          Most Recent TSH and T4:  Recent Labs   Lab Test 05/16/23  1043   TSH 3.69     Most Recent Hemoglobin A1c:  Recent Labs   Lab Test 01/29/24  1014   A1C 5.7*     Most Recent 6 glucoses:  Recent Labs   Lab Test 11/28/24  1048 11/27/24  1142 11/26/24  0710 11/25/24  1406 10/24/24  1502 10/15/24  1112   * 118* 101* 105* 115* 104*     Most Recent Urinalysis:  Recent Labs   Lab Test 10/15/24  1109   COLOR Yellow   APPEARANCE Clear   URINEGLC 500*   URINEBILI Negative   URINEKETONE Negative   SG 1.015   UBLD Negative   URINEPH 7.0   PROTEIN 100*   UROBILINOGEN 0.2   NITRITE Negative   LEUKEST Negative   RBCU None Seen   WBCU None Seen     Most Recent ABG:No lab results found.  Most Recent ESR & CRP:  Recent Labs   Lab Test 07/18/19  2317   SED 42*   CRP 12.8*     Most Recent Anemia Panel:  Recent Labs   Lab Test  11/27/24  1142 10/08/24  1203 09/13/24  0944   WBC 8.9   < >  --    HGB 10.3*   < >  --    HCT 32.0*   < >  --    MCV 88   < >  --       < >  --    IRON  --   --  47*   IRONSAT  --   --  18   FEB  --   --  264   RASHEED  --   --  120    < > = values in this interval not displayed.     Most Recent CPK:No lab results found.,   Results for orders placed or performed during the hospital encounter of 11/25/24   XR Chest 2 Views    Narrative    CHEST TWO VIEWS  11/25/2024 2:36 PM     HISTORY:  Shortness of breath.    COMPARISON: 10/8/2024.       Impression    IMPRESSION: No significant interval change. Stable cardiac  enlargement. Mild bilateral pulmonary venous congestion. Perihilar  interstitial prominence is again suspicious for pulmonary edema.  Calcified granuloma near the left lung apex. Small amount of pleural  fluid or thickening at the right lung base laterally. Loop recorder  device projected over the heart left of midline.    BECKIE BEJARANO MD         SYSTEM ID:  [id number]       Discharge Medications   Discharge Medication List as of 11/28/2024  1:41 PM        START taking these medications    Details   acetaminophen (TYLENOL) 325 MG tablet Take 2 tablets (650 mg) by mouth every 6 hours as needed for mild pain., No Print Out      valsartan (DIOVAN) 80 MG tablet Take 1 tablet (80 mg) by mouth daily., Disp-30 tablet, R-0, E-Prescribe           CONTINUE these medications which have NOT CHANGED    Details   allopurinol (ZYLOPRIM) 300 MG tablet TAKE 1 TABLET DAILY, Disp-90 tablet, R-3, E-Prescribe      clobetasol propionate (TEMOVATE) 0.05 % external cream Apply topically 2 times daily To feet as neededDisp-60 g, [id number] substitute augmented betamethasone if better with insurance      ezetimibe (ZETIA) 10 MG tablet Take 1 tablet (10 mg) by mouth daily, Disp-90 tablet, R-3, E-Prescribe      isosorbide mononitrate CR (IMDUR) 120 MG 24 HR ER tablet Take 1 tablet (120 mg) by mouth daily, Disp-90 tablet,  R-3, E-Prescribe      ketoconazole (NIZORAL) 2 % external shampoo Massage into wet scalp, let sit 3-5 min, then rinse. Do this 3x weekly.Disp-120 mL, [id number]      rivaroxaban ANTICOAGULANT (XARELTO) 15 MG TABS tablet Take 1 tablet (15 mg) by mouth daily (with dinner)., Disp-30 tablet, R-0, E-Prescribe      rosuvastatin (CRESTOR) 10 MG tablet Take 1 tablet (10 mg) by mouth at bedtime., Disp-30 tablet, R-0, Local Print      torsemide (DEMADEX) 20 MG tablet Take 1 tablet (20 mg) by mouth daily., Disp-30 tablet, R-0, E-Prescribe      triamcinolone (KENALOG) 0.1 % external cream Apply topically 2 times daily To psoriasis on arms and body as neededDisp-454 g, [id number]      calcipotriene (DOVONOX) 0.005 % external cream Mix efudex + calcipotriene equally. Apply BID x 4-10 days. Stop when skin gets red.Disp-60 g, [id number]      fluorouracil (EFUDEX) 5 % external cream Mix equally with calcipotriene cream. Apply BID x 4-10 days. Stop when skin gets red.Disp-40 g, [id number]           STOP taking these medications       amLODIPine (NORVASC) 5 MG tablet Comments:   Reason for Stopping:         aspirin (ASA) 81 MG EC tablet Comments:   Reason for Stopping:         empagliflozin (JARDIANCE) 10 MG TABS tablet Comments:   Reason for Stopping:         hydrALAZINE (APRESOLINE) 100 MG tablet Comments:   Reason for Stopping:         losartan (COZAAR) 25 MG tablet Comments:   Reason for Stopping:             Allergies   Allergies   Allergen Reactions    Ace Inhibitors Anaphylaxis     Edema of ace    Eliquis [Apixaban] Other (See Comments)     Facial numbness

## 2024-12-02 ENCOUNTER — OFFICE VISIT (OUTPATIENT)
Dept: CARDIOLOGY | Facility: CLINIC | Age: 79
End: 2024-12-02
Payer: COMMERCIAL

## 2024-12-02 VITALS
DIASTOLIC BLOOD PRESSURE: 69 MMHG | HEIGHT: 66 IN | SYSTOLIC BLOOD PRESSURE: 137 MMHG | WEIGHT: 201.1 LBS | OXYGEN SATURATION: 99 % | BODY MASS INDEX: 32.32 KG/M2 | HEART RATE: 76 BPM

## 2024-12-02 DIAGNOSIS — E78.2 MIXED HYPERLIPIDEMIA: ICD-10-CM

## 2024-12-02 DIAGNOSIS — I50.9 ACUTE ON CHRONIC CONGESTIVE HEART FAILURE, UNSPECIFIED HEART FAILURE TYPE (H): Primary | ICD-10-CM

## 2024-12-02 DIAGNOSIS — I10 BENIGN ESSENTIAL HYPERTENSION: ICD-10-CM

## 2024-12-02 DIAGNOSIS — I48.20 CHRONIC ATRIAL FIBRILLATION (H): ICD-10-CM

## 2024-12-02 DIAGNOSIS — N18.32 STAGE 3B CHRONIC KIDNEY DISEASE (H): ICD-10-CM

## 2024-12-02 DIAGNOSIS — I25.10 CORONARY ARTERY DISEASE INVOLVING NATIVE CORONARY ARTERY OF NATIVE HEART WITHOUT ANGINA PECTORIS: ICD-10-CM

## 2024-12-02 RX ORDER — HYDRALAZINE HYDROCHLORIDE 50 MG/1
50 TABLET, FILM COATED ORAL 3 TIMES DAILY
COMMUNITY

## 2024-12-02 RX ORDER — EZETIMIBE 10 MG/1
10 TABLET ORAL DAILY
Qty: 90 TABLET | Refills: 3 | Status: SHIPPED | OUTPATIENT
Start: 2024-12-02 | End: 2024-12-02

## 2024-12-02 RX ORDER — EZETIMIBE 10 MG/1
10 TABLET ORAL DAILY
COMMUNITY

## 2024-12-02 RX ORDER — ROSUVASTATIN CALCIUM 10 MG/1
20 TABLET, COATED ORAL AT BEDTIME
COMMUNITY
Start: 2024-12-02 | End: 2024-12-03

## 2024-12-02 RX ORDER — VALSARTAN 80 MG/1
80 TABLET ORAL DAILY
Qty: 90 TABLET | Refills: 3 | Status: SHIPPED | OUTPATIENT
Start: 2024-12-02

## 2024-12-02 NOTE — PATIENT INSTRUCTIONS
"Call [facility] nurse for any questions or concerns Mon-Fri 8am-4pm  [phone]: Nurse number    [phone]: After Hours urgent Phone Number (choose option 2)      Today, we discussed the following:  Blood pressure goal < 130/80    Medications:   Ensure you are not taking Losartan - - it was changed to Valsartan 80mg in the hospital     Continue the hydralazine for blood pressure     Labs  Recheck kidney function at primary care office     Follow up:   Keep plan for visit with your primary, Nephrology and pharmacist visit next week given all the medication changes and we can monitor your kidney function     Cancel appointment in January and reschedule to see myself in 2 weeks to ensure things are stable before your trip       Please, remember:   1.  Weigh yourself daily. Call if your weight is up > than 2 pounds in one day, or 5 pounds in one week; if you feel more short of breath or have worsening swelling in your legs or abdomen.  Bring a log of weights to your clinic visits.     2.  Follow the American Heart association diet: Eat a low sodium diet (less than 2,000mg or 2g of salt per day). Try to avoid \"fast food\".  Read food labels to know how much salt is in one serving. There is a lot of hidden salt in cheese, bread, sauces and salad dressings.  Diet is the turner to loosing weight first - start with smaller portion sizes.  If you eat less salt, you will retain less fluid.     3.  Avoid alcohol, as this can worsen heart failure.     4.  Avoid NSAIDs as able (For example, Ibuprofen / aleve / advil / naprosen / diclofenac).    5.   Activity:  Aim for routine walking daily or moderate physical activity of 30 minutes, 4 times a week to start.   Take rest breaks to help conserve your energy.   If your legs swell during the day, lay down and elevate feet on pillows for 10 minutes twice a day; consider wearing knee high compression stockings 20-30 mmHg daily       Lola Flores [facility]  "

## 2024-12-02 NOTE — LETTER
"12/2/2024    Rudy Vernon MD  [address]  [place] [address]    RE: Betito Anderson       Dear Colleague,     I had the pleasure of seeing Betito Anderson in the [facility].        Cardiology Clinic Follow up     Betito Anderson MRN# [id number]   YOB: 1945 Age: 79 year old     Primary cardiologist: Dr. Hidalgo     Reason for visit: Post hospital heart failure follow up    History of presenting illness:    Betito Anderson \"Mykel\" is a pleasant 79 year old male with past medical history significant for:     1.  Severe 3-vessel coronary artery disease diagnosed in 07/2019 with a normal LM, 80% mid LAD, 70% circ, subtotally occluded RCA with left-to-right collaterals.  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  He then underwent OLESYA to the LAD and D2 (7/5/2019).  Residual 70% circumflex and 90% proximal RCA lesion with left-to-right collaterals  2.  Hypertension  3.  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  Dyslipidemia  5.  Chronic atrial fibrillation, on Xarelto  6.  History of syncope in 2019, monitor with NSVT, EP study negative, asymptomatic bradycardia and pauses in about the 4-second range. ILR battery out since 2022. EP consulted during hospital stay 10/2024 with 3.6s pauses during daytime, asymptomatic. No urgent indication for PPM but may need in future or if HR consistently < 50.   7.   BMI 33   8.   Gout  9.   Anemia  10. CKD3b, baseline creatinine 1.4-1.7. Follows with Nephrology  11. Likely LADY in left renal artery (elevated velocities); difficult visualization of right renal artery  12. Chronic LE edema     Hospitalized 10/8/24-10/12/24 with shortness of breath and increased edema R>L. LE US negative for DVT. BNP 6636. Diuresed on IV lasix to weight 202. Creatinine " was uptrending to 2.4 and plan to hold jardiance and losartan until appt with Nephrology. Coreg 6.25mg BID was also stopped secondary to bradycardia and pauses. Torsemide 20mg daily.    Seen by Nephrology 10/31/24. Plan to continue to hold jardiance until back on full dose of Losartan. Losartan resumed 25mg.    Presents today following recent hospitalization last week 11/25/24-11/28/24 for acute decompensated heart failure and noted uncontrolled HTN, KATELIN/CKD. CXR with mild vascular congestion.  nT proBNP 5147. Noted excessive dietary sodium intake. Diuresed 7 lbs to 201. Creat peak 1.8. Valsartan 80mg was started in place of losartan and jardiance 10mg daily was continued. Amlodipine and hydralazine were held per summary notes (inpt /69).    Today, Mykel presents for follow up with his wife who helps provide some details. He had some confusion with his medications from hospital discharge.  He is still taking hydralazine 50mg three times a day--was told to continue per the hospitalist although discharge summary didn't reflect this. His breathing has improved. Still fatigued with exertion. Has a  that will be coming again soon. Sleeps in a recliner chronically. Denies orthopnea/PND. Edema in legs significantly improved.  Clinic weight is 201 lbs. /69.  Monitoring weight and blood pressure closely. Home scale 193-194lbs, //66.    Does not add salt to food, discussed low sodium / sodium food guide handouts.   Has PCP visit tomorrow where he prefers to have labs drawn.     Wife wonders about a sooner PSG test; soonest per  is in February.  Has travel to [place] at the end of the month he is hopeful for.            Assessment and Plan:     ASSESSMENT:    Acute on chronic HFpEF; history of HFimpEF  -Diuresed about 7 pounds to a weight of 201 during hospitalization last week.  Improvement in dyspnea and edema. Clinic weight 201 pounds.  Home scale 193-194 lbs.   -Continues on  torsemide 20 mg daily  -Continues on Jardiance 10 mg daily  -Will need close monitoring of renal function.  -Plan for repeat NT proBNP tomorrow with his labs at primary care office to trend for baseline  -Discussed low-sodium diet  -HTN is currently controlled; losartan was changed to valsartan 80 mg daily while inpatient  -Entresto is a IIb recommendation in the setting of HFpEF and typically reserved for situations with recurrent heart failure hospitalizations and difficult to control hypertension.  May consider in the future.   -Sleep apnea screening scheduled for February     HTN  -Improved  -Continue valsartan 80mg daily for now - BMP tomorrow and has follow up with Nephrology later this week  -Continue hydralazine 50mg three times a day  -Continue torsemide 20mg daily  -Continue Imdur 120mg daily   -Off amlodipine currently - edema improved     Permanent atrial fibrillation with bradycardia and pauses  -No longer on Coreg. Monitor heart rates at home. EP consulted during hospital stay 10/2024 with 3.6s pauses during daytime, asymptomatic. No urgent indication for PPM but may need in future or if HR consistently < 50 or if beta blocker needed for HTN control, so far doing okay off Coreg.  -Continues on Xarelto 15mg daily for stroke risk reduction     CAD, 3 vessel s/p stenting to LAD bifurcation into the diagonal in 2019   -Unable to have coronary artery bypass surgery at the time due to porcelain aorta.   -Denies any chest pain. Dyspnea improved with diuresis. EF remains preserved.   -Continue Imdur 120mg daily   -No longer on aspirin to reduce bleeding risks  -Continue rosuvastatin 20mg daily (reduced secondary to CKD) and zetia 10mg daily   -Repeat FLP in 3 months if LDL > 70 then change rosuvastatin to atorvastatin high intensity     CKD 3b  -Creatinine 1.8 at hospital discharge. On torsemide 20mg daily and jardiance 10mg daily   -Repeat BMP ordered but patient would like labs drawn at PCP office.   -Has  "close follow up with Nephrology this week    Chronic LE edema - improved off amlodipine and following diuresis       PLAN:     Labs tomorrow with PCP per patient's preference; BNP, BMP  Continue current medications for now  Blood pressure is stable off amlodipine for now  Follow up with PCP and Nephrology as planned  [facility] pharmacist visit next week   Follow up in cardiology clinic with myself or Melvi in 2 weeks        Lola Flores, ANTELMO, APRN, CNP  [facility]     Orders this Visit:  Orders Placed This Encounter   Procedures     Basic metabolic panel     N terminal pro BNP outpatient     Follow-Up with Cardiology RASHI     Orders Placed This Encounter   Medications     hydrALAZINE (APRESOLINE) 50 MG tablet     Sig: Take 50 mg by mouth 3 times daily.     DISCONTD: ezetimibe (ZETIA) 10 MG tablet     Sig: Take 1 tablet (10 mg) by mouth daily.     Dispense:  90 tablet     Refill:  3     rivaroxaban ANTICOAGULANT (XARELTO) 15 MG TABS tablet     Sig: Take 1 tablet (15 mg) by mouth daily (with dinner).     Dispense:  90 tablet     Refill:  3     valsartan (DIOVAN) 80 MG tablet     Sig: Take 1 tablet (80 mg) by mouth daily.     Dispense:  90 tablet     Refill:  3     rosuvastatin (CRESTOR) 10 MG tablet     Sig: Take 2 tablets (20 mg) by mouth at bedtime.     ezetimibe (ZETIA) 10 MG tablet     Sig: Take 10 mg by mouth daily.     Medications Discontinued During This Encounter   Medication Reason     ezetimibe (ZETIA) 10 MG tablet Reorder (No AVS)     rivaroxaban ANTICOAGULANT (XARELTO) 15 MG TABS tablet Reorder (No AVS)     valsartan (DIOVAN) 80 MG tablet Reorder (No AVS)     ezetimibe (ZETIA) 10 MG tablet      rosuvastatin (CRESTOR) 10 MG tablet Reorder (No AVS)              Physical Exam:   Vitals: /69   Pulse 76   Ht 1.676 m (5' 6\")   Wt 91.2 kg (201 lb 1.6 oz)   SpO2 99%   BMI 32.46 kg/m      Body mass index is 32.46 kg/m .    Vitals:    12/02/24 0817   Weight: 91.2 kg (201 lb 1.6 oz) "       General :   Alert and oriented, in no acute distress.    Respiratory:   Respirations unlabored. Clear bilaterally with no rales, rhonchi, or wheezes.     Cardiovascular:   Rhythm is irregular. S1 and S2 are normal. No significant murmur is present. JVD not elevated   Extremities: Warm and dry, Trace lower edema R>L   Neurologic: Moves all extremities, non focal    Psych:  Appropriate             Medications:     Current Outpatient Medications   Medication Sig Dispense Refill     acetaminophen (TYLENOL) 325 MG tablet Take 2 tablets (650 mg) by mouth every 6 hours as needed for mild pain.       allopurinol (ZYLOPRIM) 300 MG tablet TAKE 1 TABLET DAILY 90 tablet 3     calcipotriene (DOVONOX) 0.005 % external cream Mix efudex + calcipotriene equally. Apply BID x 4-10 days. Stop when skin gets red. 60 g 1     clobetasol propionate (TEMOVATE) 0.05 % external cream Apply topically 2 times daily To feet as needed 60 g 3     ezetimibe (ZETIA) 10 MG tablet Take 10 mg by mouth daily.       fluorouracil (EFUDEX) 5 % external cream Mix equally with calcipotriene cream. Apply BID x 4-10 days. Stop when skin gets red. 40 g 0     hydrALAZINE (APRESOLINE) 50 MG tablet Take 50 mg by mouth 3 times daily.       isosorbide mononitrate CR (IMDUR) 120 MG 24 HR ER tablet Take 1 tablet (120 mg) by mouth daily 90 tablet 3     ketoconazole (NIZORAL) 2 % external shampoo Massage into wet scalp, let sit 3-5 min, then rinse. Do this 3x weekly. 120 mL 11     rivaroxaban ANTICOAGULANT (XARELTO) 15 MG TABS tablet Take 1 tablet (15 mg) by mouth daily (with dinner). 90 tablet 3     rosuvastatin (CRESTOR) 10 MG tablet Take 2 tablets (20 mg) by mouth at bedtime.       torsemide (DEMADEX) 20 MG tablet Take 1 tablet (20 mg) by mouth daily. 30 tablet 0     triamcinolone (KENALOG) 0.1 % external cream Apply topically 2 times daily To psoriasis on arms and body as needed 454 g 1     valsartan (DIOVAN) 80 MG tablet Take 1 tablet (80 mg) by mouth daily.  90 tablet 3       Family History   Problem Relation Age of Onset     Coronary Artery Disease Father      Medical History Unknown Mother      Medical History Unknown Maternal Grandfather        Social History     Socioeconomic History     Marital status:      Spouse name: Not on file     Number of children: 2     Years of education: Not on file     Highest education level: Not on file   Occupational History     Occupation: retired   Tobacco Use     Smoking status: Former     Current packs/day: 0.00     Average packs/day: 1 pack/day for 30.0 years (30.0 ttl pk-yrs)     Types: Cigarettes     Start date: 1968     Quit date: 1998     Years since quittin.2     Smokeless tobacco: Never   Vaping Use     Vaping status: Never Used   Substance and Sexual Activity     Alcohol use: Yes     Comment: 3-4 drinks per week     Drug use: No     Sexual activity: Not Currently     Partners: Female   Other Topics Concern     Parent/sibling w/ CABG, MI or angioplasty before 65F 55M? Not Asked   Social History Narrative     Not on file     Social Drivers of Health     Financial Resource Strain: Low Risk  (2024)    Financial Resource Strain      Within the past 12 months, have you or your family members you live with been unable to get utilities (heat, electricity) when it was really needed?: No   Food Insecurity: Low Risk  (2024)    Food Insecurity      Within the past 12 months, did you worry that your food would run out before you got money to buy more?: No      Within the past 12 months, did the food you bought just not last and you didn t have money to get more?: No   Transportation Needs: Low Risk  (2024)    Transportation Needs      Within the past 12 months, has lack of transportation kept you from medical appointments, getting your medicines, non-medical meetings or appointments, work, or from getting things that you need?: No   Physical Activity: Not on file   Stress: Not on file   Social  Connections: Unknown (2/16/2023)    [facility]    Social Connections      Frequency of Communication with Friends and Family: Not on file   Interpersonal Safety: Low Risk  (10/24/2024)    Interpersonal Safety      Do you feel physically and emotionally safe where you currently live?: Yes      Within the past 12 months, have you been hit, slapped, kicked or otherwise physically hurt by someone?: No      Within the past 12 months, have you been humiliated or emotionally abused in other ways by your partner or ex-partner?: No   Housing Stability: Low Risk  (1/29/2024)    Housing Stability      Do you have housing? : Yes      Are you worried about losing your housing?: No            Past Medical History:     Past Medical History:   Diagnosis Date     Acute diastolic congestive heart failure (H) 06/2019    hosp fsd, then fu echo ef nl; echo 2023 nl ef     ASCVD (arteriosclerotic cardiovascular disease) 1997    angio with 40% circ lesion; 6/19 hosp for chf, 3 vessel dz on angio but porcelin aorta so ptca done     Atrial fibrillation (H) 10/09/2018    found on routine physical     Basal cell carcinoma      Carotid artery plaque 2005    seen on us, about 50% stenosis, fu 2009 us no significant stenosis     CKD (chronic kidney disease) stage 3, GFR 30-59 ml/min (H)      Elevated blood sugar      Gout     on allopurinol     Heart failure with preserved ejection fraction, NYHA class IV (H) 10/2024    hosp fsd     HTN (hypertension)      Hypercholesteremia      Hyponatremia 2015    felt due to chlorthalidone     Low mean corpuscular volume (MCV)      Near syncope 05/2015    fu est echo low level but neg     Psoriasis     Dr. Marti     Renal mass 06/29/2019    seen on ct chest for sob, needs fu ct for that, fu us done 7/19 and no mass seen, should have fu ct in December, had fu us 3/22 and no fu needed     Screening 2012    abd us no  aaa     Syncope 09/2019    hosp fsd, cause not clear, lowered coreg dose; seen by Dr. Lezama of ep and ep study neg, implanted event monitor     V-tach (H) 09/2019    seen on event monitor for fu syncope, then ep study and no inducible vtach              Past Surgical History:     Past Surgical History:   Procedure Laterality Date     CATARACT EXTRACTION  03/2022     CATARACT EXTRACTION  2022     CV CORONARY ANGIOGRAM N/A 07/02/2019    Procedure: Coronary Angiogram;  Surgeon: Donaldo Loera MD;  Location:  HEART CARDIAC CATH LAB     CV HEART CATHETERIZATION WITH POSSIBLE INTERVENTION N/A 07/05/2019    Procedure: Heart Catheterization with Possible Intervention;  Surgeon: Manolo Hu MD;  Location:  HEART CARDIAC CATH LAB     CV LEFT HEART CATH N/A 07/02/2019    Procedure: Left Heart Cath;  Surgeon: Donaldo Loera MD;  Location: [facility] CARDIAC CATH LAB     CV LEFT VENTRICULOGRAM N/A 07/02/2019    Procedure: Left Ventriculogram;  Surgeon: Donaldo Loera MD;  Location:  HEART CARDIAC CATH LAB     CV PCI STENT DRUG ELUTING N/A 07/05/2019    Procedure: PCI Stent Drug Eluting;  Surgeon: Manolo Hu MD;  Location:  HEART CARDIAC CATH LAB     CV RIGHT HEART CATH MEASUREMENTS RECORDED N/A 07/02/2019    Procedure: Right Heart Cath;  Surgeon: Donaldo Loera MD;  Location:  HEART CARDIAC CATH LAB     EP LOOP RECORDER IMPLANT N/A 10/11/2019    Procedure: EP Loop Recorder Implant;  Surgeon: Fuad Lezama MD;  Location:  HEART CARDIAC CATH LAB     EP RIGHT VENTRICULAR RECORDING N/A 10/11/2019    Procedure: EP Ventricular Pacing;  Surgeon: Fuad Lezama MD;  Location: [facility] CARDIAC CATH LAB     ZZC ANESTH,DX ARTHROSCOPIC PROC KNEE JOINT  01/01/2009            Allergies:   Ace inhibitors and Eliquis [apixaban]       Data Reviewed today:   Echocardiogram: 10/9/2024  Summary  Left ventricular systolic function is normal.The visual ejection fraction  is  60-65%.Diastolic Doppler findings (E/E' ratio and/or other parameters) suggest  left ventricular filling pressures are increased.  Mildly decreased right ventricular systolic function.  Severe biatrial enlargement.  There is mild (1+) mitral regurgitation.There is mild mitral stenosis.    Coronary angiogram: 7/2019  1-successful PCI of the mid LAD, straddling the second diagonal, with OLESYA.     Left Main   The vessel was visualized by selective angiography and is moderate in size. The left main was selectively injected.   Mid LM to Dist LM lesion is 30% stenosed.      Left Anterior Descending   The LAD was selectively injected. The proximal segment is mildly to moderately calcified with no significant narrowing. The mid segment is very tortuous with a focal 85 to 90% narrowing just after the takeoff of the large first diagonal branch. Just first diagonal branch bifurcates into 2 equally sized medial and lateral branches that had no significant narrowing.   Ost LAD to Prox LAD lesion is 45% stenosed.   Prox LAD to Mid LAD lesion is 90% stenosed. Culprit lesion. The lesion is type B2 - medium risk, located at the major branch, bifurcated and eccentric. The lesion is severely calcified.      Second Diagonal Branch   Ost 2nd Diag lesion is 75% stenosed.      Left Circumflex   The left circumflex coronary artery was selectively injected. There is a calcified ostial and proximal narrowing of about 40 to 50%. There is a mid vessel 30 to 40% narrowing followed by a 70% narrowing in the distal vessel before the takeoff of the terminal marginal branch. There is a very large epicardial collateral vessel seen to fill the distal right coronary.      Right Coronary Artery   The dominant right coronary was selectively injected. The ostium and proximal segment are heavily calcified. There is an eccentric 90% proximal narrowing. There is a second 95% subtotal narrowing in the proximal segment. There is very slow antegrade flow in  "the distal right coronary and competitive filling is seen from collateral vessels on the left system. Injections of the LAD demonstrate profuse collaterals via septal  branches to the distal right coronary and posterior lateral branch.   Prox RCA lesion is 100% stenosed. The lesion is chronic total occlusion.   Mid RCA lesion is 90% stenosed.      Right Posterior Descending Artery   Collaterals   RPDA filled by collaterals from Dist LAD.          Last ILR data 5/2023  [employer] Reveal Loop Recorder   Symptom: 0    Tachy: 0    Pause: 341 pause episodes (most previously acknowledged in alerts) show 3-4s pauses while in AF. Not new. Dr. Hidalgo previously made aware: \"\"Only if he is symptomatic I would back off BB. Otherwise I would continue current Rx for less than 5 sec pause while in AF and less than 3 second pause when in NSR.\"    Beth: 9 beth episodes logged (most previously acknowledged in alerts) show AF w/ slow ventricular response in the 30s during sleeping hours  Time in AT/AF: 99.7% (likely 100% but listed as slightly lower d/t some undersensing). Patient has been in chronic AF since 2018, takes xarelto.  Heart rate: large variability from 30-90s per histogarm    Battery: EOS since 4/7/23     All laboratory data reviewed:    Recent Labs   Lab Test 09/13/24  0944 01/29/24  1014 05/16/23  1043 01/05/23  0802 03/11/22  0746 04/05/21  1205 07/01/20  1055 09/09/19  0745 06/30/19  0640 06/27/19  1151   LDL  --  83  --  97 92  --    < >  --    < >  --    HDL  --  35*  --  39* 48  --    < >  --    < >  --    NHDL  --  119  --  144* 121  --    < >  --    < >  --    CHOL  --  154  --  183 169  --    < >  --    < >  --    TRIG  --  182*  --  234* 145  --    < >  --    < >  --    TSH  --   --  3.69  --   --  2.83  --   --   --  3.22   NTBNP  --   --  2,581*  --   --  2,076*  --  1,667*   < >  --    IRON 47*  --   --   --   --   --   --   --   --   --      --   --   --   --   --   --   --   --   --  "   IRONSAT 18  --   --   --   --   --   --   --   --   --    RASHEED 120  --   --   --   --   --   --   --   --   --     < > = values in this interval not displayed.       Lab Results   Component Value Date    WBC 8.9 11/27/2024    WBC 7.4 10/11/2019    RBC 3.64 (L) 11/27/2024    RBC 4.52 10/11/2019    HGB 10.3 (L) 11/27/2024    HGB 11.9 (L) 04/05/2021    HCT 32.0 (L) 11/27/2024    HCT 38.7 (L) 10/11/2019    MCV 88 11/27/2024    MCV 86 10/11/2019    MCH 28.3 11/27/2024    MCH 27.2 10/11/2019    MCHC 32.2 11/27/2024    MCHC 31.8 10/11/2019    RDW 18.4 (H) 11/27/2024    RDW 16.6 (H) 10/11/2019     11/27/2024     10/11/2019       Lab Results   Component Value Date     11/28/2024     05/28/2021    POTASSIUM 3.7 11/28/2024    POTASSIUM 3.6 07/08/2022    POTASSIUM 3.9 05/28/2021    CHLORIDE 98 11/28/2024    CHLORIDE 106 07/08/2022    CHLORIDE 106 05/28/2021    CO2 29 11/28/2024    CO2 26 07/08/2022    CO2 27 05/28/2021    ANIONGAP 13 11/28/2024    ANIONGAP 7 07/08/2022    ANIONGAP 7 05/28/2021     (H) 11/28/2024     (H) 10/08/2024     (H) 07/08/2022     (H) 05/28/2021    BUN 42.8 (H) 11/28/2024    BUN 23 07/08/2022    BUN 44 (H) 05/28/2021    CR 1.82 (H) 11/28/2024    CR 1.55 (H) 05/28/2021    GFRESTIMATED 37 (L) 11/28/2024    GFRESTIMATED 43 (L) 05/28/2021    GFRESTBLACK 50 (L) 05/28/2021    GUNNER 9.2 11/28/2024    GUNNER 8.7 05/28/2021      Lab Results   Component Value Date    AST 22 11/25/2024    AST 14 02/16/2021    ALT 13 11/25/2024    ALT 23 02/16/2021       Lab Results   Component Value Date    A1C 5.7 (H) 01/29/2024    A1C 5.7 (H) 06/30/2019       The longitudinal plan of care for Mykel was addressed during this visit. Due to the added complexity in care, I will continue to support Mykel in the subsequent management of this condition(s) and with the ongoing continuity of care of this condition(s).    This note has been dictated using voice recognition software. Any  grammatical, typographical, or context distortions are unintentional and inherent to the software.    Thank you for allowing me to participate in the care of your patient.      Sincerely,     ALFRED Vásquez [facility]  cc:   Lesia Christian PA-C  [address], [address]  [place] [address]

## 2024-12-02 NOTE — PROGRESS NOTES
"      Cardiology Clinic Follow up     Betito Anderson MRN# [id number]   YOB: 1945 Age: 79 year old     Primary cardiologist: Dr. Hidalgo     Reason for visit: Post hospital heart failure follow up    History of presenting illness:    Betito Anderson \"Mykel\" is a pleasant 79 year old male with past medical history significant for:     1.  Severe 3-vessel coronary artery disease diagnosed in 07/2019 with a normal LM, 80% mid LAD, 70% circ, subtotally occluded RCA with left-to-right collaterals.  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  He then underwent OLESYA to the LAD and D2 (7/5/2019).  Residual 70% circumflex and 90% proximal RCA lesion with left-to-right collaterals  2.  Hypertension  3.  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  Dyslipidemia  5.  Chronic atrial fibrillation, on Xarelto  6.  History of syncope in 2019, monitor with NSVT, EP study negative, asymptomatic bradycardia and pauses in about the 4-second range. ILR battery out since 2022. EP consulted during hospital stay 10/2024 with 3.6s pauses during daytime, asymptomatic. No urgent indication for PPM but may need in future or if HR consistently < 50.   7.   BMI 33   8.   Gout  9.   Anemia  10. CKD3b, baseline creatinine 1.4-1.7. Follows with Nephrology  11. Likely LADY in left renal artery (elevated velocities); difficult visualization of right renal artery  12. Chronic LE edema     Hospitalized 10/8/24-10/12/24 with shortness of breath and increased edema R>L. LE US negative for DVT. BNP 6636. Diuresed on IV lasix to weight 202. Creatinine was uptrending to 2.4 and plan to hold jardiance and losartan until appt with Nephrology. Coreg 6.25mg BID was also stopped secondary to bradycardia and pauses. Torsemide 20mg daily.    Seen by Nephrology " 10/31/24. Plan to continue to hold jardiance until back on full dose of Losartan. Losartan resumed 25mg.    Presents today following recent hospitalization last week 11/25/24-11/28/24 for acute decompensated heart failure and noted uncontrolled HTN, KATELIN/CKD. CXR with mild vascular congestion.  nT proBNP 5147. Noted excessive dietary sodium intake. Diuresed 7 lbs to 201. Creat peak 1.8. Valsartan 80mg was started in place of losartan and jardiance 10mg daily was continued. Amlodipine and hydralazine were held per summary notes (inpt /69).    Today, Mykel presents for follow up with his wife who helps provide some details. He had some confusion with his medications from hospital discharge.  He is still taking hydralazine 50mg three times a day--was told to continue per the hospitalist although discharge summary didn't reflect this. His breathing has improved. Still fatigued with exertion. Has a  that will be coming again soon. Sleeps in a recliner chronically. Denies orthopnea/PND. Edema in legs significantly improved.  Clinic weight is 201 lbs. /69.  Monitoring weight and blood pressure closely. Home scale 193-194lbs, //66.    Does not add salt to food, discussed low sodium / sodium food guide handouts.   Has PCP visit tomorrow where he prefers to have labs drawn.     Wife wonders about a sooner PSG test; soonest per  is in February.  Has travel to [place] at the end of the month he is hopeful for.            Assessment and Plan:     ASSESSMENT:    Acute on chronic HFpEF; history of HFimpEF  -Diuresed about 7 pounds to a weight of 201 during hospitalization last week.  Improvement in dyspnea and edema. Clinic weight 201 pounds.  Home scale 193-194 lbs.   -Continues on torsemide 20 mg daily  -Continues on Jardiance 10 mg daily  -Will need close monitoring of renal function.  -Plan for repeat NT proBNP tomorrow with his labs at primary care office to trend for  baseline  -Discussed low-sodium diet  -HTN is currently controlled; losartan was changed to valsartan 80 mg daily while inpatient  -Entresto is a IIb recommendation in the setting of HFpEF and typically reserved for situations with recurrent heart failure hospitalizations and difficult to control hypertension.  May consider in the future.   -Sleep apnea screening scheduled for February     HTN  -Improved  -Continue valsartan 80mg daily for now - BMP tomorrow and has follow up with Nephrology later this week  -Continue hydralazine 50mg three times a day  -Continue torsemide 20mg daily  -Continue Imdur 120mg daily   -Off amlodipine currently - edema improved     Permanent atrial fibrillation with bradycardia and pauses  -No longer on Coreg. Monitor heart rates at home. EP consulted during hospital stay 10/2024 with 3.6s pauses during daytime, asymptomatic. No urgent indication for PPM but may need in future or if HR consistently < 50 or if beta blocker needed for HTN control, so far doing okay off Coreg.  -Continues on Xarelto 15mg daily for stroke risk reduction     CAD, 3 vessel s/p stenting to LAD bifurcation into the diagonal in 2019   -Unable to have coronary artery bypass surgery at the time due to porcelain aorta.   -Denies any chest pain. Dyspnea improved with diuresis. EF remains preserved.   -Continue Imdur 120mg daily   -No longer on aspirin to reduce bleeding risks  -Continue rosuvastatin 20mg daily (reduced secondary to CKD) and zetia 10mg daily   -Repeat FLP in 3 months if LDL > 70 then change rosuvastatin to atorvastatin high intensity     CKD 3b  -Creatinine 1.8 at hospital discharge. On torsemide 20mg daily and jardiance 10mg daily   -Repeat BMP ordered but patient would like labs drawn at PCP office.   -Has close follow up with Nephrology this week    Chronic LE edema - improved off amlodipine and following diuresis       PLAN:     Labs tomorrow with PCP per patient's preference; BNP, BMP  Continue  "current medications for now  Blood pressure is stable off amlodipine for now  Follow up with PCP and Nephrology as planned  [facility] pharmacist visit next week   Follow up in cardiology clinic with myself or Melvi in 2 weeks        Lola Flores DNP, ALFRED, CNP  [facility] Heart clinic     Orders this Visit:  Orders Placed This Encounter   Procedures    Basic metabolic panel    N terminal pro BNP outpatient    Follow-Up with Cardiology RASHI     Orders Placed This Encounter   Medications    hydrALAZINE (APRESOLINE) 50 MG tablet     Sig: Take 50 mg by mouth 3 times daily.    DISCONTD: ezetimibe (ZETIA) 10 MG tablet     Sig: Take 1 tablet (10 mg) by mouth daily.     Dispense:  90 tablet     Refill:  3    rivaroxaban ANTICOAGULANT (XARELTO) 15 MG TABS tablet     Sig: Take 1 tablet (15 mg) by mouth daily (with dinner).     Dispense:  90 tablet     Refill:  3    valsartan (DIOVAN) 80 MG tablet     Sig: Take 1 tablet (80 mg) by mouth daily.     Dispense:  90 tablet     Refill:  3    rosuvastatin (CRESTOR) 10 MG tablet     Sig: Take 2 tablets (20 mg) by mouth at bedtime.    ezetimibe (ZETIA) 10 MG tablet     Sig: Take 10 mg by mouth daily.     Medications Discontinued During This Encounter   Medication Reason    ezetimibe (ZETIA) 10 MG tablet Reorder (No AVS)    rivaroxaban ANTICOAGULANT (XARELTO) 15 MG TABS tablet Reorder (No AVS)    valsartan (DIOVAN) 80 MG tablet Reorder (No AVS)    ezetimibe (ZETIA) 10 MG tablet     rosuvastatin (CRESTOR) 10 MG tablet Reorder (No AVS)              Physical Exam:   Vitals: /69   Pulse 76   Ht 1.676 m (5' 6\")   Wt 91.2 kg (201 lb 1.6 oz)   SpO2 99%   BMI 32.46 kg/m      Body mass index is 32.46 kg/m .    Vitals:    12/02/24 0817   Weight: 91.2 kg (201 lb 1.6 oz)       General :   Alert and oriented, in no acute distress.    Respiratory:   Respirations unlabored. Clear bilaterally with no rales, rhonchi, or wheezes.     Cardiovascular:   Rhythm is irregular. S1 and S2 are " normal. No significant murmur is present. JVD not elevated   Extremities: Warm and dry, Trace lower edema R>L   Neurologic: Moves all extremities, non focal    Psych:  Appropriate             Medications:     Current Outpatient Medications   Medication Sig Dispense Refill    acetaminophen (TYLENOL) 325 MG tablet Take 2 tablets (650 mg) by mouth every 6 hours as needed for mild pain.      allopurinol (ZYLOPRIM) 300 MG tablet TAKE 1 TABLET DAILY 90 tablet 3    calcipotriene (DOVONOX) 0.005 % external cream Mix efudex + calcipotriene equally. Apply BID x 4-10 days. Stop when skin gets red. 60 g 1    clobetasol propionate (TEMOVATE) 0.05 % external cream Apply topically 2 times daily To feet as needed 60 g 3    ezetimibe (ZETIA) 10 MG tablet Take 10 mg by mouth daily.      fluorouracil (EFUDEX) 5 % external cream Mix equally with calcipotriene cream. Apply BID x 4-10 days. Stop when skin gets red. 40 g 0    hydrALAZINE (APRESOLINE) 50 MG tablet Take 50 mg by mouth 3 times daily.      isosorbide mononitrate CR (IMDUR) 120 MG 24 HR ER tablet Take 1 tablet (120 mg) by mouth daily 90 tablet 3    ketoconazole (NIZORAL) 2 % external shampoo Massage into wet scalp, let sit 3-5 min, then rinse. Do this 3x weekly. 120 mL 11    rivaroxaban ANTICOAGULANT (XARELTO) 15 MG TABS tablet Take 1 tablet (15 mg) by mouth daily (with dinner). 90 tablet 3    rosuvastatin (CRESTOR) 10 MG tablet Take 2 tablets (20 mg) by mouth at bedtime.      torsemide (DEMADEX) 20 MG tablet Take 1 tablet (20 mg) by mouth daily. 30 tablet 0    triamcinolone (KENALOG) 0.1 % external cream Apply topically 2 times daily To psoriasis on arms and body as needed 454 g 1    valsartan (DIOVAN) 80 MG tablet Take 1 tablet (80 mg) by mouth daily. 90 tablet 3       Family History   Problem Relation Age of Onset    Coronary Artery Disease Father     Medical History Unknown Mother     Medical History Unknown Maternal Grandfather        Social History     Socioeconomic  History    Marital status:      Spouse name: Not on file    Number of children: 2    Years of education: Not on file    Highest education level: Not on file   Occupational History    Occupation: retired   Tobacco Use    Smoking status: Former     Current packs/day: 0.00     Average packs/day: 1 pack/day for 30.0 years (30.0 ttl pk-yrs)     Types: Cigarettes     Start date: 1968     Quit date: 1998     Years since quittin.2    Smokeless tobacco: Never   Vaping Use    Vaping status: Never Used   Substance and Sexual Activity    Alcohol use: Yes     Comment: 3-4 drinks per week    Drug use: No    Sexual activity: Not Currently     Partners: Female   Other Topics Concern    Parent/sibling w/ CABG, MI or angioplasty before 65F 55M? Not Asked   Social History Narrative    Not on file     Social Drivers of Health     Financial Resource Strain: Low Risk  (2024)    Financial Resource Strain     Within the past 12 months, have you or your family members you live with been unable to get utilities (heat, electricity) when it was really needed?: No   Food Insecurity: Low Risk  (2024)    Food Insecurity     Within the past 12 months, did you worry that your food would run out before you got money to buy more?: No     Within the past 12 months, did the food you bought just not last and you didn t have money to get more?: No   Transportation Needs: Low Risk  (2024)    Transportation Needs     Within the past 12 months, has lack of transportation kept you from medical appointments, getting your medicines, non-medical meetings or appointments, work, or from getting things that you need?: No   Physical Activity: Not on file   Stress: Not on file   Social Connections: Unknown (2023)    [facility]    Social Connections     Frequency of Communication with Friends and Family: Not on file   Interpersonal  Safety: Low Risk  (10/24/2024)    Interpersonal Safety     Do you feel physically and emotionally safe where you currently live?: Yes     Within the past 12 months, have you been hit, slapped, kicked or otherwise physically hurt by someone?: No     Within the past 12 months, have you been humiliated or emotionally abused in other ways by your partner or ex-partner?: No   Housing Stability: Low Risk  (1/29/2024)    Housing Stability     Do you have housing? : Yes     Are you worried about losing your housing?: No            Past Medical History:     Past Medical History:   Diagnosis Date    Acute diastolic congestive heart failure (H) 06/2019    hosp fsd, then fu echo ef nl; echo 2023 nl ef    ASCVD (arteriosclerotic cardiovascular disease) 1997    angio with 40% circ lesion; 6/19 hosp for chf, 3 vessel dz on angio but porcelin aorta so ptca done    Atrial fibrillation (H) 10/09/2018    found on routine physical    Basal cell carcinoma     Carotid artery plaque 2005    seen on us, about 50% stenosis, fu 2009 us no significant stenosis    CKD (chronic kidney disease) stage 3, GFR 30-59 ml/min (H)     Elevated blood sugar     Gout     on allopurinol    Heart failure with preserved ejection fraction, NYHA class IV (H) 10/2024    hosp fsd    HTN (hypertension)     Hypercholesteremia     Hyponatremia 2015    felt due to chlorthalidone    Low mean corpuscular volume (MCV)     Near syncope 05/2015    fu est echo low level but neg    Psoriasis     Dr. Marti    Renal mass 06/29/2019    seen on ct chest for sob, needs fu ct for that, fu us done 7/19 and no mass seen, should have fu ct in December, had fu us 3/22 and no fu needed    Screening 2012    abd us no aaa    Syncope 09/2019    hosp fsd, cause not clear, lowered coreg dose; seen by Dr. Lezama of ep and ep study neg, implanted event monitor    V-tach (H) 09/2019    seen on event monitor for fu syncope, then ep study and no inducible vtach              Past Surgical  History:     Past Surgical History:   Procedure Laterality Date    CATARACT EXTRACTION  03/2022    CATARACT EXTRACTION  2022    CV CORONARY ANGIOGRAM N/A 07/02/2019    Procedure: Coronary Angiogram;  Surgeon: Donaldo Loera MD;  Location:  HEART CARDIAC CATH LAB    CV HEART CATHETERIZATION WITH POSSIBLE INTERVENTION N/A 07/05/2019    Procedure: Heart Catheterization with Possible Intervention;  Surgeon: Manolo Hu MD;  Location:  HEART CARDIAC CATH LAB    CV LEFT HEART CATH N/A 07/02/2019    Procedure: Left Heart Cath;  Surgeon: Donaldo Loera MD;  Location:  HEART CARDIAC CATH LAB    CV LEFT VENTRICULOGRAM N/A 07/02/2019    Procedure: Left Ventriculogram;  Surgeon: Donaldo Loera MD;  Location:  HEART CARDIAC CATH LAB    CV PCI STENT DRUG ELUTING N/A 07/05/2019    Procedure: PCI Stent Drug Eluting;  Surgeon: Manolo Hu MD;  Location:  HEART CARDIAC CATH LAB    CV RIGHT HEART CATH MEASUREMENTS RECORDED N/A 07/02/2019    Procedure: Right Heart Cath;  Surgeon: Donaldo Loera MD;  Location:  HEART CARDIAC CATH LAB    EP LOOP RECORDER IMPLANT N/A 10/11/2019    Procedure: EP Loop Recorder Implant;  Surgeon: Fuad Lezama MD;  Location: [facility] CARDIAC CATH LAB    EP RIGHT VENTRICULAR RECORDING N/A 10/11/2019    Procedure: EP Ventricular Pacing;  Surgeon: Fuad Lezama MD;  Location:  HEART CARDIAC CATH LAB    ZZC ANESTH,DX ARTHROSCOPIC PROC KNEE JOINT  01/01/2009            Allergies:   Ace inhibitors and Eliquis [apixaban]       Data Reviewed today:   Echocardiogram: 10/9/2024  Summary  Left ventricular systolic function is normal.The visual ejection fraction is  60-65%.Diastolic Doppler findings (E/E' ratio and/or other parameters) suggest  left ventricular filling pressures are increased.  Mildly decreased right ventricular systolic function.  Severe biatrial enlargement.  There is mild (1+) mitral regurgitation.There is mild mitral  stenosis.    Coronary angiogram: 7/2019  1-successful PCI of the mid LAD, straddling the second diagonal, with OLESYA.     Left Main   The vessel was visualized by selective angiography and is moderate in size. The left main was selectively injected.   Mid LM to Dist LM lesion is 30% stenosed.      Left Anterior Descending   The LAD was selectively injected. The proximal segment is mildly to moderately calcified with no significant narrowing. The mid segment is very tortuous with a focal 85 to 90% narrowing just after the takeoff of the large first diagonal branch. Just first diagonal branch bifurcates into 2 equally sized medial and lateral branches that had no significant narrowing.   Ost LAD to Prox LAD lesion is 45% stenosed.   Prox LAD to Mid LAD lesion is 90% stenosed. Culprit lesion. The lesion is type B2 - medium risk, located at the major branch, bifurcated and eccentric. The lesion is severely calcified.      Second Diagonal Branch   Ost 2nd Diag lesion is 75% stenosed.      Left Circumflex   The left circumflex coronary artery was selectively injected. There is a calcified ostial and proximal narrowing of about 40 to 50%. There is a mid vessel 30 to 40% narrowing followed by a 70% narrowing in the distal vessel before the takeoff of the terminal marginal branch. There is a very large epicardial collateral vessel seen to fill the distal right coronary.      Right Coronary Artery   The dominant right coronary was selectively injected. The ostium and proximal segment are heavily calcified. There is an eccentric 90% proximal narrowing. There is a second 95% subtotal narrowing in the proximal segment. There is very slow antegrade flow in the distal right coronary and competitive filling is seen from collateral vessels on the left system. Injections of the LAD demonstrate profuse collaterals via septal  branches to the distal right coronary and posterior lateral branch.   Prox RCA lesion is 100%  "stenosed. The lesion is chronic total occlusion.   Mid RCA lesion is 90% stenosed.      Right Posterior Descending Artery   Collaterals   RPDA filled by collaterals from Dist LAD.          Last ILR data 5/2023  enGene Reveal Loop Recorder   Symptom: 0    Tachy: 0    Pause: 341 pause episodes (most previously acknowledged in alerts) show 3-4s pauses while in AF. Not new. Dr. Hidalgo previously made aware: \"\"Only if he is symptomatic I would back off BB. Otherwise I would continue current Rx for less than 5 sec pause while in AF and less than 3 second pause when in NSR.\"    Beth: 9 beth episodes logged (most previously acknowledged in alerts) show AF w/ slow ventricular response in the 30s during sleeping hours  Time in AT/AF: 99.7% (likely 100% but listed as slightly lower d/t some undersensing). Patient has been in chronic AF since 2018, takes xarelto.  Heart rate: large variability from 30-90s per histogarm    Battery: EOS since 4/7/23     All laboratory data reviewed:    Recent Labs   Lab Test 09/13/24  0944 01/29/24  1014 05/16/23  1043 01/05/23  0802 03/11/22  0746 04/05/21  1205 07/01/20  1055 09/09/19  0745 06/30/19  0640 06/27/19  1151   LDL  --  83  --  97 92  --    < >  --    < >  --    HDL  --  35*  --  39* 48  --    < >  --    < >  --    NHDL  --  119  --  144* 121  --    < >  --    < >  --    CHOL  --  154  --  183 169  --    < >  --    < >  --    TRIG  --  182*  --  234* 145  --    < >  --    < >  --    TSH  --   --  3.69  --   --  2.83  --   --   --  3.22   NTBNP  --   --  2,581*  --   --  2,076*  --  1,667*   < >  --    IRON 47*  --   --   --   --   --   --   --   --   --      --   --   --   --   --   --   --   --   --    IRONSAT 18  --   --   --   --   --   --   --   --   --    RASHEED 120  --   --   --   --   --   --   --   --   --     < > = values in this interval not displayed.       Lab Results   Component Value Date    WBC 8.9 11/27/2024    WBC 7.4 10/11/2019    RBC 3.64 (L) 11/27/2024    " RBC 4.52 10/11/2019    HGB 10.3 (L) 11/27/2024    HGB 11.9 (L) 04/05/2021    HCT 32.0 (L) 11/27/2024    HCT 38.7 (L) 10/11/2019    MCV 88 11/27/2024    MCV 86 10/11/2019    MCH 28.3 11/27/2024    MCH 27.2 10/11/2019    MCHC 32.2 11/27/2024    MCHC 31.8 10/11/2019    RDW 18.4 (H) 11/27/2024    RDW 16.6 (H) 10/11/2019     11/27/2024     10/11/2019       Lab Results   Component Value Date     11/28/2024     05/28/2021    POTASSIUM 3.7 11/28/2024    POTASSIUM 3.6 07/08/2022    POTASSIUM 3.9 05/28/2021    CHLORIDE 98 11/28/2024    CHLORIDE 106 07/08/2022    CHLORIDE 106 05/28/2021    CO2 29 11/28/2024    CO2 26 07/08/2022    CO2 27 05/28/2021    ANIONGAP 13 11/28/2024    ANIONGAP 7 07/08/2022    ANIONGAP 7 05/28/2021     (H) 11/28/2024     (H) 10/08/2024     (H) 07/08/2022     (H) 05/28/2021    BUN 42.8 (H) 11/28/2024    BUN 23 07/08/2022    BUN 44 (H) 05/28/2021    CR 1.82 (H) 11/28/2024    CR 1.55 (H) 05/28/2021    GFRESTIMATED 37 (L) 11/28/2024    GFRESTIMATED 43 (L) 05/28/2021    GFRESTBLACK 50 (L) 05/28/2021    GUNNER 9.2 11/28/2024    GUNNER 8.7 05/28/2021      Lab Results   Component Value Date    AST 22 11/25/2024    AST 14 02/16/2021    ALT 13 11/25/2024    ALT 23 02/16/2021       Lab Results   Component Value Date    A1C 5.7 (H) 01/29/2024    A1C 5.7 (H) 06/30/2019       The longitudinal plan of care for Mykel was addressed during this visit. Due to the added complexity in care, I will continue to support Mykel in the subsequent management of this condition(s) and with the ongoing continuity of care of this condition(s).    This note has been dictated using voice recognition software. Any grammatical, typographical, or context distortions are unintentional and inherent to the software.

## 2024-12-03 ENCOUNTER — OFFICE VISIT (OUTPATIENT)
Dept: FAMILY MEDICINE | Facility: CLINIC | Age: 79
End: 2024-12-03
Payer: COMMERCIAL

## 2024-12-03 VITALS
HEART RATE: 61 BPM | RESPIRATION RATE: 16 BRPM | TEMPERATURE: 97.7 F | SYSTOLIC BLOOD PRESSURE: 130 MMHG | BODY MASS INDEX: 32.14 KG/M2 | HEIGHT: 66 IN | WEIGHT: 200 LBS | DIASTOLIC BLOOD PRESSURE: 72 MMHG | OXYGEN SATURATION: 96 %

## 2024-12-03 DIAGNOSIS — N18.32 STAGE 3B CHRONIC KIDNEY DISEASE (H): ICD-10-CM

## 2024-12-03 DIAGNOSIS — E78.5 HYPERLIPIDEMIA LDL GOAL <100: ICD-10-CM

## 2024-12-03 DIAGNOSIS — I50.9 ACUTE ON CHRONIC CONGESTIVE HEART FAILURE, UNSPECIFIED HEART FAILURE TYPE (H): ICD-10-CM

## 2024-12-03 DIAGNOSIS — I50.32 CHRONIC HEART FAILURE WITH PRESERVED EJECTION FRACTION (H): Primary | ICD-10-CM

## 2024-12-03 DIAGNOSIS — I48.20 CHRONIC ATRIAL FIBRILLATION (H): ICD-10-CM

## 2024-12-03 DIAGNOSIS — I25.10 ASCVD (ARTERIOSCLEROTIC CARDIOVASCULAR DISEASE): ICD-10-CM

## 2024-12-03 DIAGNOSIS — I10 BENIGN ESSENTIAL HYPERTENSION: ICD-10-CM

## 2024-12-03 PROBLEM — Z79.01 ANTICOAGULATED: Status: RESOLVED | Noted: 2024-10-08 | Resolved: 2024-12-03

## 2024-12-03 PROBLEM — N28.9 RENAL INSUFFICIENCY: Status: RESOLVED | Noted: 2024-10-08 | Resolved: 2024-12-03

## 2024-12-03 PROBLEM — I50.23 ACUTE ON CHRONIC SYSTOLIC CONGESTIVE HEART FAILURE (H): Status: RESOLVED | Noted: 2024-12-03 | Resolved: 2024-12-03

## 2024-12-03 PROBLEM — I50.23 ACUTE ON CHRONIC SYSTOLIC CONGESTIVE HEART FAILURE (H): Status: ACTIVE | Noted: 2024-12-03

## 2024-12-03 PROBLEM — R55 SYNCOPE: Status: RESOLVED | Noted: 2019-09-14 | Resolved: 2024-12-03

## 2024-12-03 PROBLEM — R79.89 ELEVATED TROPONIN: Status: RESOLVED | Noted: 2024-10-08 | Resolved: 2024-12-03

## 2024-12-03 PROCEDURE — 99214 OFFICE O/P EST MOD 30 MIN: CPT | Performed by: INTERNAL MEDICINE

## 2024-12-03 PROCEDURE — 80048 BASIC METABOLIC PNL TOTAL CA: CPT | Performed by: INTERNAL MEDICINE

## 2024-12-03 PROCEDURE — 83880 ASSAY OF NATRIURETIC PEPTIDE: CPT | Performed by: INTERNAL MEDICINE

## 2024-12-03 PROCEDURE — 36415 COLL VENOUS BLD VENIPUNCTURE: CPT | Performed by: INTERNAL MEDICINE

## 2024-12-03 RX ORDER — ROSUVASTATIN CALCIUM 20 MG/1
20 TABLET, COATED ORAL DAILY
Status: SHIPPED
Start: 2024-12-03

## 2024-12-03 ASSESSMENT — PAIN SCALES - GENERAL: PAINLEVEL_OUTOF10: NO PAIN (0)

## 2024-12-03 NOTE — PROGRESS NOTES
Song Hogue is a 79 year old, presenting for the following health issues:  No chief complaint on file.    See other note from today for details      Hospital Follow-up Visit:    Hospital/Nursing Home/IP Rehab Facility: [facility]  Date of Admission: 11/25/2024  Date of Discharge: 11/28/2024  Reason(s) for Admission:     Acute exacerbation of heart failure with preserved EF (HFpEF)  Coronary artery disease post PCI in 2019   Chronic atrial fibrillation on xarelto  Hyperlipidemia   Anemia   CKD Stage IIIb  History of bradycardia (off bblockers)   Gout history with flair   Holding jardiance     Was the patient in the ICU or did the patient experience delirium during hospitalization?  No  Do you have any other stressors you would like to discuss with your provider? No    Problems taking medications regularly:  None  Medication changes since discharge: None  Problems adhering to non-medication therapy:  None    Summary of hospitalization:  [facility] discharge summary reviewed  Diagnostic Tests/Treatments reviewed.  Follow up needed: labs  Other Healthcare Providers Involved in Patient s Care:         Specialist appointment - renal, cards  Update since discharge: improved.         Plan of care communicated with patient and family                     Objective    There were no vitals taken for this visit.  There is no height or weight on file to calculate BMI.  Physical Exam               Signed Electronically by: Rudy Vernon MD

## 2024-12-03 NOTE — PROGRESS NOTES
This is a 79-year-old with multiple medical problems who I am seeing for follow-up.  He presents with his wife.  As noted and reviewed he was just hospitalized from November 25 through November 28 at [facility] for acute exacerbation of heart failure with preserved ejection fraction.  He also has chronic A-fib on Xarelto as well as coronary artery disease, CKD and anemia.    The patient presented to the ER with worsening dyspnea and chest tightness.  He also had weight gain and worsening lower extremity edema.  His last cardiac echo was on October 9 of this year showing an EF of 60 to 65% with increased LV filling pressures and mildly decreased right ventricular systolic function.  Chest x-ray was consistent with pulmonary edema and BNP was elevated at more than 5000.  EKG showed A-fib with no evidence of ischemia.  Troponin was flat at 53 and then 54.  The patient was seen by cardiology.  He was started on IV diuretics with a Bumex drip.  His blood pressure was not well-controlled.  Norvasc was stopped and he was started on valsartan.  He was changed to oral Demadex on discharge.  Hydralazine dose was reduced to 50 mg 3 times daily with valsartan 80 mg daily and Demadex 20 mg daily and he was enrolled in core clinic after discharge.  Pharmacy was asked to evaluate for Entresto use.  Weight on discharge of 201 pounds.    The patient has chronic anemia.  His hemoglobin was stable.    The patient has CKD stage IIIb.  He is followed by nephrology.  Jardiance was held on discharge.    The patient was seen in follow-up yesterday  by cardiology and I reviewed that note.  They noted his hospital stay in October from the eighth through the 12th at which time he was diuresed with IV Lasix to a weight of 202.  His creatinine went up to 2.4 and they recommended holding the Jardiance and losartan at that time.  Carvedilol was also stopped due to bradycardia and pauses.  Subsequent to this he was seen by  nephrology on October 31 and they continue to hold the Jardiance and added losartan back at 25 mg.  At the visit yesterday there was a note regarding some medication confusion from hospital discharge, he was still taking hydralazine 3 times a day.  At the visit yesterday he was felt to be stable.  It was noted that he is on Jardiance 10 mg.  They wanted to do a proBNP tomorrow with his labs.  He is on a low-sodium diet.  He is scheduled for sleep apnea screening in February.  Cardiology recommended continuing hydralazine 50 mg 3 times a day as well as Imdur 120 mg a day.  He is off amlodipine as noted.    The patient was noted to have bradycardia at times during his hospital stay in October with 3.6-second pauses which were asymptomatic.  EP saw the patient and at that time did not feel he needed a pacemaker but they will monitor this.    At this time the patient is doing well.  His breathing is doing well.  He is not having edema.  His weight has been very stable and he checks it daily and his blood pressure has been quite good.  He is not having chest pain or dizziness.  He needs labs drawn today.  No GI symptoms.    Past Medical History:   Diagnosis Date    Acute diastolic congestive heart failure (H) 06/2019    hosp fsd, then fu echo ef nl; echo 2023 nl ef    ASCVD (arteriosclerotic cardiovascular disease) 1997    angio with 40% circ lesion; 6/19 hosp for chf, 3 vessel dz on angio but porcelin aorta so ptca done    Atrial fibrillation (H) 10/09/2018    found on routine physical    Basal cell carcinoma     Carotid artery plaque 2005    seen on us, about 50% stenosis, fu 2009 us no significant stenosis    CKD (chronic kidney disease) stage 3, GFR 30-59 ml/min (H)     Elevated blood sugar     Gout     on allopurinol    Heart failure with preserved ejection fraction, NYHA class IV (H) 10/2024    hosp fsd    HTN (hypertension)     Hypercholesteremia     Hyponatremia 2015    felt due to chlorthalidone    Low mean  corpuscular volume (MCV)     Near syncope 05/2015    fu est echo low level but neg    Psoriasis     Dr. Marti    Renal mass 06/29/2019    seen on ct chest for sob, needs fu ct for that, fu us done 7/19 and no mass seen, should have fu ct in December, had fu us 3/22 and no fu needed    Screening 2012    abd us no aaa    Syncope 09/2019    hosp fsd, cause not clear, lowered coreg dose; seen by Dr. Lezama of ep and ep study neg, implanted event monitor    V-tach (H) 09/2019    seen on event monitor for fu syncope, then ep study and no inducible vtach     Past Surgical History:   Procedure Laterality Date    CATARACT EXTRACTION  03/2022    CATARACT EXTRACTION  2022    CV CORONARY ANGIOGRAM N/A 07/02/2019    Procedure: Coronary Angiogram;  Surgeon: Donaldo Loera MD;  Location:  HEART CARDIAC CATH LAB    CV HEART CATHETERIZATION WITH POSSIBLE INTERVENTION N/A 07/05/2019    Procedure: Heart Catheterization with Possible Intervention;  Surgeon: Manolo Hu MD;  Location:  HEART CARDIAC CATH LAB    CV LEFT HEART CATH N/A 07/02/2019    Procedure: Left Heart Cath;  Surgeon: Donaldo Loera MD;  Location: [facility] CARDIAC CATH LAB    CV LEFT VENTRICULOGRAM N/A 07/02/2019    Procedure: Left Ventriculogram;  Surgeon: Donaldo Loera MD;  Location: [facility] CARDIAC CATH LAB    CV PCI STENT DRUG ELUTING N/A 07/05/2019    Procedure: PCI Stent Drug Eluting;  Surgeon: Manolo Hu MD;  Location:  HEART CARDIAC CATH LAB    CV RIGHT HEART CATH MEASUREMENTS RECORDED N/A 07/02/2019    Procedure: Right Heart Cath;  Surgeon: Donaldo Loera MD;  Location:  HEART CARDIAC CATH LAB    EP LOOP RECORDER IMPLANT N/A 10/11/2019    Procedure: EP Loop Recorder Implant;  Surgeon: Fuad Lezama MD;  Location: [facility] CARDIAC CATH LAB    EP RIGHT VENTRICULAR RECORDING N/A 10/11/2019    Procedure: EP Ventricular Pacing;  Surgeon: Fuad Lezama MD;  Location:  HEART CARDIAC CATH LAB    [facility]  ANESTH,DX ARTHROSCOPIC PROC KNEE JOINT  2009     Social History     Socioeconomic History    Marital status:      Spouse name: Not on file    Number of children: 2    Years of education: Not on file    Highest education level: Not on file   Occupational History    Occupation: retired   Tobacco Use    Smoking status: Former     Current packs/day: 0.00     Average packs/day: 1 pack/day for 30.0 years (30.0 ttl pk-yrs)     Types: Cigarettes     Start date: 1968     Quit date: 1998     Years since quittin.2    Smokeless tobacco: Never   Vaping Use    Vaping status: Never Used   Substance and Sexual Activity    Alcohol use: Yes     Comment: 3-4 drinks per week    Drug use: No    Sexual activity: Not Currently     Partners: Female   Other Topics Concern    Parent/sibling w/ CABG, MI or angioplasty before 65F 55M? Not Asked   Social History Narrative    Not on file     Social Drivers of Health     Financial Resource Strain: Low Risk  (2024)    Financial Resource Strain     Within the past 12 months, have you or your family members you live with been unable to get utilities (heat, electricity) when it was really needed?: No   Food Insecurity: Low Risk  (2024)    Food Insecurity     Within the past 12 months, did you worry that your food would run out before you got money to buy more?: No     Within the past 12 months, did the food you bought just not last and you didn t have money to get more?: No   Transportation Needs: Low Risk  (2024)    Transportation Needs     Within the past 12 months, has lack of transportation kept you from medical appointments, getting your medicines, non-medical meetings or appointments, work, or from getting things that you need?: No   Physical Activity: Not on file   Stress: Not on file   Social Connections: Unknown (2023)    Received from [facility] [employer] [facility]  Connections     Frequency of Communication with Friends and Family: Not on file   Interpersonal Safety: Low Risk  (10/24/2024)    Interpersonal Safety     Do you feel physically and emotionally safe where you currently live?: Yes     Within the past 12 months, have you been hit, slapped, kicked or otherwise physically hurt by someone?: No     Within the past 12 months, have you been humiliated or emotionally abused in other ways by your partner or ex-partner?: No   Housing Stability: Low Risk  (1/29/2024)    Housing Stability     Do you have housing? : Yes     Are you worried about losing your housing?: No     Current Outpatient Medications   Medication Sig Dispense Refill    acetaminophen (TYLENOL) 325 MG tablet Take 2 tablets (650 mg) by mouth every 6 hours as needed for mild pain.      allopurinol (ZYLOPRIM) 300 MG tablet TAKE 1 TABLET DAILY 90 tablet 3    calcipotriene (DOVONOX) 0.005 % external cream Mix efudex + calcipotriene equally. Apply BID x 4-10 days. Stop when skin gets red. 60 g 1    clobetasol propionate (TEMOVATE) 0.05 % external cream Apply topically 2 times daily To feet as needed 60 g 3    ezetimibe (ZETIA) 10 MG tablet Take 10 mg by mouth daily.      fluorouracil (EFUDEX) 5 % external cream Mix equally with calcipotriene cream. Apply BID x 4-10 days. Stop when skin gets red. 40 g 0    hydrALAZINE (APRESOLINE) 50 MG tablet Take 50 mg by mouth 3 times daily.      isosorbide mononitrate CR (IMDUR) 120 MG 24 HR ER tablet Take 1 tablet (120 mg) by mouth daily 90 tablet 3    ketoconazole (NIZORAL) 2 % external shampoo Massage into wet scalp, let sit 3-5 min, then rinse. Do this 3x weekly. 120 mL 11    rivaroxaban ANTICOAGULANT (XARELTO) 15 MG TABS tablet Take 1 tablet (15 mg) by mouth daily (with dinner). 90 tablet 3    rosuvastatin (CRESTOR) 20 MG tablet Take 1 tablet (20 mg) by mouth daily.      torsemide (DEMADEX) 20 MG tablet Take 1 tablet (20 mg) by mouth daily. 30 tablet 0    triamcinolone  "(KENALOG) 0.1 % external cream Apply topically 2 times daily To psoriasis on arms and body as needed 454 g 1    valsartan (DIOVAN) 80 MG tablet Take 1 tablet (80 mg) by mouth daily. 90 tablet 3     Allergies   Allergen Reactions    Ace Inhibitors Anaphylaxis     Edema of ace    Eliquis [Apixaban] Other (See Comments)     Facial numbness     FAMILY HISTORY NOTED AND REVIEWED    REVIEW OF SYSTEMS: above    PHYSICAL EXAM    /72   Pulse 61   Temp 97.7  F (36.5  C) (Temporal)   Resp 16   Ht 1.676 m (5' 6\")   Wt 90.7 kg (200 lb)   SpO2 96%   BMI 32.28 kg/m      Patient appears non toxic  Lungs clear  Cv irreg irreg, no jvp or edema    Labs to be done    ASSESSMENT:  Acute exacerbation of heart failure with preserved ejection fraction, doing much better at this time.  He is not on Jardiance and is seeing renal in 2 days and that physician can decide on that.  He is otherwise doing well and he was instructed to call if his weight starts going up, he gets more short of breath or he develops edema.  CKD, stage IIIb, off Jardiance, follow-up with renal for this  Hypertension, controlled  Coronary artery disease, stable  Hyperlipidemia, on statin therapy  Chronic A-fib, rate controlled, on Xarelto    PLAN:  Labs today  He will call if his weight goes up, increase shortness of breath or edema  Follow-up with specialist  See me as noted end of January  To get vaccines at pharmacy    Rudy Vernon M.D.        "

## 2024-12-04 LAB
ANION GAP SERPL CALCULATED.3IONS-SCNC: 15 MMOL/L (ref 7–15)
BUN SERPL-MCNC: 37.3 MG/DL (ref 8–23)
CALCIUM SERPL-MCNC: 9.7 MG/DL (ref 8.8–10.4)
CHLORIDE SERPL-SCNC: 101 MMOL/L (ref 98–107)
CREAT SERPL-MCNC: 1.78 MG/DL (ref 0.67–1.17)
EGFRCR SERPLBLD CKD-EPI 2021: 38 ML/MIN/1.73M2
GLUCOSE SERPL-MCNC: 106 MG/DL (ref 70–99)
HCO3 SERPL-SCNC: 23 MMOL/L (ref 22–29)
NT-PROBNP SERPL-MCNC: 4284 PG/ML (ref 0–1800)
POTASSIUM SERPL-SCNC: 4.1 MMOL/L (ref 3.4–5.3)
SODIUM SERPL-SCNC: 139 MMOL/L (ref 135–145)

## 2024-12-09 ENCOUNTER — OFFICE VISIT (OUTPATIENT)
Dept: PHARMACY | Facility: CLINIC | Age: 79
End: 2024-12-09
Attending: PHYSICIAN ASSISTANT
Payer: COMMERCIAL

## 2024-12-09 VITALS — SYSTOLIC BLOOD PRESSURE: 120 MMHG | BODY MASS INDEX: 31.96 KG/M2 | DIASTOLIC BLOOD PRESSURE: 60 MMHG | WEIGHT: 198 LBS

## 2024-12-09 DIAGNOSIS — I48.20 CHRONIC ATRIAL FIBRILLATION (H): ICD-10-CM

## 2024-12-09 DIAGNOSIS — E78.5 HYPERLIPIDEMIA LDL GOAL <100: ICD-10-CM

## 2024-12-09 DIAGNOSIS — L57.8 ACTINIC SKIN DAMAGE: ICD-10-CM

## 2024-12-09 DIAGNOSIS — R52 PAIN: ICD-10-CM

## 2024-12-09 DIAGNOSIS — I50.32 CHRONIC HEART FAILURE WITH PRESERVED EJECTION FRACTION (H): ICD-10-CM

## 2024-12-09 DIAGNOSIS — M10.9 GOUT, UNSPECIFIED CAUSE, UNSPECIFIED CHRONICITY, UNSPECIFIED SITE: ICD-10-CM

## 2024-12-09 DIAGNOSIS — I47.20 V-TACH (H): ICD-10-CM

## 2024-12-09 DIAGNOSIS — I10 BENIGN ESSENTIAL HYPERTENSION: Primary | ICD-10-CM

## 2024-12-09 DIAGNOSIS — I25.10 ASCVD (ARTERIOSCLEROTIC CARDIOVASCULAR DISEASE): ICD-10-CM

## 2024-12-09 DIAGNOSIS — L40.9 PSORIASIS: ICD-10-CM

## 2024-12-09 PROCEDURE — 99607 MTMS BY PHARM ADDL 15 MIN: CPT | Performed by: PHARMACIST

## 2024-12-09 PROCEDURE — 99605 MTMS BY PHARM NP 15 MIN: CPT | Performed by: PHARMACIST

## 2024-12-09 RX ORDER — SENNOSIDES 8.6 MG
1300 CAPSULE ORAL EVERY 8 HOURS PRN
COMMUNITY

## 2024-12-09 NOTE — LETTER
December 9, 2024  [address]  [address] [address]    Dear Mr. Anderson, [employer]     Thank you for talking with me on Dec 9, 2024 about your health and medications. As a follow-up to our conversation, I have included two documents:      Your Recommended To-Do List has steps you should take to get the best results from your medications.  Your Medication List will help you keep track of your medications and how to take them.    If you want to talk about these documents, please call Solange Walker PharmD at phone: [phone], Monday-Friday 8-4:30pm.    I look forward to working with you and your doctors to make sure your medications work well for you.    Sincerely,  Solange Walker PharmD  [facility] Pharmacist, [facility]

## 2024-12-09 NOTE — LETTER
_  Medication List        Prepared on: Dec 9, 2024     Bring your Medication List when you go to the doctor, hospital, or   emergency room. And, share it with your family or caregivers.     Note any changes to how you take your medications.  Cross out medications when you no longer use them.    Medication How I take it Why I use it Prescriber   acetaminophen (ACETAMINOPHEN 8 HOUR) 650 MG CR tablet Take 1,300 mg by mouth every 8 hours as needed for mild pain or fever. Pain Self   allopurinol (ZYLOPRIM) 300 MG tablet TAKE 1 TABLET DAILY Gout, unspecified cause, unspecified chronicity, unspecified site Rudy Vernon MD   calcipotriene (DOVONOX) 0.005 % external cream Mix efudex + calcipotriene equally. Apply BID x 4-10 days. Stop when skin gets red. Actinic skin damage Janel Dodson MD   clobetasol propionate (TEMOVATE) 0.05 % external cream Apply topically 2 times daily To feet as needed Psoriasis Janel Dodson MD   empagliflozin (JARDIANCE) 10 MG TABS tablet Take 10 mg by mouth daily. Chronic Kidney Disease/Heart Failure with Preserved Ejection Fraction Patient Reported   ezetimibe (ZETIA) 10 MG tablet Take 10 mg by mouth daily. Hyperlipidemia Patient Reported   fluorouracil (EFUDEX) 5 % external cream Mix equally with calcipotriene cream. Apply BID x 4-10 days. Stop when skin gets red. Actinic skin damage Janel Dodson MD   hydrALAZINE (APRESOLINE) 50 MG tablet Take 50 mg by mouth 3 times daily. Benign Essential Hypertension Patient Reported   isosorbide mononitrate CR (IMDUR) 120 MG 24 HR ER tablet Take 1 tablet (120 mg) by mouth daily Acute on chronic systolic congestive heart failure (H) Rudy Vernon MD   ketoconazole (NIZORAL) 2 % external shampoo Massage into wet scalp, let sit 3-5 min, then rinse. Do this 3x weekly. Psoriasis Janel Dodson MD   rivaroxaban ANTICOAGULANT (XARELTO) 15 MG TABS tablet Take 1 tablet (15 mg) by mouth daily (with dinner). Chronic Atrial Fibrillation (H)  ALFRED Castillo CNP   rosuvastatin (CRESTOR) 20 MG tablet Take 1 tablet (20 mg) by mouth daily. Hyperlipidemia LDL Goal <100 Rudy Vernon MD   torsemide (DEMADEX) 20 MG tablet Take 1 tablet (20 mg) by mouth daily. Chronic heart failure with preserved ejection fraction (H) Hipolito Singh MD   triamcinolone (KENALOG) 0.1 % external cream Apply topically 2 times daily To psoriasis on arms and body as needed Psoriasis Janel Dodson MD   valsartan (DIOVAN) 80 MG tablet Take 1 tablet (80 mg) by mouth daily. Acute on chronic congestive heart failure, unspecified heart failure type (H) ALFRED Castillo CNP         Add new medications, over-the-counter drugs, herbals, vitamins, or  minerals in the blank rows below.    Medication How I take it Why I use it Prescriber                                      Allergies:      - Ace Inhibitors - Anaphylaxis  - Eliquis [apixaban] - Other (See Comments)        Side effects I have had:      Not on File        Other Information:              My notes and questions:

## 2024-12-09 NOTE — PATIENT INSTRUCTIONS
"Recommendations from today's MTM visit:                                                    MTM (medication therapy management) is a service provided by a clinical pharmacist designed to help you get the most of out of your medicines.   Today we reviewed what your medicines are for, how to know if they are working, that your medicines are safe and how to make your medicine regimen as easy as possible.      Continue current medication regimen.     Follow-up: Return in about 1 year (around 12/9/2025) for Follow-up Medication Review.    It was great speaking with you today.  I value your experience and would be very thankful for your time in providing feedback in our clinic survey. In the next few days, you may receive an email or text message from [facility] with a link to a survey related to your  clinical pharmacist.\"     To schedule another MTM appointment, please call the clinic directly or you may call the MTM scheduling line at [phone] or toll-free at [phone].     My Clinical Pharmacist's contact information:                                                      Please feel free to contact me with any questions or concerns you have.      Elba Eddy PharmD  Medication Therapy Management Pharmacy Resident  [facility]  [phone]    Solange Walker PharmD, [facility]  Medication Therapy Management Provider  [facility]  [phone]     "

## 2024-12-09 NOTE — PROGRESS NOTES
Medication Therapy Management (MTM) Encounter    ASSESSMENT:                            Medication Adherence/Access: No issues identified.    Hypertension/ASCVD/A. Fib/HFpEF/Ventricular Tachycardia  Stable.  Patient is meeting blood pressure goal of < 130/80mmHg.   Weight is stable, symptoms have improved.  Plan in place to re-check BMP after initiation of Jardiance.   Is on appropriate Xarelto dose with current CrCl <50ml/min.    Hyperlipidemia   Stable.     Pain  Stable.    Gout  Stable. Patient is at goal of uric acid <6mg/dL.    Actinic skin damage  Plan in place.    Derm  Stable.    PLAN:                            Continue current medication regimen.     Follow-up: Return in about 1 year (around 12/9/2025) for Follow-up Medication Review.    SUBJECTIVE/OBJECTIVE:                          Mykel Anderson is a 79 year old male seen for an initial visit. He was referred to me from his insurance plan.      Reason for visit: Comprehensive medication review.    Allergies/ADRs: Reviewed in chart  Past Medical History: Reviewed in chart  Tobacco: He reports that he quit smoking about 26 years ago. His smoking use included cigarettes. He started smoking about 56 years ago. He has a 30 pack-year smoking history. He has never used smokeless tobacco.  Alcohol: Less than 1 beverages / week  Caffeine: 1 cup of coffee/day  Other Substance Use: none  Activity: Works with a  once a week, uses sitting elliptical daily for 5-10 minutes    Medication Adherence/Access: Patient uses pill box(es).  Patient takes medications 3 time(s) per day.  Per patient, misses medication 3 time(s) per week (only mid-day dose of hydralazine).      Hypertension /ASCVD/A. Fib/HFpEF/Ventricular Tachycardia  Xarelto 15mg daily with dinner  Hydralazine 50mg three times daily  Imdur 120mg daily  Jardiance 10mg daily - resumed by nephrology 12/5  Torsemide 20mg daily  Valsartan 80mg daily  Patient reports no current medication side  effects  He denies any signs and symptoms of HF at this time, no shortness of breath or edema present today.  He's feeling so much better in comparison to prior to most recent hospitalization.  He monitors his weight daily at home and reports it's been decreasing.  Patient self monitors blood pressure.  Home BP monitoring 122/63mmHg today .  Following 50oz daily fluid restriction and 2g daily sodium restriction   Following with cardiology and [facility] clinic, most recent note indicates Entresto may be considered in the future if needed.     Hyperlipidemia   Ezetimibe (Zetia) 10mg once daily  Rosuvastatin 20mg daily  Patient reports no significant myalgias or other side effects.      Pain   Acetaminophen 1300mg three times daily as needed - takes a few times a week  He reports pain is primarily in his right knee.  He finds this effective, denies side effects.    Gout  Allopurinol 300mg daily  Patient reports no current pain concerns.   Patient is experiencing the following medication side effects: none.       Actinic skin damage  Calcipotriene 0.005% cream twice daily x 4-10 days  Fluorouracil 5% cream twice daily x 4-10 days   Has not used either yet, but has at home.    Derm  Clobetasol 0.05% cream twice daily as needed (feet) - using rarely  Ketoconazole 2% shampoo 2-3x/week  Triamcinolone 0.1% cream daily as needed (arms and body) - using rarely  He reports current regimen is effective, hasn't had any recent flares.  Denies side effects.    Today's Vitals: /60   Wt 198 lb (89.8 kg)   BMI 31.96 kg/m    ----------------  Post Discharge Medication Reconciliation Status: discharge medications reconciled, continue medications without change.    I spent 20 minutes with this patient today. A copy of the visit note was provided to the patient's provider(s).    A summary of these recommendations was given to the patient.    Solange Walker, PharmD, [facility]  Medication Therapy Management Provider  [phone]       Medication Therapy Recommendations  No medication therapy recommendations to display

## 2024-12-09 NOTE — LETTER
"Recommended To-Do List      Prepared on: Dec 9, 2024       You can get the best results from your medications by completing the items on this \"To-Do List.\"      Bring your To-Do List when you go to your doctor. And, share it with your family or caregivers.    My To-Do List:  What we talked about: What I should do:   What my medicines are for, how to know if my medicines are working, made sure my medicines are safe for me and reviewed how to take my medicines.    Take my medicines every day               "

## 2024-12-16 ENCOUNTER — OFFICE VISIT (OUTPATIENT)
Dept: CARDIOLOGY | Facility: CLINIC | Age: 79
End: 2024-12-16
Attending: NURSE PRACTITIONER
Payer: COMMERCIAL

## 2024-12-16 ENCOUNTER — LAB (OUTPATIENT)
Dept: LAB | Facility: CLINIC | Age: 79
End: 2024-12-16
Payer: COMMERCIAL

## 2024-12-16 VITALS
WEIGHT: 196.6 LBS | BODY MASS INDEX: 31.6 KG/M2 | DIASTOLIC BLOOD PRESSURE: 58 MMHG | HEART RATE: 81 BPM | OXYGEN SATURATION: 97 % | SYSTOLIC BLOOD PRESSURE: 92 MMHG | HEIGHT: 66 IN

## 2024-12-16 DIAGNOSIS — N18.32 STAGE 3B CHRONIC KIDNEY DISEASE (H): ICD-10-CM

## 2024-12-16 DIAGNOSIS — I25.10 CORONARY ARTERY DISEASE INVOLVING NATIVE CORONARY ARTERY OF NATIVE HEART WITHOUT ANGINA PECTORIS: ICD-10-CM

## 2024-12-16 DIAGNOSIS — I50.9 ACUTE ON CHRONIC CONGESTIVE HEART FAILURE, UNSPECIFIED HEART FAILURE TYPE (H): Primary | ICD-10-CM

## 2024-12-16 DIAGNOSIS — I10 PRIMARY HYPERTENSION: ICD-10-CM

## 2024-12-16 LAB
ANION GAP SERPL CALCULATED.3IONS-SCNC: 13 MMOL/L (ref 7–15)
BUN SERPL-MCNC: 26.9 MG/DL (ref 8–23)
CALCIUM SERPL-MCNC: 9.4 MG/DL (ref 8.8–10.4)
CHLORIDE SERPL-SCNC: 105 MMOL/L (ref 98–107)
CREAT SERPL-MCNC: 1.78 MG/DL (ref 0.67–1.17)
EGFRCR SERPLBLD CKD-EPI 2021: 38 ML/MIN/1.73M2
GLUCOSE SERPL-MCNC: 98 MG/DL (ref 70–99)
HCO3 SERPL-SCNC: 22 MMOL/L (ref 22–29)
POTASSIUM SERPL-SCNC: 4.3 MMOL/L (ref 3.4–5.3)
SODIUM SERPL-SCNC: 140 MMOL/L (ref 135–145)

## 2024-12-16 PROCEDURE — 36415 COLL VENOUS BLD VENIPUNCTURE: CPT | Performed by: NURSE PRACTITIONER

## 2024-12-16 PROCEDURE — 80048 BASIC METABOLIC PNL TOTAL CA: CPT | Performed by: NURSE PRACTITIONER

## 2024-12-16 NOTE — PROGRESS NOTES
"      Cardiology Clinic Follow up     Betito Anderson MRN# [id number]   YOB: 1945 Age: 79 year old     Primary cardiologist: Dr. Hidalgo     Reason for visit: Post hospital heart failure follow up    History of presenting illness:    Betito Anderson \"Mykel\" is a pleasant 79 year old male with past medical history significant for:     1.  Severe 3-vessel coronary artery disease diagnosed in 07/2019 with a normal LM, 80% mid LAD, 70% circ, subtotally occluded RCA with left-to-right collaterals.  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  He then underwent OLESYA to the LAD and D2 (7/5/2019).  Residual 70% circumflex and 90% proximal RCA lesion with left-to-right collaterals  2.  Hypertension  3.  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  Dyslipidemia  5.  Chronic atrial fibrillation, on Xarelto  6.  History of syncope in 2019, monitor with NSVT, EP study negative, asymptomatic bradycardia and pauses in about the 4-second range. ILR battery out since 2022. EP consulted during hospital stay 10/2024 with 3.6s pauses during daytime, asymptomatic. No urgent indication for PPM but may need in future or if HR consistently < 50.   7.   BMI 33   8.   Gout  9.   Anemia  10. CKD3b, baseline creatinine 1.5-1.7. Follows with Nephrology  11. Likely LADY in left renal artery (elevated velocities); difficult visualization of right renal artery  12. Chronic LE edema     Hospitalized 10/8/24-10/12/24 with shortness of breath and increased edema R>L. LE US negative for DVT. BNP 6636. Diuresed on IV lasix to weight 202. Creatinine was uptrending to 2.4 and plan to hold jardiance and losartan until appt with Nephrology. Coreg 6.25mg BID was also stopped secondary to bradycardia and pauses. EP was consulted during stay (see Dr. Lezama's note " 10/10/24). Changed to Torsemide 20mg daily.    Seen by Nephrology 10/31/24. Plan to continue to hold jardiance until back on full dose of Losartan. Losartan resumed 25mg.    Hospitalized 11/25/24-11/28/24 for acute decompensated diastolic heart failure and noted uncontrolled HTN, KATELIN/CKD. CXR with mild vascular congestion.  nT proBNP 5147.  Noted excessive dietary sodium intake. Diuresed 7 lbs to 201. Creat peak 1.8. Valsartan 80mg was started in place of losartan and jardiance 10mg daily was continued. Amlodipine and hydralazine were held per summary notes (inpt /69).    Seen last in follow up with myself 12/2/2024 following hospital stay. Taking hydralazine 50mg TID. Still fatigued with exertion. Planned to start up with a  again. Weight 201 in clinic. /69. Home scale 193-194 lbs and home 's. Continued on Jardiance 10mg and torsemide 20mg.     Seen by Nephrology 12/5/24 and MTM Pharmacist 12/9/24.       Today, Mykel presents for follow up with his wife who helps provide some details.  He can do more than he has before. He was able to walk from the ramp here without stopping. More energy. No dyspnea or increased edema. No dizziness. Blood pressure running low 91/59 to 103/64. Weight down to 188 lbs the past couple days. Usually only drinking 1 glass of water a day.    Sleep study not scheduled until February. Planned to travel to [place] soon.         Assessment and Plan:     ASSESSMENT:    Acute on chronic HFpEF; history of HFimpEF  -Diuresed about 7 pounds to a weight of 201 during hospitalization.  Improvement in dyspnea and edema. Clinic weight down from 201 last visit to 196 pounds.  Home scale down from 194 to 188. Improved Energy. Limited water intake.  -Continues on torsemide 20 mg daily  -Continues on Jardiance 10 mg daily  -Creat 1.78, repeat BMP today   -Discussed low-sodium diet  -HTN is currently controlled now on the low side 92/58 though asymptomatic. Increase  liquid intake.  -Continues on valsartan 80 mg daily    -Entresto is a IIb recommendation in the setting of HFpEF and typically reserved for situations with recurrent heart failure hospitalizations and difficult to control hypertension.  May consider in the future.   -Sleep apnea screening scheduled for February     HTN  -Improved - now low normal 92/58, home readings 91/59 -103/64   -Continue valsartan 80mg daily    - Reduce hydralazine 50mg twice a day for now; if SBP > 135s at home can increase back to TID   -Continue torsemide 20mg daily  -Continue Imdur 120mg daily   -Off amlodipine currently -- edema improved     Permanent atrial fibrillation with bradycardia and pauses  -No longer on Coreg. Monitor heart rates at home. EP consulted during hospital stay 10/2024 with 3.6s pauses during daytime, asymptomatic. No urgent indication for PPM but may need in future or if HR consistently < 50 or if beta blocker needed for HTN control, so far doing okay off Coreg.  -Continues on Xarelto 15mg daily for stroke risk reduction     CAD, 3 vessel s/p stenting to LAD bifurcation into the diagonal in 2019   -Unable to have coronary artery bypass surgery at the time due to porcelain aorta.   -Denies any chest pain. Dyspnea improved with diuresis. EF remains preserved.   -Continue Imdur 120mg daily   -No longer on aspirin to reduce bleeding risks  -Continue rosuvastatin 20mg daily (reduced secondary to CKD) and zetia 10mg daily   -Repeat FLP in 3 months if LDL > 70 then change rosuvastatin to atorvastatin high intensity     CKD 3b  -Creatinine 1.8 at hospital discharge. On torsemide 20mg daily and jardiance 10mg daily   -Repeat labs 12/3/24 showed creat 1.78, K 4.1.   -Following with Nephrology, seen last 12/5/24.   -BMP again today     Chronic LE edema - improved off amlodipine and following diuresis       PLAN:     BMP today  Weight down to 188 lbs and BP 92/58 but asymptomatic  Increase liquid intake   Reduce hydralazine to  "50mg BID for now, continue home BP monitoring   Follow up in February with myself; has Nephrology and PCP follow up in January        Lola Flores DNP, APRN, CNP  [facility]     Orders this Visit:  Orders Placed This Encounter   Procedures    Basic metabolic panel    Follow-Up with Cardiology RASHI     No orders of the defined types were placed in this encounter.    There are no discontinued medications.             Physical Exam:   Vitals: BP 92/58 (BP Location: Right arm, Patient Position: Sitting, Cuff Size: Adult Large)   Pulse 81   Ht 1.676 m (5' 6\")   Wt 89.2 kg (196 lb 9.6 oz)   SpO2 97%   BMI 31.73 kg/m      Body mass index is 31.73 kg/m .    Vitals:    12/16/24 0802   Weight: 89.2 kg (196 lb 9.6 oz)       General :   Alert and oriented, in no acute distress.    Respiratory:   Respirations unlabored. Clear bilaterally with no rales, rhonchi, or wheezes.     Cardiovascular:   Rhythm is irregular. S1 and S2 are normal. No significant murmur is present. JVD not elevated   Extremities: Warm and dry, Slight-1+ lower edema R>L   Neurologic: Moves all extremities, non focal    Psych:  Appropriate             Medications:     Current Outpatient Medications   Medication Sig Dispense Refill    acetaminophen (ACETAMINOPHEN 8 HOUR) 650 MG CR tablet Take 1,300 mg by mouth every 8 hours as needed for mild pain or fever.      allopurinol (ZYLOPRIM) 300 MG tablet TAKE 1 TABLET DAILY 90 tablet 3    empagliflozin (JARDIANCE) 10 MG TABS tablet Take 10 mg by mouth daily.      ezetimibe (ZETIA) 10 MG tablet Take 10 mg by mouth daily.      hydrALAZINE (APRESOLINE) 50 MG tablet Take 50 mg by mouth 2 times daily.      isosorbide mononitrate CR (IMDUR) 120 MG 24 HR ER tablet Take 1 tablet (120 mg) by mouth daily 90 tablet 3    rivaroxaban ANTICOAGULANT (XARELTO) 15 MG TABS tablet Take 1 tablet (15 mg) by mouth daily (with dinner). 90 tablet 3    rosuvastatin (CRESTOR) 20 MG tablet Take 1 tablet (20 mg) by " mouth daily.      torsemide (DEMADEX) 20 MG tablet Take 1 tablet (20 mg) by mouth daily. 30 tablet 0    valsartan (DIOVAN) 80 MG tablet Take 1 tablet (80 mg) by mouth daily. 90 tablet 3    calcipotriene (DOVONOX) 0.005 % external cream Mix efudex + calcipotriene equally. Apply BID x 4-10 days. Stop when skin gets red. (Patient not taking: Reported on 2024) 60 g 1    clobetasol propionate (TEMOVATE) 0.05 % external cream Apply topically 2 times daily To feet as needed 60 g 3    fluorouracil (EFUDEX) 5 % external cream Mix equally with calcipotriene cream. Apply BID x 4-10 days. Stop when skin gets red. (Patient not taking: Reported on 2024) 40 g 0    ketoconazole (NIZORAL) 2 % external shampoo Massage into wet scalp, let sit 3-5 min, then rinse. Do this 3x weekly. 120 mL 11    triamcinolone (KENALOG) 0.1 % external cream Apply topically 2 times daily To psoriasis on arms and body as needed 454 g 1       Family History   Problem Relation Age of Onset    Coronary Artery Disease Father     Medical History Unknown Mother     Medical History Unknown Maternal Grandfather        Social History     Socioeconomic History    Marital status:      Spouse name: Not on file    Number of children: 2    Years of education: Not on file    Highest education level: Not on file   Occupational History    Occupation: retired   Tobacco Use    Smoking status: Former     Current packs/day: 0.00     Average packs/day: 1 pack/day for 30.0 years (30.0 ttl pk-yrs)     Types: Cigarettes     Start date: 1968     Quit date: 1998     Years since quittin.2    Smokeless tobacco: Never   Vaping Use    Vaping status: Never Used   Substance and Sexual Activity    Alcohol use: Yes     Comment: 3-4 drinks per week    Drug use: No    Sexual activity: Not Currently     Partners: Female   Other Topics Concern    Parent/sibling w/ CABG, MI or angioplasty before 65F 55M? Not Asked   Social History Narrative    Not on file      Social Drivers of Health     Financial Resource Strain: Low Risk  (1/29/2024)    Financial Resource Strain     Within the past 12 months, have you or your family members you live with been unable to get utilities (heat, electricity) when it was really needed?: No   Food Insecurity: Low Risk  (1/29/2024)    Food Insecurity     Within the past 12 months, did you worry that your food would run out before you got money to buy more?: No     Within the past 12 months, did the food you bought just not last and you didn t have money to get more?: No   Transportation Needs: Low Risk  (1/29/2024)    Transportation Needs     Within the past 12 months, has lack of transportation kept you from medical appointments, getting your medicines, non-medical meetings or appointments, work, or from getting things that you need?: No   Physical Activity: Not on file   Stress: Not on file   Social Connections: Unknown (2/16/2023)    Received from [facility], [facility]    Social Connections     Frequency of Communication with Friends and Family: Not on file   Interpersonal Safety: Low Risk  (10/24/2024)    Interpersonal Safety     Do you feel physically and emotionally safe where you currently live?: Yes     Within the past 12 months, have you been hit, slapped, kicked or otherwise physically hurt by someone?: No     Within the past 12 months, have you been humiliated or emotionally abused in other ways by your partner or ex-partner?: No   Housing Stability: Low Risk  (1/29/2024)    Housing Stability     Do you have housing? : Yes     Are you worried about losing your housing?: No            Past Medical History:     Past Medical History:   Diagnosis Date    Acute diastolic congestive heart failure (H) 06/2019    hosp fsd, then fu echo ef nl; echo 2023 nl ef    ASCVD (arteriosclerotic cardiovascular disease) 1997    angio with 40% circ lesion; 6/19 hosp for chf, 3  vessel dz on angio but porcelin aorta so ptca done    Atrial fibrillation (H) 10/09/2018    found on routine physical    Basal cell carcinoma     Carotid artery plaque 2005    seen on us, about 50% stenosis, fu 2009 us no significant stenosis    CKD (chronic kidney disease) stage 3, GFR 30-59 ml/min (H)     Elevated blood sugar     Gout     on allopurinol    Heart failure with preserved ejection fraction, NYHA class IV (H) 10/2024    hosp fsd    HTN (hypertension)     Hypercholesteremia     Hyponatremia 2015    felt due to chlorthalidone    Low mean corpuscular volume (MCV)     Near syncope 05/2015    fu est echo low level but neg    Psoriasis     Dr. Marti    Renal mass 06/29/2019    seen on ct chest for sob, needs fu ct for that, fu us done 7/19 and no mass seen, should have fu ct in December, had fu us 3/22 and no fu needed    Screening 2012    abd us no aaa    Syncope 09/2019    hosp fsd, cause not clear, lowered coreg dose; seen by Dr. Lezama of ep and ep study neg, implanted event monitor    V-tach (H) 09/2019    seen on event monitor for fu syncope, then ep study and no inducible vtach              Past Surgical History:     Past Surgical History:   Procedure Laterality Date    CATARACT EXTRACTION  03/2022    CATARACT EXTRACTION  2022    CV CORONARY ANGIOGRAM N/A 07/02/2019    Procedure: Coronary Angiogram;  Surgeon: Donaldo Loera MD;  Location:  HEART CARDIAC CATH LAB    CV HEART CATHETERIZATION WITH POSSIBLE INTERVENTION N/A 07/05/2019    Procedure: Heart Catheterization with Possible Intervention;  Surgeon: Manolo Hu MD;  Location:  HEART CARDIAC CATH LAB    CV LEFT HEART CATH N/A 07/02/2019    Procedure: Left Heart Cath;  Surgeon: Donaldo Loera MD;  Location: [facility] CARDIAC CATH LAB    CV LEFT VENTRICULOGRAM N/A 07/02/2019    Procedure: Left Ventriculogram;  Surgeon: Donaldo Loera MD;  Location: [facility] CARDIAC CATH LAB    CV PCI STENT DRUG ELUTING N/A 07/05/2019     Procedure: PCI Stent Drug Eluting;  Surgeon: Manolo Hu MD;  Location:  HEART CARDIAC CATH LAB    CV RIGHT HEART CATH MEASUREMENTS RECORDED N/A 07/02/2019    Procedure: Right Heart Cath;  Surgeon: Donaldo Loera MD;  Location:  HEART CARDIAC CATH LAB    EP LOOP RECORDER IMPLANT N/A 10/11/2019    Procedure: EP Loop Recorder Implant;  Surgeon: Fuad Lezama MD;  Location:  HEART CARDIAC CATH LAB    EP RIGHT VENTRICULAR RECORDING N/A 10/11/2019    Procedure: EP Ventricular Pacing;  Surgeon: Fuad Lezama MD;  Location:  HEART CARDIAC CATH LAB    ZZC ANESTH,DX ARTHROSCOPIC PROC KNEE JOINT  01/01/2009            Allergies:   Ace inhibitors and Eliquis [apixaban]       Data Reviewed today:   Echocardiogram: 10/9/2024  Summary  Left ventricular systolic function is normal.The visual ejection fraction is  60-65%.Diastolic Doppler findings (E/E' ratio and/or other parameters) suggest  left ventricular filling pressures are increased.  Mildly decreased right ventricular systolic function.  Severe biatrial enlargement.  There is mild (1+) mitral regurgitation.There is mild mitral stenosis.    Coronary angiogram: 7/2019  1-successful PCI of the mid LAD, straddling the second diagonal, with OLESYA.     Left Main   The vessel was visualized by selective angiography and is moderate in size. The left main was selectively injected.   Mid LM to Dist LM lesion is 30% stenosed.      Left Anterior Descending   The LAD was selectively injected. The proximal segment is mildly to moderately calcified with no significant narrowing. The mid segment is very tortuous with a focal 85 to 90% narrowing just after the takeoff of the large first diagonal branch. Just first diagonal branch bifurcates into 2 equally sized medial and lateral branches that had no significant narrowing.   Ost LAD to Prox LAD lesion is 45% stenosed.   Prox LAD to Mid LAD lesion is 90% stenosed. Culprit lesion. The lesion is type B2 - medium  "risk, located at the major branch, bifurcated and eccentric. The lesion is severely calcified.      Second Diagonal Branch   Ost 2nd Diag lesion is 75% stenosed.      Left Circumflex   The left circumflex coronary artery was selectively injected. There is a calcified ostial and proximal narrowing of about 40 to 50%. There is a mid vessel 30 to 40% narrowing followed by a 70% narrowing in the distal vessel before the takeoff of the terminal marginal branch. There is a very large epicardial collateral vessel seen to fill the distal right coronary.      Right Coronary Artery   The dominant right coronary was selectively injected. The ostium and proximal segment are heavily calcified. There is an eccentric 90% proximal narrowing. There is a second 95% subtotal narrowing in the proximal segment. There is very slow antegrade flow in the distal right coronary and competitive filling is seen from collateral vessels on the left system. Injections of the LAD demonstrate profuse collaterals via septal  branches to the distal right coronary and posterior lateral branch.   Prox RCA lesion is 100% stenosed. The lesion is chronic total occlusion.   Mid RCA lesion is 90% stenosed.      Right Posterior Descending Artery   Collaterals   RPDA filled by collaterals from Dist LAD.          Last ILR data 5/2023  InboundWriter Reveal Loop Recorder   Symptom: 0    Tachy: 0    Pause: 341 pause episodes (most previously acknowledged in alerts) show 3-4s pauses while in AF. Not new. Dr. Hidalgo previously made aware: \"\"Only if he is symptomatic I would back off BB. Otherwise I would continue current Rx for less than 5 sec pause while in AF and less than 3 second pause when in NSR.\"    Beth: 9 beth episodes logged (most previously acknowledged in alerts) show AF w/ slow ventricular response in the 30s during sleeping hours  Time in AT/AF: 99.7% (likely 100% but listed as slightly lower d/t some undersensing). Patient has been in chronic " AF since 2018, takes xarelto.  Heart rate: large variability from 30-90s per histogarm    Battery: EOS since 4/7/23     All laboratory data reviewed:    Recent Labs   Lab Test 12/03/24  1508 09/13/24  0944 01/29/24  1014 05/16/23  1043 01/05/23  0802 03/11/22  0746 04/05/21  1205 06/30/19  0640 06/27/19  1151   LDL  --   --  83  --  97 92  --    < >  --    HDL  --   --  35*  --  39* 48  --    < >  --    NHDL  --   --  119  --  144* 121  --    < >  --    CHOL  --   --  154  --  183 169  --    < >  --    TRIG  --   --  182*  --  234* 145  --    < >  --    TSH  --   --   --  3.69  --   --  2.83  --  3.22   NTBNP 4,284*  --   --  2,581*  --   --  2,076*   < >  --    IRON  --  47*  --   --   --   --   --   --   --    FEB  --  264  --   --   --   --   --   --   --    IRONSAT  --  18  --   --   --   --   --   --   --    RASHEED  --  120  --   --   --   --   --   --   --     < > = values in this interval not displayed.       Lab Results   Component Value Date    WBC 8.9 11/27/2024    WBC 7.4 10/11/2019    RBC 3.64 (L) 11/27/2024    RBC 4.52 10/11/2019    HGB 10.3 (L) 11/27/2024    HGB 11.9 (L) 04/05/2021    HCT 32.0 (L) 11/27/2024    HCT 38.7 (L) 10/11/2019    MCV 88 11/27/2024    MCV 86 10/11/2019    MCH 28.3 11/27/2024    MCH 27.2 10/11/2019    MCHC 32.2 11/27/2024    MCHC 31.8 10/11/2019    RDW 18.4 (H) 11/27/2024    RDW 16.6 (H) 10/11/2019     11/27/2024     10/11/2019       Lab Results   Component Value Date     12/03/2024     05/28/2021    POTASSIUM 4.1 12/03/2024    POTASSIUM 3.6 07/08/2022    POTASSIUM 3.9 05/28/2021    CHLORIDE 101 12/03/2024    CHLORIDE 106 07/08/2022    CHLORIDE 106 05/28/2021    CO2 23 12/03/2024    CO2 26 07/08/2022    CO2 27 05/28/2021    ANIONGAP 15 12/03/2024    ANIONGAP 7 07/08/2022    ANIONGAP 7 05/28/2021     (H) 12/03/2024     (H) 10/08/2024     (H) 07/08/2022     (H) 05/28/2021    BUN 37.3 (H) 12/03/2024    BUN 23 07/08/2022    BUN 44  (H) 05/28/2021    CR 1.78 (H) 12/03/2024    CR 1.55 (H) 05/28/2021    GFRESTIMATED 38 (L) 12/03/2024    GFRESTIMATED 43 (L) 05/28/2021    GFRESTBLACK 50 (L) 05/28/2021    GUNNER 9.7 12/03/2024    GUNNER 8.7 05/28/2021      Lab Results   Component Value Date    AST 22 11/25/2024    AST 14 02/16/2021    ALT 13 11/25/2024    ALT 23 02/16/2021       Lab Results   Component Value Date    A1C 5.7 (H) 01/29/2024    A1C 5.7 (H) 06/30/2019       The longitudinal plan of care for Mykel was addressed during this visit. Due to the added complexity in care, I will continue to support Mykel in the subsequent management of this condition(s) and with the ongoing continuity of care of this condition(s).    This note has been dictated using voice recognition software. Any grammatical, typographical, or context distortions are unintentional and inherent to the software.

## 2024-12-16 NOTE — PATIENT INSTRUCTIONS
"Call [facility] nurse for any questions or concerns Mon-Fri 8am-4pm  [phone]: Nurse number    [phone]: After Hours urgent Phone Number (choose option 2)      Today, we discussed the following:    Medication changes:   Stop the afternoon hydralazine for now. Take hydralazine 50mg twice daily  If blood pressures start going  up to 135 on the top number then increase back to three times a day      Increase liquid intake to 50 ounces a day atleast. Your blood pressure is on the low side of normal    Labs today - I'll call you if we need to adjust the water pill for a day or 2.    Follow up:   See Kait back in February        Please, remember:   1.  Weigh yourself daily. Call if your weight is up > than 2 pounds in one day, or 5 pounds in one week; if you feel more short of breath or have worsening swelling in your legs or abdomen.  Bring a log of weights to your clinic visits.     2.  Follow the American Heart association diet: Eat a low sodium diet (less than 2,000mg or 2g of salt per day). Try to avoid \"fast food\".  Read food labels to know how much salt is in one serving. There is a lot of hidden salt in cheese, bread, sauces and salad dressings.  Diet is the turner to loosing weight first - start with smaller portion sizes.  If you eat less salt, you will retain less fluid.     3.  Avoid alcohol, as this can worsen heart failure.     4.  Avoid NSAIDs as able (For example, Ibuprofen / aleve / advil / naprosen / diclofenac).    5.   Activity:  Aim for routine walking daily or moderate physical activity of 30 minutes, 4 times a week to start.   Take rest breaks to help conserve your energy.   If your legs swell during the day, lay down and elevate feet on pillows for 10 minutes twice a day       Thank you for your visit with the [facility] today.   [facility] stands for Cardiomyopathy Optimization Rehabilitation and Education. The [facility] clinic will teach and help you to manage your heart failure and help to keep " you out of the hospital.       Lola Flores CNP  [facility]

## 2024-12-16 NOTE — LETTER
"12/16/2024    Rudy Vernon MD  [address]  [place] [address]    RE: Betito Anderson       Dear Colleague,     I had the pleasure of seeing Betito Anderson in the [facility].        Cardiology Clinic Follow up     Betito Anderson MRN# [id number]   YOB: 1945 Age: 79 year old     Primary cardiologist: Dr. Hidalgo     Reason for visit: Post hospital heart failure follow up    History of presenting illness:    Betito Anderson \"Mykel\" is a pleasant 79 year old male with past medical history significant for:     1.  Severe 3-vessel coronary artery disease diagnosed in 07/2019 with a normal LM, 80% mid LAD, 70% circ, subtotally occluded RCA with left-to-right collaterals.  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  He then underwent OLESYA to the LAD and D2 (7/5/2019).  Residual 70% circumflex and 90% proximal RCA lesion with left-to-right collaterals  2.  Hypertension  3.  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  Dyslipidemia  5.  Chronic atrial fibrillation, on Xarelto  6.  History of syncope in 2019, monitor with NSVT, EP study negative, asymptomatic bradycardia and pauses in about the 4-second range. ILR battery out since 2022. EP consulted during hospital stay 10/2024 with 3.6s pauses during daytime, asymptomatic. No urgent indication for PPM but may need in future or if HR consistently < 50.   7.   BMI 33   8.   Gout  9.   Anemia  10. CKD3b, baseline creatinine 1.5-1.7. Follows with Nephrology  11. Likely LADY in left renal artery (elevated velocities); difficult visualization of right renal artery  12. Chronic LE edema     Hospitalized 10/8/24-10/12/24 with shortness of breath and increased edema R>L. LE US negative for DVT. BNP 6636. Diuresed on IV lasix to weight 202. Creatinine " was uptrending to 2.4 and plan to hold jardiance and losartan until appt with Nephrology. Coreg 6.25mg BID was also stopped secondary to bradycardia and pauses. EP was consulted during stay (see Dr. Lezama's note 10/10/24). Changed to Torsemide 20mg daily.    Seen by Nephrology 10/31/24. Plan to continue to hold jardiance until back on full dose of Losartan. Losartan resumed 25mg.    Hospitalized 11/25/24-11/28/24 for acute decompensated diastolic heart failure and noted uncontrolled HTN, KATELIN/CKD. CXR with mild vascular congestion.  nT proBNP 5147.  Noted excessive dietary sodium intake. Diuresed 7 lbs to 201. Creat peak 1.8. Valsartan 80mg was started in place of losartan and jardiance 10mg daily was continued. Amlodipine and hydralazine were held per summary notes (inpt /69).    Seen last in follow up with myself 12/2/2024 following hospital stay. Taking hydralazine 50mg TID. Still fatigued with exertion. Planned to start up with a  again. Weight 201 in clinic. /69. Home scale 193-194 lbs and home 's. Continued on Jardiance 10mg and torsemide 20mg.     Seen by Nephrology 12/5/24 and [facility] Pharmacist 12/9/24.       Today, Mykel presents for follow up with his wife who helps provide some details.  He can do more than he has before. He was able to walk from the ramp here without stopping. More energy. No dyspnea or increased edema. No dizziness. Blood pressure running low 91/59 to 103/64. Weight down to 188 lbs the past couple days. Usually only drinking 1 glass of water a day.    Sleep study not scheduled until February. Planned to travel to [place] soon.         Assessment and Plan:     ASSESSMENT:    Acute on chronic HFpEF; history of HFimpEF  -Diuresed about 7 pounds to a weight of 201 during hospitalization.  Improvement in dyspnea and edema. Clinic weight down from 201 last visit to 196 pounds.  Home scale down from 194 to 188. Improved Energy. Limited water intake.  -Continues on  torsemide 20 mg daily  -Continues on Jardiance 10 mg daily  -Creat 1.78, repeat BMP today   -Discussed low-sodium diet  -HTN is currently controlled now on the low side 92/58 though asymptomatic. Increase liquid intake.  -Continues on valsartan 80 mg daily    -Entresto is a IIb recommendation in the setting of HFpEF and typically reserved for situations with recurrent heart failure hospitalizations and difficult to control hypertension.  May consider in the future.   -Sleep apnea screening scheduled for February     HTN  -Improved - now low normal 92/58, home readings 91/59 -103/64   -Continue valsartan 80mg daily    - Reduce hydralazine 50mg twice a day for now; if SBP > 135s at home can increase back to TID   -Continue torsemide 20mg daily  -Continue Imdur 120mg daily   -Off amlodipine currently -- edema improved     Permanent atrial fibrillation with bradycardia and pauses  -No longer on Coreg. Monitor heart rates at home. EP consulted during hospital stay 10/2024 with 3.6s pauses during daytime, asymptomatic. No urgent indication for PPM but may need in future or if HR consistently < 50 or if beta blocker needed for HTN control, so far doing okay off Coreg.  -Continues on Xarelto 15mg daily for stroke risk reduction     CAD, 3 vessel s/p stenting to LAD bifurcation into the diagonal in 2019   -Unable to have coronary artery bypass surgery at the time due to porcelain aorta.   -Denies any chest pain. Dyspnea improved with diuresis. EF remains preserved.   -Continue Imdur 120mg daily   -No longer on aspirin to reduce bleeding risks  -Continue rosuvastatin 20mg daily (reduced secondary to CKD) and zetia 10mg daily   -Repeat FLP in 3 months if LDL > 70 then change rosuvastatin to atorvastatin high intensity     CKD 3b  -Creatinine 1.8 at hospital discharge. On torsemide 20mg daily and jardiance 10mg daily   -Repeat labs 12/3/24 showed creat 1.78, K 4.1.   -Following with Nephrology, seen last 12/5/24.   -BMP again  "today     Chronic LE edema - improved off amlodipine and following diuresis       PLAN:     BMP today  Weight down to 188 lbs and BP 92/58 but asymptomatic  Increase liquid intake   Reduce hydralazine to 50mg BID for now, continue home BP monitoring   Follow up in February with myself; has Nephrology and PCP follow up in January        Lola Flores DNP, APRN, CNP  [facility]     Orders this Visit:  Orders Placed This Encounter   Procedures     Basic metabolic panel     Follow-Up with Cardiology RASHI     No orders of the defined types were placed in this encounter.    There are no discontinued medications.             Physical Exam:   Vitals: BP 92/58 (BP Location: Right arm, Patient Position: Sitting, Cuff Size: Adult Large)   Pulse 81   Ht 1.676 m (5' 6\")   Wt 89.2 kg (196 lb 9.6 oz)   SpO2 97%   BMI 31.73 kg/m      Body mass index is 31.73 kg/m .    Vitals:    12/16/24 0802   Weight: 89.2 kg (196 lb 9.6 oz)       General :   Alert and oriented, in no acute distress.    Respiratory:   Respirations unlabored. Clear bilaterally with no rales, rhonchi, or wheezes.     Cardiovascular:   Rhythm is irregular. S1 and S2 are normal. No significant murmur is present. JVD not elevated   Extremities: Warm and dry, Slight-1+ lower edema R>L   Neurologic: Moves all extremities, non focal    Psych:  Appropriate             Medications:     Current Outpatient Medications   Medication Sig Dispense Refill     acetaminophen (ACETAMINOPHEN 8 HOUR) 650 MG CR tablet Take 1,300 mg by mouth every 8 hours as needed for mild pain or fever.       allopurinol (ZYLOPRIM) 300 MG tablet TAKE 1 TABLET DAILY 90 tablet 3     empagliflozin (JARDIANCE) 10 MG TABS tablet Take 10 mg by mouth daily.       ezetimibe (ZETIA) 10 MG tablet Take 10 mg by mouth daily.       hydrALAZINE (APRESOLINE) 50 MG tablet Take 50 mg by mouth 2 times daily.       isosorbide mononitrate CR (IMDUR) 120 MG 24 HR ER tablet Take 1 tablet (120 mg) " by mouth daily 90 tablet 3     rivaroxaban ANTICOAGULANT (XARELTO) 15 MG TABS tablet Take 1 tablet (15 mg) by mouth daily (with dinner). 90 tablet 3     rosuvastatin (CRESTOR) 20 MG tablet Take 1 tablet (20 mg) by mouth daily.       torsemide (DEMADEX) 20 MG tablet Take 1 tablet (20 mg) by mouth daily. 30 tablet 0     valsartan (DIOVAN) 80 MG tablet Take 1 tablet (80 mg) by mouth daily. 90 tablet 3     calcipotriene (DOVONOX) 0.005 % external cream Mix efudex + calcipotriene equally. Apply BID x 4-10 days. Stop when skin gets red. (Patient not taking: Reported on 2024) 60 g 1     clobetasol propionate (TEMOVATE) 0.05 % external cream Apply topically 2 times daily To feet as needed 60 g 3     fluorouracil (EFUDEX) 5 % external cream Mix equally with calcipotriene cream. Apply BID x 4-10 days. Stop when skin gets red. (Patient not taking: Reported on 2024) 40 g 0     ketoconazole (NIZORAL) 2 % external shampoo Massage into wet scalp, let sit 3-5 min, then rinse. Do this 3x weekly. 120 mL 11     triamcinolone (KENALOG) 0.1 % external cream Apply topically 2 times daily To psoriasis on arms and body as needed 454 g 1       Family History   Problem Relation Age of Onset     Coronary Artery Disease Father      Medical History Unknown Mother      Medical History Unknown Maternal Grandfather        Social History     Socioeconomic History     Marital status:      Spouse name: Not on file     Number of children: 2     Years of education: Not on file     Highest education level: Not on file   Occupational History     Occupation: retired   Tobacco Use     Smoking status: Former     Current packs/day: 0.00     Average packs/day: 1 pack/day for 30.0 years (30.0 ttl pk-yrs)     Types: Cigarettes     Start date: 1968     Quit date: 1998     Years since quittin.2     Smokeless tobacco: Never   Vaping Use     Vaping status: Never Used   Substance and Sexual Activity     Alcohol use: Yes     Comment:  3-4 drinks per week     Drug use: No     Sexual activity: Not Currently     Partners: Female   Other Topics Concern     Parent/sibling w/ CABG, MI or angioplasty before 65F 55M? Not Asked   Social History Narrative     Not on file     Social Drivers of Health     Financial Resource Strain: Low Risk  (1/29/2024)    Financial Resource Strain      Within the past 12 months, have you or your family members you live with been unable to get utilities (heat, electricity) when it was really needed?: No   Food Insecurity: Low Risk  (1/29/2024)    Food Insecurity      Within the past 12 months, did you worry that your food would run out before you got money to buy more?: No      Within the past 12 months, did the food you bought just not last and you didn t have money to get more?: No   Transportation Needs: Low Risk  (1/29/2024)    Transportation Needs      Within the past 12 months, has lack of transportation kept you from medical appointments, getting your medicines, non-medical meetings or appointments, work, or from getting things that you need?: No   Physical Activity: Not on file   Stress: Not on file   Social Connections: Unknown (2/16/2023)    Received from [facility], [facility]    Social Connections      Frequency of Communication with Friends and Family: Not on file   Interpersonal Safety: Low Risk  (10/24/2024)    Interpersonal Safety      Do you feel physically and emotionally safe where you currently live?: Yes      Within the past 12 months, have you been hit, slapped, kicked or otherwise physically hurt by someone?: No      Within the past 12 months, have you been humiliated or emotionally abused in other ways by your partner or ex-partner?: No   Housing Stability: Low Risk  (1/29/2024)    Housing Stability      Do you have housing? : Yes      Are you worried about losing your housing?: No            Past Medical History:     Past Medical  History:   Diagnosis Date     Acute diastolic congestive heart failure (H) 06/2019    hosp fsd, then fu echo ef nl; echo 2023 nl ef     ASCVD (arteriosclerotic cardiovascular disease) 1997    angio with 40% circ lesion; 6/19 hosp for chf, 3 vessel dz on angio but porcelin aorta so ptca done     Atrial fibrillation (H) 10/09/2018    found on routine physical     Basal cell carcinoma      Carotid artery plaque 2005    seen on us, about 50% stenosis, fu 2009 us no significant stenosis     CKD (chronic kidney disease) stage 3, GFR 30-59 ml/min (H)      Elevated blood sugar      Gout     on allopurinol     Heart failure with preserved ejection fraction, NYHA class IV (H) 10/2024    hosp fsd     HTN (hypertension)      Hypercholesteremia      Hyponatremia 2015    felt due to chlorthalidone     Low mean corpuscular volume (MCV)      Near syncope 05/2015    fu est echo low level but neg     Psoriasis     Dr. Marti     Renal mass 06/29/2019    seen on ct chest for sob, needs fu ct for that, fu us done 7/19 and no mass seen, should have fu ct in December, had fu us 3/22 and no fu needed     Screening 2012    abd us no aaa     Syncope 09/2019    hosp fsd, cause not clear, lowered coreg dose; seen by Dr. Lezama of ep and ep study neg, implanted event monitor     V-tach (H) 09/2019    seen on event monitor for fu syncope, then ep study and no inducible vtach              Past Surgical History:     Past Surgical History:   Procedure Laterality Date     CATARACT EXTRACTION  03/2022     CATARACT EXTRACTION  2022     CV CORONARY ANGIOGRAM N/A 07/02/2019    Procedure: Coronary Angiogram;  Surgeon: Donaldo Loera MD;  Location:  HEART CARDIAC CATH LAB     CV HEART CATHETERIZATION WITH POSSIBLE INTERVENTION N/A 07/05/2019    Procedure: Heart Catheterization with Possible Intervention;  Surgeon: Manolo Hu MD;  Location:  HEART CARDIAC CATH LAB     CV LEFT HEART CATH N/A 07/02/2019    Procedure: Left Heart Cath;   Surgeon: Donaldo Loera MD;  Location: [facility] CARDIAC CATH LAB     CV LEFT VENTRICULOGRAM N/A 07/02/2019    Procedure: Left Ventriculogram;  Surgeon: Donaldo Loera MD;  Location: [facility] CARDIAC CATH LAB     CV PCI STENT DRUG ELUTING N/A 07/05/2019    Procedure: PCI Stent Drug Eluting;  Surgeon: Manolo Hu MD;  Location:  HEART CARDIAC CATH LAB     CV RIGHT HEART CATH MEASUREMENTS RECORDED N/A 07/02/2019    Procedure: Right Heart Cath;  Surgeon: Donaldo Loera MD;  Location:  HEART CARDIAC CATH LAB     EP LOOP RECORDER IMPLANT N/A 10/11/2019    Procedure: EP Loop Recorder Implant;  Surgeon: Fuad Lezama MD;  Location: [facility] CARDIAC CATH LAB     EP RIGHT VENTRICULAR RECORDING N/A 10/11/2019    Procedure: EP Ventricular Pacing;  Surgeon: Fuad Lezama MD;  Location: [facility] CARDIAC CATH LAB     ZZC ANESTH,DX ARTHROSCOPIC PROC KNEE JOINT  01/01/2009            Allergies:   Ace inhibitors and Eliquis [apixaban]       Data Reviewed today:   Echocardiogram: 10/9/2024  Summary  Left ventricular systolic function is normal.The visual ejection fraction is  60-65%.Diastolic Doppler findings (E/E' ratio and/or other parameters) suggest  left ventricular filling pressures are increased.  Mildly decreased right ventricular systolic function.  Severe biatrial enlargement.  There is mild (1+) mitral regurgitation.There is mild mitral stenosis.    Coronary angiogram: 7/2019  1-successful PCI of the mid LAD, straddling the second diagonal, with OLESYA.     Left Main   The vessel was visualized by selective angiography and is moderate in size. The left main was selectively injected.   Mid LM to Dist LM lesion is 30% stenosed.      Left Anterior Descending   The LAD was selectively injected. The proximal segment is mildly to moderately calcified with no significant narrowing. The mid segment is very tortuous with a focal 85 to 90% narrowing just after the takeoff of the large first  "diagonal branch. Just first diagonal branch bifurcates into 2 equally sized medial and lateral branches that had no significant narrowing.   Ost LAD to Prox LAD lesion is 45% stenosed.   Prox LAD to Mid LAD lesion is 90% stenosed. Culprit lesion. The lesion is type B2 - medium risk, located at the major branch, bifurcated and eccentric. The lesion is severely calcified.      Second Diagonal Branch   Ost 2nd Diag lesion is 75% stenosed.      Left Circumflex   The left circumflex coronary artery was selectively injected. There is a calcified ostial and proximal narrowing of about 40 to 50%. There is a mid vessel 30 to 40% narrowing followed by a 70% narrowing in the distal vessel before the takeoff of the terminal marginal branch. There is a very large epicardial collateral vessel seen to fill the distal right coronary.      Right Coronary Artery   The dominant right coronary was selectively injected. The ostium and proximal segment are heavily calcified. There is an eccentric 90% proximal narrowing. There is a second 95% subtotal narrowing in the proximal segment. There is very slow antegrade flow in the distal right coronary and competitive filling is seen from collateral vessels on the left system. Injections of the LAD demonstrate profuse collaterals via septal  branches to the distal right coronary and posterior lateral branch.   Prox RCA lesion is 100% stenosed. The lesion is chronic total occlusion.   Mid RCA lesion is 90% stenosed.      Right Posterior Descending Artery   Collaterals   RPDA filled by collaterals from Dist LAD.          Last ILR data 5/2023  [employer] Reveal Loop Recorder   Symptom: 0    Tachy: 0    Pause: 341 pause episodes (most previously acknowledged in alerts) show 3-4s pauses while in AF. Not new. Dr. Hidalgo previously made aware: \"\"Only if he is symptomatic I would back off BB. Otherwise I would continue current Rx for less than 5 sec pause while in AF and less than 3 second " "pause when in NSR.\"    Beth: 9 beth episodes logged (most previously acknowledged in alerts) show AF w/ slow ventricular response in the 30s during sleeping hours  Time in AT/AF: 99.7% (likely 100% but listed as slightly lower d/t some undersensing). Patient has been in chronic AF since 2018, takes xarelto.  Heart rate: large variability from 30-90s per histogarm    Battery: EOS since 4/7/23     All laboratory data reviewed:    Recent Labs   Lab Test 12/03/24  1508 09/13/24  0944 01/29/24  1014 05/16/23  1043 01/05/23  0802 03/11/22  0746 04/05/21  1205 06/30/19  0640 06/27/19  1151   LDL  --   --  83  --  97 92  --    < >  --    HDL  --   --  35*  --  39* 48  --    < >  --    NHDL  --   --  119  --  144* 121  --    < >  --    CHOL  --   --  154  --  183 169  --    < >  --    TRIG  --   --  182*  --  234* 145  --    < >  --    TSH  --   --   --  3.69  --   --  2.83  --  3.22   NTBNP 4,284*  --   --  2,581*  --   --  2,076*   < >  --    IRON  --  47*  --   --   --   --   --   --   --    FEB  --  264  --   --   --   --   --   --   --    IRONSAT  --  18  --   --   --   --   --   --   --    RASHEED  --  120  --   --   --   --   --   --   --     < > = values in this interval not displayed.       Lab Results   Component Value Date    WBC 8.9 11/27/2024    WBC 7.4 10/11/2019    RBC 3.64 (L) 11/27/2024    RBC 4.52 10/11/2019    HGB 10.3 (L) 11/27/2024    HGB 11.9 (L) 04/05/2021    HCT 32.0 (L) 11/27/2024    HCT 38.7 (L) 10/11/2019    MCV 88 11/27/2024    MCV 86 10/11/2019    MCH 28.3 11/27/2024    MCH 27.2 10/11/2019    MCHC 32.2 11/27/2024    MCHC 31.8 10/11/2019    RDW 18.4 (H) 11/27/2024    RDW 16.6 (H) 10/11/2019     11/27/2024     10/11/2019       Lab Results   Component Value Date     12/03/2024     05/28/2021    POTASSIUM 4.1 12/03/2024    POTASSIUM 3.6 07/08/2022    POTASSIUM 3.9 05/28/2021    CHLORIDE 101 12/03/2024    CHLORIDE 106 07/08/2022    CHLORIDE 106 05/28/2021    CO2 23 12/03/2024 "    CO2 26 07/08/2022    CO2 27 05/28/2021    ANIONGAP 15 12/03/2024    ANIONGAP 7 07/08/2022    ANIONGAP 7 05/28/2021     (H) 12/03/2024     (H) 10/08/2024     (H) 07/08/2022     (H) 05/28/2021    BUN 37.3 (H) 12/03/2024    BUN 23 07/08/2022    BUN 44 (H) 05/28/2021    CR 1.78 (H) 12/03/2024    CR 1.55 (H) 05/28/2021    GFRESTIMATED 38 (L) 12/03/2024    GFRESTIMATED 43 (L) 05/28/2021    GFRESTBLACK 50 (L) 05/28/2021    GUNNER 9.7 12/03/2024    GUNNER 8.7 05/28/2021      Lab Results   Component Value Date    AST 22 11/25/2024    AST 14 02/16/2021    ALT 13 11/25/2024    ALT 23 02/16/2021       Lab Results   Component Value Date    A1C 5.7 (H) 01/29/2024    A1C 5.7 (H) 06/30/2019       The longitudinal plan of care for Mykel was addressed during this visit. Due to the added complexity in care, I will continue to support Mykel in the subsequent management of this condition(s) and with the ongoing continuity of care of this condition(s).    This note has been dictated using voice recognition software. Any grammatical, typographical, or context distortions are unintentional and inherent to the software.    Thank you for allowing me to participate in the care of your patient.      Sincerely,     ALFRED Vásquez CNP     [facility]  cc:   ALFRED Castillo CNP  [address]  [place] [address]

## 2025-01-27 ENCOUNTER — TRANSFERRED RECORDS (OUTPATIENT)
Dept: HEALTH INFORMATION MANAGEMENT | Facility: CLINIC | Age: 80
End: 2025-01-27
Payer: COMMERCIAL

## 2025-01-31 PROBLEM — N25.81 SECONDARY RENAL HYPERPARATHYROIDISM: Status: ACTIVE | Noted: 2025-01-01

## 2025-02-07 ENCOUNTER — TELEPHONE (OUTPATIENT)
Dept: FAMILY MEDICINE | Facility: CLINIC | Age: 80
End: 2025-02-07
Payer: COMMERCIAL

## 2025-02-07 DIAGNOSIS — E78.5 HYPERLIPIDEMIA LDL GOAL <100: Primary | ICD-10-CM

## 2025-02-07 DIAGNOSIS — I50.23 ACUTE ON CHRONIC SYSTOLIC CONGESTIVE HEART FAILURE (H): ICD-10-CM

## 2025-02-07 NOTE — TELEPHONE ENCOUNTER
Medication Question or Refill    Contacts       Contact Date/Time Type Contact Phone/Fax    02/07/2025 11:35 AM CST Phone (Incoming) Mykel Anderson (Self) [phone] (H)            What medication are you calling about (include dose and sig)?: ISOSORBIDE 120 MG and Ezetimibe 10 MG [prescribe from Heart Clinic]     Preferred Pharmacy:     [id number] [place] [address] AT [facility] [address] [address]  Phone: [phone] Fax: [phone]    Controlled Substance Agreement on file:   CSA -- Patient Level:    CSA: None found at the patient level.       Who prescribed the medication?: Dr. Vernon    Do you need a refill? Yes    When did you use the medication last? Completely out     Patient offered an appointment? No     Do you have any questions or concerns?  No      Could we send this information to you in [facility] or would you prefer to receive a phone call?:   Patient would prefer a phone call   Okay to leave a detailed message?: Yes at Home number on file [phone] (home)

## 2025-02-07 NOTE — TELEPHONE ENCOUNTER
Clinic RN: Please investigate patient's chart or contact patient if the information cannot be found because the medication is listed as historical or discontinued. Confirm patient is taking this medication. Document findings and route refill encounter to provider for approval or denial.    Curtis Bernal, RN, BSN, MSN  RN Lead

## 2025-02-11 RX ORDER — EZETIMIBE 10 MG/1
10 TABLET ORAL DAILY
Qty: 90 TABLET | Refills: 3 | Status: SHIPPED | OUTPATIENT
Start: 2025-02-11

## 2025-02-11 RX ORDER — ISOSORBIDE MONONITRATE 120 MG/1
120 TABLET, EXTENDED RELEASE ORAL DAILY
Qty: 90 TABLET | Refills: 3 | Status: SHIPPED | OUTPATIENT
Start: 2025-02-11

## 2025-02-11 NOTE — TELEPHONE ENCOUNTER
Patient called back to check the status of his prescriptions. Pharmacy states that there is no update from provider to receive his meds. He went 4 days without it.     Dr. Vernon, please advise.

## 2025-02-17 ENCOUNTER — OFFICE VISIT (OUTPATIENT)
Dept: CARDIOLOGY | Facility: CLINIC | Age: 80
End: 2025-02-17
Attending: NURSE PRACTITIONER
Payer: COMMERCIAL

## 2025-02-17 VITALS
SYSTOLIC BLOOD PRESSURE: 128 MMHG | HEART RATE: 86 BPM | DIASTOLIC BLOOD PRESSURE: 60 MMHG | HEIGHT: 66 IN | BODY MASS INDEX: 32.02 KG/M2 | WEIGHT: 199.2 LBS | OXYGEN SATURATION: 97 %

## 2025-02-17 DIAGNOSIS — I10 BENIGN ESSENTIAL HYPERTENSION: ICD-10-CM

## 2025-02-17 DIAGNOSIS — I50.32 CHRONIC HEART FAILURE WITH PRESERVED EJECTION FRACTION (H): ICD-10-CM

## 2025-02-17 DIAGNOSIS — I50.9 ACUTE ON CHRONIC CONGESTIVE HEART FAILURE, UNSPECIFIED HEART FAILURE TYPE (H): ICD-10-CM

## 2025-02-17 DIAGNOSIS — E78.5 HYPERLIPIDEMIA LDL GOAL <100: ICD-10-CM

## 2025-02-17 DIAGNOSIS — N18.32 STAGE 3B CHRONIC KIDNEY DISEASE (H): Primary | ICD-10-CM

## 2025-02-17 PROCEDURE — 99214 OFFICE O/P EST MOD 30 MIN: CPT | Performed by: NURSE PRACTITIONER

## 2025-02-17 PROCEDURE — G2211 COMPLEX E/M VISIT ADD ON: HCPCS | Performed by: NURSE PRACTITIONER

## 2025-02-17 RX ORDER — ROSUVASTATIN CALCIUM 20 MG/1
20 TABLET, COATED ORAL DAILY
Qty: 90 TABLET | Refills: 4 | Status: CANCELLED | OUTPATIENT
Start: 2025-02-17

## 2025-02-17 RX ORDER — HYDRALAZINE HYDROCHLORIDE 50 MG/1
50 TABLET, FILM COATED ORAL 2 TIMES DAILY
Qty: 90 TABLET | Refills: 4 | Status: CANCELLED | OUTPATIENT
Start: 2025-02-17

## 2025-02-17 RX ORDER — TORSEMIDE 20 MG/1
20 TABLET ORAL DAILY
Qty: 90 TABLET | Refills: 4 | Status: CANCELLED | OUTPATIENT
Start: 2025-02-17

## 2025-02-17 RX ORDER — TORSEMIDE 20 MG/1
20 TABLET ORAL DAILY
Qty: 90 TABLET | Refills: 3 | Status: SHIPPED | OUTPATIENT
Start: 2025-02-17

## 2025-02-17 NOTE — PATIENT INSTRUCTIONS
"Call  nurse for any questions or concerns Mon-Fri 8am-4pm  [phone]: Nurse number        Today, we discussed the following:  Medication changes:   Continue current medications   Kidney function looks good. I refilled the Jardiance and sent more refills for Torsemide when you need them.    Try increasing protein intake - Edamame soy beans steam or small handful unsalted Almonds   Continue with 50 ounces of water     Follow up:   6 months          Please, remember:   1.  Weigh yourself daily. Call if your weight is up > than 2 pounds in one day, or 5 pounds in one week; if you feel more short of breath or have worsening swelling in your legs or abdomen.  Bring a log of weights to your clinic visits.     2.  Follow the American Heart association diet: Eat a low sodium diet (less than 2,000mg or 2g of salt per day). Try to avoid \"fast food\".  Read food labels to know how much salt is in one serving. There is a lot of hidden salt in cheese, bread, sauces and salad dressings.  Diet is the turner to loosing weight first - start with smaller portion sizes.  If you eat less salt, you will retain less fluid.     3.  Avoid alcohol, as this can worsen heart failure.     4.  Avoid NSAIDs as able (For example, Ibuprofen / aleve / advil / naprosen / diclofenac).    5.   Activity:  Aim for routine walking daily or moderate physical activity of 30 minutes, 4 times a week to start.   Take rest breaks to help conserve your energy.   If your legs swell during the day, lay down and elevate feet on pillows for 10 minutes twice a day; consider wearing knee high compression stockings 20-30 mmHg daily              VIOLETTA [facility]    "

## 2025-02-17 NOTE — PROGRESS NOTES
"      Cardiology Clinic Follow up     Betito Anderson MRN# [id number]   YOB: 1945 Age: 79 year old     Primary cardiologist: Dr. Hidalgo     Reason for visit: Routine 2 month follow up     History of presenting illness:    Betito Anderson \"Mykel\" is a pleasant 79 year old male with past medical history significant for:     1.  Severe 3-vessel coronary artery disease diagnosed in 07/2019 with a normal LM, 80% mid LAD, 70% circ, subtotally occluded RCA with left-to-right collaterals.  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  He then underwent OLESYA to the LAD and D2 (7/5/2019).  Residual 70% circumflex and 90% proximal RCA lesion with left-to-right collaterals  2.  Hypertension  3.  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  Dyslipidemia  5.  Chronic atrial fibrillation, on Xarelto  6.  History of syncope in 2019, monitor with NSVT, EP study negative, asymptomatic bradycardia and pauses in about the 4-second range. ILR battery out since 2022. EP consulted during hospital stay 10/2024 with 3.6s pauses during daytime, asymptomatic. No urgent indication for PPM but may need in future or if HR consistently < 50.   7.   BMI 33   8.   Gout  9.   Anemia  10. CKD3b, baseline creatinine 1.5-1.7. Follows with Nephrology  11. Likely LADY in left renal artery (elevated velocities); difficult visualization of right renal artery  12. Chronic LE edema     Hospitalized 10/8/24-10/12/24 with shortness of breath and increased edema R>L. LE US negative for DVT. BNP 6636. Diuresed on IV lasix to weight 202. Creatinine was uptrending to 2.4 and plan to hold jardiance and losartan until appt with Nephrology. Coreg 6.25mg BID was also stopped secondary to bradycardia and pauses. EP was consulted during stay (see Dr. Lezama's note 10/10/24). " Changed to Torsemide 20mg daily.    Seen by Nephrology 10/31/24. Plan to continue to hold jardiance until back on full dose of Losartan. Losartan resumed 25mg.    Hospitalized again 11/25/24-11/28/24 for acute decompensated diastolic heart failure and noted uncontrolled HTN, KATELIN/CKD. CXR with mild vascular congestion.  nT proBNP 5147.  Noted excessive dietary sodium intake. Diuresed 7 lbs to 201. Creat peak 1.8. Valsartan 80mg was started in place of losartan and jardiance 10mg daily was continued. Amlodipine and hydralazine were held per summary notes (inpt /69).    Seen last in follow up with myself 12/2/2024 following hospital stay. Taking hydralazine 50mg TID. Still fatigued with exertion. Planned to start up with a  again. Weight 201 in clinic. /69. Home scale 193-194 lbs and home 's. Continued on Jardiance 10mg and torsemide 20mg.     Seen by Nephrology 12/5/24 and MTM Pharmacist 12/9/24.     Seen last in follow up with myself 12/16/25. Hydralazine reduced to twice a day due to lower blood pressures and lightheadedness. Weight 196 in office.       Today, Mykel presents for follow up unaccompanied. Had a good time in [place]. No further low blood pressures. Weight on home scale 192-193. Home BP controlled 116-139 systolic.  He is eating only small amounts twice a day; somewhat of a picky eater,  but felt like his weight has not come down since getting back from [facility] to 188 previously.  Is working with a .  Denies any chest pain, shortness of breath, leg swelling, orthopnea PND    Sleep study not scheduled until February.           Assessment and Plan:     ASSESSMENT:    Acute on chronic HFpEF; history of HFimpEF  -Diuresed about 7 pounds to a weight of 201 during last hospitalization.  Clinic weight 199, up 3 lbs after his vacation on our scale.  Home scale stable 192-193. Improved Energy, no dyspnea or edema.    -Continues on torsemide 20 mg daily  -Continues  on Jardiance 10 mg daily  -Creat improved to 1.5   -Continue low-sodium diet  -Continues on valsartan 80 mg daily    -Entresto is a IIb recommendation in the setting of HFpEF and typically reserved for situations with recurrent heart failure hospitalizations and difficult to control hypertension.  May consider in the future if needed.   -Sleep apnea screening scheduled this spring     HTN  -Controlled   -Continue valsartan 80mg daily    -Continue hydralazine 50mg twice a day (reduced secondary to low BP) - could consider increase in valsartan in future and reducing hydralazine as able with improving creatinine level.   -Continue torsemide 20mg daily  -Continue Imdur 120mg daily   -Off amlodipine currently -- edema improved     Permanent atrial fibrillation with bradycardia and pauses  -No longer on Coreg. Monitor heart rates at home. EP consulted during hospital stay 10/2024 with 3.6s pauses during daytime, asymptomatic. No urgent indication for PPM but may need in future or if HR consistently < 50 or if beta blocker needed for HTN control, so far doing okay off Coreg.  -Continues on Xarelto 15mg daily for stroke risk reduction     CAD, 3 vessel s/p stenting to LAD bifurcation into the diagonal in 2019   -Unable to have coronary artery bypass surgery at the time due to porcelain aorta.   -Denies any chest pain. Dyspnea improved with diuresis. EF remains preserved.   -Continue Imdur 120mg daily   -No longer on aspirin to reduce bleeding risks  -Continue rosuvastatin 20mg daily (reduced secondary to CKD) and zetia 10mg daily   -Repeat FLP in 3 months if LDL > 70 then change rosuvastatin to atorvastatin high intensity     CKD 3b  -Creatinine 1.8 at hospital discharge. On torsemide 20mg daily and jardiance 10mg daily   -Repeat labs 12/3/24 showed creat 1.78, K 4.1.   -Following with Nephrology, seen last 12/5/24.   -BMP again today     Chronic LE edema - improved off amlodipine and following diuresis       PLAN:    "  Continue current medications; doing well on Jardiance and torsemide dosing and blood pressures well controlled. Creatinine stable at 1.5. Follows with Nephrology.   Continue daily blood pressure and weight monitoring   Follow up with sleep medicine as planned - still needs sleep study   Follow up with 6 months with Dr. Hidalgo or myself        Lola Flores, DNP, APRN, CNP  [facility]     Orders this Visit:  Orders Placed This Encounter   Procedures    Follow-Up with Cardiology     Orders Placed This Encounter   Medications    empagliflozin (JARDIANCE) 10 MG TABS tablet     Sig: Take 1 tablet (10 mg) by mouth daily.     Dispense:  90 tablet     Refill:  1     Please fill    torsemide (DEMADEX) 20 MG tablet     Sig: Take 1 tablet (20 mg) by mouth daily.     Dispense:  90 tablet     Refill:  3     Keep on hold.     Medications Discontinued During This Encounter   Medication Reason    torsemide (DEMADEX) 20 MG tablet Reorder (No AVS)    empagliflozin (JARDIANCE) 10 MG TABS tablet Reorder (No AVS)              Physical Exam:   Vitals: /60   Pulse 86   Ht 1.676 m (5' 6\")   Wt 90.4 kg (199 lb 3.2 oz)   SpO2 97%   BMI 32.15 kg/m      Body mass index is 32.15 kg/m .    Wt Readings from Last 3 Encounters:   02/17/25 90.4 kg (199 lb 3.2 oz)   01/31/25 89.7 kg (197 lb 12.8 oz)   12/16/24 89.2 kg (196 lb 9.6 oz)       General :   Alert and oriented, in no acute distress.    Respiratory:   Respirations unlabored. Clear bilaterally with no rales, rhonchi, or wheezes.     Cardiovascular:   Rhythm is irregular. S1 and S2 are normal. No significant murmur is present. JVD not elevated   Extremities: Warm and dry, NO LE edema    Neurologic: Moves all extremities, non focal    Psych:  Appropriate             Medications:     Current Outpatient Medications   Medication Sig Dispense Refill    acetaminophen (ACETAMINOPHEN 8 HOUR) 650 MG CR tablet Take 1,300 mg by mouth every 8 hours as needed for mild " pain or fever.      allopurinol (ZYLOPRIM) 300 MG tablet TAKE 1 TABLET DAILY (Patient taking differently: 150 mg. TAKE 1 TABLET DAILY) 90 tablet 3    calcipotriene (DOVONOX) 0.005 % external cream Mix efudex + calcipotriene equally. Apply BID x 4-10 days. Stop when skin gets red. 60 g 1    calcitRIOL (ROCALTROL) 0.25 MCG capsule Take 1 capsule (0.25 mcg) by mouth every other day.      clobetasol propionate (TEMOVATE) 0.05 % external cream Apply topically 2 times daily To feet as needed 60 g 3    empagliflozin (JARDIANCE) 10 MG TABS tablet Take 1 tablet (10 mg) by mouth daily. 90 tablet 1    ezetimibe (ZETIA) 10 MG tablet Take 1 tablet (10 mg) by mouth daily. 90 tablet 3    fluorouracil (EFUDEX) 5 % external cream Mix equally with calcipotriene cream. Apply BID x 4-10 days. Stop when skin gets red. 40 g 0    hydrALAZINE (APRESOLINE) 50 MG tablet Take 50 mg by mouth 2 times daily.      isosorbide mononitrate CR (IMDUR) 120 MG 24 HR ER tablet Take 1 tablet (120 mg) by mouth daily. 90 tablet 3    ketoconazole (NIZORAL) 2 % external shampoo Massage into wet scalp, let sit 3-5 min, then rinse. Do this 3x weekly. 120 mL 11    rivaroxaban ANTICOAGULANT (XARELTO) 15 MG TABS tablet Take 1 tablet (15 mg) by mouth daily (with dinner). 90 tablet 3    rosuvastatin (CRESTOR) 20 MG tablet Take 1 tablet (20 mg) by mouth daily.      torsemide (DEMADEX) 20 MG tablet Take 1 tablet (20 mg) by mouth daily. 90 tablet 3    triamcinolone (KENALOG) 0.1 % external cream Apply topically 2 times daily To psoriasis on arms and body as needed 454 g 1    valsartan (DIOVAN) 80 MG tablet Take 1 tablet (80 mg) by mouth daily. 90 tablet 3       Family History   Problem Relation Age of Onset    Coronary Artery Disease Father     Medical History Unknown Mother     Medical History Unknown Maternal Grandfather        Social History     Socioeconomic History    Marital status:      Spouse name: Not on file    Number of children: 2    Years of  education: Not on file    Highest education level: Not on file   Occupational History    Occupation: retired   Tobacco Use    Smoking status: Former     Current packs/day: 0.00     Average packs/day: 1 pack/day for 30.0 years (30.0 ttl pk-yrs)     Types: Cigarettes     Start date: 1968     Quit date: 1998     Years since quittin.4    Smokeless tobacco: Never   Vaping Use    Vaping status: Never Used   Substance and Sexual Activity    Alcohol use: Yes     Comment: 1-2 per day    Drug use: No    Sexual activity: Not Currently     Partners: Female   Other Topics Concern    Parent/sibling w/ CABG, MI or angioplasty before 65F 55M? Not Asked   Social History Narrative    Not on file     Social Drivers of Health     Financial Resource Strain: Low Risk  (2025)    Financial Resource Strain     Within the past 12 months, have you or your family members you live with been unable to get utilities (heat, electricity) when it was really needed?: No   Food Insecurity: Low Risk  (2025)    Food Insecurity     Within the past 12 months, did you worry that your food would run out before you got money to buy more?: No     Within the past 12 months, did the food you bought just not last and you didn t have money to get more?: No   Transportation Needs: Low Risk  (2025)    Transportation Needs     Within the past 12 months, has lack of transportation kept you from medical appointments, getting your medicines, non-medical meetings or appointments, work, or from getting things that you need?: No   Physical Activity: Insufficiently Active (2025)    Exercise Vital Sign     Days of Exercise per Week: 7 days     Minutes of Exercise per Session: 10 min   Stress: No Stress Concern Present (2025)    [employer] - Occupational Stress Questionnaire     Feeling of Stress : Only a little   Social Connections: Unknown (2025)    Social Connection and Isolation Panel [NHANES]      Frequency of Communication with Friends and Family: Not on file     Frequency of Social Gatherings with Friends and Family: Once a week     Attends Anabaptist Services: Not on file     Active Member of Clubs or Organizations: Not on file     Attends Club or Organization Meetings: Not on file     Marital Status: Not on file   Interpersonal Safety: Low Risk  (1/31/2025)    Interpersonal Safety     Do you feel physically and emotionally safe where you currently live?: Yes     Within the past 12 months, have you been hit, slapped, kicked or otherwise physically hurt by someone?: No     Within the past 12 months, have you been humiliated or emotionally abused in other ways by your partner or ex-partner?: No   Housing Stability: Low Risk  (1/31/2025)    Housing Stability     Do you have housing? : Yes     Are you worried about losing your housing?: No            Past Medical History:     Past Medical History:   Diagnosis Date    Acute diastolic congestive heart failure (H) 06/2019    hosp fsd, then fu echo ef nl; echo 2023 nl ef    ASCVD (arteriosclerotic cardiovascular disease) 1997    angio with 40% circ lesion; 6/19 hosp for chf, 3 vessel dz on angio but porcelin aorta so ptca done    Atrial fibrillation (H) 10/09/2018    found on routine physical    Basal cell carcinoma     Carotid artery plaque 2005    seen on us, about 50% stenosis, fu 2009 us no significant stenosis    CKD (chronic kidney disease) stage 3, GFR 30-59 ml/min (H)     Elevated blood sugar     Gout     on allopurinol    Heart failure with preserved ejection fraction, NYHA class IV (H) 10/2024    hosp fsd    HTN (hypertension)     Hypercholesteremia     Hyponatremia 2015    felt due to chlorthalidone    Low mean corpuscular volume (MCV)     Near syncope 05/2015    fu est echo low level but neg    Psoriasis     Dr. Marti    Renal mass 06/29/2019    seen on ct chest for sob, needs fu ct for that, fu us done 7/19 and no mass seen, should have fu ct in  December, had fu us 3/22 and no fu needed    Screening 2012    abd us no aaa    Secondary renal hyperparathyroidism 2025    Syncope 09/2019    hosp fsd, cause not clear, lowered coreg dose; seen by Dr. Lezama of ep and ep study neg, implanted event monitor    V-tach (H) 09/2019    seen on event monitor for fu syncope, then ep study and no inducible vtach              Past Surgical History:     Past Surgical History:   Procedure Laterality Date    CATARACT EXTRACTION  03/2022    CATARACT EXTRACTION  2022    CV CORONARY ANGIOGRAM N/A 07/02/2019    Procedure: Coronary Angiogram;  Surgeon: Donaldo Loera MD;  Location:  HEART CARDIAC CATH LAB    CV HEART CATHETERIZATION WITH POSSIBLE INTERVENTION N/A 07/05/2019    Procedure: Heart Catheterization with Possible Intervention;  Surgeon: Manolo Hu MD;  Location:  HE[facility]ARD[facility] C[facility] LAB    CV LEFT HEART CATH N/A 07/02/2019    Procedure: Left Heart Cath;  Surgeon: Donaldo Loera MD;  Location:  HE[facility]ARD[facility] CATH LAB    CV LEFT VENTRICULOGRAM N/A 07/02/2019    Procedure: Left Ventriculogram;  Surgeon: Donaldo Loera MD;  Location:  HEART [facility]ARD[facility] CATH LAB    CV PCI STENT DRUG ELUTING N/A 07/05/2019    Procedure: PCI Stent Drug Eluting;  Surgeon: Manolo Hu MD;  Location:  HE[facility]ARD[facility] CATH LAB    CV RIGHT HEART CATH MEASUREMENTS RECORDED N/A 07/02/2019    Procedure: Right Heart Cath;  Surgeon: Donaldo Loera MD;  Location:  HE[facility]ARD[facility] CATH LAB    EP LOOP RECORDER IMPLANT N/A 10/11/2019    Procedure: EP Loop Recorder Implant;  Surgeon: Fuad Lezama MD;  Location:  HEART CARD[facility] CATH LAB    EP RIGHT VENTRICULAR RECORDING N/A 10/11/2019    Procedure: EP Ventricular Pacing;  Surgeon: Fuad Lezama MD;  Location:  HEART CARDI[facility] CATH LAB    ZZC ANESTH,DX ARTHROSCOPIC PROC KNEE JOINT  01/01/2009            Allergies:   Ace inhibitors and Eliquis [apixaban]       Data Reviewed today:   Echocardiogram:  10/9/2024  Summary  Left ventricular systolic function is normal.The visual ejection fraction is  60-65%.Diastolic Doppler findings (E/E' ratio and/or other parameters) suggest  left ventricular filling pressures are increased.  Mildly decreased right ventricular systolic function.  Severe biatrial enlargement.  There is mild (1+) mitral regurgitation.There is mild mitral stenosis.    Coronary angiogram: 7/2019  1-successful PCI of the mid LAD, straddling the second diagonal, with OLESYA.     Left Main   The vessel was visualized by selective angiography and is moderate in size. The left main was selectively injected.   Mid LM to Dist LM lesion is 30% stenosed.      Left Anterior Descending   The LAD was selectively injected. The proximal segment is mildly to moderately calcified with no significant narrowing. The mid segment is very tortuous with a focal 85 to 90% narrowing just after the takeoff of the large first diagonal branch. Just first diagonal branch bifurcates into 2 equally sized medial and lateral branches that had no significant narrowing.   Ost LAD to Prox LAD lesion is 45% stenosed.   Prox LAD to Mid LAD lesion is 90% stenosed. Culprit lesion. The lesion is type B2 - medium risk, located at the major branch, bifurcated and eccentric. The lesion is severely calcified.      Second Diagonal Branch   Ost 2nd Diag lesion is 75% stenosed.      Left Circumflex   The left circumflex coronary artery was selectively injected. There is a calcified ostial and proximal narrowing of about 40 to 50%. There is a mid vessel 30 to 40% narrowing followed by a 70% narrowing in the distal vessel before the takeoff of the terminal marginal branch. There is a very large epicardial collateral vessel seen to fill the distal right coronary.      Right Coronary Artery   The dominant right coronary was selectively injected. The ostium and proximal segment are heavily calcified. There is an eccentric 90% proximal narrowing. There is  "a second 95% subtotal narrowing in the proximal segment. There is very slow antegrade flow in the distal right coronary and competitive filling is seen from collateral vessels on the left system. Injections of the LAD demonstrate profuse collaterals via septal  branches to the distal right coronary and posterior lateral branch.   Prox RCA lesion is 100% stenosed. The lesion is chronic total occlusion.   Mid RCA lesion is 90% stenosed.      Right Posterior Descending Artery   Collaterals   RPDA filled by collaterals from Dist LAD.          Last ILR data 5/2023  [employer] Reveal Loop Recorder   Symptom: 0    Tachy: 0    Pause: 341 pause episodes (most previously acknowledged in alerts) show 3-4s pauses while in AF. Not new. Dr. Hidalgo previously made aware: \"\"Only if he is symptomatic I would back off BB. Otherwise I would continue current Rx for less than 5 sec pause while in AF and less than 3 second pause when in NSR.\"    Beth: 9 beth episodes logged (most previously acknowledged in alerts) show AF w/ slow ventricular response in the 30s during sleeping hours  Time in AT/AF: 99.7% (likely 100% but listed as slightly lower d/t some undersensing). Patient has been in chronic AF since 2018, takes xarelto.  Heart rate: large variability from 30-90s per histogarm    Battery: EOS since 4/7/23     All laboratory data reviewed:    Recent Labs   Lab Test 12/03/24  1508 09/13/24  0944 01/29/24  1014 05/16/23  1043 01/05/23  0802 03/11/22  0746 04/05/21  1205 06/30/19  0640 06/27/19  1151   LDL  --   --  83  --  97 92  --    < >  --    HDL  --   --  35*  --  39* 48  --    < >  --    NHDL  --   --  119  --  144* 121  --    < >  --    CHOL  --   --  154  --  183 169  --    < >  --    TRIG  --   --  182*  --  234* 145  --    < >  --    TSH  --   --   --  3.69  --   --  2.83  --  3.22   NTBNP 4,284*  --   --  2,581*  --   --  2,076*   < >  --    IRON  --  47*  --   --   --   --   --   --   --    FEB  --  264  --   --   " --   --   --   --   --    IRONSAT  --  18  --   --   --   --   --   --   --    RASHEED  --  120  --   --   --   --   --   --   --     < > = values in this interval not displayed.       Lab Results   Component Value Date    WBC 8.9 11/27/2024    WBC 7.4 10/11/2019    RBC 3.64 (L) 11/27/2024    RBC 4.52 10/11/2019    HGB 10.3 (L) 11/27/2024    HGB 11.9 (L) 04/05/2021    HCT 32.0 (L) 11/27/2024    HCT 38.7 (L) 10/11/2019    MCV 88 11/27/2024    MCV 86 10/11/2019    MCH 28.3 11/27/2024    MCH 27.2 10/11/2019    MCHC 32.2 11/27/2024    MCHC 31.8 10/11/2019    RDW 18.4 (H) 11/27/2024    RDW 16.6 (H) 10/11/2019     11/27/2024     10/11/2019       Lab Results   Component Value Date     12/16/2024     05/28/2021    POTASSIUM 4.3 12/16/2024    POTASSIUM 3.6 07/08/2022    POTASSIUM 3.9 05/28/2021    CHLORIDE 105 12/16/2024    CHLORIDE 106 07/08/2022    CHLORIDE 106 05/28/2021    CO2 22 12/16/2024    CO2 26 07/08/2022    CO2 27 05/28/2021    ANIONGAP 13 12/16/2024    ANIONGAP 7 07/08/2022    ANIONGAP 7 05/28/2021    GLC 98 12/16/2024     (H) 10/08/2024     (H) 07/08/2022     (H) 05/28/2021    BUN 26.9 (H) 12/16/2024    BUN 23 07/08/2022    BUN 44 (H) 05/28/2021    CR 1.78 (H) 12/16/2024    CR 1.55 (H) 05/28/2021    GFRESTIMATED 38 (L) 12/16/2024    GFRESTIMATED 43 (L) 05/28/2021    GFRESTBLACK 50 (L) 05/28/2021    GUNNER 9.4 12/16/2024    GUNNER 8.7 05/28/2021      Lab Results   Component Value Date    AST 22 11/25/2024    AST 14 02/16/2021    ALT 13 11/25/2024    ALT 23 02/16/2021       Lab Results   Component Value Date    A1C 5.7 (H) 01/29/2024    A1C 5.7 (H) 06/30/2019       The longitudinal plan of care for Mykel was addressed during this visit. Due to the added complexity in care, I will continue to support Mykel in the subsequent management of this condition(s) and with the ongoing continuity of care of this condition(s).    This note has been dictated using voice recognition software.  Any grammatical, typographical, or context distortions are unintentional and inherent to the software.

## 2025-02-17 NOTE — LETTER
"2/17/2025    Rudy Vernon MD  [address]  [place] [address]    RE: Betito Anderson       Dear Colleague,     I had the pleasure of seeing Betito Anderson in the [facility].        Cardiology Clinic Follow up     Betito Anderson MRN# [id number]   YOB: 1945 Age: 79 year old     Primary cardiologist: Dr. Hidalgo     Reason for visit: Routine 2 month follow up     History of presenting illness:    Betito Anderson \"Mykel\" is a pleasant 79 year old male with past medical history significant for:     1.  Severe 3-vessel coronary artery disease diagnosed in 07/2019 with a normal LM, 80% mid LAD, 70% circ, subtotally occluded RCA with left-to-right collaterals.  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  He then underwent OLESYA to the LAD and D2 (7/5/2019).  Residual 70% circumflex and 90% proximal RCA lesion with left-to-right collaterals  2.  Hypertension  3.  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  Dyslipidemia  5.  Chronic atrial fibrillation, on Xarelto  6.  History of syncope in 2019, monitor with NSVT, EP study negative, asymptomatic bradycardia and pauses in about the 4-second range. ILR battery out since 2022. EP consulted during hospital stay 10/2024 with 3.6s pauses during daytime, asymptomatic. No urgent indication for PPM but may need in future or if HR consistently < 50.   7.   BMI 33   8.   Gout  9.   Anemia  10. CKD3b, baseline creatinine 1.5-1.7. Follows with Nephrology  11. Likely LADY in left renal artery (elevated velocities); difficult visualization of right renal artery  12. Chronic LE edema     Hospitalized 10/8/24-10/12/24 with shortness of breath and increased edema R>L. LE US negative for DVT. BNP 6636. Diuresed on IV lasix to weight 202. Creatinine was " uptrending to 2.4 and plan to hold jardiance and losartan until appt with Nephrology. Coreg 6.25mg BID was also stopped secondary to bradycardia and pauses. EP was consulted during stay (see Dr. Lezama's note 10/10/24). Changed to Torsemide 20mg daily.    Seen by Nephrology 10/31/24. Plan to continue to hold jardiance until back on full dose of Losartan. Losartan resumed 25mg.    Hospitalized again 11/25/24-11/28/24 for acute decompensated diastolic heart failure and noted uncontrolled HTN, KATELIN/CKD. CXR with mild vascular congestion.  nT proBNP 5147.  Noted excessive dietary sodium intake. Diuresed 7 lbs to 201. Creat peak 1.8. Valsartan 80mg was started in place of losartan and jardiance 10mg daily was continued. Amlodipine and hydralazine were held per summary notes (inpt /69).    Seen last in follow up with myself 12/2/2024 following hospital stay. Taking hydralazine 50mg TID. Still fatigued with exertion. Planned to start up with a  again. Weight 201 in clinic. /69. Home scale 193-194 lbs and home 's. Continued on Jardiance 10mg and torsemide 20mg.     Seen by Nephrology 12/5/24 and MTM Pharmacist 12/9/24.     Seen last in follow up with myself 12/16/25. Hydralazine reduced to twice a day due to lower blood pressures and lightheadedness. Weight 196 in office.       Today, Mykel presents for follow up unaccompanied. Had a good time in [place]. No further low blood pressures. Weight on home scale 192-193. Home BP controlled 116-139 systolic.  He is eating only small amounts twice a day; somewhat of a picky eater,  but felt like his weight has not come down since getting back from [facility] to 188 previously.  Is working with a .  Denies any chest pain, shortness of breath, leg swelling, orthopnea PND    Sleep study not scheduled until February.           Assessment and Plan:     ASSESSMENT:    Acute on chronic HFpEF; history of HFimpEF  -Diuresed about 7 pounds to a  weight of 201 during last hospitalization.  Clinic weight 199, up 3 lbs after his vacation on our scale.  Home scale stable 192-193. Improved Energy, no dyspnea or edema.    -Continues on torsemide 20 mg daily  -Continues on Jardiance 10 mg daily  -Creat improved to 1.5   -Continue low-sodium diet  -Continues on valsartan 80 mg daily    -Entresto is a IIb recommendation in the setting of HFpEF and typically reserved for situations with recurrent heart failure hospitalizations and difficult to control hypertension.  May consider in the future if needed.   -Sleep apnea screening scheduled this spring     HTN  -Controlled   -Continue valsartan 80mg daily    -Continue hydralazine 50mg twice a day (reduced secondary to low BP) - could consider increase in valsartan in future and reducing hydralazine as able with improving creatinine level.   -Continue torsemide 20mg daily  -Continue Imdur 120mg daily   -Off amlodipine currently -- edema improved     Permanent atrial fibrillation with bradycardia and pauses  -No longer on Coreg. Monitor heart rates at home. EP consulted during hospital stay 10/2024 with 3.6s pauses during daytime, asymptomatic. No urgent indication for PPM but may need in future or if HR consistently < 50 or if beta blocker needed for HTN control, so far doing okay off Coreg.  -Continues on Xarelto 15mg daily for stroke risk reduction     CAD, 3 vessel s/p stenting to LAD bifurcation into the diagonal in 2019   -Unable to have coronary artery bypass surgery at the time due to porcelain aorta.   -Denies any chest pain. Dyspnea improved with diuresis. EF remains preserved.   -Continue Imdur 120mg daily   -No longer on aspirin to reduce bleeding risks  -Continue rosuvastatin 20mg daily (reduced secondary to CKD) and zetia 10mg daily   -Repeat FLP in 3 months if LDL > 70 then change rosuvastatin to atorvastatin high intensity     CKD 3b  -Creatinine 1.8 at hospital discharge. On torsemide 20mg daily and  "jardiance 10mg daily   -Repeat labs 12/3/24 showed creat 1.78, K 4.1.   -Following with Nephrology, seen last 12/5/24.   -BMP again today     Chronic LE edema - improved off amlodipine and following diuresis       PLAN:     Continue current medications; doing well on Jardiance and torsemide dosing and blood pressures well controlled. Creatinine stable at 1.5. Follows with Nephrology.   Continue daily blood pressure and weight monitoring   Follow up with sleep medicine as planned - still needs sleep study   Follow up with 6 months with Dr. Hidalgo or myself        Lola Flores, DNP, APRN, CNP  [facility]     Orders this Visit:  Orders Placed This Encounter   Procedures     Follow-Up with Cardiology     Orders Placed This Encounter   Medications     empagliflozin (JARDIANCE) 10 MG TABS tablet     Sig: Take 1 tablet (10 mg) by mouth daily.     Dispense:  90 tablet     Refill:  1     Please fill     torsemide (DEMADEX) 20 MG tablet     Sig: Take 1 tablet (20 mg) by mouth daily.     Dispense:  90 tablet     Refill:  3     Keep on hold.     Medications Discontinued During This Encounter   Medication Reason     torsemide (DEMADEX) 20 MG tablet Reorder (No AVS)     empagliflozin (JARDIANCE) 10 MG TABS tablet Reorder (No AVS)              Physical Exam:   Vitals: /60   Pulse 86   Ht 1.676 m (5' 6\")   Wt 90.4 kg (199 lb 3.2 oz)   SpO2 97%   BMI 32.15 kg/m      Body mass index is 32.15 kg/m .    Wt Readings from Last 3 Encounters:   02/17/25 90.4 kg (199 lb 3.2 oz)   01/31/25 89.7 kg (197 lb 12.8 oz)   12/16/24 89.2 kg (196 lb 9.6 oz)       General :   Alert and oriented, in no acute distress.    Respiratory:   Respirations unlabored. Clear bilaterally with no rales, rhonchi, or wheezes.     Cardiovascular:   Rhythm is irregular. S1 and S2 are normal. No significant murmur is present. JVD not elevated   Extremities: Warm and dry, NO LE edema    Neurologic: Moves all extremities, non focal  "   Psych:  Appropriate             Medications:     Current Outpatient Medications   Medication Sig Dispense Refill     acetaminophen (ACETAMINOPHEN 8 HOUR) 650 MG CR tablet Take 1,300 mg by mouth every 8 hours as needed for mild pain or fever.       allopurinol (ZYLOPRIM) 300 MG tablet TAKE 1 TABLET DAILY (Patient taking differently: 150 mg. TAKE 1 TABLET DAILY) 90 tablet 3     calcipotriene (DOVONOX) 0.005 % external cream Mix efudex + calcipotriene equally. Apply BID x 4-10 days. Stop when skin gets red. 60 g 1     calcitRIOL (ROCALTROL) 0.25 MCG capsule Take 1 capsule (0.25 mcg) by mouth every other day.       clobetasol propionate (TEMOVATE) 0.05 % external cream Apply topically 2 times daily To feet as needed 60 g 3     empagliflozin (JARDIANCE) 10 MG TABS tablet Take 1 tablet (10 mg) by mouth daily. 90 tablet 1     ezetimibe (ZETIA) 10 MG tablet Take 1 tablet (10 mg) by mouth daily. 90 tablet 3     fluorouracil (EFUDEX) 5 % external cream Mix equally with calcipotriene cream. Apply BID x 4-10 days. Stop when skin gets red. 40 g 0     hydrALAZINE (APRESOLINE) 50 MG tablet Take 50 mg by mouth 2 times daily.       isosorbide mononitrate CR (IMDUR) 120 MG 24 HR ER tablet Take 1 tablet (120 mg) by mouth daily. 90 tablet 3     ketoconazole (NIZORAL) 2 % external shampoo Massage into wet scalp, let sit 3-5 min, then rinse. Do this 3x weekly. 120 mL 11     rivaroxaban ANTICOAGULANT (XARELTO) 15 MG TABS tablet Take 1 tablet (15 mg) by mouth daily (with dinner). 90 tablet 3     rosuvastatin (CRESTOR) 20 MG tablet Take 1 tablet (20 mg) by mouth daily.       torsemide (DEMADEX) 20 MG tablet Take 1 tablet (20 mg) by mouth daily. 90 tablet 3     triamcinolone (KENALOG) 0.1 % external cream Apply topically 2 times daily To psoriasis on arms and body as needed 454 g 1     valsartan (DIOVAN) 80 MG tablet Take 1 tablet (80 mg) by mouth daily. 90 tablet 3       Family History   Problem Relation Age of Onset     Coronary Artery  Disease Father      Medical History Unknown Mother      Medical History Unknown Maternal Grandfather        Social History     Socioeconomic History     Marital status:      Spouse name: Not on file     Number of children: 2     Years of education: Not on file     Highest education level: Not on file   Occupational History     Occupation: retired   Tobacco Use     Smoking status: Former     Current packs/day: 0.00     Average packs/day: 1 pack/day for 30.0 years (30.0 ttl pk-yrs)     Types: Cigarettes     Start date: 1968     Quit date: 1998     Years since quittin.4     Smokeless tobacco: Never   Vaping Use     Vaping status: Never Used   Substance and Sexual Activity     Alcohol use: Yes     Comment: 1-2 per day     Drug use: No     Sexual activity: Not Currently     Partners: Female   Other Topics Concern     Parent/sibling w/ CABG, MI or angioplasty before 65F 55M? Not Asked   Social History Narrative     Not on file     Social Drivers of Health     Financial Resource Strain: Low Risk  (2025)    Financial Resource Strain      Within the past 12 months, have you or your family members you live with been unable to get utilities (heat, electricity) when it was really needed?: No   Food Insecurity: Low Risk  (2025)    Food Insecurity      Within the past 12 months, did you worry that your food would run out before you got money to buy more?: No      Within the past 12 months, did the food you bought just not last and you didn t have money to get more?: No   Transportation Needs: Low Risk  (2025)    Transportation Needs      Within the past 12 months, has lack of transportation kept you from medical appointments, getting your medicines, non-medical meetings or appointments, work, or from getting things that you need?: No   Physical Activity: Insufficiently Active (2025)    Exercise Vital Sign      Days of Exercise per Week: 7 days      Minutes of Exercise per Session: 10 min    Stress: No Stress Concern Present (1/31/2025)    [employer] [employer] - Occupational Stress Questionnaire      Feeling of Stress : Only a little   Social Connections: Unknown (1/31/2025)    Social Connection and Isolation Panel [NHANES]      Frequency of Communication with Friends and Family: Not on file      Frequency of Social Gatherings with Friends and Family: Once a week      Attends Congregational Services: Not on file      Active Member of Clubs or Organizations: Not on file      Attends Club or Organization Meetings: Not on file      Marital Status: Not on file   Interpersonal Safety: Low Risk  (1/31/2025)    Interpersonal Safety      Do you feel physically and emotionally safe where you currently live?: Yes      Within the past 12 months, have you been hit, slapped, kicked or otherwise physically hurt by someone?: No      Within the past 12 months, have you been humiliated or emotionally abused in other ways by your partner or ex-partner?: No   Housing Stability: Low Risk  (1/31/2025)    Housing Stability      Do you have housing? : Yes      Are you worried about losing your housing?: No            Past Medical History:     Past Medical History:   Diagnosis Date     Acute diastolic congestive heart failure (H) 06/2019    hosp fsd, then fu echo ef nl; echo 2023 nl ef     ASCVD (arteriosclerotic cardiovascular disease) 1997    angio with 40% circ lesion; 6/19 hosp for chf, 3 vessel dz on angio but porcelin aorta so ptca done     Atrial fibrillation (H) 10/09/2018    found on routine physical     Basal cell carcinoma      Carotid artery plaque 2005    seen on us, about 50% stenosis, fu 2009 us no significant stenosis     CKD (chronic kidney disease) stage 3, GFR 30-59 ml/min (H)      Elevated blood sugar      Gout     on allopurinol     Heart failure with preserved ejection fraction, NYHA class IV (H) 10/2024    hosp fsd     HTN (hypertension)      Hypercholesteremia      Hyponatremia 2015     felt due to chlorthalidone     Low mean corpuscular volume (MCV)      Near syncope 05/2015    fu est echo low level but neg     Psoriasis     Dr. Marti     Renal mass 06/29/2019    seen on ct chest for sob, needs fu ct for that, fu us done 7/19 and no mass seen, should have fu ct in December, had fu us 3/22 and no fu needed     Screening 2012    abd us no aaa     Secondary renal hyperparathyroidism 2025     Syncope 09/2019    hosp fsd, cause not clear, lowered coreg dose; seen by Dr. Lezama of ep and ep study neg, implanted event monitor     V-tach (H) 09/2019    seen on event monitor for fu syncope, then ep study and no inducible vtach              Past Surgical History:     Past Surgical History:   Procedure Laterality Date     CATARACT EXTRACTION  03/2022     CATARACT EXTRACTION  2022     CV CORONARY ANGIOGRAM N/A 07/02/2019    Procedure: Coronary Angiogram;  Surgeon: Donaldo Loera MD;  Location:  HEART CARDIAC CATH LAB     CV HEART CATHETERIZATION WITH POSSIBLE INTERVENTION N/A 07/05/2019    Procedure: Heart Catheterization with Possible Intervention;  Surgeon: Manolo Hu MD;  Location:  HEART CARDIAC CATH LAB     CV LEFT HEART CATH N/A 07/02/2019    Procedure: Left Heart Cath;  Surgeon: Donaldo Loera MD;  Location:  HEART CARDIAC CATH LAB     CV LEFT VENTRICULOGRAM N/A 07/02/2019    Procedure: Left Ventriculogram;  Surgeon: Donaldo Loera MD;  Location:  HEART CARDIAC CATH LAB     CV PCI STENT DRUG ELUTING N/A 07/05/2019    Procedure: PCI Stent Drug Eluting;  Surgeon: Manolo Hu MD;  Location:  HEART CARDIAC CATH LAB     CV RIGHT HEART CATH MEASUREMENTS RECORDED N/A 07/02/2019    Procedure: Right Heart Cath;  Surgeon: Donaldo Loera MD;  Location:  HEART CARDIAC CATH LAB     EP LOOP RECORDER IMPLANT N/A 10/11/2019    Procedure: EP Loop Recorder Implant;  Surgeon: Fuad Lezama MD;  Location:  HEART CARDIAC CATH LAB     EP RIGHT VENTRICULAR  RECORDING N/A 10/11/2019    Procedure: EP Ventricular Pacing;  Surgeon: Fuad Lezama MD;  Location:  HEART CARDIAC CATH LAB     ZZC ANESTH,DX ARTHROSCOPIC PROC KNEE JOINT  01/01/2009            Allergies:   Ace inhibitors and Eliquis [apixaban]       Data Reviewed today:   Echocardiogram: 10/9/2024  Summary  Left ventricular systolic function is normal.The visual ejection fraction is  60-65%.Diastolic Doppler findings (E/E' ratio and/or other parameters) suggest  left ventricular filling pressures are increased.  Mildly decreased right ventricular systolic function.  Severe biatrial enlargement.  There is mild (1+) mitral regurgitation.There is mild mitral stenosis.    Coronary angiogram: 7/2019  1-successful PCI of the mid LAD, straddling the second diagonal, with OLESYA.     Left Main   The vessel was visualized by selective angiography and is moderate in size. The left main was selectively injected.   Mid LM to Dist LM lesion is 30% stenosed.      Left Anterior Descending   The LAD was selectively injected. The proximal segment is mildly to moderately calcified with no significant narrowing. The mid segment is very tortuous with a focal 85 to 90% narrowing just after the takeoff of the large first diagonal branch. Just first diagonal branch bifurcates into 2 equally sized medial and lateral branches that had no significant narrowing.   Ost LAD to Prox LAD lesion is 45% stenosed.   Prox LAD to Mid LAD lesion is 90% stenosed. Culprit lesion. The lesion is type B2 - medium risk, located at the major branch, bifurcated and eccentric. The lesion is severely calcified.      Second Diagonal Branch   Ost 2nd Diag lesion is 75% stenosed.      Left Circumflex   The left circumflex coronary artery was selectively injected. There is a calcified ostial and proximal narrowing of about 40 to 50%. There is a mid vessel 30 to 40% narrowing followed by a 70% narrowing in the distal vessel before the takeoff of the terminal  "marginal branch. There is a very large epicardial collateral vessel seen to fill the distal right coronary.      Right Coronary Artery   The dominant right coronary was selectively injected. The ostium and proximal segment are heavily calcified. There is an eccentric 90% proximal narrowing. There is a second 95% subtotal narrowing in the proximal segment. There is very slow antegrade flow in the distal right coronary and competitive filling is seen from collateral vessels on the left system. Injections of the LAD demonstrate profuse collaterals via septal  branches to the distal right coronary and posterior lateral branch.   Prox RCA lesion is 100% stenosed. The lesion is chronic total occlusion.   Mid RCA lesion is 90% stenosed.      Right Posterior Descending Artery   Collaterals   RPDA filled by collaterals from Dist LAD.          Last ILR data 5/2023  [email or web address] Reveal Loop Recorder   Symptom: 0    Tachy: 0    Pause: 341 pause episodes (most previously acknowledged in alerts) show 3-4s pauses while in AF. Not new. Dr. Hidalgo previously made aware: \"\"Only if he is symptomatic I would back off BB. Otherwise I would continue current Rx for less than 5 sec pause while in AF and less than 3 second pause when in NSR.\"    Beth: 9 beth episodes logged (most previously acknowledged in alerts) show AF w/ slow ventricular response in the 30s during sleeping hours  Time in AT/AF: 99.7% (likely 100% but listed as slightly lower d/t some undersensing). Patient has been in chronic AF since 2018, takes xarelto.  Heart rate: large variability from 30-90s per histogarm    Battery: EOS since 4/7/23     All laboratory data reviewed:    Recent Labs   Lab Test 12/03/24  1508 09/13/24  0944 01/29/24  1014 05/16/23  1043 01/05/23  0802 03/11/22  0746 04/05/21  1205 06/30/19  0640 06/27/19  1151   LDL  --   --  83  --  97 92  --    < >  --    HDL  --   --  35*  --  39* 48  --    < >  --    NHDL  --   --  119  --  144* 121  --  "   < >  --    CHOL  --   --  154  --  183 169  --    < >  --    TRIG  --   --  182*  --  234* 145  --    < >  --    TSH  --   --   --  3.69  --   --  2.83  --  3.22   NTBNP 4,284*  --   --  2,581*  --   --  2,076*   < >  --    IRON  --  47*  --   --   --   --   --   --   --    FEB  --  264  --   --   --   --   --   --   --    IRONSAT  --  18  --   --   --   --   --   --   --    RASHEED  --  120  --   --   --   --   --   --   --     < > = values in this interval not displayed.       Lab Results   Component Value Date    WBC 8.9 11/27/2024    WBC 7.4 10/11/2019    RBC 3.64 (L) 11/27/2024    RBC 4.52 10/11/2019    HGB 10.3 (L) 11/27/2024    HGB 11.9 (L) 04/05/2021    HCT 32.0 (L) 11/27/2024    HCT 38.7 (L) 10/11/2019    MCV 88 11/27/2024    MCV 86 10/11/2019    MCH 28.3 11/27/2024    MCH 27.2 10/11/2019    MCHC 32.2 11/27/2024    MCHC 31.8 10/11/2019    RDW 18.4 (H) 11/27/2024    RDW 16.6 (H) 10/11/2019     11/27/2024     10/11/2019       Lab Results   Component Value Date     12/16/2024     05/28/2021    POTASSIUM 4.3 12/16/2024    POTASSIUM 3.6 07/08/2022    POTASSIUM 3.9 05/28/2021    CHLORIDE 105 12/16/2024    CHLORIDE 106 07/08/2022    CHLORIDE 106 05/28/2021    CO2 22 12/16/2024    CO2 26 07/08/2022    CO2 27 05/28/2021    ANIONGAP 13 12/16/2024    ANIONGAP 7 07/08/2022    ANIONGAP 7 05/28/2021    GLC 98 12/16/2024     (H) 10/08/2024     (H) 07/08/2022     (H) 05/28/2021    BUN 26.9 (H) 12/16/2024    BUN 23 07/08/2022    BUN 44 (H) 05/28/2021    CR 1.78 (H) 12/16/2024    CR 1.55 (H) 05/28/2021    GFRESTIMATED 38 (L) 12/16/2024    GFRESTIMATED 43 (L) 05/28/2021    GFRESTBLACK 50 (L) 05/28/2021    GUNNER 9.4 12/16/2024    GUNNER 8.7 05/28/2021      Lab Results   Component Value Date    AST 22 11/25/2024    AST 14 02/16/2021    ALT 13 11/25/2024    ALT 23 02/16/2021       Lab Results   Component Value Date    A1C 5.7 (H) 01/29/2024    A1C 5.7 (H) 06/30/2019       The longitudinal  plan of care for Mykel was addressed during this visit. Due to the added complexity in care, I will continue to support Mykel in the subsequent management of this condition(s) and with the ongoing continuity of care of this condition(s).    This note has been dictated using voice recognition software. Any grammatical, typographical, or context distortions are unintentional and inherent to the software.    Thank you for allowing me to participate in the care of your patient.      Sincerely,     ALFRED Vásquez CNP     [facility]  cc:   ALFRED Castillo CNP  [address]  [place] [address]

## 2025-05-05 ENCOUNTER — THERAPY VISIT (OUTPATIENT)
Dept: SLEEP MEDICINE | Facility: CLINIC | Age: 80
End: 2025-05-05
Attending: INTERNAL MEDICINE
Payer: COMMERCIAL

## 2025-05-05 DIAGNOSIS — G47.9 SLEEP DISORDER, UNSPECIFIED: ICD-10-CM

## 2025-05-05 DIAGNOSIS — I50.32 CHRONIC HEART FAILURE WITH PRESERVED EJECTION FRACTION (H): ICD-10-CM

## 2025-05-06 ENCOUNTER — MYC REFILL (OUTPATIENT)
Dept: FAMILY MEDICINE | Facility: CLINIC | Age: 80
End: 2025-05-06
Payer: COMMERCIAL

## 2025-05-06 DIAGNOSIS — M10.9 GOUT, UNSPECIFIED CAUSE, UNSPECIFIED CHRONICITY, UNSPECIFIED SITE: ICD-10-CM

## 2025-05-06 RX ORDER — ALLOPURINOL 300 MG/1
150 TABLET ORAL DAILY
Qty: 90 TABLET | Refills: 3 | Status: SHIPPED | OUTPATIENT
Start: 2025-05-06 | End: 2025-05-06

## 2025-05-06 RX ORDER — ALLOPURINOL 300 MG/1
150 TABLET ORAL DAILY
Qty: 50 TABLET | Refills: 3 | Status: SHIPPED | OUTPATIENT
Start: 2025-05-06

## 2025-05-06 NOTE — TELEPHONE ENCOUNTER
"Pharm called as Allopurinol [facility]  has 2 sets of instructions 0.5 tablets (150mg) and 1 tablet(300mg)  Asked pharmacy to delete RX for now.    Called pt and he is taking allopurinol 1/2 a tab or 150mg a day, but cannot remember who told him to do that.    Can see [facility] notes from outside that correlate from Nephrology on 1/27/25 that references \"adjustment in allopurinol as indicated.\"    Pended for 150mg dose    Lizy Hernandez RN  [facility] RN Triage Team    "

## 2025-05-22 LAB — SLPCOMP: NORMAL

## 2025-05-26 ENCOUNTER — MYC REFILL (OUTPATIENT)
Dept: FAMILY MEDICINE | Facility: CLINIC | Age: 80
End: 2025-05-26
Payer: COMMERCIAL

## 2025-05-26 DIAGNOSIS — E78.5 HYPERLIPIDEMIA LDL GOAL <100: ICD-10-CM

## 2025-05-26 DIAGNOSIS — I10 BENIGN ESSENTIAL HYPERTENSION: ICD-10-CM

## 2025-05-27 NOTE — TELEPHONE ENCOUNTER
Clinic RN: Please investigate patient's chart or contact patient if the information cannot be found because the medication is listed as historical or discontinued. Confirm patient is taking this medication. Document findings and route refill encounter to provider for approval or denial. hydrALAZINE (APRESOLINE) 50 MG tablet     Annabel FOSTER RN  [facility] Triage Team

## 2025-05-28 RX ORDER — ROSUVASTATIN CALCIUM 20 MG/1
20 TABLET, COATED ORAL DAILY
Qty: 90 TABLET | Refills: 2 | Status: SHIPPED | OUTPATIENT
Start: 2025-05-28

## 2025-05-28 RX ORDER — HYDRALAZINE HYDROCHLORIDE 50 MG/1
50 TABLET, FILM COATED ORAL 2 TIMES DAILY
Qty: 180 TABLET | Refills: 2 | Status: SHIPPED | OUTPATIENT
Start: 2025-05-28

## 2025-08-24 DIAGNOSIS — N18.32 STAGE 3B CHRONIC KIDNEY DISEASE (H): ICD-10-CM

## 2025-08-24 DIAGNOSIS — I50.32 CHRONIC HEART FAILURE WITH PRESERVED EJECTION FRACTION (H): ICD-10-CM

## 2025-09-03 ENCOUNTER — TELEPHONE (OUTPATIENT)
Dept: FAMILY MEDICINE | Facility: CLINIC | Age: 80
End: 2025-09-03
Payer: COMMERCIAL

## 2025-09-03 RX ORDER — EMPAGLIFLOZIN 10 MG/1
10 TABLET, FILM COATED ORAL DAILY
Qty: 90 TABLET | Refills: 1 | Status: SHIPPED | OUTPATIENT
Start: 2025-09-03

## (undated) DEVICE — SLEEVE TR BAND RADIAL COMPRESSION DEVICE 24CM TRB24-REG

## (undated) DEVICE — RAD G/W INQWIRE .035X260CM J-TIP EXCHANGE IQ35F260J1O5RS

## (undated) DEVICE — INTRODUCER SHEATH GREEN 6.5FRX11CM .038IN PSI-6F-11-038ACT

## (undated) DEVICE — PACK EP SRG PROC LF DISP SAN32EPFSR

## (undated) DEVICE — INTRO GLIDESHEATH SLENDER 6FR 10X45CM 60-1060

## (undated) DEVICE — Device

## (undated) DEVICE — INTRO SHEATH 7FRX10CM PINNACLE RSS702

## (undated) DEVICE — INFL DVC KIT W/10CC NITRO IN4530

## (undated) DEVICE — KIT HAND CONTROL ANGIOTOUCH ACIST 65CM AT-P65

## (undated) DEVICE — TOTE ANGIO CORP PC15AT SAN32CC83O

## (undated) DEVICE — CATH JACKY 5FR 3.5 CURVE 40-5023

## (undated) DEVICE — DEFIB PRO-PADZ LVP LQD GEL ADULT 8900-2105-01

## (undated) DEVICE — CATH LAUNCHER 6FR EBU 3.5 LA6EBU35

## (undated) DEVICE — CATH EP CATH EP REPRO DAIG RSPN FX CRV DX EP C

## (undated) DEVICE — CATH BALLOON NC EMERGE 3.25X8MM H7493926708320

## (undated) DEVICE — GW VASC 190CM .014IN HI-TRQ 1009660J

## (undated) DEVICE — CATH BALLOON EMERGE 2.5X8MM H7493918908250

## (undated) DEVICE — WIRE GUIDE 0.035"X260CM SAFE-T-J EXCHANGE G00517

## (undated) DEVICE — MANIFOLD KIT ANGIO AUTOMATED 014613

## (undated) DEVICE — CATH ANGIO INFINITI PIGTAIL 145 6 SH 6FRX110CM  534-652S

## (undated) DEVICE — INTRODUCER CATH VASC 5FRX10CM  MPIS-501-NT-U-SST

## (undated) DEVICE — CATH LAUNCHER 6FR LA6EBU375

## (undated) DEVICE — GUIDEWIRE VASC 0.014INX180CM RUNTHROUGH 25-1011

## (undated) RX ORDER — VERAPAMIL HYDROCHLORIDE 2.5 MG/ML
INJECTION, SOLUTION INTRAVENOUS
Status: DISPENSED
Start: 2019-07-02

## (undated) RX ORDER — HEPARIN SODIUM 200 [USP'U]/100ML
INJECTION, SOLUTION INTRAVENOUS
Status: DISPENSED
Start: 2019-07-05

## (undated) RX ORDER — CEFAZOLIN SODIUM 2 G/100ML
INJECTION, SOLUTION INTRAVENOUS
Status: DISPENSED
Start: 2019-10-11

## (undated) RX ORDER — LIDOCAINE HYDROCHLORIDE 10 MG/ML
INJECTION, SOLUTION EPIDURAL; INFILTRATION; INTRACAUDAL; PERINEURAL
Status: DISPENSED
Start: 2019-07-02

## (undated) RX ORDER — NITROGLYCERIN 5 MG/ML
VIAL (ML) INTRAVENOUS
Status: DISPENSED
Start: 2019-07-05

## (undated) RX ORDER — VERAPAMIL HYDROCHLORIDE 2.5 MG/ML
INJECTION, SOLUTION INTRAVENOUS
Status: DISPENSED
Start: 2019-07-05

## (undated) RX ORDER — NITROGLYCERIN 5 MG/ML
VIAL (ML) INTRAVENOUS
Status: DISPENSED
Start: 2019-07-02

## (undated) RX ORDER — REGADENOSON 0.08 MG/ML
INJECTION, SOLUTION INTRAVENOUS
Status: DISPENSED
Start: 2019-09-12

## (undated) RX ORDER — HYDRALAZINE HYDROCHLORIDE 20 MG/ML
INJECTION INTRAMUSCULAR; INTRAVENOUS
Status: DISPENSED
Start: 2019-07-02

## (undated) RX ORDER — FENTANYL CITRATE 50 UG/ML
INJECTION, SOLUTION INTRAMUSCULAR; INTRAVENOUS
Status: DISPENSED
Start: 2019-07-02

## (undated) RX ORDER — FENTANYL CITRATE 50 UG/ML
INJECTION, SOLUTION INTRAMUSCULAR; INTRAVENOUS
Status: DISPENSED
Start: 2019-10-11

## (undated) RX ORDER — HEPARIN SODIUM 1000 [USP'U]/ML
INJECTION, SOLUTION INTRAVENOUS; SUBCUTANEOUS
Status: DISPENSED
Start: 2019-07-05

## (undated) RX ORDER — LIDOCAINE HYDROCHLORIDE 10 MG/ML
INJECTION, SOLUTION EPIDURAL; INFILTRATION; INTRACAUDAL; PERINEURAL
Status: DISPENSED
Start: 2019-07-05

## (undated) RX ORDER — CLOPIDOGREL BISULFATE 75 MG/1
TABLET ORAL
Status: DISPENSED
Start: 2019-07-05

## (undated) RX ORDER — LIDOCAINE HYDROCHLORIDE 10 MG/ML
INJECTION, SOLUTION EPIDURAL; INFILTRATION; INTRACAUDAL; PERINEURAL
Status: DISPENSED
Start: 2019-10-11

## (undated) RX ORDER — HEPARIN SODIUM 1000 [USP'U]/ML
INJECTION, SOLUTION INTRAVENOUS; SUBCUTANEOUS
Status: DISPENSED
Start: 2019-07-02

## (undated) RX ORDER — FENTANYL CITRATE 50 UG/ML
INJECTION, SOLUTION INTRAMUSCULAR; INTRAVENOUS
Status: DISPENSED
Start: 2019-07-05